# Patient Record
Sex: FEMALE | Race: WHITE | Employment: OTHER | URBAN - METROPOLITAN AREA
[De-identification: names, ages, dates, MRNs, and addresses within clinical notes are randomized per-mention and may not be internally consistent; named-entity substitution may affect disease eponyms.]

---

## 2019-10-11 ENCOUNTER — HOSPITAL ENCOUNTER (OUTPATIENT)
Age: 74
Setting detail: SPECIMEN
Discharge: HOME | End: 2019-10-11
Payer: COMMERCIAL

## 2019-10-11 LAB
DATE, STOOL #1: NORMAL
DATE, STOOL #2: NORMAL
DATE, STOOL #3: NORMAL
HEMOCCULT SP1 STL QL: NEGATIVE
HEMOCCULT SP2 STL QL: NEGATIVE
HEMOCCULT SP3 STL QL: NEGATIVE
TIME, STOOL #1: 1856
TIME, STOOL #2: NORMAL
TIME, STOOL #3: NORMAL

## 2019-10-14 ENCOUNTER — HOSPITAL ENCOUNTER (OUTPATIENT)
Age: 74
Setting detail: OBSERVATION
Discharge: HOME | End: 2019-10-17
Attending: EMERGENCY MEDICINE | Admitting: INTERNAL MEDICINE
Payer: MEDICARE

## 2019-10-14 ENCOUNTER — APPOINTMENT (OUTPATIENT)
Dept: GENERAL RADIOLOGY | Age: 74
End: 2019-10-14
Payer: MEDICARE

## 2019-10-14 DIAGNOSIS — J40 BRONCHITIS: Primary | ICD-10-CM

## 2019-10-14 DIAGNOSIS — I50.9 CONGESTIVE HEART FAILURE, UNSPECIFIED HF CHRONICITY, UNSPECIFIED HEART FAILURE TYPE (HCC): ICD-10-CM

## 2019-10-14 DIAGNOSIS — R06.89 DYSPNEA AND RESPIRATORY ABNORMALITIES: ICD-10-CM

## 2019-10-14 DIAGNOSIS — J22 LOWER RESPIRATORY INFECTION: ICD-10-CM

## 2019-10-14 DIAGNOSIS — R06.00 DYSPNEA AND RESPIRATORY ABNORMALITIES: ICD-10-CM

## 2019-10-14 LAB
ABSOLUTE EOS #: 0.48 K/UL (ref 0–0.44)
ABSOLUTE IMMATURE GRANULOCYTE: 0.12 K/UL (ref 0–0.3)
ABSOLUTE LYMPH #: 4.44 K/UL (ref 1.1–3.7)
ABSOLUTE MONO #: 0.84 K/UL (ref 0.1–1.2)
ANION GAP SERPL CALCULATED.3IONS-SCNC: 11 MMOL/L (ref 9–17)
BASOPHILS # BLD: 1 % (ref 0–2)
BASOPHILS ABSOLUTE: 0.12 K/UL (ref 0–0.2)
BNP INTERPRETATION: ABNORMAL
BUN BLDV-MCNC: 10 MG/DL (ref 8–23)
BUN/CREAT BLD: 14 (ref 9–20)
CALCIUM SERPL-MCNC: 9.7 MG/DL (ref 8.6–10.4)
CHLORIDE BLD-SCNC: 104 MMOL/L (ref 98–107)
CO2: 23 MMOL/L (ref 20–31)
CREAT SERPL-MCNC: 0.71 MG/DL (ref 0.5–0.9)
DIFFERENTIAL TYPE: ABNORMAL
DIRECT EXAM: NORMAL
DIRECT EXAM: NORMAL
EOSINOPHILS RELATIVE PERCENT: 4 % (ref 1–4)
GFR AFRICAN AMERICAN: >60 ML/MIN
GFR NON-AFRICAN AMERICAN: >60 ML/MIN
GFR SERPL CREATININE-BSD FRML MDRD: ABNORMAL ML/MIN/{1.73_M2}
GFR SERPL CREATININE-BSD FRML MDRD: ABNORMAL ML/MIN/{1.73_M2}
GLUCOSE BLD-MCNC: 100 MG/DL (ref 70–99)
HCT VFR BLD CALC: 35.2 % (ref 36.3–47.1)
HEMOGLOBIN: 11.2 G/DL (ref 11.9–15.1)
IMMATURE GRANULOCYTES: 1 %
LACTIC ACID, SEPSIS WHOLE BLOOD: NORMAL MMOL/L (ref 0.5–1.9)
LACTIC ACID, SEPSIS: 1.4 MMOL/L (ref 0.5–1.9)
LYMPHOCYTES # BLD: 37 % (ref 24–43)
Lab: NORMAL
Lab: NORMAL
MAGNESIUM: 1.7 MG/DL (ref 1.6–2.6)
MCH RBC QN AUTO: 29.8 PG (ref 25.2–33.5)
MCHC RBC AUTO-ENTMCNC: 31.8 G/DL (ref 28.4–34.8)
MCV RBC AUTO: 93.6 FL (ref 82.6–102.9)
MONOCYTES # BLD: 7 % (ref 3–12)
MYOGLOBIN: <21 NG/ML (ref 25–58)
NRBC AUTOMATED: 0 PER 100 WBC
PDW BLD-RTO: 15.4 % (ref 11.8–14.4)
PLATELET # BLD: 382 K/UL (ref 138–453)
PLATELET ESTIMATE: ABNORMAL
PMV BLD AUTO: 10.1 FL (ref 8.1–13.5)
POTASSIUM SERPL-SCNC: 3.8 MMOL/L (ref 3.7–5.3)
PRO-BNP: 819 PG/ML
RBC # BLD: 3.76 M/UL (ref 3.95–5.11)
RBC # BLD: ABNORMAL 10*6/UL
SEG NEUTROPHILS: 50 % (ref 36–65)
SEGMENTED NEUTROPHILS ABSOLUTE COUNT: 6 K/UL (ref 1.5–8.1)
SODIUM BLD-SCNC: 138 MMOL/L (ref 135–144)
SPECIMEN DESCRIPTION: NORMAL
SPECIMEN DESCRIPTION: NORMAL
TROPONIN INTERP: ABNORMAL
TROPONIN T: ABNORMAL NG/ML
TROPONIN, HIGH SENSITIVITY: 12 NG/L (ref 0–14)
WBC # BLD: 12 K/UL (ref 3.5–11.3)
WBC # BLD: ABNORMAL 10*3/UL

## 2019-10-14 PROCEDURE — 85025 COMPLETE CBC W/AUTO DIFF WBC: CPT

## 2019-10-14 PROCEDURE — 87804 INFLUENZA ASSAY W/OPTIC: CPT

## 2019-10-14 PROCEDURE — 71045 X-RAY EXAM CHEST 1 VIEW: CPT

## 2019-10-14 PROCEDURE — 94760 N-INVAS EAR/PLS OXIMETRY 1: CPT

## 2019-10-14 PROCEDURE — 83880 ASSAY OF NATRIURETIC PEPTIDE: CPT

## 2019-10-14 PROCEDURE — 83735 ASSAY OF MAGNESIUM: CPT

## 2019-10-14 PROCEDURE — 6360000002 HC RX W HCPCS: Performed by: NURSE PRACTITIONER

## 2019-10-14 PROCEDURE — 84484 ASSAY OF TROPONIN QUANT: CPT

## 2019-10-14 PROCEDURE — 83874 ASSAY OF MYOGLOBIN: CPT

## 2019-10-14 PROCEDURE — 81003 URINALYSIS AUTO W/O SCOPE: CPT

## 2019-10-14 PROCEDURE — 94640 AIRWAY INHALATION TREATMENT: CPT

## 2019-10-14 PROCEDURE — 99285 EMERGENCY DEPT VISIT HI MDM: CPT

## 2019-10-14 PROCEDURE — 80048 BASIC METABOLIC PNL TOTAL CA: CPT

## 2019-10-14 PROCEDURE — 83605 ASSAY OF LACTIC ACID: CPT

## 2019-10-14 PROCEDURE — 93005 ELECTROCARDIOGRAM TRACING: CPT | Performed by: NURSE PRACTITIONER

## 2019-10-14 PROCEDURE — 87040 BLOOD CULTURE FOR BACTERIA: CPT

## 2019-10-14 PROCEDURE — 87880 STREP A ASSAY W/OPTIC: CPT

## 2019-10-14 PROCEDURE — 96365 THER/PROPH/DIAG IV INF INIT: CPT

## 2019-10-14 PROCEDURE — 96366 THER/PROPH/DIAG IV INF ADDON: CPT

## 2019-10-14 PROCEDURE — 96375 TX/PRO/DX INJ NEW DRUG ADDON: CPT

## 2019-10-14 RX ORDER — METHYLPREDNISOLONE SODIUM SUCCINATE 125 MG/2ML
125 INJECTION, POWDER, LYOPHILIZED, FOR SOLUTION INTRAMUSCULAR; INTRAVENOUS ONCE
Status: COMPLETED | OUTPATIENT
Start: 2019-10-14 | End: 2019-10-14

## 2019-10-14 RX ORDER — MAGNESIUM SULFATE 1 G/100ML
1 INJECTION INTRAVENOUS ONCE
Status: COMPLETED | OUTPATIENT
Start: 2019-10-14 | End: 2019-10-14

## 2019-10-14 RX ORDER — ALBUTEROL SULFATE 2.5 MG/3ML
2.5 SOLUTION RESPIRATORY (INHALATION) ONCE
Status: COMPLETED | OUTPATIENT
Start: 2019-10-14 | End: 2019-10-14

## 2019-10-14 RX ADMIN — ALBUTEROL SULFATE 2.5 MG: 2.5 SOLUTION RESPIRATORY (INHALATION) at 20:22

## 2019-10-14 RX ADMIN — METHYLPREDNISOLONE SODIUM SUCCINATE 125 MG: 125 INJECTION, POWDER, FOR SOLUTION INTRAMUSCULAR; INTRAVENOUS at 20:54

## 2019-10-14 RX ADMIN — MAGNESIUM SULFATE IN DEXTROSE 1 G: 10 INJECTION, SOLUTION INTRAVENOUS at 20:57

## 2019-10-14 ASSESSMENT — ENCOUNTER SYMPTOMS
COUGH: 1
WHEEZING: 1
VOMITING: 0
SHORTNESS OF BREATH: 1
ABDOMINAL PAIN: 0
NAUSEA: 0

## 2019-10-14 ASSESSMENT — PAIN SCALES - GENERAL: PAINLEVEL_OUTOF10: 9

## 2019-10-15 PROBLEM — Z72.0 TOBACCO ABUSE: Status: ACTIVE | Noted: 2019-10-15

## 2019-10-15 PROBLEM — I50.33 ACUTE ON CHRONIC DIASTOLIC CHF (CONGESTIVE HEART FAILURE) (HCC): Status: ACTIVE | Noted: 2019-10-15

## 2019-10-15 PROBLEM — I50.9 HEART FAILURE (HCC): Status: ACTIVE | Noted: 2019-10-15

## 2019-10-15 PROBLEM — I50.33 ACUTE ON CHRONIC DIASTOLIC HEART FAILURE (HCC): Status: ACTIVE | Noted: 2019-10-15

## 2019-10-15 LAB
ANION GAP SERPL CALCULATED.3IONS-SCNC: 17 MMOL/L (ref 9–17)
BUN BLDV-MCNC: 11 MG/DL (ref 8–23)
BUN/CREAT BLD: 12 (ref 9–20)
CALCIUM SERPL-MCNC: 9.2 MG/DL (ref 8.6–10.4)
CHLORIDE BLD-SCNC: 100 MMOL/L (ref 98–107)
CHOLESTEROL/HDL RATIO: 3
CHOLESTEROL: 114 MG/DL
CO2: 18 MMOL/L (ref 20–31)
CREAT SERPL-MCNC: 0.91 MG/DL (ref 0.5–0.9)
EKG ATRIAL RATE: 71 BPM
EKG P AXIS: 30 DEGREES
EKG P-R INTERVAL: 186 MS
EKG Q-T INTERVAL: 456 MS
EKG QRS DURATION: 94 MS
EKG QTC CALCULATION (BAZETT): 495 MS
EKG R AXIS: -17 DEGREES
EKG T AXIS: -23 DEGREES
EKG VENTRICULAR RATE: 71 BPM
GFR AFRICAN AMERICAN: >60 ML/MIN
GFR NON-AFRICAN AMERICAN: >60 ML/MIN
GFR SERPL CREATININE-BSD FRML MDRD: ABNORMAL ML/MIN/{1.73_M2}
GFR SERPL CREATININE-BSD FRML MDRD: ABNORMAL ML/MIN/{1.73_M2}
GLUCOSE BLD-MCNC: 101 MG/DL (ref 65–105)
GLUCOSE BLD-MCNC: 168 MG/DL (ref 65–105)
GLUCOSE BLD-MCNC: 215 MG/DL (ref 65–105)
GLUCOSE BLD-MCNC: 473 MG/DL (ref 70–99)
HCT VFR BLD CALC: 36.6 % (ref 36.3–47.1)
HDLC SERPL-MCNC: 38 MG/DL
HEMOGLOBIN: 11.4 G/DL (ref 11.9–15.1)
LACTIC ACID, SEPSIS WHOLE BLOOD: NORMAL MMOL/L (ref 0.5–1.9)
LACTIC ACID, SEPSIS: 1.6 MMOL/L (ref 0.5–1.9)
LDL CHOLESTEROL: 63 MG/DL (ref 0–130)
LV EF: 55 %
LVEF MODALITY: NORMAL
MAGNESIUM: 1.7 MG/DL (ref 1.6–2.6)
MCH RBC QN AUTO: 29.8 PG (ref 25.2–33.5)
MCHC RBC AUTO-ENTMCNC: 31.1 G/DL (ref 28.4–34.8)
MCV RBC AUTO: 95.6 FL (ref 82.6–102.9)
MYOGLOBIN: 21 NG/ML (ref 25–58)
NRBC AUTOMATED: 0 PER 100 WBC
PDW BLD-RTO: 15.4 % (ref 11.8–14.4)
PLATELET # BLD: 365 K/UL (ref 138–453)
PMV BLD AUTO: 10 FL (ref 8.1–13.5)
POTASSIUM SERPL-SCNC: 3.8 MMOL/L (ref 3.7–5.3)
RBC # BLD: 3.83 M/UL (ref 3.95–5.11)
SODIUM BLD-SCNC: 135 MMOL/L (ref 135–144)
TRIGL SERPL-MCNC: 64 MG/DL
TROPONIN INTERP: ABNORMAL
TROPONIN T: ABNORMAL NG/ML
TROPONIN, HIGH SENSITIVITY: 10 NG/L (ref 0–14)
TSH SERPL DL<=0.05 MIU/L-ACNC: 0.47 MIU/L (ref 0.3–5)
VLDLC SERPL CALC-MCNC: ABNORMAL MG/DL (ref 1–30)
WBC # BLD: 9.6 K/UL (ref 3.5–11.3)

## 2019-10-15 PROCEDURE — 36415 COLL VENOUS BLD VENIPUNCTURE: CPT

## 2019-10-15 PROCEDURE — 80048 BASIC METABOLIC PNL TOTAL CA: CPT

## 2019-10-15 PROCEDURE — 6370000000 HC RX 637 (ALT 250 FOR IP): Performed by: NURSE PRACTITIONER

## 2019-10-15 PROCEDURE — G0378 HOSPITAL OBSERVATION PER HR: HCPCS

## 2019-10-15 PROCEDURE — 97162 PT EVAL MOD COMPLEX 30 MIN: CPT

## 2019-10-15 PROCEDURE — 94640 AIRWAY INHALATION TREATMENT: CPT

## 2019-10-15 PROCEDURE — 6360000002 HC RX W HCPCS: Performed by: NURSE PRACTITIONER

## 2019-10-15 PROCEDURE — 6360000002 HC RX W HCPCS: Performed by: EMERGENCY MEDICINE

## 2019-10-15 PROCEDURE — 93306 TTE W/DOPPLER COMPLETE: CPT

## 2019-10-15 PROCEDURE — 83735 ASSAY OF MAGNESIUM: CPT

## 2019-10-15 PROCEDURE — 80061 LIPID PANEL: CPT

## 2019-10-15 PROCEDURE — 82947 ASSAY GLUCOSE BLOOD QUANT: CPT

## 2019-10-15 PROCEDURE — 2700000000 HC OXYGEN THERAPY PER DAY

## 2019-10-15 PROCEDURE — 96375 TX/PRO/DX INJ NEW DRUG ADDON: CPT

## 2019-10-15 PROCEDURE — 94761 N-INVAS EAR/PLS OXIMETRY MLT: CPT

## 2019-10-15 PROCEDURE — 99222 1ST HOSP IP/OBS MODERATE 55: CPT | Performed by: NURSE PRACTITIONER

## 2019-10-15 PROCEDURE — 85027 COMPLETE CBC AUTOMATED: CPT

## 2019-10-15 PROCEDURE — 2580000003 HC RX 258: Performed by: NURSE PRACTITIONER

## 2019-10-15 PROCEDURE — 97530 THERAPEUTIC ACTIVITIES: CPT

## 2019-10-15 PROCEDURE — 84443 ASSAY THYROID STIM HORMONE: CPT

## 2019-10-15 PROCEDURE — 96376 TX/PRO/DX INJ SAME DRUG ADON: CPT

## 2019-10-15 PROCEDURE — 96372 THER/PROPH/DIAG INJ SC/IM: CPT

## 2019-10-15 PROCEDURE — 94760 N-INVAS EAR/PLS OXIMETRY 1: CPT

## 2019-10-15 RX ORDER — SERTRALINE HYDROCHLORIDE 100 MG/1
100 TABLET, FILM COATED ORAL DAILY
Status: DISCONTINUED | OUTPATIENT
Start: 2019-10-15 | End: 2019-10-17 | Stop reason: HOSPADM

## 2019-10-15 RX ORDER — LORAZEPAM 1 MG/1
1 TABLET ORAL EVERY 8 HOURS PRN
Status: DISCONTINUED | OUTPATIENT
Start: 2019-10-15 | End: 2019-10-17 | Stop reason: HOSPADM

## 2019-10-15 RX ORDER — CARVEDILOL 3.12 MG/1
12.5 TABLET ORAL 2 TIMES DAILY WITH MEALS
Status: DISCONTINUED | OUTPATIENT
Start: 2019-10-15 | End: 2019-10-17 | Stop reason: HOSPADM

## 2019-10-15 RX ORDER — ACETAMINOPHEN 325 MG/1
650 TABLET ORAL EVERY 4 HOURS PRN
Status: DISCONTINUED | OUTPATIENT
Start: 2019-10-15 | End: 2019-10-17 | Stop reason: HOSPADM

## 2019-10-15 RX ORDER — DOCUSATE SODIUM 100 MG/1
100 CAPSULE, LIQUID FILLED ORAL 2 TIMES DAILY
Status: DISCONTINUED | OUTPATIENT
Start: 2019-10-15 | End: 2019-10-17 | Stop reason: HOSPADM

## 2019-10-15 RX ORDER — METHYLPREDNISOLONE SODIUM SUCCINATE 40 MG/ML
40 INJECTION, POWDER, LYOPHILIZED, FOR SOLUTION INTRAMUSCULAR; INTRAVENOUS EVERY 6 HOURS
Status: DISCONTINUED | OUTPATIENT
Start: 2019-10-15 | End: 2019-10-17

## 2019-10-15 RX ORDER — IPRATROPIUM BROMIDE AND ALBUTEROL SULFATE 2.5; .5 MG/3ML; MG/3ML
1 SOLUTION RESPIRATORY (INHALATION)
Status: DISCONTINUED | OUTPATIENT
Start: 2019-10-15 | End: 2019-10-15

## 2019-10-15 RX ORDER — NICOTINE 21 MG/24HR
1 PATCH, TRANSDERMAL 24 HOURS TRANSDERMAL DAILY PRN
Status: DISCONTINUED | OUTPATIENT
Start: 2019-10-15 | End: 2019-10-17 | Stop reason: HOSPADM

## 2019-10-15 RX ORDER — ONDANSETRON 2 MG/ML
4 INJECTION INTRAMUSCULAR; INTRAVENOUS EVERY 6 HOURS PRN
Status: DISCONTINUED | OUTPATIENT
Start: 2019-10-15 | End: 2019-10-17 | Stop reason: HOSPADM

## 2019-10-15 RX ORDER — MORPHINE SULFATE 15 MG/1
15 TABLET, FILM COATED, EXTENDED RELEASE ORAL 2 TIMES DAILY
Status: DISCONTINUED | OUTPATIENT
Start: 2019-10-15 | End: 2019-10-17 | Stop reason: HOSPADM

## 2019-10-15 RX ORDER — AMLODIPINE BESYLATE 5 MG/1
10 TABLET ORAL DAILY
Status: DISCONTINUED | OUTPATIENT
Start: 2019-10-15 | End: 2019-10-17 | Stop reason: HOSPADM

## 2019-10-15 RX ORDER — FUROSEMIDE 10 MG/ML
40 INJECTION INTRAMUSCULAR; INTRAVENOUS 2 TIMES DAILY
Status: DISCONTINUED | OUTPATIENT
Start: 2019-10-15 | End: 2019-10-17

## 2019-10-15 RX ORDER — BENZONATATE 100 MG/1
100 CAPSULE ORAL 3 TIMES DAILY PRN
Status: DISCONTINUED | OUTPATIENT
Start: 2019-10-15 | End: 2019-10-17 | Stop reason: HOSPADM

## 2019-10-15 RX ORDER — TRAZODONE HYDROCHLORIDE 50 MG/1
100 TABLET ORAL NIGHTLY
Status: DISCONTINUED | OUTPATIENT
Start: 2019-10-15 | End: 2019-10-17 | Stop reason: HOSPADM

## 2019-10-15 RX ORDER — CALCIUM CARBONATE/VITAMIN D3 600 MG-10
1 TABLET ORAL DAILY
Status: DISCONTINUED | OUTPATIENT
Start: 2019-10-15 | End: 2019-10-17 | Stop reason: HOSPADM

## 2019-10-15 RX ORDER — LISINOPRIL 10 MG/1
20 TABLET ORAL DAILY
Status: DISCONTINUED | OUTPATIENT
Start: 2019-10-15 | End: 2019-10-17 | Stop reason: HOSPADM

## 2019-10-15 RX ORDER — ALBUTEROL SULFATE 2.5 MG/3ML
2.5 SOLUTION RESPIRATORY (INHALATION)
Status: DISCONTINUED | OUTPATIENT
Start: 2019-10-15 | End: 2019-10-17 | Stop reason: HOSPADM

## 2019-10-15 RX ORDER — HYDRALAZINE HYDROCHLORIDE 10 MG/1
10 TABLET, FILM COATED ORAL 2 TIMES DAILY
Status: DISCONTINUED | OUTPATIENT
Start: 2019-10-15 | End: 2019-10-17 | Stop reason: HOSPADM

## 2019-10-15 RX ORDER — ONDANSETRON 4 MG/1
4 TABLET, ORALLY DISINTEGRATING ORAL EVERY 6 HOURS PRN
Status: DISCONTINUED | OUTPATIENT
Start: 2019-10-15 | End: 2019-10-17 | Stop reason: HOSPADM

## 2019-10-15 RX ORDER — ALBUTEROL SULFATE 90 UG/1
2 AEROSOL, METERED RESPIRATORY (INHALATION) EVERY 6 HOURS PRN
Status: DISCONTINUED | OUTPATIENT
Start: 2019-10-15 | End: 2019-10-17 | Stop reason: HOSPADM

## 2019-10-15 RX ORDER — GABAPENTIN 100 MG/1
100 CAPSULE ORAL 3 TIMES DAILY
Status: DISCONTINUED | OUTPATIENT
Start: 2019-10-15 | End: 2019-10-16

## 2019-10-15 RX ORDER — SODIUM CHLORIDE 0.9 % (FLUSH) 0.9 %
10 SYRINGE (ML) INJECTION EVERY 12 HOURS SCHEDULED
Status: DISCONTINUED | OUTPATIENT
Start: 2019-10-15 | End: 2019-10-17 | Stop reason: HOSPADM

## 2019-10-15 RX ORDER — LANOLIN ALCOHOL/MO/W.PET/CERES
325 CREAM (GRAM) TOPICAL
Status: DISCONTINUED | OUTPATIENT
Start: 2019-10-15 | End: 2019-10-17 | Stop reason: HOSPADM

## 2019-10-15 RX ORDER — OXYBUTYNIN CHLORIDE 5 MG/1
5 TABLET ORAL 2 TIMES DAILY
Status: DISCONTINUED | OUTPATIENT
Start: 2019-10-15 | End: 2019-10-17 | Stop reason: HOSPADM

## 2019-10-15 RX ORDER — NICOTINE POLACRILEX 4 MG
15 LOZENGE BUCCAL PRN
Status: DISCONTINUED | OUTPATIENT
Start: 2019-10-15 | End: 2019-10-17 | Stop reason: HOSPADM

## 2019-10-15 RX ORDER — DEXTROSE MONOHYDRATE 50 MG/ML
100 INJECTION, SOLUTION INTRAVENOUS PRN
Status: DISCONTINUED | OUTPATIENT
Start: 2019-10-15 | End: 2019-10-17 | Stop reason: HOSPADM

## 2019-10-15 RX ORDER — DEXTROSE MONOHYDRATE 25 G/50ML
12.5 INJECTION, SOLUTION INTRAVENOUS PRN
Status: DISCONTINUED | OUTPATIENT
Start: 2019-10-15 | End: 2019-10-17 | Stop reason: HOSPADM

## 2019-10-15 RX ORDER — PREDNISONE 20 MG/1
20 TABLET ORAL DAILY
Status: DISCONTINUED | OUTPATIENT
Start: 2019-10-15 | End: 2019-10-15

## 2019-10-15 RX ORDER — ASPIRIN 81 MG/1
81 TABLET ORAL DAILY
Status: DISCONTINUED | OUTPATIENT
Start: 2019-10-15 | End: 2019-10-17 | Stop reason: HOSPADM

## 2019-10-15 RX ORDER — ALBUTEROL SULFATE 2.5 MG/3ML
2.5 SOLUTION RESPIRATORY (INHALATION) EVERY 4 HOURS PRN
Status: DISCONTINUED | OUTPATIENT
Start: 2019-10-15 | End: 2019-10-17 | Stop reason: HOSPADM

## 2019-10-15 RX ORDER — ATORVASTATIN CALCIUM 20 MG/1
20 TABLET, FILM COATED ORAL DAILY
Status: DISCONTINUED | OUTPATIENT
Start: 2019-10-15 | End: 2019-10-17 | Stop reason: HOSPADM

## 2019-10-15 RX ORDER — PANTOPRAZOLE SODIUM 40 MG/1
40 TABLET, DELAYED RELEASE ORAL
Status: DISCONTINUED | OUTPATIENT
Start: 2019-10-15 | End: 2019-10-17 | Stop reason: HOSPADM

## 2019-10-15 RX ORDER — SODIUM CHLORIDE 0.9 % (FLUSH) 0.9 %
10 SYRINGE (ML) INJECTION PRN
Status: DISCONTINUED | OUTPATIENT
Start: 2019-10-15 | End: 2019-10-17 | Stop reason: HOSPADM

## 2019-10-15 RX ORDER — ONDANSETRON 2 MG/ML
4 INJECTION INTRAMUSCULAR; INTRAVENOUS EVERY 6 HOURS PRN
Status: DISCONTINUED | OUTPATIENT
Start: 2019-10-15 | End: 2019-10-15 | Stop reason: SDUPTHER

## 2019-10-15 RX ADMIN — ALBUTEROL SULFATE 2.5 MG: 2.5 SOLUTION RESPIRATORY (INHALATION) at 11:12

## 2019-10-15 RX ADMIN — DOCUSATE SODIUM 100 MG: 100 CAPSULE, LIQUID FILLED ORAL at 21:53

## 2019-10-15 RX ADMIN — INSULIN LISPRO 1 UNITS: 100 INJECTION, SOLUTION INTRAVENOUS; SUBCUTANEOUS at 21:53

## 2019-10-15 RX ADMIN — METHYLPREDNISOLONE SODIUM SUCCINATE 40 MG: 40 INJECTION, POWDER, FOR SOLUTION INTRAMUSCULAR; INTRAVENOUS at 21:53

## 2019-10-15 RX ADMIN — MORPHINE SULFATE 15 MG: 15 TABLET, FILM COATED, EXTENDED RELEASE ORAL at 21:53

## 2019-10-15 RX ADMIN — CARVEDILOL 12.5 MG: 3.12 TABLET, FILM COATED ORAL at 08:35

## 2019-10-15 RX ADMIN — ALBUTEROL SULFATE 2.5 MG: 2.5 SOLUTION RESPIRATORY (INHALATION) at 06:32

## 2019-10-15 RX ADMIN — CARVEDILOL 12.5 MG: 3.12 TABLET, FILM COATED ORAL at 17:25

## 2019-10-15 RX ADMIN — INSULIN LISPRO 3 UNITS: 100 INJECTION, SOLUTION INTRAVENOUS; SUBCUTANEOUS at 12:48

## 2019-10-15 RX ADMIN — METFORMIN HYDROCHLORIDE 500 MG: 500 TABLET ORAL at 08:34

## 2019-10-15 RX ADMIN — PREDNISONE 20 MG: 20 TABLET ORAL at 08:34

## 2019-10-15 RX ADMIN — Medication 10 ML: at 08:37

## 2019-10-15 RX ADMIN — SERTRALINE HYDROCHLORIDE 100 MG: 100 TABLET ORAL at 08:35

## 2019-10-15 RX ADMIN — FUROSEMIDE 40 MG: 10 INJECTION, SOLUTION INTRAMUSCULAR; INTRAVENOUS at 21:53

## 2019-10-15 RX ADMIN — MORPHINE SULFATE 15 MG: 15 TABLET, FILM COATED, EXTENDED RELEASE ORAL at 08:34

## 2019-10-15 RX ADMIN — ALBUTEROL SULFATE 2.5 MG: 2.5 SOLUTION RESPIRATORY (INHALATION) at 15:13

## 2019-10-15 RX ADMIN — ASPIRIN 81 MG: 81 TABLET, COATED ORAL at 08:33

## 2019-10-15 RX ADMIN — OXYBUTYNIN CHLORIDE 5 MG: 5 TABLET ORAL at 08:33

## 2019-10-15 RX ADMIN — Medication 10 ML: at 21:53

## 2019-10-15 RX ADMIN — ATORVASTATIN CALCIUM 20 MG: 20 TABLET, FILM COATED ORAL at 08:35

## 2019-10-15 RX ADMIN — TRAZODONE HYDROCHLORIDE 100 MG: 50 TABLET ORAL at 21:53

## 2019-10-15 RX ADMIN — HYDRALAZINE HYDROCHLORIDE 10 MG: 10 TABLET, FILM COATED ORAL at 08:33

## 2019-10-15 RX ADMIN — FERROUS SULFATE TAB EC 325 MG (65 MG FE EQUIVALENT) 325 MG: 325 (65 FE) TABLET DELAYED RESPONSE at 08:33

## 2019-10-15 RX ADMIN — BENZONATATE 100 MG: 100 CAPSULE ORAL at 10:54

## 2019-10-15 RX ADMIN — GUAIFENESIN AND DEXTROMETHORPHAN HYDROBROMIDE 1 TABLET: 600; 30 TABLET, EXTENDED RELEASE ORAL at 14:57

## 2019-10-15 RX ADMIN — OXYBUTYNIN CHLORIDE 5 MG: 5 TABLET ORAL at 21:53

## 2019-10-15 RX ADMIN — FUROSEMIDE 40 MG: 10 INJECTION, SOLUTION INTRAMUSCULAR; INTRAVENOUS at 08:35

## 2019-10-15 RX ADMIN — FERROUS SULFATE TAB EC 325 MG (65 MG FE EQUIVALENT) 325 MG: 325 (65 FE) TABLET DELAYED RESPONSE at 12:49

## 2019-10-15 RX ADMIN — DOCUSATE SODIUM 100 MG: 100 CAPSULE, LIQUID FILLED ORAL at 08:32

## 2019-10-15 RX ADMIN — PREGABALIN 200 MG: 75 CAPSULE ORAL at 03:23

## 2019-10-15 RX ADMIN — LORAZEPAM 1 MG: 1 TABLET ORAL at 03:24

## 2019-10-15 RX ADMIN — ACETAMINOPHEN 650 MG: 325 TABLET ORAL at 18:11

## 2019-10-15 RX ADMIN — PREGABALIN 200 MG: 75 CAPSULE ORAL at 08:33

## 2019-10-15 RX ADMIN — PANTOPRAZOLE SODIUM 40 MG: 40 TABLET, DELAYED RELEASE ORAL at 10:51

## 2019-10-15 RX ADMIN — Medication 1 TABLET: at 08:35

## 2019-10-15 RX ADMIN — LISINOPRIL 20 MG: 10 TABLET ORAL at 08:34

## 2019-10-15 RX ADMIN — ALBUTEROL SULFATE 2.5 MG: 2.5 SOLUTION RESPIRATORY (INHALATION) at 03:22

## 2019-10-15 RX ADMIN — PREGABALIN 200 MG: 75 CAPSULE ORAL at 21:53

## 2019-10-15 RX ADMIN — METHYLPREDNISOLONE SODIUM SUCCINATE 40 MG: 40 INJECTION, POWDER, FOR SOLUTION INTRAMUSCULAR; INTRAVENOUS at 14:57

## 2019-10-15 RX ADMIN — HYDRALAZINE HYDROCHLORIDE 10 MG: 10 TABLET, FILM COATED ORAL at 21:53

## 2019-10-15 RX ADMIN — ENOXAPARIN SODIUM 40 MG: 40 INJECTION SUBCUTANEOUS at 08:35

## 2019-10-15 RX ADMIN — MOMETASONE FUROATE AND FORMOTEROL FUMARATE DIHYDRATE 2 PUFF: 200; 5 AEROSOL RESPIRATORY (INHALATION) at 18:21

## 2019-10-15 RX ADMIN — GUAIFENESIN AND DEXTROMETHORPHAN HYDROBROMIDE 1 TABLET: 600; 30 TABLET, EXTENDED RELEASE ORAL at 21:53

## 2019-10-15 RX ADMIN — FERROUS SULFATE TAB EC 325 MG (65 MG FE EQUIVALENT) 325 MG: 325 (65 FE) TABLET DELAYED RESPONSE at 17:25

## 2019-10-15 RX ADMIN — ALBUTEROL SULFATE 2.5 MG: 2.5 SOLUTION RESPIRATORY (INHALATION) at 18:19

## 2019-10-15 ASSESSMENT — PAIN SCALES - GENERAL
PAINLEVEL_OUTOF10: 6
PAINLEVEL_OUTOF10: 3
PAINLEVEL_OUTOF10: 9

## 2019-10-15 ASSESSMENT — PAIN DESCRIPTION - PAIN TYPE: TYPE: CHRONIC PAIN

## 2019-10-15 ASSESSMENT — PAIN DESCRIPTION - FREQUENCY: FREQUENCY: INTERMITTENT

## 2019-10-15 ASSESSMENT — PAIN DESCRIPTION - DESCRIPTORS: DESCRIPTORS: ACHING;DISCOMFORT

## 2019-10-15 ASSESSMENT — PAIN DESCRIPTION - ONSET: ONSET: ON-GOING

## 2019-10-15 ASSESSMENT — PAIN DESCRIPTION - LOCATION: LOCATION: GENERALIZED

## 2019-10-15 ASSESSMENT — PAIN - FUNCTIONAL ASSESSMENT: PAIN_FUNCTIONAL_ASSESSMENT: ACTIVITIES ARE NOT PREVENTED

## 2019-10-16 ENCOUNTER — APPOINTMENT (OUTPATIENT)
Dept: GENERAL RADIOLOGY | Age: 74
End: 2019-10-16
Payer: MEDICARE

## 2019-10-16 LAB
-: NORMAL
AMORPHOUS: NORMAL
BACTERIA: NORMAL
BILIRUBIN URINE: NEGATIVE
BNP INTERPRETATION: ABNORMAL
CASTS UA: NORMAL /LPF
COLOR: YELLOW
COMMENT UA: ABNORMAL
CRYSTALS, UA: NORMAL /HPF
EPITHELIAL CELLS UA: NORMAL /HPF (ref 0–5)
GLUCOSE BLD-MCNC: 138 MG/DL (ref 65–105)
GLUCOSE BLD-MCNC: 144 MG/DL (ref 65–105)
GLUCOSE BLD-MCNC: 156 MG/DL (ref 65–105)
GLUCOSE BLD-MCNC: 208 MG/DL (ref 65–105)
GLUCOSE URINE: NEGATIVE
KETONES, URINE: NEGATIVE
LEUKOCYTE ESTERASE, URINE: NEGATIVE
MUCUS: NORMAL
NITRITE, URINE: NEGATIVE
OTHER OBSERVATIONS UA: NORMAL
PH UA: 5.5 (ref 5–8)
PRO-BNP: 2031 PG/ML
PROTEIN UA: ABNORMAL
RBC UA: NORMAL /HPF (ref 0–2)
RENAL EPITHELIAL, UA: NORMAL /HPF
SPECIFIC GRAVITY UA: 1.01 (ref 1–1.03)
TRICHOMONAS: NORMAL
TURBIDITY: CLEAR
URINE HGB: NEGATIVE
UROBILINOGEN, URINE: NORMAL
WBC UA: NORMAL /HPF (ref 0–5)
YEAST: NORMAL

## 2019-10-16 PROCEDURE — 83880 ASSAY OF NATRIURETIC PEPTIDE: CPT

## 2019-10-16 PROCEDURE — 6360000002 HC RX W HCPCS: Performed by: NURSE PRACTITIONER

## 2019-10-16 PROCEDURE — 97535 SELF CARE MNGMENT TRAINING: CPT

## 2019-10-16 PROCEDURE — 2580000003 HC RX 258: Performed by: NURSE PRACTITIONER

## 2019-10-16 PROCEDURE — G0378 HOSPITAL OBSERVATION PER HR: HCPCS

## 2019-10-16 PROCEDURE — 97116 GAIT TRAINING THERAPY: CPT

## 2019-10-16 PROCEDURE — 81001 URINALYSIS AUTO W/SCOPE: CPT

## 2019-10-16 PROCEDURE — 6370000000 HC RX 637 (ALT 250 FOR IP): Performed by: NURSE PRACTITIONER

## 2019-10-16 PROCEDURE — 94640 AIRWAY INHALATION TREATMENT: CPT

## 2019-10-16 PROCEDURE — 2700000000 HC OXYGEN THERAPY PER DAY

## 2019-10-16 PROCEDURE — 97110 THERAPEUTIC EXERCISES: CPT

## 2019-10-16 PROCEDURE — 96376 TX/PRO/DX INJ SAME DRUG ADON: CPT

## 2019-10-16 PROCEDURE — 82947 ASSAY GLUCOSE BLOOD QUANT: CPT

## 2019-10-16 PROCEDURE — 99225 PR SBSQ OBSERVATION CARE/DAY 25 MINUTES: CPT | Performed by: NURSE PRACTITIONER

## 2019-10-16 PROCEDURE — 71046 X-RAY EXAM CHEST 2 VIEWS: CPT

## 2019-10-16 PROCEDURE — 96372 THER/PROPH/DIAG INJ SC/IM: CPT

## 2019-10-16 PROCEDURE — 97166 OT EVAL MOD COMPLEX 45 MIN: CPT

## 2019-10-16 PROCEDURE — 36415 COLL VENOUS BLD VENIPUNCTURE: CPT

## 2019-10-16 RX ADMIN — TRAZODONE HYDROCHLORIDE 100 MG: 50 TABLET ORAL at 20:48

## 2019-10-16 RX ADMIN — METHYLPREDNISOLONE SODIUM SUCCINATE 40 MG: 40 INJECTION, POWDER, FOR SOLUTION INTRAMUSCULAR; INTRAVENOUS at 09:20

## 2019-10-16 RX ADMIN — GUAIFENESIN AND DEXTROMETHORPHAN HYDROBROMIDE 1 TABLET: 600; 30 TABLET, EXTENDED RELEASE ORAL at 09:24

## 2019-10-16 RX ADMIN — MORPHINE SULFATE 15 MG: 15 TABLET, FILM COATED, EXTENDED RELEASE ORAL at 09:19

## 2019-10-16 RX ADMIN — CARVEDILOL 12.5 MG: 3.12 TABLET, FILM COATED ORAL at 09:24

## 2019-10-16 RX ADMIN — ALBUTEROL SULFATE 2.5 MG: 2.5 SOLUTION RESPIRATORY (INHALATION) at 11:27

## 2019-10-16 RX ADMIN — ACETAMINOPHEN 650 MG: 325 TABLET ORAL at 06:05

## 2019-10-16 RX ADMIN — HYDRALAZINE HYDROCHLORIDE 10 MG: 10 TABLET, FILM COATED ORAL at 20:47

## 2019-10-16 RX ADMIN — ENOXAPARIN SODIUM 40 MG: 40 INJECTION SUBCUTANEOUS at 09:20

## 2019-10-16 RX ADMIN — LISINOPRIL 20 MG: 10 TABLET ORAL at 09:19

## 2019-10-16 RX ADMIN — Medication 1 TABLET: at 09:19

## 2019-10-16 RX ADMIN — Medication 10 ML: at 20:49

## 2019-10-16 RX ADMIN — PREGABALIN 200 MG: 75 CAPSULE ORAL at 20:48

## 2019-10-16 RX ADMIN — METHYLPREDNISOLONE SODIUM SUCCINATE 40 MG: 40 INJECTION, POWDER, FOR SOLUTION INTRAMUSCULAR; INTRAVENOUS at 15:53

## 2019-10-16 RX ADMIN — GUAIFENESIN AND DEXTROMETHORPHAN HYDROBROMIDE 1 TABLET: 600; 30 TABLET, EXTENDED RELEASE ORAL at 20:48

## 2019-10-16 RX ADMIN — ALBUTEROL SULFATE 2.5 MG: 2.5 SOLUTION RESPIRATORY (INHALATION) at 08:04

## 2019-10-16 RX ADMIN — PANTOPRAZOLE SODIUM 40 MG: 40 TABLET, DELAYED RELEASE ORAL at 05:38

## 2019-10-16 RX ADMIN — ASPIRIN 81 MG: 81 TABLET, COATED ORAL at 09:20

## 2019-10-16 RX ADMIN — AMLODIPINE BESYLATE 10 MG: 5 TABLET ORAL at 09:20

## 2019-10-16 RX ADMIN — ACETAMINOPHEN 650 MG: 325 TABLET ORAL at 13:56

## 2019-10-16 RX ADMIN — MOMETASONE FUROATE AND FORMOTEROL FUMARATE DIHYDRATE 2 PUFF: 200; 5 AEROSOL RESPIRATORY (INHALATION) at 08:04

## 2019-10-16 RX ADMIN — FERROUS SULFATE TAB EC 325 MG (65 MG FE EQUIVALENT) 325 MG: 325 (65 FE) TABLET DELAYED RESPONSE at 13:54

## 2019-10-16 RX ADMIN — FUROSEMIDE 40 MG: 10 INJECTION, SOLUTION INTRAMUSCULAR; INTRAVENOUS at 09:20

## 2019-10-16 RX ADMIN — PREGABALIN 200 MG: 75 CAPSULE ORAL at 09:19

## 2019-10-16 RX ADMIN — HYDRALAZINE HYDROCHLORIDE 10 MG: 10 TABLET, FILM COATED ORAL at 09:19

## 2019-10-16 RX ADMIN — METFORMIN HYDROCHLORIDE 500 MG: 500 TABLET ORAL at 09:24

## 2019-10-16 RX ADMIN — ATORVASTATIN CALCIUM 20 MG: 20 TABLET, FILM COATED ORAL at 09:19

## 2019-10-16 RX ADMIN — ACETAMINOPHEN 650 MG: 325 TABLET ORAL at 21:48

## 2019-10-16 RX ADMIN — FERROUS SULFATE TAB EC 325 MG (65 MG FE EQUIVALENT) 325 MG: 325 (65 FE) TABLET DELAYED RESPONSE at 16:56

## 2019-10-16 RX ADMIN — DOCUSATE SODIUM 100 MG: 100 CAPSULE, LIQUID FILLED ORAL at 09:19

## 2019-10-16 RX ADMIN — OXYBUTYNIN CHLORIDE 5 MG: 5 TABLET ORAL at 09:20

## 2019-10-16 RX ADMIN — DOCUSATE SODIUM 100 MG: 100 CAPSULE, LIQUID FILLED ORAL at 20:48

## 2019-10-16 RX ADMIN — METHYLPREDNISOLONE SODIUM SUCCINATE 40 MG: 40 INJECTION, POWDER, FOR SOLUTION INTRAMUSCULAR; INTRAVENOUS at 20:47

## 2019-10-16 RX ADMIN — INSULIN LISPRO 1 UNITS: 100 INJECTION, SOLUTION INTRAVENOUS; SUBCUTANEOUS at 21:48

## 2019-10-16 RX ADMIN — MOMETASONE FUROATE AND FORMOTEROL FUMARATE DIHYDRATE 2 PUFF: 200; 5 AEROSOL RESPIRATORY (INHALATION) at 18:19

## 2019-10-16 RX ADMIN — FERROUS SULFATE TAB EC 325 MG (65 MG FE EQUIVALENT) 325 MG: 325 (65 FE) TABLET DELAYED RESPONSE at 09:24

## 2019-10-16 RX ADMIN — OXYBUTYNIN CHLORIDE 5 MG: 5 TABLET ORAL at 20:48

## 2019-10-16 RX ADMIN — MORPHINE SULFATE 15 MG: 15 TABLET, FILM COATED, EXTENDED RELEASE ORAL at 20:47

## 2019-10-16 RX ADMIN — INSULIN LISPRO 3 UNITS: 100 INJECTION, SOLUTION INTRAVENOUS; SUBCUTANEOUS at 17:15

## 2019-10-16 RX ADMIN — CARVEDILOL 12.5 MG: 3.12 TABLET, FILM COATED ORAL at 16:56

## 2019-10-16 RX ADMIN — METHYLPREDNISOLONE SODIUM SUCCINATE 40 MG: 40 INJECTION, POWDER, FOR SOLUTION INTRAMUSCULAR; INTRAVENOUS at 03:15

## 2019-10-16 RX ADMIN — Medication 10 ML: at 09:19

## 2019-10-16 RX ADMIN — Medication 10 ML: at 03:15

## 2019-10-16 RX ADMIN — ALBUTEROL SULFATE 2.5 MG: 2.5 SOLUTION RESPIRATORY (INHALATION) at 15:37

## 2019-10-16 RX ADMIN — FUROSEMIDE 40 MG: 10 INJECTION, SOLUTION INTRAMUSCULAR; INTRAVENOUS at 20:47

## 2019-10-16 RX ADMIN — SERTRALINE HYDROCHLORIDE 100 MG: 100 TABLET ORAL at 09:20

## 2019-10-16 RX ADMIN — ALBUTEROL SULFATE 2.5 MG: 2.5 SOLUTION RESPIRATORY (INHALATION) at 18:19

## 2019-10-16 ASSESSMENT — PAIN DESCRIPTION - LOCATION
LOCATION: GENERALIZED
LOCATION: HEAD;CHEST

## 2019-10-16 ASSESSMENT — PAIN DESCRIPTION - DESCRIPTORS: DESCRIPTORS: ACHING;DISCOMFORT;TENDER

## 2019-10-16 ASSESSMENT — PAIN SCALES - GENERAL
PAINLEVEL_OUTOF10: 6
PAINLEVEL_OUTOF10: 8
PAINLEVEL_OUTOF10: 3
PAINLEVEL_OUTOF10: 9
PAINLEVEL_OUTOF10: 9
PAINLEVEL_OUTOF10: 8

## 2019-10-16 ASSESSMENT — PAIN DESCRIPTION - FREQUENCY: FREQUENCY: CONTINUOUS

## 2019-10-16 ASSESSMENT — PAIN - FUNCTIONAL ASSESSMENT: PAIN_FUNCTIONAL_ASSESSMENT: PREVENTS OR INTERFERES SOME ACTIVE ACTIVITIES AND ADLS

## 2019-10-16 ASSESSMENT — PAIN DESCRIPTION - PAIN TYPE
TYPE: ACUTE PAIN
TYPE: ACUTE PAIN

## 2019-10-16 ASSESSMENT — PAIN DESCRIPTION - PROGRESSION: CLINICAL_PROGRESSION: GRADUALLY IMPROVING

## 2019-10-16 ASSESSMENT — PAIN DESCRIPTION - ONSET: ONSET: ON-GOING

## 2019-10-17 VITALS
SYSTOLIC BLOOD PRESSURE: 169 MMHG | TEMPERATURE: 97.7 F | HEIGHT: 66 IN | HEART RATE: 62 BPM | DIASTOLIC BLOOD PRESSURE: 85 MMHG | WEIGHT: 151.2 LBS | BODY MASS INDEX: 24.3 KG/M2 | OXYGEN SATURATION: 100 % | RESPIRATION RATE: 16 BRPM

## 2019-10-17 LAB
GLUCOSE BLD-MCNC: 152 MG/DL (ref 65–105)
GLUCOSE BLD-MCNC: 158 MG/DL (ref 65–105)

## 2019-10-17 PROCEDURE — G0378 HOSPITAL OBSERVATION PER HR: HCPCS

## 2019-10-17 PROCEDURE — 6370000000 HC RX 637 (ALT 250 FOR IP): Performed by: NURSE PRACTITIONER

## 2019-10-17 PROCEDURE — 6360000002 HC RX W HCPCS: Performed by: NURSE PRACTITIONER

## 2019-10-17 PROCEDURE — 2700000000 HC OXYGEN THERAPY PER DAY

## 2019-10-17 PROCEDURE — 97530 THERAPEUTIC ACTIVITIES: CPT | Performed by: NURSE PRACTITIONER

## 2019-10-17 PROCEDURE — 96372 THER/PROPH/DIAG INJ SC/IM: CPT

## 2019-10-17 PROCEDURE — 94640 AIRWAY INHALATION TREATMENT: CPT

## 2019-10-17 PROCEDURE — 97535 SELF CARE MNGMENT TRAINING: CPT | Performed by: NURSE PRACTITIONER

## 2019-10-17 PROCEDURE — 82947 ASSAY GLUCOSE BLOOD QUANT: CPT

## 2019-10-17 PROCEDURE — 96376 TX/PRO/DX INJ SAME DRUG ADON: CPT

## 2019-10-17 PROCEDURE — 2580000003 HC RX 258: Performed by: NURSE PRACTITIONER

## 2019-10-17 PROCEDURE — 99239 HOSP IP/OBS DSCHRG MGMT >30: CPT | Performed by: NURSE PRACTITIONER

## 2019-10-17 RX ORDER — PREDNISONE 20 MG/1
40 TABLET ORAL DAILY
Qty: 14 TABLET | Refills: 0 | Status: SHIPPED | OUTPATIENT
Start: 2019-10-17 | End: 2019-10-24

## 2019-10-17 RX ORDER — FUROSEMIDE 20 MG/1
20 TABLET ORAL DAILY
Qty: 30 TABLET | Refills: 3 | Status: ON HOLD | OUTPATIENT
Start: 2019-10-18 | End: 2020-08-25 | Stop reason: HOSPADM

## 2019-10-17 RX ORDER — FUROSEMIDE 20 MG/1
20 TABLET ORAL DAILY
Qty: 60 TABLET | Refills: 3 | Status: CANCELLED | OUTPATIENT
Start: 2019-10-18

## 2019-10-17 RX ORDER — NICOTINE 21 MG/24HR
1 PATCH, TRANSDERMAL 24 HOURS TRANSDERMAL DAILY PRN
Qty: 30 PATCH | Refills: 3 | Status: SHIPPED | OUTPATIENT
Start: 2019-10-17 | End: 2020-08-20

## 2019-10-17 RX ORDER — FUROSEMIDE 20 MG/1
20 TABLET ORAL DAILY
Status: DISCONTINUED | OUTPATIENT
Start: 2019-10-18 | End: 2019-10-17 | Stop reason: HOSPADM

## 2019-10-17 RX ORDER — PREDNISONE 20 MG/1
20 TABLET ORAL DAILY
Qty: 30 TABLET | Refills: 0
Start: 2019-10-25 | End: 2019-11-24

## 2019-10-17 RX ADMIN — HYDRALAZINE HYDROCHLORIDE 10 MG: 10 TABLET, FILM COATED ORAL at 08:36

## 2019-10-17 RX ADMIN — MORPHINE SULFATE 15 MG: 15 TABLET, FILM COATED, EXTENDED RELEASE ORAL at 08:37

## 2019-10-17 RX ADMIN — MOMETASONE FUROATE AND FORMOTEROL FUMARATE DIHYDRATE 2 PUFF: 200; 5 AEROSOL RESPIRATORY (INHALATION) at 07:25

## 2019-10-17 RX ADMIN — PREGABALIN 200 MG: 75 CAPSULE ORAL at 08:37

## 2019-10-17 RX ADMIN — ATORVASTATIN CALCIUM 20 MG: 20 TABLET, FILM COATED ORAL at 08:36

## 2019-10-17 RX ADMIN — PANTOPRAZOLE SODIUM 40 MG: 40 TABLET, DELAYED RELEASE ORAL at 06:05

## 2019-10-17 RX ADMIN — LISINOPRIL 20 MG: 10 TABLET ORAL at 08:36

## 2019-10-17 RX ADMIN — FUROSEMIDE 40 MG: 10 INJECTION, SOLUTION INTRAMUSCULAR; INTRAVENOUS at 08:35

## 2019-10-17 RX ADMIN — INSULIN LISPRO 3 UNITS: 100 INJECTION, SOLUTION INTRAVENOUS; SUBCUTANEOUS at 12:36

## 2019-10-17 RX ADMIN — AMLODIPINE BESYLATE 10 MG: 5 TABLET ORAL at 08:37

## 2019-10-17 RX ADMIN — METHYLPREDNISOLONE SODIUM SUCCINATE 40 MG: 40 INJECTION, POWDER, FOR SOLUTION INTRAMUSCULAR; INTRAVENOUS at 03:02

## 2019-10-17 RX ADMIN — Medication 1 TABLET: at 08:37

## 2019-10-17 RX ADMIN — METFORMIN HYDROCHLORIDE 500 MG: 500 TABLET ORAL at 08:37

## 2019-10-17 RX ADMIN — Medication 10 ML: at 08:44

## 2019-10-17 RX ADMIN — ALBUTEROL SULFATE 2.5 MG: 2.5 SOLUTION RESPIRATORY (INHALATION) at 07:25

## 2019-10-17 RX ADMIN — OXYBUTYNIN CHLORIDE 5 MG: 5 TABLET ORAL at 08:36

## 2019-10-17 RX ADMIN — ENOXAPARIN SODIUM 40 MG: 40 INJECTION SUBCUTANEOUS at 08:35

## 2019-10-17 RX ADMIN — METHYLPREDNISOLONE SODIUM SUCCINATE 40 MG: 40 INJECTION, POWDER, FOR SOLUTION INTRAMUSCULAR; INTRAVENOUS at 08:35

## 2019-10-17 RX ADMIN — ASPIRIN 81 MG: 81 TABLET, COATED ORAL at 08:38

## 2019-10-17 RX ADMIN — GUAIFENESIN AND DEXTROMETHORPHAN HYDROBROMIDE 1 TABLET: 600; 30 TABLET, EXTENDED RELEASE ORAL at 08:37

## 2019-10-17 RX ADMIN — INSULIN LISPRO 3 UNITS: 100 INJECTION, SOLUTION INTRAVENOUS; SUBCUTANEOUS at 08:35

## 2019-10-17 RX ADMIN — DOCUSATE SODIUM 100 MG: 100 CAPSULE, LIQUID FILLED ORAL at 08:36

## 2019-10-17 RX ADMIN — CARVEDILOL 12.5 MG: 3.12 TABLET, FILM COATED ORAL at 08:38

## 2019-10-17 RX ADMIN — FERROUS SULFATE TAB EC 325 MG (65 MG FE EQUIVALENT) 325 MG: 325 (65 FE) TABLET DELAYED RESPONSE at 08:37

## 2019-10-17 RX ADMIN — ACETAMINOPHEN 650 MG: 325 TABLET ORAL at 04:45

## 2019-10-17 RX ADMIN — FERROUS SULFATE TAB EC 325 MG (65 MG FE EQUIVALENT) 325 MG: 325 (65 FE) TABLET DELAYED RESPONSE at 12:36

## 2019-10-17 RX ADMIN — SERTRALINE HYDROCHLORIDE 100 MG: 100 TABLET ORAL at 08:37

## 2019-10-17 RX ADMIN — ALBUTEROL SULFATE 2.5 MG: 2.5 SOLUTION RESPIRATORY (INHALATION) at 11:13

## 2019-10-17 ASSESSMENT — PAIN SCALES - GENERAL
PAINLEVEL_OUTOF10: 8
PAINLEVEL_OUTOF10: 9
PAINLEVEL_OUTOF10: 6

## 2019-10-20 LAB
CULTURE: NORMAL
CULTURE: NORMAL
Lab: NORMAL
Lab: NORMAL
SPECIMEN DESCRIPTION: NORMAL
SPECIMEN DESCRIPTION: NORMAL

## 2019-12-03 ENCOUNTER — HOSPITAL ENCOUNTER (EMERGENCY)
Age: 74
Discharge: HOME | End: 2019-12-03
Attending: EMERGENCY MEDICINE
Payer: MEDICARE

## 2019-12-03 ENCOUNTER — APPOINTMENT (OUTPATIENT)
Dept: GENERAL RADIOLOGY | Age: 74
End: 2019-12-03
Payer: MEDICARE

## 2019-12-03 VITALS
SYSTOLIC BLOOD PRESSURE: 140 MMHG | RESPIRATION RATE: 18 BRPM | BODY MASS INDEX: 25.83 KG/M2 | HEIGHT: 65 IN | TEMPERATURE: 97.7 F | OXYGEN SATURATION: 96 % | WEIGHT: 155 LBS | HEART RATE: 68 BPM | DIASTOLIC BLOOD PRESSURE: 79 MMHG

## 2019-12-03 DIAGNOSIS — J40 BRONCHITIS: ICD-10-CM

## 2019-12-03 DIAGNOSIS — R06.02 SHORTNESS OF BREATH: Primary | ICD-10-CM

## 2019-12-03 LAB
ABSOLUTE EOS #: 0.41 K/UL (ref 0–0.44)
ABSOLUTE IMMATURE GRANULOCYTE: 0.04 K/UL (ref 0–0.3)
ABSOLUTE LYMPH #: 3.49 K/UL (ref 1.1–3.7)
ABSOLUTE MONO #: 0.76 K/UL (ref 0.1–1.2)
ANION GAP SERPL CALCULATED.3IONS-SCNC: 14 MMOL/L (ref 9–17)
BASOPHILS # BLD: 1 % (ref 0–2)
BASOPHILS ABSOLUTE: 0.09 K/UL (ref 0–0.2)
BNP INTERPRETATION: ABNORMAL
BUN BLDV-MCNC: 23 MG/DL (ref 8–23)
BUN/CREAT BLD: 24 (ref 9–20)
CALCIUM SERPL-MCNC: 9.8 MG/DL (ref 8.6–10.4)
CHLORIDE BLD-SCNC: 105 MMOL/L (ref 98–107)
CO2: 21 MMOL/L (ref 20–31)
CREAT SERPL-MCNC: 0.94 MG/DL (ref 0.5–0.9)
DIFFERENTIAL TYPE: ABNORMAL
EOSINOPHILS RELATIVE PERCENT: 4 % (ref 1–4)
GFR AFRICAN AMERICAN: >60 ML/MIN
GFR NON-AFRICAN AMERICAN: 58 ML/MIN
GFR SERPL CREATININE-BSD FRML MDRD: ABNORMAL ML/MIN/{1.73_M2}
GFR SERPL CREATININE-BSD FRML MDRD: ABNORMAL ML/MIN/{1.73_M2}
GLUCOSE BLD-MCNC: 96 MG/DL (ref 70–99)
HCT VFR BLD CALC: 37.5 % (ref 36.3–47.1)
HEMOGLOBIN: 11.9 G/DL (ref 11.9–15.1)
IMMATURE GRANULOCYTES: 0 %
LYMPHOCYTES # BLD: 32 % (ref 24–43)
MCH RBC QN AUTO: 29.5 PG (ref 25.2–33.5)
MCHC RBC AUTO-ENTMCNC: 31.7 G/DL (ref 28.4–34.8)
MCV RBC AUTO: 93.1 FL (ref 82.6–102.9)
MONOCYTES # BLD: 7 % (ref 3–12)
MYOGLOBIN: 26 NG/ML (ref 25–58)
NRBC AUTOMATED: 0 PER 100 WBC
PDW BLD-RTO: 14.8 % (ref 11.8–14.4)
PLATELET # BLD: 321 K/UL (ref 138–453)
PLATELET ESTIMATE: ABNORMAL
PMV BLD AUTO: 10 FL (ref 8.1–13.5)
POTASSIUM SERPL-SCNC: 4.3 MMOL/L (ref 3.7–5.3)
PRO-BNP: 333 PG/ML
RBC # BLD: 4.03 M/UL (ref 3.95–5.11)
RBC # BLD: ABNORMAL 10*6/UL
SEG NEUTROPHILS: 56 % (ref 36–65)
SEGMENTED NEUTROPHILS ABSOLUTE COUNT: 6.28 K/UL (ref 1.5–8.1)
SODIUM BLD-SCNC: 140 MMOL/L (ref 135–144)
TROPONIN INTERP: NORMAL
TROPONIN T: NORMAL NG/ML
TROPONIN, HIGH SENSITIVITY: 12 NG/L (ref 0–14)
WBC # BLD: 11.1 K/UL (ref 3.5–11.3)
WBC # BLD: ABNORMAL 10*3/UL

## 2019-12-03 PROCEDURE — 99285 EMERGENCY DEPT VISIT HI MDM: CPT

## 2019-12-03 PROCEDURE — 94760 N-INVAS EAR/PLS OXIMETRY 1: CPT

## 2019-12-03 PROCEDURE — 6360000002 HC RX W HCPCS: Performed by: EMERGENCY MEDICINE

## 2019-12-03 PROCEDURE — 6360000002 HC RX W HCPCS: Performed by: PHYSICIAN ASSISTANT

## 2019-12-03 PROCEDURE — 83874 ASSAY OF MYOGLOBIN: CPT

## 2019-12-03 PROCEDURE — 94640 AIRWAY INHALATION TREATMENT: CPT

## 2019-12-03 PROCEDURE — 80048 BASIC METABOLIC PNL TOTAL CA: CPT

## 2019-12-03 PROCEDURE — 84484 ASSAY OF TROPONIN QUANT: CPT

## 2019-12-03 PROCEDURE — 96374 THER/PROPH/DIAG INJ IV PUSH: CPT

## 2019-12-03 PROCEDURE — 71046 X-RAY EXAM CHEST 2 VIEWS: CPT

## 2019-12-03 PROCEDURE — 85025 COMPLETE CBC W/AUTO DIFF WBC: CPT

## 2019-12-03 PROCEDURE — 93005 ELECTROCARDIOGRAM TRACING: CPT | Performed by: PHYSICIAN ASSISTANT

## 2019-12-03 PROCEDURE — 83880 ASSAY OF NATRIURETIC PEPTIDE: CPT

## 2019-12-03 RX ORDER — ALBUTEROL SULFATE 2.5 MG/3ML
5 SOLUTION RESPIRATORY (INHALATION)
Status: DISCONTINUED | OUTPATIENT
Start: 2019-12-03 | End: 2019-12-03 | Stop reason: HOSPADM

## 2019-12-03 RX ORDER — BENZONATATE 100 MG/1
100 CAPSULE ORAL 3 TIMES DAILY PRN
Qty: 30 CAPSULE | Refills: 0 | Status: SHIPPED | OUTPATIENT
Start: 2019-12-03 | End: 2019-12-10

## 2019-12-03 RX ORDER — ALBUTEROL SULFATE 90 UG/1
2 AEROSOL, METERED RESPIRATORY (INHALATION)
Status: DISCONTINUED | OUTPATIENT
Start: 2019-12-03 | End: 2019-12-03 | Stop reason: HOSPADM

## 2019-12-03 RX ORDER — FUROSEMIDE 10 MG/ML
40 INJECTION INTRAMUSCULAR; INTRAVENOUS ONCE
Status: COMPLETED | OUTPATIENT
Start: 2019-12-03 | End: 2019-12-03

## 2019-12-03 RX ORDER — IPRATROPIUM BROMIDE AND ALBUTEROL SULFATE 2.5; .5 MG/3ML; MG/3ML
1 SOLUTION RESPIRATORY (INHALATION)
Status: DISCONTINUED | OUTPATIENT
Start: 2019-12-03 | End: 2019-12-03 | Stop reason: HOSPADM

## 2019-12-03 RX ADMIN — FUROSEMIDE 40 MG: 10 INJECTION, SOLUTION INTRAMUSCULAR; INTRAVENOUS at 13:26

## 2019-12-03 RX ADMIN — ALBUTEROL SULFATE 5 MG: 5 SOLUTION RESPIRATORY (INHALATION) at 12:00

## 2019-12-04 LAB
EKG ATRIAL RATE: 70 BPM
EKG P AXIS: 33 DEGREES
EKG P-R INTERVAL: 180 MS
EKG Q-T INTERVAL: 460 MS
EKG QRS DURATION: 92 MS
EKG QTC CALCULATION (BAZETT): 496 MS
EKG R AXIS: -22 DEGREES
EKG T AXIS: -4 DEGREES
EKG VENTRICULAR RATE: 70 BPM

## 2019-12-12 ENCOUNTER — HOSPITAL ENCOUNTER (OUTPATIENT)
Dept: PHYSICAL THERAPY | Age: 74
Setting detail: THERAPIES SERIES
Discharge: HOME | End: 2019-12-12
Payer: MEDICARE

## 2019-12-12 PROCEDURE — 97162 PT EVAL MOD COMPLEX 30 MIN: CPT

## 2019-12-12 PROCEDURE — 97112 NEUROMUSCULAR REEDUCATION: CPT

## 2019-12-17 ENCOUNTER — HOSPITAL ENCOUNTER (OUTPATIENT)
Dept: PHYSICAL THERAPY | Age: 74
Setting detail: THERAPIES SERIES
Discharge: HOME | End: 2019-12-17
Payer: MEDICARE

## 2019-12-17 PROCEDURE — 97110 THERAPEUTIC EXERCISES: CPT

## 2019-12-17 PROCEDURE — 97112 NEUROMUSCULAR REEDUCATION: CPT

## 2019-12-19 ENCOUNTER — HOSPITAL ENCOUNTER (OUTPATIENT)
Dept: PHYSICAL THERAPY | Age: 74
Setting detail: THERAPIES SERIES
Discharge: HOME | End: 2019-12-19
Payer: MEDICARE

## 2019-12-19 PROCEDURE — 97110 THERAPEUTIC EXERCISES: CPT

## 2019-12-19 PROCEDURE — 97116 GAIT TRAINING THERAPY: CPT

## 2019-12-23 ENCOUNTER — HOSPITAL ENCOUNTER (OUTPATIENT)
Dept: PHYSICAL THERAPY | Age: 74
Setting detail: THERAPIES SERIES
Discharge: HOME | End: 2019-12-23
Payer: MEDICARE

## 2019-12-23 PROCEDURE — 97116 GAIT TRAINING THERAPY: CPT

## 2019-12-23 PROCEDURE — 97110 THERAPEUTIC EXERCISES: CPT

## 2019-12-27 ENCOUNTER — HOSPITAL ENCOUNTER (OUTPATIENT)
Dept: PHYSICAL THERAPY | Age: 74
Setting detail: THERAPIES SERIES
Discharge: HOME | End: 2019-12-27
Payer: MEDICARE

## 2019-12-27 PROCEDURE — 97016 VASOPNEUMATIC DEVICE THERAPY: CPT

## 2019-12-27 PROCEDURE — 97110 THERAPEUTIC EXERCISES: CPT

## 2019-12-30 ENCOUNTER — HOSPITAL ENCOUNTER (OUTPATIENT)
Dept: PHYSICAL THERAPY | Age: 74
Setting detail: THERAPIES SERIES
Discharge: HOME | End: 2019-12-30
Payer: MEDICARE

## 2019-12-30 PROCEDURE — 97110 THERAPEUTIC EXERCISES: CPT

## 2019-12-30 PROCEDURE — 97016 VASOPNEUMATIC DEVICE THERAPY: CPT

## 2020-01-02 ENCOUNTER — HOSPITAL ENCOUNTER (OUTPATIENT)
Dept: PHYSICAL THERAPY | Age: 75
Setting detail: THERAPIES SERIES
Discharge: HOME | End: 2020-01-02
Payer: MEDICARE

## 2020-01-02 PROCEDURE — 97016 VASOPNEUMATIC DEVICE THERAPY: CPT

## 2020-01-02 PROCEDURE — 97110 THERAPEUTIC EXERCISES: CPT

## 2020-01-02 NOTE — FLOWSHEET NOTE
Aquatics     []  Neuromuscular     [x]  Other: vasocompression 15 1   Total Treatment time 30 2       Assessment: [] Progressing toward goals. [] No change. [x] Other: Added weight for L marches and LAQ and added hip ER to continue to work on hip and LE strengthening. Held standing exercises again at this date due to pt having continued R ankle edema and pain. Ended treatment session with vasocompression for R ankle. STG: (to be met in 8 treatments)  1. ? Pain: Pt will report decreased bilateral LE pain to 5/10 with standing and walking. 2. ? Strength: Pt will demonstrate improved core and erector spinae strength as evident by ability to sit upright. 3. ? Function: Pt will demonstrate ability to stand for 5 minutes without evident shaking. 4. Independent with Home Exercise Programs  5. Demonstrate Knowledge of fall prevention  LTG: (to be met in 16 treatments)  1. Pt will report decreased LE pain to 2-3/10 with standing for 5 minutes. 2. Pt will increase bilateral hip strength to 4-/5 globally in order to improve standing tolerance. 3. Pt will demonstrate decreased risk of falls and improved functional activity tolerance as evident by an improved score on the CAI by 7 points.      Patient goals: \"to get better\"    Pt. Education:  [x] Yes  [] No  [x] Reviewed Prior HEP/Ed  Method of Education: [x] Verbal  [x] Demo  [] Written  Comprehension of Education:  [] Verbalizes understanding. [x] Demonstrates understanding. [x] Needs review. [] Demonstrates/verbalizes HEP/Ed previously given. Plan: [x] Continue per plan of care.    [] Other:      Time In: 9:40 am           Time Out: 10:13 am    Electronically signed by:  Yanna King, PT

## 2020-01-06 ENCOUNTER — HOSPITAL ENCOUNTER (OUTPATIENT)
Dept: PHYSICAL THERAPY | Age: 75
Setting detail: THERAPIES SERIES
Discharge: HOME | End: 2020-01-06
Payer: MEDICARE

## 2020-01-06 ENCOUNTER — APPOINTMENT (OUTPATIENT)
Dept: PHYSICAL THERAPY | Age: 75
End: 2020-01-06
Payer: MEDICARE

## 2020-01-06 PROCEDURE — 97016 VASOPNEUMATIC DEVICE THERAPY: CPT

## 2020-01-06 PROCEDURE — 97110 THERAPEUTIC EXERCISES: CPT

## 2020-01-09 ENCOUNTER — HOSPITAL ENCOUNTER (OUTPATIENT)
Dept: PHYSICAL THERAPY | Age: 75
Setting detail: THERAPIES SERIES
Discharge: HOME | End: 2020-01-09
Payer: MEDICARE

## 2020-01-09 NOTE — FLOWSHEET NOTE
[] [facility]) - [facility]  Therapy  [address]    [phone]  F: [phone]   [] [address] [facility] [address]   [address]  P: [phone]  F: [phone]  [] [place]  [address] [facility]  P: [phone]  F: [phone]  [] [address]  [facility]   [address]   [place]: [phone]  F: [phone]   [x] [address]  [place]: [phone]   F: [phone]     Physical Therapy Cancel/No Show note    Date: 2020  Patient: Jp Veloz  : 1945  MRN: [id number]    Cancels/No Shows to date:     For today's appointment patient:    [x]  Cancelled    [] Rescheduled appointment    [] No-show     Reason given by patient:    []  Patient ill    []  Conflicting appointment    [] No transportation      [] Conflict with work    [] No reason given    [] Weather related    [x] Other:      Comments:  Per , \"unable to make it. \"      [x] Next appointment was confirmed    Electronically signed by: Rakesh Mcclendon PT

## 2020-01-13 ENCOUNTER — APPOINTMENT (OUTPATIENT)
Dept: PHYSICAL THERAPY | Age: 75
End: 2020-01-13
Payer: MEDICARE

## 2020-01-15 ENCOUNTER — HOSPITAL ENCOUNTER (EMERGENCY)
Age: 75
Discharge: HOME | End: 2020-01-15
Attending: EMERGENCY MEDICINE
Payer: MEDICARE

## 2020-01-15 ENCOUNTER — APPOINTMENT (OUTPATIENT)
Dept: GENERAL RADIOLOGY | Age: 75
End: 2020-01-15
Payer: MEDICARE

## 2020-01-15 VITALS
OXYGEN SATURATION: 96 % | TEMPERATURE: 97.9 F | DIASTOLIC BLOOD PRESSURE: 55 MMHG | HEART RATE: 85 BPM | RESPIRATION RATE: 18 BRPM | HEIGHT: 66 IN | WEIGHT: 158 LBS | SYSTOLIC BLOOD PRESSURE: 94 MMHG | BODY MASS INDEX: 25.39 KG/M2

## 2020-01-15 LAB
-: NORMAL
ABSOLUTE EOS #: 0.4 K/UL (ref 0–0.44)
ABSOLUTE IMMATURE GRANULOCYTE: 0.03 K/UL (ref 0–0.3)
ABSOLUTE LYMPH #: 2.73 K/UL (ref 1.1–3.7)
ABSOLUTE MONO #: 0.8 K/UL (ref 0.1–1.2)
ANION GAP SERPL CALCULATED.3IONS-SCNC: 13 MMOL/L (ref 9–17)
BASOPHILS # BLD: 1 % (ref 0–2)
BASOPHILS ABSOLUTE: 0.11 K/UL (ref 0–0.2)
BNP INTERPRETATION: ABNORMAL
BUN BLDV-MCNC: 14 MG/DL (ref 8–23)
BUN/CREAT BLD: 16 (ref 9–20)
CALCIUM SERPL-MCNC: 9.9 MG/DL (ref 8.6–10.4)
CHLORIDE BLD-SCNC: 100 MMOL/L (ref 98–107)
CO2: 21 MMOL/L (ref 20–31)
CREAT SERPL-MCNC: 0.88 MG/DL (ref 0.5–0.9)
DIFFERENTIAL TYPE: ABNORMAL
DIRECT EXAM: NORMAL
EOSINOPHILS RELATIVE PERCENT: 4 % (ref 1–4)
GFR AFRICAN AMERICAN: >60 ML/MIN
GFR NON-AFRICAN AMERICAN: >60 ML/MIN
GFR SERPL CREATININE-BSD FRML MDRD: ABNORMAL ML/MIN/{1.73_M2}
GFR SERPL CREATININE-BSD FRML MDRD: ABNORMAL ML/MIN/{1.73_M2}
GLUCOSE BLD-MCNC: 85 MG/DL (ref 70–99)
HCT VFR BLD CALC: 37.8 % (ref 36.3–47.1)
HEMOGLOBIN: 12.2 G/DL (ref 11.9–15.1)
IMMATURE GRANULOCYTES: 0 %
LACTIC ACID, SEPSIS WHOLE BLOOD: NORMAL MMOL/L (ref 0.5–1.9)
LACTIC ACID, SEPSIS: 1.7 MMOL/L (ref 0.5–1.9)
LYMPHOCYTES # BLD: 29 % (ref 24–43)
Lab: NORMAL
MAGNESIUM: 1.8 MG/DL (ref 1.6–2.6)
MCH RBC QN AUTO: 28.4 PG (ref 25.2–33.5)
MCHC RBC AUTO-ENTMCNC: 32.3 G/DL (ref 28.4–34.8)
MCV RBC AUTO: 88.1 FL (ref 82.6–102.9)
MONOCYTES # BLD: 9 % (ref 3–12)
MYOGLOBIN: 44 NG/ML (ref 25–58)
NRBC AUTOMATED: 0 PER 100 WBC
PDW BLD-RTO: 15.1 % (ref 11.8–14.4)
PLATELET # BLD: 415 K/UL (ref 138–453)
PLATELET ESTIMATE: ABNORMAL
PMV BLD AUTO: 9.7 FL (ref 8.1–13.5)
POTASSIUM SERPL-SCNC: 4.2 MMOL/L (ref 3.7–5.3)
PRO-BNP: 1019 PG/ML
RBC # BLD: 4.29 M/UL (ref 3.95–5.11)
RBC # BLD: ABNORMAL 10*6/UL
REASON FOR REJECTION: NORMAL
SEG NEUTROPHILS: 57 % (ref 36–65)
SEGMENTED NEUTROPHILS ABSOLUTE COUNT: 5.32 K/UL (ref 1.5–8.1)
SODIUM BLD-SCNC: 134 MMOL/L (ref 135–144)
SPECIMEN DESCRIPTION: NORMAL
TROPONIN INTERP: NORMAL
TROPONIN T: NORMAL NG/ML
TROPONIN, HIGH SENSITIVITY: 10 NG/L (ref 0–14)
WBC # BLD: 9.4 K/UL (ref 3.5–11.3)
WBC # BLD: ABNORMAL 10*3/UL
ZZ NTE CLEAN UP: ORDERED TEST: NORMAL
ZZ NTE WITH NAME CLEAN UP: SPECIMEN SOURCE: NORMAL

## 2020-01-15 PROCEDURE — 83605 ASSAY OF LACTIC ACID: CPT

## 2020-01-15 PROCEDURE — 84484 ASSAY OF TROPONIN QUANT: CPT

## 2020-01-15 PROCEDURE — 6360000002 HC RX W HCPCS: Performed by: NURSE PRACTITIONER

## 2020-01-15 PROCEDURE — 96374 THER/PROPH/DIAG INJ IV PUSH: CPT

## 2020-01-15 PROCEDURE — 99291 CRITICAL CARE FIRST HOUR: CPT

## 2020-01-15 PROCEDURE — 80048 BASIC METABOLIC PNL TOTAL CA: CPT

## 2020-01-15 PROCEDURE — 87040 BLOOD CULTURE FOR BACTERIA: CPT

## 2020-01-15 PROCEDURE — 6370000000 HC RX 637 (ALT 250 FOR IP): Performed by: NURSE PRACTITIONER

## 2020-01-15 PROCEDURE — 83735 ASSAY OF MAGNESIUM: CPT

## 2020-01-15 PROCEDURE — 87804 INFLUENZA ASSAY W/OPTIC: CPT

## 2020-01-15 PROCEDURE — 83874 ASSAY OF MYOGLOBIN: CPT

## 2020-01-15 PROCEDURE — 71045 X-RAY EXAM CHEST 1 VIEW: CPT

## 2020-01-15 PROCEDURE — 94640 AIRWAY INHALATION TREATMENT: CPT

## 2020-01-15 PROCEDURE — 83880 ASSAY OF NATRIURETIC PEPTIDE: CPT

## 2020-01-15 PROCEDURE — 93005 ELECTROCARDIOGRAM TRACING: CPT | Performed by: NURSE PRACTITIONER

## 2020-01-15 PROCEDURE — 85025 COMPLETE CBC W/AUTO DIFF WBC: CPT

## 2020-01-15 RX ORDER — ALBUTEROL SULFATE 2.5 MG/3ML
2.5 SOLUTION RESPIRATORY (INHALATION) ONCE
Status: COMPLETED | OUTPATIENT
Start: 2020-01-15 | End: 2020-01-15

## 2020-01-15 RX ORDER — TIZANIDINE 2 MG/1
2 TABLET ORAL 2 TIMES DAILY PRN
COMMUNITY
End: 2020-08-20 | Stop reason: DRUGHIGH

## 2020-01-15 RX ORDER — GUAIFENESIN/DEXTROMETHORPHAN 100-10MG/5
5 SYRUP ORAL 3 TIMES DAILY PRN
Qty: 120 ML | Refills: 0 | Status: SHIPPED | OUTPATIENT
Start: 2020-01-15 | End: 2020-01-25

## 2020-01-15 RX ORDER — PANTOPRAZOLE SODIUM 40 MG/1
40 TABLET, DELAYED RELEASE ORAL DAILY
COMMUNITY
End: 2020-08-20 | Stop reason: DRUGHIGH

## 2020-01-15 RX ORDER — GUAIFENESIN 100 MG/5ML
200 SOLUTION ORAL ONCE
Status: COMPLETED | OUTPATIENT
Start: 2020-01-15 | End: 2020-01-15

## 2020-01-15 RX ORDER — ALBUTEROL SULFATE 2.5 MG/3ML
2.5 SOLUTION RESPIRATORY (INHALATION) EVERY 6 HOURS PRN
Qty: 120 EACH | Refills: 0 | Status: ON HOLD | OUTPATIENT
Start: 2020-01-15 | End: 2020-08-25 | Stop reason: SDUPTHER

## 2020-01-15 RX ORDER — IBUPROFEN 800 MG/1
800 TABLET ORAL EVERY 8 HOURS PRN
Status: ON HOLD | COMMUNITY
End: 2020-08-25 | Stop reason: HOSPADM

## 2020-01-15 RX ORDER — ALBUTEROL SULFATE 2.5 MG/3ML
2.5 SOLUTION RESPIRATORY (INHALATION) EVERY 6 HOURS PRN
COMMUNITY
End: 2020-08-20 | Stop reason: SDUPTHER

## 2020-01-15 RX ORDER — METHYLPREDNISOLONE SODIUM SUCCINATE 125 MG/2ML
125 INJECTION, POWDER, LYOPHILIZED, FOR SOLUTION INTRAMUSCULAR; INTRAVENOUS ONCE
Status: COMPLETED | OUTPATIENT
Start: 2020-01-15 | End: 2020-01-15

## 2020-01-15 RX ORDER — ALBUTEROL SULFATE 90 UG/1
2 AEROSOL, METERED RESPIRATORY (INHALATION) EVERY 6 HOURS PRN
Qty: 1 INHALER | Refills: 0 | Status: ON HOLD | OUTPATIENT
Start: 2020-01-15 | End: 2020-08-25 | Stop reason: HOSPADM

## 2020-01-15 RX ORDER — BENZONATATE 100 MG/1
100 CAPSULE ORAL 3 TIMES DAILY PRN
Qty: 21 CAPSULE | Refills: 0 | Status: SHIPPED | OUTPATIENT
Start: 2020-01-15 | End: 2020-01-22

## 2020-01-15 RX ORDER — PREDNISONE 20 MG/1
40 TABLET ORAL DAILY
Qty: 10 TABLET | Refills: 0 | Status: SHIPPED | OUTPATIENT
Start: 2020-01-15 | End: 2020-01-20

## 2020-01-15 RX ADMIN — GUAIFENESIN 200 MG: 200 SOLUTION ORAL at 11:59

## 2020-01-15 RX ADMIN — ALBUTEROL SULFATE 2.5 MG: 2.5 SOLUTION RESPIRATORY (INHALATION) at 11:32

## 2020-01-15 RX ADMIN — METHYLPREDNISOLONE SODIUM SUCCINATE 125 MG: 125 INJECTION, POWDER, FOR SOLUTION INTRAMUSCULAR; INTRAVENOUS at 11:58

## 2020-01-15 ASSESSMENT — ENCOUNTER SYMPTOMS
WHEEZING: 1
RHINORRHEA: 1
COUGH: 1
VOMITING: 0
SHORTNESS OF BREATH: 1
ABDOMINAL PAIN: 0
NAUSEA: 0

## 2020-01-15 ASSESSMENT — VISUAL ACUITY: OU: 1

## 2020-01-15 ASSESSMENT — PAIN SCALES - GENERAL: PAINLEVEL_OUTOF10: 9

## 2020-01-15 NOTE — ED PROVIDER NOTES
mouth daily, Disp-30 tablet, R-3Normal      nicotine (NICODERM CQ) 21 MG/24HR Place 1 patch onto the skin daily as needed (as neeeded for nicotine withdrawl), Disp-30 patch, R-3Normal      atorvastatin (LIPITOR) 20 MG tablet Take 20 mg by mouth daily Historical Med      traZODone (DESYREL) 100 MG tablet Take 100 mg by mouth nightly      insulin lispro (HUMALOG) 100 UNIT/ML injection vial Inject 3 Units into the skin 3 times daily (before meals)      metFORMIN (GLUCOPHAGE) 500 MG tablet Take 500 mg by mouth daily (with breakfast)      ferrous sulfate 325 (65 FE) MG EC tablet Take 325 mg by mouth 3 times daily (with meals)      docusate sodium (COLACE) 100 MG capsule Take 100 mg by mouth 2 times daily      !! albuterol (PROVENTIL HFA;VENTOLIN HFA) 108 (90 BASE) MCG/ACT inhaler Inhale 2 puffs into the lungs every 6 hours as needed for Wheezing. pregabalin (LYRICA) 75 MG capsule Take 75 mg by mouth 2 times daily. Historical Med      aspirin 81 MG tablet Take 81 mg by mouth daily. calcium-vitamin D (OSCAL-500) 500-200 MG-UNIT per tablet Take 1 tablet by mouth daily. hydrALAZINE (APRESOLINE) 10 MG tablet Take 10 mg by mouth 2 times daily. fluticasone-salmeterol (ADVAIR) 250-50 MCG/DOSE AEPB Inhale 1 puff into the lungs every 12 hours. lisinopril (PRINIVIL;ZESTRIL) 20 MG tablet Take 20 mg by mouth daily Historical Med      diclofenac sodium 1 % GEL Apply 2 g topically 2 times daily. , Topical, 2 TIMES DAILY, Until Discontinued, Historical Med       !! - Potential duplicate medications found. Please discuss with provider. ALLERGIES     Spiriva handihaler [tiotropium bromide monohydrate]    FAMILY HISTORY     History reviewed. No pertinent family history.        SOCIAL HISTORY       Social History     Socioeconomic History    Marital status: Single     Spouse name: None    Number of children: None    Years of education: None    Highest education level: None   Occupational History    None Social Needs    Financial resource strain: None    Food insecurity:     Worry: None     Inability: None    Transportation needs:     Medical: None     Non-medical: None   Tobacco Use    Smoking status: Current Every Day Smoker     Packs/day: 0.50     Types: Cigarettes    Smokeless tobacco: Never Used   Substance and Sexual Activity    Alcohol use: Yes     Alcohol/week: 70.0 standard drinks     Types: 70 Cans of beer per week     Comment: 840 oz (3, 40oz beers/day)    Drug use: Yes     Types: Marijuana    Sexual activity: None   Lifestyle    Physical activity:     Days per week: None     Minutes per session: None    Stress: None   Relationships    Social connections:     Talks on phone: None     Gets together: None     Attends Baptism service: None     Active member of club or organization: None     Attends meetings of clubs or organizations: None     Relationship status: None    Intimate partner violence:     Fear of current or ex partner: None     Emotionally abused: None     Physically abused: None     Forced sexual activity: None   Other Topics Concern    None   Social History Narrative    None         REVIEW OF SYSTEMS    (2-9 systems for level 4, 10 or more for level 5)     Review of Systems   Constitutional: Positive for activity change and fatigue. HENT: Positive for congestion and rhinorrhea. Respiratory: Positive for cough, shortness of breath and wheezing. Cardiovascular: Positive for chest pain. Gastrointestinal: Negative for abdominal pain, nausea and vomiting. Musculoskeletal: Positive for myalgias. All other systems reviewed and are negative. Except as noted above the remainder of the review of systems was reviewed and negative.      PHYSICAL EXAM    (up to 7 for level 4, 8 or more for level 5)     ED Triage Vitals   BP Temp Temp Source Pulse Resp SpO2 Height Weight   01/15/20 1109 01/15/20 1108 01/15/20 1108 01/15/20 1108 01/15/20 1108 01/15/20 1108 01/15/20 1108 patients simultaneously. Vitals:    Vitals:    01/15/20 1108 01/15/20 1109   BP:  (!) 94/55   Pulse: 85    Resp: 18    Temp: 97.9 °F (36.6 °C)    TempSrc: Oral    SpO2: 96%    Weight: 158 lb (71.7 kg)    Height: 5' 6\" (1.676 m)          CONSULTS:  None    RES:  Procedures    FINAL IMPRESSION      1. COPD exacerbation ([address]          DISPOSITION/PLAN   DISPOSITION Decision To Discharge 01/15/2020 12:49:38 PM      PATIENT REFERRED TO:   Kristin Pereira MD  [place] [address] [facility],[address]  [phone]    Schedule an appointment as soon as possible for a visit       [facility] ED  [address] [facility]  [phone]          DISCHARGE MEDICATIONS:     Discharge Medication List as of 1/15/2020 12:55 PM      START taking these medications    Details   predniSONE (DELTASONE) 20 MG tablet Take 2 tablets by mouth daily for 5 days, Disp-10 tablet, R-0Print      !! albuterol sulfate HFA (VENTOLIN HFA) 108 (90 Base) MCG/ACT inhaler Inhale 2 puffs into the lungs every 6 hours as needed for Wheezing or Shortness of Breath, Disp-1 Inhaler, R-0Print      !! albuterol (PROVENTIL) (2.5 MG/3ML) 0.083% nebulizer solution Take 3 mLs by nebulization every 6 hours as needed for Wheezing or Shortness of Breath, Disp-120 each, R-0Print      benzonatate (TESSALON PERLES) 100 MG capsule Take 1 capsule by mouth 3 times daily as needed for Cough, Disp-21 capsule, R-0Print       !! - Potential duplicate medications found. Please discuss with provider.         Electronically signed by ABDON Sanchez 1/15/2020 at 6:35 PM           ABDON Sanchez CNP  01/15/20 [id number]

## 2020-01-15 NOTE — ED PROVIDER NOTES
EMERGENCY DEPARTMENT ENCOUNTER   ATTENDING ATTESTATION     Pt Name: Kaushik Coffey  MRN: [id number]  [place] 1945  Date of evaluation: 1/15/20   Kaushik Coffey is a 77 y.o. female with CC: Cough and Shortness of Breath    MDM:   The patient is a 67-year-old female who presented to the emergency department secondary cough congestion and concern for possible flu. Afebrile nontoxic-appearing no acute respiratory distress. Rapid influenza negative, chest x-ray no infiltrate dissection pneumothorax. Patient noted wheezing she has a history of COPD/asthma. Received breathing treatments. Discharged home with steroid burst after receiving steroids in the emergency department albuterol inhaler, outpatient follow-up and parameters for return to the emergency department. CRITICAL CARE:       EKG: All EKG's are interpreted by the Emergency Department Physician who either signs or Co-signs this chart in the absence of a cardiologist.      RADIOLOGY:All plain film, CT, MRI, and formal ultrasound images (except ED bedside ultrasound) are read by the radiologist, see reports below, unless otherwise noted in MDM or here. XR CHEST PORTABLE    (Results Pending)     LABS: All lab results were reviewed by myself, and all abnormals are listed below. Labs Reviewed   CBC WITH AUTO DIFFERENTIAL - Abnormal; Notable for the following components:       Result Value    RDW 15.1 (*)     All other components within normal limits   RAPID INFLUENZA A/B ANTIGENS   CULTURE BLOOD #1   CULTURE BLOOD #1   SPECIMEN REJECTION   TROP/MYOGLOBIN   LACTATE, SEPSIS   LACTATE, SEPSIS   BASIC METABOLIC PANEL   BRAIN NATRIURETIC PEPTIDE   TROP/MYOGLOBIN   MAGNESIUM   PREVIOUS SPECIMEN     CONSULTS:  None  FINAL IMPRESSION    No diagnosis found.         PASTMEDICAL HISTORY     Past Medical History:   Diagnosis Date    CHF (congestive heart failure) (HCC)     COPD (chronic obstructive pulmonary disease) (HCC)     Diabetes mellitus (HCC)     MRSA (methicillin resistant staph aureus) culture positive 8/28/2014    sputum    Tobacco abuse 10/15/2019     SURGICAL HISTORY       Past Surgical History:   Procedure Laterality Date    APPENDECTOMY      HYSTERECTOMY      JOINT REPLACEMENT Bilateral      CURRENT MEDICATIONS       Previous Medications    ALBUTEROL (PROVENTIL HFA;VENTOLIN HFA) 108 (90 BASE) MCG/ACT INHALER    Inhale 2 puffs into the lungs every 6 hours as needed for Wheezing. AMLODIPINE (NORVASC) 10 MG TABLET    Take 10 mg by mouth daily. ASPIRIN 81 MG TABLET    Take 81 mg by mouth daily. ATORVASTATIN (LIPITOR) 10 MG TABLET    Take 20 mg by mouth daily    CALCIUM-VITAMIN D (OSCAL-500) 500-200 MG-UNIT PER TABLET    Take 1 tablet by mouth daily. CARVEDILOL (COREG) 12.5 MG TABLET    Take 12.5 mg by mouth 2 times daily (with meals)    DIAZEPAM (VALIUM) 5 MG TABLET    Take 5 mg by mouth once as needed for Anxiety. DICLOFENAC SODIUM 1 % GEL    Apply 2 g topically 2 times daily. DOCUSATE SODIUM (COLACE) 100 MG CAPSULE    Take 100 mg by mouth 2 times daily    FERROUS SULFATE 325 (65 FE) MG EC TABLET    Take 325 mg by mouth 3 times daily (with meals)    FLUTICASONE PROPIONATE, NASAL, NA    by Nasal route. FLUTICASONE-SALMETEROL (ADVAIR) 250-50 MCG/DOSE AEPB    Inhale 1 puff into the lungs every 12 hours. FUROSEMIDE (LASIX) 20 MG TABLET    Take 1 tablet by mouth daily    HYDRALAZINE (APRESOLINE) 10 MG TABLET    Take 10 mg by mouth 2 times daily. HYDROCODONE-ACETAMINOPHEN (NORCO) 5-325 MG PER TABLET    Take 2 tablets by mouth every 6 hours as needed for Pain    INSULIN LISPRO (HUMALOG) 100 UNIT/ML INJECTION VIAL    Inject 3 Units into the skin 3 times daily (before meals)    LISINOPRIL (PRINIVIL;ZESTRIL) 5 MG TABLET    Take 20 mg by mouth daily     LORAZEPAM (ATIVAN) 1 MG TABLET    Take 1 mg by mouth every 8 hours as needed for Anxiety    MELOXICAM (MOBIC) 15 MG TABLET    Take 15 mg by mouth daily.     METFORMIN (GLUCOPHAGE) 500 MG TABLET    Take 500 mg by mouth daily (with breakfast)    MORPHINE SULFATE ER (MS CONTIN) 15 MG CR TABLET    Take 15 mg by mouth 2 times daily    NICOTINE (NICODERM CQ) 21 MG/24HR    Place 1 patch onto the skin daily as needed (as neeeded for nicotine withdrawl)    OMEPRAZOLE (PRILOSEC) 20 MG CAPSULE    Take 20 mg by mouth daily. OXYBUTYNIN (DITROPAN) 5 MG TABLET    Take 5 mg by mouth 2 times daily. PREGABALIN (LYRICA) 200 MG CAPSULE    Take 200 mg by mouth 2 times daily. SERTRALINE (ZOLOFT) 25 MG TABLET    Take 100 mg by mouth daily     TRAZODONE (DESYREL) 100 MG TABLET    Take 100 mg by mouth nightly    ZOLPIDEM (AMBIEN) 5 MG TABLET    Take 5 mg by mouth nightly as needed for Sleep. ALLERGIES     is allergic to spiriva handihaler [tiotropium bromide monohydrate]. FAMILY HISTORY     She indicated that her mother is . She indicated that her father is . SOCIAL HISTORY       Social History     Tobacco Use    Smoking status: Current Every Day Smoker     Packs/day: 0.50     Types: Cigarettes    Smokeless tobacco: Never Used   Substance Use Topics    Alcohol use: Yes     Alcohol/week: 70.0 standard drinks     Types: 70 Cans of beer per week     Comment: 840 oz (3, 40oz beers/day)    Drug use: Yes     Types: Marijuana       I personally evaluated and examined the patient in conjunction with the APC and agree with the assessment, treatment plan, and disposition of the patient as recorded by the APC.    Santa Lazo MD  Attending Emergency Physician          Santa Lazo MD  [phone]

## 2020-01-16 ENCOUNTER — APPOINTMENT (OUTPATIENT)
Dept: PHYSICAL THERAPY | Age: 75
End: 2020-01-16
Payer: MEDICARE

## 2020-01-16 LAB
EKG ATRIAL RATE: 64 BPM
EKG P AXIS: 29 DEGREES
EKG P-R INTERVAL: 178 MS
EKG Q-T INTERVAL: 514 MS
EKG QRS DURATION: 94 MS
EKG QTC CALCULATION (BAZETT): 530 MS
EKG R AXIS: -26 DEGREES
EKG T AXIS: -45 DEGREES
EKG VENTRICULAR RATE: 64 BPM

## 2020-01-16 PROCEDURE — 93010 ELECTROCARDIOGRAM REPORT: CPT | Performed by: INTERNAL MEDICINE

## 2020-01-23 ENCOUNTER — HOSPITAL ENCOUNTER (OUTPATIENT)
Dept: PHYSICAL THERAPY | Age: 75
Setting detail: THERAPIES SERIES
Discharge: HOME | End: 2020-01-23
Payer: MEDICARE

## 2020-01-23 PROCEDURE — 97110 THERAPEUTIC EXERCISES: CPT

## 2020-01-23 PROCEDURE — 97016 VASOPNEUMATIC DEVICE THERAPY: CPT

## 2020-01-23 NOTE — FLOWSHEET NOTE
Physical Therapy      [address] [place] [address] Outpatient Physical Therapy   [address] [facility] [address]   Phone: [phone]   Fax: [phone]    Physical Therapy Daily Treatment Note    Date:  2020  Patient Name:  Ryan Zhong    :  1945  MRN: [id number]  Physician: Dr. Anatoliy Gibbs MD                                   Insurance: [facility] Medicare  Medical Diagnosis: leg weakness                  Rehab Codes: M79.604, M79.605, M62.81, R26.2  Onset Date: one year                                    Next 's appt: none listed  Visit# / total visits:   Cancels/No Shows:     Subjective:   Pain:  [x] Yes  [] No Location: Generalized body ache Pain Rating: (0-10 scale) 8/10 overall  Pain altered Tx:  [x] No  [] Yes  Action:  Comments: Patient reports that she has been feeling better, R ankle pain and swelling has decreased and she has been trying to walk around her house.      Objective:  Modalities:   Precautions: high fall risk  Exercises: Patient preferred to complete seated and standing exs as opposed to Mat  Exercise Reps/ Time Weight/ Level Comments   Nu Step 5' L4          Seated      Hip ABD 20x Blue TB    Hip add 5\"x20 ball    LAQ 20x     march 20x     Heel/toe raises 20x  Noted decreased active DF on the LLE compared to the R   HS Curls 20x Blue    Hip ER 20x           Standing   In parallel bars   3 way Hip 15x  Just completed abduction    Heel raises x15     March  20x     HS Curl 15x     Partial Squat 10x     H/T wt shifting 20x     Lateral Side Stepping --  // Bars req Bishnu UE support   // [employer] --           Gait   Gait belt CGA x1 - all with cane   Gait training x1 lap in clinic  VC for slowing pace and energy conservation   Lateral side stepping 20'x2     Retro walking 20'x2           Other:     Specific Instructions for next treatment:    Completed by primary PT 2020    STRENGTH     Left Right   Hip Flex 3+ 4-   Ext 4- 4-   Abd 3+ 3+   Knee Flex 4-

## 2020-01-27 ENCOUNTER — HOSPITAL ENCOUNTER (OUTPATIENT)
Dept: PHYSICAL THERAPY | Age: 75
Setting detail: THERAPIES SERIES
Discharge: HOME | End: 2020-01-27
Payer: MEDICARE

## 2020-01-27 PROCEDURE — 97016 VASOPNEUMATIC DEVICE THERAPY: CPT

## 2020-01-27 PROCEDURE — 97110 THERAPEUTIC EXERCISES: CPT

## 2020-01-30 ENCOUNTER — HOSPITAL ENCOUNTER (OUTPATIENT)
Dept: PHYSICAL THERAPY | Age: 75
Setting detail: THERAPIES SERIES
Discharge: HOME | End: 2020-01-30
Payer: MEDICARE

## 2020-01-30 PROCEDURE — 97110 THERAPEUTIC EXERCISES: CPT

## 2020-01-30 PROCEDURE — 97016 VASOPNEUMATIC DEVICE THERAPY: CPT

## 2020-01-30 NOTE — FLOWSHEET NOTE
Physical Therapy      [address] [place] [address] Outpatient Physical Therapy   [address] [facility] [address]   Phone: [phone]   Fax: [phone]    Physical Therapy Daily Treatment Note    Date:  2020  Patient Name:  Jp Veloz    :  1945  MRN: [id number]  Physician: Dr. Clay Hameed MD                                   Insurance: [facility] [employer] [facility] Medicare  Medical Diagnosis: leg weakness                  Rehab Codes: M79.604, M79.605, M62.81, R26.2  Onset Date: one year                                    Next 's appt: none listed  Visit# / total visits:   Cancels/No Shows:     Subjective:   Pain:  [x] Yes  [] No Location: Generalized body ache Pain Rating: (0-10 scale) 8/10 overall  Pain altered Tx:  [x] No  [] Yes  Action:  Comments: Patient reports increased fatigue today, noting she has been coughing more again. Objective:  Modalities: Vasocompression R ankle min pressure with patient supine x 15 min.    Precautions: high fall risk  Exercises: Patient preferred to complete seated and standing exs as opposed to Mat  Exercise Reps/ Time Weight/ Level Comments   Nu Step 5' L4          Seated      Hip ABD 20x Blue TB    Hip add 5\"x20 ball    LAQ 20x 1.5#    marches 20x 1.5#    Heel/toe raises 20x 1.5# Noted decreased active DF on the LLE compared to the R   HS Curls 20x Blue    Hip ER 20x 1.5#          Standing   In parallel bars   3 way Hip 15x  Just completed abduction/extension    Heel raises x15     March  20x     HS Curl 15x     Partial Squat 10x     H/T wt shifting 20x     Lateral Side Stepping --  // Bars req Bishnu UE support   // [employer] --           Gait   Gait belt CGA x1 - all with cane   Gait training x1 lap in clinic  VC for slowing pace and energy conservation   Lateral side stepping 20'x2     Retro walking 20'x2           Other:     Specific Instructions for next treatment:    Completed by primary PT 2020    STRENGTH     Left Right   Hip Flex 3+ 4- Ext 4- 4-   Abd 3+ 3+   Knee Flex 4- 4-   Ext 3+ 4-   Ankle DF 4- 4-   PF 4- 4-         Treatment Charges: Mins Units   []  Modalities     [x]  Ther Exercise 25 2   []  Manual Therapy     []  Ther Activities     []  Aquatics     []  Neuromuscular     [x]  Other: vasocompression 15 1   Total Treatment time 40 3       Assessment: [x] Progressing toward goals. [] No change. [x] Other: Continued with most recent exercise progression due to patient having increased fatigue. Monitored patient's oxygen saturation and heart rate throughout treatment session due to patient being short of breath following 5 minutes of standing exercises. After second set of standing exercises, pt at SpO2 76% and  bpm, took resting of 2-3 minutes to bring oxygen saturation up to 89-90%. Pt expresses that she usually sits in the 80s. Pt instructed to use her home oxygen today in order to normalize her stats and patient planning to go to the ER if symptoms worsen. Pt able to ambulate short distances in the clinic at this date without an assistive device and without loss of balance. STG: (to be met in 8 treatments)  1. ? Pain: Pt will report decreased bilateral LE pain to 5/10 with standing and walking. - Not met 1/23/2020  2. ? Strength: Pt will demonstrate improved core and erector spinae strength as evident by ability to sit upright. - Not met 1/23/2020  3. ? Function: Pt will demonstrate ability to stand for 5 minutes without evident shaking. - MET 1/23/2020  4. Independent with Home Exercise Programs - met 1/23/2020  5. Demonstrate Knowledge of fall prevention - met 1/23/2020  LTG: (to be met in 16 treatments)  1. Pt will report decreased LE pain to 2-3/10 with standing for 5 minutes. 2. Pt will increase bilateral hip strength to 4-/5 globally in order to improve standing tolerance.   3. Pt will demonstrate decreased risk of falls and improved functional activity tolerance as evident by an improved score on the CAI by 7

## 2020-02-03 ENCOUNTER — HOSPITAL ENCOUNTER (OUTPATIENT)
Dept: PHYSICAL THERAPY | Age: 75
Setting detail: THERAPIES SERIES
Discharge: HOME | End: 2020-02-03
Payer: MEDICARE

## 2020-02-03 PROCEDURE — 97110 THERAPEUTIC EXERCISES: CPT

## 2020-02-03 PROCEDURE — 97016 VASOPNEUMATIC DEVICE THERAPY: CPT

## 2020-02-03 NOTE — FLOWSHEET NOTE
[address] Outpatient Physical Therapy   [address] [facility] [address]   Phone: [phone]   Fax: [phone]    Physical Therapy Daily Treatment Note    Date:  2/3/2020  Patient Name:  Ayaka Stovall    :  1945  MRN: [id number]  Physician: Dr. Leslie Mosher MD                                   Insurance: [facility] [employer] Medicare  Medical Diagnosis: leg weakness                  Rehab Codes: M79.604, M79.605, M62.81, R26.2  Onset Date: one year                                    Next 's appt: none listed  Visit# / total visits:   Cancels/No Shows:     Subjective:   Pain:  [x] Yes  [] No Location: Generalized body ache Pain Rating: (0-10 scale) 10/10 overall  Pain altered Tx:  [x] No  [] Yes  Action:  Comments: Patient reports today with increase pain and general fatigue. Reported that she \"has a cold\" that has caused increases. Objective:  Modalities: Vasocompression R ankle min pressure with patient supine x 15 min.    Precautions: high fall risk  Exercises: Patient preferred to complete seated and standing exs as opposed to Mat  Exercise Reps/ Time Weight/ Level Comments   Nu Step 5' L4          Seated      Hip ABD 20x Blue TB    Hip add 5\"x20 ball    LAQ 20x 1.5#    marches 20x 1.5#    Heel/toe raises 20x 1.5# Noted decreased active DF on the LLE compared to the R   HS Curls 20x Blue    Hip ER 20x 1.5#          Standing   In parallel bars   3 way Hip 15x 1.5# Just completed abduction/extension 2/3/2020   Heel raises x15     March  20x 1.5#    HS Curl 15x 1.5#    Partial Squat 10x     H/T wt shifting 20x     Lateral Side Stepping --  // Bars req Bishnu UE support   // [employer] --           Gait   Gait belt CGA x1 - all with cane   Gait training x1 lap in clinic  VC for slowing pace and energy conservation   Lateral side stepping 20'x2     Retro walking 20'x2           Other:     Specific Instructions for next treatment:    Completed by primary PT 1/23/2020    STRENGTH     Left Right   Hip Flex 3+ 4-   Ext 4- 4-   Abd 3+ 3+   Knee Flex 4- 4-   Ext 3+ 4-   Ankle DF 4- 4-   PF 4- 4-         Treatment Charges: Mins Units   []  Modalities     [x]  Ther Exercise 35 2   []  Manual Therapy     []  Ther Activities     []  Aquatics     []  Neuromuscular     [x]  Other: vasocompression 15 1   Total Treatment time 50 3       Assessment: [x] Progressing toward goals. [] No change. [x] Other: Continued with exercises per chart to increase B LE strength. Progressed standing exercises by adding weight where noted with fair tolerance. Patient noted increase in muscle fatigue with progressions. Measured SpO2/HR immediately after standing exercises SpO2 72%,  bpm. Measured after 2-3 min of rest: SpO2 91, HR 70. Ended session with vaso to R ankle secondary to cont pain. STG: (to be met in 8 treatments)  1. ? Pain: Pt will report decreased bilateral LE pain to 5/10 with standing and walking. - Not met 1/23/2020  2. ? Strength: Pt will demonstrate improved core and erector spinae strength as evident by ability to sit upright. - Not met 1/23/2020  3. ? Function: Pt will demonstrate ability to stand for 5 minutes without evident shaking. - MET 1/23/2020  4. Independent with Home Exercise Programs - met 1/23/2020  5. Demonstrate Knowledge of fall prevention - met 1/23/2020  LTG: (to be met in 16 treatments)  1. Pt will report decreased LE pain to 2-3/10 with standing for 5 minutes. 2. Pt will increase bilateral hip strength to 4-/5 globally in order to improve standing tolerance. 3. Pt will demonstrate decreased risk of falls and improved functional activity tolerance as evident by an improved score on the CAI by 7 points.      Patient goals: \"to get better\"    Pt. Education:  [x] Yes  [] No  [x] Reviewed Prior HEP/Ed  Method of Education: [] Verbal  [x] Demo  [] Written  Comprehension of Education:  [] Verbalizes understanding. [x] Demonstrates understanding.

## 2020-02-06 ENCOUNTER — HOSPITAL ENCOUNTER (OUTPATIENT)
Dept: PHYSICAL THERAPY | Age: 75
Setting detail: THERAPIES SERIES
Discharge: HOME | End: 2020-02-06
Payer: MEDICARE

## 2020-02-10 ENCOUNTER — HOSPITAL ENCOUNTER (OUTPATIENT)
Dept: PHYSICAL THERAPY | Age: 75
Setting detail: THERAPIES SERIES
Discharge: HOME | End: 2020-02-10
Payer: MEDICARE

## 2020-02-13 ENCOUNTER — HOSPITAL ENCOUNTER (OUTPATIENT)
Dept: PHYSICAL THERAPY | Age: 75
Setting detail: THERAPIES SERIES
Discharge: HOME | End: 2020-02-13
Payer: MEDICARE

## 2020-02-13 NOTE — FLOWSHEET NOTE
[] [address]  [address]    [phone]  F: [phone]   [] [address]  [facility] [address]   [address]  P: [phone]  F: [phone]  [] [address]  [address] [facility]  P: [phone]  F: [phone]  [] [address] [facility] [address]   [place]: [phone]  F: [phone]   [] [address]  [place]: [phone]   F: [phone]     Physical Therapy Cancel/No Show note    Date: 2020  Patient: Lexie Booker  : 1945  MRN: [id number]    Cancels/No Shows to date:     For today's appointment patient:    [x]  Cancelled    [] Rescheduled appointment    [] No-show     Reason given by patient:    [x]  Patient ill    []  Conflicting appointment    [] No transportation      [] Conflict with work    [] No reason given    [] Weather related    [] Other:      Comments:        [x] Next appointment was confirmed    Electronically signed by: Chato Smiley PT

## 2020-02-17 ENCOUNTER — HOSPITAL ENCOUNTER (OUTPATIENT)
Dept: PHYSICAL THERAPY | Age: 75
Setting detail: THERAPIES SERIES
Discharge: HOME | End: 2020-02-17
Payer: MEDICARE

## 2020-02-17 NOTE — FLOWSHEET NOTE
[] [facility]) [facility]  [address]    [phone]  F: [phone]   [] [address] [facility] [address]   [address]  P: [phone]  F: [phone]  [] [address]  [address] [facility]  P: [phone]  F: [phone]  [] [address]  [facility]   [address]   [place]: [phone]  F: [phone]   [x] [address]  [place]: [phone]   F: [phone]     Physical Therapy Cancel/No Show note    Date: 2020  Patient: Laxmi Pimentel  : 1945  MRN: [id number]    Cancels/No Shows to date:     For today's appointment patient:    [x]  Cancelled    [] Rescheduled appointment    [] No-show     Reason given by patient:    []  Patient ill    []  Conflicting appointment    [x] No transportation      [] Conflict with work    [] No reason given    [] Weather related    [] Other:      Comments:        [] Next appointment was confirmed    Electronically signed by: Sonja Askew PTA

## 2020-02-20 ENCOUNTER — HOSPITAL ENCOUNTER (OUTPATIENT)
Dept: PHYSICAL THERAPY | Age: 75
Setting detail: THERAPIES SERIES
Discharge: HOME | End: 2020-02-20
Payer: MEDICARE

## 2020-02-24 ENCOUNTER — HOSPITAL ENCOUNTER (OUTPATIENT)
Dept: PHYSICAL THERAPY | Age: 75
Setting detail: THERAPIES SERIES
Discharge: HOME | End: 2020-02-24
Payer: MEDICARE

## 2020-02-24 PROCEDURE — 97016 VASOPNEUMATIC DEVICE THERAPY: CPT

## 2020-02-24 PROCEDURE — 97110 THERAPEUTIC EXERCISES: CPT

## 2020-02-24 NOTE — FLOWSHEET NOTE
Instructions for next treatment:    Completed by primary PT 1/23/2020    STRENGTH     Left Right   Hip Flex 3+ 4-   Ext 4- 4-   Abd 3+ 3+   Knee Flex 4- 4-   Ext 3+ 4-   Ankle DF 4- 4-   PF 4- 4-         Treatment Charges: Mins Units   []  Modalities     [x]  Ther Exercise 30 2   []  Manual Therapy     []  Ther Activities     []  Aquatics     []  Neuromuscular     [x]  Other: vasocompression 15 1   Total Treatment time 45 3       Assessment: [x] Progressing toward goals. [] No change. [x] Other: Pt demonstrated significant improvement in tolerance to exercise program at this date, showing some fatigue by end of treatment session, but not short of breath like past sessions. Decreased time required to complete exercise program.    STG: (to be met in 8 treatments)  1. ? Pain: Pt will report decreased bilateral LE pain to 5/10 with standing and walking. - Not met 1/23/2020  2. ? Strength: Pt will demonstrate improved core and erector spinae strength as evident by ability to sit upright. - Not met 1/23/2020  3. ? Function: Pt will demonstrate ability to stand for 5 minutes without evident shaking. - MET 1/23/2020  4. Independent with Home Exercise Programs - met 1/23/2020  5. Demonstrate Knowledge of fall prevention - met 1/23/2020  LTG: (to be met in 16 treatments)  1. Pt will report decreased LE pain to 2-3/10 with standing for 5 minutes. 2. Pt will increase bilateral hip strength to 4-/5 globally in order to improve standing tolerance. 3. Pt will demonstrate decreased risk of falls and improved functional activity tolerance as evident by an improved score on the CAI by 7 points.      Patient goals: \"to get better\"    Pt. Education:  [x] Yes  [] No  [x] Reviewed Prior HEP/Ed  Method of Education: [] Verbal  [x] Demo  [] Written  Comprehension of Education:  [] Verbalizes understanding. [x] Demonstrates understanding. [x] Needs review. [] Demonstrates/verbalizes HEP/Ed previously given.      Plan: [x]

## 2020-03-02 ENCOUNTER — HOSPITAL ENCOUNTER (OUTPATIENT)
Dept: PHYSICAL THERAPY | Age: 75
Setting detail: THERAPIES SERIES
Discharge: HOME | End: 2020-03-02
Payer: MEDICARE

## 2020-03-02 PROCEDURE — 97110 THERAPEUTIC EXERCISES: CPT

## 2020-03-02 NOTE — FLOWSHEET NOTE
[address] Outpatient Physical Therapy   [address] [facility] [address]   Phone: [phone]   Fax: [phone]    Physical Therapy Daily Treatment Note    Date:  3/2/2020  Patient Name:  Joe Smith    :  1945  MRN: [id number]  Physician: Dr. Khanh Sevilla MD                                   Insurance: [facility] [employer] [facility] Medicare  Medical Diagnosis: leg weakness                  Rehab Codes: M79.604, M79.605, M62.81, R26.2  Onset Date: one year                                    Next 's appt: none listed  Visit# / total visits:   Cancels/No Shows:     Subjective:   Pain:  [x] Yes  [] No Location: Generalized body ache Pain Rating: (0-10 scale) 8/10 overall  Pain altered Tx:  [x] No  [] Yes  Action:  Comments: Patient has no new complaints states the weather is causing her COPD symptoms to become aggravated. Objective:  Modalities: Vasocompression R ankle min pressure with patient supine x 15 min.    Precautions: high fall risk  Exercises: Patient preferred to complete seated and standing exs as opposed to Mat  Exercise Reps/ Time Weight/ Level Comments   Nu Step 5' L4          Seated      Hip ABD 20x Blue TB    Hip add 5\"x20 ball    LAQ 20x 1.5#    marches 20x 1.5#    Heel/toe raises 20x 1.5# Noted decreased active DF on the LLE compared to the R   HS Curls 20x Blue    Hip ER 20x 1.5#          Standing   In parallel bars   3 way Hip 15x 1.5# Just completed abduction/extension 2/3/2020   Heel raises x15     March  20x 1.5#    HS Curl 15x 1.5#    Partial Squat 10x     H/T wt shifting 20x     Lateral Side Stepping --  // Bars req Bishnu UE support   // [employer] --           Gait   Gait belt CGA x1 - all with cane   Gait training 2 lap in clinic  VC for slowing pace and energy conservation   Lateral side stepping 20'x2     Retro walking 20'x2           Other:     Specific Instructions for next treatment:    Completed by primary PT 2020    STRENGTH

## 2020-03-05 ENCOUNTER — HOSPITAL ENCOUNTER (OUTPATIENT)
Dept: PHYSICAL THERAPY | Age: 75
Setting detail: THERAPIES SERIES
Discharge: HOME | End: 2020-03-05
Payer: MEDICARE

## 2020-03-05 PROCEDURE — 97110 THERAPEUTIC EXERCISES: CPT

## 2020-03-05 NOTE — FLOWSHEET NOTE
side stepping 20'x2     Retro walking 20'x2           Other:     Specific Instructions for next treatment:    Completed by primary PT 1/23/2020    STRENGTH     Left Right   Hip Flex 3+ 4-   Ext 4- 4-   Abd 3+ 3+   Knee Flex 4- 4-   Ext 3+ 4-   Ankle DF 4- 4-   PF 4- 4-         Treatment Charges: Mins Units   []  Modalities     [x]  Ther Exercise 30 2   []  Manual Therapy     []  Ther Activities     []  Aquatics     []  Neuromuscular     []  Other: vasocompression     Total Treatment time 30 2       Assessment: [x] Progressing toward goals. [] No change. [x] Other: Cont exercises per chart to increase BLE strength. Added step ups and sets of squats this date for additional LE strengthening. Patient with good tolerance to exercises overall with notable fatigue post.     STG: (to be met in 8 treatments)  1. ? Pain: Pt will report decreased bilateral LE pain to 5/10 with standing and walking. - Not met 1/23/2020  2. ? Strength: Pt will demonstrate improved core and erector spinae strength as evident by ability to sit upright. - Not met 1/23/2020  3. ? Function: Pt will demonstrate ability to stand for 5 minutes without evident shaking. - MET 1/23/2020  4. Independent with Home Exercise Programs - met 1/23/2020  5. Demonstrate Knowledge of fall prevention - met 1/23/2020  LTG: (to be met in 16 treatments)  1. Pt will report decreased LE pain to 2-3/10 with standing for 5 minutes. 2. Pt will increase bilateral hip strength to 4-/5 globally in order to improve standing tolerance. 3. Pt will demonstrate decreased risk of falls and improved functional activity tolerance as evident by an improved score on the CAI by 7 points.      Patient goals: \"to get better\"    Pt. Education:  [x] Yes  [] No  [x] Reviewed Prior HEP/Ed  Method of Education: [] Verbal  [x] Demo  [] Written  Comprehension of Education:  [] Verbalizes understanding. [x] Demonstrates understanding. [x] Needs review.   [] Demonstrates/verbalizes HEP/Ed previously given. Plan: [x] Continue per plan of care.    [] Other:      Time In: 11:00 am           Time Out: 11:30 am     Electronically signed by:  Sonja Askew PTA

## 2020-03-10 ENCOUNTER — HOSPITAL ENCOUNTER (OUTPATIENT)
Dept: PHYSICAL THERAPY | Age: 75
Setting detail: THERAPIES SERIES
Discharge: HOME | End: 2020-03-10
Payer: MEDICARE

## 2020-03-10 PROCEDURE — 97110 THERAPEUTIC EXERCISES: CPT

## 2020-03-10 PROCEDURE — 97112 NEUROMUSCULAR REEDUCATION: CPT

## 2020-03-10 NOTE — FLOWSHEET NOTE
performed as part of CAI Balance testing at this date     Specific Instructions for next treatment:      Treatment Charges: Mins Units   []  Modalities     [x]  Ther Exercise 25 2   []  Manual Therapy     []  Ther Activities     []  Aquatics     [x]  Neuromuscular 15 1   []  Other: vasocompression     Total Treatment time 40 2       Assessment: [x] Progressing toward goals. [] No change. [x] Other: Pt with significant improvements in balance activities at this date, limited assist required throughout. Pt demonstrates a good recall of charted exercises without increased pain noted. Continues to demonstrate fatigue with standing exercises secondary to shortness of breath. STG: (to be met in 8 treatments)  1. ? Pain: Pt will report decreased bilateral LE pain to 5/10 with standing and walking. - Not met 3/10/2020  2. ? Strength: Pt will demonstrate improved core and erector spinae strength as evident by ability to sit upright. - Progress made 3/10/2020  3. ? Function: Pt will demonstrate ability to stand for 5 minutes without evident shaking. - MET 1/23/2020  4. Independent with Home Exercise Programs - met 1/23/2020  5. Demonstrate Knowledge of fall prevention - met 1/23/2020  LTG: (to be met in 16 treatments)  1. Pt will report decreased LE pain to 2-3/10 with standing for 5 minutes. - Not met 3/10/2020  2. Pt will increase bilateral hip strength to 4-/5 globally in order to improve standing tolerance. - MET 3/10/2020  3. Pt will demonstrate decreased risk of falls and improved functional activity tolerance as evident by an improved score on the CAI by 7 points. - MET 3/10/2020, improved by 16 points     Patient goals: \"to get better\"    Pt. Education:  [x] Yes  [] No  [x] Reviewed Prior HEP/Ed  Method of Education: [] Verbal  [x] Demo  [] Written  Comprehension of Education:  [] Verbalizes understanding. [] Demonstrates understanding. [] Needs review.   [x] Demonstrates/verbalizes HEP/Ed previously

## 2020-08-20 ENCOUNTER — HOSPITAL ENCOUNTER (INPATIENT)
Age: 75
LOS: 5 days | Discharge: HOME | DRG: 871 | End: 2020-08-25
Attending: EMERGENCY MEDICINE | Admitting: FAMILY MEDICINE
Payer: MEDICARE

## 2020-08-20 ENCOUNTER — APPOINTMENT (OUTPATIENT)
Dept: GENERAL RADIOLOGY | Age: 75
DRG: 871 | End: 2020-08-20
Payer: MEDICARE

## 2020-08-20 PROBLEM — J96.10 CHRONIC RESPIRATORY FAILURE (HCC): Status: ACTIVE | Noted: 2020-08-20

## 2020-08-20 PROBLEM — R94.31 PROLONGED Q-T INTERVAL ON ECG: Status: ACTIVE | Noted: 2020-08-20

## 2020-08-20 PROBLEM — R77.8 ELEVATED TROPONIN: Status: ACTIVE | Noted: 2020-08-20

## 2020-08-20 PROBLEM — J18.9 COMMUNITY ACQUIRED PNEUMONIA OF RIGHT UPPER LOBE OF LUNG: Status: ACTIVE | Noted: 2020-08-20

## 2020-08-20 LAB
ABSOLUTE EOS #: 0 K/UL (ref 0–0.4)
ABSOLUTE IMMATURE GRANULOCYTE: 0 K/UL (ref 0–0.3)
ABSOLUTE LYMPH #: 1.48 K/UL (ref 1–4.8)
ABSOLUTE MONO #: 1.27 K/UL (ref 0.2–0.8)
ANION GAP SERPL CALCULATED.3IONS-SCNC: 13 MMOL/L (ref 9–17)
BASOPHILS # BLD: 1 %
BASOPHILS ABSOLUTE: 0.21 K/UL (ref 0–0.2)
BNP INTERPRETATION: ABNORMAL
BUN BLDV-MCNC: 21 MG/DL (ref 8–23)
BUN/CREAT BLD: 19 (ref 9–20)
CALCIUM SERPL-MCNC: 8.3 MG/DL (ref 8.6–10.4)
CHLORIDE BLD-SCNC: 99 MMOL/L (ref 98–107)
CO2: 22 MMOL/L (ref 20–31)
CREAT SERPL-MCNC: 1.13 MG/DL (ref 0.5–0.9)
DIFFERENTIAL TYPE: ABNORMAL
EKG ATRIAL RATE: 88 BPM
EKG P AXIS: 31 DEGREES
EKG P-R INTERVAL: 186 MS
EKG Q-T INTERVAL: 416 MS
EKG QRS DURATION: 98 MS
EKG QTC CALCULATION (BAZETT): 503 MS
EKG R AXIS: -30 DEGREES
EKG T AXIS: 27 DEGREES
EKG VENTRICULAR RATE: 88 BPM
EOSINOPHILS RELATIVE PERCENT: 0 % (ref 1–4)
GFR AFRICAN AMERICAN: 57 ML/MIN
GFR NON-AFRICAN AMERICAN: 47 ML/MIN
GFR SERPL CREATININE-BSD FRML MDRD: ABNORMAL ML/MIN/{1.73_M2}
GFR SERPL CREATININE-BSD FRML MDRD: ABNORMAL ML/MIN/{1.73_M2}
GLUCOSE BLD-MCNC: 113 MG/DL (ref 65–105)
GLUCOSE BLD-MCNC: 178 MG/DL (ref 65–105)
GLUCOSE BLD-MCNC: 202 MG/DL (ref 65–105)
GLUCOSE BLD-MCNC: 94 MG/DL (ref 70–99)
HCT VFR BLD CALC: 36.6 % (ref 36.3–47.1)
HEMOGLOBIN: 11.4 G/DL (ref 11.9–15.1)
IMMATURE GRANULOCYTES: 0 %
LACTIC ACID: 1.9 MMOL/L (ref 0.5–2.2)
LYMPHOCYTES # BLD: 7 % (ref 24–44)
MCH RBC QN AUTO: 25.3 PG (ref 25.2–33.5)
MCHC RBC AUTO-ENTMCNC: 31.1 G/DL (ref 28.4–34.8)
MCV RBC AUTO: 81.2 FL (ref 82.6–102.9)
MONOCYTES # BLD: 6 % (ref 1–7)
MORPHOLOGY: ABNORMAL
MORPHOLOGY: ABNORMAL
NRBC AUTOMATED: 0.1 PER 100 WBC
PDW BLD-RTO: 27.1 % (ref 11.8–14.4)
PLATELET # BLD: 348 K/UL (ref 138–453)
PLATELET ESTIMATE: ABNORMAL
PMV BLD AUTO: 9.8 FL (ref 8.1–13.5)
POTASSIUM SERPL-SCNC: 4.4 MMOL/L (ref 3.7–5.3)
PRO-BNP: ABNORMAL PG/ML
RBC # BLD: 4.51 M/UL (ref 3.95–5.11)
RBC # BLD: ABNORMAL 10*6/UL
SARS-COV-2, PCR: NORMAL
SARS-COV-2, RAPID: NOT DETECTED
SARS-COV-2: NORMAL
SEG NEUTROPHILS: 86 % (ref 36–66)
SEGMENTED NEUTROPHILS ABSOLUTE COUNT: 18.14 K/UL (ref 1.8–7.7)
SODIUM BLD-SCNC: 134 MMOL/L (ref 135–144)
SOURCE: NORMAL
TROPONIN INTERP: ABNORMAL
TROPONIN T: ABNORMAL NG/ML
TROPONIN, HIGH SENSITIVITY: 18 NG/L (ref 0–14)
WBC # BLD: 21.1 K/UL (ref 3.5–11.3)
WBC # BLD: ABNORMAL 10*3/UL

## 2020-08-20 PROCEDURE — 2060000000 HC ICU INTERMEDIATE R&B

## 2020-08-20 PROCEDURE — 6370000000 HC RX 637 (ALT 250 FOR IP): Performed by: NURSE PRACTITIONER

## 2020-08-20 PROCEDURE — 99223 1ST HOSP IP/OBS HIGH 75: CPT | Performed by: FAMILY MEDICINE

## 2020-08-20 PROCEDURE — 85025 COMPLETE CBC W/AUTO DIFF WBC: CPT

## 2020-08-20 PROCEDURE — 96365 THER/PROPH/DIAG IV INF INIT: CPT

## 2020-08-20 PROCEDURE — 6370000000 HC RX 637 (ALT 250 FOR IP): Performed by: FAMILY MEDICINE

## 2020-08-20 PROCEDURE — 94640 AIRWAY INHALATION TREATMENT: CPT

## 2020-08-20 PROCEDURE — 87040 BLOOD CULTURE FOR BACTERIA: CPT

## 2020-08-20 PROCEDURE — 80048 BASIC METABOLIC PNL TOTAL CA: CPT

## 2020-08-20 PROCEDURE — 99285 EMERGENCY DEPT VISIT HI MDM: CPT

## 2020-08-20 PROCEDURE — 94761 N-INVAS EAR/PLS OXIMETRY MLT: CPT

## 2020-08-20 PROCEDURE — 71045 X-RAY EXAM CHEST 1 VIEW: CPT

## 2020-08-20 PROCEDURE — 2700000000 HC OXYGEN THERAPY PER DAY

## 2020-08-20 PROCEDURE — 6360000002 HC RX W HCPCS: Performed by: EMERGENCY MEDICINE

## 2020-08-20 PROCEDURE — 83880 ASSAY OF NATRIURETIC PEPTIDE: CPT

## 2020-08-20 PROCEDURE — 6360000002 HC RX W HCPCS: Performed by: FAMILY MEDICINE

## 2020-08-20 PROCEDURE — 2580000003 HC RX 258: Performed by: EMERGENCY MEDICINE

## 2020-08-20 PROCEDURE — 96375 TX/PRO/DX INJ NEW DRUG ADDON: CPT

## 2020-08-20 PROCEDURE — APPSS45 APP SPLIT SHARED TIME 31-45 MINUTES: Performed by: NURSE PRACTITIONER

## 2020-08-20 PROCEDURE — 96368 THER/DIAG CONCURRENT INF: CPT

## 2020-08-20 PROCEDURE — 84484 ASSAY OF TROPONIN QUANT: CPT

## 2020-08-20 PROCEDURE — 2580000003 HC RX 258: Performed by: NURSE PRACTITIONER

## 2020-08-20 PROCEDURE — 83605 ASSAY OF LACTIC ACID: CPT

## 2020-08-20 PROCEDURE — 82947 ASSAY GLUCOSE BLOOD QUANT: CPT

## 2020-08-20 PROCEDURE — 93005 ELECTROCARDIOGRAM TRACING: CPT | Performed by: EMERGENCY MEDICINE

## 2020-08-20 PROCEDURE — 6360000002 HC RX W HCPCS: Performed by: NURSE PRACTITIONER

## 2020-08-20 PROCEDURE — U0002 COVID-19 LAB TEST NON-CDC: HCPCS

## 2020-08-20 RX ORDER — PREDNISONE 20 MG/1
20 TABLET ORAL DAILY
Status: DISCONTINUED | OUTPATIENT
Start: 2020-08-20 | End: 2020-08-22

## 2020-08-20 RX ORDER — NICOTINE 21 MG/24HR
1 PATCH, TRANSDERMAL 24 HOURS TRANSDERMAL DAILY PRN
Status: DISCONTINUED | OUTPATIENT
Start: 2020-08-20 | End: 2020-08-25 | Stop reason: HOSPADM

## 2020-08-20 RX ORDER — SODIUM CHLORIDE 9 MG/ML
INJECTION, SOLUTION INTRAVENOUS CONTINUOUS
Status: DISCONTINUED | OUTPATIENT
Start: 2020-08-20 | End: 2020-08-20

## 2020-08-20 RX ORDER — SODIUM CHLORIDE 0.9 % (FLUSH) 0.9 %
10 SYRINGE (ML) INJECTION PRN
Status: DISCONTINUED | OUTPATIENT
Start: 2020-08-20 | End: 2020-08-25 | Stop reason: HOSPADM

## 2020-08-20 RX ORDER — ASPIRIN 81 MG/1
81 TABLET ORAL DAILY
Status: DISCONTINUED | OUTPATIENT
Start: 2020-08-20 | End: 2020-08-25 | Stop reason: HOSPADM

## 2020-08-20 RX ORDER — NICOTINE POLACRILEX 4 MG
15 LOZENGE BUCCAL PRN
Status: DISCONTINUED | OUTPATIENT
Start: 2020-08-20 | End: 2020-08-25 | Stop reason: HOSPADM

## 2020-08-20 RX ORDER — SODIUM CHLORIDE 0.9 % (FLUSH) 0.9 %
10 SYRINGE (ML) INJECTION EVERY 12 HOURS SCHEDULED
Status: DISCONTINUED | OUTPATIENT
Start: 2020-08-20 | End: 2020-08-25 | Stop reason: HOSPADM

## 2020-08-20 RX ORDER — AMMONIUM LACTATE 12 G/100G
LOTION TOPICAL 2 TIMES DAILY
Status: ON HOLD | COMMUNITY
End: 2021-02-08

## 2020-08-20 RX ORDER — ACETAMINOPHEN 650 MG/1
650 SUPPOSITORY RECTAL EVERY 6 HOURS PRN
Status: DISCONTINUED | OUTPATIENT
Start: 2020-08-20 | End: 2020-08-25 | Stop reason: HOSPADM

## 2020-08-20 RX ORDER — METHYLPREDNISOLONE SODIUM SUCCINATE 125 MG/2ML
125 INJECTION, POWDER, LYOPHILIZED, FOR SOLUTION INTRAMUSCULAR; INTRAVENOUS ONCE
Status: COMPLETED | OUTPATIENT
Start: 2020-08-20 | End: 2020-08-20

## 2020-08-20 RX ORDER — LORATADINE 10 MG/1
10 TABLET ORAL DAILY
Status: ON HOLD | COMMUNITY
End: 2021-03-09 | Stop reason: HOSPADM

## 2020-08-20 RX ORDER — PANTOPRAZOLE SODIUM 20 MG/1
20 TABLET, DELAYED RELEASE ORAL DAILY
Status: DISCONTINUED | OUTPATIENT
Start: 2020-08-21 | End: 2020-08-25 | Stop reason: HOSPADM

## 2020-08-20 RX ORDER — IPRATROPIUM BROMIDE AND ALBUTEROL SULFATE 2.5; .5 MG/3ML; MG/3ML
1 SOLUTION RESPIRATORY (INHALATION)
Status: DISCONTINUED | OUTPATIENT
Start: 2020-08-20 | End: 2020-08-25 | Stop reason: HOSPADM

## 2020-08-20 RX ORDER — ACETAMINOPHEN 325 MG/1
650 TABLET ORAL EVERY 6 HOURS PRN
Status: DISCONTINUED | OUTPATIENT
Start: 2020-08-20 | End: 2020-08-25 | Stop reason: HOSPADM

## 2020-08-20 RX ORDER — ALBUTEROL SULFATE 2.5 MG/3ML
2.5 SOLUTION RESPIRATORY (INHALATION)
Status: DISCONTINUED | OUTPATIENT
Start: 2020-08-20 | End: 2020-08-25 | Stop reason: HOSPADM

## 2020-08-20 RX ORDER — PANTOPRAZOLE SODIUM 40 MG/1
40 TABLET, DELAYED RELEASE ORAL DAILY
Status: DISCONTINUED | OUTPATIENT
Start: 2020-08-20 | End: 2020-08-20

## 2020-08-20 RX ORDER — TIZANIDINE 2 MG/1
2 TABLET ORAL 2 TIMES DAILY PRN
Status: DISCONTINUED | OUTPATIENT
Start: 2020-08-20 | End: 2020-08-25 | Stop reason: HOSPADM

## 2020-08-20 RX ORDER — ATORVASTATIN CALCIUM 20 MG/1
20 TABLET, FILM COATED ORAL DAILY
Status: DISCONTINUED | OUTPATIENT
Start: 2020-08-20 | End: 2020-08-25 | Stop reason: HOSPADM

## 2020-08-20 RX ORDER — PROMETHAZINE HYDROCHLORIDE 12.5 MG/1
12.5 TABLET ORAL EVERY 6 HOURS PRN
Status: DISCONTINUED | OUTPATIENT
Start: 2020-08-20 | End: 2020-08-20

## 2020-08-20 RX ORDER — FUROSEMIDE 10 MG/ML
40 INJECTION INTRAMUSCULAR; INTRAVENOUS DAILY
Status: DISCONTINUED | OUTPATIENT
Start: 2020-08-20 | End: 2020-08-25 | Stop reason: HOSPADM

## 2020-08-20 RX ORDER — DEXTROSE MONOHYDRATE 50 MG/ML
100 INJECTION, SOLUTION INTRAVENOUS PRN
Status: DISCONTINUED | OUTPATIENT
Start: 2020-08-20 | End: 2020-08-25 | Stop reason: HOSPADM

## 2020-08-20 RX ORDER — DOCUSATE SODIUM 100 MG/1
100 CAPSULE, LIQUID FILLED ORAL 2 TIMES DAILY
Status: DISCONTINUED | OUTPATIENT
Start: 2020-08-20 | End: 2020-08-25 | Stop reason: HOSPADM

## 2020-08-20 RX ORDER — TRAZODONE HYDROCHLORIDE 100 MG/1
100 TABLET ORAL NIGHTLY
Status: DISCONTINUED | OUTPATIENT
Start: 2020-08-20 | End: 2020-08-25 | Stop reason: HOSPADM

## 2020-08-20 RX ORDER — FUROSEMIDE 20 MG/1
20 TABLET ORAL DAILY
Status: DISCONTINUED | OUTPATIENT
Start: 2020-08-20 | End: 2020-08-20

## 2020-08-20 RX ORDER — BUDESONIDE AND FORMOTEROL FUMARATE DIHYDRATE 160; 4.5 UG/1; UG/1
2 AEROSOL RESPIRATORY (INHALATION) 2 TIMES DAILY
Status: DISCONTINUED | OUTPATIENT
Start: 2020-08-20 | End: 2020-08-25 | Stop reason: HOSPADM

## 2020-08-20 RX ORDER — DEXTROSE MONOHYDRATE 25 G/50ML
12.5 INJECTION, SOLUTION INTRAVENOUS PRN
Status: DISCONTINUED | OUTPATIENT
Start: 2020-08-20 | End: 2020-08-25 | Stop reason: HOSPADM

## 2020-08-20 RX ORDER — PREGABALIN 75 MG/1
75 CAPSULE ORAL 2 TIMES DAILY
Status: DISCONTINUED | OUTPATIENT
Start: 2020-08-20 | End: 2020-08-20

## 2020-08-20 RX ORDER — LISINOPRIL 20 MG/1
20 TABLET ORAL DAILY
Status: DISCONTINUED | OUTPATIENT
Start: 2020-08-20 | End: 2020-08-25 | Stop reason: HOSPADM

## 2020-08-20 RX ORDER — TRAZODONE HYDROCHLORIDE 100 MG/1
100 TABLET ORAL NIGHTLY
Status: DISCONTINUED | OUTPATIENT
Start: 2020-08-20 | End: 2020-08-20

## 2020-08-20 RX ORDER — PANTOPRAZOLE SODIUM 20 MG/1
20 TABLET, DELAYED RELEASE ORAL DAILY
Status: ON HOLD | COMMUNITY
End: 2020-08-25 | Stop reason: SDUPTHER

## 2020-08-20 RX ORDER — HYDRALAZINE HYDROCHLORIDE 10 MG/1
10 TABLET, FILM COATED ORAL 2 TIMES DAILY
Status: DISCONTINUED | OUTPATIENT
Start: 2020-08-20 | End: 2020-08-20

## 2020-08-20 RX ORDER — TIZANIDINE 4 MG/1
4 TABLET ORAL 2 TIMES DAILY PRN
Status: ON HOLD | COMMUNITY
End: 2021-02-25 | Stop reason: HOSPADM

## 2020-08-20 RX ORDER — PREGABALIN 100 MG/1
100 CAPSULE ORAL 2 TIMES DAILY
Status: DISCONTINUED | OUTPATIENT
Start: 2020-08-20 | End: 2020-08-25 | Stop reason: HOSPADM

## 2020-08-20 RX ORDER — GUAIFENESIN/DEXTROMETHORPHAN 100-10MG/5
5 SYRUP ORAL EVERY 4 HOURS PRN
Status: DISCONTINUED | OUTPATIENT
Start: 2020-08-20 | End: 2020-08-22

## 2020-08-20 RX ORDER — ONDANSETRON 2 MG/ML
4 INJECTION INTRAMUSCULAR; INTRAVENOUS EVERY 6 HOURS PRN
Status: DISCONTINUED | OUTPATIENT
Start: 2020-08-20 | End: 2020-08-20

## 2020-08-20 RX ORDER — PREGABALIN 100 MG/1
100 CAPSULE ORAL 2 TIMES DAILY
Status: ON HOLD | COMMUNITY
End: 2021-02-22 | Stop reason: HOSPADM

## 2020-08-20 RX ADMIN — METHYLPREDNISOLONE SODIUM SUCCINATE 125 MG: 125 INJECTION, POWDER, FOR SOLUTION INTRAMUSCULAR; INTRAVENOUS at 12:47

## 2020-08-20 RX ADMIN — BUDESONIDE AND FORMOTEROL FUMARATE DIHYDRATE 2 PUFF: 160; 4.5 AEROSOL RESPIRATORY (INHALATION) at 19:13

## 2020-08-20 RX ADMIN — TRAZODONE HYDROCHLORIDE 100 MG: 100 TABLET ORAL at 20:54

## 2020-08-20 RX ADMIN — PREGABALIN 100 MG: 100 CAPSULE ORAL at 20:16

## 2020-08-20 RX ADMIN — PREDNISONE 20 MG: 20 TABLET ORAL at 18:11

## 2020-08-20 RX ADMIN — GUAIFENESIN AND DEXTROMETHORPHAN 5 ML: 100; 10 SYRUP ORAL at 20:16

## 2020-08-20 RX ADMIN — SODIUM CHLORIDE, PRESERVATIVE FREE 10 ML: 5 INJECTION INTRAVENOUS at 20:16

## 2020-08-20 RX ADMIN — DOCUSATE SODIUM 100 MG: 100 CAPSULE, LIQUID FILLED ORAL at 20:16

## 2020-08-20 RX ADMIN — INSULIN LISPRO 2 UNITS: 100 INJECTION, SOLUTION INTRAVENOUS; SUBCUTANEOUS at 20:16

## 2020-08-20 RX ADMIN — AZITHROMYCIN MONOHYDRATE 500 MG: 500 INJECTION, POWDER, LYOPHILIZED, FOR SOLUTION INTRAVENOUS at 14:43

## 2020-08-20 RX ADMIN — CEFTRIAXONE SODIUM 1 G: 1 INJECTION, POWDER, FOR SOLUTION INTRAMUSCULAR; INTRAVENOUS at 14:43

## 2020-08-20 RX ADMIN — ENOXAPARIN SODIUM 40 MG: 40 INJECTION SUBCUTANEOUS at 18:06

## 2020-08-20 RX ADMIN — IPRATROPIUM BROMIDE AND ALBUTEROL SULFATE 1 AMPULE: .5; 3 SOLUTION RESPIRATORY (INHALATION) at 19:13

## 2020-08-20 RX ADMIN — FUROSEMIDE 40 MG: 10 INJECTION, SOLUTION INTRAMUSCULAR; INTRAVENOUS at 18:06

## 2020-08-20 ASSESSMENT — PAIN SCALES - GENERAL: PAINLEVEL_OUTOF10: 0

## 2020-08-20 ASSESSMENT — ENCOUNTER SYMPTOMS
EYE REDNESS: 0
SINUS PRESSURE: 0
ABDOMINAL DISTENTION: 0
COLOR CHANGE: 0
VOMITING: 0
SHORTNESS OF BREATH: 1
BACK PAIN: 0
CONSTIPATION: 0
APNEA: 0
COUGH: 1
DIARRHEA: 0
SINUS PAIN: 0
PHOTOPHOBIA: 0
EYE DISCHARGE: 0
VOICE CHANGE: 0
NAUSEA: 0
WHEEZING: 1
FACIAL SWELLING: 0
ABDOMINAL PAIN: 0

## 2020-08-20 NOTE — ED NOTES
Patient presents to the er with c/o chest pain and sob over the past week and half. Patient denies fever, but does report chills and body aches. Patient has a hx of COPD and wears home O2. In triage patient was found to have low saturation, but did not wear her oxygen on way over to the hospital SpO2 did improve when patient was placed on oxygen.      Venessa De Jesus RN  08/20/20 5600

## 2020-08-20 NOTE — PROGRESS NOTES
.Patient admitted to room 1003. VS taken, telemetry placed and call light given/within reach. Patient A&O and given room orientation. Orders released/reviewed, initial assessment completed and navigator started. See chart for more detail.

## 2020-08-20 NOTE — ED PROVIDER NOTES
[address] [place] [address] ED  EMERGENCY DEPARTMENT ENCOUNTER      Pt Name: Dylan Ahumada  MRN: [id number]  [place] 1945  Date of evaluation: 8/20/2020  Provider: Don Saucedo MD    [address] [place] [address]       Chief Complaint   Patient presents with    Shortness of Breath    Cough    Generalized Body Aches         HISTORY OF PRESENT ILLNESS  (Location/Symptom, Timing/Onset, Context/Setting, Quality, Duration, Modifying Factors, Severity.)   Dylan Ahumada is a 77 y.o. female who presents to the emergency department for cough and shortness of breath. She is had it for a week. She has a history of COPD and is on home oxygen. Upon triage her O2 sat was 82% but she did not wear her oxygen in route to the hospital.  When she was placed on her usual home oxygen her O2 sat was near 100%. She has been coughing up some beige phlegm, no hemoptysis. She has had body aches but no known fever. Nursing Notes were reviewed. ALLERGIES     Tiotropium and Spiriva handihaler [tiotropium bromide monohydrate]    CURRENT MEDICATIONS       Previous Medications    ALBUTEROL (PROVENTIL HFA;VENTOLIN HFA) 108 (90 BASE) MCG/ACT INHALER    Inhale 2 puffs into the lungs every 6 hours as needed for Wheezing. ALBUTEROL (PROVENTIL) (2.5 MG/3ML) 0.083% NEBULIZER SOLUTION    Take 2.5 mg by nebulization every 6 hours as needed for Wheezing    ALBUTEROL (PROVENTIL) (2.5 MG/3ML) 0.083% NEBULIZER SOLUTION    Take 3 mLs by nebulization every 6 hours as needed for Wheezing or Shortness of Breath    ALBUTEROL SULFATE HFA (VENTOLIN HFA) 108 (90 BASE) MCG/ACT INHALER    Inhale 2 puffs into the lungs every 6 hours as needed for Wheezing or Shortness of Breath    ASPIRIN 81 MG TABLET    Take 81 mg by mouth daily. ATORVASTATIN (LIPITOR) 20 MG TABLET    Take 20 mg by mouth daily     CALCIUM-VITAMIN D (OSCAL-500) 500-200 MG-UNIT PER TABLET    Take 1 tablet by mouth daily. DICLOFENAC SODIUM 1 % GEL    Apply 2 g topically 2 times daily.     DOCUSATE SODIUM (COLACE) 100 MG CAPSULE    Take 100 mg by mouth 2 times daily    FERROUS SULFATE 325 (65 FE) MG EC TABLET    Take 325 mg by mouth 3 times daily (with meals)    FLUTICASONE-SALMETEROL (ADVAIR) 250-50 MCG/DOSE AEPB    Inhale 1 puff into the lungs every 12 hours. FUROSEMIDE (LASIX) 20 MG TABLET    Take 1 tablet by mouth daily    HYDRALAZINE (APRESOLINE) 10 MG TABLET    Take 10 mg by mouth 2 times daily. IBUPROFEN (ADVIL;MOTRIN) 800 MG TABLET    Take 800 mg by mouth every 8 hours as needed for Pain    INSULIN LISPRO (HUMALOG) 100 UNIT/ML INJECTION VIAL    Inject 3 Units into the skin 3 times daily (before meals)    LISINOPRIL (PRINIVIL;ZESTRIL) 20 MG TABLET    Take 20 mg by mouth daily     METFORMIN (GLUCOPHAGE) 500 MG TABLET    Take 500 mg by mouth daily (with breakfast)    NICOTINE (NICODERM CQ) 21 MG/24HR    Place 1 patch onto the skin daily as needed (as neeeded for nicotine withdrawl)    PANTOPRAZOLE (PROTONIX) 40 MG TABLET    Take 40 mg by mouth daily    PREGABALIN (LYRICA) 75 MG CAPSULE    Take 75 mg by mouth 2 times daily. TIZANIDINE (ZANAFLEX) 2 MG TABLET    Take 2 mg by mouth 2 times daily as needed    TRAZODONE (DESYREL) 100 MG TABLET    Take 100 mg by mouth nightly       PAST MEDICAL HISTORY         Diagnosis Date    CHF (congestive heart failure) (HCC)     [facility]     Diabetes mellitus [facility]     MRSA (methicillin resistant staph aureus) culture positive 2014    sputum    Tobacco abuse 10/15/2019       SURGICAL HISTORY           Procedure Laterality Date    APPENDECTOMY      HYSTERECTOMY      JOINT REPLACEMENT Bilateral          FAMILY HISTORY     History reviewed. No pertinent family history. Family Status   Relation Name Status    Mother      Father          SOCIAL HISTORY      reports that she has been smoking cigarettes. She has been smoking about 0.50 packs per day.  She has never used smokeless tobacco. She reports current alcohol use of about 70.0 standard drinks of alcohol per week. She reports current drug use. Drug: Marijuana. REVIEW OF SYSTEMS    (2-9 systems for level 4, 10 or more for level 5)     Review of Systems   Constitutional: Negative for chills, fatigue and fever. HENT: Negative for congestion, ear discharge and facial swelling. Eyes: Negative for discharge and redness. Respiratory: Positive for cough, shortness of breath and wheezing. Cardiovascular: Negative for chest pain. Gastrointestinal: Negative for abdominal pain, constipation, diarrhea and vomiting. Genitourinary: Negative for dysuria and hematuria. Musculoskeletal: Negative for arthralgias. Skin: Negative for color change and rash. Neurological: Negative for syncope, numbness and headaches. Hematological: Negative for adenopathy. Psychiatric/Behavioral: Negative for confusion. The patient is not nervous/anxious. Except as noted above the remainder of the review of systems was reviewed and negative. PHYSICAL EXAM    (up to 7 for level 4, 8 or more for level 5)     Vitals:    08/20/20 1215 08/20/20 1225 08/20/20 1303 08/20/20 1318   BP: (!) 151/91  (!) 161/95 (!) 165/72   Pulse: 92  91 88   Resp: 18  19 20   Temp: 98.4 °F (36.9 °C)      TempSrc: Oral      SpO2: (!) 82%      Weight:  144 lb (65.3 kg)     Height:  5' 6\" (1.676 m)         Physical Exam  Constitutional:       General: She is not in acute distress. Appearance: She is well-developed. She is not diaphoretic. HENT:      Head: Normocephalic and atraumatic. Eyes:      General:         Right eye: No discharge. Left eye: No discharge. Neck:      Musculoskeletal: Neck supple. Cardiovascular:      Rate and Rhythm: Normal rate. Pulmonary:      Effort: Pulmonary effort is normal. No respiratory distress. Breath sounds: No stridor. Comments: She coughs frequently. Musculoskeletal: Normal range of motion.    Skin:     Findings: No erythema or rash. Neurological:      Mental Status: She is alert and oriented to person, place, and time. Psychiatric:         Behavior: Behavior normal.     Limited physical exam carried out due to risk of viral transmission. DIAGNOSTIC RESULTS     EKG: All EKG's are interpreted by the Emergency Department Physician who either signs or Co-signs this chart in the absence of a cardiologist.    RADIOLOGY:   Non-plain film images such as CT, Ultrasound and MRI are read by the radiologist. Plain radiographic images are visualized and preliminarily interpreted by the emergency physician with the below findings:    Interpretation per the Radiologist below, if available at the time of this note:    Xr Chest Portable    Result Date: 8/20/2020  EXAMINATION: ONE XRAY VIEW OF THE CHEST 8/20/2020 12:57 pm COMPARISON: 01/15/2020, 12/03/2019 HISTORY: ORDERING SYSTEM PROVIDED HISTORY: sob TECHNOLOGIST PROVIDED HISTORY: sob Reason for Exam: port ap upright cough congestion Acuity: Unknown Type of Exam: Unknown FINDINGS: Cardiac silhouette enlargement again noted. Interstitial opacities appear more prominent in the right upper lung field. Basilar interstitial opacities otherwise appear unchanged. The soft tissue of the patient's neck obscures a portion of the lung apices, however there is no evidence for pneumothorax. No evidence for significant effusion. No acute osseous abnormality identified. Right upper lobe opacities appear more prominent in the interval.  Developing inflammatory process or infection should be considered in the appropriate clinical setting. Chronic basilar interstitial opacities.      LABS:  Labs Reviewed   CBC WITH AUTO DIFFERENTIAL - Abnormal; Notable for the following components:       Result Value    WBC 21.1 (*)     Hemoglobin 11.4 (*)     MCV 81.2 (*)     RDW 27.1 (*)     NRBC Automated 0.1 (*)     Seg Neutrophils 86 (*)     Lymphocytes 7 (*)     Eosinophils % 0 (*)     Segs Absolute 18.14 (*)     Absolute Mono # 1.27 (*)     Basophils Absolute 0.21 (*)     All other components within normal limits   BASIC METABOLIC PANEL - Abnormal; Notable for the following components:    CREATININE 1.13 (*)     Calcium 8.3 (*)     Sodium 134 (*)     GFR Non- 47 (*)     GFR  57 (*)     All other components within normal limits   CULTURE, BLOOD 1   CULTURE, BLOOD 1   LACTIC ACID   COVID-19       All other labs were within normal range or not returned as of this dictation. EMERGENCY DEPARTMENT COURSE and DIFFERENTIAL DIAGNOSIS/MDM:   Vitals:    Vitals:    08/20/20 1215 08/20/20 1225 08/20/20 1303 08/20/20 1318   BP: (!) 151/91  (!) 161/95 (!) 165/72   Pulse: 92  91 88   Resp: 18 19 20   Temp: 98.4 °F (36.9 °C)      TempSrc: Oral      SpO2: (!) 82%      Weight:  144 lb (65.3 kg)     Height:  5' 6\" (1.676 m)         Orders Placed This Encounter   Medications    methylPREDNISolone sodium (SOLU-MEDROL) injection 125 mg    cefTRIAXone (ROCEPHIN) 1 g IVPB in 50 mL D5W minibag    azithromycin (ZITHROMAX) 500 mg in D5W 250ml addavial       Medical Decision Making: The patient has pneumonia per radiologist reading of the x-ray. White count is 21,000. Blood cultures were obtained and then she was given IV antibiotic. Coronavirus testing is negative. Treatment diagnosis and disposition were discussed with the patient. CONSULTS:  None    PROCEDURES:  None    FINAL IMPRESSION      1. Pneumonia due to organism          DISPOSITION/PLAN   DISPOSITION Decision To Admit 08/20/2020 02:34:33 PM      PATIENT REFERRED TO:   No follow-up provider specified.     DISCHARGE MEDICATIONS:     New Prescriptions    No medications on file         (Please note that portions of this note were completed with a voice recognition program.  Efforts were made to edit the dictations but occasionally words are mis-transcribed.)    Thomas Christopher MD  Attending Emergency Physician           Thomas Christopher, MD  08/20/20 8978

## 2020-08-20 NOTE — H&P
[facility]    HISTORY AND PHYSICAL EXAMINATION            Date:   8/20/2020  Patient name:  Eleonora Monzon  Date of admission:  8/20/2020 12:17 PM  MRN:   [id number]  Account:  [de-identified]  YOB: 1945  PCP:    Yudy Reagan MD  Room:   [place] [address]  Code Status:    Prior    Chief Complaint:     Chief Complaint   Patient presents with    Shortness of Breath    Cough    Generalized Body Aches       History Obtained From:     patient    History of Present Illness:     Eleonora Monzon is a 77 y.o. Non-/non  female who presents with Shortness of Breath; Cough; and Generalized Body Aches   and is admitted to the hospital for the management of Community acquired pneumonia of right upper lobe of lung ([facility] [address] Patient reports that she has a longstanding history of chronic respiratory failure secondary to COPD. Patient was apparently told that she has congestive heart failure as well however it does not appear as though she is on a daily diuretics. Patient reports that over the past week she has been experiencing exertional dyspnea, increasing fatigue, and for the past 5 days a deep cough that is becoming more frequent and productive. Patient reports that her cough is now producing yellow/green phlegm and sputum. Patient denies any fever or chills. Patient reports that today her cough became so severe that she was severely dyspneic and could not catch her breath. Patient wears home O2 and she states that she increased her oxygen in an attempt to relieve her dyspnea without any success. Patient has a granddaughter that helps care for her in the home and she reports that her condition has greatly deteriorated over the past 5 to 7 days by her assessment. In the emergency department patient was found to have an elevated WBC count, findings on x-ray consistent with atelectasis as well as pneumonia in the right upper lobe.   Patient is to be admitted for further evaluation. At the time of my exam patient is in mild respiratory distress, she is requiring supplemental oxygen to maintain oxygen saturation greater than 90. Patient denies any chest pain at this time however she states she does have chest wall pain during coughing episodes rated at a 5/10. Dyspnea is persistent and has been eased by breathing treatment in the emergency department. COVID swab was negative. Past Medical History:     Past Medical History:   Diagnosis Date    CHF (congestive heart failure) ([facility] [address]     COPD [facility]chronic obstructive pulmonary disease) ([facility]     Diabetes mellitus ([facility])     MRSA (methicillin resistant staph aureus) culture positive 8/28/2014    sputum    Tobacco abuse 10/15/2019        Past Surgical History:     Past Surgical History:   Procedure Laterality Date    APPENDECTOMY      HYSTERECTOMY      JOINT REPLACEMENT Bilateral         Medications Prior to Admission:     Prior to Admission medications    Medication Sig Start Date End Date Taking?  Authorizing Provider   albuterol (PROVENTIL) (2.5 MG/3ML) 0.083% nebulizer solution Take 2.5 mg by nebulization every 6 hours as needed for Wheezing    Historical Provider, MD   ibuprofen (ADVIL;MOTRIN) 800 MG tablet Take 800 mg by mouth every 8 hours as needed for Pain    Historical Provider, MD   pantoprazole (PROTONIX) 40 MG tablet Take 40 mg by mouth daily    Historical Provider, MD   tiZANidine (ZANAFLEX) 2 MG tablet Take 2 mg by mouth 2 times daily as needed    Historical Provider, MD   albuterol sulfate HFA (VENTOLIN HFA) 108 (90 Base) MCG/ACT inhaler Inhale 2 puffs into the lungs every 6 hours as needed for Wheezing or Shortness of Breath 1/15/20   ABDON Iniguez CNP   albuterol (PROVENTIL) (2.5 MG/3ML) 0.083% nebulizer solution Take 3 mLs by nebulization every 6 hours as needed for Wheezing or Shortness of Breath 1/15/20   ABDON Iniguez CNP   furosemide (LASIX) 20 MG tablet Take 1 tablet by mouth daily 10/18/19 11/17/19  ABDON Martin NP   nicotine (NICODERM CQ) 21 MG/24HR Place 1 patch onto the skin daily as needed (as neeeded for nicotine withdrawl) 10/17/19   ABDON Martin NP   atorvastatin (LIPITOR) 20 MG tablet Take 20 mg by mouth daily     Historical Provider, MD   traZODone (DESYREL) 100 MG tablet Take 100 mg by mouth nightly    Historical Provider, MD   insulin lispro (HUMALOG) 100 UNIT/ML injection vial Inject 3 Units into the skin 3 times daily (before meals)    Historical Provider, MD   metFORMIN (GLUCOPHAGE) 500 MG tablet Take 500 mg by mouth daily (with breakfast)    Historical Provider, MD   ferrous sulfate 325 (65 FE) MG EC tablet Take 325 mg by mouth 3 times daily (with meals)    Historical Provider, MD   docusate sodium (COLACE) 100 MG capsule Take 100 mg by mouth 2 times daily    Historical Provider, MD   albuterol (PROVENTIL HFA;VENTOLIN HFA) 108 (90 BASE) MCG/ACT inhaler Inhale 2 puffs into the lungs every 6 hours as needed for Wheezing. Historical Provider, MD   pregabalin (LYRICA) 75 MG capsule Take 75 mg by mouth 2 times daily. Historical Provider, MD   aspirin 81 MG tablet Take 81 mg by mouth daily. Historical Provider, MD   calcium-vitamin D (OSCAL-500) 500-200 MG-UNIT per tablet Take 1 tablet by mouth daily. Historical Provider, MD   hydrALAZINE (APRESOLINE) 10 MG tablet Take 10 mg by mouth 2 times daily. Historical Provider, MD   fluticasone-salmeterol (ADVAIR) 250-50 MCG/DOSE AEPB Inhale 1 puff into the lungs every 12 hours. Historical Provider, MD   lisinopril (PRINIVIL;ZESTRIL) 20 MG tablet Take 20 mg by mouth daily     Historical Provider, MD   diclofenac sodium 1 % GEL Apply 2 g topically 2 times daily. Historical Provider, MD        Allergies:     Tiotropium and Spiriva handihaler [tiotropium bromide monohydrate]    Social History:     Tobacco:    reports that she has been smoking cigarettes.  She has been smoking about 0.50 packs per day. She has never used smokeless tobacco.  Alcohol:      reports current alcohol use of about 70.0 standard drinks of alcohol per week. Drug Use:  reports current drug use. Drug: Marijuana. Family History:     Family History   Problem Relation Age of Onset    Heart Disease Mother     Diabetes Father         Review of Systems:     Positive and Negative as described in HPI. Review of Systems   Constitutional: Positive for activity change and fatigue. Negative for fever. HENT: Negative for sinus pressure, sinus pain and voice change. Eyes: Negative for photophobia and visual disturbance. Respiratory: Positive for cough, shortness of breath and wheezing. Negative for apnea. Cardiovascular: Positive for leg swelling. Negative for chest pain and palpitations. Gastrointestinal: Negative for abdominal distention, abdominal pain, nausea and vomiting. Endocrine: Negative for polyphagia and polyuria. Genitourinary: Negative for difficulty urinating, frequency, urgency, vaginal bleeding and vaginal discharge. Musculoskeletal: Negative for arthralgias, back pain, neck pain and neck stiffness. Skin: Negative for color change and rash. Allergic/Immunologic: Negative for environmental allergies, food allergies and immunocompromised state. Neurological: Positive for dizziness and weakness. Negative for tremors and syncope. Hematological: Negative for adenopathy. Psychiatric/Behavioral: Negative for agitation, confusion and self-injury. The patient is not nervous/anxious and is not hyperactive. Physical Exam:   BP (!) 160/98   Pulse 83   Temp 98.4 °F (36.9 °C) (Oral)   Resp 20   Ht 5' 6\" (1.676 m)   Wt 144 lb (65.3 kg)   SpO2 91%   BMI 23.24 kg/m²   Temp (24hrs), Av.4 °F (36.9 °C), Min:98.4 °F (36.9 °C), Max:98.4 °F (36.9 °C)    No results for input(s): POCGLU in the last 72 hours.   No intake or output data in the 24 hours ending 20 7020    Physical Exam  Constitutional:       General: She is in acute distress. Appearance: She is ill-appearing. She is not toxic-appearing. HENT:      Head: Normocephalic and atraumatic. Right periorbital erythema present. Nose: Congestion present. Neck:      Musculoskeletal: Normal range of motion and neck supple. Cardiovascular:      Rate and Rhythm: Normal rate and regular rhythm. Pulses: Normal pulses. Heart sounds: No murmur. No friction rub. No gallop. Pulmonary:      Effort: Tachypnea, accessory muscle usage and respiratory distress present. No retractions. Breath sounds: Examination of the right-middle field reveals decreased breath sounds. Examination of the left-middle field reveals decreased breath sounds. Examination of the right-lower field reveals decreased breath sounds and rales. Examination of the left-lower field reveals decreased breath sounds and rales. Decreased breath sounds, wheezing and rales present. Abdominal:      General: Abdomen is flat. There is no distension. Palpations: Abdomen is soft. There is no mass. Tenderness: There is no abdominal tenderness. Musculoskeletal: Normal range of motion. General: No swelling or tenderness. Skin:     General: Skin is warm and dry. Capillary Refill: Capillary refill takes less than 2 seconds. Coloration: Skin is pale. Neurological:      General: No focal deficit present. Mental Status: She is alert and oriented to person, place, and time. Motor: No weakness.       Gait: Gait normal.   Psychiatric:         Mood and Affect: Mood normal.         Behavior: Behavior normal.         Investigations:      Laboratory Testing:  Recent Results (from the past 24 hour(s))   EKG 12 Lead    Collection Time: 08/20/20 12:30 PM   Result Value Ref Range    Ventricular Rate 88 BPM    Atrial Rate 88 BPM    P-R Interval 186 ms    QRS Duration 98 ms    Q-T Interval 416 ms    QTc Calculation (Bazett) 503 ms    P Axis 31 degrees    R Axis -30 degrees    T Axis 27 degrees   CBC Auto Differential    Collection Time: 08/20/20 12:40 PM   Result Value Ref Range    WBC 21.1 (H) 3.5 - 11.3 k/uL    RBC 4.51 3.95 - 5.11 m/uL    Hemoglobin 11.4 (L) 11.9 - 15.1 g/dL    Hematocrit 36.6 36.3 - 47.1 %    MCV 81.2 (L) 82.6 - 102.9 fL    MCH 25.3 25.2 - 33.5 pg    MCHC 31.1 28.4 - 34.8 g/dL    RDW 27.1 (H) 11.8 - 14.4 %    Platelets 948 897 - 872 k/uL    MPV 9.8 8.1 - 13.5 fL    NRBC Automated 0.1 (H) 0.0 per 100 WBC    Differential Type NOT REPORTED     WBC Morphology NOT REPORTED     RBC Morphology NOT REPORTED     Platelet Estimate NOT REPORTED     Seg Neutrophils 86 (H) 36 - 66 %    Lymphocytes 7 (L) 24 - 44 %    Monocytes 6 1 - 7 %    Eosinophils % 0 (L) 1 - 4 %    Basophils 1 %    Immature Granulocytes 0 0 %    Segs Absolute 18.14 (H) 1.8 - 7.7 k/uL    Absolute Lymph # 1.48 1.0 - 4.8 k/uL    Absolute Mono # 1.27 (H) 0.2 - 0.8 k/uL    Absolute Eos # 0.00 0.0 - 0.4 k/uL    Basophils Absolute 0.21 (H) 0.0 - 0.2 k/uL    Absolute Immature Granulocyte 0.00 0.00 - 0.30 k/uL    Morphology ANISOCYTOSIS PRESENT     Morphology DOHLE BODIES PRESENT    Basic Metabolic Panel    Collection Time: 08/20/20 12:40 PM   Result Value Ref Range    Glucose 94 70 - 99 mg/dL    BUN 21 8 - 23 mg/dL    CREATININE 1.13 (H) 0.50 - 0.90 mg/dL    Bun/Cre Ratio 19 9 - 20    Calcium 8.3 (L) 8.6 - 10.4 mg/dL    Sodium 134 (L) 135 - 144 mmol/L    Potassium 4.4 3.7 - 5.3 mmol/L    Chloride 99 98 - 107 mmol/L    CO2 22 20 - 31 mmol/L    Anion Gap 13 9 - 17 mmol/L    GFR Non-African American 47 (L) >60 mL/min    GFR  57 (L) >60 mL/min    GFR Comment          GFR Staging NOT REPORTED    Lactic Acid    Collection Time: 08/20/20  2:02 PM   Result Value Ref Range    Lactic Acid 1.9 0.5 - 2.2 mmol/L   COVID-19    Collection Time: 08/20/20  2:02 PM    Specimen: Other   Result Value Ref Range    SARS-CoV-2          SARS-CoV-2, Rapid Not Detected Not Detected Source . NASOPHARYNGEAL SWAB     SARS-CoV-2, PCR             Imaging/Diagnostics:  Xr Chest Portable    Result Date: 8/20/2020  Right upper lobe opacities appear more prominent in the interval.  Developing inflammatory process or infection should be considered in the appropriate clinical setting. Chronic basilar interstitial opacities. Assessment :      Hospital Problems           Last Modified POA    * (Principal) Community acquired pneumonia of right upper lobe of lung ([address] 8/20/2020 Yes    COPD (chronic obstructive pulmonary disease) ([address] 8/20/2020 Yes    Acute respiratory failure with hypoxia ([address] 8/20/2020 Yes    Type 2 diabetes mellitus with hyperglycemia, with long-term current use of insulin ([address] 8/20/2020 Yes    Acute on chronic diastolic heart failure ([address] 8/20/2020 Yes          Plan:     Patient status inpatient in the  Progressive Unit/Step down    1. Add BNP, serial twelve-lead's, repeat x-ray in the morning  2. Echo results appreciated no need to repeat at this time  3. Rocephin and Zithromax IV  4. Trend lactate, follow cultures, trend CBC, add procalcitonin  5. Glycemic control with sliding scale insulin  6. Scheduled and as needed breathing treatments  7. Continue home medications for COPD    Consultations:   IP CONSULT TO HOSPITALIST    Patient is admitted as inpatient status because of co-morbidities listed above, severity of signs and symptoms as outlined, requirement for current medical therapies and most importantly because of direct risk to patient if care not provided in a hospital setting. Expected length of stay > 48 hours.     ABDON Arriaga NP  8/20/2020  3:36 PM    Copy sent to Dr. Behzad Strauss MD

## 2020-08-21 ENCOUNTER — APPOINTMENT (OUTPATIENT)
Dept: GENERAL RADIOLOGY | Age: 75
DRG: 871 | End: 2020-08-21
Payer: MEDICARE

## 2020-08-21 PROBLEM — E44.1 MILD MALNUTRITION (HCC): Status: ACTIVE | Noted: 2020-08-21

## 2020-08-21 LAB
ANION GAP SERPL CALCULATED.3IONS-SCNC: 11 MMOL/L (ref 9–17)
BUN BLDV-MCNC: 17 MG/DL (ref 8–23)
BUN/CREAT BLD: 18 (ref 9–20)
CALCIUM SERPL-MCNC: 8.5 MG/DL (ref 8.6–10.4)
CHLORIDE BLD-SCNC: 101 MMOL/L (ref 98–107)
CO2: 25 MMOL/L (ref 20–31)
CREAT SERPL-MCNC: 0.96 MG/DL (ref 0.5–0.9)
GFR AFRICAN AMERICAN: >60 ML/MIN
GFR NON-AFRICAN AMERICAN: 57 ML/MIN
GFR SERPL CREATININE-BSD FRML MDRD: ABNORMAL ML/MIN/{1.73_M2}
GFR SERPL CREATININE-BSD FRML MDRD: ABNORMAL ML/MIN/{1.73_M2}
GLUCOSE BLD-MCNC: 126 MG/DL (ref 65–105)
GLUCOSE BLD-MCNC: 141 MG/DL (ref 65–105)
GLUCOSE BLD-MCNC: 145 MG/DL (ref 70–99)
GLUCOSE BLD-MCNC: 150 MG/DL (ref 65–105)
HCT VFR BLD CALC: 33.8 % (ref 36.3–47.1)
HEMOGLOBIN: 10.6 G/DL (ref 11.9–15.1)
MCH RBC QN AUTO: 25 PG (ref 25.2–33.5)
MCHC RBC AUTO-ENTMCNC: 31.4 G/DL (ref 28.4–34.8)
MCV RBC AUTO: 79.7 FL (ref 82.6–102.9)
NRBC AUTOMATED: 0.1 PER 100 WBC
PDW BLD-RTO: 26.3 % (ref 11.8–14.4)
PLATELET # BLD: 358 K/UL (ref 138–453)
PMV BLD AUTO: 9.7 FL (ref 8.1–13.5)
POTASSIUM SERPL-SCNC: 4.4 MMOL/L (ref 3.7–5.3)
RBC # BLD: 4.24 M/UL (ref 3.95–5.11)
SODIUM BLD-SCNC: 137 MMOL/L (ref 135–144)
WBC # BLD: 22.9 K/UL (ref 3.5–11.3)

## 2020-08-21 PROCEDURE — 85027 COMPLETE CBC AUTOMATED: CPT

## 2020-08-21 PROCEDURE — 80048 BASIC METABOLIC PNL TOTAL CA: CPT

## 2020-08-21 PROCEDURE — 6360000002 HC RX W HCPCS: Performed by: FAMILY MEDICINE

## 2020-08-21 PROCEDURE — 82947 ASSAY GLUCOSE BLOOD QUANT: CPT

## 2020-08-21 PROCEDURE — 36415 COLL VENOUS BLD VENIPUNCTURE: CPT

## 2020-08-21 PROCEDURE — 6370000000 HC RX 637 (ALT 250 FOR IP): Performed by: NURSE PRACTITIONER

## 2020-08-21 PROCEDURE — 97166 OT EVAL MOD COMPLEX 45 MIN: CPT

## 2020-08-21 PROCEDURE — 71046 X-RAY EXAM CHEST 2 VIEWS: CPT

## 2020-08-21 PROCEDURE — 2580000003 HC RX 258: Performed by: NURSE PRACTITIONER

## 2020-08-21 PROCEDURE — 97535 SELF CARE MNGMENT TRAINING: CPT

## 2020-08-21 PROCEDURE — 97116 GAIT TRAINING THERAPY: CPT

## 2020-08-21 PROCEDURE — 94640 AIRWAY INHALATION TREATMENT: CPT

## 2020-08-21 PROCEDURE — 99232 SBSQ HOSP IP/OBS MODERATE 35: CPT | Performed by: FAMILY MEDICINE

## 2020-08-21 PROCEDURE — 97530 THERAPEUTIC ACTIVITIES: CPT

## 2020-08-21 PROCEDURE — 97162 PT EVAL MOD COMPLEX 30 MIN: CPT

## 2020-08-21 PROCEDURE — 6360000002 HC RX W HCPCS: Performed by: NURSE PRACTITIONER

## 2020-08-21 PROCEDURE — 94761 N-INVAS EAR/PLS OXIMETRY MLT: CPT

## 2020-08-21 PROCEDURE — 6370000000 HC RX 637 (ALT 250 FOR IP): Performed by: FAMILY MEDICINE

## 2020-08-21 PROCEDURE — 2700000000 HC OXYGEN THERAPY PER DAY

## 2020-08-21 PROCEDURE — 2060000000 HC ICU INTERMEDIATE R&B

## 2020-08-21 RX ORDER — AMLODIPINE BESYLATE 5 MG/1
5 TABLET ORAL DAILY
Status: DISCONTINUED | OUTPATIENT
Start: 2020-08-21 | End: 2020-08-25 | Stop reason: HOSPADM

## 2020-08-21 RX ADMIN — DOCUSATE SODIUM 100 MG: 100 CAPSULE, LIQUID FILLED ORAL at 08:00

## 2020-08-21 RX ADMIN — AMLODIPINE BESYLATE 5 MG: 5 TABLET ORAL at 20:39

## 2020-08-21 RX ADMIN — IPRATROPIUM BROMIDE AND ALBUTEROL SULFATE 1 AMPULE: .5; 3 SOLUTION RESPIRATORY (INHALATION) at 15:14

## 2020-08-21 RX ADMIN — ASPIRIN 81 MG: 81 TABLET, COATED ORAL at 07:59

## 2020-08-21 RX ADMIN — PREDNISONE 20 MG: 20 TABLET ORAL at 08:00

## 2020-08-21 RX ADMIN — BUDESONIDE AND FORMOTEROL FUMARATE DIHYDRATE 2 PUFF: 160; 4.5 AEROSOL RESPIRATORY (INHALATION) at 07:47

## 2020-08-21 RX ADMIN — GUAIFENESIN AND DEXTROMETHORPHAN 5 ML: 100; 10 SYRUP ORAL at 17:26

## 2020-08-21 RX ADMIN — IPRATROPIUM BROMIDE AND ALBUTEROL SULFATE 1 AMPULE: .5; 3 SOLUTION RESPIRATORY (INHALATION) at 07:46

## 2020-08-21 RX ADMIN — FUROSEMIDE 40 MG: 10 INJECTION, SOLUTION INTRAMUSCULAR; INTRAVENOUS at 07:59

## 2020-08-21 RX ADMIN — TRAZODONE HYDROCHLORIDE 100 MG: 100 TABLET ORAL at 20:39

## 2020-08-21 RX ADMIN — DOCUSATE SODIUM 100 MG: 100 CAPSULE, LIQUID FILLED ORAL at 20:39

## 2020-08-21 RX ADMIN — LISINOPRIL 20 MG: 20 TABLET ORAL at 07:59

## 2020-08-21 RX ADMIN — BUDESONIDE AND FORMOTEROL FUMARATE DIHYDRATE 2 PUFF: 160; 4.5 AEROSOL RESPIRATORY (INHALATION) at 19:26

## 2020-08-21 RX ADMIN — INSULIN LISPRO 2 UNITS: 100 INJECTION, SOLUTION INTRAVENOUS; SUBCUTANEOUS at 12:49

## 2020-08-21 RX ADMIN — GUAIFENESIN AND DEXTROMETHORPHAN 5 ML: 100; 10 SYRUP ORAL at 09:10

## 2020-08-21 RX ADMIN — ENOXAPARIN SODIUM 40 MG: 40 INJECTION SUBCUTANEOUS at 07:59

## 2020-08-21 RX ADMIN — SODIUM CHLORIDE, PRESERVATIVE FREE 10 ML: 5 INJECTION INTRAVENOUS at 08:00

## 2020-08-21 RX ADMIN — ACETAMINOPHEN 650 MG: 325 TABLET ORAL at 14:50

## 2020-08-21 RX ADMIN — INSULIN LISPRO 2 UNITS: 100 INJECTION, SOLUTION INTRAVENOUS; SUBCUTANEOUS at 17:26

## 2020-08-21 RX ADMIN — GUAIFENESIN AND DEXTROMETHORPHAN 5 ML: 100; 10 SYRUP ORAL at 04:48

## 2020-08-21 RX ADMIN — CEFTRIAXONE SODIUM 1 G: 1 INJECTION, POWDER, FOR SOLUTION INTRAMUSCULAR; INTRAVENOUS at 14:28

## 2020-08-21 RX ADMIN — METFORMIN HYDROCHLORIDE 500 MG: 500 TABLET ORAL at 07:59

## 2020-08-21 RX ADMIN — PREGABALIN 100 MG: 100 CAPSULE ORAL at 20:39

## 2020-08-21 RX ADMIN — IPRATROPIUM BROMIDE AND ALBUTEROL SULFATE 1 AMPULE: .5; 3 SOLUTION RESPIRATORY (INHALATION) at 19:26

## 2020-08-21 RX ADMIN — AZITHROMYCIN MONOHYDRATE 500 MG: 500 INJECTION, POWDER, LYOPHILIZED, FOR SOLUTION INTRAVENOUS at 15:42

## 2020-08-21 RX ADMIN — PANTOPRAZOLE SODIUM 20 MG: 20 TABLET, DELAYED RELEASE ORAL at 06:26

## 2020-08-21 RX ADMIN — PREGABALIN 100 MG: 100 CAPSULE ORAL at 08:00

## 2020-08-21 RX ADMIN — ATORVASTATIN CALCIUM 20 MG: 20 TABLET, FILM COATED ORAL at 07:59

## 2020-08-21 RX ADMIN — SODIUM CHLORIDE, PRESERVATIVE FREE 10 ML: 5 INJECTION INTRAVENOUS at 20:39

## 2020-08-21 ASSESSMENT — ENCOUNTER SYMPTOMS
NAUSEA: 0
VOMITING: 0
DIARRHEA: 0
CHEST TIGHTNESS: 0
BLOOD IN STOOL: 0
CONSTIPATION: 0
ABDOMINAL PAIN: 0
WHEEZING: 0
SHORTNESS OF BREATH: 1
COUGH: 1

## 2020-08-21 ASSESSMENT — PAIN SCALES - GENERAL
PAINLEVEL_OUTOF10: 9
PAINLEVEL_OUTOF10: 5
PAINLEVEL_OUTOF10: 9
PAINLEVEL_OUTOF10: 10
PAINLEVEL_OUTOF10: 5

## 2020-08-21 ASSESSMENT — PAIN DESCRIPTION - LOCATION
LOCATION: HEAD
LOCATION: GENERALIZED
LOCATION: GENERALIZED

## 2020-08-21 ASSESSMENT — PAIN DESCRIPTION - PAIN TYPE
TYPE: CHRONIC PAIN
TYPE: ACUTE PAIN
TYPE: CHRONIC PAIN

## 2020-08-21 NOTE — CARE COORDINATION
SW met with pt to discuss discharge plan, SW explained the therapy team recommendation regarding SNF, pt is agreeable, pt requested referral to [address] [place] [address]. SW will fax referral     The Plan for Transition of Care is related to the following treatment goals: Discharge to SNF to gain strength while recovering with PT/OT strengthening, teaching, training, monitoring and medication management. The Patient was provided with a choice of provider and agrees   with the discharge plan. [x] Yes [] No    Freedom of choice list was provided with basic dialogue that supports the patient's individualized plan of care/goals, treatment preferences and shares the quality data associated with the providers.  [x] Yes [] No

## 2020-08-21 NOTE — PLAN OF CARE
Problem: Skin Integrity:  Goal: Will show no infection signs and symptoms  Description: Will show no infection signs and symptoms  Outcome: Ongoing     Problem: Falls - Risk of:  Goal: Will remain free from falls  Description: Will remain free from falls. Fall risk assessment completed. Patient instructed to use call light. Bed locked and in lowest position, side rails up 2/4, call light and bedside table within reach, clutter removed, and non-skid footwear on when pt out of bed. Hourly rounds will continue. Bed alarm in use. Outcome: Ongoing     Problem: Pain:  Goal: Control of chronic pain  Description: Control of chronic pain. Pain assessment completed. Patient demonstrates understanding of pain rating scale and interventions. At this time patient states he pain is controlled with lyrica. Will continue to monitor and reassess with each rounding and PRN.     Outcome: Ongoing

## 2020-08-21 NOTE — PROGRESS NOTES
: No  Patient's  Info: MIKE elizabeth  Additional Comments: h.o. falls - one this Monday and one a week ago. Cognition   Cognition  Overall Cognitive Status: Exceptions  Arousal/Alertness: Appropriate responses to stimuli  Following Commands: Follows all commands without difficulty  Attention Span: Appears intact  Memory: Appears intact  Safety Judgement: Decreased awareness of need for assistance;Decreased awareness of need for safety  Problem Solving: Assistance required to generate solutions;Assistance required to implement solutions;Assistance required to correct errors made  Insights: Decreased awareness of deficits  Initiation: Requires cues for some  Sequencing: Requires cues for some    Objective     Observation/Palpation  Posture: Fair  Observation: Pt demonstrated poor posture throughout session, due to being SOB    AROM RLE (degrees)  RLE AROM: WFL  AROM LLE (degrees)  LLE AROM : WFL  Strength RLE  Comment: grossly 3+/5 MMT  Strength LLE  Comment: grossly 3+/5 MMT  Tone RLE  RLE Tone: Normotonic  Tone LLE  LLE Tone: Normotonic  Sensation  Overall Sensation Status: WFL  Bed mobility  Supine to Sit: Stand by assistance  Comment: HOB elevated and handrails used, increased time to complete. Transfers  Sit to Stand: Minimal Assistance  Stand to sit: Minimal Assistance  Bed to Chair: Moderate assistance(or minAx2)  Comment: Verbal cueing for proper hand placement with STS transfers. Ambulation  Ambulation?: Yes  Ambulation 1  Surface: level tile  Device: Rolling Walker  Assistance: Moderate assistance(or minAx2)  Quality of Gait: Pt demonstrated shakiness while ambulating and standing. No LOB, although unsteady. Gait Deviations: Slow [place]; Increased BLANQUITA; Decreased step length;Decreased step height  Distance: 12 ft to the sink and 12ft back to the chair. Comments: Pt required verbal cueing for proper sequence and use of RW as pt will allow RW to get very far in front of her.  No LOB Balance  Posture: Fair(Pt tends to lean over on elbows when trying to catch breath in sitting position. Educated pt to sit up tall when completing deep breathing to maximize exercise.)  Sitting - Static: Good  Sitting - Dynamic: Good  Standing - Static: Fair  Standing - Dynamic: Fair;-(BUE support from RW)        Plan   Plan  Times per week: 1-2x day / 5-6 days per week  Specific instructions for Next Treatment: initiate exercises/HEP  Current Treatment Recommendations: Strengthening, Functional Mobility Training, Home Exercise Program, Transfer Training, Gait Training, Safety Education & Training, Balance Training, Endurance Training, Positioning  Safety Devices  Type of devices: All fall risk precautions in place, Call light within reach, Chair alarm in place, Patient at risk for falls, Gait belt, Left in chair, Nurse notified    OutComes Score  AM-PAC Score  AM-PAC Inpatient Mobility Raw Score : 16 (08/21/20 1216)  AM-PAC Inpatient T-Scale Score : 40.78 (08/21/20 1216)  Mobility Inpatient CMS 0-100% Score: 54.16 (08/21/20 1216)  Mobility Inpatient CMS G-Code Modifier : CK (08/21/20 1216)          Goals  Short term goals  Time Frame for Short term goals: 12 visits  Short term goal 1: Pt will perform bed mobility indep. Short term goal 2: Pt will perform all functional transfer indep. Short term goal 3: Pt will ambulate 100ft with RW and CGA  Short term goal 4: Pt will tolerate 25 mins of PT including exercises / balance / and gait training to improve activity tolerance and functional mobility. Patient Goals   Patient goals :  To get stronger       Therapy Time   Individual Concurrent Group Co-treatment   Time In 1054         Time Out 1134         Minutes 40           Tx time: 30mins        Brodie Nieves PT

## 2020-08-21 NOTE — PROGRESS NOTES
Comprehensive Nutrition Assessment    Type and Reason for Visit:  Positive Nutrition Screen(Unplanned weight loss, decreased appetite)    Nutrition Recommendations/Plan: Continue General diet and start Ensure Enlive 2x/day. Nutrition Assessment:  Patient admission is related to pneuminia. Patient reports a decreased appetite and weight loss over the past 7 months. Patient is mildly malnourished. Continue General diet and start Ensure Enlive 2x/day. Monitor p.o intakes and labs. Malnutrition Assessment:  Malnutrition Status:  Mild malnutrition    Context:  Chronic Illness     Findings of the 6 clinical characteristics of malnutrition:  Energy Intake:  Mild decrease in energy intake (Comment)  Weight Loss:  1 - Mild weight loss (specify amount and time period)     Body Fat Loss:  Unable to assess     Muscle Mass Loss:  Unable to assess    Fluid Accumulation:  No significant fluid accumulation Extremities   Strength:  Not Performed    Estimated Daily Nutrient Needs:  Energy (kcal):  2192-2345 kcal based on 20-23 kcal/kg; Weight Used for Energy Requirements:  Current     Protein (g):  79-85 gm based on 1.2-1.3 gm/kg; Weight Used for Protein Requirements:  Current          Nutrition Related Findings:  No edema. Wounds:  None       Current Nutrition Therapies:    DIET GENERAL;     Anthropometric Measures:  · Height: 5' 6\" (167.6 cm)  · Current Body Weight: 145 lb (65.8 kg)   · Admission Body Weight: 145 lb (65.8 kg)    · Usual Body Weight: 158 lb (71.7 kg)     · Ideal Body Weight: 130 lbs; % Ideal Body Weight 111.5 %   · BMI: 23.4  · BMI Categories: Normal Weight (BMI 22.0 to 24.9) age over 72       Nutrition Diagnosis:   · Mild malnutrition related to inadequate protein-energy intake as evidenced by weight loss, intake 51-75%, intake 26-50%      Nutrition Interventions:   Food and/or Nutrient Delivery:  Continue Current Diet, Start Oral Nutrition Supplement  Nutrition Education/Counseling:  Education not indicated   Coordination of Nutrition Care:  Continued Inpatient Monitoring    Goals:  PO intakes are greater than 50% at meals       Nutrition Monitoring and Evaluation:   Food/Nutrient Intake Outcomes:  Food and Nutrient Intake, Supplement Intake  Physical Signs/Symptoms Outcomes:  Biochemical Data, Skin, Weight, Fluid Status or Edema     Discharge Planning:    Continue Oral Nutrition Supplement, Continue current diet       Some areas of assessment maybe incomplete due to COVID-19 precautions.     Vic Moon  MFN, RDN, LDN  Lead Clinical Dietitian  RD Office Phone [phone]

## 2020-08-21 NOTE — CARE COORDINATION
Case Management Initial Discharge Plan  Roberto Senate,         Readmission Risk              Risk of Unplanned Readmission:        23             Met with:patient to discuss discharge plans. Information verified: address, contacts, phone number, , insurance Yes  PCP: Luanne Adame MD  Date of last visit: ? Insurance Provider: [id number]    Discharge Planning  Current Residence:  Apartment  Living Arrangements:  Alone   Home has 2nd floor apartment with elevator present   Support Systems:  Family Members  Current Services PTA:  [id number]  daily 4 hr shiftsAgency:[facility]   Patient able to perform ADL's:Assisted  DME in home:  Nebulizer, O2 concentrator with portability pt can't remember provider, shower chair, cane, scooter, rollator, walker, grab bars, glucometer, Life Alert, [employer]  DME used to aid ambulation prior to admission:   walker  DME used during admission:  walker    Potential Assistance Needed:  [facility] PT/OT    Pharmacy:    Potential Assistance Purchasing Medications:  No  Does patient want to participate in local refill/ meds to beds program?  No    Patient agreeable to home care: No  [place] of choice provided:  n/a      Type of Home Care Services:  Nursing Services  Patient expects to be discharged to:  home    Prior SNF/Rehab Placement and Facility: [facility]  Agreeable to SNF/Rehab: Yes  [place] of choice provided: no   Evaluation: yes, message to  to follow up for SNF    Expected Discharge date:  20  Follow Up Appointment: Best Day/ Time: Monday AM    Transportation provider:   Transportation arrangements needed for discharge: Yes    Discharge Plan:   Met with patient to review discharge Plan. Pt states she has a home health aide daily. Phone call to [facility] at [phone] and spoke with Kuldip and informed him of pt's admit and that she is considering SNF.     Discussed with patient therapy's recommendation for SNF and she is agreeable. Explained to her that  will contact her for her choices. Trish TRINH contacted and will follow up. PLAN  SW to f/u for SNF  COVID test negative.         Electronically signed by [employer] on 8/21/20 at 12:30 PM EDT

## 2020-08-21 NOTE — PROGRESS NOTES
[facility]    Progress Note    8/21/2020    9:24 AM    Name:   Lucas Homans  MRN:     [id number]     Acct:      [de-identified]   Room:   [facility] Day:  1  Admit Date:  8/20/2020 12:17 PM    PCP:   Pawan Mejias MD  Code Status:  Full Code    Subjective:     C/C:   Chief Complaint   Patient presents with    Shortness of Breath    Cough    Generalized Body Aches     Interval History Status: improved. Patient seen and examined at bedside, no acute events overnight. Feels much better and breathing is better but her cough is still bothering her  Good diuresis overnight   patient denies any chest pain,  chills, fevers, nausea or vomiting. Patient vitals, labs  were reviewed,from overnight shift and morning updates were noted and discussed with the nurse    Brief History:     Patient with known history of diabetes, hypertension, hyperlipidemia and diastolic heart failure and tobacco abuse [continue to smoke] presented to ER with progressive dyspnea and developed cough and colored sputum yesterday was found to have developing pneumonia and acute on chronic diastolic heart failure  Was screened for COVID 19 and came back negative  Initially hypoxic on presentation , wears oxygen chronically, not tachypneic, slightly elevated blood pressure    Review of Systems:     Review of Systems   Constitutional: Positive for fatigue. Negative for chills, diaphoresis and fever. HENT: Negative for congestion. Eyes: Negative for visual disturbance. Respiratory: Positive for cough and shortness of breath. Negative for chest tightness and wheezing. Cardiovascular: Negative for chest pain, palpitations and leg swelling. Gastrointestinal: Negative for abdominal pain, blood in stool, constipation, diarrhea, nausea and vomiting. Genitourinary: Negative for difficulty urinating.    Neurological: Negative for dizziness, weakness, light-headedness, numbness and headaches. All other systems reviewed and are negative. Medications: Allergies: Allergies   Allergen Reactions    Tiotropium Anaphylaxis and Swelling    Spiriva Handihaler [Tiotropium Bromide Monohydrate] Swelling       Current Meds:   Scheduled Meds:    aspirin  81 mg Oral Daily    atorvastatin  20 mg Oral Daily    docusate sodium  100 mg Oral BID    budesonide-formoterol  2 puff Inhalation BID    lisinopril  20 mg Oral Daily    metFORMIN  500 mg Oral Daily with breakfast    sodium chloride flush  10 mL Intravenous 2 times per day    enoxaparin  40 mg Subcutaneous Daily    ipratropium-albuterol  1 ampule Inhalation Q4H WA    azithromycin  500 mg Intravenous Q24H    And    cefTRIAXone (ROCEPHIN) IV  1 g Intravenous Q24H    insulin lispro  0-12 Units Subcutaneous TID WC    insulin lispro  0-6 Units Subcutaneous Nightly    pregabalin  100 mg Oral BID    pantoprazole  20 mg Oral Daily    furosemide  40 mg Intravenous Daily    predniSONE  20 mg Oral Daily    traZODone  100 mg Oral Nightly     Continuous Infusions:    dextrose       PRN Meds: tiZANidine, sodium chloride flush, acetaminophen **OR** acetaminophen, magnesium hydroxide, nicotine, albuterol, glucose, dextrose, glucagon (rDNA), dextrose, guaiFENesin-dextromethorphan    Data:     Past Medical History:   has a past medical history of CHF (congestive heart failure) ([facility]), COPD (chronic obstructive pulmonary disease) ([facility] [address] [facility] MRSA (methicillin resistant staph aureus) culture positive, and Tobacco abuse. Social History:   reports that she has been smoking cigarettes. She has been smoking about 0.50 packs per day. She has never used smokeless tobacco. She reports current alcohol use of about 70.0 standard drinks of alcohol per week. She reports current drug use. Drug: Marijuana.      Family History:   Family History   Problem Relation Age of Onset    Heart Disease Mother     Diabetes Father Vitals:  BP (!) 157/92   Pulse 100   Temp 98.2 °F (36.8 °C) (Oral)   Resp 16   Ht 5' 6\" (1.676 m)   Wt 145 lb 4.8 oz (65.9 kg)   SpO2 94%   BMI 23.45 kg/m²   Temp (24hrs), Av °F (36.7 °C), Min:97.3 °F (36.3 °C), Max:98.4 °F (36.9 °C)    Recent Labs     20  0730   POCGLU 113* 178* 202* 126*       I/O (24Hr): Intake/Output Summary (Last 24 hours) at 2020 09  Last data filed at 2020 8694  Gross per 24 hour   Intake 300 ml   Output 700 ml   Net -400 ml       Labs:  Hematology:  Recent Labs     20  12420  0616   WBC 21.1* 22.9*   RBC 4.51 4.24   HGB 11.4* 10.6*   HCT 36.6 33.8*   MCV 81.2* 79.7*   MCH 25.3 25.0*   MCHC 31.1 31.4   RDW 27.1* 26.3*    358   MPV 9.8 9.7     Chemistry:  Recent Labs     20  12420  0616   * 137   K 4.4 4.4   CL 99 101   CO2 22 25   GLUCOSE 94 145*   BUN 21 17   CREATININE 1.13* 0.96*   ANIONGAP 13 11   LABGLOM 47* 57*   GFRAA 57* >60   CALCIUM 8.3* 8.5*   PROBNP 16,462*  --    TROPHS 18*  --      Recent Labs     20  0730   POCGLU 113* 178* 202* 126*     ABG:  Lab Results   Component Value Date    POCPH 7.40 2014    [address]  2014     DISREGARD RESULTS. PATIENT NUMBER WAS INCORRECTLY ASSIGNED. POCPCO2 44 2014    PCO2  2014     DISREGARD RESULTS. PATIENT NUMBER WAS INCORRECTLY ASSIGNED. POCPO2 65 2014    PO2  2014     DISREGARD RESULTS. PATIENT NUMBER WAS INCORRECTLY ASSIGNED. POCHCO3 27.4 2014    HCO3  2014     DISREGARD RESULTS. PATIENT NUMBER WAS INCORRECTLY ASSIGNED. NBEA NOT REPORTED 2014    PBEA 3 2014    [id number] 29 2014    [id number] 92 2014    O2SAT  2014     DISREGARD RESULTS. PATIENT NUMBER WAS INCORRECTLY ASSIGNED.     FIO2 NOT REPORTED 2014     Lab Results   Component Value Date/Time    SPECIAL RIGHT WRIST, 9ML 2020 02:09 PM     Lab Results   Component Value Date/Time    CULTURE NO GROWTH 13 HOURS 08/20/2020 02:09 PM       Radiology:  Xr Chest Portable    Result Date: 8/20/2020  Right upper lobe opacities appear more prominent in the interval.  Developing inflammatory process or infection should be considered in the appropriate clinical setting. Chronic basilar interstitial opacities. Physical Examination:        Physical Exam  Vitals signs and nursing note reviewed. Constitutional:       General: She is not in acute distress. HENT:      Head: Normocephalic and atraumatic. Eyes:      Conjunctiva/sclera: Conjunctivae normal.      Pupils: Pupils are equal, round, and reactive to light. Cardiovascular:      Rate and Rhythm: Normal rate and regular rhythm. Heart sounds: No murmur. Pulmonary:      Effort: Pulmonary effort is normal. No accessory muscle usage or respiratory distress. Breath sounds: No stridor. Examination of the right-lower field reveals rales. Examination of the left-lower field reveals rales. Decreased breath sounds and rales present. No wheezing or rhonchi. Abdominal:      General: Bowel sounds are normal. There is no distension. Palpations: Abdomen is soft. Abdomen is not rigid. Tenderness: There is no abdominal tenderness. There is no guarding. Musculoskeletal:         General: No tenderness. Skin:     General: Skin is warm and dry. Findings: No erythema, lesion or rash. Neurological:      Mental Status: She is alert and oriented to person, place, and time. Cranial Nerves: No cranial nerve deficit. Motor: No seizure activity. Psychiatric:         Speech: Speech normal.         Behavior: Behavior normal. Behavior is cooperative.          Assessment:        Hospital Problems           Last Modified POA    * (Principal) Community acquired pneumonia of right upper lobe of lung ([address] 8/20/2020 Yes    Type 2 diabetes mellitus with hyperglycemia, with long-term current use of insulin ([facility] 8/20/2020 Yes    COPD (chronic obstructive pulmonary disease) ([facility] 8/20/2020 Yes    Acute on chronic diastolic heart failure ([facility] 8/20/2020 Yes    Tobacco abuse 8/20/2020 Yes    Elevated troponin 8/20/2020 Yes    Prolonged Q-T interval on ECG 8/20/2020 Yes    Pneumonia due to organism 8/20/2020 Yes    Hypoxia 8/20/2020 Yes    Chronic respiratory failure ([facility] 8/20/2020 Yes          Plan:        PNA : will get two-view chest x-ray to better evaluate infiltrate for now continue antibiotics.     COPD exacerbation: Continue breathing treatments and inhaled steroids    Acute decompensated diastolic heart failure: Lasix 40 mg BID IV , strict I's & O's, daily weight, cardiac diet and fluid restriction      HTN: Not optimally controlled, continue home medications and add small dose of Norvasc    HPL: continue home medications    DM2: Continue to hold metformin for now, insulin sliding scale, hypoglycemia protocol    Diabetic polyneuropathy: Continue Lyrica    DVT prophylaxis    Discussed with the patient and the nurse    Karishma Alatorre MD  8/21/2020  9:24 AM

## 2020-08-21 NOTE — PROGRESS NOTES
Occupational Therapy   Occupational Therapy Initial Assessment  Date: 2020   Patient Name: Alla Gonzalez  MRN: [id number]     : 1945    Date of Service: 2020    Discharge Recommendations:  [address] RN reports patient is medically stable for therapy treatment this date. Chart reviewed prior to treatment and patient is agreeable for therapy. All lines intact and patient positioned comfortably at end of treatment. All patient needs addressed prior to ending therapy session. Assessment   Performance deficits / Impairments: Decreased functional mobility ; Decreased ADL status; Decreased strength;Decreased safe awareness;Decreased balance;Decreased endurance;Decreased posture  Prognosis: Good  Decision Making: Medium Complexity  OT Education: OT Role;Plan of Care;Family Education;Equipment;Home Exercise Program;Precautions; ADL Adaptive Strategies; Energy Conservation;Transfer Training  REQUIRES OT FOLLOW UP: Yes  Activity Tolerance  Activity Tolerance: Patient Tolerated treatment well;Patient limited by fatigue  Safety Devices  Safety Devices in place: Yes  Type of devices: Call light within reach;Nurse notified; Patient at risk for falls;Gait belt;Left in chair;Chair alarm in place           Patient Diagnosis(es): The encounter diagnosis was Pneumonia due to organism. has a past medical history of CHF (congestive heart failure) ([facility] [address] COPD (chronic obstructive pulmonary disease) ([facility] [address] Diabetes mellitus ([facility] [address] MRSA (methicillin resistant staph aureus) culture positive, and Tobacco abuse.   has a past surgical history that includes joint replacement (Bilateral); Hysterectomy; and Appendectomy.            Restrictions  Restrictions/Precautions  Restrictions/Precautions: General Precautions, Fall Risk  Position Activity Restriction  Other position/activity restrictions: 3L O2    Subjective   General  Chart Reviewed: Yes  Patient assessed for rehabilitation services?: Yes  Response to previous treatment: Patient with no complaints from previous session  Family / Caregiver Present: No  Vital Signs  Temp: 98.1 °F (36.7 °C)  Temp Source: Oral  Pulse: 89  Heart Rate Source: Monitor  Resp: 16  BP: (!) 152/85  BP Location: Right Arm  BP Upper/Lower: Upper  MAP (mmHg): 101  Oxygen Therapy  SpO2: 100 %  O2 Device: Nasal cannula  Social/Functional History  Social/Functional History  Lives With: Alone  Type of Home: Apartment  Home Layout: One level(2nd floor apartment)  Home Access: Elevator  Bathroom Shower/Tub: Tub/Shower unit  Bathroom Toilet: Standard  Bathroom Equipment: Shower chair, Grab bars in shower  Home Equipment: Alert Button, Oxygen(3L O2, usually uses at night but has been using throughout day more)  Receives Help From: Home health(A 7days/week for 4 hours)  ADL Assistance: Needs assistance(assist with bathing, pt can dress and bathe self)  Homemaking Assistance: Needs assistance(requires assistance with laundry, cooking, cleaning)  Ambulation Assistance: Independent(furniture walks at home, does not use AD)  Transfer Assistance: Independent  Active : No  Patient's  Info: MIKE elizabeth  Additional Comments: h.o. falls - one this Monday and one a week ago. Objective   Vision: Impaired  Vision Exceptions: Wears glasses at all times  Hearing: Exceptions to [facility]  Hearing Exceptions: Bilateral hearing aid    Orientation  Overall Orientation Status: Within Functional Limits  Observation/Palpation  Posture: Fair  Observation: Pt demonstrated poor posture throughout session, due to being SOB  Balance  Sitting Balance: Stand by assistance  Standing Balance: Minimal assistance  Functional Mobility  Functional - Mobility Device: Rolling Walker  Activity: To/from bathroom  Assist Level: Minimal assistance(x2 for short distance bed to sink and back for grooming tasks. Pt fatigues very easily and needs cues throughout to stay close to walker.  Pt also cued on pursed lip Training, Self-Care / ADL, Positioning, Equipment Evaluation, Education, & procurement, Patient/Caregiver Education & Training            Goals  Short term goals  Time Frame for Short term goals: by discharge, pt will  Short term goal 1: demo SBA with ADL transfers with good safety and DME/AD as approp  Short term goal 2: demo SBA with funcitonal mob in room with good safety/pacing for ADL completion  Short term goal 3: demo SBA with toieting routine  Short term goal 4: demo SBA with UB ADLs and CGA with LB ADLs with good safety/pacing, AE/DME as needed  Short term goal 5: demo and verb good understanding of fall prevention techs, EC/WS techs, breathing techs, and possible equip needs for home  Patient Goals   Patient goals : to get home when able       Therapy Time   Individual Concurrent Group Co-treatment   Time In 1104         Time Out 1144         Minutes 40              Patient would benefit from SNF for continued occupational therapy to increase independence with  ADL of bathing, dressing, toileting and grooming. Writer recommending SNF placement for for activity tolerance and strength which will increase independence with ADL's coordinated with bed mobility and chair transfers. Continued skilled OT services to address decreased safety awareness with ADL and IADL tasks and for education and increased independence with DME and AE for fall prevention and ec/ws techniques prior to d/c home. Educated patient on pursed lip breathing to increase control of breathing. Initiated by breathing through the nose and blowing out with pursed lip to increase O2 into lungs.       Verbal edu provided to pt on safe ADL completion techniques and tips during bathing/showering, and toileting; EC/WS techniques of pacing, posturing, body mechanics, planning and organizing tasks, avoiding fatigue, and task simplification in regards to ADLs of eating, grooming, bathing/showering, dressing, and IADLs of cooking, meal cleanup, marketing and meal planning, laundry, bed making, and housework.      Shaan Ballard, OT

## 2020-08-21 NOTE — PLAN OF CARE
Nutrition Problem #1: Mild malnutrition  Intervention: Food and/or Nutrient Delivery: Continue Current Diet, Start Oral Nutrition Supplement  Nutritional Goals: PO intakes are greater than 50% at meals

## 2020-08-21 NOTE — CARE COORDINATION
GAYATHRI received return call from Carlos Navas with the [address] regarding the pt has been accepted and the insurance pre-cert will be started.

## 2020-08-21 NOTE — FLOWSHEET NOTE
Patient is awake and alert while sitting up in a chair. Patient seems to be having some shortness of breath but engages in conversation. Patient reports that she has family support, but denies any spiritual or emotional concerns. Writer provides conversation, listening presence, and emotional support. Patient appears to be coping adequately. Patient expresses appreciation for the visit. Spiritual Care will follow as needed.        08/21/20 1230   Encounter Summary   Services provided to: Patient   Referral/Consult From: [facility] Family members   Continue Visiting   (8/21/20)   Complexity of Encounter Low   Length of Encounter 15 minutes   Routine   Type Initial   Assessment Approachable   Intervention Active listening;Explored feelings, thoughts, concerns;Explored coping resources;Nurtured hope   Outcome Expressed gratitude

## 2020-08-22 ENCOUNTER — APPOINTMENT (OUTPATIENT)
Dept: CT IMAGING | Age: 75
DRG: 871 | End: 2020-08-22
Payer: MEDICARE

## 2020-08-22 LAB
ABSOLUTE EOS #: 0 K/UL (ref 0–0.44)
ABSOLUTE IMMATURE GRANULOCYTE: 0.19 K/UL (ref 0–0.3)
ABSOLUTE LYMPH #: 3.42 K/UL (ref 1.1–3.7)
ABSOLUTE MONO #: 1.33 K/UL (ref 0.1–1.2)
ANION GAP SERPL CALCULATED.3IONS-SCNC: 13 MMOL/L (ref 9–17)
BASOPHILS # BLD: 1 % (ref 0–2)
BASOPHILS ABSOLUTE: 0.19 K/UL (ref 0–0.2)
BUN BLDV-MCNC: 14 MG/DL (ref 8–23)
BUN/CREAT BLD: 17 (ref 9–20)
CALCIUM SERPL-MCNC: 8.7 MG/DL (ref 8.6–10.4)
CHLORIDE BLD-SCNC: 101 MMOL/L (ref 98–107)
CO2: 26 MMOL/L (ref 20–31)
CREAT SERPL-MCNC: 0.82 MG/DL (ref 0.5–0.9)
DIFFERENTIAL TYPE: ABNORMAL
EOSINOPHILS RELATIVE PERCENT: 0 % (ref 1–4)
GFR AFRICAN AMERICAN: >60 ML/MIN
GFR NON-AFRICAN AMERICAN: >60 ML/MIN
GFR SERPL CREATININE-BSD FRML MDRD: ABNORMAL ML/MIN/{1.73_M2}
GFR SERPL CREATININE-BSD FRML MDRD: ABNORMAL ML/MIN/{1.73_M2}
GLUCOSE BLD-MCNC: 109 MG/DL (ref 65–105)
GLUCOSE BLD-MCNC: 110 MG/DL (ref 65–105)
GLUCOSE BLD-MCNC: 110 MG/DL (ref 70–99)
GLUCOSE BLD-MCNC: 122 MG/DL (ref 65–105)
GLUCOSE BLD-MCNC: 174 MG/DL (ref 65–105)
GLUCOSE BLD-MCNC: 63 MG/DL (ref 65–105)
HCT VFR BLD CALC: 34.3 % (ref 36.3–47.1)
HEMOGLOBIN: 10.6 G/DL (ref 11.9–15.1)
IMMATURE GRANULOCYTES: 1 %
LACTIC ACID, SEPSIS WHOLE BLOOD: ABNORMAL MMOL/L (ref 0.5–1.9)
LACTIC ACID, SEPSIS: 2.6 MMOL/L (ref 0.5–1.9)
LYMPHOCYTES # BLD: 18 % (ref 24–43)
MCH RBC QN AUTO: 25.2 PG (ref 25.2–33.5)
MCHC RBC AUTO-ENTMCNC: 30.9 G/DL (ref 28.4–34.8)
MCV RBC AUTO: 81.5 FL (ref 82.6–102.9)
MONOCYTES # BLD: 7 % (ref 3–12)
MORPHOLOGY: ABNORMAL
MORPHOLOGY: ABNORMAL
NRBC AUTOMATED: 0.4 PER 100 WBC
PDW BLD-RTO: 26.5 % (ref 11.8–14.4)
PLATELET # BLD: 367 K/UL (ref 138–453)
PLATELET ESTIMATE: ABNORMAL
PMV BLD AUTO: 9.9 FL (ref 8.1–13.5)
POTASSIUM SERPL-SCNC: 3.9 MMOL/L (ref 3.7–5.3)
RBC # BLD: 4.21 M/UL (ref 3.95–5.11)
RBC # BLD: ABNORMAL 10*6/UL
SEG NEUTROPHILS: 73 % (ref 36–65)
SEGMENTED NEUTROPHILS ABSOLUTE COUNT: 13.87 K/UL (ref 1.5–8.1)
SODIUM BLD-SCNC: 140 MMOL/L (ref 135–144)
WBC # BLD: 19 K/UL (ref 3.5–11.3)
WBC # BLD: ABNORMAL 10*3/UL

## 2020-08-22 PROCEDURE — 80048 BASIC METABOLIC PNL TOTAL CA: CPT

## 2020-08-22 PROCEDURE — 6360000002 HC RX W HCPCS: Performed by: NURSE PRACTITIONER

## 2020-08-22 PROCEDURE — 6370000000 HC RX 637 (ALT 250 FOR IP): Performed by: NURSE PRACTITIONER

## 2020-08-22 PROCEDURE — 2060000000 HC ICU INTERMEDIATE R&B

## 2020-08-22 PROCEDURE — 99232 SBSQ HOSP IP/OBS MODERATE 35: CPT | Performed by: FAMILY MEDICINE

## 2020-08-22 PROCEDURE — 71250 CT THORAX DX C-: CPT

## 2020-08-22 PROCEDURE — 94640 AIRWAY INHALATION TREATMENT: CPT

## 2020-08-22 PROCEDURE — 83605 ASSAY OF LACTIC ACID: CPT

## 2020-08-22 PROCEDURE — 2580000003 HC RX 258: Performed by: NURSE PRACTITIONER

## 2020-08-22 PROCEDURE — 36415 COLL VENOUS BLD VENIPUNCTURE: CPT

## 2020-08-22 PROCEDURE — 85025 COMPLETE CBC W/AUTO DIFF WBC: CPT

## 2020-08-22 PROCEDURE — 6360000002 HC RX W HCPCS: Performed by: FAMILY MEDICINE

## 2020-08-22 PROCEDURE — 2700000000 HC OXYGEN THERAPY PER DAY

## 2020-08-22 PROCEDURE — 82947 ASSAY GLUCOSE BLOOD QUANT: CPT

## 2020-08-22 PROCEDURE — 94761 N-INVAS EAR/PLS OXIMETRY MLT: CPT

## 2020-08-22 PROCEDURE — 6370000000 HC RX 637 (ALT 250 FOR IP): Performed by: FAMILY MEDICINE

## 2020-08-22 RX ORDER — BENZONATATE 100 MG/1
100 CAPSULE ORAL 3 TIMES DAILY PRN
Status: DISCONTINUED | OUTPATIENT
Start: 2020-08-22 | End: 2020-08-25 | Stop reason: HOSPADM

## 2020-08-22 RX ORDER — 0.9 % SODIUM CHLORIDE 0.9 %
250 INTRAVENOUS SOLUTION INTRAVENOUS ONCE
Status: DISCONTINUED | OUTPATIENT
Start: 2020-08-22 | End: 2020-08-22

## 2020-08-22 RX ORDER — METHYLPREDNISOLONE SODIUM SUCCINATE 40 MG/ML
40 INJECTION, POWDER, LYOPHILIZED, FOR SOLUTION INTRAMUSCULAR; INTRAVENOUS EVERY 6 HOURS
Status: DISCONTINUED | OUTPATIENT
Start: 2020-08-22 | End: 2020-08-24

## 2020-08-22 RX ORDER — HYDROCODONE BITARTRATE AND ACETAMINOPHEN 5; 325 MG/1; MG/1
1 TABLET ORAL EVERY 6 HOURS PRN
Status: DISCONTINUED | OUTPATIENT
Start: 2020-08-22 | End: 2020-08-23

## 2020-08-22 RX ORDER — CODEINE PHOSPHATE AND GUAIFENESIN 10; 100 MG/5ML; MG/5ML
5 SOLUTION ORAL EVERY 4 HOURS PRN
Status: DISCONTINUED | OUTPATIENT
Start: 2020-08-22 | End: 2020-08-25 | Stop reason: HOSPADM

## 2020-08-22 RX ADMIN — ACETAMINOPHEN 650 MG: 325 TABLET ORAL at 15:17

## 2020-08-22 RX ADMIN — DOCUSATE SODIUM 100 MG: 100 CAPSULE, LIQUID FILLED ORAL at 20:09

## 2020-08-22 RX ADMIN — GUAIFENESIN AND CODEINE PHOSPHATE 5 ML: 10; 100 LIQUID ORAL at 17:57

## 2020-08-22 RX ADMIN — ENOXAPARIN SODIUM 40 MG: 40 INJECTION SUBCUTANEOUS at 08:49

## 2020-08-22 RX ADMIN — BENZONATATE 100 MG: 100 CAPSULE ORAL at 08:47

## 2020-08-22 RX ADMIN — PREGABALIN 100 MG: 100 CAPSULE ORAL at 08:49

## 2020-08-22 RX ADMIN — FUROSEMIDE 40 MG: 10 INJECTION, SOLUTION INTRAMUSCULAR; INTRAVENOUS at 08:49

## 2020-08-22 RX ADMIN — GUAIFENESIN AND CODEINE PHOSPHATE 5 ML: 10; 100 LIQUID ORAL at 12:59

## 2020-08-22 RX ADMIN — ASPIRIN 81 MG: 81 TABLET, COATED ORAL at 08:49

## 2020-08-22 RX ADMIN — IPRATROPIUM BROMIDE AND ALBUTEROL SULFATE 1 AMPULE: .5; 3 SOLUTION RESPIRATORY (INHALATION) at 07:13

## 2020-08-22 RX ADMIN — INSULIN LISPRO 2 UNITS: 100 INJECTION, SOLUTION INTRAVENOUS; SUBCUTANEOUS at 17:57

## 2020-08-22 RX ADMIN — LISINOPRIL 20 MG: 20 TABLET ORAL at 08:49

## 2020-08-22 RX ADMIN — BUDESONIDE AND FORMOTEROL FUMARATE DIHYDRATE 2 PUFF: 160; 4.5 AEROSOL RESPIRATORY (INHALATION) at 07:13

## 2020-08-22 RX ADMIN — IPRATROPIUM BROMIDE AND ALBUTEROL SULFATE 1 AMPULE: .5; 3 SOLUTION RESPIRATORY (INHALATION) at 15:26

## 2020-08-22 RX ADMIN — INSULIN LISPRO 1 UNITS: 100 INJECTION, SOLUTION INTRAVENOUS; SUBCUTANEOUS at 20:08

## 2020-08-22 RX ADMIN — ATORVASTATIN CALCIUM 20 MG: 20 TABLET, FILM COATED ORAL at 08:49

## 2020-08-22 RX ADMIN — IPRATROPIUM BROMIDE AND ALBUTEROL SULFATE 1 AMPULE: .5; 3 SOLUTION RESPIRATORY (INHALATION) at 19:19

## 2020-08-22 RX ADMIN — GUAIFENESIN AND CODEINE PHOSPHATE 5 ML: 10; 100 LIQUID ORAL at 08:47

## 2020-08-22 RX ADMIN — METHYLPREDNISOLONE SODIUM SUCCINATE 40 MG: 40 INJECTION, POWDER, FOR SOLUTION INTRAMUSCULAR; INTRAVENOUS at 20:08

## 2020-08-22 RX ADMIN — TRAZODONE HYDROCHLORIDE 100 MG: 100 TABLET ORAL at 20:09

## 2020-08-22 RX ADMIN — SODIUM CHLORIDE, PRESERVATIVE FREE 10 ML: 5 INJECTION INTRAVENOUS at 20:08

## 2020-08-22 RX ADMIN — SODIUM CHLORIDE, PRESERVATIVE FREE 10 ML: 5 INJECTION INTRAVENOUS at 08:50

## 2020-08-22 RX ADMIN — DOCUSATE SODIUM 100 MG: 100 CAPSULE, LIQUID FILLED ORAL at 08:49

## 2020-08-22 RX ADMIN — HYDROCODONE BITARTRATE AND ACETAMINOPHEN 1 TABLET: 5; 325 TABLET ORAL at 17:57

## 2020-08-22 RX ADMIN — IPRATROPIUM BROMIDE AND ALBUTEROL SULFATE 1 AMPULE: .5; 3 SOLUTION RESPIRATORY (INHALATION) at 11:08

## 2020-08-22 RX ADMIN — BUDESONIDE AND FORMOTEROL FUMARATE DIHYDRATE 2 PUFF: 160; 4.5 AEROSOL RESPIRATORY (INHALATION) at 19:20

## 2020-08-22 RX ADMIN — PREGABALIN 100 MG: 100 CAPSULE ORAL at 20:08

## 2020-08-22 RX ADMIN — METHYLPREDNISOLONE SODIUM SUCCINATE 40 MG: 40 INJECTION, POWDER, FOR SOLUTION INTRAMUSCULAR; INTRAVENOUS at 08:49

## 2020-08-22 RX ADMIN — METHYLPREDNISOLONE SODIUM SUCCINATE 40 MG: 40 INJECTION, POWDER, FOR SOLUTION INTRAMUSCULAR; INTRAVENOUS at 16:12

## 2020-08-22 RX ADMIN — GUAIFENESIN AND DEXTROMETHORPHAN 5 ML: 100; 10 SYRUP ORAL at 04:00

## 2020-08-22 RX ADMIN — AMLODIPINE BESYLATE 5 MG: 5 TABLET ORAL at 08:50

## 2020-08-22 RX ADMIN — PANTOPRAZOLE SODIUM 20 MG: 20 TABLET, DELAYED RELEASE ORAL at 05:07

## 2020-08-22 RX ADMIN — CEFTRIAXONE SODIUM 1 G: 1 INJECTION, POWDER, FOR SOLUTION INTRAMUSCULAR; INTRAVENOUS at 15:12

## 2020-08-22 RX ADMIN — BENZONATATE 100 MG: 100 CAPSULE ORAL at 20:09

## 2020-08-22 RX ADMIN — AZITHROMYCIN MONOHYDRATE 500 MG: 500 INJECTION, POWDER, LYOPHILIZED, FOR SOLUTION INTRAVENOUS at 16:12

## 2020-08-22 ASSESSMENT — ENCOUNTER SYMPTOMS
ABDOMINAL PAIN: 0
NAUSEA: 0
DIARRHEA: 0
VOMITING: 0
BLOOD IN STOOL: 0
COUGH: 1
WHEEZING: 0
CHEST TIGHTNESS: 0
CONSTIPATION: 0

## 2020-08-22 ASSESSMENT — PAIN SCALES - GENERAL
PAINLEVEL_OUTOF10: 0
PAINLEVEL_OUTOF10: 0
PAINLEVEL_OUTOF10: 10
PAINLEVEL_OUTOF10: 5

## 2020-08-22 ASSESSMENT — PAIN DESCRIPTION - LOCATION: LOCATION: GENERALIZED

## 2020-08-22 ASSESSMENT — PAIN DESCRIPTION - PAIN TYPE: TYPE: CHRONIC PAIN

## 2020-08-22 NOTE — PROGRESS NOTES
and are negative. Medications: Allergies: Allergies   Allergen Reactions    Tiotropium Anaphylaxis and Swelling    Spiriva Handihaler [Tiotropium Bromide Monohydrate] Swelling       Current Meds:   Scheduled Meds:    amLODIPine  5 mg Oral Daily    aspirin  81 mg Oral Daily    atorvastatin  20 mg Oral Daily    docusate sodium  100 mg Oral BID    budesonide-formoterol  2 puff Inhalation BID    lisinopril  20 mg Oral Daily    [Held by provider] metFORMIN  500 mg Oral Daily with breakfast    sodium chloride flush  10 mL Intravenous 2 times per day    enoxaparin  40 mg Subcutaneous Daily    ipratropium-albuterol  1 ampule Inhalation Q4H WA    azithromycin  500 mg Intravenous Q24H    And    cefTRIAXone (ROCEPHIN) IV  1 g Intravenous Q24H    insulin lispro  0-12 Units Subcutaneous TID WC    insulin lispro  0-6 Units Subcutaneous Nightly    pregabalin  100 mg Oral BID    pantoprazole  20 mg Oral Daily    furosemide  40 mg Intravenous Daily    predniSONE  20 mg Oral Daily    traZODone  100 mg Oral Nightly     Continuous Infusions:    dextrose       PRN Meds: tiZANidine, sodium chloride flush, acetaminophen **OR** acetaminophen, magnesium hydroxide, nicotine, albuterol, glucose, dextrose, glucagon (rDNA), dextrose, guaiFENesin-dextromethorphan    Data:     Past Medical History:   has a past medical history of CHF (congestive heart failure) ([facility]), COPD (chronic obstructive pulmonary disease) ([facility] [address] Diabetes mellitus ([facility] MRSA (methicillin resistant staph aureus) culture positive, and Tobacco abuse. Social History:   reports that she has been smoking cigarettes. She has been smoking about 0.50 packs per day. She has never used smokeless tobacco. She reports current alcohol use of about 70.0 standard drinks of alcohol per week. She reports current drug use. Drug: Marijuana.      Family History:   Family History   Problem Relation Age of Onset    Heart Disease Mother     Diabetes Father Vitals:  BP (!) 155/91   Pulse 96   Temp 97.9 °F (36.6 °C) (Oral)   Resp 18   Ht 5' 6\" (1.676 m)   Wt 147 lb 3.2 oz (66.8 kg)   SpO2 91%   BMI 23.76 kg/m²   Temp (24hrs), Av.2 °F (36.8 °C), Min:97.9 °F (36.6 °C), Max:98.6 °F (37 °C)    Recent Labs     20  0730 20  11420  16520   POCGLU 126* 141* 150* 110*       I/O (24Hr): Intake/Output Summary (Last 24 hours) at 2020 08  Last data filed at 2020 0414  Gross per 24 hour   Intake 840 ml   Output 1450 ml   Net -610 ml       Labs:  Hematology:  Recent Labs     20  1240 20  0616   WBC 21.1* 22.9*   RBC 4.51 4.24   HGB 11.4* 10.6*   HCT 36.6 33.8*   MCV 81.2* 79.7*   MCH 25.3 25.0*   MCHC 31.1 31.4   RDW 27.1* 26.3*    358   MPV 9.8 9.7     Chemistry:  Recent Labs     20  1240 20  0616   * 137   K 4.4 4.4   CL 99 101   CO2 22 25   GLUCOSE 94 145*   BUN 21 17   CREATININE 1.13* 0.96*   ANIONGAP 13 11   LABGLOM 47* 57*   GFRAA 57* >60   CALCIUM 8.3* 8.5*   PROBNP 16,462*  --    TROPHS 18*  --      Recent Labs     20  1927 20  0720  11420  16520   POCGLU 178* 202* 126* 141* 150* 110*     ABG:  Lab Results   Component Value Date    POCPH 7.40 2014    [address]  2014     DISREGARD RESULTS. PATIENT NUMBER WAS INCORRECTLY ASSIGNED. POCPCO2 44 2014    PCO2  2014     DISREGARD RESULTS. PATIENT NUMBER WAS INCORRECTLY ASSIGNED. POCPO2 65 2014    PO2  2014     DISREGARD RESULTS. PATIENT NUMBER WAS INCORRECTLY ASSIGNED. POCHCO3 27.4 2014    HCO3  2014     DISREGARD RESULTS. PATIENT NUMBER WAS INCORRECTLY ASSIGNED. NBEA NOT REPORTED 2014    PBEA 3 2014    [id number] 29 2014    [id number] 92 2014    O2SAT  2014     DISREGARD RESULTS. PATIENT NUMBER WAS INCORRECTLY ASSIGNED.     FIO2 NOT REPORTED 2014     Lab Results   Component Value Date/Time    SPECIAL RIGHT WRIST, 9ML 08/20/2020 02:09 PM     Lab Results   Component Value Date/Time    CULTURE NO GROWTH 2 DAYS 08/20/2020 02:09 PM       Radiology:  Xr Chest Portable    Result Date: 8/20/2020  Right upper lobe opacities appear more prominent in the interval.  Developing inflammatory process or infection should be considered in the appropriate clinical setting. Chronic basilar interstitial opacities. Physical Examination:        Physical Exam  Vitals signs and nursing note reviewed. Constitutional:       General: She is not in acute distress. HENT:      Head: Normocephalic and atraumatic. Eyes:      Conjunctiva/sclera: Conjunctivae normal.      Pupils: Pupils are equal, round, and reactive to light. Cardiovascular:      Rate and Rhythm: Normal rate and regular rhythm. Heart sounds: No murmur. Pulmonary:      Effort: Pulmonary effort is normal. No accessory muscle usage or respiratory distress. Breath sounds: No stridor. Examination of the right-lower field reveals rales. Examination of the left-lower field reveals rales. Decreased breath sounds and rales present. No wheezing or rhonchi. Abdominal:      General: Bowel sounds are normal. There is no distension. Palpations: Abdomen is soft. Abdomen is not rigid. Tenderness: There is no abdominal tenderness. There is no guarding. Musculoskeletal:         General: No tenderness. Skin:     General: Skin is warm and dry. Findings: No erythema, lesion or rash. Neurological:      Mental Status: She is alert and oriented to person, place, and time. Cranial Nerves: No cranial nerve deficit. Motor: No seizure activity. Psychiatric:         Speech: Speech normal.         Behavior: Behavior normal. Behavior is cooperative.          Assessment:        Hospital Problems           Last Modified POA    * (Principal) Community acquired pneumonia of right upper lobe of lung ([address] 8/20/2020 Yes    Type 2 diabetes mellitus with hyperglycemia, with long-term current use of insulin ([facility] 8/20/2020 Yes    COPD (chronic obstructive pulmonary disease) ([facility] [address] 8/20/2020 Yes    Acute on chronic diastolic heart failure ([facility] [facility] 8/20/2020 Yes    Tobacco abuse 8/20/2020 Yes    Elevated troponin 8/20/2020 Yes    Prolonged Q-T interval on ECG 8/20/2020 Yes    Pneumonia due to organism 8/20/2020 Yes    Hypoxia 8/20/2020 Yes    Chronic respiratory failure ([facility] [address] 8/20/2020 Yes    Mild malnutrition ([facility] [address] 8/21/2020 Yes          Plan:        RUL PNA :  Might be viral given CXR ,continue IV antibiotics for now , will get CT chest    COPD exacerbation: Continue breathing treatments and inhaled steroids  Added stronger medications for cough     Acute decompensated diastolic heart failure: Lasix 40 mg BID IV , strict I's & O's, daily weight, cardiac diet and fluid restriction      HTN: Not optimally controlled, continue home medications and added small dose of Norvasc with better response    HPL: continue home medications    DM2: Continue to hold metformin for now, insulin sliding scale, hypoglycemia protocol    Diabetic polyneuropathy: Continue Lyrica    DVT prophylaxis    Discussed with the patient and the nurse    Austin Brooks MD  8/22/2020  8:19 AM

## 2020-08-22 NOTE — PROGRESS NOTES
Physical Therapy  DATE: 2020    NAME: Cuca Crockett  MRN: [id number]   : 1945    Patient not seen this date for Physical Therapy due to:  [] Blood transfusion in progress  [] Cancel by RN  [] Hemodialysis  [x]  Refusal by Patient, states she isn't feeling good and does not want to do PT today  [] Spine Precautions   [] Strict Bedrest  [] Surgery  [] Testing      [] Other        [] PT being discontinued at this time. Patient independent. No further needs. [] PT being discontinued at this time as the patient has been transferred to hospice care. No further needs.     Anastasia Fuentes, PT

## 2020-08-22 NOTE — PLAN OF CARE
Problem: Falls - Risk of:  Goal: Will remain free from falls  Description: Will remain free from falls  Outcome: Ongoing   Fall risk assessment completed. Patient instructed to use call light. Bed locked and in lowest position, side rails up 2/4, call light and bedside table within reach, clutter removed, and non-skid footwear on when pt out of bed. Hourly rounds will continue. Problem: Pain:  Description: Pain management should include both nonpharmacologic and pharmacologic interventions.   Goal: Pain level will decrease  Description: Pain level will decrease  Outcome: Ongoing

## 2020-08-22 NOTE — DISCHARGE INSTR - COC
Continuity of Care Form    Patient Name: Raul Mathews   :  1945  MRN:  [id number]    Admit date:  2020  Discharge date:  2020    Code Status Order: Full Code   Advance Directives:   [facility] Documentation     Date/Time Healthcare Directive Type of Healthcare Directive Copy in [facility] [address] Agent's Name Healthcare Agent's Phone Number    20 1730  No, patient does not have an advance directive for healthcare treatment -- -- -- -- --          Admitting Physician:  Karen Lieberman MD  PCP: Quique Her MD    Discharging Nurse: [facility] [address] Unit/Room#: 1003/1003-02  Discharging Unit Phone Number: [phone]    Emergency Contact:   Extended Emergency Contact Information  Primary Emergency Contact: Letty Dill  Address: NOT AVAILABLE  Home Phone: [phone]  Relation: Aunt/Uncle  Secondary Emergency Contact: [address] [place] [address] Phone: [phone]  Relation: Grandchild    Past Surgical History:  Past Surgical History:   Procedure Laterality Date    APPENDECTOMY      HYSTERECTOMY      JOINT REPLACEMENT Bilateral        Immunization History: There is no immunization history on file for this patient.     Active Problems:  Patient Active Problem List   Diagnosis Code    COPD (chronic obstructive pulmonary disease) ([facility] J44.9    CHF (congestive heart failure) ([facility]) I50.9    Cocaine abuse ([facility] F14.10    Acidosis, metabolic, with respiratory acidosis E87.4    Acute respiratory failure with hypoxia ([facility]) J96.01    Polysubstance abuse ([facility] F19.10    Hyponatremia, Beer Potomania E87.1    Normocytic anemia Q65.5    Neutrophilic leukocytosis P92.0    Increased ammonia level R79.89    Hypophosphatemia E83.39    Type 2 diabetes mellitus with hyperglycemia, with long-term current use of insulin ([facility]) E11.65, Z79.4    Acute on chronic diastolic heart failure ([facility]) I50.33    Tobacco abuse Z72.0    Diabetes mellitus ([facility] E11.9  Acute on chronic diastolic CHF (congestive heart failure) ([facility]) I50.33    Community acquired pneumonia of right upper lobe of lung ([facility]) J18.1    Elevated troponin R79.89    Prolonged Q-T interval on ECG R94.31    Pneumonia due to organism J18.9    Hypoxia R09.02    Chronic respiratory failure ([facility]) J96.10    Mild malnutrition ([facility]) E44.1       Isolation/Infection:   Isolation          Contact        Patient Infection Status     Infection Onset Added Last Indicated Last Indicated By Review Planned Expiration Resolved Resolved By    MRSA  09/02/14 09/02/14 Olivia Cruz, ANDRES        sputum    Resolved    COVID-19 Rule Out 08/20/20 08/20/20 08/20/20 COVID-19 (Ordered)   08/20/20 Rule-Out Test Resulted          Nurse Assessment:  Last Vital Signs: BP (!) 155/91   Pulse 96   Temp 97.9 °F (36.6 °C) (Oral)   Resp 18   Ht 5' 6\" (1.676 m)   Wt 147 lb 3.2 oz (66.8 kg)   SpO2 91%   BMI 23.76 kg/m²     Last documented pain score (0-10 scale): Pain Level: 5  Last Weight:   Wt Readings from Last 1 Encounters:   08/22/20 147 lb 3.2 oz (66.8 kg)     Mental Status:  oriented and alert    IV Access:  - None    Nursing Mobility/ADLs:  Walking   Independent  Transfer  Independent  Bathing  Independent  Dressing  Independent  [address]  Independent  Med Delivery   whole    Wound Care Documentation and Therapy:        Elimination:  Continence:   · Bowel: Yes  · Bladder: Yes  Urinary Catheter: None   Colostomy/Ileostomy/Ileal Conduit: No       Date of Last BM: 08/25/2020    Intake/Output Summary (Last 24 hours) at 8/22/2020 0826  Last data filed at 8/22/2020 0414  Gross per 24 hour   Intake 840 ml   Output 1450 ml   Net -610 ml     I/O last 3 completed shifts: In: 840 [P.O.:840]  Out: 1450 [Urine:1450]    Safety Concerns:      At Risk for Falls    Impairments/Disabilities:      None    Nutrition Therapy:  Current Nutrition Therapy:   - Oral Diet:  General    Routes of Feeding: Oral  Liquids: No Restrictions  Daily Fluid Restriction: no  Last Modified Barium Swallow with Video (Video Swallowing Test): not done    Treatments at the Time of Hospital Discharge:   Respiratory Treatments: see mar  Oxygen Therapy:  is on oxygen at 3 L/min per nasal cannula. Ventilator:    - No ventilator support    Rehab Therapies: Physical Therapy and Occupational Therapy  Weight Bearing Status/Restrictions: No weight bearing restirctions  Other Medical Equipment (for information only, NOT a DME order):  wheelchair, walker and bath bench  Other Treatments:   1. Skilled RN assessment   2. Medication reconciliation   3. HHA as prior to admission       Patient's personal belongings (please select all that are sent with patient):  None    RN SIGNATURE:  Electronically signed by Rosalina Steward RN on 8/25/20 at 2:24 PM EDT    CASE MANAGEMENT/SOCIAL WORK SECTION    Inpatient Status Date: 8/20    Readmission Risk Assessment Score:  Readmission Risk              Risk of Unplanned Readmission:        23           Discharging to Facility/ [facility]   · Phone: [phone] or [phone]  · Fax: [phone]    Dialysis Facility (if applicable)   · Name:  · Address:  · Dialysis Schedule:  · Phone:  · Fax:    / signature: Electronically signed by MAXIMUS Keyes on 8/22/20 at 8:26 AM EDT  Electronically signed by Timbo Wheat RN on 8/25/20 at 12:42 PM EDT      PHYSICIAN SECTION    Prognosis: Fair    Condition at Discharge: Stable    Rehab Potential (if transferring to Rehab): Fair    Recommended Labs or Other Treatments After Discharge: continue Oxygen, PT , OT . Physician Certification: I certify the above information and transfer of [place] [employer]  is necessary for the continuing treatment of the diagnosis listed and that she requires Home Care for greater 30 days.      Update Admission H&P: No change in H&P    PHYSICIAN SIGNATURE:  Electronically signed by

## 2020-08-22 NOTE — PLAN OF CARE
Problem: Falls - Risk of:  Goal: Will remain free from falls  Description: Will remain free from falls. Fall risk assessment completed. Patient instructed to use call light. Bed locked and in lowest position, side rails up 2/4, call light and bedside table within reach, clutter removed, and non-skid footwear on when pt out of bed. Hourly rounds will continue. Outcome: Ongoing     Problem: Pain:  Goal: Control of acute pain  Description: Control of acute pain. Pain assessment completed. Patient demonstrates understanding of pain rating scale and interventions. At this time patient states her pain is a 10/10. PRN medication administered. Will continue to monitor and reassess with each rounding and PRN.     Outcome: Ongoing     Problem: Breathing Pattern - Ineffective:  Goal: Ability to achieve and maintain a regular respiratory rate will improve  Description: Ability to achieve and maintain a regular respiratory rate will improve  Outcome: Ongoing

## 2020-08-23 LAB
ABSOLUTE EOS #: 0 K/UL (ref 0–0.44)
ABSOLUTE IMMATURE GRANULOCYTE: 0.26 K/UL (ref 0–0.3)
ABSOLUTE LYMPH #: 1.7 K/UL (ref 1.1–3.7)
ABSOLUTE MONO #: 1.05 K/UL (ref 0.1–1.2)
ANION GAP SERPL CALCULATED.3IONS-SCNC: 13 MMOL/L (ref 9–17)
BASOPHILS # BLD: 0 % (ref 0–2)
BASOPHILS ABSOLUTE: 0 K/UL (ref 0–0.2)
BUN BLDV-MCNC: 14 MG/DL (ref 8–23)
BUN/CREAT BLD: 17 (ref 9–20)
C-REACTIVE PROTEIN: 46 MG/L (ref 0–5)
CALCIUM SERPL-MCNC: 9.1 MG/DL (ref 8.6–10.4)
CHLORIDE BLD-SCNC: 98 MMOL/L (ref 98–107)
CO2: 29 MMOL/L (ref 20–31)
CREAT SERPL-MCNC: 0.84 MG/DL (ref 0.5–0.9)
DIFFERENTIAL TYPE: ABNORMAL
EOSINOPHILS RELATIVE PERCENT: 0 % (ref 1–4)
FERRITIN: 130 UG/L (ref 13–150)
FIBRINOGEN: 478 MG/DL (ref 179–518)
GFR AFRICAN AMERICAN: >60 ML/MIN
GFR NON-AFRICAN AMERICAN: >60 ML/MIN
GFR SERPL CREATININE-BSD FRML MDRD: ABNORMAL ML/MIN/{1.73_M2}
GFR SERPL CREATININE-BSD FRML MDRD: ABNORMAL ML/MIN/{1.73_M2}
GLUCOSE BLD-MCNC: 105 MG/DL (ref 65–105)
GLUCOSE BLD-MCNC: 114 MG/DL (ref 70–99)
GLUCOSE BLD-MCNC: 131 MG/DL (ref 65–105)
GLUCOSE BLD-MCNC: 132 MG/DL (ref 65–105)
GLUCOSE BLD-MCNC: 134 MG/DL (ref 65–105)
GLUCOSE BLD-MCNC: 153 MG/DL (ref 65–105)
HCT VFR BLD CALC: 37 % (ref 36.3–47.1)
HEMOGLOBIN: 11.3 G/DL (ref 11.9–15.1)
IMMATURE GRANULOCYTES: 2 %
LACTATE DEHYDROGENASE: 358 U/L (ref 135–214)
LACTIC ACID, SEPSIS WHOLE BLOOD: ABNORMAL MMOL/L (ref 0.5–1.9)
LACTIC ACID, SEPSIS: 2.6 MMOL/L (ref 0.5–1.9)
LYMPHOCYTES # BLD: 13 % (ref 24–43)
MCH RBC QN AUTO: 24.7 PG (ref 25.2–33.5)
MCHC RBC AUTO-ENTMCNC: 30.5 G/DL (ref 28.4–34.8)
MCV RBC AUTO: 81 FL (ref 82.6–102.9)
MONOCYTES # BLD: 8 % (ref 3–12)
MORPHOLOGY: ABNORMAL
MORPHOLOGY: ABNORMAL
NRBC AUTOMATED: 0.5 PER 100 WBC
PDW BLD-RTO: 26.1 % (ref 11.8–14.4)
PLATELET # BLD: 380 K/UL (ref 138–453)
PLATELET ESTIMATE: ABNORMAL
PMV BLD AUTO: 9.8 FL (ref 8.1–13.5)
POTASSIUM SERPL-SCNC: 4.3 MMOL/L (ref 3.7–5.3)
RBC # BLD: 4.57 M/UL (ref 3.95–5.11)
RBC # BLD: ABNORMAL 10*6/UL
SARS-COV-2, PCR: NORMAL
SARS-COV-2, RAPID: NORMAL
SARS-COV-2: NOT DETECTED
SEG NEUTROPHILS: 77 % (ref 36–65)
SEGMENTED NEUTROPHILS ABSOLUTE COUNT: 10.09 K/UL (ref 1.5–8.1)
SODIUM BLD-SCNC: 140 MMOL/L (ref 135–144)
SOURCE: NORMAL
WBC # BLD: 13.1 K/UL (ref 3.5–11.3)
WBC # BLD: ABNORMAL 10*3/UL

## 2020-08-23 PROCEDURE — 99232 SBSQ HOSP IP/OBS MODERATE 35: CPT | Performed by: FAMILY MEDICINE

## 2020-08-23 PROCEDURE — 94640 AIRWAY INHALATION TREATMENT: CPT

## 2020-08-23 PROCEDURE — 80048 BASIC METABOLIC PNL TOTAL CA: CPT

## 2020-08-23 PROCEDURE — 82728 ASSAY OF FERRITIN: CPT

## 2020-08-23 PROCEDURE — 6360000002 HC RX W HCPCS: Performed by: NURSE PRACTITIONER

## 2020-08-23 PROCEDURE — 6370000000 HC RX 637 (ALT 250 FOR IP): Performed by: FAMILY MEDICINE

## 2020-08-23 PROCEDURE — 94761 N-INVAS EAR/PLS OXIMETRY MLT: CPT

## 2020-08-23 PROCEDURE — 2060000000 HC ICU INTERMEDIATE R&B

## 2020-08-23 PROCEDURE — 6370000000 HC RX 637 (ALT 250 FOR IP): Performed by: NURSE PRACTITIONER

## 2020-08-23 PROCEDURE — 86140 C-REACTIVE PROTEIN: CPT

## 2020-08-23 PROCEDURE — 85384 FIBRINOGEN ACTIVITY: CPT

## 2020-08-23 PROCEDURE — 6360000002 HC RX W HCPCS: Performed by: FAMILY MEDICINE

## 2020-08-23 PROCEDURE — U0003 INFECTIOUS AGENT DETECTION BY NUCLEIC ACID (DNA OR RNA); SEVERE ACUTE RESPIRATORY SYNDROME CORONAVIRUS 2 (SARS-COV-2) (CORONAVIRUS DISEASE [COVID-19]), AMPLIFIED PROBE TECHNIQUE, MAKING USE OF HIGH THROUGHPUT TECHNOLOGIES AS DESCRIBED BY CMS-2020-01-R: HCPCS

## 2020-08-23 PROCEDURE — 36415 COLL VENOUS BLD VENIPUNCTURE: CPT

## 2020-08-23 PROCEDURE — 2700000000 HC OXYGEN THERAPY PER DAY

## 2020-08-23 PROCEDURE — 2580000003 HC RX 258: Performed by: NURSE PRACTITIONER

## 2020-08-23 PROCEDURE — 83615 LACTATE (LD) (LDH) ENZYME: CPT

## 2020-08-23 PROCEDURE — 85025 COMPLETE CBC W/AUTO DIFF WBC: CPT

## 2020-08-23 RX ORDER — HYDROCODONE BITARTRATE AND ACETAMINOPHEN 5; 325 MG/1; MG/1
1 TABLET ORAL EVERY 4 HOURS PRN
Status: DISCONTINUED | OUTPATIENT
Start: 2020-08-23 | End: 2020-08-25 | Stop reason: HOSPADM

## 2020-08-23 RX ADMIN — HYDROCODONE BITARTRATE AND ACETAMINOPHEN 1 TABLET: 5; 325 TABLET ORAL at 20:55

## 2020-08-23 RX ADMIN — BUDESONIDE AND FORMOTEROL FUMARATE DIHYDRATE 2 PUFF: 160; 4.5 AEROSOL RESPIRATORY (INHALATION) at 07:10

## 2020-08-23 RX ADMIN — BENZONATATE 100 MG: 100 CAPSULE ORAL at 04:32

## 2020-08-23 RX ADMIN — GUAIFENESIN AND CODEINE PHOSPHATE 5 ML: 10; 100 LIQUID ORAL at 02:54

## 2020-08-23 RX ADMIN — BENZONATATE 100 MG: 100 CAPSULE ORAL at 15:17

## 2020-08-23 RX ADMIN — AZITHROMYCIN MONOHYDRATE 500 MG: 500 INJECTION, POWDER, LYOPHILIZED, FOR SOLUTION INTRAVENOUS at 16:12

## 2020-08-23 RX ADMIN — AMLODIPINE BESYLATE 5 MG: 5 TABLET ORAL at 08:53

## 2020-08-23 RX ADMIN — GUAIFENESIN AND CODEINE PHOSPHATE 5 ML: 10; 100 LIQUID ORAL at 13:12

## 2020-08-23 RX ADMIN — METHYLPREDNISOLONE SODIUM SUCCINATE 40 MG: 40 INJECTION, POWDER, FOR SOLUTION INTRAMUSCULAR; INTRAVENOUS at 08:53

## 2020-08-23 RX ADMIN — ATORVASTATIN CALCIUM 20 MG: 20 TABLET, FILM COATED ORAL at 08:53

## 2020-08-23 RX ADMIN — ASPIRIN 81 MG: 81 TABLET, COATED ORAL at 08:53

## 2020-08-23 RX ADMIN — PREGABALIN 100 MG: 100 CAPSULE ORAL at 20:35

## 2020-08-23 RX ADMIN — DOCUSATE SODIUM 100 MG: 100 CAPSULE, LIQUID FILLED ORAL at 08:53

## 2020-08-23 RX ADMIN — PANTOPRAZOLE SODIUM 20 MG: 20 TABLET, DELAYED RELEASE ORAL at 04:32

## 2020-08-23 RX ADMIN — PREGABALIN 100 MG: 100 CAPSULE ORAL at 08:53

## 2020-08-23 RX ADMIN — DOCUSATE SODIUM 100 MG: 100 CAPSULE, LIQUID FILLED ORAL at 20:35

## 2020-08-23 RX ADMIN — Medication 10 ML: at 15:17

## 2020-08-23 RX ADMIN — IPRATROPIUM BROMIDE AND ALBUTEROL SULFATE 1 AMPULE: .5; 3 SOLUTION RESPIRATORY (INHALATION) at 07:08

## 2020-08-23 RX ADMIN — METHYLPREDNISOLONE SODIUM SUCCINATE 40 MG: 40 INJECTION, POWDER, FOR SOLUTION INTRAMUSCULAR; INTRAVENOUS at 02:37

## 2020-08-23 RX ADMIN — SODIUM CHLORIDE, PRESERVATIVE FREE 10 ML: 5 INJECTION INTRAVENOUS at 20:35

## 2020-08-23 RX ADMIN — METHYLPREDNISOLONE SODIUM SUCCINATE 40 MG: 40 INJECTION, POWDER, FOR SOLUTION INTRAMUSCULAR; INTRAVENOUS at 20:35

## 2020-08-23 RX ADMIN — TRAZODONE HYDROCHLORIDE 100 MG: 100 TABLET ORAL at 20:35

## 2020-08-23 RX ADMIN — IPRATROPIUM BROMIDE AND ALBUTEROL SULFATE 1 AMPULE: .5; 3 SOLUTION RESPIRATORY (INHALATION) at 11:57

## 2020-08-23 RX ADMIN — GUAIFENESIN AND CODEINE PHOSPHATE 5 ML: 10; 100 LIQUID ORAL at 09:09

## 2020-08-23 RX ADMIN — HYDROCODONE BITARTRATE AND ACETAMINOPHEN 1 TABLET: 5; 325 TABLET ORAL at 05:18

## 2020-08-23 RX ADMIN — HYDROCODONE BITARTRATE AND ACETAMINOPHEN 1 TABLET: 5; 325 TABLET ORAL at 11:19

## 2020-08-23 RX ADMIN — METHYLPREDNISOLONE SODIUM SUCCINATE 40 MG: 40 INJECTION, POWDER, FOR SOLUTION INTRAMUSCULAR; INTRAVENOUS at 15:17

## 2020-08-23 RX ADMIN — HYDROCODONE BITARTRATE AND ACETAMINOPHEN 1 TABLET: 5; 325 TABLET ORAL at 15:25

## 2020-08-23 RX ADMIN — FUROSEMIDE 40 MG: 10 INJECTION, SOLUTION INTRAMUSCULAR; INTRAVENOUS at 08:53

## 2020-08-23 RX ADMIN — GUAIFENESIN AND CODEINE PHOSPHATE 5 ML: 10; 100 LIQUID ORAL at 17:39

## 2020-08-23 RX ADMIN — SODIUM CHLORIDE, PRESERVATIVE FREE 10 ML: 5 INJECTION INTRAVENOUS at 08:54

## 2020-08-23 RX ADMIN — ENOXAPARIN SODIUM 40 MG: 40 INJECTION SUBCUTANEOUS at 08:53

## 2020-08-23 RX ADMIN — LISINOPRIL 20 MG: 20 TABLET ORAL at 08:59

## 2020-08-23 RX ADMIN — CEFTRIAXONE SODIUM 1 G: 1 INJECTION, POWDER, FOR SOLUTION INTRAMUSCULAR; INTRAVENOUS at 15:17

## 2020-08-23 ASSESSMENT — ENCOUNTER SYMPTOMS
ABDOMINAL PAIN: 0
CHEST TIGHTNESS: 0
VOMITING: 0
DIARRHEA: 0
WHEEZING: 0
NAUSEA: 0
BLOOD IN STOOL: 0
COUGH: 1
CONSTIPATION: 0

## 2020-08-23 ASSESSMENT — PAIN SCALES - GENERAL
PAINLEVEL_OUTOF10: 9
PAINLEVEL_OUTOF10: 9
PAINLEVEL_OUTOF10: 10
PAINLEVEL_OUTOF10: 10
PAINLEVEL_OUTOF10: 7
PAINLEVEL_OUTOF10: 10
PAINLEVEL_OUTOF10: 0
PAINLEVEL_OUTOF10: 10
PAINLEVEL_OUTOF10: 9
PAINLEVEL_OUTOF10: 10

## 2020-08-23 ASSESSMENT — PAIN DESCRIPTION - ONSET
ONSET: ON-GOING

## 2020-08-23 ASSESSMENT — PAIN DESCRIPTION - PAIN TYPE
TYPE: ACUTE PAIN

## 2020-08-23 ASSESSMENT — PAIN DESCRIPTION - LOCATION
LOCATION: ABDOMEN;CHEST
LOCATION: ABDOMEN

## 2020-08-23 ASSESSMENT — PAIN DESCRIPTION - FREQUENCY
FREQUENCY: CONTINUOUS
FREQUENCY: CONTINUOUS
FREQUENCY: INTERMITTENT
FREQUENCY: INTERMITTENT

## 2020-08-23 ASSESSMENT — PAIN DESCRIPTION - DESCRIPTORS
DESCRIPTORS: DISCOMFORT
DESCRIPTORS: ACHING;DISCOMFORT
DESCRIPTORS: ACHING;DISCOMFORT

## 2020-08-23 ASSESSMENT — PAIN - FUNCTIONAL ASSESSMENT
PAIN_FUNCTIONAL_ASSESSMENT: PREVENTS OR INTERFERES SOME ACTIVE ACTIVITIES AND ADLS
PAIN_FUNCTIONAL_ASSESSMENT: PREVENTS OR INTERFERES SOME ACTIVE ACTIVITIES AND ADLS

## 2020-08-23 ASSESSMENT — PAIN DESCRIPTION - PROGRESSION
CLINICAL_PROGRESSION: NOT CHANGED
CLINICAL_PROGRESSION: NOT CHANGED

## 2020-08-23 NOTE — PLAN OF CARE
Problem: Falls - Risk of:  Goal: Will remain free from falls  Description: Will remain free from falls  8/23/2020 0028 by Alaina Jj RN  Outcome: Ongoing   Fall risk assessment completed. Patient instructed to use call light. Bed locked and in lowest position, side rails up 2/4, call light and bedside table within reach, clutter removed, and non-skid footwear on when pt out of bed. Hourly rounds will continue. Problem: Breathing Pattern - Ineffective:  Goal: Ability to achieve and maintain a regular respiratory rate will improve  Description: Ability to achieve and maintain a regular respiratory rate will improve  8/23/2020 0028 by Alaina Jj RN  Outcome: Ongoing   Pt on 3L of oxygen via NC. Spot checking pt, O2 sat WNL. Pt c/o of SOB with getting up for the bathroom, but subsides when resting in bed.

## 2020-08-23 NOTE — PROGRESS NOTES
[facility]    Progress Note    8/23/2020    7:50 AM    Name:   Cuca Crockett  MRN:     [id number]     Acct:      [de-identified]   Room:   [address] [place] [address] Day:  3  Admit Date:  8/20/2020 12:17 PM    PCP:   Jane Muir MD  Code Status:  Full Code    Subjective:     C/C:   Chief Complaint   Patient presents with    Shortness of Breath    Cough    Generalized Body Aches     Interval History Status: improved. Patient seen and examined at bedside, continues to be coughing hard  Patient vitals, labs  were reviewed,from overnight shift and morning updates were noted and discussed with the nurse    Brief History:     Patient with known history of diabetes, hypertension, hyperlipidemia and diastolic heart failure and tobacco abuse [continue to smoke] presented to ER with progressive dyspnea and developed cough and colored sputum yesterday was found to have developing pneumonia and acute on chronic diastolic heart failure  Was screened for COVID 19 and came back negative  Initially hypoxic on presentation , wears oxygen chronically, not tachypneic, slightly elevated blood pressure    Review of Systems:     Review of Systems   Constitutional: Positive for fatigue. Negative for chills, diaphoresis and fever. HENT: Negative for congestion. Eyes: Negative for visual disturbance. Respiratory: Positive for cough. Negative for chest tightness and wheezing. Cardiovascular: Negative for chest pain, palpitations and leg swelling. Gastrointestinal: Negative for abdominal pain, blood in stool, constipation, diarrhea, nausea and vomiting. Genitourinary: Negative for difficulty urinating. Neurological: Negative for dizziness, weakness, light-headedness, numbness and headaches. All other systems reviewed and are negative. Medications: Allergies:     Allergies   Allergen Reactions    Tiotropium Anaphylaxis and Swelling    Spiriva Handaler [Tiotropium Bromide Monohydrate] Swelling       Current Meds:   Scheduled Meds:    methylPREDNISolone  40 mg Intravenous Q6H    amLODIPine  5 mg Oral Daily    aspirin  81 mg Oral Daily    atorvastatin  20 mg Oral Daily    docusate sodium  100 mg Oral BID    budesonide-formoterol  2 puff Inhalation BID    [Held by provider] lisinopril  20 mg Oral Daily    [Held by provider] metFORMIN  500 mg Oral Daily with breakfast    sodium chloride flush  10 mL Intravenous 2 times per day    enoxaparin  40 mg Subcutaneous Daily    ipratropium-albuterol  1 ampule Inhalation Q4H WA    azithromycin  500 mg Intravenous Q24H    And    cefTRIAXone (ROCEPHIN) IV  1 g Intravenous Q24H    insulin lispro  0-12 Units Subcutaneous TID WC    insulin lispro  0-6 Units Subcutaneous Nightly    pregabalin  100 mg Oral BID    pantoprazole  20 mg Oral Daily    furosemide  40 mg Intravenous Daily    traZODone  100 mg Oral Nightly     Continuous Infusions:    dextrose       PRN Meds: benzonatate, guaiFENesin-codeine, HYDROcodone 5 mg - acetaminophen, tiZANidine, sodium chloride flush, acetaminophen **OR** acetaminophen, magnesium hydroxide, nicotine, albuterol, glucose, dextrose, glucagon (rDNA), dextrose    Data:     Past Medical History:   has a past medical history of CHF (congestive heart failure) ([facility]), COPD (chronic obstructive pulmonary disease) ([facility] [facility] Diabetes mellitus ([facility] MRSA (methicillin resistant staph aureus) culture positive, and Tobacco abuse. Social History:   reports that she has been smoking cigarettes. She has been smoking about 0.50 packs per day. She has never used smokeless tobacco. She reports current alcohol use of about 70.0 standard drinks of alcohol per week. She reports current drug use. Drug: Marijuana.      Family History:   Family History   Problem Relation Age of Onset    Heart Disease Mother     Diabetes Father        Vitals:  /81   Pulse 95   Temp 97.9 °F (36.6 °C) (Oral)   Resp 18 Ht 5' 6\" (1.676 m)   Wt 147 lb 3.2 oz (66.8 kg)   SpO2 92%   BMI 23.76 kg/m²   Temp (24hrs), Av.9 °F (36.6 °C), Min:97.5 °F (36.4 °C), Max:98.2 °F (36.8 °C)    Recent Labs     20  0710 20  0839 20  1114 20  1637   POCGLU 63* 109* 122* 174*       I/O (24Hr): Intake/Output Summary (Last 24 hours) at 2020 0750  Last data filed at 2020 0303  Gross per 24 hour   Intake 470 ml   Output 1800 ml   Net -1330 ml       Labs:  Hematology:  Recent Labs     20  1240 20  0616 20  0906   WBC 21.1* 22.9* 19.0*   RBC 4.51 4.24 4.21   HGB 11.4* 10.6* 10.6*   HCT 36.6 33.8* 34.3*   MCV 81.2* 79.7* 81.5*   MCH 25.3 25.0* 25.2   MCHC 31.1 31.4 30.9   RDW 27.1* 26.3* 26.5*    358 367   MPV 9.8 9.7 9.9     Chemistry:  Recent Labs     20  1240 20  0616 20  0906   * 137 140   K 4.4 4.4 3.9   CL 99 101 101   CO2 22 25 26   GLUCOSE 94 145* 110*   BUN 21 17 14   CREATININE 1.13* 0.96* 0.82   ANIONGAP 13 11 13   LABGLOM 47* 57* >60   GFRAA 57* >60 >60   CALCIUM 8.3* 8.5* 8.7   PROBNP 16,462*  --   --    TROPHS 18*  --   --      Recent Labs     20  1651 20  1944 20  0710 20  0839 20  1114 20  1637   POCGLU 150* 110* 63* 109* 122* 174*     ABG:  Lab Results   Component Value Date    POCPH 7.40 2014    [address]  2014     DISREGARD RESULTS. PATIENT NUMBER WAS INCORRECTLY ASSIGNED. POCPCO2 44 2014    PCO2  2014     DISREGARD RESULTS. PATIENT NUMBER WAS INCORRECTLY ASSIGNED. POCPO2 65 2014    PO2  2014     DISREGARD RESULTS. PATIENT NUMBER WAS INCORRECTLY ASSIGNED. POCHCO3 27.4 2014    HCO3  2014     DISREGARD RESULTS. PATIENT NUMBER WAS INCORRECTLY ASSIGNED. NBEA NOT REPORTED 2014    PBEA 3 2014    [id number] 29 2014    [id number] 92 2014    O2SAT  2014     DISREGARD RESULTS. PATIENT NUMBER WAS INCORRECTLY ASSIGNED.     FIO2 NOT REPORTED 09/04/2014     Lab Results   Component Value Date/Time    SPECIAL RIGHT WRIST, 9ML 08/20/2020 02:09 PM     Lab Results   Component Value Date/Time    CULTURE NO GROWTH 3 DAYS 08/20/2020 02:09 PM       Radiology:  Xr Chest Portable    Result Date: 8/20/2020  Right upper lobe opacities appear more prominent in the interval.  Developing inflammatory process or infection should be considered in the appropriate clinical setting. Chronic basilar interstitial opacities. Physical Examination:        Physical Exam  Vitals signs and nursing note reviewed. Constitutional:       General: She is not in acute distress. HENT:      Head: Normocephalic and atraumatic. Eyes:      Conjunctiva/sclera: Conjunctivae normal.      Pupils: Pupils are equal, round, and reactive to light. Cardiovascular:      Rate and Rhythm: Normal rate and regular rhythm. Heart sounds: No murmur. Pulmonary:      Effort: Pulmonary effort is normal. No accessory muscle usage or respiratory distress. Breath sounds: No stridor. Examination of the right-lower field reveals rales. Examination of the left-lower field reveals rales. Decreased breath sounds and rales present. No wheezing or rhonchi. Abdominal:      General: Bowel sounds are normal. There is no distension. Palpations: Abdomen is soft. Abdomen is not rigid. Tenderness: There is no abdominal tenderness. There is no guarding. Musculoskeletal:         General: No tenderness. Skin:     General: Skin is warm and dry. Findings: No erythema, lesion or rash. Neurological:      Mental Status: She is alert and oriented to person, place, and time. Cranial Nerves: No cranial nerve deficit. Motor: No seizure activity. Psychiatric:         Speech: Speech normal.         Behavior: Behavior normal. Behavior is cooperative.          Assessment:        Hospital Problems           Last Modified POA    * (Principal) Community acquired pneumonia of right upper lobe of lung ([address] 8/20/2020 Yes    Type 2 diabetes mellitus with hyperglycemia, with long-term current use of insulin ([address] 8/20/2020 Yes    COPD (chronic obstructive pulmonary disease) ([address] 8/20/2020 Yes    Acute on chronic diastolic heart failure ([address] 8/20/2020 Yes    Tobacco abuse 8/20/2020 Yes    Elevated troponin 8/20/2020 Yes    Prolonged Q-T interval on ECG 8/20/2020 Yes    Pneumonia due to organism 8/20/2020 Yes    Hypoxia 8/20/2020 Yes    Chronic respiratory failure ([address] 8/20/2020 Yes    Mild malnutrition ([address] 8/21/2020 Yes          Plan:        Atypical PNA on CT chest/ emphysema and fibrosis: Groundglass opacities, covered 19 rapid test was negative in ER, will get inflammatory markers,continue IV antibiotics for now.   Discussed with the pulmonologist (Dr. Sonia Kaye)     COPD exacerbation: Continue breathing treatments , steroids and medications for cough     Acute decompensated diastolic heart failure: Lasix 40 mg BID IV , strict I's & O's, daily weight, cardiac diet and fluid restriction    HTN: Not optimally controlled, continue home medications and added small dose of Norvasc with better response    HPL: continue home medications    DM2: Continue to hold metformin for now, insulin sliding scale, hypoglycemia protocol    Diabetic polyneuropathy: Continue Lyrica    DVT prophylaxis    Discussed with the patient and the nurse    Pattie Heimlich, MD  8/23/2020  7:50 AM

## 2020-08-23 NOTE — PLAN OF CARE
Problem: Falls - Risk of:  Goal: Will remain free from falls  Description: Will remain free from falls  8/23/2020 1233 by Jimena Melendez RN  Outcome: Ongoing   Fall risk assessment completed. Patient instructed to use call light. Bed locked and in lowest position, side rails up 2/4, call light and bedside table within reach, clutter removed, and non-skid footwear on when pt out of bed. Hourly rounds will continue. Problem: Skin Integrity:  Goal: Absence of new skin breakdown  Description: Absence of new skin breakdown  8/23/2020 1233 by Jimena Melendez RN  Outcome: Ongoing   Patient repositioned as needed. No skin breakdown noted. Mucus membranes moist and will continue to monitor. Problem: Pain:  Goal: Pain level will decrease  Description: Pain level will decrease  8/23/2020 1233 by Jimena Melendez RN  Outcome: Ongoing  Pain assessment completed. Patient demonstrates understanding of pain rating scale and interventions. At this time patient states their pain is at 10 and being treated with PRN pain medication. Problem: Breathing Pattern - Ineffective:  Goal: Ability to achieve and maintain a regular respiratory rate will improve  Description: Ability to achieve and maintain a regular respiratory rate will improve  8/23/2020 1233 by Jimena Melendez RN  Outcome: Ongoing  Oxygen administered as needed. Pulse oximetry levels WNL. No cyanosis noted. Lung sounds rhonchi- throughout and wheezing- throughout. Repositioned to encourage proper ventilation.

## 2020-08-23 NOTE — CONSULTS
Pulmonary Medicine and Critical Care Consult    Patient - Shirley Stover   MRN -  [id number]   [facility]   - 1945      Date of Admission -  2020 12:17 PM  Date of evaluation -  2020  Room - [facility]/1003-   Shayna Castellanos MD Primary Care Physician - Ximena García MD     Reason for Consult      Shortness of breath/chronic respiratory failure  Assessment   · Chronic hypoxic respiratory failure  · Acute Exacerbation of COPD/emphysema  · Probable interstitial lung disease with progressive fibrosis  · Atypical pneumonia  · Smoker  · Diabetes    Recommendations   · Oxygen by nasal cannula  · Incentive spirometer every hour awake  · DuoNeb by nebulizer  · Symbicort 160  · Solu-Medrol 40 every 6  · Rocephin Zithromax  · Sputum culture   · respiratory pathogen panel  · Airborne isolation  · COVID testing   · Smoking cessation advised  · Follow-up CT chest outpatient  · DVT prophylaxis    Problem List      Patient Active Problem List   Diagnosis    COPD (chronic obstructive pulmonary disease) ([address]    CHF (congestive heart failure) (HCC)    Cocaine abuse ([address]    Acidosis, metabolic, with respiratory acidosis    Acute respiratory failure with hypoxia (HCC)    Polysubstance abuse ([address]    Hyponatremia, Beer Potomania    Normocytic anemia    Neutrophilic leukocytosis    Increased ammonia level    Hypophosphatemia    Type 2 diabetes mellitus with hyperglycemia, with long-term current use of insulin (HCC)    Acute on chronic diastolic heart failure ([address]    Tobacco abuse    Diabetes mellitus ([address]    Acute on chronic diastolic CHF (congestive heart failure) ([address]    Community acquired pneumonia of right upper lobe of lung ([address]    Elevated troponin    Prolonged Q-T interval on ECG    Pneumonia due to organism    Hypoxia    Chronic respiratory failure ([address]    Mild malnutrition ([address]       HPI     Shirley Stover is 77 y.o.,  female, chronic respiratory failure on home oxygen 2 L 24 hours a secondary advanced COPD emphysema, diabetes, CHF and continue with nicotine dependence/smoking. Patient presented for shortness of breath that worsened over the last 2 weeks associated with dry cough. She denies fever or chills. No loss of smell or taste. She has no exposure to sick contacts however she goes out of the house and her grandkids come to see her all the time. She has been smoking 3 cigarettes a day and has not been compliant with her home oxygen. She had a CT scan chest done today.   PMHx   Past Medical History      Diagnosis Date    [facility]     COPD (chronic obstructive pulmonary disease) (HCC)     Diabetes mellitus (HCC)     MRSA (methicillin resistant staph aureus) culture positive 8/28/2014    sputum    Tobacco abuse 10/15/2019      Past Surgical History        Procedure Laterality Date    APPENDECTOMY      HYSTERECTOMY      JOINT REPLACEMENT Bilateral        Meds    Current Medications    methylPREDNISolone  40 mg Intravenous Q6H    amLODIPine  5 mg Oral Daily    aspirin  81 mg Oral Daily    atorvastatin  20 mg Oral Daily    docusate sodium  100 mg Oral BID    budesonide-formoterol  2 puff Inhalation BID    lisinopril  20 mg Oral Daily    [Held by provider] metFORMIN  500 mg Oral Daily with breakfast    sodium chloride flush  10 mL Intravenous 2 times per day    enoxaparin  40 mg Subcutaneous Daily    ipratropium-albuterol  1 ampule Inhalation Q4H WA    azithromycin  500 mg Intravenous Q24H    And    cefTRIAXone (ROCEPHIN) IV  1 g Intravenous Q24H    insulin lispro  0-12 Units Subcutaneous TID     insulin lispro  0-6 Units Subcutaneous Nightly    pregabalin  100 mg Oral BID    pantoprazole  20 mg Oral Daily    furosemide  40 mg Intravenous Daily    traZODone  100 mg Oral Nightly     HYDROcodone 5 mg - acetaminophen, benzonatate, guaiFENesin-codeine, tiZANidine, sodium chloride flush, acetaminophen **OR** acetaminophen, magnesium hydroxide, nicotine, albuterol, glucose, dextrose, glucagon (rDNA), dextrose  IV Drips/Infusions   dextrose       Home Medications  Medications Prior to Admission: pantoprazole (PROTONIX) 20 MG tablet, Take 20 mg by mouth daily  tiZANidine (ZANAFLEX) 4 MG tablet, Take 4 mg by mouth 2 times daily as needed  pregabalin (LYRICA) 100 MG capsule, Take 100 mg by mouth 2 times daily. ammonium lactate (LAC-HYDRIN) 12 % lotion, Apply topically 2 times daily To arms and legs  Multiple Vitamins-Minerals (CERTAVITE SENIOR PO), Take 1 tablet by mouth daily  sertraline (ZOLOFT) 50 MG tablet, Take 50 mg by mouth daily  loratadine (CLARITIN) 10 MG tablet, Take 10 mg by mouth daily  ibuprofen (ADVIL;MOTRIN) 800 MG tablet, Take 800 mg by mouth every 8 hours as needed for Pain  furosemide (LASIX) 20 MG tablet, Take 1 tablet by mouth daily  lisinopril (PRINIVIL;ZESTRIL) 20 MG tablet, Take 20 mg by mouth daily   albuterol sulfate HFA (VENTOLIN HFA) 108 (90 Base) MCG/ACT inhaler, Inhale 2 puffs into the lungs every 6 hours as needed for Wheezing or Shortness of Breath  albuterol (PROVENTIL) (2.5 MG/3ML) 0.083% nebulizer solution, Take 3 mLs by nebulization every 6 hours as needed for Wheezing or Shortness of Breath (Patient taking differently: Take 2.5 mg by nebulization every 8 hours as needed for Wheezing or Shortness of Breath )  atorvastatin (LIPITOR) 20 MG tablet, Take 20 mg by mouth daily   traZODone (DESYREL) 100 MG tablet, Take 100 mg by mouth nightly  metFORMIN (GLUCOPHAGE) 500 MG tablet, Take 500 mg by mouth daily (with breakfast)  aspirin 81 MG tablet, Take 81 mg by mouth daily. calcium-vitamin D (OSCAL-500) 500-200 MG-UNIT per tablet, Take 1 tablet by mouth daily. fluticasone-salmeterol (ADVAIR) 250-50 MCG/DOSE AEPB, Inhale 1 puff into the lungs every 12 hours.     Allergies    Tiotropium and Spiriva handihaler [tiotropium bromide monohydrate]  Social History     Social History     Socioeconomic History    Marital status: Single     Spouse name: Not on file    Number of children: Not on file    Years of education: Not on file    Highest education level: Not on file   Occupational History    Not on file   Social Needs    Financial resource strain: Not on file    Food insecurity     Worry: Not on file     Inability: Not on file    Transportation needs     Medical: Not on file     Non-medical: Not on file   Tobacco Use    Smoking status: Current Every Day Smoker     Packs/day: 0.50     Types: Cigarettes    Smokeless tobacco: Never Used   Substance and Sexual Activity    Alcohol use:  Yes     Alcohol/week: 70.0 standard drinks     Types: 70 Cans of beer per week     Comment: 840 oz (3, 40oz beers/day)    Drug use: Yes     Types: Marijuana    Sexual activity: Not on file   Lifestyle    Physical activity     Days per week: Not on file     Minutes per session: Not on file    Stress: Not on file   Relationships    Social connections     Talks on phone: Not on file     Gets together: Not on file     Attends Hinduism service: Not on file     Active member of club or organization: Not on file     Attends meetings of clubs or organizations: Not on file     Relationship status: Not on file    Intimate partner violence     Fear of current or ex partner: Not on file     Emotionally abused: Not on file     Physically abused: Not on file     Forced sexual activity: Not on file   Other Topics Concern    Not on file   Social History Narrative    Not on file     Family History          Problem Relation Age of Onset    Heart Disease Mother     Diabetes Father      ROS - 11 systems   General Denies any fever or chills  HEENT Denies any diplopia, tinnitus or vertigo  Resp as above  Cardiac Denies any chest pain, palpitations, claudication or edema  GI Denies any melena, hematochezia, hematemesis or pyrosis   Denies any frequency, urgency, hesitancy or incontinence  Heme Denies bruising or bleeding easily  Endocrine Denies any history of  thyroid disease  Neuro Denies any focal motor deficits  Psychiatric Denies anxiety, depression, suicidal ideation  Skin Denies rashes, itching, open sores    Vitals     height is 5' 6\" (1.676 m) and weight is 147 lb 3.2 oz (66.8 kg). Her oral temperature is 98.1 °F (36.7 °C). Her blood pressure is 156/92 (abnormal) and her pulse is 97. Her respiration is 20 and oxygen saturation is 96%. Body mass index is 23.76 kg/m². I/O        Intake/Output Summary (Last 24 hours) at 8/23/2020 1204  Last data filed at 8/23/2020 0303  Gross per 24 hour   Intake 320 ml   Output 1800 ml   Net -1480 ml     I/O last 3 completed shifts: In: 470 [P.O.:470]  Out: 1800 [Urine:1800]   Patient Vitals for the past 96 hrs (Last 3 readings):   Weight   08/22/20 0502 147 lb 3.2 oz (66.8 kg)   08/21/20 0612 145 lb 4.8 oz (65.9 kg)   08/20/20 1225 144 lb (65.3 kg)     Exam   General Appearance  Awake, alert, oriented, in no acute distress  HEENT - Head is normocephalic, atraumatic. Pupil reactive to light  Neck - Supple, symmetrical, trachea midline and Soft, trachea midline and straight  Lungs -decreased breath sound bilateral crackles  Cardiovascular - Heart sounds are normal.  Regular rhythm normal rate without murmur, gallop or rub. Abdomen - Soft, nontender, nondistended, no masses or organomegaly  Neurologic - CN II-XII are grossly intact.  There are no focal motor  deficits  Skin - No bruising or bleeding  Extremities - No cyanosis, clubbing or edema    Labs  - Old records and notes have been reviewed in [facility]   CBC     Lab Results   Component Value Date    WBC 13.1 08/23/2020    RBC 4.57 08/23/2020    HGB 11.3 08/23/2020    HCT 37.0 08/23/2020     08/23/2020    MCV 81.0 08/23/2020    MCH 24.7 08/23/2020    MCHC 30.5 08/23/2020    RDW 26.1 08/23/2020    LYMPHOPCT 13 08/23/2020    MONOPCT 8 08/23/2020    BASOPCT 0 08/23/2020    MONOSABS 1.05 08/23/2020    LYMPHSABS 1.70 08/23/2020    EOSABS 0.00 08/23/2020    BASOSABS 0.00 08/23/2020    DIFFTYPE NOT REPORTED 08/23/2020     BMP   Lab Results   Component Value Date     08/23/2020    K 4.3 08/23/2020    CL 98 08/23/2020    CO2 29 08/23/2020    BUN 14 08/23/2020    CREATININE 0.84 08/23/2020    GLUCOSE 114 08/23/2020    CALCIUM 9.1 08/23/2020    MG 1.8 01/15/2020     LFTS  Lab Results   Component Value Date    ALKPHOS 87 08/28/2014    ALT 20 08/28/2014    AST 34 08/28/2014    PROT 7.8 08/28/2014    BILITOT 0.20 08/28/2014    LABALBU 3.9 08/28/2014         Radiology    CXR      CT chest     Patchy ground-glass opacity involving right upper lobe near the apex and    minimal patchy ground-glass opacities left lower lobe may suggest underlying    atypical infection or interstitial edema.  Clinic correlation recommended         Diffuse interstitial thickening identified throughout both lungs with small    cystic lucencies may represent underlying longstanding chronic interstitial    process or emphysematous change no not well characterized on this exam at    this time                    CT chest 2016          (See actual reports for details)    \"Thank you for asking us to see this patient\"    Case discussed with nurse and patient/family. Questions and concerns addressed.     Electronically signed by     Tona Argueta MD on 8/23/2020 at 12:04 PM

## 2020-08-24 PROBLEM — A41.9 SEPSIS (HCC): Status: ACTIVE | Noted: 2020-08-24

## 2020-08-24 LAB
ANGIOTENSIN-CONVERTING ENZYME: 16 U/L (ref 8–52)
GLUCOSE BLD-MCNC: 121 MG/DL (ref 65–105)
GLUCOSE BLD-MCNC: 130 MG/DL (ref 65–105)
GLUCOSE BLD-MCNC: 162 MG/DL (ref 65–105)
GLUCOSE BLD-MCNC: 165 MG/DL (ref 65–105)

## 2020-08-24 PROCEDURE — 6370000000 HC RX 637 (ALT 250 FOR IP): Performed by: FAMILY MEDICINE

## 2020-08-24 PROCEDURE — 6370000000 HC RX 637 (ALT 250 FOR IP): Performed by: NURSE PRACTITIONER

## 2020-08-24 PROCEDURE — 2060000000 HC ICU INTERMEDIATE R&B

## 2020-08-24 PROCEDURE — 99232 SBSQ HOSP IP/OBS MODERATE 35: CPT | Performed by: FAMILY MEDICINE

## 2020-08-24 PROCEDURE — 82164 ANGIOTENSIN I ENZYME TEST: CPT

## 2020-08-24 PROCEDURE — 97116 GAIT TRAINING THERAPY: CPT

## 2020-08-24 PROCEDURE — 97530 THERAPEUTIC ACTIVITIES: CPT

## 2020-08-24 PROCEDURE — 86038 ANTINUCLEAR ANTIBODIES: CPT

## 2020-08-24 PROCEDURE — 6360000002 HC RX W HCPCS: Performed by: FAMILY MEDICINE

## 2020-08-24 PROCEDURE — 2580000003 HC RX 258: Performed by: NURSE PRACTITIONER

## 2020-08-24 PROCEDURE — 97110 THERAPEUTIC EXERCISES: CPT

## 2020-08-24 PROCEDURE — 83516 IMMUNOASSAY NONANTIBODY: CPT

## 2020-08-24 PROCEDURE — 6360000002 HC RX W HCPCS: Performed by: NURSE PRACTITIONER

## 2020-08-24 PROCEDURE — 94640 AIRWAY INHALATION TREATMENT: CPT

## 2020-08-24 PROCEDURE — 36415 COLL VENOUS BLD VENIPUNCTURE: CPT

## 2020-08-24 PROCEDURE — 97535 SELF CARE MNGMENT TRAINING: CPT

## 2020-08-24 PROCEDURE — 86225 DNA ANTIBODY NATIVE: CPT

## 2020-08-24 PROCEDURE — 82947 ASSAY GLUCOSE BLOOD QUANT: CPT

## 2020-08-24 RX ORDER — METHYLPREDNISOLONE SODIUM SUCCINATE 40 MG/ML
30 INJECTION, POWDER, LYOPHILIZED, FOR SOLUTION INTRAMUSCULAR; INTRAVENOUS EVERY 8 HOURS
Status: DISCONTINUED | OUTPATIENT
Start: 2020-08-24 | End: 2020-08-25

## 2020-08-24 RX ADMIN — PANTOPRAZOLE SODIUM 20 MG: 20 TABLET, DELAYED RELEASE ORAL at 05:29

## 2020-08-24 RX ADMIN — SODIUM CHLORIDE, PRESERVATIVE FREE 10 ML: 5 INJECTION INTRAVENOUS at 22:09

## 2020-08-24 RX ADMIN — INSULIN LISPRO 1 UNITS: 100 INJECTION, SOLUTION INTRAVENOUS; SUBCUTANEOUS at 22:09

## 2020-08-24 RX ADMIN — BUDESONIDE AND FORMOTEROL FUMARATE DIHYDRATE 2 PUFF: 160; 4.5 AEROSOL RESPIRATORY (INHALATION) at 19:56

## 2020-08-24 RX ADMIN — PREGABALIN 100 MG: 100 CAPSULE ORAL at 22:08

## 2020-08-24 RX ADMIN — CEFTRIAXONE SODIUM 1 G: 1 INJECTION, POWDER, FOR SOLUTION INTRAMUSCULAR; INTRAVENOUS at 13:27

## 2020-08-24 RX ADMIN — INSULIN LISPRO 2 UNITS: 100 INJECTION, SOLUTION INTRAVENOUS; SUBCUTANEOUS at 13:30

## 2020-08-24 RX ADMIN — IPRATROPIUM BROMIDE AND ALBUTEROL SULFATE 1 AMPULE: .5; 3 SOLUTION RESPIRATORY (INHALATION) at 19:55

## 2020-08-24 RX ADMIN — HYDROCODONE BITARTRATE AND ACETAMINOPHEN 1 TABLET: 5; 325 TABLET ORAL at 22:09

## 2020-08-24 RX ADMIN — GUAIFENESIN AND CODEINE PHOSPHATE 5 ML: 10; 100 LIQUID ORAL at 17:35

## 2020-08-24 RX ADMIN — HYDROCODONE BITARTRATE AND ACETAMINOPHEN 1 TABLET: 5; 325 TABLET ORAL at 02:30

## 2020-08-24 RX ADMIN — AZITHROMYCIN MONOHYDRATE 500 MG: 500 INJECTION, POWDER, LYOPHILIZED, FOR SOLUTION INTRAVENOUS at 17:11

## 2020-08-24 RX ADMIN — FUROSEMIDE 40 MG: 10 INJECTION, SOLUTION INTRAMUSCULAR; INTRAVENOUS at 08:08

## 2020-08-24 RX ADMIN — IPRATROPIUM BROMIDE AND ALBUTEROL SULFATE 1 AMPULE: .5; 3 SOLUTION RESPIRATORY (INHALATION) at 14:53

## 2020-08-24 RX ADMIN — HYDROCODONE BITARTRATE AND ACETAMINOPHEN 1 TABLET: 5; 325 TABLET ORAL at 13:27

## 2020-08-24 RX ADMIN — ASPIRIN 81 MG: 81 TABLET, COATED ORAL at 08:08

## 2020-08-24 RX ADMIN — BENZONATATE 100 MG: 100 CAPSULE ORAL at 02:30

## 2020-08-24 RX ADMIN — DOCUSATE SODIUM 100 MG: 100 CAPSULE, LIQUID FILLED ORAL at 22:09

## 2020-08-24 RX ADMIN — DOCUSATE SODIUM 100 MG: 100 CAPSULE, LIQUID FILLED ORAL at 08:08

## 2020-08-24 RX ADMIN — ATORVASTATIN CALCIUM 20 MG: 20 TABLET, FILM COATED ORAL at 08:09

## 2020-08-24 RX ADMIN — TRAZODONE HYDROCHLORIDE 100 MG: 100 TABLET ORAL at 22:09

## 2020-08-24 RX ADMIN — METHYLPREDNISOLONE SODIUM SUCCINATE 30 MG: 40 INJECTION, POWDER, FOR SOLUTION INTRAMUSCULAR; INTRAVENOUS at 17:11

## 2020-08-24 RX ADMIN — PREGABALIN 100 MG: 100 CAPSULE ORAL at 08:08

## 2020-08-24 RX ADMIN — METHYLPREDNISOLONE SODIUM SUCCINATE 40 MG: 40 INJECTION, POWDER, FOR SOLUTION INTRAMUSCULAR; INTRAVENOUS at 08:08

## 2020-08-24 RX ADMIN — IPRATROPIUM BROMIDE AND ALBUTEROL SULFATE 1 AMPULE: .5; 3 SOLUTION RESPIRATORY (INHALATION) at 11:42

## 2020-08-24 RX ADMIN — LISINOPRIL 20 MG: 20 TABLET ORAL at 08:08

## 2020-08-24 RX ADMIN — AMLODIPINE BESYLATE 5 MG: 5 TABLET ORAL at 08:08

## 2020-08-24 RX ADMIN — METHYLPREDNISOLONE SODIUM SUCCINATE 40 MG: 40 INJECTION, POWDER, FOR SOLUTION INTRAMUSCULAR; INTRAVENOUS at 02:30

## 2020-08-24 RX ADMIN — SODIUM CHLORIDE, PRESERVATIVE FREE 10 ML: 5 INJECTION INTRAVENOUS at 08:08

## 2020-08-24 RX ADMIN — ENOXAPARIN SODIUM 40 MG: 40 INJECTION SUBCUTANEOUS at 08:09

## 2020-08-24 RX ADMIN — HYDROCODONE BITARTRATE AND ACETAMINOPHEN 1 TABLET: 5; 325 TABLET ORAL at 17:35

## 2020-08-24 ASSESSMENT — PAIN DESCRIPTION - LOCATION
LOCATION: ABDOMEN

## 2020-08-24 ASSESSMENT — PAIN DESCRIPTION - DESCRIPTORS
DESCRIPTORS: ACHING;DISCOMFORT

## 2020-08-24 ASSESSMENT — PAIN SCALES - GENERAL
PAINLEVEL_OUTOF10: 2
PAINLEVEL_OUTOF10: 9

## 2020-08-24 ASSESSMENT — ENCOUNTER SYMPTOMS
DIARRHEA: 0
CONSTIPATION: 0
CHEST TIGHTNESS: 0
WHEEZING: 0
VOMITING: 0
ABDOMINAL PAIN: 0
COUGH: 1
NAUSEA: 0
BLOOD IN STOOL: 0

## 2020-08-24 ASSESSMENT — PAIN DESCRIPTION - PROGRESSION
CLINICAL_PROGRESSION: NOT CHANGED

## 2020-08-24 ASSESSMENT — PAIN - FUNCTIONAL ASSESSMENT
PAIN_FUNCTIONAL_ASSESSMENT: ACTIVITIES ARE NOT PREVENTED
PAIN_FUNCTIONAL_ASSESSMENT: PREVENTS OR INTERFERES SOME ACTIVE ACTIVITIES AND ADLS
PAIN_FUNCTIONAL_ASSESSMENT: PREVENTS OR INTERFERES SOME ACTIVE ACTIVITIES AND ADLS

## 2020-08-24 ASSESSMENT — PAIN DESCRIPTION - FREQUENCY
FREQUENCY: INTERMITTENT

## 2020-08-24 ASSESSMENT — PAIN DESCRIPTION - PAIN TYPE
TYPE: ACUTE PAIN

## 2020-08-24 ASSESSMENT — PAIN DESCRIPTION - ONSET
ONSET: ON-GOING
ONSET: GRADUAL
ONSET: GRADUAL

## 2020-08-24 NOTE — PROGRESS NOTES
Physical Therapy  Facility/Department: [facility] PROGRESSIVE CARE  Daily Treatment Note  NAME: Lucas Homans  : 1945  MRN: [id number]    Date of Service: 2020    Discharge Recommendations:  Subacute/Skilled Nursing Facility   PT Equipment Recommendations  Equipment Needed: No    Assessment   Body structures, Functions, Activity limitations: Decreased functional mobility ; Decreased balance;Decreased strength;Decreased endurance; Increased pain;Decreased safe awareness  Assessment: Patient motivated to reach goals and return healthy. Patient able to progress toward baseline but is limited by her SOB, endurance, fatigue and decreased safety awareness. Patient appropriate to continue IP Rehab and discharge to SNF at this time. Patient declined SNF stay currently but agreeable for Home w/ HHPT. Prognosis: Good  PT Education: Goals;Plan of Care;General Safety; Energy Conservation;Transfer Training;Functional Mobility Training;Gait Training  Patient Education: Educated pt on breathing techniques when SOB, taking rest breaks when necessary, use of RW, and use of call light before getting up. Activity Tolerance  Activity Tolerance: Educated pt to take breaks when needed, as she will push herself. Patient Diagnosis(es): The encounter diagnosis was Pneumonia due to organism. has a past medical history of CHF (congestive heart failure) ([facility] [address] COPD (chronic obstructive pulmonary disease) ([facility] [facility] Diabetes mellitus ([facility] [facility] MRSA (methicillin resistant staph aureus) culture positive, and Tobacco abuse.   has a past surgical history that includes joint replacement (Bilateral); Hysterectomy; and Appendectomy.     Restrictions  Restrictions/Precautions  Restrictions/Precautions: General Precautions, Fall Risk  Required Braces or Orthoses?: No  Position Activity Restriction  Other position/activity restrictions: 2L O2, up as luis daniel, contact iso, elevate heels, telemetry  Subjective   General  Chart Reviewed: Yes  Response To

## 2020-08-24 NOTE — PROGRESS NOTES
Occupational Therapy  Facility/Department: [facility] PROGRESSIVE CARE  Daily Treatment Note  NAME: Refugio Storey  : 1945  MRN: [id number]    Date of Service: 2020    RN Connie Garza reports patient is medically stable for therapy treatment this date. Chart reviewed prior to treatment and patient is agreeable for therapy. All lines intact and patient positioned comfortably at end of treatment. All patient needs addressed prior to ending therapy session. Discharge Recommendations:  Subacute/Skilled Nursing Facility(Pt declines SNF however states she is agreeable to in home OT)  OT Equipment Recommendations  Equipment Needed: Yes  Mobility Devices: ADL Assistive Devices  ADL Assistive Devices: Long-handled Shoe Horn;Long-handled Sponge    Assessment   Performance deficits / Impairments: Decreased functional mobility ; Decreased ADL status; Decreased strength;Decreased safe awareness;Decreased balance;Decreased endurance;Decreased posture  Assessment: Skilled OT is indicated to increase overall I and safety awareness with ADL and functional tasks to return home with assist as needed. Pt states she has a supportive grand son who can assist at DC. Prognosis: Good  OT Education: OT Role;Plan of Care;Energy Conservation;Transfer Training  Patient Education: Pursed lip breathing, safety in function, fall prevention/call light use and recommendations for continued therapy  REQUIRES OT FOLLOW UP: Yes  Activity Tolerance  Activity Tolerance: Patient Tolerated treatment well;Patient limited by fatigue;Patient limited by pain  Activity Tolerance: Fair minus  Safety Devices  Type of devices: Call light within reach;Gait belt;Patient at risk for falls; Left in chair;Nurse notified         Patient Diagnosis(es): The encounter diagnosis was Pneumonia due to organism.       has a past medical history of [facility]F (congestive heart failure) ([facility] COPD (chronic obstructive pulmonary disease) ([facility] Diabetes mellitus ([facility] MRSA (methicillin resistant staph aureus) culture positive, and Tobacco abuse.   has a past surgical history that includes joint replacement (Bilateral); Hysterectomy; and Appendectomy. Restrictions  Restrictions/Precautions  Restrictions/Precautions: General Precautions, Fall Risk  Position Activity Restriction  Other position/activity restrictions: 2L O2, up as luis daniel, contact iso, elevate heels, telemetry  Subjective   General  Chart Reviewed: Yes  Patient assessed for rehabilitation services?: Yes  Family / Caregiver Present: No  Pre Treatment Pain Screening  Intervention List: Nurse/Physician notified  Comments / Details: Pt states she has 8/10 pain in her abdomin. Pt states RN gave her medication prior to writer arrival.    Orientation  Orientation  Overall Orientation Status: Within Functional Limits  Objective    ADL  Grooming: Setup;Stand by assistance(to brush teeth seated EOB)  UE Bathing: Setup;Stand by assistance(for sponge bath seated EOB)  LE Bathing: Setup;Minimal assistance(CGA to wash BLE seated EOB, MIN A to stand with RW and pt able to wash florencio area)  UE Dressing: Setup;Minimal assistance(to doff/joc[facility] hosp gown)  LE Dressing: Setup; Moderate assistance(Pt able to thread BLE in pull up brief while seated EOB, required MIN A to stand with RW and pull brief up completely. Pt required MOD A to jocelyn B socks seated EOB.)  Toileting: (Pt with no needs at this time)  Additional Comments: Pt completed sponge bath EOB with increased time and cues for pacing self, and pursed lip breathing. Balance  Sitting Balance: Stand by assistance  Standing Balance: Minimal assistance  Standing Balance  Time: standing luis daniel < 1.5 min for self care and functional mobility tasks.   Functional Mobility  Functional - Mobility Device: Rolling Walker  Activity: (bed to door back to bedside chair)  Assist Level: Minimal assistance  Functional Mobility Comments: MOD verbal cues for pacing, slowing down movements, awareness/assist with lines, pursed lip breathing, RW safety, upright posture/scanning room all to increase safety and reduce falls. Toilet Transfers  Toilet Transfer: Unable to assess  Toilet Transfers Comments: Pt with no needs at this time. Bed mobility  Supine to Sit: Stand by assistance(with increased time to complete)  Sit to Supine: Unable to assess(Pt agreed to sit in bedside chair.)  Comment: MIN verbal cues for pacing self and pursed lip breathing all to increase safety. Pt with good use of bed rails, required increased time to complete. Transfers  Stand Step Transfers: Minimal assistance(with RW)  Sit to stand: Minimal assistance  Stand to sit: Minimal assistance  Transfer Comments: MOD verbal cues for RW safety, B hand placement, squaring self/AD up to surface prior to sitting, controlled stand to sit, pacing self, awarenss/assist with lines and pursed lip breathing all to increase safety. Cognition  Overall Cognitive Status: Exceptions  Arousal/Alertness: Appropriate responses to stimuli  Following Commands:  Follows all commands without difficulty  Attention Span: Appears intact  Memory: Appears intact  Safety Judgement: Decreased awareness of need for assistance;Decreased awareness of need for safety  Problem Solving: Assistance required to generate solutions;Assistance required to implement solutions;Assistance required to correct errors made  Insights: Decreased awareness of deficits  Initiation: Requires cues for some  Sequencing: Requires cues for some     Perception  Overall Perceptual Status: [facility]                                   Plan   Plan  Times per week: 4-5x/week, 1-2x/day  Current Treatment Recommendations: Strengthening, Balance Training, Functional Mobility Training, Endurance Training, Safety Education & Training, Self-Care / ADL, Positioning, Equipment Evaluation, Education, & procurement, Patient/Caregiver Education & Training                        AM-PAC Score: 17              Goals  Short term goals  Time Frame for Short term goals: by discharge, pt will  Short term goal 1: demo SBA with ADL transfers with good safety and DME/AD as approp  Short term goal 2: demo SBA with funcitonal mob in room with good safety/pacing for ADL completion  Short term goal 3: demo SBA with toieting routine  Short term goal 4: demo SBA with UB ADLs and CGA with LB ADLs with good safety/pacing, AE/DME as needed  Short term goal 5: demo and verb good understanding of fall prevention techs, EC/WS techs, breathing techs, and possible equip needs for home  Patient Goals   Patient goals : to get home when able       Therapy Time   Individual Concurrent Group Co-treatment   Time In 0740         Time Out 0810         Minutes [facility]

## 2020-08-24 NOTE — PROGRESS NOTES
Physician Progress Note      Kike Field  [facility] #:                  [id number]  :                       1945  ADMIT DATE:       2020 12:17 PM  [address] [place] DATE:  RESPONDING  PROVIDER #:        Rebeca Edge MD          QUERY TEXT:    Pt admitted with chills and dyspnea . Pt noted to have Pneumonia. If possible,   please document in the progress notes and discharge summary if you are   evaluating and /or treating any of the following: The medical record reflects the following:  Risk Factors: age 76 yo w PNA, Smoker, COPD  Clinical Indicators: hypotension, hypoxia @ 82% on presentation in ED, WBC 19,   13.1, tachycardia at  HR in 90's,  dyspnea is persistent - using increased oxygen from normal home use. She is   documented as being ill appearing. no fever  Treatment: Covid testing negative, Blood cultures pending, Rocephin and   Zithromax IV  Thank you , Sondra Tang RN, MSN, CDS  Options provided:  -- Sepsis, present on admission  -- No Sepsis, Pneumonia only  -- Other - I will add my own diagnosis  -- Disagree - Not applicable / Not valid  -- Disagree - Clinically unable to determine / Unknown  -- Refer to Clinical Documentation Reviewer    PROVIDER RESPONSE TEXT:    This patient has sepsis which was present on admission.     Query created by: Rajat Le on 2020 10:45 AM      Electronically signed by:  Rebeca Edge MD 2020 10:49 AM

## 2020-08-24 NOTE — PLAN OF CARE
Problem: Pain:  Goal: Pain level will decrease  Description: Pain level will decrease  Outcome: Ongoing  Note: Assess for pain using scale 0-10. Medicate PRN as ordered. Non pharmacologic interventions. Continuing to monitor.      Problem: Pain:  Goal: Control of acute pain  Description: Control of acute pain  Outcome: Ongoing     Problem: Breathing Pattern - Ineffective:  Goal: Ability to achieve and maintain a regular respiratory rate will improve  Description: Ability to achieve and maintain a regular respiratory rate will improve  Outcome: Ongoing

## 2020-08-24 NOTE — PROGRESS NOTES
[facility]    Progress Note    8/24/2020    10:51 AM    Name:   Charley Bonds  MRN:     [id number]     Acct:      [de-identified]   Room:   [address] [place] [address] Day:  4  Admit Date:  8/20/2020 12:17 PM    PCP:   Dimas Pardo MD  Code Status:  Full Code    Subjective:     C/C:   Chief Complaint   Patient presents with    Shortness of Breath    Cough    Generalized Body Aches     Interval History Status: improved. Patient seen and examined at bedside, she is significantly better , cough improving, still with some breathing difficulties  COVID again negative   Patient vitals, labs  were reviewed,from overnight shift and morning updates were noted and discussed with the nurse    Brief History:     Patient with known history of diabetes, hypertension, hyperlipidemia and diastolic heart failure and tobacco abuse [continue to smoke] presented to ER with progressive dyspnea and developed cough and colored sputum yesterday was found to have developing pneumonia and acute on chronic diastolic heart failure  Was screened for COVID 19 and came back negative  Initially hypoxic on presentation , wears oxygen chronically, not tachypneic, slightly elevated blood pressure    Review of Systems:     Review of Systems   Constitutional: Negative for chills, diaphoresis and fever. HENT: Negative for congestion. Eyes: Negative for visual disturbance. Respiratory: Positive for cough. Negative for chest tightness and wheezing. Cardiovascular: Negative for chest pain, palpitations and leg swelling. Gastrointestinal: Negative for abdominal pain, blood in stool, constipation, diarrhea, nausea and vomiting. Genitourinary: Negative for difficulty urinating. Neurological: Negative for dizziness, weakness, light-headedness, numbness and headaches. Medications: Allergies:     Allergies   Allergen Reactions    Tiotropium Anaphylaxis and Swelling    Spiriva Handihaler [Tiotropium Bromide Monohydrate] Swelling       Current Meds:   Scheduled Meds:    methylPREDNISolone  30 mg Intravenous Q8H    amLODIPine  5 mg Oral Daily    aspirin  81 mg Oral Daily    atorvastatin  20 mg Oral Daily    docusate sodium  100 mg Oral BID    budesonide-formoterol  2 puff Inhalation BID    lisinopril  20 mg Oral Daily    [Held by provider] metFORMIN  500 mg Oral Daily with breakfast    sodium chloride flush  10 mL Intravenous 2 times per day    enoxaparin  40 mg Subcutaneous Daily    ipratropium-albuterol  1 ampule Inhalation Q4H WA    azithromycin  500 mg Intravenous Q24H    And    cefTRIAXone (ROCEPHIN) IV  1 g Intravenous Q24H    insulin lispro  0-12 Units Subcutaneous TID WC    insulin lispro  0-6 Units Subcutaneous Nightly    pregabalin  100 mg Oral BID    pantoprazole  20 mg Oral Daily    furosemide  40 mg Intravenous Daily    traZODone  100 mg Oral Nightly     Continuous Infusions:    dextrose       PRN Meds: HYDROcodone 5 mg - acetaminophen, benzonatate, guaiFENesin-codeine, tiZANidine, sodium chloride flush, acetaminophen **OR** acetaminophen, magnesium hydroxide, nicotine, albuterol, glucose, dextrose, glucagon (rDNA), dextrose    Data:     Past Medical History:   has a past medical history of CHF (congestive heart failure) ([facility]), COPD (chronic obstructive pulmonary disease) ([facility] Diabetes mellitus ([facility] MRSA (methicillin resistant staph aureus) culture positive, and Tobacco abuse. Social History:   reports that she has been smoking cigarettes. She has been smoking about 0.50 packs per day. She has never used smokeless tobacco. She reports current alcohol use of about 70.0 standard drinks of alcohol per week. She reports current drug use. Drug: Marijuana.      Family History:   Family History   Problem Relation Age of Onset    Heart Disease Mother     Diabetes Father        Vitals:  /79   Pulse 98   Temp 97.5 °F (36.4 °C) (Oral)   Resp 20   Ht 5' 6\" (1.676 m)   Wt 141 lb 14.4 oz (64.4 kg)   SpO2 92%   BMI 22.90 kg/m²   Temp (24hrs), Av.9 °F (36.6 °C), Min:97.5 °F (36.4 °C), Max:98.3 °F (36.8 °C)    Recent Labs     20  1102 20  16520  0714   POCGLU 134* 131* 132* 121*       I/O (24Hr): Intake/Output Summary (Last 24 hours) at 2020 1051  Last data filed at 2020 2117  Gross per 24 hour   Intake 318 ml   Output --   Net 318 ml       Labs:  Hematology:  Recent Labs     20  0920  0827   WBC 19.0* 13.1*   RBC 4.21 4.57   HGB 10.6* 11.3*   HCT 34.3* 37.0   MCV 81.5* 81.0*   MCH 25.2 24.7*   MCHC 30.9 30.5   RDW 26.5* 26.1*    380   MPV 9.9 9.8   CRP  --  46.0*     Chemistry:  Recent Labs     20  0920  0827    140   K 3.9 4.3    98   CO2 26 29   GLUCOSE 110* 114*   BUN 14 14   CREATININE 0.82 0.84   ANIONGAP 13 13   LABGLOM >60 >60   GFRAA >60 >60   CALCIUM 8.7 9.1     Recent Labs     20  1916 20  0744 20  0827 20  1102 20  16520  0714   LDH  --   --  358*  --   --   --   --    POCGLU 153* 105  --  134* 131* 132* 121*     ABG:  Lab Results   Component Value Date    POCPH 7.40 2014    [facility]  2014     DISREGARD RESULTS. PATIENT NUMBER WAS INCORRECTLY ASSIGNED. POCPCO2 44 2014    PCO2  2014     DISREGARD RESULTS. PATIENT NUMBER WAS INCORRECTLY ASSIGNED. POCPO2 65 2014    PO2  2014     DISREGARD RESULTS. PATIENT NUMBER WAS INCORRECTLY ASSIGNED. POCHCO3 27.4 2014    HCO3  2014     DISREGARD RESULTS. PATIENT NUMBER WAS INCORRECTLY ASSIGNED. NBEA NOT REPORTED 2014    PBEA 3 2014    USC2VHR 29 2014    VXMU1PCP 92 2014    O2SAT  2014     DISREGARD RESULTS. PATIENT NUMBER WAS INCORRECTLY ASSIGNED.     FIO2 NOT REPORTED 2014     Lab Results   Component Value Date/Time    SPECIAL RIGHT WRIST, 9ML 2020 02:09 PM     Lab Results   Component Value Date/Time    CULTURE NO GROWTH 3 DAYS 08/20/2020 02:09 PM       Radiology:  Xr Chest Portable    Result Date: 8/20/2020  Right upper lobe opacities appear more prominent in the interval.  Developing inflammatory process or infection should be considered in the appropriate clinical setting. Chronic basilar interstitial opacities. Physical Examination:        Physical Exam  Vitals signs and nursing note reviewed. Constitutional:       General: She is not in acute distress. HENT:      Head: Normocephalic and atraumatic. Eyes:      Conjunctiva/sclera: Conjunctivae normal.      Pupils: Pupils are equal, round, and reactive to light. Cardiovascular:      Rate and Rhythm: Normal rate and regular rhythm. Heart sounds: No murmur. Pulmonary:      Effort: Pulmonary effort is normal. No accessory muscle usage or respiratory distress. Breath sounds: No stridor. Examination of the right-lower field reveals rales. Examination of the left-lower field reveals rales. Decreased breath sounds and rales present. No wheezing or rhonchi. Abdominal:      General: Bowel sounds are normal. There is no distension. Palpations: Abdomen is soft. Abdomen is not rigid. Tenderness: There is no abdominal tenderness. There is no guarding. Musculoskeletal:         General: No tenderness. Skin:     General: Skin is warm and dry. Findings: No erythema, lesion or rash. Neurological:      Mental Status: She is alert and oriented to person, place, and time. Cranial Nerves: No cranial nerve deficit. Motor: No seizure activity. Psychiatric:         Speech: Speech normal.         Behavior: Behavior normal. Behavior is cooperative.          Assessment:        Hospital Problems           Last Modified POA    * (Principal) Community acquired pneumonia of right upper lobe of lung ([address] 8/20/2020 Yes    Type 2 diabetes mellitus with hyperglycemia, with long-term current use of insulin ([facility] 8/20/2020 Yes    COPD (chronic obstructive pulmonary disease) ([facility] [address] 8/20/2020 Yes    Acute on chronic diastolic heart failure ([facility] [address] 8/20/2020 Yes    Tobacco abuse 8/20/2020 Yes    Elevated troponin 8/20/2020 Yes    Prolonged Q-T interval on ECG 8/20/2020 Yes    Pneumonia due to organism 8/20/2020 Yes    Hypoxia 8/20/2020 Yes    Chronic respiratory failure ([facility] 8/20/2020 Yes    Mild malnutrition ([facility] [facility] 8/21/2020 Yes    Sepsis ([facility] [facility] 8/24/2020 Yes          Plan:          Sepsis ( POA) : improving, continue IV Abx     Atypical PNA on CT chest/ emphysema / ILD with possibly progressing  fibrosis: Groundglass opacities, COVID 19  19 rapid test and regular swab both negative,  inflammatory markers not significantcontinue  IV antibiotics   Discussed with the pulmonologist, appreciate their recommendation   Patient is finally feeling better today after 2 days of IV steroids     Chronic hypoxemic respiratory failure: Continue supplemental oxygen    COPD exacerbation: Continue breathing treatments ,  IV steroids and medications for cough     Acute decompensated diastolic heart failure:  Switch  Lasix 40 IV to oral  , strict I's & O's, daily weight, cardiac diet and fluid restriction    HTN: better  controlled, continue current medications     HPL: continue home medications    DM2: Continue to hold metformin for now, insulin sliding scale, hypoglycemia protocol    Diabetic polyneuropathy: Continue Lyrica    DVT prophylaxis    Hopefully DC in the next 24 hours    Discussed with the patient and the nurse     Bella Brink MD  8/24/2020  10:51 AM

## 2020-08-24 NOTE — CARE COORDINATION
Discharge planning    Chart reviewed. SS notes bianka has accepted and precert was started on 5/38. covid testing is pending. Therapy will need updated notes today to assist with precert.      TAMARA in [facility]     PULM    Assessment   · Chronic hypoxic respiratory failure  · Acute Exacerbation of COPD/emphysema  · Probable interstitial lung disease with progressive fibrosis  · Atypical pneumonia  Recommendations   · Oxygen by nasal cannula  · Incentive spirometer every hour awake  · DuoNeb by nebulizer  · Solu-Medrol 40 every 6  · Rocephin Zithromax  · Sputum culture   · respiratory pathogen panel  · Airborne isolation  · COVID testing   · Follow-up CT chest outpatient

## 2020-08-24 NOTE — PROGRESS NOTES
Pulmonary Critical Care Progress Note  Franchesca Castillo MD     Patient seen for the follow up of  Community acquired pneumonia of right upper lobe of lung ([address]     Subjective:  She is sitting up in chair in no distress. No significant events noted overnight. Her shortness of breath is significantly improved. She has dry cough. She denies any chest pain. Examination:  Vitals: /79   Pulse 98   Temp 97.5 °F (36.4 °C) (Oral)   Resp 20   Ht 5' 6\" (1.676 m)   Wt 141 lb 14.4 oz (64.4 kg)   SpO2 92%   BMI 22.90 kg/m²   General appearance: alert and cooperative with exam, up in chair  Neck: No JVD  Lungs: Bibasilar crackles  Heart: regular rate and rhythm, S1, S2 normal, no gallop  Abdomen: Soft, non tender, + BS  Extremities: no cyanosis or clubbing. No significant edema    LABs:  CBC:   Recent Labs     08/22/20  0906 08/23/20  0827   WBC 19.0* 13.1*   HGB 10.6* 11.3*   HCT 34.3* 37.0    380     BMP:   Recent Labs     08/22/20  0906 08/23/20  0827    140   K 3.9 4.3   CO2 26 29   BUN 14 14   CREATININE 0.82 0.84   LABGLOM >60 >60   GLUCOSE 110* 114*       Radiology:  8/22/2020      Impression:  · Chronic hypoxic respiratory failure, noncompliant on home oxygen  · Acute Exacerbation of COPD/emphysema  · Probable interstitial lung disease with progressive fibrosis  · Atypical pneumonia, SARS-COV-2-x1  · Lymphadenopathy, paratracheal, most likely reactive  · Active smoking  · DM, CAD, CHF    Recommendations:  · Oxygen by nasal cannula at 2 L per nasal cannula  · Incentive spirometer every hour awake  · DuoNeb every 4 hours and as needed   · Symbicort 160  · IV Solu-Medrol 40 mg every 6 hours, decrease dose/frequency  · Continue IV Rocephin and Zithromax  · Check sputum culture   · CVD work-up  · X-ray chest and labs in a.m.   · Smoking cessation advised  · Follow-up CT chest as an outpatient outpatient  · DVT prophylaxis with Lovenox  · Will follow with you      Electronically signed by     Sandra Bower

## 2020-08-25 ENCOUNTER — APPOINTMENT (OUTPATIENT)
Dept: GENERAL RADIOLOGY | Age: 75
DRG: 871 | End: 2020-08-25
Payer: MEDICARE

## 2020-08-25 VITALS
HEIGHT: 66 IN | RESPIRATION RATE: 18 BRPM | WEIGHT: 143.1 LBS | OXYGEN SATURATION: 95 % | DIASTOLIC BLOOD PRESSURE: 84 MMHG | BODY MASS INDEX: 23 KG/M2 | SYSTOLIC BLOOD PRESSURE: 127 MMHG | HEART RATE: 97 BPM | TEMPERATURE: 97.5 F

## 2020-08-25 PROBLEM — A41.9 SEPSIS (HCC): Status: RESOLVED | Noted: 2020-08-24 | Resolved: 2020-08-25

## 2020-08-25 LAB
ABSOLUTE EOS #: 0 K/UL (ref 0–0.4)
ABSOLUTE IMMATURE GRANULOCYTE: 0.13 K/UL (ref 0–0.3)
ABSOLUTE LYMPH #: 1.33 K/UL (ref 1–4.8)
ABSOLUTE MONO #: 0.93 K/UL (ref 0.2–0.8)
ANCA MYELOPEROXIDASE: 41 AU/ML
ANCA PROTEINASE 3: 18 AU/ML
ANION GAP SERPL CALCULATED.3IONS-SCNC: 12 MMOL/L (ref 9–17)
ANTI DNA DOUBLE STRANDED: 39 IU/ML
ANTI-NUCLEAR ANTIBODY (ANA): NEGATIVE
BASOPHILS # BLD: 0 %
BASOPHILS ABSOLUTE: 0 K/UL (ref 0–0.2)
BUN BLDV-MCNC: 22 MG/DL (ref 8–23)
BUN/CREAT BLD: 23 (ref 9–20)
CALCIUM SERPL-MCNC: 8.7 MG/DL (ref 8.6–10.4)
CHLORIDE BLD-SCNC: 98 MMOL/L (ref 98–107)
CO2: 26 MMOL/L (ref 20–31)
CREAT SERPL-MCNC: 0.95 MG/DL (ref 0.5–0.9)
DIFFERENTIAL TYPE: ABNORMAL
EOSINOPHILS RELATIVE PERCENT: 0 % (ref 1–4)
GBM ANTIBODY IGG: 10 AU/ML
GFR AFRICAN AMERICAN: >60 ML/MIN
GFR NON-AFRICAN AMERICAN: 58 ML/MIN
GFR SERPL CREATININE-BSD FRML MDRD: ABNORMAL ML/MIN/{1.73_M2}
GFR SERPL CREATININE-BSD FRML MDRD: ABNORMAL ML/MIN/{1.73_M2}
GLUCOSE BLD-MCNC: 139 MG/DL (ref 65–105)
GLUCOSE BLD-MCNC: 169 MG/DL (ref 70–99)
GLUCOSE BLD-MCNC: 250 MG/DL (ref 65–105)
HCT VFR BLD CALC: 36.9 % (ref 36.3–47.1)
HEMOGLOBIN: 11.2 G/DL (ref 11.9–15.1)
IMMATURE GRANULOCYTES: 1 %
LYMPHOCYTES # BLD: 10 % (ref 24–44)
MCH RBC QN AUTO: 25.2 PG (ref 25.2–33.5)
MCHC RBC AUTO-ENTMCNC: 30.4 G/DL (ref 28.4–34.8)
MCV RBC AUTO: 82.9 FL (ref 82.6–102.9)
MONOCYTES # BLD: 7 % (ref 1–7)
MORPHOLOGY: ABNORMAL
MORPHOLOGY: ABNORMAL
NRBC AUTOMATED: 0.5 PER 100 WBC
PDW BLD-RTO: 26.1 % (ref 11.8–14.4)
PLATELET # BLD: 398 K/UL (ref 138–453)
PLATELET ESTIMATE: ABNORMAL
PMV BLD AUTO: 10.1 FL (ref 8.1–13.5)
POTASSIUM SERPL-SCNC: 4.3 MMOL/L (ref 3.7–5.3)
RBC # BLD: 4.45 M/UL (ref 3.95–5.11)
RBC # BLD: ABNORMAL 10*6/UL
SEG NEUTROPHILS: 82 % (ref 36–66)
SEGMENTED NEUTROPHILS ABSOLUTE COUNT: 10.91 K/UL (ref 1.8–7.7)
SODIUM BLD-SCNC: 136 MMOL/L (ref 135–144)
WBC # BLD: 13.3 K/UL (ref 3.5–11.3)
WBC # BLD: ABNORMAL 10*3/UL

## 2020-08-25 PROCEDURE — 85025 COMPLETE CBC W/AUTO DIFF WBC: CPT

## 2020-08-25 PROCEDURE — 6370000000 HC RX 637 (ALT 250 FOR IP): Performed by: NURSE PRACTITIONER

## 2020-08-25 PROCEDURE — 6370000000 HC RX 637 (ALT 250 FOR IP): Performed by: FAMILY MEDICINE

## 2020-08-25 PROCEDURE — 2700000000 HC OXYGEN THERAPY PER DAY

## 2020-08-25 PROCEDURE — 71045 X-RAY EXAM CHEST 1 VIEW: CPT

## 2020-08-25 PROCEDURE — 80048 BASIC METABOLIC PNL TOTAL CA: CPT

## 2020-08-25 PROCEDURE — 6360000002 HC RX W HCPCS: Performed by: NURSE PRACTITIONER

## 2020-08-25 PROCEDURE — 97110 THERAPEUTIC EXERCISES: CPT

## 2020-08-25 PROCEDURE — 82947 ASSAY GLUCOSE BLOOD QUANT: CPT

## 2020-08-25 PROCEDURE — 99239 HOSP IP/OBS DSCHRG MGMT >30: CPT | Performed by: FAMILY MEDICINE

## 2020-08-25 PROCEDURE — 94640 AIRWAY INHALATION TREATMENT: CPT

## 2020-08-25 PROCEDURE — 36415 COLL VENOUS BLD VENIPUNCTURE: CPT

## 2020-08-25 PROCEDURE — 6360000002 HC RX W HCPCS: Performed by: FAMILY MEDICINE

## 2020-08-25 PROCEDURE — 97535 SELF CARE MNGMENT TRAINING: CPT

## 2020-08-25 PROCEDURE — 2580000003 HC RX 258: Performed by: NURSE PRACTITIONER

## 2020-08-25 RX ORDER — NICOTINE 21 MG/24HR
1 PATCH, TRANSDERMAL 24 HOURS TRANSDERMAL DAILY PRN
Qty: 30 PATCH | Refills: 3 | Status: ON HOLD | OUTPATIENT
Start: 2020-08-25 | End: 2021-02-08

## 2020-08-25 RX ORDER — GUAIFENESIN/DEXTROMETHORPHAN 100-10MG/5
5 SYRUP ORAL 3 TIMES DAILY PRN
Qty: 120 ML | Refills: 0 | Status: SHIPPED | OUTPATIENT
Start: 2020-08-25 | End: 2020-09-04

## 2020-08-25 RX ORDER — PANTOPRAZOLE SODIUM 20 MG/1
20 TABLET, DELAYED RELEASE ORAL DAILY
Qty: 30 TABLET | Refills: 3 | Status: SHIPPED | OUTPATIENT
Start: 2020-08-25

## 2020-08-25 RX ORDER — FUROSEMIDE 40 MG/1
40 TABLET ORAL DAILY
Qty: 60 TABLET | Refills: 3 | Status: ON HOLD | OUTPATIENT
Start: 2020-08-25 | End: 2021-02-08 | Stop reason: DRUGHIGH

## 2020-08-25 RX ORDER — ALBUTEROL SULFATE 2.5 MG/3ML
2.5 SOLUTION RESPIRATORY (INHALATION) EVERY 6 HOURS PRN
Qty: 120 EACH | Refills: 0 | Status: SHIPPED | OUTPATIENT
Start: 2020-08-25

## 2020-08-25 RX ORDER — PREDNISONE 10 MG/1
TABLET ORAL
Qty: 32 TABLET | Refills: 0 | Status: ON HOLD | OUTPATIENT
Start: 2020-08-25 | End: 2021-02-08 | Stop reason: ALTCHOICE

## 2020-08-25 RX ORDER — AMLODIPINE BESYLATE 5 MG/1
5 TABLET ORAL DAILY
Qty: 30 TABLET | Refills: 3 | Status: ON HOLD | OUTPATIENT
Start: 2020-08-26 | End: 2021-02-08 | Stop reason: ALTCHOICE

## 2020-08-25 RX ORDER — PREDNISONE 20 MG/1
40 TABLET ORAL DAILY
Status: DISCONTINUED | OUTPATIENT
Start: 2020-08-25 | End: 2020-08-25 | Stop reason: HOSPADM

## 2020-08-25 RX ORDER — BENZONATATE 100 MG/1
100 CAPSULE ORAL 3 TIMES DAILY PRN
Qty: 30 CAPSULE | Refills: 0 | Status: SHIPPED | OUTPATIENT
Start: 2020-08-25 | End: 2020-09-04

## 2020-08-25 RX ORDER — AZITHROMYCIN 500 MG/1
500 TABLET, FILM COATED ORAL DAILY
Qty: 5 TABLET | Refills: 0 | Status: SHIPPED | OUTPATIENT
Start: 2020-08-25 | End: 2020-08-30

## 2020-08-25 RX ADMIN — ASPIRIN 81 MG: 81 TABLET, COATED ORAL at 08:29

## 2020-08-25 RX ADMIN — IPRATROPIUM BROMIDE AND ALBUTEROL SULFATE 1 AMPULE: .5; 3 SOLUTION RESPIRATORY (INHALATION) at 14:21

## 2020-08-25 RX ADMIN — IPRATROPIUM BROMIDE AND ALBUTEROL SULFATE 1 AMPULE: .5; 3 SOLUTION RESPIRATORY (INHALATION) at 10:00

## 2020-08-25 RX ADMIN — BUDESONIDE AND FORMOTEROL FUMARATE DIHYDRATE 2 PUFF: 160; 4.5 AEROSOL RESPIRATORY (INHALATION) at 06:11

## 2020-08-25 RX ADMIN — METHYLPREDNISOLONE SODIUM SUCCINATE 30 MG: 40 INJECTION, POWDER, FOR SOLUTION INTRAMUSCULAR; INTRAVENOUS at 03:23

## 2020-08-25 RX ADMIN — PANTOPRAZOLE SODIUM 20 MG: 20 TABLET, DELAYED RELEASE ORAL at 05:52

## 2020-08-25 RX ADMIN — ATORVASTATIN CALCIUM 20 MG: 20 TABLET, FILM COATED ORAL at 08:29

## 2020-08-25 RX ADMIN — ENOXAPARIN SODIUM 40 MG: 40 INJECTION SUBCUTANEOUS at 08:29

## 2020-08-25 RX ADMIN — AMLODIPINE BESYLATE 5 MG: 5 TABLET ORAL at 08:29

## 2020-08-25 RX ADMIN — LISINOPRIL 20 MG: 20 TABLET ORAL at 08:29

## 2020-08-25 RX ADMIN — PREDNISONE 40 MG: 20 TABLET ORAL at 12:42

## 2020-08-25 RX ADMIN — DOCUSATE SODIUM 100 MG: 100 CAPSULE, LIQUID FILLED ORAL at 08:29

## 2020-08-25 RX ADMIN — METHYLPREDNISOLONE SODIUM SUCCINATE 30 MG: 40 INJECTION, POWDER, FOR SOLUTION INTRAMUSCULAR; INTRAVENOUS at 08:30

## 2020-08-25 RX ADMIN — BENZONATATE 100 MG: 100 CAPSULE ORAL at 03:23

## 2020-08-25 RX ADMIN — INSULIN LISPRO 6 UNITS: 100 INJECTION, SOLUTION INTRAVENOUS; SUBCUTANEOUS at 12:41

## 2020-08-25 RX ADMIN — IPRATROPIUM BROMIDE AND ALBUTEROL SULFATE 1 AMPULE: .5; 3 SOLUTION RESPIRATORY (INHALATION) at 06:08

## 2020-08-25 RX ADMIN — FUROSEMIDE 40 MG: 10 INJECTION, SOLUTION INTRAMUSCULAR; INTRAVENOUS at 08:29

## 2020-08-25 RX ADMIN — PREGABALIN 100 MG: 100 CAPSULE ORAL at 08:29

## 2020-08-25 RX ADMIN — HYDROCODONE BITARTRATE AND ACETAMINOPHEN 1 TABLET: 5; 325 TABLET ORAL at 03:23

## 2020-08-25 RX ADMIN — SODIUM CHLORIDE, PRESERVATIVE FREE 10 ML: 5 INJECTION INTRAVENOUS at 08:30

## 2020-08-25 ASSESSMENT — PAIN SCALES - GENERAL
PAINLEVEL_OUTOF10: 9
PAINLEVEL_OUTOF10: 9
PAINLEVEL_OUTOF10: 6
PAINLEVEL_OUTOF10: 8

## 2020-08-25 ASSESSMENT — PAIN DESCRIPTION - LOCATION
LOCATION: GENERALIZED
LOCATION: ABDOMEN;GENERALIZED
LOCATION: ABDOMEN

## 2020-08-25 ASSESSMENT — ENCOUNTER SYMPTOMS
ABDOMINAL PAIN: 0
VOMITING: 0
CONSTIPATION: 0
CHEST TIGHTNESS: 0
DIARRHEA: 0
BLOOD IN STOOL: 0
COUGH: 0
NAUSEA: 0
RHINORRHEA: 0
WHEEZING: 0
SHORTNESS OF BREATH: 0

## 2020-08-25 ASSESSMENT — PAIN DESCRIPTION - PAIN TYPE
TYPE: CHRONIC PAIN
TYPE: CHRONIC PAIN

## 2020-08-25 NOTE — PROGRESS NOTES
Physical Therapy  Facility/Department: [facility] PROGRESSIVE CARE  Daily Treatment Note  NAME: Cuca Crockett  : 1945  MRN: [id number]    Date of Service: 2020    Discharge Recommendations:  Subacute/Skilled Nursing Facility        Assessment   Body structures, Functions, Activity limitations: Decreased functional mobility ; Decreased balance;Decreased strength;Decreased endurance; Increased pain;Decreased safe awareness  Assessment: pt progressing toward goals  Activity Tolerance  Activity Tolerance: Patient limited by fatigue;Patient limited by endurance     Patient Diagnosis(es): The primary encounter diagnosis was Pneumonia due to organism. A diagnosis of Chronic obstructive pulmonary disease with acute exacerbation ([facility] was also pertinent to this visit. has a past medical history of [facility] (congestive heart failure) [facility] COPD (chronic obstructive pulmonary disease) ([facility] Diabetes mellitus ([facility] MRSA (methicillin resistant staph aureus) culture positive, and Tobacco abuse.   has a past surgical history that includes joint replacement (Bilateral); Hysterectomy; and Appendectomy.     Restrictions  Restrictions/Precautions  Restrictions/Precautions: General Precautions, Fall Risk  Required Braces or Orthoses?: No  Position Activity Restriction  Other position/activity restrictions: 2L O2, up as luis daniel, contact iso, elevate heels, telemetry  Subjective   General  Chart Reviewed: Yes  Subjective  Subjective: pt reports she had just walked in her room with OT so she is agreeable to do chairside ex only     Pain Screening  Patient Currently in Pain: Yes  Pain Assessment  Pain Assessment: 0-10  Pain Level: 6  Pain Location: Abdomen  Vital Signs  Patient Currently in Pain: Yes       Orientation     Cognition      Objective         Ambulation  Ambulation?: No     Balance  Posture: Fair  Sitting - Static: Good  Sitting - Dynamic: Good  Standing - Static: Fair  Standing - Dynamic: Fair;-  Exercises  Comments: Seated tk LE AROM x 10 reps, UE medium resistance tband ex x 20 reps with several rest breaks required. VCs for pursed lip breathing with fair carryover. G-Code     OutComes Score                                                     AM-PAC Score  AM-PAC Inpatient Mobility Raw Score : 15 (08/25/20 1437)  AM-PAC Inpatient T-Scale Score : 39.45 (08/25/20 1437)  Mobility Inpatient CMS 0-100% Score: 57.7 (08/25/20 1437)  Mobility Inpatient CMS G-Code Modifier : CK (08/25/20 1437)          Goals  Short term goals  Time Frame for Short term goals: 12 visits  Short term goal 1: Pt will perform bed mobility indep. Short term goal 2: Pt will perform all functional transfer indep. Short term goal 3: Pt will ambulate 100ft with RW and CGA  Short term goal 4: Pt will tolerate 25 mins of PT including exercises / balance / and gait training to improve activity tolerance and functional mobility. Patient Goals   Patient goals : To get stronger    Plan    Plan  Times per week: 1-2x day / 5-6 days per week  Specific instructions for Next Treatment: initiate exercises/HEP  Current Treatment Recommendations: Strengthening, Functional Mobility Training, Home Exercise Program, Transfer Training, Gait Training, Safety Education & Training, Balance Training, Endurance Training, Positioning  Safety Devices  Type of devices:  All fall risk precautions in place, Call light within reach, Chair alarm in place, Patient at risk for falls, Gait belt, Left in chair, Nurse notified     Therapy Time   Individual Concurrent Group Co-treatment   Time In  1139         Time Out  1204         Minutes  25                 [address], [place]

## 2020-08-25 NOTE — PROGRESS NOTES
Pulmonary Critical Care Progress Note  Joanne Sanchez MD     Patient seen for the follow up of  Community acquired pneumonia of right upper lobe of lung ([address]     Subjective:  She is up in chair, in no distress. No significant events noted overnight. Her shortness of breath is better than yesterday. She has dry cough. She denies any chest pain. She is anxious to go home. Examination:  Vitals: BP (!) 142/95   Pulse 96   Temp 97.5 °F (36.4 °C) (Oral)   Resp 18   Ht 5' 6\" (1.676 m)   Wt 143 lb 1.6 oz (64.9 kg)   SpO2 98%   BMI 23.10 kg/m²   General appearance: alert and cooperative with exam, up in chair  Neck: No JVD  Lungs: Bibasilar crackles  Heart: regular rate and rhythm, S1, S2 normal, no gallop  Abdomen: Soft, non tender, + BS  Extremities: no cyanosis or clubbing.  No significant edema    LABs:  CBC:   Recent Labs     08/23/20  0827 08/25/20  0541   WBC 13.1* 13.3*   HGB 11.3* 11.2*   HCT 37.0 36.9    398     BMP:   Recent Labs     08/23/20  0827 08/25/20  0541    136   K 4.3 4.3   CO2 29 26   BUN 14 22   CREATININE 0.84 0.95*   LABGLOM >60 58*   GLUCOSE 114* 169*       Radiology:  CXR  8/25/2020      CT scan  8/22/2020      Impression:  · Chronic hypoxic respiratory failure, noncompliant on home oxygen  · Acute Exacerbation of COPD/emphysema  · Probable interstitial lung disease with progressive fibrosis  · Atypical pneumonia, SARS-COV-2-x1  · Lymphadenopathy, paratracheal, most likely reactive  · Active smoking  · DM, CAD, CHF    Recommendations:  · Oxygen by nasal cannula at 3 L per nasal cannula  · Incentive spirometer every hour awake  · DuoNeb every 4 hours and as needed   · Symbicort 160  · Discontinue IV Solu-Medrol 30 mg every 8 hours  · Start prednisone taper  · Continue IV Rocephin and Zithromax, switch to oral at discharge   · check sputum culture   · CVD work-up in progress  · Smoking cessation advised  · Follow-up CT chest as an outpatient  · Okay to discharge from pulmonary standpoint with outpatient follow-up with Ronald Crews CNP in 1 week.   · DVT prophylaxis with Lovenox  · Will follow with you      Electronically signed by     Abdelrahman Garcia MD on 8/25/2020 at 12:21 PM  Pulmonary Critical Care and Sleep Medicine,  [facility]  Cell: [phone]  Office: [phone]

## 2020-08-25 NOTE — PROGRESS NOTES
[address]      Daily Progress Note     Admit Date: 8/20/2020  Bed/Room No.  1005/1005-01  Admitting Physician : Chana Garcia MD  Code Status :[facility] Day:  LOS: 5 days   Chief Complaint:     Chief Complaint   Patient presents with    Shortness of Breath    Cough    Generalized Body Aches     Principal Problem:    Community acquired pneumonia of right upper lobe of lung ([address]  Active Problems:    COPD (chronic obstructive pulmonary disease) ([facility]    Type 2 diabetes mellitus with hyperglycemia, with long-term current use of insulin (HCC)    Acute on chronic diastolic heart failure (HCC)    Tobacco abuse    Elevated troponin    Prolonged Q-T interval on ECG    Pneumonia due to organism    Hypoxia    Chronic respiratory failure (HCC)    Mild malnutrition (HCC)    Sepsis ([facility]  Resolved Problems:    * No resolved hospital problems. *    Subjective : Interval History/Significant events :  08/25/20    Patient reports improvement in her breathing. She denies any wheezing, productive sputum, dyspnea at rest.  Patient stable at 2 L oxygen nasal cannula. She denies any chest pain, nausea, vomiting. Vitals - Stable afebrile  Labs -leukocytosis improved 13.3, normal kidney function, electrolytes. Nursing notes , Consults notes reviewed. Overnight events and updates discussed with Nursing staff . Background History:         My Vale is 77 y.o. female  Who was admitted to the hospital on 8/20/2020 for treatment of Community acquired pneumonia of right upper lobe of lung ([facility] Patient came to emergency room with dyspnea, cough with productive sputum. Patient was found to have pneumonia and acute on chronic diastolic heart failure. COVID-19 was suspected and was ruled out with negative SARS-CoV-2 PCR. Patient was hypoxic on presentation. He has history of chronic respiratory failure. Patient was treated with IV steroids, IV diuretics, IV antibiotics.   PMH:  Past Medical History:   Diagnosis Date    CHF (congestive heart failure) ([facility]     COPD (chronic obstructive pulmonary disease) ([facility]     Diabetes mellitus ([facility])     MRSA (methicillin resistant staph aureus) culture positive 8/28/2014    sputum    Tobacco abuse 10/15/2019      Allergies: Allergies   Allergen Reactions    Tiotropium Anaphylaxis and Swelling    Spiriva Handihaler [Tiotropium Bromide Monohydrate] Swelling      Medications :  methylPREDNISolone, 30 mg, Intravenous, Q8H  amLODIPine, 5 mg, Oral, Daily  aspirin, 81 mg, Oral, Daily  atorvastatin, 20 mg, Oral, Daily  docusate sodium, 100 mg, Oral, BID  budesonide-formoterol, 2 puff, Inhalation, BID  lisinopril, 20 mg, Oral, Daily  [Held by provider] metFORMIN, 500 mg, Oral, Daily with breakfast  sodium chloride flush, 10 mL, Intravenous, 2 times per day  enoxaparin, 40 mg, Subcutaneous, Daily  ipratropium-albuterol, 1 ampule, Inhalation, Q4H WA  azithromycin, 500 mg, Intravenous, Q24H    And  cefTRIAXone (ROCEPHIN) IV, 1 g, Intravenous, Q24H  insulin lispro, 0-12 Units, Subcutaneous, TID WC  insulin lispro, 0-6 Units, Subcutaneous, Nightly  pregabalin, 100 mg, Oral, BID  pantoprazole, 20 mg, Oral, Daily  furosemide, 40 mg, Intravenous, Daily  traZODone, 100 mg, Oral, Nightly        Review of Systems   Review of Systems   Constitutional: Negative for appetite change, fatigue, fever and unexpected weight change. HENT: Negative for congestion, rhinorrhea and sneezing. Eyes: Negative for visual disturbance. Respiratory: Negative for cough, chest tightness, shortness of breath and wheezing. Cardiovascular: Negative for chest pain and palpitations. Gastrointestinal: Negative for abdominal pain, blood in stool, constipation, diarrhea, nausea and vomiting. Genitourinary: Negative for dysuria, enuresis, frequency and hematuria. Musculoskeletal: Negative for arthralgias and myalgias. Skin: Negative for rash.    Neurological: Negative for dizziness, weakness, light-headedness and headaches. Hematological: Does not bruise/bleed easily. Psychiatric/Behavioral: Negative for dysphoric mood and sleep disturbance. Objective :      Current Vitals : Temp: 97.5 °F (36.4 °C),  Pulse: 96, Resp: 18, BP: (!) 142/95, SpO2: 98 %  Last 24 Hrs Vitals   Patient Vitals for the past 24 hrs:   BP Temp Temp src Pulse Resp SpO2 Weight   08/25/20 0713 (!) 142/95 97.5 °F (36.4 °C) Oral 96 18 98 % --   08/25/20 0558 -- -- -- -- -- -- 143 lb 1.6 oz (64.9 kg)   08/25/20 0404 125/80 97.7 °F (36.5 °C) Oral 95 20 93 % --   08/24/20 2357 115/81 97.9 °F (36.6 °C) Oral 70 18 100 % --   08/24/20 1944 102/79 97.5 °F (36.4 °C) Oral (!) 44 18 100 % --   08/24/20 1531 132/87 97.9 °F (36.6 °C) Oral 91 16 98 % --   08/24/20 1117 96/67 97.5 °F (36.4 °C) Oral 94 18 93 % --     Intake / output   No intake/output data recorded. Physical Exam:  Physical Exam  Vitals signs and nursing note reviewed. Constitutional:       General: She is not in acute distress. Appearance: She is not diaphoretic. Interventions: Nasal cannula in place. HENT:      Head: Normocephalic and atraumatic. Nose:      Right Sinus: No maxillary sinus tenderness or frontal sinus tenderness. Left Sinus: No maxillary sinus tenderness or frontal sinus tenderness. Mouth/Throat:      Pharynx: No oropharyngeal exudate. Eyes:      General: No scleral icterus. Conjunctiva/sclera: Conjunctivae normal.      Pupils: Pupils are equal, round, and reactive to light. Neck:      Musculoskeletal: Full passive range of motion without pain and neck supple. Thyroid: No thyromegaly. Vascular: No JVD. Cardiovascular:      Rate and Rhythm: Normal rate and regular rhythm. Pulses:           Dorsalis pedis pulses are 2+ on the right side and 2+ on the left side. Heart sounds: Normal heart sounds. No murmur.    Pulmonary:      Effort: Pulmonary effort is normal.      Breath sounds: Normal breath sounds. No wheezing or rales. Abdominal:      Palpations: Abdomen is soft. There is no mass. Tenderness: There is no abdominal tenderness. Lymphadenopathy:      Head:      Right side of head: No submandibular adenopathy. Left side of head: No submandibular adenopathy. Cervical: No cervical adenopathy. Skin:     General: Skin is warm. Neurological:      Mental Status: She is alert and oriented to person, place, and time. Motor: No tremor. Psychiatric:         Behavior: Behavior is cooperative. Laboratory findings:    Recent Labs     08/22/20  0906 08/23/20  0827 08/25/20  0541   WBC 19.0* 13.1* 13.3*   HGB 10.6* 11.3* 11.2*   HCT 34.3* 37.0 36.9    380 398     Recent Labs     08/22/20  0906 08/23/20  0827 08/25/20  0541    140 136   K 3.9 4.3 4.3    98 98   CO2 26 29 26   GLUCOSE 110* 114* 169*   BUN 14 14 22   CREATININE 0.82 0.84 0.95*   CALCIUM 8.7 9.1 8.7     Recent Labs     08/23/20  0827   *          Specific Gravity, UA   Date Value Ref Range Status   10/16/2019 1.010 1.005 - 1.030 Final     Protein, UA   Date Value Ref Range Status   10/16/2019 1+ (A) NEGATIVE Final     RBC, UA   Date Value Ref Range Status   10/16/2019 0 TO 2 0 - 2 /HPF Final     Bacteria, UA   Date Value Ref Range Status   10/16/2019 NOT REPORTED None Final     Nitrite, Urine   Date Value Ref Range Status   10/16/2019 NEGATIVE NEGATIVE Final     WBC, UA   Date Value Ref Range Status   10/16/2019 None 0 - 5 /HPF Final     Leukocyte Esterase, Urine   Date Value Ref Range Status   10/16/2019 NEGATIVE NEGATIVE Final       Imaging / Clinical Data :-   Xr Chest (2 Vw)    Result Date: 8/21/2020  No change in scattered pulmonary opacities of concern for developing inflammatory or infectious process. No effusion, or extrapleural air. Osseous structures are age-appropriate.      Ct Chest Wo Contrast    Result Date: 8/22/2020  Patchy ground-glass opacity involving right upper

## 2020-08-25 NOTE — CARE COORDINATION
SW informed by Cayetano Mckeon RN CM the pt has changed her mind and would like to go home at discharge instead of to the [address] [place] [address]. GAYATHRI called Lynn Kim at the [address] [place] [address] to inform and cancel the insurance pre-cert.

## 2020-08-25 NOTE — DISCHARGE SUMMARY
[address]      Discharge Summary     Patient ID: Rosemarie Yarbrough  :     MRN: [id number]     ACCOUNT:  [de-identified]   Patient Location : [address] [place] [address]  Patient's PCP: Fransisca Allison MD  Admit Date: 2020   Discharge Date: 2020     Length of Stay: 5  Code Status:  Full Code  Admitting Physician: Austin Brooks MD  Discharge Physician: Caitie Barry MD     Active Discharge Diagnosis :     Primary Problem  Community acquired pneumonia of right upper lobe of lung [facility])      [place] Problems    Diagnosis Date Noted    Mild malnutrition ([facility] [E44.1] 2020    Community acquired pneumonia of right upper lobe of lung ([facility] [J18.1] 2020    Elevated troponin [R79.89] 2020    Prolonged Q-T interval on ECG [R94.31] 2020    Chronic respiratory failure ([facility] [J96.10] 2020    Pneumonia due to organism [J18.9]     Hypoxia [R09.02]     Acute on chronic diastolic heart failure ([facility] [I50.33] 10/15/2019    Tobacco abuse [Z72.0] 10/15/2019    Type 2 diabetes mellitus with hyperglycemia, with long-term current use of insulin ([facility] [E11.65, Z79.4] 2014    [facility] [J44.9] 2014       Admission Condition:  fair     Discharged Condition: stable    Hospital Stay:     Hospital Course:  Rosemarie Yarbrough is a 77 y.o. female who was admitted for the management of   Community acquired pneumonia of right upper lobe of lung ([facility] , presented to ER with Shortness of Breath; Cough; and Generalized Body Aches  Patient came to emergency room with dyspnea, cough with productive sputum. Patient was found to have pneumonia and acute on chronic diastolic heart failure. COVID-19 was suspected and was ruled out with negative SARS-CoV-2 PCR. Patient was hypoxic on presentation. He has history of chronic respiratory failure.   Patient was treated with IV steroids, IV diuretics, IV antibiotics. Significant therapeutic interventions:   1. Sepsis secondary to atypical pneumonia -improved with antibiotics. 2. COPD exacerbation -prednisone taper  3. Acute on chronic diastolic heart failure -treated with IV diuretics stable on oral diuretics, fluid restriction, salt restriction. 4. Essential hypertension-well-controlled  5. Type 2 diabetes mellitus -controlled blood sugars. 6. Suspected COVID-19-ruled out with NAAT  and RT-PCR SARS-CoV-2.        Significant Diagnostic Studies:   Labs / Micro:/Radiology  Recent Labs     08/25/20  0541 08/23/20  0827 08/22/20  0906   WBC 13.3* 13.1* 19.0*   HGB 11.2* 11.3* 10.6*   HCT 36.9 37.0 34.3*   MCV 82.9 81.0* 81.5*    380 367     Labs Renal Latest Ref Rng & Units 8/25/2020 8/23/2020 8/22/2020 8/21/2020 8/20/2020   BUN 8 - 23 mg/dL 22 14 14 17 21   Cr 0.50 - 0.90 mg/dL 0.95(H) 0.84 0.82 0.96(H) 1.13(H)   K 3.7 - 5.3 mmol/L 4.3 4.3 3.9 4.4 4.4   Na 135 - 144 mmol/L 136 140 140 137 134(L)     Lab Results   Component Value Date    ALT 20 08/28/2014    AST 34 (H) 08/28/2014    ALKPHOS 87 08/28/2014    BILITOT 0.20 (L) 08/28/2014     Lab Results   Component Value Date    TSH 0.47 10/15/2019     Lab Results   Component Value Date    HEPAIGM NONREACTIVE 08/29/2014    HEPBIGM NONREACTIVE 08/29/2014    HEPCAB REACTIVE (A) 08/29/2014     Lab Results   Component Value Date    COLORU YELLOW 10/16/2019    NITRU NEGATIVE 10/16/2019    GLUCOSEU NEGATIVE 10/16/2019    GLUCOSEU NEGATIVE 10/24/2011    KETUA NEGATIVE 10/16/2019    UROBILINOGEN Normal 10/16/2019    BILIRUBINUR NEGATIVE 10/16/2019    BILIRUBINUR NEGATIVE 10/24/2011     Lab Results   Component Value Date    LABA1C 5.8 08/28/2014     Lab Results   Component Value Date     08/28/2014     No results found for: INR, PROTIME    Xr Chest (2 Vw)    Result Date: 8/21/2020  No change in scattered pulmonary opacities of concern for developing inflammatory or infectious process.   No effusion, or extrapleural air. Osseous structures are age-appropriate. Ct Chest Wo Contrast    Result Date: 8/22/2020  Patchy ground-glass opacity involving right upper lobe near the apex and minimal patchy ground-glass opacities left lower lobe may suggest underlying atypical infection or interstitial edema. Clinic correlation recommended Diffuse interstitial thickening identified throughout both lungs with small cystic lucencies may represent underlying longstanding chronic interstitial process or emphysematous change no not well characterized on this exam at this time     Xr Chest Portable    Result Date: 8/25/2020  Interstitial changes which are slightly worse in the left lower lobe, but improved in the right upper lobe. Findings may be secondary to a mixed response of atypical infection and/or edema. There may be an element of chronic interstitial lung disease, although, overall the interstitial changes are more pronounced from 8 months prior. Xr Chest Portable    Result Date: 8/20/2020  Right upper lobe opacities appear more prominent in the interval.  Developing inflammatory process or infection should be considered in the appropriate clinical setting. Chronic basilar interstitial opacities. Consultations:    Consults:     Final Specialist Recommendations/Findings:   IP CONSULT TO HOSPITALIST  IP CONSULT TO PULMONOLOGY      The patient was seen and examined on day of discharge and this discharge summary is in conjunction with any daily progress note from day of discharge.     Discharge plan:     Disposition: Home with home Care     Physician Follow Up:     Ashlyn Mc MD  [address] [address]  [phone]    Schedule an appointment as soon as possible for a visit in 1 week      [facility] - SAMANTHA  [address]  [facility]  [phone]    Schedule an appointment as soon as possible for a visit in 1 week         Requiring Further Evaluation/Follow Up POST HOSPITALIZATION/Incidental Findings: follow up with PCP     Diet: cardiac diet    Activity: As tolerated. Instructions to Patient: medications as advised. Discharge Medications:      Medication List      START taking these medications    amLODIPine 5 MG tablet  Commonly known as:  NORVASC  Take 1 tablet by mouth daily  Start taking on:  August 26, 2020     azithromycin 500 MG tablet  Commonly known as:  ZITHROMAX  Take 1 tablet by mouth daily for 5 days     benzonatate 100 MG capsule  Commonly known as:  TESSALON  Take 1 capsule by mouth 3 times daily as needed for Cough     guaiFENesin-dextromethorphan 100-10 MG/5ML syrup  Commonly known as:  ROBITUSSIN DM  Take 5 mLs by mouth 3 times daily as needed for Cough     predniSONE 10 MG tablet  Commonly known as:  DELTASONE  4 tabs for 3 days, then 3 tabs for 3 days , then 2 tabs for 3 days , then 1 tab for 3 days , then 1/2 tab for 3 days then stop        CHANGE how you take these medications    albuterol (2.5 MG/3ML) 0.083% nebulizer solution  Commonly known as:  PROVENTIL  Take 3 mLs by nebulization every 6 hours as needed for Wheezing or Shortness of Breath  What changed:    · when to take this  · Another medication with the same name was removed. Continue taking this medication, and follow the directions you see here.      furosemide 40 MG tablet  Commonly known as:  Lasix  Take 1 tablet by mouth daily  What changed:    · medication strength  · how much to take     nicotine 21 MG/24HR  Commonly known as:  NICODERM CQ  Place 1 patch onto the skin daily as needed (if patient smokes/requests nicotine replacent therapy)  What changed:    · reasons to take this  · how long to infuse this        CONTINUE taking these medications    ammonium lactate 12 % lotion  Commonly known as:  LAC-HYDRIN     aspirin 81 MG tablet     atorvastatin 20 MG tablet  Commonly known as:  LIPITOR     calcium-vitamin D 500-200 MG-UNIT per tablet  Commonly known as:  [address] SENIOR PO     fluticasone-salmeterol 250-50 MCG/DOSE Aepb  Commonly known as:  ADVAIR     lisinopril 20 MG tablet  Commonly known as:  PRINIVIL;ZESTRIL     loratadine 10 MG tablet  Commonly known as:  CLARITIN     metFORMIN 500 MG tablet  Commonly known as:  GLUCOPHAGE     pantoprazole 20 MG tablet  Commonly known as:  PROTONIX  Take 1 tablet by mouth daily     pregabalin 100 MG capsule  Commonly known as:  LYRICA     sertraline 50 MG tablet  Commonly known as:  ZOLOFT     tiZANidine 4 MG tablet  Commonly known as:  ZANAFLEX     traZODone 100 MG tablet  Commonly known as:  DESYREL        STOP taking these medications    diclofenac sodium 1 % Gel  Commonly known as:  VOLTAREN     docusate sodium 100 MG capsule  Commonly known as:  COLACE     hydrALAZINE 10 MG tablet  Commonly known as:  APRESOLINE     ibuprofen 800 MG tablet  Commonly known as:  ADVIL;MOTRIN           Where to Get Your Medications      These medications were sent to [address] [place] [address]  [address] [address]    Phone:  [phone]   · albuterol (2.5 MG/3ML) 0.083% nebulizer solution  · amLODIPine 5 MG tablet  · azithromycin 500 MG tablet  · benzonatate 100 MG capsule  · furosemide 40 MG tablet  · guaiFENesin-dextromethorphan 100-10 MG/5ML syrup  · nicotine 21 MG/24HR  · pantoprazole 20 MG tablet  · predniSONE 10 MG tablet         Time Spent on discharge is  35 mins in patient examination, evaluation, counseling as well as medication reconciliation, prescriptions for required medications, discharge plan and follow up. Electronically signed by   Jeffry Yo MD  8/25/2020        Thank you Dr. Jane Muir MD for the opportunity to be involved in this patient's care.

## 2020-08-25 NOTE — PROGRESS NOTES
.All patient belongings collected. IV and telemetry removed. Discharge paperwork given and explained to patient. All questions answered. Patient awaiting ride from family and being discharged home with home care. Patients family is bringing portable oxygen tank for patient to have on ride home.

## 2020-08-25 NOTE — PROGRESS NOTES
Comprehensive Nutrition Assessment    Type and Reason for Visit:  Reassess    Nutrition Recommendations/Plan: Continue General diet. Consider carb control diet if glucose labs continue to be elevated. Nutrition Assessment:  Patient is eating well at meals and has a good appetite. Elevated glucose labs are noted. Patient may benefit from carb controlled diet. Monitor p.o intakes and labs. Malnutrition Assessment:  Malnutrition Status:  Mild malnutrition    Context:  Chronic Illness     Findings of the 6 clinical characteristics of malnutrition:  Energy Intake:  Mild decrease in energy intake (Comment)  Weight Loss:  1 - Mild weight loss (specify amount and time period)     Body Fat Loss:  Unable to assess     Muscle Mass Loss:  Unable to assess    Fluid Accumulation:  No significant fluid accumulation Extremities   Strength:  Not Performed    Estimated Daily Nutrient Needs:  Energy (kcal):  3930-0692 kcal based on 20-23 kcal/kg; Weight Used for Energy Requirements:  Current     Protein (g):  79-85 gm based on 1.2-1.3 gm/kg; Weight Used for Protein Requirements:  Current            Nutrition Related Findings:  No edema. Wounds:  None       Current Nutrition Therapies:    DIET GENERAL;     Anthropometric Measures:  · Height: 5' 6\" (167.6 cm)  · Current Body Weight: 143 lb (64.9 kg)   · Admission Body Weight: 145 lb (65.8 kg)    · Usual Body Weight: 158 lb (71.7 kg)     · Ideal Body Weight: 130 lbs; % Ideal Body Weight 111.5 %   · BMI: 23.1  · BMI Categories: Normal Weight (BMI 22.0 to 24.9) age over 72       Nutrition Diagnosis:   · Mild malnutrition related to inadequate protein-energy intake as evidenced by weight loss, intake 51-75%, intake 26-50%      Nutrition Interventions:   Food and/or Nutrient Delivery:  Continue Current Diet  Nutrition Education/Counseling:  Education not indicated   Coordination of Nutrition Care:  Continued Inpatient Monitoring    Goals:  PO intakes are greater than 50% at meals       Nutrition Monitoring and Evaluation:   Food/Nutrient Intake Outcomes:  Food and Nutrient Intake, Supplement Intake  Physical Signs/Symptoms Outcomes:  Biochemical Data, Skin, Weight, Fluid Status or Edema     Discharge Planning:    Continue current diet     Some areas of assessment maybe incomplete due to COVID-19 precautions.     Chano NGUYENN, RDN, LDN  Lead Clinical Dietitian  RD Office Phone [phone]

## 2020-08-25 NOTE — PROGRESS NOTES
Occupational Therapy  Facility/Department: [facility] PROGRESSIVE CARE  Daily Treatment Note  NAME: Cuca Crockett  : 1945  MRN: [id number]    Date of Service: 2020    Discharge Recommendations:  Subacute/Skilled Nursing Facility  OT Equipment Recommendations  Equipment Needed: Yes  Mobility Devices: ADL Assistive Devices  ADL Assistive Devices: Long-handled Shoe Horn;Long-handled Sponge    Assessment   Performance deficits / Impairments: Decreased functional mobility ; Decreased ADL status; Decreased strength;Decreased safe awareness;Decreased balance;Decreased endurance;Decreased posture  Assessment: Skilled OT is indicated to increase overall I and safety awareness with ADL and functional tasks to return home with assist as needed. Pt states she has a supportive grand son who can assist at DC. Prognosis: Good  OT Education: OT Role;Plan of Care;Energy Conservation;Transfer Training;IADL Safety; ADL Adaptive Strategies  REQUIRES OT FOLLOW UP: Yes  Activity Tolerance  Activity Tolerance: Patient Tolerated treatment well  Safety Devices  Safety Devices in place: Yes  Type of devices: Call light within reach;Gait belt;Patient at risk for falls; Left in chair;Nurse notified         Patient Diagnosis(es): The encounter diagnosis was Pneumonia due to organism. has a past medical history of CHF (congestive heart failure) ([facility] [address] COPD (chronic obstructive pulmonary disease) ([facility] [address] Diabetes mellitus ([facility] [address] MRSA (methicillin resistant staph aureus) culture positive, and Tobacco abuse.   has a past surgical history that includes joint replacement (Bilateral); Hysterectomy; and Appendectomy.     Restrictions  Restrictions/Precautions  Restrictions/Precautions: General Precautions, Fall Risk  Required Braces or Orthoses?: No  Position Activity Restriction  Other position/activity restrictions: 2L O2, up as luis daniel, contact iso, elevate heels, telemetry  Subjective   General  Chart Reviewed: Yes  Patient assessed for rehabilitation services?: Yes  Response to previous treatment: Patient with no complaints from previous session  Family / Caregiver Present: No      Orientation  Orientation  Overall Orientation Status: Within Functional Limits  Objective    ADL  Grooming: Setup;Stand by assistance(standing at sink)  UE Dressing: Setup;Minimal assistance  LE Dressing: Setup;Minimal assistance  Toileting: Minimal assistance        Balance  Sitting Balance: Supervision  Standing Balance: Contact guard assistance  Standing Balance  Time: standing tolerance ~5 minutes  Activity: standing at sink for grooming  Functional Mobility  Functional - Mobility Device: Rolling Walker  Activity: To/from bathroom; Other  Assist Level: Contact guard assistance  Functional Mobility Comments: Patient completed functional mobility to/from bathroom then around room x1 with verbal cues for environment scanning, RW safety(keeping RW close to body), posture, and awareness of line mgt to ncrease overall safety  Toilet Transfers  Toilet - Technique: Ambulating  Equipment Used: Standard toilet  Toilet Transfer: Contact guard assistance  Toilet Transfers Comments: Good use of grab bars  Bed mobility  Supine to Sit: Stand by assistance  Scooting: Stand by assistance  Comment: Increased time and use of bed rails  Transfers  Sit to stand: Contact guard assistance  Stand to sit: Contact guard assistance  Transfer Comments: Verbal cues for hand placements, controlled sit<>stand, and pacing self. Cognition  Overall Cognitive Status: Exceptions  Arousal/Alertness: Appropriate responses to stimuli  Following Commands:  Follows all commands without difficulty  Attention Span: Appears intact  Memory: Appears intact  Safety Judgement: Decreased awareness of need for assistance;Decreased awareness of need for safety  Problem Solving: Assistance required to generate solutions;Assistance required to implement solutions;Assistance required to correct errors made  Insights: Decreased awareness of deficits  Initiation: Requires cues for some  Sequencing: Requires cues for some      Plan   Plan  Times per week: 4-5x/week, 1-2x/day  Current Treatment Recommendations: Strengthening, Balance Training, Functional Mobility Training, Endurance Training, Safety Education & Training, Self-Care / ADL, Positioning, Equipment Evaluation, Education, & procurement, Patient/Caregiver Education & Training    AM-PAC Score        AM-PAC Inpatient Daily Activity Raw Score: 18 (08/25/20 1238)  AM-PAC Inpatient ADL T-Scale Score : 38.66 (08/25/20 1238)  ADL Inpatient CMS 0-100% Score: 46.65 (08/25/20 1238)  ADL Inpatient CMS G-Code Modifier : CK (08/25/20 1238)    Goals  Short term goals  Time Frame for Short term goals: by discharge, pt will  Short term goal 1: demo SBA with ADL transfers with good safety and DME/AD as approp  Short term goal 2: demo SBA with funcitonal mob in room with good safety/pacing for ADL completion  Short term goal 3: demo SBA with toileting routine  Short term goal 4: demo SBA with UB ADLs and CGA with LB ADLs with good safety/pacing, AE/DME as needed  Short term goal 5: demo and verb good understanding of fall prevention techs, EC/WS techs, breathing techs, and possible equip needs for home  Patient Goals   Patient goals : to get home when able       Therapy Time   Individual Concurrent Group Co-treatment   Time In 1118         Time Out 1134         Minutes 16           Upon writer exit, call light within reach, pt retired to chair. All lines intact and patient positioned comfortably. Chair alarm in place. All patient needs addressed prior to ending therapy session. Chart reviewed prior to treatment and patient is agreeable for therapy. RN reports patient is medically stable for therapy treatment this date.     VIOLETTA Galloway/VERA

## 2020-08-25 NOTE — CARE COORDINATION
Discharge planning    Met with patient and she no longer wants to go to [facility]. She wants to go home with her current home care agency [facility]. Call to [facility] at [phone] and spoke with  and they do provide HHA and they can provide RN and therapy if needed. Patient states her grandson Winnie Majano will be in and out of her home all day long and can stay with her as needed    \Notified that patient will likely be discharged home today. Didi Schwab Discharging to Facility/ Agency     · Name is [facility]   · Phone: [phone] or [phone]  · Fax: [phone]    TAMARA in epic and will need completed and signed. Patient is calling grandson to find out about her home 18. Patient states she has home 18 with concentrator and portability. Call to [facility] to see if they have patient listed. [facility] stated her order is  2-3 liters 24/7 back in 2015.

## 2020-08-26 ENCOUNTER — CARE COORDINATION (OUTPATIENT)
Dept: CASE MANAGEMENT | Age: 75
End: 2020-08-26

## 2020-08-26 NOTE — CARE COORDINATION
Attempted to reach pt for follow up call. Left message requesting call back.     Jerman Verdugo RN  Care Transition Coordinator  [phone]

## 2020-08-26 NOTE — ADT AUTH CERT
controlled, continue home medications and added small dose of Norvasc with better response         HPL: continue home medications         DM2: Continue to hold metformin for now, insulin sliding scale, hypoglycemia protocol         Diabetic polyneuropathy: Continue Lyrica         DVT prophylaxis    ---------------------------------       8/22/2020 08:39    POC Glucose: 109 (H)       8/22/2020 09:06    Sodium: 140    Potassium: 3.9    Chloride: 101    CO2: 26    BUN: 14    Creatinine: 0.82    Bun/Cre Ratio: 17    Anion Gap: 13    GFR Non-: >60    GFR African American: >60    Glucose: 110 (H)    Calcium: 8.7    GFR Comment: Pend    GFR Staging: NOT REPORTED    WBC: 19.0 (H)    RBC: 4.21    Hemoglobin Quant: 10.6 (L)    Hematocrit: 34.3 (L)    MCV: 81.5 (L)    MCH: 25.2    MCHC: 30.9    MPV: 9.9    RDW: 26.5 (H)    Platelet Count: 823    Platelet Estimate: NOT REPORTED    Absolute Mono #: 1.33 (H)    Eosinophils %: 0 (L)    Basophils Absolute: 0.19    Differential Type: NOT REPORTED    Seg Neutrophils: 73 (H)    Segs Absolute: 13.87 (H)    Lymphocytes: 18 (L)    Absolute Lymph #: 3.42    Monocytes: 7    Absolute Eos #: 0.00    Basophils: 1    Immature Granulocytes: 1 (H)    WBC Morphology: NOT REPORTED    RBC Morphology: NOT REPORTED    Morphology: 1+ TARGET CELLS       8/22/2020 09:06    Morphology: ANISOCYTOSIS PRESENT       8/22/2020 11:14    POC Glucose: 122 (H)       8/22/2020 16:37    POC Glucose: 174 (H)       8/22/2020 19:16    POC Glucose: 153 (H)       8/22/2020 21:51    Lactic Acid, Sepsis: 2.6 (H)    Lactic Acid, Sepsis, Whole Blood: NOT REPORTED       8/22/2020 23:56    Lactic Acid, Sepsis: 2.6 (H)    Lactic Acid, Sepsis, Whole Blood: NOT REPORTED    ---------------    CT chest;    Patchy ground-glass opacity involving right upper lobe near the apex and    minimal patchy ground-glass opacities left lower lobe may suggest underlying    atypical infection or interstitial edema.  Clinic correlation recommended         Diffuse interstitial thickening identified throughout both lungs with small    cystic lucencies may represent underlying longstanding chronic interstitial    process or emphysematous change no not well characterized on this exam at    this time    -------------------    Scheduled Medications    · amLODIPine 5 mg Oral Daily    · aspirin 81 mg Oral Daily    · atorvastatin 20 mg Oral Daily    · docusate sodium 100 mg Oral BID    · budesonide-formoterol 2 puff Inhalation BID    · lisinopril 20 mg Oral Daily    · [Held by provider] metFORMIN 500 mg Oral Daily with breakfast    · sodium chloride flush 10 mL Intravenous 2 times per day    · enoxaparin 40 mg Subcutaneous Daily    · ipratropium-albuterol 1 ampule Inhalation Q4H WA    · azithromycin 500 mg Intravenous Q24H      And    · cefTRIAXone (ROCEPHIN) IV 1 g Intravenous Q24H    · insulin lispro 0-12 Units Subcutaneous TID WC    · insulin lispro 0-6 Units Subcutaneous Nightly    · pregabalin 100 mg Oral BID    · pantoprazole 20 mg Oral Daily    · furosemide 40 mg Intravenous Daily    · predniSONE 20 mg Oral Daily    · traZODone 100 mg Oral Nightly       PRN    acetaminophen (TYLENOL) tablet 650 mg      Dose: 650 mg    Freq: EVERY 6 HOURS PRN Route: PO x1       benzonatate (TESSALON) capsule 100 mg      Dose: 100 mg    Freq: 3 TIMES DAILY PRN Route: PO x2       guaiFENesin-codeine (GUAIFENESIN AC) 100-10 MG/5ML liquid 5 mL      Dose: 5 mL    Freq: EVERY 4 HOURS PRN Route: PO x3       guaiFENesin-dextromethorphan (ROBITUSSIN DM) 100-10 MG/5ML syrup 5 mL      Dose: 5 mL x 1       HYDROcodone-acetaminophen (NORCO) 5-325 MG per tablet 1 tablet      Dose: 1 tablet    Freq: EVERY 6 HOURS PRN Route: PO x1       nicotine (NICODERM CQ) 21 MG/24HR 1 patch      Dose: 1 patch x1

## 2020-08-28 ENCOUNTER — CARE COORDINATION (OUTPATIENT)
Dept: CASE MANAGEMENT | Age: 75
End: 2020-08-28

## 2020-08-28 NOTE — CARE COORDINATION
[facility] Transitions Follow Up Call    2020    Patient: Lucas Homans  Patient : 1945   MRN: [id number]  Reason for Admission: Pneumonia  Discharge Date: 20 RARS: Readmission Risk Score: 34         Spoke with: Patient    Patient states she is doing better at this time. Still coughing mostly dry cough. (did not received Tessalon Perles - called Pharmacy and they are not covered by Insurance) Will call PCP for another medication. Called Dr. Kusum Fowler office and spoke to Jadine Sever who transferred me to Nurse voice mail [facility]). Left message that patient does need something for cough and to call patient with information. Continues to use oxygen at 3/L continuous for SOB. Taking Antibiotics and prednisone as directed. Patient states she is NOT smoking and using her Nicotine patch. Patient has all other medications is taking medications as directed and has no questions concerning medications at this time. Medications reviewed. No 1111F done PCP not [facility]. Patient knows when to contact physician or report to ED with worsening or severe symptoms, changes, or concerns. Patient states has F/U appointment 2020  Will Follow up at later time. Linden Riley LPN     [facility] / [address] [place] [address] Transitions Subsequent and Final Call    Subsequent and Final Calls  Do you have any ongoing symptoms?:  Yes  Patient-reported symptoms:  Shortness of Breath, Cough  Interventions for patient-reported symptoms:  Notified PCP/Physician  Have your medications changed?:  No  Do you have any questions related to your medications?:  Yes  Patient Reports:  Wants to know why no Tessalon Perles? Do you currently have any active services?:  No  Do you have any needs or concerns that I can assist you with?:  No  Identified Barriers:  None  Care Transitions Interventions  Other Interventions: Follow Up  No future appointments.     Linden Riley LPN

## 2020-08-31 ENCOUNTER — CARE COORDINATION (OUTPATIENT)
Dept: CASE MANAGEMENT | Age: 75
End: 2020-08-31

## 2020-08-31 NOTE — CARE COORDINATION
[facility] Transitions Follow Up Call- COVID risk follow up call     2020    Patient: Alla Gonzalez  Patient : 1945   MRN: [id number]  Reason for Admission: pneumonia, COPD   Discharge Date: 20 RARS: Readmission Risk Score: 29         Spoke with: Allison Beckford     Call to pt who states she is doing \"okay\". States using the oxygen. Declines to talk further  Kylah Watkins will follow up with her later     Care Transitions Subsequent and Final Call    Subsequent and Final Calls  Do you have any ongoing symptoms?:  No  Have your medications changed?:  No  Do you have any questions related to your medications?:  No  Do you currently have any active services?:  Yes  Are you currently active with any services?:  Home Health  Do you have any needs or concerns that I can assist you with?:  No  Identified Barriers:  Lack of Education  Care Transitions Interventions  Other Interventions: Follow Up  No future appointments.     Claude Blair RN

## 2020-09-08 ENCOUNTER — CARE COORDINATION (OUTPATIENT)
Dept: CASE MANAGEMENT | Age: 75
End: 2020-09-08

## 2020-09-08 NOTE — CARE COORDINATION
[facility] Transitions Follow Up Call- 1st attempt final COVID risk follow up call      2020    Patient: Ada Grijalva  Patient : 1945   MRN: [id number]  Reason for Admission: pneumonia, COPD   Discharge Date: 20 RARS: Readmission Risk Score: 29         Attempted to reach patient for final transitional call. VM left to return call to [phone]     Follow Up  No future appointments.     Ernst Babinski, RN

## 2020-09-10 ENCOUNTER — CARE COORDINATION (OUTPATIENT)
Dept: CASE MANAGEMENT | Age: 75
End: 2020-09-10

## 2020-09-10 NOTE — CARE COORDINATION
[facility] Transitions Follow Up Call- final COVID risk follow up call     9/10/2020    Patient: Isac Xiong  Patient : 1945   MRN: [id number]  Reason for Admission: pneumonia, COPD    Discharge Date: 20 RARS: Readmission Risk Score: 29         Spoke with: Mike Constantino     Call to pt who states she is doing \"okay\". States wearing oxygen   States no new tests or meds from follow up appts  Denies fever/ chills or cough    You Patient resolved from the Care Transitions episode on 9/10/2020  Discussed COVID-19 related testing which was available at this time. Test results were negative. Patient informed of results, if available? Yes    Patient/family has been provided the following resources and education related to COVID-19:                         Signs, symptoms and red flags related to COVID-19            CDC exposure and quarantine guidelines            [employer] exposure contact - [phone]            Contact for their local Department of Health                 Patient currently reports that the following symptoms have improved:  no new/worsening symptoms     No further outreach scheduled with this CTN/ACM. Episode of Care resolved. Patient has this CTN/ACM contact information if future needs arise. Care Transitions Subsequent and Final Call    Subsequent and Final Calls  Do you have any ongoing symptoms?:  No  Have your medications changed?:  No  Do you have any questions related to your medications?:  No  Do you currently have any active services?:  Yes  Are you currently active with any services?:  Home Health  Do you have any needs or concerns that I can assist you with?:  No  Identified Barriers:  Lack of Education  Care Transitions Interventions  Other Interventions: Follow Up  No future appointments.     Vani Miller RN

## 2020-09-19 PROBLEM — R77.8 ELEVATED TROPONIN: Status: RESOLVED | Noted: 2020-08-20 | Resolved: 2020-09-19

## 2020-11-03 PROBLEM — J18.9 COMMUNITY ACQUIRED PNEUMONIA OF RIGHT UPPER LOBE OF LUNG: Status: RESOLVED | Noted: 2020-08-20 | Resolved: 2020-11-03

## 2021-02-06 ENCOUNTER — APPOINTMENT (OUTPATIENT)
Dept: CT IMAGING | Age: 76
DRG: 871 | End: 2021-02-06
Payer: MEDICARE

## 2021-02-06 ENCOUNTER — APPOINTMENT (OUTPATIENT)
Dept: GENERAL RADIOLOGY | Age: 76
DRG: 871 | End: 2021-02-06
Payer: MEDICARE

## 2021-02-06 ENCOUNTER — HOSPITAL ENCOUNTER (INPATIENT)
Age: 76
LOS: 9 days | Discharge: ANOTHER HOSPITAL | DRG: 871 | End: 2021-02-15
Attending: EMERGENCY MEDICINE | Admitting: INTERNAL MEDICINE
Payer: MEDICARE

## 2021-02-06 DIAGNOSIS — R77.8 ELEVATED TROPONIN: ICD-10-CM

## 2021-02-06 DIAGNOSIS — R41.82 ALTERED MENTAL STATUS, UNSPECIFIED ALTERED MENTAL STATUS TYPE: ICD-10-CM

## 2021-02-06 DIAGNOSIS — N17.9 AKI (ACUTE KIDNEY INJURY) (HCC): ICD-10-CM

## 2021-02-06 DIAGNOSIS — J18.9 PNEUMONIA DUE TO ORGANISM: Primary | ICD-10-CM

## 2021-02-06 PROBLEM — I10 ESSENTIAL HYPERTENSION: Status: ACTIVE | Noted: 2021-02-06

## 2021-02-06 PROBLEM — I95.9 HYPOTENSION: Status: ACTIVE | Noted: 2021-02-06

## 2021-02-06 PROBLEM — F17.200 CURRENT EVERY DAY SMOKER: Status: ACTIVE | Noted: 2021-02-06

## 2021-02-06 PROBLEM — I50.32 CHRONIC DIASTOLIC HEART FAILURE (HCC): Status: ACTIVE | Noted: 2019-10-15

## 2021-02-06 LAB
-: ABNORMAL
ABSOLUTE EOS #: 0.1 K/UL (ref 0–0.44)
ABSOLUTE IMMATURE GRANULOCYTE: 0 K/UL (ref 0–0.3)
ABSOLUTE LYMPH #: 2.28 K/UL (ref 1.1–3.7)
ABSOLUTE MONO #: 0.76 K/UL (ref 0.1–1.2)
ACETAMINOPHEN LEVEL: <10 UG/ML (ref 10–30)
ALBUMIN SERPL-MCNC: 2.3 G/DL (ref 3.5–5.2)
ALBUMIN/GLOBULIN RATIO: ABNORMAL (ref 1–2.5)
ALP BLD-CCNC: 102 U/L (ref 35–104)
ALT SERPL-CCNC: 18 U/L (ref 5–33)
AMMONIA: 36 UMOL/L (ref 11–51)
AMORPHOUS: ABNORMAL
ANION GAP SERPL CALCULATED.3IONS-SCNC: 11 MMOL/L (ref 9–17)
AST SERPL-CCNC: 41 U/L
BACTERIA: ABNORMAL
BASOPHILS # BLD: 1 % (ref 0–2)
BASOPHILS ABSOLUTE: 0.1 K/UL (ref 0–0.2)
BILIRUB SERPL-MCNC: 0.82 MG/DL (ref 0.3–1.2)
BILIRUBIN DIRECT: 0.53 MG/DL
BILIRUBIN URINE: ABNORMAL
BILIRUBIN, INDIRECT: 0.29 MG/DL (ref 0–1)
BNP INTERPRETATION: ABNORMAL
BUN BLDV-MCNC: 23 MG/DL (ref 8–23)
BUN/CREAT BLD: 15 (ref 9–20)
CALCIUM SERPL-MCNC: 8.2 MG/DL (ref 8.6–10.4)
CASTS UA: ABNORMAL /LPF
CHLORIDE BLD-SCNC: 105 MMOL/L (ref 98–107)
CO2: 17 MMOL/L (ref 20–31)
COLOR: YELLOW
COMMENT UA: ABNORMAL
CREAT SERPL-MCNC: 1.57 MG/DL (ref 0.5–0.9)
CRYSTALS, UA: ABNORMAL /HPF
DIFFERENTIAL TYPE: ABNORMAL
EOSINOPHILS RELATIVE PERCENT: 1 % (ref 1–4)
EPITHELIAL CELLS UA: ABNORMAL /HPF (ref 0–5)
ETHANOL PERCENT: <0.01 %
ETHANOL: <10 MG/DL
FIO2: ABNORMAL
GFR AFRICAN AMERICAN: 39 ML/MIN
GFR NON-AFRICAN AMERICAN: 32 ML/MIN
GFR SERPL CREATININE-BSD FRML MDRD: ABNORMAL ML/MIN/{1.73_M2}
GFR SERPL CREATININE-BSD FRML MDRD: ABNORMAL ML/MIN/{1.73_M2}
GLOBULIN: ABNORMAL G/DL (ref 1.5–3.8)
GLUCOSE BLD-MCNC: 137 MG/DL (ref 70–99)
GLUCOSE URINE: NEGATIVE
HCO3 VENOUS: 18.6 MMOL/L (ref 24–30)
HCT VFR BLD CALC: 37 % (ref 36.3–47.1)
HEMOGLOBIN: 11.4 G/DL (ref 11.9–15.1)
IMMATURE GRANULOCYTES: 0 %
INR BLD: 1.4
KETONES, URINE: NEGATIVE
LACTIC ACID: 1.5 MMOL/L (ref 0.5–2.2)
LACTIC ACID: 2.3 MMOL/L (ref 0.5–2.2)
LEUKOCYTE ESTERASE, URINE: NEGATIVE
LIPASE: 7 U/L (ref 13–60)
LYMPHOCYTES # BLD: 24 % (ref 24–43)
MAGNESIUM: 1.9 MG/DL (ref 1.6–2.6)
MCH RBC QN AUTO: 24.4 PG (ref 25.2–33.5)
MCHC RBC AUTO-ENTMCNC: 30.8 G/DL (ref 28.4–34.8)
MCV RBC AUTO: 79.1 FL (ref 82.6–102.9)
MONOCYTES # BLD: 8 % (ref 3–12)
MORPHOLOGY: ABNORMAL
MORPHOLOGY: ABNORMAL
MUCUS: ABNORMAL
NEGATIVE BASE EXCESS, VEN: 8 (ref 0–2)
NITRITE, URINE: NEGATIVE
NRBC AUTOMATED: 0.2 PER 100 WBC
O2 DEVICE/FLOW/%: ABNORMAL
O2 SAT, VEN: 73 %
OTHER OBSERVATIONS UA: ABNORMAL
PARTIAL THROMBOPLASTIN TIME: 35.4 SEC (ref 23.9–33.8)
PATIENT TEMP: ABNORMAL
PCO2, VEN: 37 MM HG (ref 39–55)
PDW BLD-RTO: 26.7 % (ref 11.8–14.4)
PH UA: 6 (ref 5–8)
PH VENOUS: 7.31 (ref 7.32–7.42)
PLATELET # BLD: 243 K/UL (ref 138–453)
PLATELET ESTIMATE: ABNORMAL
PMV BLD AUTO: 9.8 FL (ref 8.1–13.5)
PO2, VEN: 42 MM HG (ref 30–50)
POC PCO2 TEMP: ABNORMAL MM HG
POC PH TEMP: ABNORMAL
POC PO2 TEMP: ABNORMAL MM HG
POSITIVE BASE EXCESS, VEN: ABNORMAL (ref 0–2)
POTASSIUM SERPL-SCNC: 4 MMOL/L (ref 3.7–5.3)
PRO-BNP: ABNORMAL PG/ML
PROTEIN UA: ABNORMAL
PROTHROMBIN TIME: 16.6 SEC (ref 11.5–14.2)
RBC # BLD: 4.68 M/UL (ref 3.95–5.11)
RBC # BLD: ABNORMAL 10*6/UL
RBC UA: ABNORMAL /HPF (ref 0–2)
RENAL EPITHELIAL, UA: ABNORMAL /HPF
SALICYLATE LEVEL: <1 MG/DL (ref 3–10)
SARS-COV-2, RAPID: NOT DETECTED
SARS-COV-2: NORMAL
SARS-COV-2: NORMAL
SEG NEUTROPHILS: 66 % (ref 36–65)
SEGMENTED NEUTROPHILS ABSOLUTE COUNT: 6.26 K/UL (ref 1.5–8.1)
SODIUM BLD-SCNC: 133 MMOL/L (ref 135–144)
SOURCE: NORMAL
SPECIFIC GRAVITY UA: 1.02 (ref 1–1.03)
TOTAL CO2, VENOUS: 20 MMOL/L (ref 25–31)
TOTAL PROTEIN: 7.5 G/DL (ref 6.4–8.3)
TOXIC TRICYCLIC SC,BLOOD: NEGATIVE
TRICHOMONAS: ABNORMAL
TROPONIN INTERP: ABNORMAL
TROPONIN T: ABNORMAL NG/ML
TROPONIN, HIGH SENSITIVITY: 43 NG/L (ref 0–14)
TROPONIN, HIGH SENSITIVITY: 44 NG/L (ref 0–14)
TROPONIN, HIGH SENSITIVITY: 58 NG/L (ref 0–14)
TURBIDITY: ABNORMAL
URINE HGB: NEGATIVE
UROBILINOGEN, URINE: NORMAL
WBC # BLD: 9.5 K/UL (ref 3.5–11.3)
WBC # BLD: ABNORMAL 10*3/UL
WBC UA: ABNORMAL /HPF (ref 0–5)
YEAST: ABNORMAL

## 2021-02-06 PROCEDURE — 94640 AIRWAY INHALATION TREATMENT: CPT

## 2021-02-06 PROCEDURE — 2060000000 HC ICU INTERMEDIATE R&B

## 2021-02-06 PROCEDURE — 80307 DRUG TEST PRSMV CHEM ANLYZR: CPT

## 2021-02-06 PROCEDURE — 96365 THER/PROPH/DIAG IV INF INIT: CPT

## 2021-02-06 PROCEDURE — 82803 BLOOD GASES ANY COMBINATION: CPT

## 2021-02-06 PROCEDURE — 99284 EMERGENCY DEPT VISIT MOD MDM: CPT

## 2021-02-06 PROCEDURE — 82140 ASSAY OF AMMONIA: CPT

## 2021-02-06 PROCEDURE — 99222 1ST HOSP IP/OBS MODERATE 55: CPT | Performed by: NURSE PRACTITIONER

## 2021-02-06 PROCEDURE — 2700000000 HC OXYGEN THERAPY PER DAY

## 2021-02-06 PROCEDURE — 6360000002 HC RX W HCPCS: Performed by: EMERGENCY MEDICINE

## 2021-02-06 PROCEDURE — 2580000003 HC RX 258: Performed by: NURSE PRACTITIONER

## 2021-02-06 PROCEDURE — 2580000003 HC RX 258: Performed by: INTERNAL MEDICINE

## 2021-02-06 PROCEDURE — 84484 ASSAY OF TROPONIN QUANT: CPT

## 2021-02-06 PROCEDURE — 83735 ASSAY OF MAGNESIUM: CPT

## 2021-02-06 PROCEDURE — 93005 ELECTROCARDIOGRAM TRACING: CPT | Performed by: EMERGENCY MEDICINE

## 2021-02-06 PROCEDURE — 80143 DRUG ASSAY ACETAMINOPHEN: CPT

## 2021-02-06 PROCEDURE — 6370000000 HC RX 637 (ALT 250 FOR IP): Performed by: INTERNAL MEDICINE

## 2021-02-06 PROCEDURE — 80179 DRUG ASSAY SALICYLATE: CPT

## 2021-02-06 PROCEDURE — 70450 CT HEAD/BRAIN W/O DYE: CPT

## 2021-02-06 PROCEDURE — 83690 ASSAY OF LIPASE: CPT

## 2021-02-06 PROCEDURE — 85025 COMPLETE CBC W/AUTO DIFF WBC: CPT

## 2021-02-06 PROCEDURE — 81001 URINALYSIS AUTO W/SCOPE: CPT

## 2021-02-06 PROCEDURE — 83880 ASSAY OF NATRIURETIC PEPTIDE: CPT

## 2021-02-06 PROCEDURE — 80076 HEPATIC FUNCTION PANEL: CPT

## 2021-02-06 PROCEDURE — 71045 X-RAY EXAM CHEST 1 VIEW: CPT

## 2021-02-06 PROCEDURE — 83605 ASSAY OF LACTIC ACID: CPT

## 2021-02-06 PROCEDURE — 2580000003 HC RX 258: Performed by: EMERGENCY MEDICINE

## 2021-02-06 PROCEDURE — 36415 COLL VENOUS BLD VENIPUNCTURE: CPT

## 2021-02-06 PROCEDURE — 85730 THROMBOPLASTIN TIME PARTIAL: CPT

## 2021-02-06 PROCEDURE — 85610 PROTHROMBIN TIME: CPT

## 2021-02-06 PROCEDURE — 96368 THER/DIAG CONCURRENT INF: CPT

## 2021-02-06 PROCEDURE — 87040 BLOOD CULTURE FOR BACTERIA: CPT

## 2021-02-06 PROCEDURE — G0480 DRUG TEST DEF 1-7 CLASSES: HCPCS

## 2021-02-06 PROCEDURE — U0002 COVID-19 LAB TEST NON-CDC: HCPCS

## 2021-02-06 PROCEDURE — 82805 BLOOD GASES W/O2 SATURATION: CPT

## 2021-02-06 PROCEDURE — 6360000002 HC RX W HCPCS: Performed by: INTERNAL MEDICINE

## 2021-02-06 PROCEDURE — 51702 INSERT TEMP BLADDER CATH: CPT

## 2021-02-06 PROCEDURE — 80048 BASIC METABOLIC PNL TOTAL CA: CPT

## 2021-02-06 RX ORDER — TIZANIDINE 4 MG/1
4 TABLET ORAL EVERY 8 HOURS PRN
Status: DISCONTINUED | OUTPATIENT
Start: 2021-02-06 | End: 2021-02-15 | Stop reason: HOSPADM

## 2021-02-06 RX ORDER — POTASSIUM CHLORIDE 20 MEQ/1
40 TABLET, EXTENDED RELEASE ORAL PRN
Status: DISCONTINUED | OUTPATIENT
Start: 2021-02-06 | End: 2021-02-06

## 2021-02-06 RX ORDER — TRAZODONE HYDROCHLORIDE 100 MG/1
100 TABLET ORAL NIGHTLY
Status: DISCONTINUED | OUTPATIENT
Start: 2021-02-06 | End: 2021-02-13

## 2021-02-06 RX ORDER — SODIUM CHLORIDE 0.9 % (FLUSH) 0.9 %
10 SYRINGE (ML) INJECTION PRN
Status: DISCONTINUED | OUTPATIENT
Start: 2021-02-06 | End: 2021-02-06 | Stop reason: SDUPTHER

## 2021-02-06 RX ORDER — SODIUM CHLORIDE 9 MG/ML
INJECTION, SOLUTION INTRAVENOUS CONTINUOUS
Status: DISCONTINUED | OUTPATIENT
Start: 2021-02-06 | End: 2021-02-06

## 2021-02-06 RX ORDER — AZITHROMYCIN 250 MG/1
500 TABLET, FILM COATED ORAL ONCE
Status: COMPLETED | OUTPATIENT
Start: 2021-02-07 | End: 2021-02-07

## 2021-02-06 RX ORDER — SODIUM CHLORIDE 0.9 % (FLUSH) 0.9 %
10 SYRINGE (ML) INJECTION EVERY 12 HOURS SCHEDULED
Status: DISCONTINUED | OUTPATIENT
Start: 2021-02-06 | End: 2021-02-14 | Stop reason: SDUPTHER

## 2021-02-06 RX ORDER — ALBUTEROL SULFATE 2.5 MG/3ML
2.5 SOLUTION RESPIRATORY (INHALATION) EVERY 6 HOURS PRN
Status: DISCONTINUED | OUTPATIENT
Start: 2021-02-06 | End: 2021-02-15 | Stop reason: HOSPADM

## 2021-02-06 RX ORDER — ATORVASTATIN CALCIUM 20 MG/1
20 TABLET, FILM COATED ORAL DAILY
Status: DISCONTINUED | OUTPATIENT
Start: 2021-02-06 | End: 2021-02-15 | Stop reason: HOSPADM

## 2021-02-06 RX ORDER — NICOTINE 21 MG/24HR
1 PATCH, TRANSDERMAL 24 HOURS TRANSDERMAL DAILY PRN
Status: DISCONTINUED | OUTPATIENT
Start: 2021-02-06 | End: 2021-02-15 | Stop reason: HOSPADM

## 2021-02-06 RX ORDER — OYSTER SHELL CALCIUM WITH VITAMIN D 500; 200 MG/1; [IU]/1
1 TABLET, FILM COATED ORAL DAILY
Status: DISCONTINUED | OUTPATIENT
Start: 2021-02-06 | End: 2021-02-06 | Stop reason: CLARIF

## 2021-02-06 RX ORDER — POLYETHYLENE GLYCOL 3350 17 G/17G
17 POWDER, FOR SOLUTION ORAL DAILY PRN
Status: DISCONTINUED | OUTPATIENT
Start: 2021-02-06 | End: 2021-02-15 | Stop reason: HOSPADM

## 2021-02-06 RX ORDER — AZITHROMYCIN 250 MG/1
250 TABLET, FILM COATED ORAL DAILY
Status: DISPENSED | OUTPATIENT
Start: 2021-02-08 | End: 2021-02-12

## 2021-02-06 RX ORDER — MAGNESIUM SULFATE 1 G/100ML
1000 INJECTION INTRAVENOUS PRN
Status: DISCONTINUED | OUTPATIENT
Start: 2021-02-06 | End: 2021-02-06

## 2021-02-06 RX ORDER — ACETAMINOPHEN 650 MG/1
650 SUPPOSITORY RECTAL EVERY 6 HOURS PRN
Status: DISCONTINUED | OUTPATIENT
Start: 2021-02-06 | End: 2021-02-15 | Stop reason: HOSPADM

## 2021-02-06 RX ORDER — SODIUM CHLORIDE 0.9 % (FLUSH) 0.9 %
10 SYRINGE (ML) INJECTION PRN
Status: DISCONTINUED | OUTPATIENT
Start: 2021-02-06 | End: 2021-02-15 | Stop reason: HOSPADM

## 2021-02-06 RX ORDER — CALCIUM CARBONATE/VITAMIN D3 600 MG-10
1 TABLET ORAL DAILY
Status: DISCONTINUED | OUTPATIENT
Start: 2021-02-07 | End: 2021-02-15 | Stop reason: HOSPADM

## 2021-02-06 RX ORDER — POTASSIUM CHLORIDE 7.45 MG/ML
10 INJECTION INTRAVENOUS PRN
Status: DISCONTINUED | OUTPATIENT
Start: 2021-02-06 | End: 2021-02-06

## 2021-02-06 RX ORDER — ASPIRIN 81 MG/1
81 TABLET ORAL DAILY
Status: DISCONTINUED | OUTPATIENT
Start: 2021-02-06 | End: 2021-02-15 | Stop reason: HOSPADM

## 2021-02-06 RX ORDER — SODIUM CHLORIDE 9 MG/ML
INJECTION, SOLUTION INTRAVENOUS CONTINUOUS
Status: DISCONTINUED | OUTPATIENT
Start: 2021-02-06 | End: 2021-02-07

## 2021-02-06 RX ORDER — SODIUM CHLORIDE 0.9 % (FLUSH) 0.9 %
10 SYRINGE (ML) INJECTION EVERY 12 HOURS SCHEDULED
Status: DISCONTINUED | OUTPATIENT
Start: 2021-02-06 | End: 2021-02-06 | Stop reason: SDUPTHER

## 2021-02-06 RX ORDER — BUDESONIDE AND FORMOTEROL FUMARATE DIHYDRATE 160; 4.5 UG/1; UG/1
2 AEROSOL RESPIRATORY (INHALATION) 2 TIMES DAILY
Status: DISCONTINUED | OUTPATIENT
Start: 2021-02-06 | End: 2021-02-09

## 2021-02-06 RX ORDER — ONDANSETRON 2 MG/ML
4 INJECTION INTRAMUSCULAR; INTRAVENOUS EVERY 6 HOURS PRN
Status: DISCONTINUED | OUTPATIENT
Start: 2021-02-06 | End: 2021-02-15 | Stop reason: HOSPADM

## 2021-02-06 RX ORDER — 0.9 % SODIUM CHLORIDE 0.9 %
30 INTRAVENOUS SOLUTION INTRAVENOUS ONCE
Status: COMPLETED | OUTPATIENT
Start: 2021-02-06 | End: 2021-02-06

## 2021-02-06 RX ORDER — PANTOPRAZOLE SODIUM 20 MG/1
20 TABLET, DELAYED RELEASE ORAL DAILY
Status: DISCONTINUED | OUTPATIENT
Start: 2021-02-07 | End: 2021-02-15 | Stop reason: HOSPADM

## 2021-02-06 RX ORDER — PROMETHAZINE HYDROCHLORIDE 12.5 MG/1
12.5 TABLET ORAL EVERY 6 HOURS PRN
Status: DISCONTINUED | OUTPATIENT
Start: 2021-02-06 | End: 2021-02-15 | Stop reason: HOSPADM

## 2021-02-06 RX ORDER — ACETAMINOPHEN 325 MG/1
650 TABLET ORAL EVERY 6 HOURS PRN
Status: DISCONTINUED | OUTPATIENT
Start: 2021-02-06 | End: 2021-02-14 | Stop reason: DRUGHIGH

## 2021-02-06 RX ORDER — CETIRIZINE HYDROCHLORIDE 5 MG/1
5 TABLET ORAL DAILY
Status: DISCONTINUED | OUTPATIENT
Start: 2021-02-06 | End: 2021-02-15 | Stop reason: HOSPADM

## 2021-02-06 RX ADMIN — SERTRALINE HYDROCHLORIDE 50 MG: 50 TABLET ORAL at 22:21

## 2021-02-06 RX ADMIN — ENOXAPARIN SODIUM 30 MG: 30 INJECTION SUBCUTANEOUS at 22:21

## 2021-02-06 RX ADMIN — SODIUM CHLORIDE: 9 INJECTION, SOLUTION INTRAVENOUS at 22:21

## 2021-02-06 RX ADMIN — SODIUM CHLORIDE 1000 ML: 9 INJECTION, SOLUTION INTRAVENOUS at 15:30

## 2021-02-06 RX ADMIN — ATORVASTATIN CALCIUM 20 MG: 20 TABLET, FILM COATED ORAL at 22:22

## 2021-02-06 RX ADMIN — CETIRIZINE HYDROCHLORIDE 5 MG: 5 TABLET, FILM COATED ORAL at 22:21

## 2021-02-06 RX ADMIN — TRAZODONE HYDROCHLORIDE 100 MG: 100 TABLET ORAL at 22:21

## 2021-02-06 RX ADMIN — CEFTRIAXONE SODIUM 1000 MG: 1 INJECTION, POWDER, FOR SOLUTION INTRAMUSCULAR; INTRAVENOUS at 15:45

## 2021-02-06 RX ADMIN — ASPIRIN 81 MG: 81 TABLET, COATED ORAL at 22:21

## 2021-02-06 RX ADMIN — SODIUM CHLORIDE, PRESERVATIVE FREE 10 ML: 5 INJECTION INTRAVENOUS at 22:21

## 2021-02-06 RX ADMIN — BUDESONIDE AND FORMOTEROL FUMARATE DIHYDRATE 2 PUFF: 160; 4.5 AEROSOL RESPIRATORY (INHALATION) at 20:52

## 2021-02-06 RX ADMIN — AZITHROMYCIN MONOHYDRATE 500 MG: 500 INJECTION, POWDER, LYOPHILIZED, FOR SOLUTION INTRAVENOUS at 16:15

## 2021-02-06 ASSESSMENT — PAIN SCALES - GENERAL: PAINLEVEL_OUTOF10: 0

## 2021-02-06 NOTE — ED NOTES
Pt is laying in bed with eyes clothes and appears in no distress. Respirations even and unlabored. Will continue to monitor.       Ximena Gallardo RN  02/06/21 9565

## 2021-02-06 NOTE — ED NOTES
Pt arrived in ED w/family w/c/o weakness, unsteady gait and confusion for the past 3 wks. Monday she got a COVID shot and speech has been slurred; started dropping things and falling. Pt A/o x4. Equal strength in arms and legs; though both are weaker than \"normal.\" Pt feels R leg is weaker than the other but this was not observed during assessment. Per granddaughter her speech is not normal - currently garbled/slurred. Gave pt warm blankets. Granddaughter at bedside.      Marie Yepez RN  02/06/21 9831

## 2021-02-06 NOTE — Clinical Note
Patient Class: Inpatient [101]   REQUIRED: Diagnosis: Hypotension [064332]   Estimated Length of Stay: Estimated stay of more than 2 midnights   Admitting Provider: Carey Carr [[id number]]   Telemetry Bed Required?: Yes

## 2021-02-06 NOTE — ED PROVIDER NOTES
EMERGENCY DEPARTMENT ENCOUNTER    Pt Name: Heidi Oliveros  MRN: [id number]  [place] 1945  Date of evaluation: 2/6/21  CHIEF COMPLAINT       Chief Complaint   Patient presents with    Dizziness     onset 2 weeks    Fall     difficulty walking    Aphasia     onset 4 days ago     HISTORY OF PRESENT ILLNESS   This is a 81-year-old female that presents with complaints of generalized weakness, altered mental status and generally not feeling well. Family states that over the past few weeks she is been having some increasing changes in her mentation, over the past few days she has been having issues with dropping things, she is been generally weak, seemingly confused and having some difficulty with walking. The patient does not really provide much to the clinical history, she has a history of alcoholism, she is a daily drinker and did have a few beers today.               REVIEW OF SYSTEMS     Review of Systems   Unable to perform ROS: Mental status change     PASTMEDICAL HISTORY     Past Medical History:   Diagnosis Date    [facility]congestive heart failure) ([facility] [address]     COPD (chronic obstructive pulmonary disease) ([facility] [address]     Diabetes mellitus ([facility])     MRSA (methicillin resistant staph aureus) culture positive 8/28/2014    sputum    Tobacco abuse 10/15/2019     Past Problem List  Patient Active Problem List   Diagnosis Code    COPD (chronic obstructive pulmonary disease) ([facility] [address] J44.9    CHF (congestive heart failure) ([facility]) I50.9    Cocaine abuse ([facility] [address] F14.10    Acidosis, metabolic, with respiratory acidosis E87.4    Acute respiratory failure with hypoxia ([facility]) J96.01    Polysubstance abuse ([facility] [address] F19.10    Hyponatremia, Beer Potomania E87.1    Normocytic anemia R40.5    Neutrophilic leukocytosis N49.4    Increased ammonia level R79.89    Hypophosphatemia E83.39    Type 2 diabetes mellitus without complication, without long-term current use of insulin ([facility]) E11.9    Acute on chronic diastolic heart failure (HCC) I50.33    Tobacco abuse Z72.0    Diabetes mellitus ([facility] [address] E11.9    Chronic diastolic heart failure ([facility]) I50.32    Elevated troponin R77.8    Prolonged Q-T interval on ECG R94.31    Community acquired pneumonia of right middle lobe of lung J18.9    Hypoxia R09.02    Chronic respiratory failure ([facility]) J96.10    Mild malnutrition ([facility]) E44.1    Hypotension I95.9    DEONTE (acute kidney injury) ([facility] [address] N17.9    Current every day smoker F17.200    Essential hypertension I10    Altered mental status R41.82     SURGICAL HISTORY       Past Surgical History:   Procedure Laterality Date    APPENDECTOMY      HYSTERECTOMY      JOINT REPLACEMENT Bilateral      CURRENT MEDICATIONS       Current Discharge Medication List      CONTINUE these medications which have NOT CHANGED    Details   pantoprazole (PROTONIX) 20 MG tablet Take 1 tablet by mouth daily  Qty: 30 tablet, Refills: 3      amLODIPine (NORVASC) 5 MG tablet Take 1 tablet by mouth daily  Qty: 30 tablet, Refills: 3      albuterol (PROVENTIL) (2.5 MG/3ML) 0.083% nebulizer solution Take 3 mLs by nebulization every 6 hours as needed for Wheezing or Shortness of Breath  Qty: 120 each, Refills: 0      nicotine (NICODERM CQ) 21 MG/24HR Place 1 patch onto the skin daily as needed (if patient smokes/requests nicotine replacent therapy)  Qty: 30 patch, Refills: 3      predniSONE (DELTASONE) 10 MG tablet 4 tabs for 3 days, then 3 tabs for 3 days , then 2 tabs for 3 days , then 1 tab for 3 days , then 1/2 tab for 3 days then stop  Qty: 32 tablet, Refills: 0      furosemide (LASIX) 40 MG tablet Take 1 tablet by mouth daily  Qty: 60 tablet, Refills: 3      tiZANidine (ZANAFLEX) 4 MG tablet Take 4 mg by mouth 2 times daily as needed      pregabalin (LYRICA) 100 MG capsule Take 100 mg by mouth 2 times daily.       ammonium lactate (LAC-HYDRIN) 12 % lotion Apply topically 2 times daily To arms and legs      Multiple Vitamins-Minerals (CERTAVITE SENIOR PO) Take 1 tablet by mouth daily      sertraline (ZOLOFT) 50 MG tablet Take 50 mg by mouth daily      loratadine (CLARITIN) 10 MG tablet Take 10 mg by mouth daily      atorvastatin (LIPITOR) 20 MG tablet Take 20 mg by mouth daily       traZODone (DESYREL) 100 MG tablet Take 100 mg by mouth nightly      metFORMIN (GLUCOPHAGE) 500 MG tablet Take 500 mg by mouth daily (with breakfast)      aspirin 81 MG tablet Take 81 mg by mouth daily. calcium-vitamin D (OSCAL-500) 500-200 MG-UNIT per tablet Take 1 tablet by mouth daily. fluticasone-salmeterol (ADVAIR) 250-50 MCG/DOSE AEPB Inhale 1 puff into the lungs every 12 hours. lisinopril (PRINIVIL;ZESTRIL) 20 MG tablet Take 20 mg by mouth daily            ALLERGIES     is allergic to tiotropium and spiriva handihaler [tiotropium bromide monohydrate]. FAMILY HISTORY     She indicated that her mother is . She indicated that her father is . SOCIAL HISTORY       Social History     Tobacco Use    Smoking status: Current Every Day Smoker     Packs/day: 0.50     Types: Cigarettes    Smokeless tobacco: Never Used   Substance Use Topics    Alcohol use:  Yes     Alcohol/week: 70.0 standard drinks     Types: 70 Cans of beer per week     Comment: 840 oz (3, 40oz beers/day)    Drug use: Yes     Types: Marijuana     PHYSICAL EXAM     INITIAL VITALS: /76   Pulse 85   Temp 97.9 °F (36.6 °C) (Oral)   Resp 18   Ht 5' 2\" (1.575 m)   Wt 132 lb (59.9 kg)   SpO2 98%   BMI 24.14 kg/m²    Physical Exam    MEDICAL DECISION MAKIN)  Sepsis Identified at Time:      3:25 PM EST         2)  Sepsis Alert Protocol Guidelines:  · Blood Cultures drawn before antibiotics  · Broad Spectrum Antibiotics given stat within one hour  · Lactic Acid Q2 hours times 2 occurrences (if first lactic acid > 2.0)    3)  Fluid Bolus Guidelines:    If lactate > 4.0   OR   MAP less than 65  OR  systolic BP < 90 (two separate readings),            then 30ml/kg crystalloid fluid bolus infused, and may give over 4 hour duration. 4)  Repeat Sepsis Exam completed during fluid bolus at time:     Patient reevaluated, the patient had significant improvement in her blood pressure, her mentation is improved as well. Continue to monitor. 5)  Vasopressors: For persistent hypotension after completion of 30ml/kg fluid bolus (need to measure BP Q 15 min in the hour after completion of fluid bolus). Discuss risks and benefits of vasopressors and alternative therapies with patient and/or DPOA. ED Course as of Feb 07 1414   Sat Feb 06, 2021   1529 Patients xray concerning for pneumonia, will administer antibiotics, code sepsis called. [RS]      ED Course User Index  [RS] Federica Lipscomb MD       CRITICAL CARE:       PROCEDURES:    Procedures    DIAGNOSTIC RESULTS   EKG:All EKG's are interpreted by the Emergency Department Physician who either signs or Co-signs this chart in the absence of a cardiologist.    Patient's EKG shows sinus bradycardia with a rate of 54, QRS and QTC intervals unremarkable, the patient has left axis deviation, no ST elevations or depressions, no significant T wave changes. Nonspecific EKG. RADIOLOGY:All plain film, CT, MRI, and formal ultrasound images (except ED bedside ultrasound) are read by the radiologist, see reports below, unless otherwisenoted in MDM or here. CT HEAD WO CONTRAST   Final Result   Chronic findings in the brain without acute CT abnormality identified. XR CHEST PORTABLE   Final Result   Bilateral airspace and interstitial opacities either reflecting pulmonary   edema versus pneumonia. LABS: All lab results were reviewed by myself, and all abnormals are listed below.   Labs Reviewed   BASIC METABOLIC PANEL - Abnormal; Notable for the following components:       Result Value    Glucose 137 (*)     CREATININE 1.57 (*)     Calcium 8.2 (*)     Sodium 133 (*)     CO2 17 (*)     GFR Non- 32 (*) GFR  39 (*)     All other components within normal limits   CBC WITH AUTO DIFFERENTIAL - Abnormal; Notable for the following components:    Hemoglobin 11.4 (*)     MCV 79.1 (*)     MCH 24.4 (*)     RDW 26.7 (*)     NRBC Automated 0.2 (*)     Seg Neutrophils 66 (*)     All other components within normal limits   APTT - Abnormal; Notable for the following components:    PTT 35.4 (*)     All other components within normal limits   PROTIME-INR - Abnormal; Notable for the following components:    Protime 16.6 (*)     All other components within normal limits   TROPONIN - Abnormal; Notable for the following components:    Troponin, High Sensitivity 58 (*)     All other components within normal limits   TROPONIN - Abnormal; Notable for the following components:    Troponin, High Sensitivity 44 (*)     All other components within normal limits   LACTIC ACID - Abnormal; Notable for the following components:    Lactic Acid 2.3 (*)     All other components within normal limits   LIPASE - Abnormal; Notable for the following components:    Lipase 7 (*)     All other components within normal limits   HEPATIC FUNCTION PANEL - Abnormal; Notable for the following components:    Albumin 2.3 (*)     AST 41 (*)     Bilirubin, Direct 0.53 (*)     All other components within normal limits   URINE RT REFLEX TO CULTURE - Abnormal; Notable for the following components:    Turbidity UA CLOUDY (*)     Bilirubin Urine   (*)     Value: Presumptive positive. Unable to confirm due to unavailability of reagent.     Protein, UA 2+ (*)     All other components within normal limits   ACETAMINOPHEN LEVEL - Abnormal; Notable for the following components:    Acetaminophen Level <10 (*)     All other components within normal limits   SALICYLATE LEVEL - Abnormal; Notable for the following components:    Salicylate Lvl <1 (*)     All other components within normal limits   POC PANEL (G3)-PRINCESS - Abnormal; Notable for the following components:    pH, David 7.31 (*)     pCO2, David 37 (*)     Total CO2, Venous 20 (*)     HCO3, Venous 18.6 (*)     Negative Base Excess, David 8 (*)     All other components within normal limits   MICROSCOPIC URINALYSIS - Abnormal; Notable for the following components:    Bacteria, UA MANY (*)     All other components within normal limits   BRAIN NATRIURETIC PEPTIDE - Abnormal; Notable for the following components:    Pro-BNP 13,553 (*)     All other components within normal limits   TROPONIN - Abnormal; Notable for the following components:    Troponin, High Sensitivity 43 (*)     All other components within normal limits   TROPONIN - Abnormal; Notable for the following components:    Troponin, High Sensitivity 49 (*)     All other components within normal limits   TROPONIN - Abnormal; Notable for the following components:    Troponin, High Sensitivity 53 (*)     All other components within normal limits   BASIC METABOLIC PANEL W/ REFLEX TO MG FOR LOW K - Abnormal; Notable for the following components:    CREATININE 1.27 (*)     Calcium 7.7 (*)     Chloride 108 (*)     CO2 18 (*)     GFR Non- 41 (*)     GFR  50 (*)     All other components within normal limits   CBC WITH AUTO DIFFERENTIAL - Abnormal; Notable for the following components:    Hemoglobin 11.3 (*)     Hematocrit 36.2 (*)     MCV 77.5 (*)     MCH 24.2 (*)     RDW 26.1 (*)     All other components within normal limits   URIC ACID - Abnormal; Notable for the following components:    Uric Acid 11.1 (*)     All other components within normal limits   HEMOGLOBIN A1C - Abnormal; Notable for the following components:    Hemoglobin A1C 6.2 (*)     All other components within normal limits   CULTURE, BLOOD 1   CULTURE, BLOOD 1   MAGNESIUM   LACTIC ACID   COVID-19   ETHANOL   TOXIC TRICYCLIC SC,B   AMMONIA   SODIUM, URINE, RANDOM   CREATININE, RANDOM URINE   CHLORIDE, URINE, RANDOM   EOSINOPHILS, URINE   CK   POC GLUCOSE FINGERSTICK POC GLUCOSE FINGERSTICK   POCT GLUCOSE   POCT GLUCOSE   POCT GLUCOSE   POCT GLUCOSE       EMERGENCY DEPARTMENTCOURSE:         Vitals:    Vitals:    02/07/21 0357 02/07/21 0737 02/07/21 0830 02/07/21 1218   BP: 100/67 105/67  112/76   Pulse: 69 68  85   Resp: 18 16  18   Temp: 97.5 °F (36.4 °C) 97.9 °F (36.6 °C)  97.9 °F (36.6 °C)   TempSrc: Oral Axillary  Oral   SpO2: 95% 98% 91% 98%   Weight:       Height:           The patient was given the following medications while in the emergency department:  Orders Placed This Encounter   Medications    0.9 % sodium chloride bolus    DISCONTD: sodium chloride flush 0.9 % injection 10 mL    DISCONTD: sodium chloride flush 0.9 % injection 10 mL    cefTRIAXone (ROCEPHIN) 1000 mg IVPB in 50 mL D5W minibag     Order Specific Question:   Antimicrobial Indications     Answer: Other     Order Specific Question:   Other Abx Indication     Answer: Suspected Sepsis of Pulmonary Origin - Community Acquired    azithromycin (ZITHROMAX) 500 mg in D5W 250ml addavial     Order Specific Question:   Antimicrobial Indications     Answer: Other     Order Specific Question:   Other Abx Indication     Answer:    Suspected Sepsis of Pulmonary Origin - Community Acquired    albuterol (PROVENTIL) nebulizer solution 2.5 mg    aspirin EC tablet 81 mg    atorvastatin (LIPITOR) tablet 20 mg    DISCONTD: calcium-vitamin D (OSCAL-500) 500-200 MG-UNIT per tablet 1 tablet    budesonide-formoterol (SYMBICORT) 160-4.5 MCG/ACT inhaler 2 puff    cetirizine (ZYRTEC) tablet 5 mg    pantoprazole (PROTONIX) tablet 20 mg    sertraline (ZOLOFT) tablet 50 mg    tiZANidine (ZANAFLEX) tablet 4 mg    traZODone (DESYREL) tablet 100 mg    sodium chloride flush 0.9 % injection 10 mL    sodium chloride flush 0.9 % injection 10 mL    DISCONTD: potassium chloride (KLOR-CON M) extended release tablet 40 mEq    DISCONTD: potassium bicarb-citric acid (EFFER-K) effervescent tablet 40 mEq    DISCONTD: potassium chloride 10 mEq/100 mL IVPB (Peripheral Line)    DISCONTD: magnesium sulfate 1000 mg in dextrose 5% 100 mL IVPB    OR Linked Order Group     promethazine (PHENERGAN) tablet 12.5 mg     ondansetron (ZOFRAN) injection 4 mg    polyethylene glycol (GLYCOLAX) packet 17 g    nicotine (NICODERM CQ) 21 MG/24HR 1 patch    OR Linked Order Group     acetaminophen (TYLENOL) tablet 650 mg     acetaminophen (TYLENOL) suppository 650 mg    enoxaparin (LOVENOX) injection 30 mg    DISCONTD: 0.9 % sodium chloride infusion    cefTRIAXone (ROCEPHIN) 1000 mg IVPB in 50 mL D5W minibag     Order Specific Question:   Antimicrobial Indications     Answer:   Pneumonia (CAP)    FOLLOWED BY Linked Order Group     azithromycin (ZITHROMAX) tablet 500 mg      Order Specific Question:   Antimicrobial Indications      Answer:   Pneumonia (CAP)     azithromycin (ZITHROMAX) tablet 250 mg      Order Specific Question:   Antimicrobial Indications      Answer:   Pneumonia (CAP)    calcium carb-cholecalciferol 600-400 MG-UNIT per tab 1 tablet    0.9 % sodium chloride infusion     CONSULTS:  IP CONSULT TO HOSPITALIST    FINAL IMPRESSION      Community acquired pneumonia  Sepsis     DISPOSITION/PLAN   DISPOSITION Admitted 02/06/2021 05:38:07 PM  Signed out to Dr Alissa Bach at shift change      PATIENT REFERRED TO:  No follow-up provider specified.   DISCHARGE MEDICATIONS:  Current Discharge Medication List        Ceci Hassan MD  Attending Emergency Physician                   Ceci Hassan MD  02/07/21 5664

## 2021-02-06 NOTE — ED PROVIDER NOTES
EMERGENCY DEPARTMENT ENCOUNTER   ATTENDING ATTESTATION     Pt Name: Yanira Velazquez  MRN: [id number]  [place] 1945  Date of evaluation: 2/6/21   Yanira Velazquez is a 77 y.o. female with CC: Dizziness (onset 2 weeks), Fall (difficulty walking), and Aphasia (onset 4 days ago)    MDM:   Received in signout from Dr. Delano Sotelo. CT head pending. Altered mental status, pneumonia meeting sepsis received antibiotics plan for admission. Spoke with [facility] Dr. Kanu Griffin who will admit. ED Course as of Feb 06 2124   Sat Feb 06, 2021   1529 Patients xray concerning for pneumonia, will administer antibiotics, code sepsis called. [RS]      ED Course User Index  [RS] Patrick Yeh MD       CRITICAL CARE:       EKG: All EKG's are interpreted by the Emergency Department Physician who either signs or Co-signs this chart in the absence of a cardiologist.      RADIOLOGY:All plain film, CT, MRI, and formal ultrasound images (except ED bedside ultrasound) are read by the radiologist, see reports below, unless otherwise noted in MDM or here. CT HEAD WO CONTRAST   Final Result   Chronic findings in the brain without acute CT abnormality identified. XR CHEST PORTABLE   Final Result   Bilateral airspace and interstitial opacities either reflecting pulmonary   edema versus pneumonia. LABS: All lab results were reviewed by myself, and all abnormals are listed below.   Labs Reviewed   BASIC METABOLIC PANEL - Abnormal; Notable for the following components:       Result Value    Glucose 137 (*)     CREATININE 1.57 (*)     Calcium 8.2 (*)     Sodium 133 (*)     CO2 17 (*)     GFR Non- 32 (*)     GFR  39 (*)     All other components within normal limits   CBC WITH AUTO DIFFERENTIAL - Abnormal; Notable for the following components:    Hemoglobin 11.4 (*)     MCV 79.1 (*)     MCH 24.4 (*)     RDW 26.7 (*)     NRBC Automated 0.2 (*)     Seg Neutrophils 66 (*)     All other components within normal limits   BRAIN NATRIURETIC PEPTIDE - Abnormal; Notable for the following components:    Pro-BNP 13,553 (*)     All other components within normal limits   CULTURE, BLOOD 1   CULTURE, BLOOD 1   MAGNESIUM   LACTIC ACID   COVID-19   ETHANOL   TOXIC TRICYCLIC SC,B   AMMONIA   BLOOD GAS, VENOUS   BASIC METABOLIC PANEL W/ REFLEX TO MG FOR LOW K   CBC WITH AUTO DIFFERENTIAL   SODIUM, URINE, RANDOM   URIC ACID   CREATININE, RANDOM URINE   CK   TROPONIN   TROPONIN   CHLORIDE, URINE, RANDOM   EOSINOPHILS, URINE   TROPONIN     CONSULTS:  IP CONSULT TO HOSPITALIST  FINAL IMPRESSION      1. Pneumonia due to organism    2. Altered mental status, unspecified altered mental status type    3.  DEONTE (acute kidney injury) ([facility] [address]    4. Elevated troponin            PASTMEDICAL HISTORY     Past Medical History:   Diagnosis Date    [facility]     COPD [facility]chronic obstructive pulmonary disease) [facility]     Diabetes mellitus ([facility])     MRSA (methicillin resistant staph aureus) culture positive 8/28/2014    sputum    Tobacco abuse 10/15/2019     SURGICAL HISTORY       Past Surgical History:   Procedure Laterality Date    APPENDECTOMY      HYSTERECTOMY      JOINT REPLACEMENT Bilateral      CURRENT MEDICATIONS       Current Discharge Medication List      CONTINUE these medications which have NOT CHANGED    Details   pantoprazole (PROTONIX) 20 MG tablet Take 1 tablet by mouth daily  Qty: 30 tablet, Refills: 3      amLODIPine (NORVASC) 5 MG tablet Take 1 tablet by mouth daily  Qty: 30 tablet, Refills: 3      albuterol (PROVENTIL) (2.5 MG/3ML) 0.083% nebulizer solution Take 3 mLs by nebulization every 6 hours as needed for Wheezing or Shortness of Breath  Qty: 120 each, Refills: 0      nicotine (NICODERM CQ) 21 MG/24HR Place 1 patch onto the skin daily as needed (if patient smokes/requests nicotine replacent therapy)  Qty: 30 patch, Refills: 3      predniSONE (DELTASONE) 10 MG tablet 4 tabs for 3 days, then 3 tabs for 3 days , then 2 tabs for 3 days , then 1 tab for 3 days , then 1/2 tab for 3 days then stop  Qty: 32 tablet, Refills: 0      furosemide (LASIX) 40 MG tablet Take 1 tablet by mouth daily  Qty: 60 tablet, Refills: 3      tiZANidine (ZANAFLEX) 4 MG tablet Take 4 mg by mouth 2 times daily as needed      pregabalin (LYRICA) 100 MG capsule Take 100 mg by mouth 2 times daily. ammonium lactate (LAC-HYDRIN) 12 % lotion Apply topically 2 times daily To arms and legs      Multiple Vitamins-Minerals (CERTAVITE SENIOR PO) Take 1 tablet by mouth daily      sertraline (ZOLOFT) 50 MG tablet Take 50 mg by mouth daily      loratadine (CLARITIN) 10 MG tablet Take 10 mg by mouth daily      atorvastatin (LIPITOR) 20 MG tablet Take 20 mg by mouth daily       traZODone (DESYREL) 100 MG tablet Take 100 mg by mouth nightly      metFORMIN (GLUCOPHAGE) 500 MG tablet Take 500 mg by mouth daily (with breakfast)      aspirin 81 MG tablet Take 81 mg by mouth daily. calcium-vitamin D (OSCAL-500) 500-200 MG-UNIT per tablet Take 1 tablet by mouth daily. fluticasone-salmeterol (ADVAIR) 250-50 MCG/DOSE AEPB Inhale 1 puff into the lungs every 12 hours. lisinopril (PRINIVIL;ZESTRIL) 20 MG tablet Take 20 mg by mouth daily            ALLERGIES     is allergic to tiotropium and spiriva handihaler [tiotropium bromide monohydrate]. FAMILY HISTORY     She indicated that her mother is . She indicated that her father is . SOCIAL HISTORY       Social History     Tobacco Use    Smoking status: Current Every Day Smoker     Packs/day: 0.50     Types: Cigarettes    Smokeless tobacco: Never Used   Substance Use Topics    Alcohol use:  Yes     Alcohol/week: 70.0 standard drinks     Types: 70 Cans of beer per week     Comment: 840 oz (3, 40oz beers/day)    Drug use: Yes     Types: Marijuana       I personally evaluated and examined the patient in conjunction with the APC and agree with the assessment, treatment plan, and disposition of the patient as recorded by the APC.    Alejandra Sweeney MD  Attending Emergency Physician         Alejandra Sweeney MD  02/06/21 1601       Alejandra Sweeney MD  02/06/21 8118

## 2021-02-07 ENCOUNTER — APPOINTMENT (OUTPATIENT)
Dept: GENERAL RADIOLOGY | Age: 76
DRG: 871 | End: 2021-02-07
Payer: MEDICARE

## 2021-02-07 LAB
ABSOLUTE EOS #: 0.07 K/UL (ref 0–0.44)
ABSOLUTE IMMATURE GRANULOCYTE: 0 K/UL (ref 0–0.3)
ABSOLUTE LYMPH #: 1.82 K/UL (ref 1.1–3.7)
ABSOLUTE MONO #: 0.63 K/UL (ref 0.1–1.2)
ANION GAP SERPL CALCULATED.3IONS-SCNC: 9 MMOL/L (ref 9–17)
BASOPHILS # BLD: 1 % (ref 0–2)
BASOPHILS ABSOLUTE: 0.07 K/UL (ref 0–0.2)
BUN BLDV-MCNC: 21 MG/DL (ref 8–23)
BUN/CREAT BLD: 17 (ref 9–20)
CALCIUM SERPL-MCNC: 7.7 MG/DL (ref 8.6–10.4)
CHLORIDE BLD-SCNC: 108 MMOL/L (ref 98–107)
CHLORIDE, UR: <20 MMOL/L
CO2: 18 MMOL/L (ref 20–31)
CREAT SERPL-MCNC: 1.27 MG/DL (ref 0.5–0.9)
CREATININE URINE: 127.9 MG/DL (ref 28–217)
DIFFERENTIAL TYPE: ABNORMAL
EOSINOPHIL,URINE: NORMAL
EOSINOPHILS RELATIVE PERCENT: 1 % (ref 1–4)
ESTIMATED AVERAGE GLUCOSE: 131 MG/DL
GFR AFRICAN AMERICAN: 50 ML/MIN
GFR NON-AFRICAN AMERICAN: 41 ML/MIN
GFR SERPL CREATININE-BSD FRML MDRD: ABNORMAL ML/MIN/{1.73_M2}
GFR SERPL CREATININE-BSD FRML MDRD: ABNORMAL ML/MIN/{1.73_M2}
GLUCOSE BLD-MCNC: 100 MG/DL (ref 65–105)
GLUCOSE BLD-MCNC: 127 MG/DL (ref 65–105)
GLUCOSE BLD-MCNC: 79 MG/DL (ref 65–105)
GLUCOSE BLD-MCNC: 86 MG/DL (ref 70–99)
GLUCOSE BLD-MCNC: 89 MG/DL (ref 65–105)
HBA1C MFR BLD: 6.2 % (ref 4–6)
HCT VFR BLD CALC: 36.2 % (ref 36.3–47.1)
HEMOGLOBIN: 11.3 G/DL (ref 11.9–15.1)
IMMATURE GRANULOCYTES: 0 %
LYMPHOCYTES # BLD: 26 % (ref 24–43)
MCH RBC QN AUTO: 24.2 PG (ref 25.2–33.5)
MCHC RBC AUTO-ENTMCNC: 31.2 G/DL (ref 28.4–34.8)
MCV RBC AUTO: 77.5 FL (ref 82.6–102.9)
MONOCYTES # BLD: 9 % (ref 3–12)
MORPHOLOGY: ABNORMAL
MORPHOLOGY: ABNORMAL
NRBC AUTOMATED: 0 PER 100 WBC
PDW BLD-RTO: 26.1 % (ref 11.8–14.4)
PLATELET # BLD: 199 K/UL (ref 138–453)
PLATELET ESTIMATE: ABNORMAL
PMV BLD AUTO: 10.5 FL (ref 8.1–13.5)
POTASSIUM SERPL-SCNC: 3.8 MMOL/L (ref 3.7–5.3)
RBC # BLD: 4.67 M/UL (ref 3.95–5.11)
RBC # BLD: ABNORMAL 10*6/UL
SEG NEUTROPHILS: 63 % (ref 36–65)
SEGMENTED NEUTROPHILS ABSOLUTE COUNT: 4.41 K/UL (ref 1.5–8.1)
SODIUM BLD-SCNC: 135 MMOL/L (ref 135–144)
SODIUM,UR: <20 MMOL/L
TOTAL CK: 48 U/L (ref 26–192)
TROPONIN INTERP: ABNORMAL
TROPONIN INTERP: ABNORMAL
TROPONIN T: ABNORMAL NG/ML
TROPONIN T: ABNORMAL NG/ML
TROPONIN, HIGH SENSITIVITY: 49 NG/L (ref 0–14)
TROPONIN, HIGH SENSITIVITY: 53 NG/L (ref 0–14)
URIC ACID: 11.1 MG/DL (ref 2.4–5.7)
WBC # BLD: 7 K/UL (ref 3.5–11.3)
WBC # BLD: ABNORMAL 10*3/UL

## 2021-02-07 PROCEDURE — 84484 ASSAY OF TROPONIN QUANT: CPT

## 2021-02-07 PROCEDURE — 93005 ELECTROCARDIOGRAM TRACING: CPT | Performed by: INTERNAL MEDICINE

## 2021-02-07 PROCEDURE — 6370000000 HC RX 637 (ALT 250 FOR IP): Performed by: INTERNAL MEDICINE

## 2021-02-07 PROCEDURE — 6360000002 HC RX W HCPCS: Performed by: NURSE PRACTITIONER

## 2021-02-07 PROCEDURE — 94640 AIRWAY INHALATION TREATMENT: CPT

## 2021-02-07 PROCEDURE — 82436 ASSAY OF URINE CHLORIDE: CPT

## 2021-02-07 PROCEDURE — 80048 BASIC METABOLIC PNL TOTAL CA: CPT

## 2021-02-07 PROCEDURE — 84550 ASSAY OF BLOOD/URIC ACID: CPT

## 2021-02-07 PROCEDURE — 85025 COMPLETE CBC W/AUTO DIFF WBC: CPT

## 2021-02-07 PROCEDURE — 2700000000 HC OXYGEN THERAPY PER DAY

## 2021-02-07 PROCEDURE — 83036 HEMOGLOBIN GLYCOSYLATED A1C: CPT

## 2021-02-07 PROCEDURE — 82550 ASSAY OF CK (CPK): CPT

## 2021-02-07 PROCEDURE — 94761 N-INVAS EAR/PLS OXIMETRY MLT: CPT

## 2021-02-07 PROCEDURE — 2580000003 HC RX 258: Performed by: INTERNAL MEDICINE

## 2021-02-07 PROCEDURE — 87205 SMEAR GRAM STAIN: CPT

## 2021-02-07 PROCEDURE — 82947 ASSAY GLUCOSE BLOOD QUANT: CPT

## 2021-02-07 PROCEDURE — 36415 COLL VENOUS BLD VENIPUNCTURE: CPT

## 2021-02-07 PROCEDURE — 82570 ASSAY OF URINE CREATININE: CPT

## 2021-02-07 PROCEDURE — 2060000000 HC ICU INTERMEDIATE R&B

## 2021-02-07 PROCEDURE — 84300 ASSAY OF URINE SODIUM: CPT

## 2021-02-07 PROCEDURE — 71045 X-RAY EXAM CHEST 1 VIEW: CPT

## 2021-02-07 PROCEDURE — 6360000002 HC RX W HCPCS: Performed by: INTERNAL MEDICINE

## 2021-02-07 PROCEDURE — 99232 SBSQ HOSP IP/OBS MODERATE 35: CPT | Performed by: INTERNAL MEDICINE

## 2021-02-07 RX ORDER — FUROSEMIDE 10 MG/ML
40 INJECTION INTRAMUSCULAR; INTRAVENOUS ONCE
Status: COMPLETED | OUTPATIENT
Start: 2021-02-08 | End: 2021-02-07

## 2021-02-07 RX ORDER — NICOTINE POLACRILEX 4 MG
15 LOZENGE BUCCAL PRN
Status: DISCONTINUED | OUTPATIENT
Start: 2021-02-07 | End: 2021-02-15 | Stop reason: HOSPADM

## 2021-02-07 RX ORDER — DEXTROSE MONOHYDRATE 50 MG/ML
100 INJECTION, SOLUTION INTRAVENOUS PRN
Status: DISCONTINUED | OUTPATIENT
Start: 2021-02-07 | End: 2021-02-15 | Stop reason: HOSPADM

## 2021-02-07 RX ORDER — DEXTROSE MONOHYDRATE 25 G/50ML
12.5 INJECTION, SOLUTION INTRAVENOUS PRN
Status: DISCONTINUED | OUTPATIENT
Start: 2021-02-07 | End: 2021-02-15 | Stop reason: HOSPADM

## 2021-02-07 RX ADMIN — CETIRIZINE HYDROCHLORIDE 5 MG: 5 TABLET, FILM COATED ORAL at 09:41

## 2021-02-07 RX ADMIN — PANTOPRAZOLE SODIUM 20 MG: 20 TABLET, DELAYED RELEASE ORAL at 09:43

## 2021-02-07 RX ADMIN — ATORVASTATIN CALCIUM 20 MG: 20 TABLET, FILM COATED ORAL at 09:41

## 2021-02-07 RX ADMIN — Medication 1 TABLET: at 09:41

## 2021-02-07 RX ADMIN — BUDESONIDE AND FORMOTEROL FUMARATE DIHYDRATE 2 PUFF: 160; 4.5 AEROSOL RESPIRATORY (INHALATION) at 20:00

## 2021-02-07 RX ADMIN — BUDESONIDE AND FORMOTEROL FUMARATE DIHYDRATE 2 PUFF: 160; 4.5 AEROSOL RESPIRATORY (INHALATION) at 08:28

## 2021-02-07 RX ADMIN — ASPIRIN 81 MG: 81 TABLET, COATED ORAL at 09:41

## 2021-02-07 RX ADMIN — FUROSEMIDE 40 MG: 10 INJECTION, SOLUTION INTRAMUSCULAR; INTRAVENOUS at 23:55

## 2021-02-07 RX ADMIN — SERTRALINE HYDROCHLORIDE 50 MG: 50 TABLET ORAL at 09:44

## 2021-02-07 RX ADMIN — AZITHROMYCIN MONOHYDRATE 500 MG: 250 TABLET ORAL at 09:41

## 2021-02-07 RX ADMIN — CEFTRIAXONE SODIUM 1000 MG: 1 INJECTION, POWDER, FOR SOLUTION INTRAMUSCULAR; INTRAVENOUS at 16:53

## 2021-02-07 RX ADMIN — ENOXAPARIN SODIUM 30 MG: 30 INJECTION SUBCUTANEOUS at 09:41

## 2021-02-07 ASSESSMENT — PAIN SCALES - GENERAL: PAINLEVEL_OUTOF10: 0

## 2021-02-07 NOTE — H&P
@[facility]@    [facility]    HISTORY AND PHYSICAL EXAMINATION            Date:   2/6/2021  Patient name:  Adilson Clayton  Date of admission:  2/6/2021  2:27 PM  MRN:   [id number]  Account:  [de-identified]  YOB: 1945  PCP:    Ginnie Babinski, MD  Room:   [address] [place] [address]  Code Status:    Full Code    Chief Complaint:     Chief Complaint   Patient presents with    Dizziness     onset 2 weeks    Fall     difficulty walking    Aphasia     onset 4 days ago     \" Coughing a lot, and not doing too good\"    History Obtained From:     patient, electronic medical record    History of Present Illness:     Adilson Clayton is a 77 y.o. Non-/non  female who presents with Dizziness (onset 2 weeks), Fall (difficulty walking), and Aphasia (onset 4 days ago)   and is admitted to the hospital for the management of Community acquired pneumonia of right middle lobe of lung. This is a 78-year-old -American female with past medical history of hypertension, diabetes mellitus, chronic diastolic heart failure, COPD, chronic hypoxic respiratory failure noncompliant with home oxygen, nicotine dependence/current smoker. Ms. Epifanio Dixon was brought to the ED on 2/6/20/2021 by her family out of concern for generalized weakness, and several weeks of changes in mentation. On arrival to the ED she was afebrile, hypotensive with systolic blood pressures 05U to 80s, hypoxic 85% on room air. Lab work notable for WBC 9.5K, lactic acid 2.3, BUN/Cre 23/1.57, sodium 133, CO2 17, HST 58, proBNP 13,553, COVID-19 negative. Chest x-ray demonstrated bilateral airspace and interstitial opacities right greater than left, noncontrast CT of the head negative for any acute intracranial process, twelve-lead EKG sinus bradycardia rate of 54 without evidence of acute ischemic changes.   She was fluid resuscitated and responsive with rapid improvement in her blood pressure and 2-hour lactate of 1.5. Blood cultures drawn, started on Rocephin and Zithromax and transferred to the progressive care unit for admission. On my exam, Ms. Jerod Johns is very hard of hearing, however she is able to follow commands, speech is fluent and content appropriate, and is without any motor or sensory deficits. She lives alone and reports that she has been feeling poorly for the past 2 months, poor appetite, fatigue, not eating or drinking well. She has been smoking since a teenager and is down to 4 cigarettes a day, she is supposed to wear home oxygen however is not compliant, and she drinks a 16 ounce beer daily. She has family support, but to what extent is not clear. Review of systems is positive for cough, fatigue, poor appetite, at times early satiety, denies nausea, vomiting, diarrhea, chest pain. Past Medical History:     Past Medical History:   Diagnosis Date    [facility] [address]     [facility]     Diabetes mellitus ([facility])     MRSA (methicillin resistant staph aureus) culture positive 8/28/2014    sputum    Tobacco abuse 10/15/2019        Past Surgical History:     Past Surgical History:   Procedure Laterality Date    APPENDECTOMY      HYSTERECTOMY      JOINT REPLACEMENT Bilateral         Medications Prior to Admission:     Prior to Admission medications    Medication Sig Start Date End Date Taking?  Authorizing Provider   pantoprazole (PROTONIX) 20 MG tablet Take 1 tablet by mouth daily 8/25/20   Rebecca Chandra MD   amLODIPine (NORVASC) 5 MG tablet Take 1 tablet by mouth daily 8/26/20   Rebecca Chandra MD   albuterol (PROVENTIL) (2.5 MG/3ML) 0.083% nebulizer solution Take 3 mLs by nebulization every 6 hours as needed for Wheezing or Shortness of Breath 8/25/20   Rebecca Chandra MD   nicotine (NICODERM CQ) 21 MG/24HR Place 1 patch onto the skin daily as needed (if patient smokes/requests nicotine replacent therapy) 8/25/20 drinks of alcohol per week. Drug Use:  reports current drug use. Drug: Marijuana. Family History:     Family History   Problem Relation Age of Onset    Heart Disease Mother     Diabetes Father        Review of Systems:     Positive and Negative as described in HPI. CONSTITUTIONAL:  negative for fevers, chills, sweats, positive for fatigue, overall not feeling well   HEENT:  negative for vision, hearing changes, runny nose, throat pain  RESPIRATORY:  negative for shortness of breath, positive cough and congestion, denies wheezing  CARDIOVASCULAR:  negative for chest pain, palpitations  GASTROINTESTINAL:  negative for nausea, vomiting, diarrhea, constipation, change in bowel habits, abdominal pain   GENITOURINARY:  negative for difficulty of urination, burning with urination, frequency   INTEGUMENT:  negative for rash, skin lesions, easy bruising   HEMATOLOGIC/LYMPHATIC:  negative for swelling/edema   ALLERGIC/IMMUNOLOGIC:  negative for urticaria , itching  ENDOCRINE:  negative increase in drinking, increase in urination, hot or cold intolerance  MUSCULOSKELETAL:  negative joint pains, muscle aches, swelling of joints  NEUROLOGICAL:  negative for headaches, dizziness, lightheadedness, numbness, pain, tingling extremities. BEHAVIOR/PSYCH:  negative for depression, anxiety    Physical Exam:   /78   Pulse 69   Temp 97.2 °F (36.2 °C) (Oral)   Resp 16   Ht 5' 2\" (1.575 m)   Wt 132 lb (59.9 kg)   SpO2 96%   BMI 24.14 kg/m²   Temp (24hrs), Av.6 °F (35.9 °C), Min:96 °F (35.6 °C), Max:97.2 °F (36.2 °C)    No results for input(s): POCGLU in the last 72 hours.   No intake or output data in the 24 hours ending 21 6415    General Appearance:  alert, fatigued, unkept appearance  Mental status: oriented to person, place, and time  Head:  normocephalic, atraumatic  Eye: no icterus, redness, pupils equal and reactive, extraocular eye movements intact, conjunctiva clear  Ear: normal external ear, no discharge, [place]  Nose:  no drainage noted  Mouth: Oral mucosa and tongue, dry, poor dentition  Neck: Trachea midline, no JVD  Lungs: Symmetric and equal chest expansion, coarse breath sounds anterior, rales right posterior middle and lower lobes, no wheezing  Cardiovascular: S1-S2 regular rate and rhythm without murmur gallop or rub  Abdomen: Soft, nontender, nondistended, hypoactive bowel  Neurologic: There are no new focal motor or sensory deficits, normal muscle tone and bulk, no abnormal sensation, normal speech, c  Skin: No gross lesions, rashes, bruising or bleeding on exposed skin area, bilateral lower extremities diffusely dry skin  Extremities:  peripheral pulses palpable, no pedal edema or calf pain with palpation  Psych: normal affect     Investigations:      Laboratory Testing:  Recent Results (from the past 24 hour(s))   EKG 12 Lead    Collection Time: 02/06/21  2:46 PM   Result Value Ref Range    Ventricular Rate 54 BPM    Atrial Rate 54 BPM    P-R Interval 214 ms    QRS Duration 92 ms    Q-T Interval 544 ms    QTc Calculation (Bazett) 515 ms    P Axis 35 degrees    R Axis -50 degrees    T Axis -70 degrees   Basic Metabolic Panel    Collection Time: 02/06/21  3:05 PM   Result Value Ref Range    Glucose 137 (H) 70 - 99 mg/dL    BUN 23 8 - 23 mg/dL    CREATININE 1.57 (H) 0.50 - 0.90 mg/dL    Bun/Cre Ratio 15 9 - 20    Calcium 8.2 (L) 8.6 - 10.4 mg/dL    Sodium 133 (L) 135 - 144 mmol/L    Potassium 4.0 3.7 - 5.3 mmol/L    Chloride 105 98 - 107 mmol/L    CO2 17 (L) 20 - 31 mmol/L    Anion Gap 11 9 - 17 mmol/L    GFR Non-African American 32 (L) >60 mL/min    GFR  39 (L) >60 mL/min    GFR Comment          GFR Staging NOT REPORTED    CBC Auto Differential    Collection Time: 02/06/21  3:05 PM   Result Value Ref Range    WBC 9.5 3.5 - 11.3 k/uL    RBC 4.68 3.95 - 5.11 m/uL    Hemoglobin 11.4 (L) 11.9 - 15.1 g/dL    Hematocrit 37.0 36.3 - 47.1 %    MCV 79.1 (L) 82.6 - 102.9 fL    MCH 24.4 (L) 25.2 - 33.5 pg    MCHC 30.8 28.4 - 34.8 g/dL    RDW 26.7 (H) 11.8 - 14.4 %    Platelets 852 479 - 523 k/uL    MPV 9.8 8.1 - 13.5 fL    NRBC Automated 0.2 (H) 0.0 per 100 WBC    Differential Type NOT REPORTED     WBC Morphology NOT REPORTED     RBC Morphology NOT REPORTED     Platelet Estimate NOT REPORTED     Seg Neutrophils 66 (H) 36 - 65 %    Lymphocytes 24 24 - 43 %    Monocytes 8 3 - 12 %    Eosinophils % 1 1 - 4 %    Basophils 1 0 - 2 %    Immature Granulocytes 0 0 %    Segs Absolute 6.26 1.50 - 8.10 k/uL    Absolute Lymph # 2.28 1.10 - 3.70 k/uL    Absolute Mono # 0.76 0.10 - 1.20 k/uL    Absolute Eos # 0.10 0.00 - 0.44 k/uL    Basophils Absolute 0.10 0.00 - 0.20 k/uL    Absolute Immature Granulocyte 0.00 0.00 - 0.30 k/uL    Morphology ANISOCYTOSIS PRESENT     Morphology 1+ TARGET CELLS    Magnesium    Collection Time: 02/06/21  3:05 PM   Result Value Ref Range    Magnesium 1.9 1.6 - 2.6 mg/dL   APTT    Collection Time: 02/06/21  3:05 PM   Result Value Ref Range    PTT 35.4 (H) 23.9 - 33.8 sec   Protime-INR    Collection Time: 02/06/21  3:05 PM   Result Value Ref Range    Protime 16.6 (H) 11.5 - 14.2 sec    INR 1.4    Troponin    Collection Time: 02/06/21  3:05 PM   Result Value Ref Range    Troponin, High Sensitivity 58 (HH) 0 - 14 ng/L    Troponin T NOT REPORTED <0.03 ng/mL    Troponin Interp NOT REPORTED    Lactic Acid    Collection Time: 02/06/21  3:05 PM   Result Value Ref Range    Lactic Acid 2.3 (H) 0.5 - 2.2 mmol/L   Lipase    Collection Time: 02/06/21  3:05 PM   Result Value Ref Range    Lipase 7 (L) 13 - 60 U/L   Hepatic Function Panel    Collection Time: 02/06/21  3:05 PM   Result Value Ref Range    Albumin 2.3 (L) 3.5 - 5.2 g/dL    Alkaline Phosphatase 102 35 - 104 U/L    ALT 18 5 - 33 U/L    AST 41 (H) <32 U/L    Total Bilirubin 0.82 0.3 - 1.2 mg/dL    Bilirubin, Direct 0.53 (H) <0.31 mg/dL    Bilirubin, Indirect 0.29 0.00 - 1.00 mg/dL    Total Protein 7.5 6.4 - 8.3 g/dL    Globulin NOT REPORTED 1.5 - 3.8 g/dL    Albumin/Globulin Ratio NOT REPORTED 1.0 - 2.5   Acetaminophen Level    Collection Time: 02/06/21  3:05 PM   Result Value Ref Range    Acetaminophen Level <10 (L) 10 - 30 ug/mL   Ethanol    Collection Time: 02/06/21  3:05 PM   Result Value Ref Range    Ethanol <10 <10 mg/dL    Ethanol percent <4.644 <9.105 %   Salicylate    Collection Time: 02/06/21  3:05 PM   Result Value Ref Range    Salicylate Lvl <1 (L) 3 - 10 mg/dL   TOXIC TRICYCLIC SC,B    Collection Time: 02/06/21  3:05 PM   Result Value Ref Range    Toxic Tricyclic Sc,Blood NEGATIVE NEGATIVE   Ammonia    Collection Time: 02/06/21  3:05 PM   Result Value Ref Range    Ammonia 36 11 - 51 umol/L   Urinalysis Reflex to Culture    Collection Time: 02/06/21  3:18 PM    Specimen: Urine, clean catch   Result Value Ref Range    Color, UA YELLOW YELLOW    Turbidity UA CLOUDY (A) CLEAR    Glucose, Ur NEGATIVE NEGATIVE    Bilirubin Urine (A) NEGATIVE     Presumptive positive. Unable to confirm due to unavailability of reagent. Ketones, Urine NEGATIVE NEGATIVE    Specific Gravity, UA 1.025 1.005 - 1.030    Urine Hgb NEGATIVE NEGATIVE    pH, UA 6.0 5.0 - 8.0    Protein, UA 2+ (A) NEGATIVE    Urobilinogen, Urine Normal Normal    Nitrite, Urine NEGATIVE NEGATIVE    Leukocyte Esterase, Urine NEGATIVE NEGATIVE    Urinalysis Comments NOT REPORTED    COVID-19    Collection Time: 02/06/21  3:18 PM    Specimen: Other   Result Value Ref Range    SARS-CoV-2          SARS-CoV-2, Rapid Not Detected Not Detected    Source . NASOPHARYNGEAL SWAB     SARS-CoV-2         Microscopic Urinalysis    Collection Time: 02/06/21  3:18 PM   Result Value Ref Range    -          WBC, UA 2 TO 5 0 - 5 /HPF    RBC, UA 0 TO 2 0 - 2 /HPF    Casts UA NOT REPORTED /LPF    Crystals, UA NOT REPORTED None /HPF    Epithelial Cells UA 0 TO 2 0 - 5 /HPF    Renal Epithelial, UA NOT REPORTED 0 /HPF    Bacteria, UA MANY (A) None    Mucus, UA NOT REPORTED None    Trichomonas, UA NOT REPORTED None Amorphous, UA NOT REPORTED None    Other Observations UA NOT REPORTED NOT REQ. Yeast, UA NOT REPORTED None   POC PANEL (G3)-PRINCESS    Collection Time: 02/06/21  3:45 PM   Result Value Ref Range    pH, Princess 7.31 (L) 7.32 - 7.42    pCO2, Princess 37 (L) 39 - 55 mm Hg    pO2, Princess 42 30 - 50 mm Hg    Total CO2, Venous 20 (L) 25 - 31 mmol/L    HCO3, Venous 18.6 (L) 24 - 30 mmol/L    Negative Base Excess, Princess 8 (H) 0.0 - 2.0    Positive Base Excess, Princess NOT REPORTED 0.0 - 2.0    O2 Sat, Princess 73 %    Pt Temp NOT REPORTED     O2 Device/Flow/% NOT REPORTED     FIO2 NOT REPORTED     POC pH Temp NOT REPORTED     POC pCO2 Temp NOT REPORTED mm Hg    POC pO2 Temp NOT REPORTED mm Hg   Troponin    Collection Time: 02/06/21  5:05 PM   Result Value Ref Range    Troponin, High Sensitivity 44 (H) 0 - 14 ng/L    Troponin T NOT REPORTED <0.03 ng/mL    Troponin Interp NOT REPORTED    Lactic Acid    Collection Time: 02/06/21  5:05 PM   Result Value Ref Range    Lactic Acid 1.5 0.5 - 2.2 mmol/L   Brain Natriuretic Peptide    Collection Time: 02/06/21  5:05 PM   Result Value Ref Range    Pro-BNP 13,553 (H) <300 pg/mL    BNP Interpretation Pro-BNP Reference Range:    Troponin    Collection Time: 02/06/21  8:58 PM   Result Value Ref Range    Troponin, High Sensitivity 43 (H) 0 - 14 ng/L    Troponin T NOT REPORTED <0.03 ng/mL    Troponin Interp NOT REPORTED        Imaging/Diagnostics:  Ct Head Wo Contrast    Result Date: 2/6/2021  Chronic findings in the brain without acute CT abnormality identified. Xr Chest Portable    Result Date: 2/6/2021  Bilateral airspace and interstitial opacities either reflecting pulmonary edema versus pneumonia.        Assessment :      Hospital Problems           Last Modified POA    * (Principal) Community acquired pneumonia of right middle lobe of lung 2/6/2021 Yes    COPD (chronic obstructive pulmonary disease) ([facility] [address] 2/6/2021 Yes    Type 2 diabetes mellitus without complication, without long-term current use of insulin ([facility] [address] 2/6/2021 Yes    Chronic diastolic heart failure ([address] 2/6/2021 Yes    Elevated troponin 2/6/2021 Yes    Chronic respiratory failure ([address] 2/6/2021 Yes    Mild malnutrition ([facility] [address] 2/6/2021 Yes    Hypotension 2/6/2021 Yes    DEONTE (acute kidney injury) ([facility] [address] 2/6/2021 Yes    Current every day smoker 2/6/2021 Yes    Essential hypertension 2/6/2021 Yes    Altered mental status 2/6/2021 Yes          Plan:      Patient status inpatient in the Progressive Unit/Step down     1. Continue Rocephin and Zithromax, follow-up blood cultures    2. Supplemental oxygen via nasal cannula, continue home inhalers with albuterol nebulizer every 6 hours as needed. Has seen Dr. Larissa Rod in the past.  Consider consult if no improvement. 3.   DEONTE: Creatinine 1.57 (baseline < 1.0). Prerenal, hypotension 2/2 dehydration. Suspect mild hyponatremia, DEONTE and mild metabolic acidosis will improve with gentle hydration. Hold diuretics and antihypertensive therapy. 4.  Chronic diastolic heart failure, elevated proBNP. Most recent surface echocardiogram October 2019 demonstrated LVEF 55% with grade 1 diastolic dysfunction, mild MR, normal RV size and function. EKG without ischemic changes, elevated troponin of unclear significance. She appears clinically dry on exam.    5.  DM Type 2: Hold Metformin, blood glucose check before meals and at bedtime. Check hemoglobin A1c. Trend glucose hold off on corrective scale. 6.  Altered mental status: Improved since arrival.  No focal deficits    7. Consult social work to evaluate for possible assistance at home           Consultations:   IP CONSULT TO HOSPITALIST    Patient is admitted as inpatient status because of co-morbidities listed above, severity of signs and symptoms as outlined, requirement for current medical therapies and most importantly because of direct risk to patient if care not provided in a hospital setting. Expected length of stay > 48 hours.     [place] ABDON Munoz - KRYSTLE  2/6/2021  11:38 PM    Copy sent to Dr. Kiersten Ortiz MD

## 2021-02-07 NOTE — PROGRESS NOTES
[facility]  Office: [address], DO, Gely Bishop, DO, Darin Davis, DO, Alva Bruno, DO, Jose Zambrano MD, Harrison Chavez MD, Elise Levine MD, Jon Scales MD, Ana Fernando MD, Delia Alfred MD, Melanie Chaidez MD, Jaclyn Holguin MD, Alexandru Isaac MD, Rashi Berger DO, Lincoln Olson MD, Stephanie Mccauley MD, Marquis Nguyen DO, Delano White MD,  Lesli Hyde DO, Tiki Mix MD, Mariama Thibodeaux MD, Alber Woods, SAMANTHA, [address], [facility], Dashawn Johnston, CNP, Leighann Epstein, CNS, Ruddy Willis, CNP, Tor Jean Baptiste, CNP, Duncan Drummond, CNP, Brittny Rodriguez, CNP, Carter Gallo, CNP, Jamil Velez PA-C, Marisel Jackson, [facility], Angel Muhammad, CNP, Hafsa Krishnamurthy, CNP, Lucius Iraheta, CNP, Maida Oconnell, [facility], Joseph Juarez, Agustín Crawford    Progress Note    2/7/2021    3:01 PM    Name:   Zacarias Valadez  MRN:     [id number]     Acct:      [de-identified]   Room:   [address] [place] [address] Day:  1  Admit Date:  2/6/2021  2:27 PM    PCP:   Todd Nuñez MD  Code Status:  Full Code    Subjective:     C/C:   Chief Complaint   Patient presents with   Elizabeth Shruti Dizziness     onset 2 weeks    Fall     difficulty walking    Aphasia     onset 4 days ago     Interval History Status: improved. Patient seen and examined this afternoon. Seen up in bed, about to eat lunch. States that she feels significantly improved from yesterday. Blood pressure significantly improved. She notes that she drinks 3-6 beers daily. Continues to smoke cigarettes. Denies chest pain, shortness of breath, abdominal pain, nausea, vomiting. She does endorse a cough, which been present for quite some time. Brief History:     Zacarias Valadez is a 77 y.o.  Non-/non  female who presents with Dizziness (onset 2 weeks), Fall (difficulty walking), and Aphasia (onset 4 days ago)   and is admitted to the hospital for the management of Community acquired pneumonia of right middle lobe of lung.     This is a 52-year-old -American female with past medical history of hypertension, diabetes mellitus, chronic diastolic heart failure, COPD, chronic hypoxic respiratory failure noncompliant with home oxygen, nicotine dependence/current smoker.      Ms. Mary Jane Tam was brought to the ED on 2/6/20/2021 by her family out of concern for generalized weakness, and several weeks of changes in mentation. On arrival to the ED she was afebrile, hypotensive with systolic blood pressures 34J to 80s, hypoxic 85% on room air. Lab work notable for WBC 9.5K, lactic acid 2.3, BUN/Cre 23/1.57, sodium 133, CO2 17, HST 58, proBNP 13,553, COVID-19 negative.     Chest x-ray demonstrated bilateral airspace and interstitial opacities right greater than left, noncontrast CT of the head negative for any acute intracranial process, twelve-lead EKG sinus bradycardia rate of 54 without evidence of acute ischemic changes. She was fluid resuscitated and responsive with rapid improvement in her blood pressure and 2-hour lactate of 1.5. Blood cultures drawn, started on Rocephin and Zithromax and transferred to the progressive care unit for admission.      On my exam, Ms. Mary Jane Tam is very hard of hearing, however she is able to follow commands, speech is fluent and content appropriate, and is without any motor or sensory deficits. She lives alone and reports that she has been feeling poorly for the past 2 months, poor appetite, fatigue, not eating or drinking well. She has been smoking since a teenager and is down to 4 cigarettes a day, she is supposed to wear home oxygen however is not compliant, and she drinks a 16 ounce beer daily. She has family support, but to what extent is not clear. Review of systems is positive for cough, fatigue, poor appetite, at times early satiety, denies nausea, vomiting, diarrhea, chest pain.      Review of Systems:     Constitutional: Reports generalized weakness; negative for chills, fevers, sweats  Respiratory: reports cough; negative for dyspnea on exertion, shortness of breath, wheezing  Cardiovascular:  negative for chest pain, chest pressure/discomfort, lower extremity edema, palpitations  Gastrointestinal:  negative for abdominal pain, constipation, diarrhea, nausea, vomiting  Neurological:  negative for dizziness, headache    Medications: Allergies: Allergies   Allergen Reactions    Tiotropium Anaphylaxis and Swelling    Spiriva Handihaler [Tiotropium Bromide Monohydrate] Swelling       Current Meds:   Scheduled Meds:    aspirin  81 mg Oral Daily    atorvastatin  20 mg Oral Daily    budesonide-formoterol  2 puff Inhalation BID    cetirizine  5 mg Oral Daily    pantoprazole  20 mg Oral Daily    sertraline  50 mg Oral Daily    traZODone  100 mg Oral Nightly    sodium chloride flush  10 mL Intravenous 2 times per day    enoxaparin  30 mg Subcutaneous Daily    cefTRIAXone (ROCEPHIN) IV  1,000 mg Intravenous Q24H    [START ON 2/8/2021] azithromycin  250 mg Oral Daily    calcium carb-cholecalciferol  1 tablet Oral Daily     Continuous Infusions:    sodium chloride 75 mL/hr at 02/06/21 2221     PRN Meds: albuterol, tiZANidine, sodium chloride flush, promethazine **OR** ondansetron, polyethylene glycol, nicotine, acetaminophen **OR** acetaminophen    Data:     Past Medical History:   has a past medical history of CHF (congestive heart failure) ([facility] [address] COPD (chronic obstructive pulmonary disease) ([facility] Diabetes mellitus ([facility] MRSA (methicillin resistant staph aureus) culture positive, and Tobacco abuse. Social History:   reports that she has been smoking cigarettes. She has been smoking about 0.50 packs per day. She has never used smokeless tobacco. She reports current alcohol use of about 70.0 standard drinks of alcohol per week. She reports current drug use. Drug: Marijuana.      Family History:   Family History   Problem Relation Age of Onset    Heart Disease Mother     Diabetes Father        Vitals:  /76   Pulse 85   Temp 97.9 °F (36.6 °C) (Oral)   Resp 18   Ht 5' 2\" (1.575 m)   Wt 132 lb (59.9 kg)   SpO2 98%   BMI 24.14 kg/m²   Temp (24hrs), Av.6 °F (36.4 °C), Min:97.2 °F (36.2 °C), Max:97.9 °F (36.6 °C)    Recent Labs     21  0848 21  1154   POCGLU 79 100       I/O (24Hr): Intake/Output Summary (Last 24 hours) at 2021 1501  Last data filed at 2021 0502  Gross per 24 hour   Intake 1037 ml   Output 350 ml   Net 687 ml       Labs:  Hematology:  Recent Labs     21  1505 21  0308   WBC 9.5 7.0   RBC 4.68 4.67   HGB 11.4* 11.3*   HCT 37.0 36.2*   MCV 79.1* 77.5*   MCH 24.4* 24.2*   MCHC 30.8 31.2   RDW 26.7* 26.1*    199   MPV 9.8 10.5   INR 1.4  --      Chemistry:  Recent Labs     21  1505 21  1705 21  2058 21  0308 21  0841   *  --   --  135  --    K 4.0  --   --  3.8  --      --   --  108*  --    CO2 17*  --   --  18*  --    GLUCOSE 137*  --   --  86  --    BUN 23  --   --  21  --    CREATININE 1.57*  --   --  1.27*  --    MG 1.9  --   --   --   --    ANIONGAP 11  --   --  9  --    LABGLOM 32*  --   --  41*  --    GFRAA 39*  --   --  50*  --    CALCIUM 8.2*  --   --  7.7*  --    PROBNP  --  13,553*  --   --   --    TROPHS 58* 44* 43* 49* 53*   CKTOTAL  --   --   --  48  --      Recent Labs     21  1505 21  0308 21  0848 21  1154   PROT 7.5  --   --   --    LABALBU 2.3*  --   --   --    LABA1C  --  6.2*  --   --    AST 41*  --   --   --    ALT 18  --   --   --    ALKPHOS 102  --   --   --    BILITOT 0.82  --   --   --    BILIDIR 0.53*  --   --   --    AMMONIA 36  --   --   --    LIPASE 7*  --   --   --    URICACID  --  11.1*  --   --    POCGLU  --   --  79 100     ABG:  Lab Results   Component Value Date    POCPH 7.40 2014    PH  2014     DISREGARD RESULTS. PATIENT NUMBER WAS INCORRECTLY ASSIGNED.     POCPCO2 44 2014 PCO2  09/04/2014     DISREGARD RESULTS. PATIENT NUMBER WAS INCORRECTLY ASSIGNED. POCPO2 65 09/04/2014    PO2  09/04/2014     DISREGARD RESULTS. PATIENT NUMBER WAS INCORRECTLY ASSIGNED. POCHCO3 27.4 09/04/2014    HCO3  09/04/2014     DISREGARD RESULTS. PATIENT NUMBER WAS INCORRECTLY ASSIGNED. NBEA NOT REPORTED 09/04/2014    PBEA 3 09/04/2014    [id number] 29 09/04/2014    [id number] 92 09/04/2014    O2SAT  09/04/2014     DISREGARD RESULTS. PATIENT NUMBER WAS INCORRECTLY ASSIGNED. FIO2 NOT REPORTED 02/06/2021     Lab Results   Component Value Date/Time    SPECIAL L AC 7 ML 02/06/2021 03:09 PM     Lab Results   Component Value Date/Time    CULTURE NO GROWTH 12 HOURS 02/06/2021 03:09 PM       Radiology:  Ct Head Wo Contrast    Result Date: 2/6/2021  Chronic findings in the brain without acute CT abnormality identified. Xr Chest Portable    Result Date: 2/6/2021  Bilateral airspace and interstitial opacities either reflecting pulmonary edema versus pneumonia.        Physical Examination:        General appearance:  alert, cooperative and no distress, elderly -American female sitting up in bed  Mental Status:  oriented to person, place and time and normal affect  Lungs:  clear to auscultation bilaterally, no wheezes or rhonchi appreciated normal effort  Heart:  regular rate and rhythm, no murmur, ectopy noted  Abdomen:  soft, nontender, nondistended, normal bowel sounds, no masses, hepatomegaly, splenomegaly  Extremities:  no edema, redness, tenderness in the calves  Skin:  no gross lesions, rashes, induration    Assessment:        Hospital Problems           Last Modified POA    * (Principal) Community acquired pneumonia of right middle lobe of lung 2/6/2021 Yes    COPD (chronic obstructive pulmonary disease) ([address] 2/6/2021 Yes    Type 2 diabetes mellitus without complication, without long-term current use of insulin ([address] 2/6/2021 Yes    Chronic diastolic heart failure ([address] 2/6/2021 Yes Elevated troponin 2/6/2021 Yes    Chronic respiratory failure ([facility] [address] 2/6/2021 Yes    Mild malnutrition ([facility] [address] 2/6/2021 Yes    Hypotension 2/6/2021 Yes    DEONTE (acute kidney injury) ([facility] [address] 2/6/2021 Yes    Current every day smoker 2/6/2021 Yes    Essential hypertension 2/6/2021 Yes    Altered mental status 2/6/2021 Yes          Plan:        1. Continue coverage for community-acquired pneumoniaRocephin and azithromycin  2. Continue supplemental oxygen to maintain SPO2 88 to 96%. Wean as tolerated. 3. Renal function is improving. Is likely that the patient was taking too much antihypertensive medication, leading to hypotension, acute kidney injury, and the symptoms that predominantly brought her to the hospital.  4. Hypertensionwe will reinstitute her antihypertensives as blood pressure allows  5. Heart failure with preserved ejection fractionrecheck 2D echo tomorrow. 6. Elevated troponinlikely secondary to DEONTE and hypovolemia. Checking 2D echo tomorrow  7. Continue IV fluids  8. Type 2 diabetes mellitusinsulin sliding scale for glycemic control  9.  We will discuss with family today    Rowan Moore DO  2/7/2021  3:01 PM

## 2021-02-07 NOTE — PROGRESS NOTES
The potassium/magnesium sliding scale has been automatically discontinued per [facility] approved policy because the patient has decreased renal function (CrCl<30 ml/min). The patient's current K/Mag levels are currently:    Recent Labs     02/06/21  1505   K 4.0   MG 1.9       Estimated Creatinine Clearance: 24 mL/min (A) (based on SCr of 1.57 mg/dL (H)). For patients with decreased renal function (below 30ml/min) needing potassium/magnesium supplementation, please order individual bolus doses with appropriate monitoring. Please contact the inpatient pharmacy at [phone] with any concerns. Thank you.     PROSPER RODRIGUEZ  2/6/2021  8:49 PM

## 2021-02-07 NOTE — FLOWSHEET NOTE
Patient was sleeping. Silent prayer was shared.    leaves prayer card for possible follow up.     02/07/21 1343   Encounter Summary   Services provided to: Patient   Referral/Consult From: [facility]   (2-7-21 PT: was sleeping)   Complexity of Encounter Low   Length of Encounter 15 minutes   Routine   Type Initial   Assessment Sleeping   Intervention Prayer

## 2021-02-07 NOTE — PROGRESS NOTES
Patient admitted to room 1021. VS taken, telemetry placed and call light given/within reach. Patient A&O and given room orientation. Orders released/reviewed, initial assessment completed and navigator started. See chart for more detail.

## 2021-02-08 PROBLEM — E43 SEVERE MALNUTRITION (HCC): Status: ACTIVE | Noted: 2021-02-08

## 2021-02-08 PROBLEM — I50.21 ACUTE SYSTOLIC HEART FAILURE (HCC): Status: ACTIVE | Noted: 2021-02-08

## 2021-02-08 LAB
ABSOLUTE EOS #: 0.08 K/UL (ref 0–0.44)
ABSOLUTE IMMATURE GRANULOCYTE: 0.04 K/UL (ref 0–0.3)
ABSOLUTE LYMPH #: 2.08 K/UL (ref 1.1–3.7)
ABSOLUTE MONO #: 0.82 K/UL (ref 0.1–1.2)
ANION GAP SERPL CALCULATED.3IONS-SCNC: 11 MMOL/L (ref 9–17)
ANTI-XA UNFRAC HEPARIN: 0.6 IU/L (ref 0.3–0.7)
BASOPHILS # BLD: 1 % (ref 0–2)
BASOPHILS ABSOLUTE: 0.07 K/UL (ref 0–0.2)
BUN BLDV-MCNC: 19 MG/DL (ref 8–23)
BUN/CREAT BLD: 19 (ref 9–20)
CALCIUM SERPL-MCNC: 8.3 MG/DL (ref 8.6–10.4)
CHLORIDE BLD-SCNC: 107 MMOL/L (ref 98–107)
CO2: 17 MMOL/L (ref 20–31)
CREAT SERPL-MCNC: 1.01 MG/DL (ref 0.5–0.9)
DIFFERENTIAL TYPE: ABNORMAL
EKG ATRIAL RATE: 54 BPM
EKG ATRIAL RATE: 93 BPM
EKG P AXIS: 35 DEGREES
EKG P AXIS: 38 DEGREES
EKG P-R INTERVAL: 210 MS
EKG P-R INTERVAL: 214 MS
EKG Q-T INTERVAL: 372 MS
EKG Q-T INTERVAL: 544 MS
EKG QRS DURATION: 86 MS
EKG QRS DURATION: 92 MS
EKG QTC CALCULATION (BAZETT): 462 MS
EKG QTC CALCULATION (BAZETT): 515 MS
EKG R AXIS: -42 DEGREES
EKG R AXIS: -50 DEGREES
EKG T AXIS: -70 DEGREES
EKG T AXIS: 63 DEGREES
EKG VENTRICULAR RATE: 54 BPM
EKG VENTRICULAR RATE: 93 BPM
EOSINOPHILS RELATIVE PERCENT: 1 % (ref 1–4)
GFR AFRICAN AMERICAN: >60 ML/MIN
GFR NON-AFRICAN AMERICAN: 53 ML/MIN
GFR SERPL CREATININE-BSD FRML MDRD: ABNORMAL ML/MIN/{1.73_M2}
GFR SERPL CREATININE-BSD FRML MDRD: ABNORMAL ML/MIN/{1.73_M2}
GLUCOSE BLD-MCNC: 131 MG/DL (ref 65–105)
GLUCOSE BLD-MCNC: 174 MG/DL (ref 65–105)
GLUCOSE BLD-MCNC: 86 MG/DL (ref 65–105)
GLUCOSE BLD-MCNC: 96 MG/DL (ref 70–99)
GLUCOSE BLD-MCNC: 98 MG/DL (ref 65–105)
HCT VFR BLD CALC: 38.7 % (ref 36.3–47.1)
HEMOGLOBIN: 12 G/DL (ref 11.9–15.1)
IMMATURE GRANULOCYTES: 0 %
INR BLD: 1.3
LV EF: 20 %
LVEF MODALITY: NORMAL
LYMPHOCYTES # BLD: 23 % (ref 24–43)
MCH RBC QN AUTO: 24.8 PG (ref 25.2–33.5)
MCHC RBC AUTO-ENTMCNC: 31 G/DL (ref 28.4–34.8)
MCV RBC AUTO: 80 FL (ref 82.6–102.9)
MONOCYTES # BLD: 9 % (ref 3–12)
NRBC AUTOMATED: 0 PER 100 WBC
PARTIAL THROMBOPLASTIN TIME: 40 SEC (ref 23.9–33.8)
PDW BLD-RTO: 27.1 % (ref 11.8–14.4)
PLATELET # BLD: 280 K/UL (ref 138–453)
PLATELET ESTIMATE: ABNORMAL
PMV BLD AUTO: 10.4 FL (ref 8.1–13.5)
POTASSIUM SERPL-SCNC: 3.7 MMOL/L (ref 3.7–5.3)
PROCALCITONIN: 0.07 NG/ML
PROTHROMBIN TIME: 16.4 SEC (ref 11.5–14.2)
RBC # BLD: 4.84 M/UL (ref 3.95–5.11)
RBC # BLD: ABNORMAL 10*6/UL
SEG NEUTROPHILS: 66 % (ref 36–65)
SEGMENTED NEUTROPHILS ABSOLUTE COUNT: 5.92 K/UL (ref 1.5–8.1)
SODIUM BLD-SCNC: 135 MMOL/L (ref 135–144)
WBC # BLD: 9 K/UL (ref 3.5–11.3)
WBC # BLD: ABNORMAL 10*3/UL

## 2021-02-08 PROCEDURE — 99233 SBSQ HOSP IP/OBS HIGH 50: CPT | Performed by: INTERNAL MEDICINE

## 2021-02-08 PROCEDURE — 84145 PROCALCITONIN (PCT): CPT

## 2021-02-08 PROCEDURE — 82947 ASSAY GLUCOSE BLOOD QUANT: CPT

## 2021-02-08 PROCEDURE — 6360000002 HC RX W HCPCS: Performed by: NURSE PRACTITIONER

## 2021-02-08 PROCEDURE — 85520 HEPARIN ASSAY: CPT

## 2021-02-08 PROCEDURE — 85730 THROMBOPLASTIN TIME PARTIAL: CPT

## 2021-02-08 PROCEDURE — 85610 PROTHROMBIN TIME: CPT

## 2021-02-08 PROCEDURE — 2700000000 HC OXYGEN THERAPY PER DAY

## 2021-02-08 PROCEDURE — 2060000000 HC ICU INTERMEDIATE R&B

## 2021-02-08 PROCEDURE — 93010 ELECTROCARDIOGRAM REPORT: CPT | Performed by: INTERNAL MEDICINE

## 2021-02-08 PROCEDURE — 6370000000 HC RX 637 (ALT 250 FOR IP): Performed by: INTERNAL MEDICINE

## 2021-02-08 PROCEDURE — 6360000002 HC RX W HCPCS: Performed by: INTERNAL MEDICINE

## 2021-02-08 PROCEDURE — 80048 BASIC METABOLIC PNL TOTAL CA: CPT

## 2021-02-08 PROCEDURE — 94660 CPAP INITIATION&MGMT: CPT

## 2021-02-08 PROCEDURE — 85025 COMPLETE CBC W/AUTO DIFF WBC: CPT

## 2021-02-08 PROCEDURE — 2580000003 HC RX 258: Performed by: INTERNAL MEDICINE

## 2021-02-08 PROCEDURE — 36415 COLL VENOUS BLD VENIPUNCTURE: CPT

## 2021-02-08 PROCEDURE — 93306 TTE W/DOPPLER COMPLETE: CPT

## 2021-02-08 PROCEDURE — 94640 AIRWAY INHALATION TREATMENT: CPT

## 2021-02-08 PROCEDURE — 94761 N-INVAS EAR/PLS OXIMETRY MLT: CPT

## 2021-02-08 RX ORDER — ALBUTEROL SULFATE 90 UG/1
2 AEROSOL, METERED RESPIRATORY (INHALATION) EVERY 6 HOURS PRN
Status: DISCONTINUED | OUTPATIENT
Start: 2021-02-08 | End: 2021-02-15 | Stop reason: HOSPADM

## 2021-02-08 RX ORDER — CARVEDILOL 6.25 MG/1
6.25 TABLET ORAL 2 TIMES DAILY WITH MEALS
Status: DISCONTINUED | OUTPATIENT
Start: 2021-02-08 | End: 2021-02-09

## 2021-02-08 RX ORDER — BUDESONIDE AND FORMOTEROL FUMARATE DIHYDRATE 160; 4.5 UG/1; UG/1
2 AEROSOL RESPIRATORY (INHALATION) 2 TIMES DAILY
Status: ON HOLD | COMMUNITY
End: 2021-02-22 | Stop reason: HOSPADM

## 2021-02-08 RX ORDER — HEPARIN SODIUM 1000 [USP'U]/ML
30 INJECTION, SOLUTION INTRAVENOUS; SUBCUTANEOUS PRN
Status: DISCONTINUED | OUTPATIENT
Start: 2021-02-08 | End: 2021-02-09 | Stop reason: ALTCHOICE

## 2021-02-08 RX ORDER — LISINOPRIL 5 MG/1
5 TABLET ORAL DAILY
Status: DISCONTINUED | OUTPATIENT
Start: 2021-02-08 | End: 2021-02-09

## 2021-02-08 RX ORDER — DIPHENHYDRAMINE HYDROCHLORIDE 50 MG/ML
25 INJECTION INTRAMUSCULAR; INTRAVENOUS ONCE
Status: COMPLETED | OUTPATIENT
Start: 2021-02-08 | End: 2021-02-08

## 2021-02-08 RX ORDER — TRAZODONE HYDROCHLORIDE 150 MG/1
150 TABLET ORAL NIGHTLY
Status: ON HOLD | COMMUNITY
End: 2021-02-22 | Stop reason: HOSPADM

## 2021-02-08 RX ORDER — HEPARIN SODIUM 1000 [USP'U]/ML
60 INJECTION, SOLUTION INTRAVENOUS; SUBCUTANEOUS ONCE
Status: COMPLETED | OUTPATIENT
Start: 2021-02-08 | End: 2021-02-08

## 2021-02-08 RX ORDER — FUROSEMIDE 10 MG/ML
60 INJECTION INTRAMUSCULAR; INTRAVENOUS DAILY
Status: DISCONTINUED | OUTPATIENT
Start: 2021-02-09 | End: 2021-02-13

## 2021-02-08 RX ORDER — IBUPROFEN 800 MG/1
800 TABLET ORAL
Status: ON HOLD | COMMUNITY
End: 2021-02-25 | Stop reason: HOSPADM

## 2021-02-08 RX ORDER — FUROSEMIDE 10 MG/ML
40 INJECTION INTRAMUSCULAR; INTRAVENOUS 2 TIMES DAILY
Status: DISCONTINUED | OUTPATIENT
Start: 2021-02-08 | End: 2021-02-08

## 2021-02-08 RX ORDER — FUROSEMIDE 10 MG/ML
60 INJECTION INTRAMUSCULAR; INTRAVENOUS 2 TIMES DAILY
Status: DISCONTINUED | OUTPATIENT
Start: 2021-02-08 | End: 2021-02-08

## 2021-02-08 RX ORDER — FUROSEMIDE 20 MG/1
20 TABLET ORAL DAILY
Status: ON HOLD | COMMUNITY
End: 2021-02-22 | Stop reason: SDUPTHER

## 2021-02-08 RX ORDER — HEPARIN SODIUM 10000 [USP'U]/100ML
5-30 INJECTION, SOLUTION INTRAVENOUS CONTINUOUS
Status: DISCONTINUED | OUTPATIENT
Start: 2021-02-08 | End: 2021-02-09

## 2021-02-08 RX ORDER — HEPARIN SODIUM 1000 [USP'U]/ML
60 INJECTION, SOLUTION INTRAVENOUS; SUBCUTANEOUS PRN
Status: DISCONTINUED | OUTPATIENT
Start: 2021-02-08 | End: 2021-02-09 | Stop reason: ALTCHOICE

## 2021-02-08 RX ADMIN — INSULIN LISPRO 1 UNITS: 100 INJECTION, SOLUTION INTRAVENOUS; SUBCUTANEOUS at 21:33

## 2021-02-08 RX ADMIN — ASPIRIN 81 MG: 81 TABLET, COATED ORAL at 09:39

## 2021-02-08 RX ADMIN — ENOXAPARIN SODIUM 30 MG: 30 INJECTION SUBCUTANEOUS at 09:38

## 2021-02-08 RX ADMIN — TRAZODONE HYDROCHLORIDE 100 MG: 100 TABLET ORAL at 21:33

## 2021-02-08 RX ADMIN — DIPHENHYDRAMINE HYDROCHLORIDE 25 MG: 50 INJECTION, SOLUTION INTRAMUSCULAR; INTRAVENOUS at 04:45

## 2021-02-08 RX ADMIN — HEPARIN SODIUM 3960 UNITS: 1000 INJECTION INTRAVENOUS; SUBCUTANEOUS at 16:33

## 2021-02-08 RX ADMIN — Medication 1 TABLET: at 09:39

## 2021-02-08 RX ADMIN — CETIRIZINE HYDROCHLORIDE 5 MG: 5 TABLET, FILM COATED ORAL at 09:39

## 2021-02-08 RX ADMIN — AZITHROMYCIN MONOHYDRATE 250 MG: 250 TABLET ORAL at 09:39

## 2021-02-08 RX ADMIN — FUROSEMIDE 60 MG: 10 INJECTION, SOLUTION INTRAMUSCULAR; INTRAVENOUS at 17:33

## 2021-02-08 RX ADMIN — SERTRALINE HYDROCHLORIDE 50 MG: 50 TABLET ORAL at 09:39

## 2021-02-08 RX ADMIN — PANTOPRAZOLE SODIUM 20 MG: 20 TABLET, DELAYED RELEASE ORAL at 06:43

## 2021-02-08 RX ADMIN — FUROSEMIDE 40 MG: 10 INJECTION, SOLUTION INTRAMUSCULAR; INTRAVENOUS at 09:38

## 2021-02-08 RX ADMIN — BUDESONIDE AND FORMOTEROL FUMARATE DIHYDRATE 2 PUFF: 160; 4.5 AEROSOL RESPIRATORY (INHALATION) at 09:22

## 2021-02-08 RX ADMIN — CARVEDILOL 6.25 MG: 6.25 TABLET, FILM COATED ORAL at 22:00

## 2021-02-08 RX ADMIN — HEPARIN SODIUM AND DEXTROSE 12 UNITS/KG/HR: 10000; 5 INJECTION INTRAVENOUS at 16:33

## 2021-02-08 RX ADMIN — ATORVASTATIN CALCIUM 20 MG: 20 TABLET, FILM COATED ORAL at 09:39

## 2021-02-08 RX ADMIN — LISINOPRIL 5 MG: 5 TABLET ORAL at 22:00

## 2021-02-08 RX ADMIN — SODIUM CHLORIDE, PRESERVATIVE FREE 10 ML: 5 INJECTION INTRAVENOUS at 09:41

## 2021-02-08 RX ADMIN — CEFTRIAXONE SODIUM 1000 MG: 1 INJECTION, POWDER, FOR SOLUTION INTRAMUSCULAR; INTRAVENOUS at 17:32

## 2021-02-08 ASSESSMENT — PAIN SCALES - GENERAL: PAINLEVEL_OUTOF10: 0

## 2021-02-08 NOTE — CONSULTS
Section of Cardiology   Consult Note      Reason for Consult: Congestive heart failure  Requesting Physician: Ramonita Guerrero DO    CHIEF COMPLAINT: Shortness of breath    History Obtained From:  patient, electronic medical record, patient's nurse    HISTORY OF PRESENT ILLNESS:      The patient is a 77 y.o. female with significant past medical history of COPD who presents with shortness of breath. The patient was admitted and placed on BiPAP and Lasix. Today's echocardiogram reported to have severe LV dysfunction. Her diuresing is successful and according to the record the patient made close to 3 L of urine however her Lasix was increased and was started on IV heparin. The patient states she is able to sleep in a flat position but sometimes she wakes up feeling out of breath. Denies any edema at this point in time. Denies any kind of chest pain. She is a smoker and alcohol drinker. She is on home oxygen 3 liters nasal cannula at night. At the time I saw the patient she has involuntary movement similar to Parkinson disease patients. The patient tells me that she never saw a cardiologist or pulmonologist.  The patient denies palpitation. No dizziness or syncope. No recent falls. Denies any bleeding from any orifice. Previous diagnostic testing for coronary artery disease includes: none. Previous history of cardiac disease includes: none. Coronary artery disease risk factors include: advanced age (older than 54 for men, 72 for women), sedentary lifestyle and smoking/ tobacco exposure.     Past Medical History:    Past Medical History:   Diagnosis Date    [facility]F [facility]est[facility] heart failure) ([facility] [address]     COPD (chronic obstructive pulmonary disease) (HCC)     Diabetes mellitus (HCC)     MRSA (methicillin resistant staph aureus) culture positive 8/28/2014    sputum    Tobacco abuse 10/15/2019     Past Surgical History:    Past Surgical History:   Procedure Laterality Date    APPENDECTOMY      HYSTERECTOMY      JOINT REPLACEMENT Bilateral      Home Medications:  Prior to Admission medications    Medication Sig Start Date End Date Taking? Authorizing Provider   budesonide-formoterol (SYMBICORT) 160-4.5 MCG/ACT AERO Inhale 2 puffs into the lungs 2 times daily   Yes Historical Provider, MD   tiotropium (SPIRIVA RESPIMAT) 2.5 MCG/ACT AERS inhaler Inhale 2 puffs into the lungs daily   Yes Historical Provider, MD   furosemide (LASIX) 20 MG tablet Take 20 mg by mouth daily   Yes Historical Provider, MD   traZODone (DESYREL) 150 MG tablet Take 150 mg by mouth nightly   Yes Historical Provider, MD   ibuprofen (ADVIL;MOTRIN) 800 MG tablet Take 800 mg by mouth 3 times daily (with meals)   Yes Historical Provider, MD   pantoprazole (PROTONIX) 20 MG tablet Take 1 tablet by mouth daily 8/25/20   Marisol Pierson MD   albuterol (PROVENTIL) (2.5 MG/3ML) 0.083% nebulizer solution Take 3 mLs by nebulization every 6 hours as needed for Wheezing or Shortness of Breath 8/25/20   Marisol Pierson MD   tiZANidine (ZANAFLEX) 4 MG tablet Take 4 mg by mouth 2 times daily as needed    Historical Provider, MD   pregabalin (LYRICA) 100 MG capsule Take 100 mg by mouth 2 times daily. Historical Provider, MD   Multiple Vitamins-Minerals (CERTAVITE SENIOR PO) Take 1 tablet by mouth daily    Historical Provider, MD   sertraline (ZOLOFT) 50 MG tablet Take 50 mg by mouth daily    Historical Provider, MD   loratadine (CLARITIN) 10 MG tablet Take 10 mg by mouth daily    Historical Provider, MD   atorvastatin (LIPITOR) 20 MG tablet Take 20 mg by mouth daily     Historical Provider, MD   aspirin 81 MG tablet Take 81 mg by mouth daily. Historical Provider, MD   calcium-vitamin D (OSCAL-500) 500-200 MG-UNIT per tablet Take 1 tablet by mouth daily.     Historical Provider, MD   lisinopril (PRINIVIL;ZESTRIL) 20 MG tablet Take 20 mg by mouth daily     Historical Provider, MD     Current Medications:    Current Facility-Administered Medications Medication Dose Route Frequency Provider Last Rate Last Admin    furosemide (LASIX) injection 60 mg  60 mg Intravenous BID Swati Gardinerr, DO   60 mg at 02/08/21 1733    albuterol sulfate  (90 Base) MCG/ACT inhaler 2 puff  2 puff Inhalation Q6H PRN Swati Cox Hauger, DO        heparin (porcine) injection 3,960 Units  60 Units/kg Intravenous PRN Swati Blairuger, DO        heparin (porcine) injection 1,980 Units  30 Units/kg Intravenous PRN Swati Blairuger, DO        heparin 25,000 units in dextrose 5% 250 mL (premix) infusion  5-30 Units/kg/hr Intravenous Continuous Swati Patel, DO 7.9 mL/hr at 02/08/21 1633 12 Units/kg/hr at 02/08/21 1633    metoprolol tartrate (LOPRESSOR) tablet 12.5 mg  12.5 mg Oral BID Swati Gardinerr, DO        insulin lispro (HUMALOG) injection vial 0-6 Units  0-6 Units Subcutaneous TID WC Dutch Patel, DO        insulin lispro (HUMALOG) injection vial 0-3 Units  0-3 Units Subcutaneous Nightly Dutch Patel, DO        glucose (GLUTOSE) 40 % oral gel 15 g  15 g Oral PRN Swati Gardinerr, DO        dextrose 50 % IV solution  12.5 g Intravenous PRN Swati Gardinerr, DO        glucagon (rDNA) injection 1 mg  1 mg Intramuscular PRN Swati Blairuger, DO        dextrose 5 % solution  100 mL/hr Intravenous PRN Swtai Gardinerr, DO        albuterol (PROVENTIL) nebulizer solution 2.5 mg  2.5 mg Nebulization Q6H PRN Swati Blairuger, DO        aspirin EC tablet 81 mg  81 mg Oral Daily Swati Gardinerr, DO   81 mg at 02/08/21 1127    atorvastatin (LIPITOR) tablet 20 mg  20 mg Oral Daily Swati Blairuger, DO   20 mg at 02/08/21 3895    budesonide-formoterol (SYMBICORT) 160-4.5 MCG/ACT inhaler 2 puff  2 puff Inhalation BID Swati Gardinerr, DO   2 puff at 02/08/21 8609    cetirizine (ZYRTEC) tablet 5 mg  5 mg Oral Daily Swati Patel DO   5 mg at 02/08/21 0939    pantoprazole (PROTONIX) tablet 20 mg  20 mg Oral Daily Ryan  Hauger, DO   20 mg at 02/08/21 8171    sertraline (ZOLOFT) tablet 50 mg  50 mg Oral Daily Ryan  Hauger, DO   50 mg at 02/08/21 6956    tiZANidine (ZANAFLEX) tablet 4 mg  4 mg Oral Q8H PRN Ryan  Hauger, DO        traZODone (DESYREL) tablet 100 mg  100 mg Oral Nightly Ryan  Hauger, DO   100 mg at 02/06/21 2221    sodium chloride flush 0.9 % injection 10 mL  10 mL Intravenous 2 times per day Ryan  Hauger, DO   10 mL at 02/08/21 0941    sodium chloride flush 0.9 % injection 10 mL  10 mL Intravenous PRN Ryan  Hauger, DO        promethazine (PHENERGAN) tablet 12.5 mg  12.5 mg Oral Q6H PRN Ryan  Hauger, DO        Or    ondansetron [facility]) injection 4 mg  4 mg Intravenous Q6H PRN Ryan  Hauger, DO        polyethylene glycol (GLYCOLAX) packet 17 g  17 g Oral Daily PRN Ryan  Hauger, DO        nicotine (NICODERM CQ) 21 MG/24HR 1 patch  1 patch Transdermal Daily PRN Ryan  Hauger, DO        acetaminophen (TYLENOL) tablet 650 mg  650 mg Oral Q6H PRN Ryan  Hauger, DO        Or    acetaminophen (TYLENOL) suppository 650 mg  650 mg Rectal Q6H PRN Ryan  Hauger, DO        cefTRIAXone (ROCEPHIN) 1000 mg IVPB in 50 mL D5W minibag  1,000 mg Intravenous Q24H Ryan  Hauger,  mL/hr at 02/08/21 1732 1,000 mg at 02/08/21 1732    azithromycin (ZITHROMAX) tablet 250 mg  250 mg Oral Daily Ryan  Hauger, DO   250 mg at 02/08/21 5707    calcium carb-cholecalciferol 600-400 MG-UNIT per tab 1 tablet  1 tablet Oral Daily Ryan  Hauger, DO   1 tablet at 02/08/21 0706     Allergies:  Tiotropium and Spiriva handihaler [tiotropium bromide monohydrate]    Social History:    Social History     Socioeconomic History    Marital status: Single     Spouse name: Not on file    Number of children: Not on file    Years of education: Not on file    Highest education level: Not on file   Occupational History    Not on file   Social Needs    Financial resource strain: Not on file    Food insecurity     Worry: Not on file     Inability: Not on file    Transportation needs     Medical: Not on file     Non-medical: Not on file   Tobacco Use    Smoking status: Current Every Day Smoker     Packs/day: 0.50     Types: Cigarettes    Smokeless tobacco: Never Used   Substance and Sexual Activity    Alcohol use:  Yes     Alcohol/week: 70.0 standard drinks     Types: 70 Cans of beer per week     Comment: 840 oz (3, 40oz beers/day)    Drug use: Yes     Types: Marijuana    Sexual activity: Not on file   Lifestyle    Physical activity     Days per week: Not on file     Minutes per session: Not on file    Stress: Not on file   Relationships    Social connections     Talks on phone: Not on file     Gets together: Not on file     Attends [employer] service: Not on file     Active member of club or organization: Not on file     Attends meetings of clubs or organizations: Not on file     Relationship status: Not on file    Intimate partner violence     Fear of current or ex partner: Not on file     Emotionally abused: Not on file     Physically abused: Not on file     Forced sexual activity: Not on file   Other Topics Concern    Not on file   Social History Narrative    Not on file     Family History:   Family History   Problem Relation Age of Onset    Heart Disease Mother     Diabetes Father        · REVIEW OF SYSTEMS       PHYSICAL EXAM:    Vitals:    VITALS:  /81   Pulse 108   Temp 98.4 °F (36.9 °C) (Oral)   Resp 18   Ht 5' 2\" (1.575 m)   Wt 145 lb 9.6 oz (66 kg)   SpO2 92%   BMI 26.63 kg/m²   24HR INTAKE/OUTPUT:      Intake/Output Summary (Last 24 hours) at 2/8/2021 1803  Last data filed at 2/8/2021 1733  Gross per 24 hour   Intake 50 ml   Output 2930 ml   Net -2880 ml       CONSTITUTIONAL:  awake, alert, cooperative, no apparent distress, and appears stated age  EYES: Pupils equal, round and reactive to light, extra ocular muscles intact, sclera clear, conjunctiva normal  ENT:  normocepalic, without obvious abnormality  NECK:  supple, symmetrical, trachea midline, no carotid bruit ,   No  JVD  BACK:  symmetric  LUNGS: Non-labored, good air exchange, clear to auscultation bilaterally, no crackles or wheezing  CARDIOVASCULAR:  Normal apical impulse, regular rate and rhythm, normal S1 and S2, no S3 or S4, and no murmur noted, no rub.  radial and bilateralpresent 2+  ABDOMEN:  No scars, normal bowel sounds, soft, non-distended, non-tender, no masses palpated, no hepatosplenomegally, no bruit. MUSCULOSKELETAL:  there is no redness, warmth, or swelling of the joints   No leg edema. NEUROLOGIC:  Awake, alert, oriented to name, place and time. SKIN:  no bruising or bleeding, normal skin color, texture, turgor and no jaundice    DATA:   ECG: Sinus rhythm, PVCs in singles, left axis deviation  ECHO: Date: Today   Left ventricle is normal in size and wall thickness. Global left ventricular systolic function is severely reduced with an  estimated ejection fraction of 20 % . Severe global hypokinesis. Left atrium is mildly dilated. Right atrium is moderately dilated . Right ventricular dilatation with reduced systolic function. Thickened mitral valve leaflets. Mild to moderate mitral regurgitation. Moderate tricuspid regurgitation. Moderate pulmonary hypertension. Estimated right ventricular systolic  pressure is [id number]. Stress Test:  Not performed to date  Angiography:  Not performed to date  Chest x-ray reported to have increasing bilateral pulmonary opacity concerning for worsening edema or pneumonia.     Cardiology Labs:  Recent Labs     02/06/21 2058 02/07/21  0308 02/07/21  0841   CKTOTAL  --  48  --    TROPONINT NOT REPORTED NOT REPORTED NOT REPORTED     Warfarin PT/INR:  Lab Results   Component Value Date    PROTIME 16.4 02/08/2021    INR 1.3 02/08/2021     CBC:  Lab Results   Component Value Date    WBC 9.0 02/08/2021    RBC 4.84 02/08/2021    HGB 12.0 02/08/2021    HCT 38.7 02/08/2021    MCV 80.0 02/08/2021    MCH 24.8 02/08/2021    MCHC 31.0 02/08/2021    RDW 27.1 02/08/2021     02/08/2021    MPV 10.4 02/08/2021     CMP:  Lab Results   Component Value Date     02/08/2021    K 3.7 02/08/2021     02/08/2021    CO2 17 02/08/2021    BUN 19 02/08/2021    CREATININE 1.01 02/08/2021    GFRAA >60 02/08/2021    LABGLOM 53 02/08/2021    GLUCOSE 96 02/08/2021    CALCIUM 8.3 02/08/2021     Magnesium:    Lab Results   Component Value Date    MG 1.9 02/06/2021     PTT:    Lab Results   Component Value Date    APTT 40.0 02/08/2021     TSH:    Lab Results   Component Value Date    TSH 0.47 10/15/2019     BMP:  Lab Results   Component Value Date     02/08/2021    K 3.7 02/08/2021     02/08/2021    CO2 17 02/08/2021    BUN 19 02/08/2021    LABALBU 2.3 02/06/2021    CREATININE 1.01 02/08/2021    CALCIUM 8.3 02/08/2021    GFRAA >60 02/08/2021    LABGLOM 53 02/08/2021    GLUCOSE 96 02/08/2021     LIVER PROFILE:  Recent Labs     02/06/21  1505   AST 41*   ALT 18   LABALBU 2.3*   ALKPHOS 102   BILITOT 0.82   BILIDIR 0.53*   IBILI 0.29   PROT 7.5   GLOB NOT REPORTED   ALBUMIN NOT REPORTED     FLP:    Lab Results   Component Value Date    CHOL 114 10/15/2019    TRIG 64 10/15/2019    HDL 38 10/15/2019    LDLCHOLESTEROL 63 10/15/2019       IMPRESSION  Acute systolic CHF  Severe cardiomyopathy  Moderate tricuspid regurgitation  Moderate pulmonary hypertension  Elevated troponin most likely secondary to CHF  COPD  Diabetes mellitus    Patient Active Problem List   Diagnosis    COPD (chronic obstructive pulmonary disease) (HCC)    CHF (congestive heart failure) (HCC)    Cocaine abuse (HCC)    Acidosis, metabolic, with respiratory acidosis    Acute respiratory failure with hypoxemia (HCC)    Polysubstance abuse (HCC)    Hyponatremia, Beer Potomania    Normocytic anemia    Neutrophilic leukocytosis    Increased ammonia level    Hypophosphatemia    Type 2 diabetes mellitus without complication, without long-term current use of insulin (HCC)    Acute on chronic diastolic heart failure (HCC)    Tobacco abuse    Diabetes mellitus (HCC)    Chronic diastolic heart failure (HCC)    Elevated troponin    Prolonged Q-T interval on ECG    Community acquired pneumonia of right middle lobe of lung    Hypoxia    Chronic respiratory failure (HCC)    Mild malnutrition (HCC)    Hypotension    [facility]ONTE [facility]) ([facility] [address]    Current every day smoker    Essential hypertension    Altered mental status    Acute systolic heart failure (HCC)           RECOMMENDATIONS:     Continue current medications  Management plan was discussed with patient     Patient has acute systolic CHF however her hypoxemia is not only related CHF otherwise she should be much better now by good diuresing. Underlying COPD is a major factor. Recommend to start Coreg 6.25 mg twice daily. Add lisinopril 5 mg daily to her regimen. Continue diuresing however I recommend to decrease the Lasix to once a day. May add Aldactone to her regimen. Low-salt diet  Strict input and output schedule  CHF teaching  The patient will require further cardiac work-up in near future after stabilization of her acute CHF  Recommend pulmonary evaluation  If her ejection fraction remains below 35% despite medical treatment then she will be considered for ICD placement for primary prevention of sudden cardiac death. Case discussed with the patient and her nurse. Thank you for consultation.       Electronically signed by Mansi Calvillo MD on 2/8/2021 at 6:03 PM     CC: Mary Melara MD

## 2021-02-08 NOTE — PLAN OF CARE
Problem: Falls - Risk of:  Goal: Will remain free from falls  Description: Will remain free from falls  2/8/2021 0231 by Sathish Harris, RN  Outcome: Ongoing  Note: Patient will remain free from falls this shift, call light is within reach, bed in locked and lowest position. Side rails up x2. Encouraged to call for assistance with transferring. Will continue to monitor. Problem: OXYGENATION/RESPIRATORY FUNCTION  Goal: Patient will achieve/maintain normal respiratory rate/effort  Description: Respiratory rate and effort will be within normal limits for the patient  Outcome: Ongoing  Note: Continuous SpO2 in place. Venturi mask applied by RT. SpO2 90% or >. IV ATB. Will monitor.

## 2021-02-08 NOTE — PLAN OF CARE
Problem: OXYGENATION/RESPIRATORY FUNCTION  Goal: Patient will maintain patent airway  Outcome: Ongoing    Pt has been on hi flow and now on Bipap . Sp02 stable in mid to low 90s. Problem: Nutrition  Goal: Optimal nutrition therapy    Current patient tires easily and has no appetite.    Outcome: Ongoing

## 2021-02-08 NOTE — PROGRESS NOTES
Occupational [address]  Occupational Therapy Not Seen Note    Patient not available for Occupational Therapy due to:    [] Testing:    [] Hemodialysis    [] Cancelled by RN:    []Refusal by Patient:    [] Surgery:     [] Intubation:     [] Pain Medication:    [] Sedation:     [] Spine Precautions :    [] Medical Instability:    [x] Other:   Cx per RN, not approp this dated d/t high flow and getting respiratory status more stable    Cari Fox, OTR/L

## 2021-02-08 NOTE — PROGRESS NOTES
Body Weight 131.8 %   · BMI: 26.5  · BMI Categories: Overweight (BMI 25.0-29. 9)       Nutrition Diagnosis:   · Severe malnutrition, In context of chronic, non-illness related related to inadequate protein-energy intake as evidenced by weight loss greater than or equal to 20% in 1 year, severe loss of subcutaneous fat, severe muscle loss, intake 26-50%, intake 0-25%      Nutrition Interventions:   Food and/or Nutrient Delivery:  Continue Current Diet, Start Oral Nutrition Supplement  Nutrition Education/Counseling:  Education not indicated   Coordination of Nutrition Care:  Continue to monitor while inpatient    Goals:  PO intakes are greater than 75% at meals       Nutrition Monitoring and Evaluation:   Food/Nutrient Intake Outcomes:  Food and Nutrient Intake, Supplement Intake  Physical Signs/Symptoms Outcomes:  Biochemical Data, Skin, Weight, GI Status, Fluid Status or Edema     Discharge Planning:    Continue current diet, Continue Oral Nutrition Supplement         Mera NGUYENN, RDN, LDN  Lead Clinical Dietitian  RD Office Phone [phone]

## 2021-02-08 NOTE — PROGRESS NOTES
Dr. Yuliana Nayak informed of consult, recent orders reviewed, labs reviewed. Order for Lisinopril and coreg received.

## 2021-02-08 NOTE — PROGRESS NOTES
Physical Therapy  DATE: 2021    NAME: Lilian Alatorre  MRN: [id number]   : 1945    Patient not seen this date for Physical Therapy due to:  [] Blood transfusion in progress  [x] Cancel by RN, medical instability  [] Hemodialysis  []  Refusal by Patient   [] Spine Precautions   [] Strict Bedrest  [] Surgery  [] Testing      [] Other        [] PT being discontinued at this time. Patient independent. No further needs. [] PT being discontinued at this time as the patient has been transferred to hospice care. No further needs.     [address], PT

## 2021-02-08 NOTE — PROGRESS NOTES
Patient placed on venti mask 6L 50%. Patient has stuffed nose and states she can not breathe through her nose. Patient coughing up thick yellow sputum. SpO2 91% on venti mask. RN notified. Will continue to monitor.

## 2021-02-08 NOTE — PROGRESS NOTES
Transitions of Care Pharmacy Service   Medication Review    The patient's list of current home medications has been reviewed. Patient states she gets all of her medications delivered from [facility]. An Rn puts them in a weekly organizer for her, she is not aware of the names of them. She states she does have a nebulizer machine at home but has run out of medication for it. She would like a new prescription for Nicotine patches, she is currently smoking about 3 cigarettes a day. Source(s) of information: [address] [place] [address] ([phone])    Based on information provided by the above source(s), I have updated the patient's home med list as described below. Please review the ACTION REQUESTED section of this note below for any discrepancies on current hospital orders. I changed or updated the following medications on the patient's home medication list:  Removed Nicoderm 21mg patches - pt out  Deltasone 10mg sliding scale - 1 time order  Lac-Hydrin 12 % lotion - tx complete  Advair 250/50 - see symbicort  Norvasc 5mg PO daily - ran out 12/  Metformin 500mg PO daily - list cleanup     Added Ibuprofen 800mg PO TID with meals  Symbicort 160/4.5 - 2 puffs BID  Spiriva Respimat 2.5mcg - 2 puffs daily     Adjusted   Lasix from 40mg to 20mg PO daily  Trazodone from 100mg to 150mg PO nightly   Other Notes Pt needs a new script for nebulizer medication if desired at home. She would also like a new script for Nicotine patches, probabilly 7mg would be enough. PROVIDER ACTION REQUESTED  Medications that need to be addressed by a physician/nurse practitioner:    Medication Action Requested   Trazodone     Please adjust from 100mg to 150mg nightly as appropriate         Please feel free to call me with any questions about this encounter. Thank you.     Erick Guard, [facility]   Transitions of Care Pharmacy Service  Phone:  [phone]  Fax: [phone]      Electronically signed by Cande Arguello Delano Scales, [facility] [facility] on 2/8/2021 at 10:56 AM           Medications Prior to Admission: budesonide-formoterol (SYMBICORT) 160-4.5 MCG/ACT AERO, Inhale 2 puffs into the lungs 2 times daily  tiotropium (SPIRIVA RESPIMAT) 2.5 MCG/ACT AERS inhaler, Inhale 2 puffs into the lungs daily  furosemide (LASIX) 20 MG tablet, Take 20 mg by mouth daily  traZODone (DESYREL) 150 MG tablet, Take 150 mg by mouth nightly  ibuprofen (ADVIL;MOTRIN) 800 MG tablet, Take 800 mg by mouth 3 times daily (with meals)  pantoprazole (PROTONIX) 20 MG tablet, Take 1 tablet by mouth daily  albuterol (PROVENTIL) (2.5 MG/3ML) 0.083% nebulizer solution, Take 3 mLs by nebulization every 6 hours as needed for Wheezing or Shortness of Breath  tiZANidine (ZANAFLEX) 4 MG tablet, Take 4 mg by mouth 2 times daily as needed  pregabalin (LYRICA) 100 MG capsule, Take 100 mg by mouth 2 times daily. Multiple Vitamins-Minerals (CERTAVITE SENIOR PO), Take 1 tablet by mouth daily  sertraline (ZOLOFT) 50 MG tablet, Take 50 mg by mouth daily  loratadine (CLARITIN) 10 MG tablet, Take 10 mg by mouth daily  atorvastatin (LIPITOR) 20 MG tablet, Take 20 mg by mouth daily   aspirin 81 MG tablet, Take 81 mg by mouth daily. calcium-vitamin D (OSCAL-500) 500-200 MG-UNIT per tablet, Take 1 tablet by mouth daily.   lisinopril (PRINIVIL;ZESTRIL) 20 MG tablet, Take 20 mg by mouth daily

## 2021-02-08 NOTE — CARE COORDINATION
Case Management Initial Discharge Plan  Denise Hunter,             Met with:patient to discuss discharge plans. Information verified: address, contacts, phone number, , insurance Yes    Emergency Contact/Next of Kin name & number: grandfrances becerra    PCP: Naif Aj MD  Date of last visit: was supposed to see tomorrow    Insurance Provider: Yordan    Discharge Planning    Living Arrangements:  Children   Support Systems:  [id number] [address] has 1 stories  0 stairs to climb to get into front door, has elevatorstairs to climb to reach second floor  Location of bedroom/bathroom in home main    Patient able to perform ADL's:Independent    Current Services (outpatient & in home) care [facility] [phone]  DME equipment: chair lift, cane, walker,Home O2 at night, rollator, nebulizer  DME provider: -    Receiving oral anticoagulation therapy? No    If indicated:   Physician managing anticoagulation treatment:   Where does patient obtain lab work for ATC treatment? Potential Assistance Needed:  N/A    Patient agreeable to home care: Yes  Freedom of choice provided:  yes    Prior SN[facility]hab [facility] and [facility]  Agreeable to SNF/Rehab: Yes  [place] of choice provided: yes     Evaluation: no    Expected Discharge date:  21    Patient expects to be discharged to:  home  Follow Up Appointment: Best Day/ Time: Tuesday AM    Transportation provider: Jeimy Bell  Transportation arrangements needed for discharge: Yes    Readmission Risk              Risk of Unplanned Readmission:        31             Does patient have a readmission risk score greater than 14?: Yes  If yes, follow-up appointment must be made within 7 days of discharge. Goals of Care:       Discharge Plan: Dg; hypotension  Patient lives alone in apt with elevator  Has [facility] kati Marquez that she is current with  Has been to [facility] and is agreeable if needed.    PT/OT to eval  [facility] [address]  Patient has home O2, lift chair, cane, walker, rollator,nebulizer  Uses [employer] for transportation. Continue to follow.             Electronically signed by Alesia Gosselin, RN on 2/7/21 at 7:02 PM EST

## 2021-02-08 NOTE — PROGRESS NOTES
[facility]  Office: [address], DO, Wanderayo Ambriz, DO, Naren Caputos, DO, Shannonramiro Chan, DO, Navi Shelton MD, Toni Lbelanc MD, Sofiya James MD, Sadaf Fields MD, Allison Camacho MD, [place] Conner MD, Charito Simons MD, Hay Rojas MD, Alexandru Porter MD, Jh Conn DO, Todd Salinas MD, Juan Bolivar MD, Josh Sampson DO, Rip Fuentes MD,  Inocencio Haynes DO, Henok Tamayo MD, Diaz Saez MD, Ruddy Riley [facility], [address], [facility], Mara Saldivar, CNP, Rikki Garces, CNS, Cruz Ingram, CNP, Mando Lee, CNP, Debbie Sal, CNP, Romulo Guajardo, CNP, Antionette Mo, CNP, Zarina Mckeon PA-C, Giuseppe Edwards, [facility], Franca Estevez, CNP, Iftikhar Luis, CNP, Edy Jarrett, CNP, Deepa Chong, [facility], Cyrus Lucero, [facility]    Progress Note    2/8/2021    2:16 PM    Name:   Karlie Alonso  MRN:     [id number]     Acct:      [de-identified]   Room:   [address] [place] [address] Day:  2  Admit Date:  2/6/2021  2:27 PM    PCP:   Joey Orozco MD  Code Status:  Full Code    Subjective:     C/C:   Chief Complaint   Patient presents with   Etheleen Daunt Dizziness     onset 2 weeks    Fall     difficulty walking    Aphasia     onset 4 days ago     Interval History Status: improved. Patient seen and examined this morning. Overnight, patient with significant hypoxia. She was placed on a simple mask and continued to desaturate. She has been placed on HFNC. She continues to appear quite winded this morning. Denies feeling swollen. She notes that she drinks 3-6 beers daily. Continues to smoke cigarettes. Dry coughing persists. Brief History:     Karlie Alonso is a 77 y.o.  Non-/non  female who presents with Dizziness (onset 2 weeks), Fall (difficulty walking), and Aphasia (onset 4 days ago)   and is admitted to the hospital for the management of Community acquired pneumonia of right middle lobe of lung.     This is a 67-year-old -American female with past medical history of hypertension, diabetes mellitus, chronic diastolic heart failure, COPD, chronic hypoxic respiratory failure noncompliant with home oxygen, nicotine dependence/current smoker.      Ms. Daniel Koch was brought to the ED on 2/6/20/2021 by her family out of concern for generalized weakness, and several weeks of changes in mentation. On arrival to the ED she was afebrile, hypotensive with systolic blood pressures 64H to 80s, hypoxic 85% on room air. Lab work notable for WBC 9.5K, lactic acid 2.3, BUN/Cre 23/1.57, sodium 133, CO2 17, HST 58, proBNP 13,553, COVID-19 negative.     Chest x-ray demonstrated bilateral airspace and interstitial opacities right greater than left, noncontrast CT of the head negative for any acute intracranial process, twelve-lead EKG sinus bradycardia rate of 54 without evidence of acute ischemic changes. She was fluid resuscitated and responsive with rapid improvement in her blood pressure and 2-hour lactate of 1.5. Blood cultures drawn, started on Rocephin and Zithromax and transferred to the progressive care unit for admission.      On my exam, Ms. Daniel Koch is very hard of hearing, however she is able to follow commands, speech is fluent and content appropriate, and is without any motor or sensory deficits. She lives alone and reports that she has been feeling poorly for the past 2 months, poor appetite, fatigue, not eating or drinking well. She has been smoking since a teenager and is down to 4 cigarettes a day, she is supposed to wear home oxygen however is not compliant, and she drinks a 16 ounce beer daily. She has family support, but to what extent is not clear. Review of systems is positive for cough, fatigue, poor appetite, at times early satiety, denies nausea, vomiting, diarrhea, chest pain. - found to have new onset HFrEF, cardio consulted 2/8.     Review of Systems:     Constitutional: Reports generalized weakness; negative for chills, fevers, sweats  Respiratory: reports dry cough, significant shortness of breath today; negative for wheezing  Cardiovascular:  negative for chest pain, chest pressure/discomfort, lower extremity edema, palpitations  Gastrointestinal:  negative for abdominal pain, constipation, diarrhea, nausea, vomiting  Neurological:  negative for dizziness, headache    Medications: Allergies: Allergies   Allergen Reactions    Tiotropium Anaphylaxis and Swelling    Spiriva Handihaler [Tiotropium Bromide Monohydrate] Swelling       Current Meds:   Scheduled Meds:    furosemide  40 mg Intravenous BID    insulin lispro  0-6 Units Subcutaneous TID WC    insulin lispro  0-3 Units Subcutaneous Nightly    aspirin  81 mg Oral Daily    atorvastatin  20 mg Oral Daily    budesonide-formoterol  2 puff Inhalation BID    cetirizine  5 mg Oral Daily    pantoprazole  20 mg Oral Daily    sertraline  50 mg Oral Daily    traZODone  100 mg Oral Nightly    sodium chloride flush  10 mL Intravenous 2 times per day    enoxaparin  30 mg Subcutaneous Daily    cefTRIAXone (ROCEPHIN) IV  1,000 mg Intravenous Q24H    azithromycin  250 mg Oral Daily    calcium carb-cholecalciferol  1 tablet Oral Daily     Continuous Infusions:    dextrose       PRN Meds: glucose, dextrose, glucagon (rDNA), dextrose, albuterol, tiZANidine, sodium chloride flush, promethazine **OR** ondansetron, polyethylene glycol, nicotine, acetaminophen **OR** acetaminophen    Data:     Past Medical History:   has a past medical history of CHF (congestive heart failure) ([facility]), COPD (chronic obstructive pulmonary disease) ([facility] Diabetes mellitus ([facility] MRSA (methicillin resistant staph aureus) culture positive, and Tobacco abuse. Social History:   reports that she has been smoking cigarettes. She has been smoking about 0.50 packs per day.  She has never used smokeless tobacco. She reports current alcohol use of about 70.0 standard drinks of alcohol per week. She reports current drug use. Drug: Marijuana. Family History:   Family History   Problem Relation Age of Onset    Heart Disease Mother     Diabetes Father        Vitals:  BP (!) 121/91   Pulse 102   Temp 97.3 °F (36.3 °C) (Oral)   Resp 20   Ht 5' 2\" (1.575 m)   Wt 145 lb 9.6 oz (66 kg)   SpO2 95%   BMI 26.63 kg/m²   Temp (24hrs), Av.4 °F (36.3 °C), Min:97.2 °F (36.2 °C), Max:98.1 °F (36.7 °C)    Recent Labs     21  1703 21  1953 21  0736 21  1117   POCGLU 127* 89 86 131*       I/O (24Hr):     Intake/Output Summary (Last 24 hours) at 2021 1416  Last data filed at 2021 0646  Gross per 24 hour   Intake 855 ml   Output 2380 ml   Net -1525 ml       Labs:  Hematology:  Recent Labs     21  1505 21  0308 21  0521   WBC 9.5 7.0 9.0   RBC 4.68 4.67 4.84   HGB 11.4* 11.3* 12.0   HCT 37.0 36.2* 38.7   MCV 79.1* 77.5* 80.0*   MCH 24.4* 24.2* 24.8*   MCHC 30.8 31.2 31.0   RDW 26.7* 26.1* 27.1*    199 280   MPV 9.8 10.5 10.4   INR 1.4  --   --      Chemistry:  Recent Labs     21  1505 21  1705 218 21  0308 21  0841 21  0521   *  --   --  135  --  135   K 4.0  --   --  3.8  --  3.7     --   --  108*  --  107   CO2 17*  --   --  18*  --  17*   GLUCOSE 137*  --   --  86  --  96   BUN 23  --   --  21  --  19   CREATININE 1.57*  --   --  1.27*  --  1.01*   MG 1.9  --   --   --   --   --    ANIONGAP 11  --   --  9  --  11   LABGLOM 32*  --   --  41*  --  53*   GFRAA 39*  --   --  50*  --  >60   CALCIUM 8.2*  --   --  7.7*  --  8.3*   PROBNP  --  13,553*  --   --   --   --    TROPHS 58* 44* 43* 49* 53*  --    CKTOTAL  --   --   --  48  --   --      Recent Labs     21  1505 21  0308 21  0848 21  1154 21  1703 21  1953 21  0736 21  1117   PROT 7.5  --   --   --   --   --   --   --    LABALBU 2.3*  --   --   --   --   --   --   --    LABA1C  --  6.2* --   --   --   --   --   --    AST 41*  --   --   --   --   --   --   --    ALT 18  --   --   --   --   --   --   --    ALKPHOS 102  --   --   --   --   --   --   --    BILITOT 0.82  --   --   --   --   --   --   --    BILIDIR 0.53*  --   --   --   --   --   --   --    AMMONIA 36  --   --   --   --   --   --   --    LIPASE 7*  --   --   --   --   --   --   --    URICACID  --  11.1*  --   --   --   --   --   --    POCGLU  --   --  79 100 127* 89 86 131*     ABG:  Lab Results   Component Value Date    POCPH 7.40 09/04/2014    PH  09/04/2014     DISREGARD RESULTS. PATIENT NUMBER WAS INCORRECTLY ASSIGNED. POCPCO2 44 09/04/2014    PCO2  09/04/2014     DISREGARD RESULTS. PATIENT NUMBER WAS INCORRECTLY ASSIGNED. POCPO2 65 09/04/2014    PO2  09/04/2014     DISREGARD RESULTS. PATIENT NUMBER WAS INCORRECTLY ASSIGNED. POCHCO3 27.4 09/04/2014    HCO3  09/04/2014     DISREGARD RESULTS. PATIENT NUMBER WAS INCORRECTLY ASSIGNED. NBEA NOT REPORTED 09/04/2014    PBEA 3 09/04/2014    [id number] 29 09/04/2014    [id number] 92 09/04/2014    O2SAT  09/04/2014     DISREGARD RESULTS. PATIENT NUMBER WAS INCORRECTLY ASSIGNED. FIO2 NOT REPORTED 02/06/2021     Lab Results   Component Value Date/Time    SPECIAL L AC 7 ML 02/06/2021 03:09 PM     Lab Results   Component Value Date/Time    CULTURE NO GROWTH 2 DAYS 02/06/2021 03:09 PM       Radiology:  Ct Head Wo Contrast    Result Date: 2/6/2021  Chronic findings in the brain without acute CT abnormality identified. Xr Chest Portable    Result Date: 2/6/2021  Bilateral airspace and interstitial opacities either reflecting pulmonary edema versus pneumonia.        Physical Examination:        General appearance:  alert, cooperative and appears uncomfortable, elderly -American female lying supine in bed  Mental Status:  oriented to person, place and time and normal affect  HENT: wears glasses, high-flow nasal cannula present  Lungs: diminished lung sounds throughout all lung fields, no wheezes or rhonchi appreciated increased effort today  Heart:  regular rate and rhythm, no murmur, ectopy noted  Abdomen:  soft, nontender, nondistended, normal bowel sounds, no masses, hepatomegaly, splenomegaly  Extremities:  no edema, redness, tenderness in the calves  Skin:  no gross lesions, rashes, induration    Assessment:        Hospital Problems           Last Modified POA    * (Principal) Acute systolic heart failure ([address] 2/8/2021 Yes    Acute respiratory failure with hypoxemia ([address] 2/8/2021 Yes    Elevated troponin 2/8/2021 Yes    Hypotension 2/8/2021 Yes    DEONTE (acute kidney injury) ([address] 2/8/2021 Yes    Altered mental status 2/8/2021 Yes    COPD (chronic obstructive pulmonary disease) ([address] 2/8/2021 Yes    Type 2 diabetes mellitus without complication, without long-term current use of insulin ([address] 2/8/2021 Yes    Chronic diastolic heart failure ([address] 2/8/2021 Yes    Community acquired pneumonia of right middle lobe of lung 2/8/2021 Yes    Chronic respiratory failure ([address] 2/8/2021 Yes    Mild malnutrition ([address] 2/8/2021 Yes    Current every day smoker 2/8/2021 Yes    Essential hypertension 2/8/2021 Yes          Plan:        1. New onset HFrEF - cardiology consultation today  2. Start heparin and low-dose lopressor  3. IV lasix to be increased today - 60 mg IVP BID  4. Continue coverage for community-acquired pneumoniaRocephin and azithromycin, although it now is more likely that the patient has edema rather than infection. Checking procal. May be able to discontinue antibiotics over the next 24 hours. 5. HFNC and Bipap therapy today. Bipap with naps and sleep  6. Hypertensionhypotensive on arrival. Likely secondary to new onset HF + lasix/lisinopril at home. Starting low dose beta blocker, increasing IV lasix today for diuresis. Hopeful to get low-dose lisinopril back on board before discharge  7. Elevated troponinlikely secondary to DEONTE and hypovolemia.  However, with new HFrEF, potentially may be due to ACS at this point. Starting heparin infusion. 8. Stop IVF  9. Type 2 diabetes mellitusinsulin sliding scale for glycemic control  10. Discussed with patient's granddaughter, Lauren Levine, on 2/7.     33 Jennifer Perez DO  2/8/2021  2:16 PM

## 2021-02-08 NOTE — FLOWSHEET NOTE
Writer attempted to visit pt but upon arrival pt was sleeping. Chaplains will attempt again at a later time. Chaplains will remain available to offer spiritual and emotional support as needed.        02/08/21 0636   Encounter Summary   Services provided to: Patient   Referral/Consult From: David   Continue Visiting   (2/8/21 sleeping)   Complexity of Encounter Low   Length of Encounter 15 minutes   Routine   Type Follow up   Assessment Sleeping   Intervention Prayer     Electronically signed by Kt Jin, on 2/8/2021 at 4:57 PM.  [id number] [facility]  [phone]

## 2021-02-09 ENCOUNTER — APPOINTMENT (OUTPATIENT)
Dept: CT IMAGING | Age: 76
DRG: 871 | End: 2021-02-09
Payer: MEDICARE

## 2021-02-09 LAB
ABSOLUTE EOS #: 0 K/UL (ref 0–0.44)
ABSOLUTE IMMATURE GRANULOCYTE: 0 K/UL (ref 0–0.3)
ABSOLUTE LYMPH #: 1.94 K/UL (ref 1.1–3.7)
ABSOLUTE MONO #: 0.87 K/UL (ref 0.1–1.2)
ANION GAP SERPL CALCULATED.3IONS-SCNC: 9 MMOL/L (ref 9–17)
ANTI-XA UNFRAC HEPARIN: 0.64 IU/L (ref 0.3–0.7)
BASOPHILS # BLD: 1 % (ref 0–2)
BASOPHILS ABSOLUTE: 0.1 K/UL (ref 0–0.2)
BNP INTERPRETATION: ABNORMAL
BUN BLDV-MCNC: 20 MG/DL (ref 8–23)
BUN/CREAT BLD: 18 (ref 9–20)
CALCIUM SERPL-MCNC: 8.2 MG/DL (ref 8.6–10.4)
CHLORIDE BLD-SCNC: 108 MMOL/L (ref 98–107)
CO2: 19 MMOL/L (ref 20–31)
CREAT SERPL-MCNC: 1.1 MG/DL (ref 0.5–0.9)
DIFFERENTIAL TYPE: ABNORMAL
EOSINOPHILS RELATIVE PERCENT: 0 % (ref 1–4)
FIO2: 50
GFR AFRICAN AMERICAN: 59 ML/MIN
GFR NON-AFRICAN AMERICAN: 48 ML/MIN
GFR SERPL CREATININE-BSD FRML MDRD: ABNORMAL ML/MIN/{1.73_M2}
GFR SERPL CREATININE-BSD FRML MDRD: ABNORMAL ML/MIN/{1.73_M2}
GLUCOSE BLD-MCNC: 127 MG/DL (ref 65–105)
GLUCOSE BLD-MCNC: 133 MG/DL (ref 65–105)
GLUCOSE BLD-MCNC: 141 MG/DL (ref 65–105)
GLUCOSE BLD-MCNC: 50 MG/DL (ref 70–99)
GLUCOSE BLD-MCNC: 59 MG/DL (ref 65–105)
GLUCOSE BLD-MCNC: 80 MG/DL (ref 65–105)
GLUCOSE BLD-MCNC: 85 MG/DL (ref 65–105)
GLUCOSE BLD-MCNC: 86 MG/DL (ref 65–105)
HCT VFR BLD CALC: 38.3 % (ref 36.3–47.1)
HEMOGLOBIN: 11.9 G/DL (ref 11.9–15.1)
IMMATURE GRANULOCYTES: 0 %
LYMPHOCYTES # BLD: 20 % (ref 24–43)
MCH RBC QN AUTO: 24.3 PG (ref 25.2–33.5)
MCHC RBC AUTO-ENTMCNC: 31.1 G/DL (ref 28.4–34.8)
MCV RBC AUTO: 78.2 FL (ref 82.6–102.9)
MONOCYTES # BLD: 9 % (ref 3–12)
MORPHOLOGY: ABNORMAL
MORPHOLOGY: ABNORMAL
NEGATIVE BASE EXCESS, ART: 5 (ref 0–2)
NRBC AUTOMATED: 0.3 PER 100 WBC
O2 DEVICE/FLOW/%: ABNORMAL
PATIENT TEMP: 37
PDW BLD-RTO: 26.3 % (ref 11.8–14.4)
PLATELET # BLD: 230 K/UL (ref 138–453)
PLATELET ESTIMATE: ABNORMAL
PMV BLD AUTO: 10.1 FL (ref 8.1–13.5)
POC HCO3: 20.1 MMOL/L (ref 22–27)
POC O2 SATURATION: 90 %
POC PCO2 TEMP: ABNORMAL MM HG
POC PCO2: 36 MM HG (ref 32–45)
POC PH TEMP: ABNORMAL
POC PH: 7.35 (ref 7.35–7.45)
POC PO2 TEMP: ABNORMAL MM HG
POC PO2: 62 MM HG (ref 75–95)
POSITIVE BASE EXCESS, ART: ABNORMAL (ref 0–2)
POTASSIUM SERPL-SCNC: 4 MMOL/L (ref 3.7–5.3)
PRO-BNP: ABNORMAL PG/ML
RBC # BLD: 4.9 M/UL (ref 3.95–5.11)
RBC # BLD: ABNORMAL 10*6/UL
SEG NEUTROPHILS: 70 % (ref 36–65)
SEGMENTED NEUTROPHILS ABSOLUTE COUNT: 6.79 K/UL (ref 1.5–8.1)
SODIUM BLD-SCNC: 136 MMOL/L (ref 135–144)
TCO2 (CALC), ART: 21 MMOL/L (ref 23–28)
WBC # BLD: 9.7 K/UL (ref 3.5–11.3)
WBC # BLD: ABNORMAL 10*3/UL

## 2021-02-09 PROCEDURE — 97163 PT EVAL HIGH COMPLEX 45 MIN: CPT

## 2021-02-09 PROCEDURE — 97530 THERAPEUTIC ACTIVITIES: CPT

## 2021-02-09 PROCEDURE — 6360000002 HC RX W HCPCS: Performed by: INTERNAL MEDICINE

## 2021-02-09 PROCEDURE — 82803 BLOOD GASES ANY COMBINATION: CPT

## 2021-02-09 PROCEDURE — 85025 COMPLETE CBC W/AUTO DIFF WBC: CPT

## 2021-02-09 PROCEDURE — 2700000000 HC OXYGEN THERAPY PER DAY

## 2021-02-09 PROCEDURE — 6370000000 HC RX 637 (ALT 250 FOR IP): Performed by: NURSE PRACTITIONER

## 2021-02-09 PROCEDURE — 99232 SBSQ HOSP IP/OBS MODERATE 35: CPT | Performed by: FAMILY MEDICINE

## 2021-02-09 PROCEDURE — 6370000000 HC RX 637 (ALT 250 FOR IP): Performed by: INTERNAL MEDICINE

## 2021-02-09 PROCEDURE — 83880 ASSAY OF NATRIURETIC PEPTIDE: CPT

## 2021-02-09 PROCEDURE — 36415 COLL VENOUS BLD VENIPUNCTURE: CPT

## 2021-02-09 PROCEDURE — 71250 CT THORAX DX C-: CPT

## 2021-02-09 PROCEDURE — 2580000003 HC RX 258: Performed by: INTERNAL MEDICINE

## 2021-02-09 PROCEDURE — 6360000002 HC RX W HCPCS: Performed by: FAMILY MEDICINE

## 2021-02-09 PROCEDURE — 97535 SELF CARE MNGMENT TRAINING: CPT

## 2021-02-09 PROCEDURE — 94660 CPAP INITIATION&MGMT: CPT

## 2021-02-09 PROCEDURE — 85520 HEPARIN ASSAY: CPT

## 2021-02-09 PROCEDURE — 2060000000 HC ICU INTERMEDIATE R&B

## 2021-02-09 PROCEDURE — 6360000002 HC RX W HCPCS: Performed by: NURSE PRACTITIONER

## 2021-02-09 PROCEDURE — 94640 AIRWAY INHALATION TREATMENT: CPT

## 2021-02-09 PROCEDURE — 80048 BASIC METABOLIC PNL TOTAL CA: CPT

## 2021-02-09 PROCEDURE — 82947 ASSAY GLUCOSE BLOOD QUANT: CPT

## 2021-02-09 PROCEDURE — 36600 WITHDRAWAL OF ARTERIAL BLOOD: CPT

## 2021-02-09 PROCEDURE — 97167 OT EVAL HIGH COMPLEX 60 MIN: CPT

## 2021-02-09 PROCEDURE — 94761 N-INVAS EAR/PLS OXIMETRY MLT: CPT

## 2021-02-09 RX ORDER — SODIUM CHLORIDE 0.9 % (FLUSH) 0.9 %
10 SYRINGE (ML) INJECTION EVERY 12 HOURS SCHEDULED
Status: DISCONTINUED | OUTPATIENT
Start: 2021-02-09 | End: 2021-02-09 | Stop reason: SDUPTHER

## 2021-02-09 RX ORDER — LORAZEPAM 1 MG/1
1 TABLET ORAL
Status: DISCONTINUED | OUTPATIENT
Start: 2021-02-09 | End: 2021-02-15

## 2021-02-09 RX ORDER — LORAZEPAM 1 MG/1
2 TABLET ORAL
Status: DISCONTINUED | OUTPATIENT
Start: 2021-02-09 | End: 2021-02-15

## 2021-02-09 RX ORDER — ARFORMOTEROL TARTRATE 15 UG/2ML
15 SOLUTION RESPIRATORY (INHALATION) 2 TIMES DAILY
Status: DISCONTINUED | OUTPATIENT
Start: 2021-02-09 | End: 2021-02-15 | Stop reason: HOSPADM

## 2021-02-09 RX ORDER — LORAZEPAM 2 MG/ML
2 INJECTION INTRAMUSCULAR
Status: DISCONTINUED | OUTPATIENT
Start: 2021-02-09 | End: 2021-02-15

## 2021-02-09 RX ORDER — CARVEDILOL 3.12 MG/1
3.12 TABLET ORAL 2 TIMES DAILY WITH MEALS
Status: DISCONTINUED | OUTPATIENT
Start: 2021-02-09 | End: 2021-02-15 | Stop reason: HOSPADM

## 2021-02-09 RX ORDER — LORAZEPAM 2 MG/ML
1 INJECTION INTRAMUSCULAR
Status: DISCONTINUED | OUTPATIENT
Start: 2021-02-09 | End: 2021-02-15

## 2021-02-09 RX ORDER — LISINOPRIL 2.5 MG/1
2.5 TABLET ORAL DAILY
Status: DISCONTINUED | OUTPATIENT
Start: 2021-02-09 | End: 2021-02-15 | Stop reason: HOSPADM

## 2021-02-09 RX ORDER — METHYLPREDNISOLONE SODIUM SUCCINATE 125 MG/2ML
60 INJECTION, POWDER, LYOPHILIZED, FOR SOLUTION INTRAMUSCULAR; INTRAVENOUS EVERY 6 HOURS
Status: DISCONTINUED | OUTPATIENT
Start: 2021-02-09 | End: 2021-02-10

## 2021-02-09 RX ORDER — BUDESONIDE 0.5 MG/2ML
0.5 INHALANT ORAL 2 TIMES DAILY
Status: DISPENSED | OUTPATIENT
Start: 2021-02-09 | End: 2021-02-14

## 2021-02-09 RX ORDER — LORAZEPAM 1 MG/1
3 TABLET ORAL
Status: DISCONTINUED | OUTPATIENT
Start: 2021-02-09 | End: 2021-02-15

## 2021-02-09 RX ORDER — LORAZEPAM 1 MG/1
4 TABLET ORAL
Status: DISCONTINUED | OUTPATIENT
Start: 2021-02-09 | End: 2021-02-15

## 2021-02-09 RX ORDER — LORAZEPAM 2 MG/ML
3 INJECTION INTRAMUSCULAR
Status: DISCONTINUED | OUTPATIENT
Start: 2021-02-09 | End: 2021-02-15

## 2021-02-09 RX ORDER — HEPARIN SODIUM 5000 [USP'U]/ML
5000 INJECTION, SOLUTION INTRAVENOUS; SUBCUTANEOUS EVERY 8 HOURS SCHEDULED
Status: DISCONTINUED | OUTPATIENT
Start: 2021-02-09 | End: 2021-02-13

## 2021-02-09 RX ORDER — LORAZEPAM 2 MG/ML
4 INJECTION INTRAMUSCULAR
Status: DISCONTINUED | OUTPATIENT
Start: 2021-02-09 | End: 2021-02-15

## 2021-02-09 RX ADMIN — INSULIN LISPRO 1 UNITS: 100 INJECTION, SOLUTION INTRAVENOUS; SUBCUTANEOUS at 18:22

## 2021-02-09 RX ADMIN — METHYLPREDNISOLONE SODIUM SUCCINATE 60 MG: 125 INJECTION, POWDER, FOR SOLUTION INTRAMUSCULAR; INTRAVENOUS at 13:20

## 2021-02-09 RX ADMIN — BUDESONIDE 500 MCG: 0.5 SUSPENSION RESPIRATORY (INHALATION) at 20:26

## 2021-02-09 RX ADMIN — ARFORMOTEROL TARTRATE 15 MCG: 15 SOLUTION RESPIRATORY (INHALATION) at 20:26

## 2021-02-09 RX ADMIN — HEPARIN SODIUM 5000 UNITS: 5000 INJECTION INTRAVENOUS; SUBCUTANEOUS at 22:28

## 2021-02-09 RX ADMIN — TRAZODONE HYDROCHLORIDE 100 MG: 100 TABLET ORAL at 20:23

## 2021-02-09 RX ADMIN — LORAZEPAM 1 MG: 1 TABLET ORAL at 16:38

## 2021-02-09 RX ADMIN — SODIUM CHLORIDE, PRESERVATIVE FREE 10 ML: 5 INJECTION INTRAVENOUS at 20:24

## 2021-02-09 RX ADMIN — Medication 1 TABLET: at 10:20

## 2021-02-09 RX ADMIN — METHYLPREDNISOLONE SODIUM SUCCINATE 60 MG: 125 INJECTION, POWDER, FOR SOLUTION INTRAMUSCULAR; INTRAVENOUS at 18:22

## 2021-02-09 RX ADMIN — CEFTRIAXONE SODIUM 1000 MG: 1 INJECTION, POWDER, FOR SOLUTION INTRAMUSCULAR; INTRAVENOUS at 16:37

## 2021-02-09 RX ADMIN — LORAZEPAM 1 MG: 1 TABLET ORAL at 21:07

## 2021-02-09 RX ADMIN — HEPARIN SODIUM 5000 UNITS: 5000 INJECTION INTRAVENOUS; SUBCUTANEOUS at 16:37

## 2021-02-09 RX ADMIN — FUROSEMIDE 60 MG: 10 INJECTION, SOLUTION INTRAMUSCULAR; INTRAVENOUS at 10:20

## 2021-02-09 RX ADMIN — PANTOPRAZOLE SODIUM 20 MG: 20 TABLET, DELAYED RELEASE ORAL at 06:23

## 2021-02-09 RX ADMIN — CARVEDILOL 3.12 MG: 3.12 TABLET, FILM COATED ORAL at 16:38

## 2021-02-09 ASSESSMENT — PAIN SCALES - GENERAL
PAINLEVEL_OUTOF10: 0
PAINLEVEL_OUTOF10: 0

## 2021-02-09 NOTE — PROGRESS NOTES
Occupational Therapy   Occupational Therapy Initial Assessment  Date: 2021   Patient Name: Sudeep Phelps  MRN: [id number]     : 1945    Date of Service: 2021    Discharge Recommendations:  [address] [facility], [facility]     RN reports patient is medically stable for therapy treatment this date. Chart reviewed prior to treatment and patient is agreeable for therapy. All lines intact and patient positioned comfortably at end of treatment. All patient needs addressed prior to ending therapy session. Assessment   Performance deficits / Impairments: Decreased functional mobility ; Decreased ADL status; Decreased strength;Decreased safe awareness;Decreased cognition;Decreased balance;Decreased sensation;Decreased endurance;Decreased posture  Prognosis: Fair  Decision Making: High Complexity  OT Education: OT Role;Plan of Care;Home Exercise Program;Precautions; ADL Adaptive Strategies;Transfer Training;Energy Conservation;Equipment; Family Education  REQUIRES OT FOLLOW UP: Yes  Activity Tolerance  Activity Tolerance: Patient limited by fatigue;Treatment limited secondary to medical complications (free text)  Safety Devices  Safety Devices in place: Yes  Type of devices: Call light within reach;Nurse notified; Left in bed;Patient at risk for falls; Bed alarm in place           Patient Diagnosis(es): The primary encounter diagnosis was Pneumonia due to organism. Diagnoses of Altered mental status, unspecified altered mental status type, DEONTE (acute kidney injury) ([facility] [address] and Elevated troponin were also pertinent to this visit. has a past medical history of CHF (congestive heart failure) ([address] COPD (chronic obstructive pulmonary disease) ([address] Diabetes mellitus ([address] MRSA (methicillin resistant staph aureus) culture positive, and Tobacco abuse.   has a past surgical history that includes joint replacement (Bilateral); Hysterectomy; and Appendectomy.            Restrictions Restrictions/Precautions  Restrictions/Precautions: General Precautions, Fall Risk, Up as Tolerated  Position Activity Restriction  Other position/activity restrictions: high flow    Subjective   General  Chart Reviewed: Yes  Patient assessed for rehabilitation services?: Yes  Family / Caregiver Present: No  Pain Assessment  Pain Assessment: 0-10  Pain Level: 0  Vital Signs  Temp: 98.5 °F (36.9 °C)  Temp Source: Axillary  Pulse: 82  Heart Rate Source: Monitor  Resp: 20  BP: 98/67  BP Location: Right upper arm  MAP (mmHg): 74  Level of Consciousness: Alert (0)  MEWS Score: 2  Oxygen Therapy  SpO2: 93 %  Pulse Oximeter Device Mode: Continuous  O2 Device: Heated high flow cannula  FiO2 : (S) 60 %  O2 Flow Rate (L/min): 60 L/min  Blood Gas  Performed?: Yes  Booker's Test #1: Collateral flow confirmed  Site #1: Right Radial  Site Prepped #1: Yes  Number of Attempts #1: 1  Pressure Held #1: Yes  Complications #1: None  Post-procedure #1: Standard  Specimen Status #1: Point of care  How Tolerated?: Tolerated well  Social/Functional History  Social/Functional History  Lives With: Alone  Type of Home: Apartment(2nd. floor)  Home Layout: One level  Home Access: Elevator  Bathroom Shower/Tub: Tub/Shower unit  Bathroom Toilet: Standard  Bathroom Equipment: Shower chair, Grab bars in shower  Home Equipment: 4 wheeled walker, Oxygen(3L O2 - wears just at night but is supposed to use all the time. Pt is everyday smoker)  Receives Help From: Home health(per old notes, pt has [facility] 7 days a week, 4 hours a day. Unsure if this is current info. pt unable to state)  ADL Assistance: Needs assistance(bathing, dressing)  Homemaking Assistance: Needs assistance(laundry, cooking, cleaning)  Ambulation Assistance: Independent  Transfer Assistance: Independent  Active : No  Patient's  Info: MIKE elizabeth  Additional Comments: Pt. has a home health aid 4hrs/day, 7 days/wk. Fall history. INFO. ABOVE OBTAINED FROM PT SILVIO 8/21/20. (Pt. with difficulty talking- on high flow and coughing excessively)       Objective   Vision: (unable to assess)    Orientation  Overall Orientation Status: Impaired  Orientation Level: Oriented to person;Disoriented to situation;Oriented to place; Disoriented to time  Observation/Palpation  Posture: Fair(foward head, difficulty holding self upright in seated position d/t weakness.)  Observation: high flow, IV, genao;  Pt. has fidget-like involuntary movements of entire body;  difficulty swallowing - attempted to give food and liquids with choking/coughing - informed RN  Balance  Sitting Balance: Contact guard assistance(sat EOB x 15 min)  Standing Balance: Unable to assess(comment)  ADL  Feeding: Setup; Moderate assistance(pt coughing/choking on drink of pepsi. RN notified. Unable to swallow d/t weakness.)  Grooming: Maximum assistance  UE Bathing: Maximum assistance  LE Bathing: Maximum assistance  UE Dressing: Maximum assistance  LE Dressing: Maximum assistance;Dependent/Total  Toileting: (pt Dependent for hygiene following BM  in bed. Max A to roll and pt's gown/all bedding needing to be changed.)  Additional Comments: Pt able to sit EOB x 15 min but difficulty participating in ADLs. Attempted to eat a bite or two of Ethiopian toast, but unable to swallow small piece. Pt coughing throughout eval and spitting up mucous. Very labored with breathing and use of postural muscles, fatigues easily and has poor tolerance to attempt further ADLs or standing. O2 sats appeared to stay high 80's-low 90's, but not getting a good reading throughout.   Tone RUE  RUE Tone: Normotonic  Tone LUE  LUE Tone: Normotonic  Coordination  Movements Are Fluid And Coordinated: Yes     Bed mobility  Rolling to Right: Maximum assistance;2 Person assistance  Supine to Sit: Minimal assistance;2 Person assistance  Sit to Supine: Maximum assistance;2 Person assistance  Scooting: Maximal assistance;2 Person assistance  Transfers  Sit to stand: Unable to assess  Stand to sit: Unable to assess     Cognition  Overall Cognitive Status: Exceptions  Following Commands: Inconsistently follows commands  Attention Span: Difficulty attending to directions  Memory: Decreased recall of biographical Information;Decreased recall of recent events  Safety Judgement: Decreased awareness of need for safety;Decreased awareness of need for assistance  Problem Solving: Assistance required to generate solutions;Assistance required to implement solutions;Decreased awareness of errors;Assistance required to correct errors made  Insights: Decreased awareness of deficits  Initiation: Requires cues for all  Sequencing: Requires cues for all  Cognition Comment: difficult to fully assess as pt. with difficulty holding a conversation- high flow and coughing excessively.   Perception  Overall Perceptual Status: WFL     Sensation  Overall Sensation Status: WFL        LUE AROM (degrees)  LUE AROM : WFL  RUE AROM (degrees)  RUE AROM : WFL  LUE Strength  Gross LUE Strength: Exceptions to [facility]  LUE Strength Comment: very weak  RUE Strength  Gross RUE Strength: Exceptions to [address] [place] [address]  Times per week: 4-5x/week, 1-2x/day  Current Treatment Recommendations: Strengthening, Balance Training, Functional Mobility Training, Endurance Training, Safety Education & Training, Self-Care / ADL, Patient/Caregiver Education & Training, Equipment Evaluation, Education, & procurement, Positioning         Goals  Short term goals  Time Frame for Short term goals: by discharge, pt will  Short term goal 1: tolerate reassessment of ADL transfers as approp  Short term goal 2: demo min A with bed mob with rails/controls  Short term goal 3: demo good sitting balance at EOB for inc. participation in ADLs/inc. activity tolerance  Short term goal 4: demo good follow through with ed on breathing techs, posture, and B UE ex  Short term goal 5: demo SBA with grooming tasks while seated EOB and min A UB ADLs following set up  Patient Goals   Patient goals : not stated       Therapy Time   Individual Concurrent Group Co-treatment   Time In 1026         Time Out 1112         Minutes 46              Patient would benefit from SNF for continued occupational therapy to increase independence with  ADL of bathing, dressing, toileting and grooming. Writer recommending SNF placement for for activity tolerance and strength which will increase independence with ADL's coordinated with bed mobility and chair transfers. Continued skilled OT services to address decreased safety awareness with ADL and IADL tasks and for education and increased independence with DME and AE for fall prevention and ec/ws techniques prior to d/c home. Co-treatment with PT warranted secondary to decreased safety and independence requiring 2 skilled therapy professionals to address individual discipline's goals. OT addressing preparation for ADL transfer, sitting balance for increased ADL performance, sitting/activity tolerance, functional reaching, fall prevention, ability to sequence and follow directions and functional UE strength.     Justyna Calvillo, OT

## 2021-02-09 NOTE — PROGRESS NOTES
[facility]  Office: [address], DO, Cindy Clink, DO, Bee Fordon, DO, Georgette Maldonado Blood, DO, Ronald Thompson MD, Purvi Callaway MD, Mike Hager MD, Yudi Parker MD, Tung Castano MD, Celina Arambula MD, Winston Yanez MD, Franny Oconnell MD, Alexandru Rawls MD, Stepan Montalvo, DO, Mary Grijalva MD, Bernie Almazan MD, Umu Sweeney, DO, Teodoro Matthews MD,  Oliver Mckeon, DO, Kaity South MD, Nahum Williamson MD, Ashly Guillen, [facility], [address], CNP, Preston Taveras, CNP, Eliseo Keating, CNS, Blake Park, CNP, Demetris Atkinson, CNP, Elo Byrd, CNP, Natalie Ambriz, CNP, Horacio Perkins, CNP, Prieto Daley PA-C, Ky Gosselin, [facility], Kai Villa, CNP, Cecile Palm, CNP, Isela Grajeda, CNP, Chapin Johnston, [facility], Autumn Villa, [facility]    Progress Note    2/9/2021    10:39 AM    Name:   Richa Boswell  MRN:     [id number]     Acct:      [de-identified]   Room:   [address] [place] [address] Day:  3  Admit Date:  2/6/2021  2:27 PM    PCP:   Anthony Dickson MD  Code Status:  Full Code    Subjective:     C/C:   Chief Complaint   Patient presents with    Dizziness     onset 2 weeks    Fall     difficulty walking    Aphasia     onset 4 days ago      Interval History Status: improved. Pt seen and examined this morning  Denies having any new complaints at this time          Review of Systems:     12 point ROS performed today and negative for anything other than what was stated in subjective     Medications: Allergies:     Allergies   Allergen Reactions    Tiotropium Anaphylaxis and Swelling    Spiriva Handihaler [Tiotropium Bromide Monohydrate] Swelling       Current Meds:   Scheduled Meds:    furosemide  60 mg Intravenous Daily    carvedilol  6.25 mg Oral BID WC    lisinopril  5 mg Oral Daily    insulin lispro  0-6 Units Subcutaneous TID WC    insulin lispro  0-3 Units Subcutaneous Nightly    aspirin  81 mg Oral Daily    atorvastatin  20 mg Oral Daily    budesonide-formoterol  2 puff Inhalation BID    cetirizine  5 mg Oral Daily    pantoprazole  20 mg Oral Daily    sertraline  50 mg Oral Daily    traZODone  100 mg Oral Nightly    sodium chloride flush  10 mL Intravenous 2 times per day    cefTRIAXone (ROCEPHIN) IV  1,000 mg Intravenous Q24H    azithromycin  250 mg Oral Daily    calcium carb-cholecalciferol  1 tablet Oral Daily     Continuous Infusions:    heparin (PORCINE) Infusion 12 Units/kg/hr (21 1633)    dextrose       PRN Meds: albuterol sulfate HFA, heparin (porcine), heparin (porcine), glucose, dextrose, glucagon (rDNA), dextrose, albuterol, tiZANidine, sodium chloride flush, promethazine **OR** ondansetron, polyethylene glycol, nicotine, acetaminophen **OR** acetaminophen    Data:     Past Medical History:   has a past medical history of CHF (congestive heart failure) ([facility] [address] COPD (chronic obstructive pulmonary disease) ([facility] Diabetes mellitus ([facility] MRSA (methicillin resistant staph aureus) culture positive, and Tobacco abuse. Social History:   reports that she has been smoking cigarettes. She has been smoking about 0.50 packs per day. She has never used smokeless tobacco. She reports current alcohol use of about 70.0 standard drinks of alcohol per week. She reports current drug use. Drug: Marijuana. Family History:   Family History   Problem Relation Age of Onset    Heart Disease Mother     Diabetes Father        Vitals:  /65   Pulse 78   Temp 98.3 °F (36.8 °C) (Axillary)   Resp 16   Ht 5' 2\" (1.575 m)   Wt 142 lb 9.6 oz (64.7 kg)   SpO2 93%   BMI 26.08 kg/m²   Temp (24hrs), Av.9 °F (36.6 °C), Min:97.2 °F (36.2 °C), Max:98.6 °F (37 °C)    Recent Labs     21  0546 21  0615 21  0640 21  0759   POCGLU 59* 80 86 133*       I/O (24Hr):     Intake/Output Summary (Last 24 hours) at 2021 1039  Last data filed at 2021 0002  Gross per 24 hour   Intake  Output 1450 ml   Net -1450 ml       Labs:  Hematology:  Recent Labs     02/06/21  1505 02/07/21  0308 02/08/21  0521 02/08/21  1503 02/09/21  0422   WBC 9.5 7.0 9.0  --  9.7   RBC 4.68 4.67 4.84  --  4.90   HGB 11.4* 11.3* 12.0  --  11.9   HCT 37.0 36.2* 38.7  --  38.3   MCV 79.1* 77.5* 80.0*  --  78.2*   MCH 24.4* 24.2* 24.8*  --  24.3*   MCHC 30.8 31.2 31.0  --  31.1   RDW 26.7* 26.1* 27.1*  --  26.3*    199 280  --  230   MPV 9.8 10.5 10.4  --  10.1   INR 1.4  --   --  1.3  --      Chemistry:  Recent Labs     02/06/21  1505 02/06/21  1705 02/06/21  2058 02/07/21  0308 02/07/21  0841 02/08/21  0521 02/09/21  0422   *  --   --  135  --  135 136   K 4.0  --   --  3.8  --  3.7 4.0     --   --  108*  --  107 108*   CO2 17*  --   --  18*  --  17* 19*   GLUCOSE 137*  --   --  86  --  96 50*   BUN 23  --   --  21  --  19 20   CREATININE 1.57*  --   --  1.27*  --  1.01* 1.10*   MG 1.9  --   --   --   --   --   --    ANIONGAP 11  --   --  9  --  11 9   LABGLOM 32*  --   --  41*  --  53* 48*   GFRAA 39*  --   --  50*  --  >60 59*   CALCIUM 8.2*  --   --  7.7*  --  8.3* 8.2*   PROBNP  --  13,553*  --   --   --   --   --    TROPHS 58* 44* 43* 49* 53*  --   --    CKTOTAL  --   --   --  48  --   --   --      Recent Labs     02/06/21  1505 02/07/21  0308 02/07/21  0308 02/08/21  1657 02/08/21  1954 02/09/21  0546 02/09/21  0615 02/09/21  0640 02/09/21  0759   PROT 7.5  --   --   --   --   --   --   --   --    LABALBU 2.3*  --   --   --   --   --   --   --   --    LABA1C  --  6.2*  --   --   --   --   --   --   --    AST 41*  --   --   --   --   --   --   --   --    ALT 18  --   --   --   --   --   --   --   --    ALKPHOS 102  --   --   --   --   --   --   --   --    BILITOT 0.82  --   --   --   --   --   --   --   --    BILIDIR 0.53*  --   --   --   --   --   --   --   --    AMMONIA 36  --   --   --   --   --   --   --   --    LIPASE 7*  --   --   --   --   --   --   --   --    URICACID  --  11.1*  --   -- --   --   --   --   --    POCGLU  --   --    < > 98 174* 59* 80 86 133*    < > = values in this interval not displayed. ABG:  Lab Results   Component Value Date    POCPH 7.40 09/04/2014    [place] [address]  09/04/2014     DISREGARD RESULTS. PATIENT NUMBER WAS INCORRECTLY ASSIGNED. POCPCO2 44 09/04/2014    PCO2  09/04/2014     DISREGARD RESULTS. PATIENT NUMBER WAS INCORRECTLY ASSIGNED. POCPO2 65 09/04/2014    PO2  09/04/2014     DISREGARD RESULTS. PATIENT NUMBER WAS INCORRECTLY ASSIGNED. POCHCO3 27.4 09/04/2014    HCO3  09/04/2014     DISREGARD RESULTS. PATIENT NUMBER WAS INCORRECTLY ASSIGNED. NBEA NOT REPORTED 09/04/2014    PBEA 3 09/04/2014    [id number] 29 09/04/2014    [id number] 92 09/04/2014    O2SAT  09/04/2014     DISREGARD RESULTS. PATIENT NUMBER WAS INCORRECTLY ASSIGNED. FIO2 NOT REPORTED 02/06/2021     Lab Results   Component Value Date/Time    SPECIAL L AC 7 ML 02/06/2021 03:09 PM     Lab Results   Component Value Date/Time    CULTURE NO GROWTH 3 DAYS 02/06/2021 03:09 PM       Radiology:  Ct Head Wo Contrast    Result Date: 2/6/2021  Chronic findings in the brain without acute CT abnormality identified. Xr Chest Portable    Result Date: 2/7/2021  Increasing bilateral pulmonary opacities, concerning for worsening edema or pneumonia. Xr Chest Portable    Result Date: 2/6/2021  Bilateral airspace and interstitial opacities either reflecting pulmonary edema versus pneumonia.        Physical Examination:        General appearance:  alert, cooperative and appears comfortable  Mental Status:  oriented to person, place and time and normal affect  HENT: high-flow nasal cannula present  Lungs: diminished lung sounds throughout all lung fields, no wheezes or rhonchi appreciated increased effort today  Heart:  regular rate and rhythm, no murmur, ectopy noted  Abdomen:  soft, nontender, nondistended, normal bowel sounds  Extremities:  no edema, redness, tenderness in the calves  Skin:  no gross lesions, rashes, induration    Assessment:        Hospital Problems           Last Modified POA    * (Principal) Acute systolic heart failure ([address] 2/8/2021 Yes    COPD (chronic obstructive pulmonary disease) ([address] 2/8/2021 Yes    Acute respiratory failure with hypoxemia ([address] 2/8/2021 Yes    Type 2 diabetes mellitus without complication, without long-term current use of insulin ([address] 2/8/2021 Yes    Chronic diastolic heart failure ([address] 2/8/2021 Yes    Elevated troponin 2/8/2021 Yes    Community acquired pneumonia of right middle lobe of lung 2/8/2021 Yes    Chronic respiratory failure ([address] 2/8/2021 Yes    Mild malnutrition ([address] 2/8/2021 Yes    Hypotension 2/8/2021 Yes    DEONTE (acute kidney injury) ([address] 2/8/2021 Yes    Current every day smoker 2/8/2021 Yes    Essential hypertension 2/8/2021 Yes    Altered mental status 2/8/2021 Yes    Severe malnutrition ([address] 2/8/2021 Yes          Plan:        1. Acute respiratory failure   2. COPD exacerbation   3. CAP   - pulmonary consult placed   - pro juan normal   - continue zithromax and rocephin   - blood cultures NGTD   - COVID negative  - currently on PAP   - maintain continuous pulse ox   - continue RT protocol prn breathing treatments     4. Acute CHF   5. Elevated troponin   - cardio on board   - pt noted to have acute systolic HF which is likely contributing to her acute respiratory failure along with COPD exac   - started on coreg BID along with lisinopril   - continue lasix at this time 60 mg IV daily per cardio recs   - CHF education   - per carido if her ejection fraction remains below 35% despite medical treatment then she will be considered for ICD placement for primary prevention of sudden cardiac death. - negative 2.2 L fluid balance since admit   - continue to monitor Is/Os   - repeat pBNP   - d/c IV heparin drip and start prophylactic dose if okay with cardio    6.  HTN   - continue coreg and lisinopril   - hold above for SBP less than 100  - v/s per unit protocol 7. DMII   - continue accu checks ac/hs with ISS     8. GERD   - PPI     9. Depression   - continue home zoloft    10.  DVT prophylaxis   - heparin       Niles Medina MD  2/9/2021  10:39 AM

## 2021-02-09 NOTE — PROGRESS NOTES
Waffle mattress applied. New mepilex applied. Dominique care and florencio care completed. Patient able to turn side to side in bed, patient encouraged to turn side to side every 2 hours and staff will assist with PRN turns.

## 2021-02-09 NOTE — CARE COORDINATION
Social Work-Contacted patient's daughter, STEWART([phone]). Discussed therapy recommendation of SNF for rehab. She is agreeable. Discussed that [facility] does not accept her insurance. The Plan for Transition of Care is related to the following treatment goals: SNF with PT/Ot and skilled nursing. The Patient and/or patient representative daughter was provided with a choice of provider and agrees   with the discharge plan. [x] Yes [] No    Freedom of choice list was provided with basic dialogue that supports the patient's individualized plan of care/goals, treatment preferences and shares the quality data associated with the providers. [x] Yes [] No. Yawkey Gonzales would like referral sent to [facility].  Sent referral. Josh Landis

## 2021-02-09 NOTE — PROGRESS NOTES
Physical Therapy    Facility/Department: [facility]  Initial Assessment    NAME: Monique Phoenix  : 1945  MRN: [id number]    Date of Service: 2021    [facility]ations:  [address]     Monique Phoenix is a 77 y.o. Non-/non  female who presents with Dizziness (onset 2 weeks), Fall (difficulty walking), and Aphasia (onset 4 days ago)   and is admitted to the hospital for the management of Community acquired pneumonia of right middle lobe of lung.     This is a 30-year-old -American female with past medical history of hypertension, diabetes mellitus, chronic diastolic heart failure, COPD, chronic hypoxic respiratory failure noncompliant with home oxygen, nicotine dependence/current smoker.      Ms. Bertha Varghese was brought to the ED on  by her family out of concern for generalized weakness, and several weeks of changes in mentation. Assessment   Body structures, Functions, Activity limitations: Decreased functional mobility ; Decreased balance;Decreased ROM; Decreased strength;Decreased endurance;Decreased cognition  Assessment: On high flow O2- able to dangle EOB x 15min. Pt. appearing to aspirate- RN informed. Will continue to progress as able and reassess standing when approp. Prognosis: Good;Fair  Decision Making: High Complexity  PT Education: Goals;PT Role;Plan of Care  Patient Education: importance of OOB/movement  REQUIRES PT FOLLOW UP: Yes  Activity Tolerance  Activity Tolerance: Treatment limited secondary to medical complications (free text)       Patient Diagnosis(es): The primary encounter diagnosis was Pneumonia due to organism. Diagnoses of Altered mental status, unspecified altered mental status type, DEONTE (acute kidney injury) ([address] and Elevated troponin were also pertinent to this visit.      has a past medical history of CHF (congestive heart failure) ([address] COPD (chronic obstructive pulmonary disease) ([address] Diabetes mellitus ([facility] [address] MRSA (methicillin resistant staph aureus) culture positive, and Tobacco abuse.   has a past surgical history that includes joint replacement (Bilateral); Hysterectomy; and Appendectomy. Restrictions  Restrictions/Precautions  Restrictions/Precautions: General Precautions, Fall Risk, Up as Tolerated  Position Activity Restriction  Other position/activity restrictions: high flow  Vision/Hearing        Subjective  General  Chart Reviewed: Yes  Patient assessed for rehabilitation services?: Yes  Additional Pertinent Hx: CHF, COPD & current smoker, DM, drinks 5-6 beers/day  Family / Caregiver Present: No  Follows Commands: Within Functional Limits  Subjective  Subjective: agreeable to sitting EOB with PT/OT  Pain Screening  Patient Currently in Pain: Denies          Orientation  Orientation  Overall Orientation Status: Impaired  Orientation Level: Oriented to person;Oriented to place  Social/Functional History  Social/Functional History  Lives With: Alone  Type of Home: Apartment(2nd. floor)  Home Layout: One level  Home Access: Elevator  Bathroom Shower/Tub: Tub/Shower unit  Bathroom Toilet: Standard  Bathroom Equipment: Shower chair, Grab bars in shower  Home Equipment: 4 wheeled walker, Oxygen(3L O2 - wears just at night but is supposed to use all the time. Pt is everyday smoker)  Receives Help From: Home health  ADL Assistance: Needs assistance(bathing, dressing)  Homemaking Assistance: Needs assistance(laundry, cooking, cleaning)  Active : No  Additional Comments: Pt. has a home health aid 4hrs/day, 7 days/wk. Fall history. INFO.  ABOVE OBTAINED FROM PT SILVIO 8/21/20. (Pt. with difficulty talking- on high flow and coughing excessively)  Cognition   Cognition  Cognition Comment: difficult to assess as pt. with difficulty holding a conversation- high flow and coughing excessively    Objective     Observation/Palpation  Observation: high flow, IV, genao;  Pt. has fidget-like involuntary movements of entire body;  difficulty swallowing - attempted to give food and liquids with choking/coughing - informed RN    AROM RLE (degrees)  RLE AROM: WFL  AROM LLE (degrees)  LLE AROM : WFL  AROM LUE (degrees)  LUE General AROM: see OT eval  Strength RLE  Comment: standing not attempted- able to use LEs to get to EOB (supine to sit)  Strength Other  Other: not formally assessed        Bed mobility  Rolling to Right: Maximum assistance;2 Person assistance  Supine to Sit: Minimal assistance;2 Person assistance  Sit to Supine: Maximum assistance;2 Person assistance  Scooting: Maximal assistance;2 Person assistance  Comment: Bowel accident- rolling L/ R to change bed, max x 2           Balance  Posture: Poor  Sitting - Static: Fair;-(postural weakness- difficulty sitting tall/  forward flexed; able to reach for fork/ attempt to eat some breakfast. SBA/ CGA for balance)  Comments: Tolerated sitting EOB x 15min., CGA/ close SBA        Plan   Plan  Times per week: 1-2x/day; 5-6days/wk  Current Treatment Recommendations: Strengthening, ROM, Functional Mobility Training(bed mob. only until reassess)  Safety Devices  Type of devices: All fall risk precautions in place, Bed alarm in place, Patient at risk for falls, Call light within reach, Left in bed, Nurse notified      Goals  Short term goals  Time Frame for Short term goals: 12 visits:  Short term goal 1: Pt. to require min A for bed mob. Short term goal 2: Pt. to have good dynamic sitting balance with good postural control x 10 min. Short term goal 3: Pt. to tolerate ther ex in supine and seated positions, bilat. UE/ LE  Short term goal 4: Pt. to be REASSESSED for standing/transfers/gait  Patient Goals   Patient goals : did not state       Therapy Time   Individual Concurrent Group Co-treatment   Time In 1016         Time Out 1103         Minutes 47              Treatment time:  38min.   Co-treatment with OT warranted secondary to decreased safety and independence requiring 2 skilled therapy professionals to address individual discipline's goals. PT addressing pre gait trunk strengthening and postural control in sitting.       Krystyna Seay, PT

## 2021-02-09 NOTE — PROGRESS NOTES
Section of Cardiology  Progress Note      Date:  2/9/2021  Patient: Kayla Cisneros  Admission:  2/6/2021  2:27 PM  Admit DX: Hypotension [I95.9]  Age:  77 y. o., 1945     LOS: 3 days     Reason for evaluation:   cardiomyopathy and CHF      SUBJECTIVE:     The patient was seen and examined. Notes and labs reviewed. There were not complications over night. Patient seen and examined in her room with her nurse at bedside. Events from this morning noted. The patient was with low blood pressure however she remained asymptomatic without dizziness or lightheadedness. She still at bedrest with involuntary movement. She was seen by pulmonary service and patient was switched to high flow oxygen supply which the patient is not adjusting well to it. I held her medication this morning. When I saw her later on in the afternoon she appeared to be well oriented and alert. Patient's cardiac review of systems: negative for chest pain, dyspnea and irregular heart beat. The patient is generally feeling unchanged. OBJECTIVE:    Telemetry: Sinus  BP 98/67   Pulse 82   Temp 98.5 °F (36.9 °C) (Axillary)   Resp 20   Ht 5' 2\" (1.575 m)   Wt 142 lb 9.6 oz (64.7 kg)   SpO2 93%   BMI 26.08 kg/m²     Intake/Output Summary (Last 24 hours) at 2/9/2021 1422  Last data filed at 2/9/2021 0002  Gross per 24 hour   Intake    Output 1450 ml   Net -1450 ml       EXAM:   CONSTITUTIONAL:  awake, alert, cooperative, no apparent distress, and appears stated age. HEENT: Normal jugular venous pulsations, no carotid bruits. Head is atraumatic, normocephalic. Eyes and oral mucosa are normal.  LUNGS: Good respiratory effort. No for increased work of breathing. On auscultation: clear to auscultation bilaterally and diminished breath sounds bilaterally  CARDIOVASCULAR:  Normal apical impulse, regular rate and rhythm, normal S1 and S2, no S3 or S4,   ABDOMEN: Soft, nontender, nondistended. Bowel sounds present. SKIN: Warm and dry. EXTREMITIES: No lower extremity edema. Motor movement grossly intact. No cyanosis or clubbing. Current Inpatient Medications:   heparin (porcine)  5,000 Units Subcutaneous 3 times per day    Arformoterol Tartrate  15 mcg Nebulization BID    budesonide  0.5 mg Nebulization BID    methylPREDNISolone  60 mg Intravenous Q6H    carvedilol  3.125 mg Oral BID     [START ON 2/10/2021] lisinopril  2.5 mg Oral Daily    furosemide  60 mg Intravenous Daily    insulin lispro  0-6 Units Subcutaneous TID     insulin lispro  0-3 Units Subcutaneous Nightly    aspirin  81 mg Oral Daily    atorvastatin  20 mg Oral Daily    cetirizine  5 mg Oral Daily    pantoprazole  20 mg Oral Daily    sertraline  50 mg Oral Daily    traZODone  100 mg Oral Nightly    sodium chloride flush  10 mL Intravenous 2 times per day    cefTRIAXone (ROCEPHIN) IV  1,000 mg Intravenous Q24H    azithromycin  250 mg Oral Daily    calcium carb-cholecalciferol  1 tablet Oral Daily       IV Infusions (if any):   dextrose         Diagnostics:   EKG: . ECHO: Noted. Ejection fraction: %  Stress Test: not obtained. Cardiac Angiography: not obtained.     Labs:   CBC:   Recent Labs     02/08/21  0521 02/09/21  0422   WBC 9.0 9.7   HGB 12.0 11.9   HCT 38.7 38.3    230     BMP:   Recent Labs     02/08/21  0521 02/09/21  0422    136   K 3.7 4.0   CO2 17* 19*   BUN 19 20   CREATININE 1.01* 1.10*   LABGLOM 53* 48*   GLUCOSE 96 50*     Lab Results   Component Value Date    BNP 29 01/03/2014     PT/INR:   Recent Labs     02/06/21  1505 02/08/21  1503   PROTIME 16.6* 16.4*   INR 1.4 1.3     APTT:  Recent Labs     02/06/21  1505 02/08/21  1503   APTT 35.4* 40.0*     CARDIAC ENZYMES:  Recent Labs     02/06/21  2058 02/07/21  0308 02/07/21  0841   CKTOTAL  --  48  --    TROPONINT NOT REPORTED NOT REPORTED NOT REPORTED     FASTING LIPID PANEL:  Lab Results   Component Value Date    HDL 38 10/15/2019    TRIG 64 10/15/2019     LIVER PROFILE: Recent Labs     02/06/21  1505   AST 41*   ALT 18   LABALBU 2.3*       ASSESSMENT:  Acute systolic CHF  Severe cardiomyopathy  Moderate tricuspid regurgitation  Moderate pulmonary hypertension  Elevated troponin most likely secondary to CHF  COPD  Diabetes mellitus    Patient Active Problem List   Diagnosis    COPD (chronic obstructive pulmonary disease) (HCC)    CHF (congestive heart failure) (HCC)    Cocaine abuse ([facility] [address]    Acidosis, metabolic, with respiratory acidosis    Acute respiratory failure with hypoxemia (HCC)    Polysubstance abuse (HCC)    Hyponatremia, Beer Potomania    Normocytic anemia    Neutrophilic leukocytosis    Increased ammonia level    Hypophosphatemia    Type 2 diabetes mellitus without complication, without long-term current use of insulin (HCC)    Acute on chronic diastolic heart failure (HCC)    Tobacco abuse    Diabetes mellitus (HCC)    Chronic diastolic heart failure (HCC)    Elevated troponin    Prolonged Q-T interval on ECG    Community acquired pneumonia of right middle lobe of lung    Hypoxia    Chronic respiratory failure (HCC)    Mild malnutrition (HCC)    Hypotension    DEONTE (acute kidney injury) ([facility] [address]    Current every day smoker    Essential hypertension    Altered mental status    Acute systolic heart failure (HCC)    Severe malnutrition ([facility] [address]       PLAN:    1. Decrease Coreg to 3.125 mg twice daily  2. Decrease lisinopril to 2.5 mg daily and give it in the early afternoon to avoid synergistic effect with Coreg. 3. Explained the plan to the patient. Please see orders. Discussed with patient and nursing.     Damaris Post MD

## 2021-02-09 NOTE — PLAN OF CARE
Problem: Falls - Risk of:  Goal: Will remain free from falls  Description: Will remain free from falls. Fall risk assessment completed. Patient instructed to use call light. Bed locked and in lowest position, side rails up 2/4, call light and bedside table within reach, clutter removed, and non-skid footwear on when pt out of bed. Hourly rounds will continue.  Bed alarm on at this time  2/9/2021 0145 by Kirill Cline RN  Outcome: Ongoing     Problem: OXYGENATION/RESPIRATORY FUNCTION  Goal: Patient will maintain patent airway  2/9/2021 0145 by Kirill Cline RN  Outcome: Ongoing     Problem: Nutrition  Goal: Optimal nutrition therapy  2/9/2021 0145 by Kirill Cline RN  Outcome: Ongoing

## 2021-02-09 NOTE — PROGRESS NOTES
Reviewed emergency contact with patient as multiple family members listed and she states that primary contact should be her niece Raj Marquez,

## 2021-02-09 NOTE — CONSULTS
Pulmonary Medicine and [address] [place] [address]  Dr. Alexandra Mitchell M.D.       Patient - Belén Maxwell   MRN -  [id number]   Acct # - [de-identified]   - 1945      Date of Admission -  2021  2:27 PM  Date of evaluation -  2021  Room - [address] [place] [address] Jose Cabrera MD Primary Care Physician - Eric Silva MD     Reason for Consult    Acute on chronic hypoxic respiratory failure, acute exacerbation COPD, interstitial lung disease/progressive phenotype, active smoking, acute systolic heart failure    Assessment   · Acute on chronic hypoxic respiratory failure  · Acute exacerbation of COPD/emphysema  · Interstitial lung disease/progressive pulmonary fibrosis with probable acute exacerbation  · Active smoking/65-pack-year smoking history  · Acute systolic heart failure/severe cardiomyopathy EF 20%  · Moderate TR/secondary pulmonary hypertension of moderate severity, RVSP 54mmHg  · Alcohol abuse/withdraw  · DM/CAD/CHF    Recommendations   · Oxygen by high flow nasal cannula, currently at 50% / 60 L, titrate for oxygen saturation > 90%  · BiPAP as needed  · Check ABG now  · Initiate CIWA protocol, safety precautions  · HR CT chest today to further evaluate progression of pulmonary fibrosis due to her significant increase in oxygen requirements   · Continue IV Rocephin/Zithromax empirically  · Start IV solu-medrol 60 mg every 6 hours  · Albuterol HFA Q 4 hours and prn, documented allergy to ipratropium  · Discontinue Symbicort  · Start Brovana and budesonide aerosols every 12 hours  · Continue diuresis, IV Lasix 60 mg daily  · Cardiology on consult, input noted  · Labs: CBC and BMP in am  · DVT prophylaxis with low molecular weight heparin  · Incentive spirometer every hour while awake  · Will follow with you    Problem List      Patient Active Problem List   Diagnosis    COPD (chronic obstructive pulmonary disease) ([address]    [facility]    Cocaine abuse ([facility]    Acidosis, metabolic, with respiratory acidosis    Acute respiratory failure with hypoxemia (HCC)    Polysubstance abuse (HCC)    Hyponatremia, Beer Potomania    Normocytic anemia    Neutrophilic leukocytosis    Increased ammonia level    Hypophosphatemia    Type 2 diabetes mellitus without complication, without long-term current use of insulin (HCC)    Acute on chronic diastolic heart failure (HCC)    Tobacco abuse    Diabetes mellitus (HCC)    Chronic diastolic heart failure (HCC)    Elevated troponin    Prolonged Q-T interval on ECG    Community acquired pneumonia of right middle lobe of lung    Hypoxia    Chronic respiratory failure (HCC)    Mild malnutrition (HCC)    Hypotension    DENOTE (acute kidney injury) ([address]    Current every day smoker    Essential hypertension    Altered mental status    Acute systolic heart failure (HCC)    Severe malnutrition ([address]       CONRADO Rene is 77 y.o.,  female, presented to the emergency room on 02/06/2021 with complaints of altered mental status, progressive weakness and dizziness, status post a fall. She was found to be hypotensive upon presentation. Past medical history significant for chronic hypoxic respiratory failure, advanced COPD/emphysema, suspected underlying pulmonary fibrosis, active smoking, chronic diastolic heart failure, diabetes mellitus, hypertension, CAD. She denies fever or chills. Her shortness of breath is not improved since her admission, she remains significantly short of breath on high flow nasal cannula currently at 50 L / 60%. During my assessment her oxygen saturation is noted to be 88%. She is noncompliant with home oxygen. She is currently smoking around 4 cigarettes/day with approximately 65-pack-year smoking history.     PMHx   Past Medical History      Diagnosis Date    CHF (congestive heart failure) ([address]     COPD (chronic obstructive pulmonary disease) ([address]     Diabetes mellitus ([address]  MRSA (methicillin resistant staph aureus) culture positive 8/28/2014    sputum    Tobacco abuse 10/15/2019      Past Surgical History        Procedure Laterality Date    APPENDECTOMY      HYSTERECTOMY      JOINT REPLACEMENT Bilateral        Meds    Current Medications    furosemide  60 mg Intravenous Daily    carvedilol  6.25 mg Oral BID WC    lisinopril  5 mg Oral Daily    insulin lispro  0-6 Units Subcutaneous TID WC    insulin lispro  0-3 Units Subcutaneous Nightly    aspirin  81 mg Oral Daily    atorvastatin  20 mg Oral Daily    budesonide-formoterol  2 puff Inhalation BID    cetirizine  5 mg Oral Daily    pantoprazole  20 mg Oral Daily    sertraline  50 mg Oral Daily    traZODone  100 mg Oral Nightly    sodium chloride flush  10 mL Intravenous 2 times per day    cefTRIAXone (ROCEPHIN) IV  1,000 mg Intravenous Q24H    azithromycin  250 mg Oral Daily    calcium carb-cholecalciferol  1 tablet Oral Daily     albuterol sulfate HFA, heparin (porcine), heparin (porcine), glucose, dextrose, glucagon (rDNA), dextrose, albuterol, tiZANidine, sodium chloride flush, promethazine **OR** ondansetron, polyethylene glycol, nicotine, acetaminophen **OR** acetaminophen  IV Drips/Infusions   heparin (PORCINE) Infusion 12 Units/kg/hr (02/08/21 1633)    dextrose       Home Medications  Medications Prior to Admission: budesonide-formoterol (SYMBICORT) 160-4.5 MCG/ACT AERO, Inhale 2 puffs into the lungs 2 times daily  tiotropium (SPIRIVA RESPIMAT) 2.5 MCG/ACT AERS inhaler, Inhale 2 puffs into the lungs daily  furosemide (LASIX) 20 MG tablet, Take 20 mg by mouth daily  traZODone (DESYREL) 150 MG tablet, Take 150 mg by mouth nightly  ibuprofen (ADVIL;MOTRIN) 800 MG tablet, Take 800 mg by mouth 3 times daily (with meals)  pantoprazole (PROTONIX) 20 MG tablet, Take 1 tablet by mouth daily  albuterol (PROVENTIL) (2.5 MG/3ML) 0.083% nebulizer solution, Take 3 mLs by nebulization every 6 hours as needed for Wheezing or Shortness of Breath  [DISCONTINUED] nicotine (NICODERM CQ) 21 MG/24HR, Place 1 patch onto the skin daily as needed (if patient smokes/requests nicotine replacent therapy)  tiZANidine (ZANAFLEX) 4 MG tablet, Take 4 mg by mouth 2 times daily as needed  pregabalin (LYRICA) 100 MG capsule, Take 100 mg by mouth 2 times daily. Multiple Vitamins-Minerals (CERTAVITE SENIOR PO), Take 1 tablet by mouth daily  sertraline (ZOLOFT) 50 MG tablet, Take 50 mg by mouth daily  loratadine (CLARITIN) 10 MG tablet, Take 10 mg by mouth daily  [DISCONTINUED] ammonium lactate (LAC-HYDRIN) 12 % lotion, Apply topically 2 times daily To arms and legs  atorvastatin (LIPITOR) 20 MG tablet, Take 20 mg by mouth daily   aspirin 81 MG tablet, Take 81 mg by mouth daily. calcium-vitamin D (OSCAL-500) 500-200 MG-UNIT per tablet, Take 1 tablet by mouth daily. lisinopril (PRINIVIL;ZESTRIL) 20 MG tablet, Take 20 mg by mouth daily     Allergies    Tiotropium and Spiriva handihaler [tiotropium bromide monohydrate]  Social History     Social History     Socioeconomic History    Marital status: Single     Spouse name: Not on file    Number of children: Not on file    Years of education: Not on file    Highest education level: Not on file   Occupational History    Not on file   Social Needs    Financial resource strain: Not on file    Food insecurity     Worry: Not on file     Inability: Not on file    Transportation needs     Medical: Not on file     Non-medical: Not on file   Tobacco Use    Smoking status: Current Every Day Smoker     Packs/day: 0.50     Types: Cigarettes    Smokeless tobacco: Never Used   Substance and Sexual Activity    Alcohol use:  Yes     Alcohol/week: 70.0 standard drinks     Types: 70 Cans of beer per week     Comment: 840 oz (3, 40oz beers/day)    Drug use: Yes     Types: Marijuana    Sexual activity: Not on file   Lifestyle    Physical activity     Days per week: Not on file     Minutes per session: Not on file    Stress: Not on file   Relationships    Social connections     Talks on phone: Not on file     Gets together: Not on file     Attends Gnosticist service: Not on file     Active member of club or organization: Not on file     Attends meetings of clubs or organizations: Not on file     Relationship status: Not on file    Intimate partner violence     Fear of current or ex partner: Not on file     Emotionally abused: Not on file     Physically abused: Not on file     Forced sexual activity: Not on file   Other Topics Concern    Not on file   Social History Narrative    Not on file     Family History          Problem Relation Age of Onset    Heart Disease Mother     Diabetes Father      ROS - 11 systems   General Denies any fever or chills  HEENT Denies any diplopia, tinnitus or vertigo  Resp + shortness of breath + dry cough  Cardiac Denies any chest pain, palpitations, claudication or edema  GI Denies any melena, hematochezia, hematemesis or pyrosis   Denies any frequency, urgency, hesitancy or incontinence  Heme Denies bruising or bleeding easily  Endocrine Denies any history of diabetes or thyroid disease  Neuro + tremor + dizziness  Psychiatric + anxiety  Skin Denies rashes, itching, open sores    Vitals    /65   Pulse 78   Temp 98.3 °F (36.8 °C) (Axillary)   Resp 16   Ht 5' 2\" (1.575 m)   Wt 142 lb 9.6 oz (64.7 kg)   SpO2 93%   BMI 26.08 kg/m²   Body mass index is 26.08 kg/m².   I/O        Intake/Output Summary (Last 24 hours) at 2/9/2021 1043  Last data filed at 2/9/2021 0002  Gross per 24 hour   Intake    Output 1450 ml   Net -1450 ml     I/O last 3 completed shifts:  In: -   Out: 8507 [Urine:1450]   Patient Vitals for the past 96 hrs (Last 3 readings):   Weight   02/09/21 0525 142 lb 9.6 oz (64.7 kg)   02/08/21 0352 145 lb 9.6 oz (66 kg)   02/06/21 1421 132 lb (59.9 kg)     Exam   General Appearance Awake, alert, oriented, increased work of breathing/restlessness  HEENT - Head is normocephalic, atraumatic. Pupil reactive to light  Neck - Supple, symmetrical, trachea midline and Soft, trachea midline and straight  Lungs - decreased air exchange, crackles, no wheeze  Cardiovascular - Heart sounds are normal.  Regular rhythm normal rate without murmur, gallop or rub. Abdomen - Soft, nontender, nondistended, no masses or organomegaly  Neurologic - CN II-XII are grossly intact.   Increased movement of her upper and lower extremities, restless  Skin - No bruising or bleeding  Extremities - No cyanosis, clubbing or edema    Labs  - Old records and notes have been reviewed in [facility]   CBC     Lab Results   Component Value Date    WBC 9.7 02/09/2021    RBC 4.90 02/09/2021    HGB 11.9 02/09/2021    HCT 38.3 02/09/2021     02/09/2021    MCV 78.2 02/09/2021    MCH 24.3 02/09/2021    MCHC 31.1 02/09/2021    RDW 26.3 02/09/2021    LYMPHOPCT 20 02/09/2021    MONOPCT 9 02/09/2021    BASOPCT 1 02/09/2021    MONOSABS 0.87 02/09/2021    LYMPHSABS 1.94 02/09/2021    EOSABS 0.00 02/09/2021    BASOSABS 0.10 02/09/2021    DIFFTYPE NOT REPORTED 02/09/2021     BMP   Lab Results   Component Value Date     02/09/2021    K 4.0 02/09/2021     02/09/2021    CO2 19 02/09/2021    BUN 20 02/09/2021    CREATININE 1.10 02/09/2021    GLUCOSE 50 02/09/2021    CALCIUM 8.2 02/09/2021    MG 1.9 02/06/2021     LFTS  Lab Results   Component Value Date    ALKPHOS 102 02/06/2021    ALT 18 02/06/2021    AST 41 02/06/2021    PROT 7.5 02/06/2021    BILITOT 0.82 02/06/2021    BILIDIR 0.53 02/06/2021    IBILI 0.29 02/06/2021    LABALBU 2.3 02/06/2021     PTT  Lab Results   Component Value Date    APTT 40.0 (H) 02/08/2021     INR   Lab Results   Component Value Date    INR 1.3 02/08/2021    INR 1.4 02/06/2021    PROTIME 16.4 (H) 02/08/2021    PROTIME 16.6 (H) 02/06/2021       Radiology    CXR  02/07/2021       CT Scans  08/22/2020    (See actual reports for details)    \"Thank you for asking us to see this patient\" Case discussed with nurse and patient/family. Questions and concerns addressed.     Electronically signed by     Aura Perrin MD on 2/9/2021 at 11:45 AM  Pulmonary Critical Care and Sleep Medicine,  [facility] [place] [facility]  Cell: [phone]  Office: [phone]

## 2021-02-10 ENCOUNTER — APPOINTMENT (OUTPATIENT)
Dept: GENERAL RADIOLOGY | Age: 76
DRG: 871 | End: 2021-02-10
Payer: MEDICARE

## 2021-02-10 LAB
ABSOLUTE EOS #: 0 K/UL (ref 0–0.44)
ABSOLUTE IMMATURE GRANULOCYTE: 0.09 K/UL (ref 0–0.3)
ABSOLUTE LYMPH #: 1.75 K/UL (ref 1.1–3.7)
ABSOLUTE MONO #: 0.28 K/UL (ref 0.1–1.2)
AMMONIA: 14 UMOL/L (ref 11–51)
AMPHETAMINE SCREEN URINE: NEGATIVE
ANION GAP SERPL CALCULATED.3IONS-SCNC: 11 MMOL/L (ref 9–17)
ANION GAP SERPL CALCULATED.3IONS-SCNC: 11 MMOL/L (ref 9–17)
ANTI-XA UNFRAC HEPARIN: <0.1 IU/L (ref 0.3–0.7)
BARBITURATE SCREEN URINE: NEGATIVE
BASOPHILS # BLD: 0 % (ref 0–2)
BASOPHILS ABSOLUTE: 0 K/UL (ref 0–0.2)
BENZODIAZEPINE SCREEN, URINE: NEGATIVE
BUN BLDV-MCNC: 25 MG/DL (ref 8–23)
BUN BLDV-MCNC: 30 MG/DL (ref 8–23)
BUN/CREAT BLD: 18 (ref 9–20)
BUN/CREAT BLD: 27 (ref 9–20)
BUPRENORPHINE URINE: NORMAL
CALCIUM SERPL-MCNC: 8.3 MG/DL (ref 8.6–10.4)
CALCIUM SERPL-MCNC: 8.6 MG/DL (ref 8.6–10.4)
CANNABINOID SCREEN URINE: NEGATIVE
CHLORIDE BLD-SCNC: 104 MMOL/L (ref 98–107)
CHLORIDE BLD-SCNC: 107 MMOL/L (ref 98–107)
CO2: 20 MMOL/L (ref 20–31)
CO2: 21 MMOL/L (ref 20–31)
COCAINE METABOLITE, URINE: NEGATIVE
CORTISOL COLLECTION INFO: ABNORMAL
CORTISOL: 24.4 UG/DL (ref 2.7–18.4)
CREAT SERPL-MCNC: 1.1 MG/DL (ref 0.5–0.9)
CREAT SERPL-MCNC: 1.37 MG/DL (ref 0.5–0.9)
DIFFERENTIAL TYPE: ABNORMAL
EOSINOPHILS RELATIVE PERCENT: 0 % (ref 1–4)
FIO2: 60
FIO2: ABNORMAL
GFR AFRICAN AMERICAN: 46 ML/MIN
GFR AFRICAN AMERICAN: 59 ML/MIN
GFR NON-AFRICAN AMERICAN: 38 ML/MIN
GFR NON-AFRICAN AMERICAN: 48 ML/MIN
GFR SERPL CREATININE-BSD FRML MDRD: ABNORMAL ML/MIN/{1.73_M2}
GLUCOSE BLD-MCNC: 105 MG/DL (ref 70–99)
GLUCOSE BLD-MCNC: 116 MG/DL (ref 65–105)
GLUCOSE BLD-MCNC: 119 MG/DL (ref 70–99)
GLUCOSE BLD-MCNC: 126 MG/DL (ref 65–105)
GLUCOSE BLD-MCNC: 84 MG/DL (ref 65–105)
HCT VFR BLD CALC: 35.7 % (ref 36.3–47.1)
HCT VFR BLD CALC: 38.8 % (ref 36.3–47.1)
HEMOGLOBIN: 11.4 G/DL (ref 11.9–15.1)
HEMOGLOBIN: 12.2 G/DL (ref 11.9–15.1)
IMMATURE GRANULOCYTES: 1 %
LACTIC ACID: 1.1 MMOL/L (ref 0.5–2.2)
LYMPHOCYTES # BLD: 19 % (ref 24–43)
MCH RBC QN AUTO: 24.4 PG (ref 25.2–33.5)
MCH RBC QN AUTO: 24.5 PG (ref 25.2–33.5)
MCHC RBC AUTO-ENTMCNC: 31.4 G/DL (ref 28.4–34.8)
MCHC RBC AUTO-ENTMCNC: 31.9 G/DL (ref 28.4–34.8)
MCV RBC AUTO: 76.4 FL (ref 82.6–102.9)
MCV RBC AUTO: 78.1 FL (ref 82.6–102.9)
MDMA URINE: NORMAL
METHADONE SCREEN, URINE: NEGATIVE
METHAMPHETAMINE, URINE: NORMAL
MONOCYTES # BLD: 3 % (ref 3–12)
MORPHOLOGY: ABNORMAL
MORPHOLOGY: ABNORMAL
NEGATIVE BASE EXCESS, ART: 4 (ref 0–2)
NEGATIVE BASE EXCESS, ART: 5 (ref 0–2)
NRBC AUTOMATED: 0.3 PER 100 WBC
NRBC AUTOMATED: 0.3 PER 100 WBC
O2 DEVICE/FLOW/%: ABNORMAL
O2 DEVICE/FLOW/%: ABNORMAL
OPIATES, URINE: NEGATIVE
OXYCODONE SCREEN URINE: NEGATIVE
PATIENT TEMP: 37
PATIENT TEMP: ABNORMAL
PDW BLD-RTO: 26.3 % (ref 11.8–14.4)
PDW BLD-RTO: 26.3 % (ref 11.8–14.4)
PHENCYCLIDINE, URINE: NEGATIVE
PLATELET # BLD: 231 K/UL (ref 138–453)
PLATELET # BLD: 233 K/UL (ref 138–453)
PLATELET ESTIMATE: ABNORMAL
PMV BLD AUTO: 10.3 FL (ref 8.1–13.5)
PMV BLD AUTO: 10.6 FL (ref 8.1–13.5)
POC HCO3: 21.8 MMOL/L (ref 22–27)
POC HCO3: 22.1 MMOL/L (ref 22–27)
POC O2 SATURATION: 94 %
POC O2 SATURATION: 99 %
POC PCO2 TEMP: ABNORMAL MM HG
POC PCO2 TEMP: ABNORMAL MM HG
POC PCO2: 45 MM HG (ref 32–45)
POC PCO2: 47 MM HG (ref 32–45)
POC PH TEMP: ABNORMAL
POC PH TEMP: ABNORMAL
POC PH: 7.28 (ref 7.35–7.45)
POC PH: 7.3 (ref 7.35–7.45)
POC PO2 TEMP: ABNORMAL MM HG
POC PO2 TEMP: ABNORMAL MM HG
POC PO2: 141 MM HG (ref 75–95)
POC PO2: 78 MM HG (ref 75–95)
POSITIVE BASE EXCESS, ART: ABNORMAL (ref 0–2)
POSITIVE BASE EXCESS, ART: ABNORMAL (ref 0–2)
POTASSIUM SERPL-SCNC: 4.5 MMOL/L (ref 3.7–5.3)
POTASSIUM SERPL-SCNC: 4.5 MMOL/L (ref 3.7–5.3)
PROPOXYPHENE, URINE: NORMAL
RBC # BLD: 4.67 M/UL (ref 3.95–5.11)
RBC # BLD: 4.97 M/UL (ref 3.95–5.11)
RBC # BLD: ABNORMAL 10*6/UL
SEG NEUTROPHILS: 77 % (ref 36–65)
SEGMENTED NEUTROPHILS ABSOLUTE COUNT: 7.08 K/UL (ref 1.5–8.1)
SODIUM BLD-SCNC: 136 MMOL/L (ref 135–144)
SODIUM BLD-SCNC: 138 MMOL/L (ref 135–144)
TCO2 (CALC), ART: 23 MMOL/L (ref 23–28)
TCO2 (CALC), ART: 23 MMOL/L (ref 23–28)
TEST INFORMATION: NORMAL
TRICYCLIC ANTIDEPRESSANTS, UR: NORMAL
WBC # BLD: 10.7 K/UL (ref 3.5–11.3)
WBC # BLD: 9.2 K/UL (ref 3.5–11.3)
WBC # BLD: ABNORMAL 10*3/UL

## 2021-02-10 PROCEDURE — 36620 INSERTION CATHETER ARTERY: CPT

## 2021-02-10 PROCEDURE — 82803 BLOOD GASES ANY COMBINATION: CPT

## 2021-02-10 PROCEDURE — 85520 HEPARIN ASSAY: CPT

## 2021-02-10 PROCEDURE — 2580000003 HC RX 258: Performed by: INTERNAL MEDICINE

## 2021-02-10 PROCEDURE — 2700000000 HC OXYGEN THERAPY PER DAY

## 2021-02-10 PROCEDURE — 6360000002 HC RX W HCPCS: Performed by: NURSE PRACTITIONER

## 2021-02-10 PROCEDURE — 36600 WITHDRAWAL OF ARTERIAL BLOOD: CPT

## 2021-02-10 PROCEDURE — 2580000003 HC RX 258: Performed by: NURSE PRACTITIONER

## 2021-02-10 PROCEDURE — 80048 BASIC METABOLIC PNL TOTAL CA: CPT

## 2021-02-10 PROCEDURE — 6360000002 HC RX W HCPCS: Performed by: FAMILY MEDICINE

## 2021-02-10 PROCEDURE — 94660 CPAP INITIATION&MGMT: CPT

## 2021-02-10 PROCEDURE — 97110 THERAPEUTIC EXERCISES: CPT

## 2021-02-10 PROCEDURE — 2000000000 HC ICU R&B

## 2021-02-10 PROCEDURE — 6360000002 HC RX W HCPCS: Performed by: INTERNAL MEDICINE

## 2021-02-10 PROCEDURE — 97530 THERAPEUTIC ACTIVITIES: CPT

## 2021-02-10 PROCEDURE — 97535 SELF CARE MNGMENT TRAINING: CPT

## 2021-02-10 PROCEDURE — 71045 X-RAY EXAM CHEST 1 VIEW: CPT

## 2021-02-10 PROCEDURE — 6370000000 HC RX 637 (ALT 250 FOR IP): Performed by: INTERNAL MEDICINE

## 2021-02-10 PROCEDURE — 94761 N-INVAS EAR/PLS OXIMETRY MLT: CPT

## 2021-02-10 PROCEDURE — 36415 COLL VENOUS BLD VENIPUNCTURE: CPT

## 2021-02-10 PROCEDURE — 2500000003 HC RX 250 WO HCPCS: Performed by: NURSE PRACTITIONER

## 2021-02-10 PROCEDURE — 82947 ASSAY GLUCOSE BLOOD QUANT: CPT

## 2021-02-10 PROCEDURE — 85027 COMPLETE CBC AUTOMATED: CPT

## 2021-02-10 PROCEDURE — 02HV33Z INSERTION OF INFUSION DEVICE INTO SUPERIOR VENA CAVA, PERCUTANEOUS APPROACH: ICD-10-PCS | Performed by: INTERNAL MEDICINE

## 2021-02-10 PROCEDURE — 83605 ASSAY OF LACTIC ACID: CPT

## 2021-02-10 PROCEDURE — 36556 INSERT NON-TUNNEL CV CATH: CPT

## 2021-02-10 PROCEDURE — 80307 DRUG TEST PRSMV CHEM ANLYZR: CPT

## 2021-02-10 PROCEDURE — 37799 UNLISTED PX VASCULAR SURGERY: CPT

## 2021-02-10 PROCEDURE — 99232 SBSQ HOSP IP/OBS MODERATE 35: CPT | Performed by: FAMILY MEDICINE

## 2021-02-10 PROCEDURE — 82533 TOTAL CORTISOL: CPT

## 2021-02-10 PROCEDURE — 94640 AIRWAY INHALATION TREATMENT: CPT

## 2021-02-10 PROCEDURE — 85025 COMPLETE CBC W/AUTO DIFF WBC: CPT

## 2021-02-10 PROCEDURE — 2500000003 HC RX 250 WO HCPCS: Performed by: INTERNAL MEDICINE

## 2021-02-10 PROCEDURE — 82140 ASSAY OF AMMONIA: CPT

## 2021-02-10 RX ORDER — DEXMEDETOMIDINE HYDROCHLORIDE 4 UG/ML
.2-1.4 INJECTION, SOLUTION INTRAVENOUS CONTINUOUS
Status: DISCONTINUED | OUTPATIENT
Start: 2021-02-10 | End: 2021-02-11 | Stop reason: SDUPTHER

## 2021-02-10 RX ORDER — 0.9 % SODIUM CHLORIDE 0.9 %
500 INTRAVENOUS SOLUTION INTRAVENOUS ONCE
Status: COMPLETED | OUTPATIENT
Start: 2021-02-10 | End: 2021-02-10

## 2021-02-10 RX ORDER — HALOPERIDOL 5 MG/ML
2 INJECTION INTRAMUSCULAR ONCE
Status: DISCONTINUED | OUTPATIENT
Start: 2021-02-10 | End: 2021-02-15 | Stop reason: HOSPADM

## 2021-02-10 RX ADMIN — BUDESONIDE 500 MCG: 0.5 SUSPENSION RESPIRATORY (INHALATION) at 19:59

## 2021-02-10 RX ADMIN — BUDESONIDE 500 MCG: 0.5 SUSPENSION RESPIRATORY (INHALATION) at 07:36

## 2021-02-10 RX ADMIN — HEPARIN SODIUM 5000 UNITS: 5000 INJECTION INTRAVENOUS; SUBCUTANEOUS at 20:04

## 2021-02-10 RX ADMIN — HEPARIN SODIUM 5000 UNITS: 5000 INJECTION INTRAVENOUS; SUBCUTANEOUS at 06:08

## 2021-02-10 RX ADMIN — ARFORMOTEROL TARTRATE 15 MCG: 15 SOLUTION RESPIRATORY (INHALATION) at 07:35

## 2021-02-10 RX ADMIN — SODIUM CHLORIDE, PRESERVATIVE FREE 10 ML: 5 INJECTION INTRAVENOUS at 20:02

## 2021-02-10 RX ADMIN — FUROSEMIDE 40 MG: 10 INJECTION, SOLUTION INTRAMUSCULAR; INTRAVENOUS at 10:07

## 2021-02-10 RX ADMIN — METHYLPREDNISOLONE SODIUM SUCCINATE 60 MG: 125 INJECTION, POWDER, FOR SOLUTION INTRAMUSCULAR; INTRAVENOUS at 01:59

## 2021-02-10 RX ADMIN — ATORVASTATIN CALCIUM 20 MG: 20 TABLET, FILM COATED ORAL at 10:07

## 2021-02-10 RX ADMIN — SODIUM CHLORIDE, PRESERVATIVE FREE 10 ML: 5 INJECTION INTRAVENOUS at 10:07

## 2021-02-10 RX ADMIN — METHYLPREDNISOLONE SODIUM SUCCINATE 60 MG: 125 INJECTION, POWDER, FOR SOLUTION INTRAMUSCULAR; INTRAVENOUS at 06:08

## 2021-02-10 RX ADMIN — AZITHROMYCIN MONOHYDRATE 250 MG: 250 TABLET ORAL at 10:07

## 2021-02-10 RX ADMIN — ARFORMOTEROL TARTRATE 15 MCG: 15 SOLUTION RESPIRATORY (INHALATION) at 19:59

## 2021-02-10 RX ADMIN — HEPARIN SODIUM 5000 UNITS: 5000 INJECTION INTRAVENOUS; SUBCUTANEOUS at 13:24

## 2021-02-10 RX ADMIN — CEFEPIME HYDROCHLORIDE 1000 MG: 1 INJECTION, POWDER, FOR SOLUTION INTRAMUSCULAR; INTRAVENOUS at 17:27

## 2021-02-10 RX ADMIN — DEXTROSE MONOHYDRATE 4 MCG/MIN: 50 INJECTION, SOLUTION INTRAVENOUS at 16:30

## 2021-02-10 RX ADMIN — PANTOPRAZOLE SODIUM 20 MG: 20 TABLET, DELAYED RELEASE ORAL at 06:08

## 2021-02-10 RX ADMIN — SODIUM CHLORIDE 0.2 MCG/KG/HR: 9 INJECTION, SOLUTION INTRAVENOUS at 17:35

## 2021-02-10 RX ADMIN — SODIUM CHLORIDE 500 ML: 9 INJECTION, SOLUTION INTRAVENOUS at 17:05

## 2021-02-10 RX ADMIN — METHYLPREDNISOLONE SODIUM SUCCINATE 60 MG: 125 INJECTION, POWDER, FOR SOLUTION INTRAMUSCULAR; INTRAVENOUS at 13:24

## 2021-02-10 RX ADMIN — HYDROCORTISONE SODIUM SUCCINATE 100 MG: 100 INJECTION, POWDER, FOR SOLUTION INTRAMUSCULAR; INTRAVENOUS at 18:10

## 2021-02-10 RX ADMIN — CARVEDILOL 3.12 MG: 3.12 TABLET, FILM COATED ORAL at 10:07

## 2021-02-10 ASSESSMENT — PAIN SCALES - GENERAL
PAINLEVEL_OUTOF10: 0

## 2021-02-10 NOTE — PROGRESS NOTES
Section of Cardiology  Progress Note      Date:  2/10/2021  Patient: Akash Rene  Admission:  2/6/2021  2:27 PM  Admit DX: Hypotension [I95.9]  Age:  77 y. o., 1945     LOS: 4 days     Reason for evaluation:   cardiomyopathy and CHF      SUBJECTIVE:     The patient was seen and examined. Notes and labs reviewed. There were complications over night. Earlier today the patient blood pressure decreased. The patient became more lethargic. Currently she does not follow command and very restless and confused. She is on high flow oxygen. She was seen by pulmonary service and the patient will be transferred to intensive care unit. Later on I saw the patient again in the intensive care unit. They told me she was very hypothermic and she is on warming blanket. She is quiet and sleepy but difficult to arouse. Her blood pressure decreased to 70 then to 50. Her heart rate remained normal.    OBJECTIVE:    Telemetry: Sinus  /73   Pulse 75   Temp 97.9 °F (36.6 °C) (Axillary)   Resp 21   Ht 5' 2\" (1.575 m)   Wt 142 lb 8 oz (64.6 kg)   SpO2 95%   BMI 26.06 kg/m²     Intake/Output Summary (Last 24 hours) at 2/10/2021 1258  Last data filed at 2/10/2021 8522  Gross per 24 hour   Intake 200 ml   Output 900 ml   Net -700 ml       EXAM:   CONSTITUTIONAL: Patient was restless and not cooperative but currently she is quiet. HEENT: Normal jugular venous pulsations, no carotid bruits. Head is atraumatic, normocephalic. Eyes and oral mucosa are normal.  LUNGS: Good respiratory effort. No for increased work of breathing. On auscultation: diminished breath sounds bilaterally  CARDIOVASCULAR:  Normal apical impulse, regular rate and rhythm, normal S1 and S2, no S3 or S4,   ABDOMEN: Soft, nontender, nondistended. Bowel sounds present. SKIN: Warm and dry. EXTREMITIES: No lower extremity edema. Motor movement grossly intact. No cyanosis or clubbing.     Current Inpatient Medications:   heparin (porcine)  5,000 Units Subcutaneous 3 times per day    Arformoterol Tartrate  15 mcg Nebulization BID    budesonide  0.5 mg Nebulization BID    methylPREDNISolone  60 mg Intravenous Q6H    carvedilol  3.125 mg Oral BID WC    lisinopril  2.5 mg Oral Daily    furosemide  60 mg Intravenous Daily    insulin lispro  0-6 Units Subcutaneous TID WC    insulin lispro  0-3 Units Subcutaneous Nightly    aspirin  81 mg Oral Daily    atorvastatin  20 mg Oral Daily    cetirizine  5 mg Oral Daily    pantoprazole  20 mg Oral Daily    sertraline  50 mg Oral Daily    traZODone  100 mg Oral Nightly    sodium chloride flush  10 mL Intravenous 2 times per day    cefTRIAXone (ROCEPHIN) IV  1,000 mg Intravenous Q24H    azithromycin  250 mg Oral Daily    calcium carb-cholecalciferol  1 tablet Oral Daily       IV Infusions (if any):   dextrose         Diagnostics:   EKG: . ECHO: reviewed. Ejection fraction: %  Stress Test: not obtained. Cardiac Angiography: not obtained. Labs:   CBC:   Recent Labs     02/09/21  0422 02/10/21  0554   WBC 9.7 9.2   HGB 11.9 12.2   HCT 38.3 38.8    233     BMP:   Recent Labs     02/09/21  0422 02/10/21  0554    136   K 4.0 4.5   CO2 19* 21   BUN 20 25*   CREATININE 1.10* 1.37*   LABGLOM 48* 38*   GLUCOSE 50* 105*     Lab Results   Component Value Date    BNP 29 01/03/2014     PT/INR:   Recent Labs     02/08/21  1503   PROTIME 16.4*   INR 1.3     APTT:  Recent Labs     02/08/21  1503   APTT 40.0*     CARDIAC ENZYMES:No results for input(s): CKTOTAL, CKMB, CKMBINDEX, TROPONINT in the last 72 hours. FASTING LIPID PANEL:  Lab Results   Component Value Date    HDL 38 10/15/2019    TRIG 64 10/15/2019     LIVER PROFILE:No results for input(s): AST, ALT, LABALBU in the last 72 hours.     ASSESSMENT:  Acute systolic CHF  Severe cardiomyopathy  Moderate tricuspid regurgitation  Moderate pulmonary hypertension  Elevated troponin most likely secondary to CHF  COPD  Diabetes mellitus  Patient Active Problem List   Diagnosis    [facility] disease) [facility]    CHF (congestive heart failure) (HCC)    Cocaine abuse (HCC)    Acidosis, metabolic, with respiratory acidosis    Acute respiratory failure with hypoxemia (HCC)    Polysubstance abuse (HCC)    Hyponatremia, Beer Potomania    Normocytic anemia    Neutrophilic leukocytosis    Increased ammonia level    Hypophosphatemia    Type 2 diabetes mellitus without complication, without long-term current use of insulin (HCC)    Acute on chronic diastolic heart failure (HCC)    Tobacco abuse    Diabetes mellitus (HCC)    Chronic diastolic heart failure (HCC)    Elevated troponin    Prolonged Q-T interval on ECG    Community acquired pneumonia of right middle lobe of lung    Hypoxia    Chronic respiratory failure (HCC)    Mild malnutrition (HCC)    Hypotension    DEONTE (acute kidney injury) ([facility]    Current every day smoker    Essential hypertension    Altered mental status    Acute systolic heart failure (HCC)    Severe malnutrition ([facility]       PLAN:    1. Start Levophed and subsequently start dobutamine as needed and tolerated to maintain systolic blood pressure above 90.  2. Her lactic acid and ammonia levels are normal.  3. No clear cardiac etiology for her deterioration at this point in time. 4. Prognosis is guarded. 5. Currently the patient is full code. Please see orders. Discussed with other pulmonary service and nursing.     Kathia Hooker MD

## 2021-02-10 NOTE — PROGRESS NOTES
Occupational Therapy  Facility/Department: [facility] PROGRESSIVE CARE  Daily Treatment Note  NAME: Merline Deck  : 1945  MRN: [id number]    Date of Service: 2/10/2021    Discharge Recommendations:  Subacute/Skilled Nursing Facility, [facility], [facility]       Assessment   Performance deficits / Impairments: Decreased functional mobility ; Decreased ADL status; Decreased strength;Decreased safe awareness;Decreased cognition;Decreased balance;Decreased sensation;Decreased endurance;Decreased posture  Prognosis: Fair  REQUIRES OT FOLLOW UP: Yes  Activity Tolerance  Activity Tolerance: Patient limited by fatigue;Treatment limited secondary to medical complications (free text)  Activity Tolerance: poor  Safety Devices  Safety Devices in place: Yes  Type of devices: All fall risk precautions in place; Left in bed;Bed alarm in place;Call light within reach;Nurse notified; Patient at risk for falls         Patient Diagnosis(es): The primary encounter diagnosis was Pneumonia due to organism. Diagnoses of Altered mental status, unspecified altered mental status type, DEONTE (acute kidney injury) ([facility] and Elevated troponin were also pertinent to this visit. has a past medical history of CHF (congestive heart failure) ([facility] COPD (chronic obstructive pulmonary disease) ([facility] Diabetes mellitus ([facility] MRSA (methicillin resistant staph aureus) culture positive, and Tobacco abuse.   has a past surgical history that includes joint replacement (Bilateral); Hysterectomy; and Appendectomy.     Restrictions  Restrictions/Precautions  Restrictions/Precautions: General Precautions, Fall Risk, Up as Tolerated  Required Braces or Orthoses?: No  Position Activity Restriction  Other position/activity restrictions: carb control diet, up with assist, high flow  Subjective   General  Chart Reviewed: Yes  Patient assessed for rehabilitation services?: Yes  Response to previous treatment: Patient with no complaints from previous session  Family / Caregiver Present: No      Orientation  Orientation  Overall Orientation Status: Impaired  Orientation Level: Oriented to person;Disoriented to place; Disoriented to time;Disoriented to situation  Objective    ADL  LE Dressing: Dependent/Total  Toileting: Dependent/Total  Additional Comments: LB care completed with DEP x2 staff in bed. Balance  Sitting Balance: Unable to assess  Standing Balance: Unable to assess  Functional Mobility  Functional Mobility Comments: UNABLE  Bed mobility  Rolling to Left: Maximum assistance;2 Person assistance;Dependent/Total  Rolling to Right: Maximum assistance;2 Person assistance;Dependent/Total  Scooting: Dependent/Total;2 Person assistance  Comment: Provided max verbal/tactile and hand over hand cues to participate in bed mob with NO follow thru. Transfers  Sit to stand: Unable to assess  Stand to sit: Unable to assess                       Cognition  Overall Cognitive Status: Exceptions  Arousal/Alertness: Inconsistent responses to stimuli;Unresponsive to stimuli  Following Commands: Does not follow commands  Attention Span: Difficulty attending to directions; Difficulty dividing attention; Unable to maintain attention  Memory: Decreased recall of biographical Information;Decreased recall of recent events;Decreased short term memory;Decreased recall of precautions  Safety Judgement: Decreased awareness of need for safety;Decreased awareness of need for assistance  Problem Solving: Assistance required to generate solutions;Assistance required to implement solutions;Decreased awareness of errors;Assistance required to correct errors made  Insights: Not aware of deficits  Initiation: Requires cues for all  Sequencing: Requires cues for all  Cognition Comment: Pt very sleepy and not follwoing directions or speaking much.      Perception  Overall Perceptual Status: Impaired  Initiation: Hand over hand to initiate tasks                                   Plan   Plan  Times per week:

## 2021-02-10 NOTE — PROCEDURES
Procedure note- Central Venous Catheter Placement  Caridad Gardner MD        INDICATION: Shock/hypotension    SITE: Right Femoral Vein    STERILE PREP: Full maximum sterile field/barrier technique was followed (with cap and mask, gloves and sterile gown, as well as broad field sterile drapes). Local disinfection was performed. LOCAL ANESTHETIC: Aqueous lidocaine 1%     ESTIMATED BLOOD LOSS: Minimal    ULTRASOUND GUIDANCE USED: Yes    PROCEDURE: With the help of ultrasound proper blood vessel was located. Using modified seldinger technique, the appropriate vessel was located with the introducer needle. The flexible J-tip wire was passed without difficulty or resistance, followed by minimal skin incisions over the introducer needle. The introducer needle was withdrawn and discarded, maintaining manual control of the guidewire at all times. After dilation of the tract, a preflushed 3 lumen CVC catheter was advanced over the guidewire without difficulty or resistance to its full extent. The guidewire was withdrawn and discarded and good return of nonpulsatile, dark venous blood assured through all lumes, with easy flushing of the lumens. The line was sutured in place then the site was reprepped with a  chlorprep solution followed by a Biopatch device.      COMPLICATIONS: None    Electronically signed by     Caridad Gardner MD, [facility]  Pulmonary Critical Care and Sleep Medicine,  [facility]  Cell: [phone]  Office: [phone]    2/10/2021 at 4:58 PM

## 2021-02-10 NOTE — PROGRESS NOTES
Physical Therapy  Facility/Department: [facility] PROGRESSIVE CARE  Daily Treatment Note  NAME: Fabian Canela  : 1945  MRN: [id number]    Date of Service: 2/10/2021    Discharge Recommendations:  [address]       RN reports patient is medically stable for therapy treatment this date. Chart reviewed prior to treatment and patient is agreeable for therapy. All lines intact and patient positioned comfortably at end of treatment. All patient needs addressed prior to ending therapy session. Re-assess needed for transfers/gait. Per RN, pt may tolerate PT better in afternoon. Attempt with next session. Assessment   Body structures, Functions, Activity limitations: Decreased functional mobility ; Decreased balance;Decreased ROM; Decreased strength;Decreased endurance;Decreased cognition  Assessment: Pt unable to tolerate much of PT this date due to being very tired and lethargic. Increased assist with all bed mobility for linen/brief change and repositioning. Will need re-assess for transfers/gait. Will continue to progress as able. Specific instructions for Next Treatment: re-assess for transfers/gait  Prognosis: Good;Fair  Decision Making: High Complexity  Clinical Presentation: unstable  PT Education: PT Role;General Safety;Pressure Relief  Patient Education: importance of OOB/movement  REQUIRES PT FOLLOW UP: Yes  Activity Tolerance  Activity Tolerance: Patient limited by fatigue;Patient limited by endurance  Activity Tolerance: Pt very lethargic this date, unable to keep eyes open during session. Pt would awake with sternal rub, but then close eyes immediately after. Pt became more awake towards end of session. Per RN, pt may tolerate therapy later in day. Patient Diagnosis(es): The primary encounter diagnosis was Pneumonia due to organism.  Diagnoses of Altered mental status, unspecified altered mental status type, DEONTE (acute kidney injury) ([facility] [address] and Elevated troponin were also pertinent to this visit. has a past medical history of CHF (congestive heart failure) ([facility] [place] [address] COPD (chronic obstructive pulmonary disease) ([facility] [place] [address] Diabetes mellitus ([facility] [place] [address] MRSA (methicillin resistant staph aureus) culture positive, and Tobacco abuse.   has a past surgical history that includes joint replacement (Bilateral); Hysterectomy; and Appendectomy. Restrictions  Restrictions/Precautions  Restrictions/Precautions: General Precautions, Fall Risk, Up as Tolerated, Contact Precautions  Required Braces or Orthoses?: No  Position Activity Restriction  Other position/activity restrictions: carb control diet, up with assist, high flow  Subjective   General  Chart Reviewed: Yes  Additional Pertinent Hx: CHF, COPD & current smoker, DM, drinks 5-6 beers/day  Family / Caregiver Present: No  Subjective  Subjective: Pt very lethargic this date, agreeable for repositioning and brief change. Cognition   Cognition  Overall Cognitive Status: Exceptions  Arousal/Alertness: Inconsistent responses to stimuli;Unresponsive to stimuli  Following Commands: Does not follow commands  Attention Span: Difficulty attending to directions; Difficulty dividing attention; Unable to maintain attention  Memory: Decreased recall of biographical Information;Decreased recall of recent events;Decreased short term memory;Decreased recall of precautions  Safety Judgement: Decreased awareness of need for safety;Decreased awareness of need for assistance  Problem Solving: Assistance required to generate solutions;Assistance required to implement solutions;Decreased awareness of errors;Assistance required to correct errors made  Insights: Not aware of deficits  Initiation: Requires cues for all  Sequencing: Requires cues for all  Cognition Comment: Pt very sleepy and not follwoing directions or speaking much.   Objective   Bed mobility  Rolling to Left: Maximum assistance;2 Person assistance;Dependent/Total  Rolling to Right: Maximum assistance;2 Person assistance;Dependent/Total  Scooting: Maximal assistance;2 Person assistance;Dependent/Total  Comment: Pt rolled multiple times for brief/linen change and repositioning. Pt would awaken with sternal rub but had difficulty keeping eyes open this date. MAX cues required, verbal and tactile, in order to complete all bed mobility. Transfers  Comment: Unable to assess due to pt being too lethargic. Ambulation  Ambulation?: No    Stimulating environment provided through opening blinds, turning on lights, turning on TV, turning up volume to appropriate level, raising HOB, and opening door. Exercises  Comments: PROM/AAROM for all UE and LEs. OutComes Score  AM-PAC Score  AM-PAC Inpatient Mobility Raw Score : 7 (02/10/21 0912)  AM-PAC Inpatient T-Scale Score : 26.42 (02/10/21 0912)  Mobility Inpatient CMS 0-100% Score: 92.36 (02/10/21 0912)  Mobility Inpatient CMS G-Code Modifier : CM (02/10/21 0912)          Goals  Short term goals  Time Frame for Short term goals: 12 visits:  Short term goal 1: Pt. to require min A for bed mob. Short term goal 2: Pt. to have good dynamic sitting balance with good postural control x 10 min. Short term goal 3: Pt. to tolerate ther ex in supine and seated positions, bilat. UE/ LE  Short term goal 4: Pt. to be REASSESSED for standing/transfers/gait  Patient Goals   Patient goals : did not state    Plan    Plan  Times per week: 1-2x/day; 5-6days/wk  Specific instructions for Next Treatment: re-assess for transfers/gait  Current Treatment Recommendations: Strengthening, ROM, Functional Mobility Training, Cognitive Reorientation, Patient/Caregiver Education & Training, Balance Training, Positioning, Safety Education & Training, Endurance Training, Neuromuscular Re-education(bed mob. only until reassess)  Safety Devices  Type of devices:  All fall risk precautions in place, Bed alarm in place, Patient at risk for falls, Call light within reach, Left in bed, Nurse notified(Pulse ox not reading accurately, RN present and notified.)     Therapy Time   Individual Concurrent Group Co-treatment   Time In [phone]         Time Out          Minutes 22             Co-treatment with OT warranted secondary to decreased safety and independence requiring 2 skilled therapy professionals to address individual discipline's goals. PT addressing bed mobility and activity tolerance.              Sheila Medicus, PT

## 2021-02-10 NOTE — PROGRESS NOTES
Patient transferred to . Report given to Christopher Goodwin RN. Patients sabine Faye in patients old room on PCU and update has been given. Writer also requested to call patients glenn Garcia to update him as well. Patients belongings collected and transferred with patient. Patient transferred on Bi-PAP.

## 2021-02-10 NOTE — PROGRESS NOTES
Patient having difficulty with staying awake long enough to take oral medications. Writer attempted to administer pills in apple sauce one at a time but patient was not following commands with swallowing. Writer was only able to administer 2 pills d/t safety of patient with not swallowing properly. See MAR for more details.

## 2021-02-10 NOTE — PLAN OF CARE
Problem: Falls - Risk of:  Goal: Will remain free from falls  Description: Will remain free from falls  2/10/2021 1855 by Doc Loja RN  Outcome: Ongoing  2/10/2021 1454 by Rachel Alegre RN  Outcome: Ongoing  2/10/2021 0733 by Mitch Ramirez RN  Outcome: Ongoing  Goal: Absence of physical injury  Description: Absence of physical injury  Outcome: Ongoing     Problem: OXYGENATION/RESPIRATORY FUNCTION  Goal: Patient will maintain patent airway  2/10/2021 1855 by Doc Loja RN  Outcome: Ongoing  2/10/2021 1454 by Rachel Alegre RN  Outcome: Ongoing  2/10/2021 0733 by Mitch Ramirez RN  Outcome: Ongoing  Goal: Patient will achieve/maintain normal respiratory rate/effort  Description: Respiratory rate and effort will be within normal limits for the patient  Outcome: Ongoing     Problem: Nutrition  Goal: Optimal nutrition therapy  2/10/2021 1855 by Doc Loja RN  Outcome: Ongoing  2/10/2021 0733 by Mitch Ramirez RN  Outcome: Ongoing     Problem: Skin Integrity:  Goal: Will show no infection signs and symptoms  Description: Will show no infection signs and symptoms  Outcome: Ongoing  Goal: Absence of new skin breakdown  Description: Absence of new skin breakdown  Outcome: Ongoing     Problem: Confusion - Acute:  Goal: Absence of continued neurological deterioration signs and symptoms  Description: Absence of continued neurological deterioration signs and symptoms  Outcome: Ongoing  Goal: Mental status will be restored to baseline  Description: Mental status will be restored to baseline  Outcome: Ongoing     Problem: Discharge Planning:  Goal: Ability to perform activities of daily living will improve  Description: Ability to perform activities of daily living will improve  Outcome: Ongoing  Goal: Participates in care planning  Description: Participates in care planning  Outcome: Ongoing     Problem: Confusion - Acute:  Goal: Absence of continued neurological deterioration signs and symptoms  Description: Absence of continued neurological deterioration signs and symptoms  Outcome: Ongoing  Goal: Mental status will be restored to baseline  Description: Mental status will be restored to baseline  Outcome: Ongoing     Problem: Injury - Risk of, Physical Injury:  Goal: Will remain free from falls  Description: Will remain free from falls  2/10/2021 1855 by Helen Clinton RN  Outcome: Ongoing  2/10/2021 1454 by Governor Maggie RN  Outcome: Ongoing  2/10/2021 0733 by Rachel Gallegos RN  Outcome: Ongoing  Goal: Absence of physical injury  Description: Absence of physical injury  Outcome: Ongoing     Problem: Mood - Altered:  Goal: Mood stable  Description: Mood stable  Outcome: Ongoing  Goal: Absence of abusive behavior  Description: Absence of abusive behavior  Outcome: Ongoing  Goal: Verbalizations of feeling emotionally comfortable while being cared for will increase  Description: Verbalizations of feeling emotionally comfortable while being cared for will increase  Outcome: Ongoing     Problem: Psychomotor Activity - Altered:  Goal: Absence of psychomotor disturbance signs and symptoms  Description: Absence of psychomotor disturbance signs and symptoms  Outcome: Ongoing     Problem: Sensory Perception - Impaired:  Goal: Demonstrations of improved sensory functioning will increase  Description: Demonstrations of improved sensory functioning will increase  Outcome: Ongoing  Goal: Decrease in sensory misperception frequency  Description: Decrease in sensory misperception frequency  Outcome: Ongoing  Goal: Able to refrain from responding to false sensory perceptions  Description: Able to refrain from responding to false sensory perceptions  Outcome: Ongoing  Goal: Demonstrates accurate environmental perceptions  Description: Demonstrates accurate environmental perceptions  Outcome: Ongoing  Goal: Able to distinguish between reality-based and nonreality-based thinking  Description: Able to distinguish between reality-based and

## 2021-02-10 NOTE — PLAN OF CARE
Problem: Falls - Risk of:  Goal: Will remain free from falls  Description: Will remain free from falls  2/10/2021 1454 by Enma Rivera RN  Outcome: Ongoing     Problem: OXYGENATION/RESPIRATORY FUNCTION  Goal: Patient will maintain patent airway  2/10/2021 1454 by Enma Rivera RN  Outcome: Ongoing

## 2021-02-10 NOTE — PROGRESS NOTES
Patient is seen and evaluated by me after transfer to ICU. She is on BiPAP, she is alert, very restless and has increased work of breathing. RN is at bedside. Plan is to continue BiPAP, may use Precedex as needed for sedation. Continue empiric antibiotics and IV Solu-Medrol and bronchodilators. Will check urine drug screen. Repeat x-ray chest and labs in the a.m. Discussed with RN. Above was reviewed and discussed with Mary Ann Tadeo CNP. And I agree with assessment and plan of care.   Electronically signed by     Jeimy Chung MD on 2/10/2021 at 3:30 PM  Pulmonary Critical Care and Sleep Medicine,  [facility]  Cell: [phone]  Office: [phone]

## 2021-02-10 NOTE — PROCEDURES
Arterial Catheter Insertion Procedure Note    Procedure: Insertion of Arterial Catheter    Indications: Shock/hypotension    Procedure Details   Under sterile conditions the skin above the right groin was prepped with betadine and covered with a sterile drape. Local anesthesia was applied to the skin and subcutaneous tissues. A 20-gauge needle was inserted into the femoral artery. A guide wire was then passed easily through the catheter which was then advanced with no resistance. Good pulsatile blood returned. The catheter was sutured into place. Good waveform noted on the monitor. Findings: There were no changes to vital signs. Patient did tolerate procedure well. Total time of procedure 20 minutes.     Electronically signed by     Doc Heredia MD on 2/10/2021 at 4:59 PM  Pulmonary Critical Care and Sleep Medicine,  [facility]  Cell: [phone]  Office: [phone]

## 2021-02-10 NOTE — PLAN OF CARE
Problem: Falls - Risk of:  Goal: Will remain free from falls  Description: Will remain free from falls. Fall risk assessment completed. Patient instructed to use call light. Bed locked and in lowest position, side rails up 2/4, call light and bedside table within reach, clutter removed, and non-skid footwear on when pt out of bed. Hourly rounds will continue.  Bed alarm on at this time  Outcome: Ongoing     Problem: OXYGENATION/RESPIRATORY FUNCTION  Goal: Patient will maintain patent airway  Outcome: Ongoing     Problem: Nutrition  Goal: Optimal nutrition therapy  Outcome: Ongoing

## 2021-02-10 NOTE — PROGRESS NOTES
[facility]  Office: [address], DO, Lucien Shin, DO, Keisha Isabel, DO, Hussein Washingtonple Blood, DO, Arcadio Moragn MD, Danyel Fuentes MD, Hugo Ruiz MD, America White MD, Aldo Espinoza MD, José Hedrick MD, Kristy West MD, Frankie White MD, Alexandru Shafer MD, Martha Tanner DO, Moses Augustin MD, Lyla Castleman, MD, Pablo Pendleton DO, Barbara Ventura MD,  Estella Regan DO, Carlito Oglesby MD, Andra Colin MD, Roxanna Pickard, [facility], [address], CNP, Balbir Serrano, CNP, Nilda Caba, CNS, Sudheer Hinojosa, [facility], Herny Leon, [facility], Kendall Limon, CNP, Bob Reddy, CNP, Nhan Simons, CNP, Ligia Nogueira PA-C, Aura Ruiz, [facility], Guy Shepherd, CNP, Zulema Fraser, CNP, Jorje Cade, CNP, Neptali Bolivar, CNP, Jacquie Potter, [facility]    Progress Note    2/10/2021    8:15 AM    Name:   Akash Rene  MRN:     [id number]     Acct:      [de-identified]   Room:   [address] [place] [address] Day:  4  Admit Date:  2/6/2021  2:27 PM    PCP:   Virginia Wong MD  Code Status:  Full Code    Subjective:     C/C:   Chief Complaint   Patient presents with    Dizziness     onset 2 weeks    Fall     difficulty walking    Aphasia     onset 4 days ago      Interval History Status: not changed. Pt was seen and examined this morning  No acute events overnight   Remains on high flow          Review of Systems:     12 point ROS performed and negative for anything other than what was stated in subjective     Medications: Allergies:     Allergies   Allergen Reactions    Tiotropium Anaphylaxis and Swelling    Spiriva Handihaler [Tiotropium Bromide Monohydrate] Swelling       Current Meds:   Scheduled Meds:    heparin (porcine)  5,000 Units Subcutaneous 3 times per day    Arformoterol Tartrate  15 mcg Nebulization BID    budesonide  0.5 mg Nebulization BID    methylPREDNISolone  60 mg Intravenous Q6H    carvedilol  3.125 mg Oral BID WC  lisinopril  2.5 mg Oral Daily    furosemide  60 mg Intravenous Daily    insulin lispro  0-6 Units Subcutaneous TID WC    insulin lispro  0-3 Units Subcutaneous Nightly    aspirin  81 mg Oral Daily    atorvastatin  20 mg Oral Daily    cetirizine  5 mg Oral Daily    pantoprazole  20 mg Oral Daily    sertraline  50 mg Oral Daily    traZODone  100 mg Oral Nightly    sodium chloride flush  10 mL Intravenous 2 times per day    cefTRIAXone (ROCEPHIN) IV  1,000 mg Intravenous Q24H    azithromycin  250 mg Oral Daily    calcium carb-cholecalciferol  1 tablet Oral Daily     Continuous Infusions:    dextrose       PRN Meds: LORazepam **OR** LORazepam **OR** LORazepam **OR** LORazepam **OR** LORazepam **OR** LORazepam **OR** LORazepam **OR** LORazepam, bisacodyl, albuterol sulfate HFA, glucose, dextrose, glucagon (rDNA), dextrose, albuterol, tiZANidine, sodium chloride flush, promethazine **OR** ondansetron, polyethylene glycol, nicotine, acetaminophen **OR** acetaminophen    Data:     Past Medical History:   has a past medical history of CHF (congestive heart failure) ([facility] [address] COPD (chronic obstructive pulmonary disease) ([facility] Diabetes mellitus ([facility] MRSA (methicillin resistant staph aureus) culture positive, and Tobacco abuse. Social History:   reports that she has been smoking cigarettes. She has been smoking about 0.50 packs per day. She has never used smokeless tobacco. She reports current alcohol use of about 70.0 standard drinks of alcohol per week. She reports current drug use. Drug: Marijuana.      Family History:   Family History   Problem Relation Age of Onset    Heart Disease Mother     Diabetes Father        Vitals:  /74   Pulse 62   Temp 97.9 °F (36.6 °C) (Axillary)   Resp 14   Ht 5' 2\" (1.575 m)   Wt 142 lb 8 oz (64.6 kg)   SpO2 100%   BMI 26.06 kg/m²   Temp (24hrs), Av.6 °F (36.4 °C), Min:97.2 °F (36.2 °C), Max:98.5 °F (36.9 °C)    Recent Labs     21  0759 21 1143 02/09/21  1623 02/09/21  1934   POCGLU 133* 85 141* 127*       I/O (24Hr): Intake/Output Summary (Last 24 hours) at 2/10/2021 0815  Last data filed at 2/10/2021 0473  Gross per 24 hour   Intake 200 ml   Output 900 ml   Net -700 ml       Labs:  Hematology:  Recent Labs     02/08/21  0521 02/08/21  1503 02/09/21  0422 02/10/21  0554   WBC 9.0  --  9.7 9.2   RBC 4.84  --  4.90 4.97   HGB 12.0  --  11.9 12.2   HCT 38.7  --  38.3 38.8   MCV 80.0*  --  78.2* 78.1*   MCH 24.8*  --  24.3* 24.5*   MCHC 31.0  --  31.1 31.4   RDW 27.1*  --  26.3* 26.3*     --  230 233   MPV 10.4  --  10.1 10.6   INR  --  1.3  --   --      Chemistry:  Recent Labs     02/07/21  0841 02/08/21  0521 02/09/21  0422 02/10/21  0554   NA  --  135 136 136   K  --  3.7 4.0 4.5   CL  --  107 108* 104   CO2  --  17* 19* 21   GLUCOSE  --  96 50* 105*   BUN  --  19 20 25*   CREATININE  --  1.01* 1.10* 1.37*   ANIONGAP  --  11 9 11   LABGLOM  --  53* 48* 38*   GFRAA  --  >60 59* 46*   CALCIUM  --  8.3* 8.2* 8.6   PROBNP  --   --  62,364*  --    TROPHS 53*  --   --   --      Recent Labs     02/09/21  0615 02/09/21  0640 02/09/21  0759 02/09/21  1143 02/09/21  1623 02/09/21  1934   POCGLU 80 86 133* 85 141* 127*     ABG:  Lab Results   Component Value Date    POCPH 7.35 02/09/2021    Reggie 30  09/04/2014     DISREGARD RESULTS. PATIENT NUMBER WAS INCORRECTLY ASSIGNED. POCPCO2 36 02/09/2021    PCO2  09/04/2014     DISREGARD RESULTS. PATIENT NUMBER WAS INCORRECTLY ASSIGNED. POCPO2 62 02/09/2021    PO2  09/04/2014     DISREGARD RESULTS. PATIENT NUMBER WAS INCORRECTLY ASSIGNED. POCHCO3 20.1 02/09/2021    HCO3  09/04/2014     DISREGARD RESULTS. PATIENT NUMBER WAS INCORRECTLY ASSIGNED. NBEA 5 02/09/2021    PBEA NOT REPORTED 02/09/2021    [id number] 21 02/09/2021    [id number] 90 02/09/2021    O2SAT  09/04/2014     DISREGARD RESULTS. PATIENT NUMBER WAS INCORRECTLY ASSIGNED.     FIO2 50.0 02/09/2021     Lab Results   Component Value Date/Time SPECIAL L AC 7 ML 02/06/2021 03:09 PM     Lab Results   Component Value Date/Time    CULTURE NO GROWTH 3 DAYS 02/06/2021 03:09 PM       Radiology:  Ct Head Wo Contrast    Result Date: 2/6/2021  Chronic findings in the brain without acute CT abnormality identified. Xr Chest Portable    Result Date: 2/7/2021  Increasing bilateral pulmonary opacities, concerning for worsening edema or pneumonia. Xr Chest Portable    Result Date: 2/6/2021  Bilateral airspace and interstitial opacities either reflecting pulmonary edema versus pneumonia. Ct Chest High Resolution    Result Date: 2/9/2021  1. Note: Evaluation of lungs is significantly limited by patient breathing motion related artifact. 2. Severe bilateral lung extensive parenchymal changes, as discussed above, consistent with interstitial lung disease. Question overlying pulmonary interstitial edema. Findings most suggestive of usual interstitial pneumonia (UIP). Findings appear worsened in comparison with prior August 2020 CT chest examination. 3. Mild layering bilateral posterior pleural effusions, as discussed above. 4. Moderate cardiomegaly with marked dilatation of the right atrium. Recommend clinical correlation. Cardiomegaly in association with pleural effusions and likely pulmonary interstitial edema compatible with patient reported history of CHF. 5. Superior middle mediastinal lymphadenopathy, as described above. Differential diagnostic considerations include association with infectious disease, inflammatory disease versus neoplastic process. 6. Marked atherosclerotic calcification involving the aortic arch and origin of the great vessels. 7. Moderate scattered subcutaneous edema.        Physical Examination:        General appearance:  alert, cooperative and appears comfortable  Mental Status:  oriented to person, place and time and normal affect  HENT: high-flow nasal cannula present  Lungs: diminished lung sounds throughout all lung fields, no wheezes or rhonchi appreciated increased effort today  Heart:  regular rate and rhythm, no murmur, ectopy noted  Abdomen:  soft, nontender, nondistended, normal bowel sounds  Extremities:  no edema, redness, tenderness in the calves  Skin:  no gross lesions, rashes, induration    Assessment:        Hospital Problems           Last Modified POA    * (Principal) Acute systolic heart failure ([address] 2/8/2021 Yes    COPD (chronic obstructive pulmonary disease) ([address] 2/8/2021 Yes    Acute respiratory failure with hypoxemia ([address] 2/8/2021 Yes    Type 2 diabetes mellitus without complication, without long-term current use of insulin ([address] 2/8/2021 Yes    Chronic diastolic heart failure ([address] 2/8/2021 Yes    Elevated troponin 2/8/2021 Yes    Community acquired pneumonia of right middle lobe of lung 2/8/2021 Yes    Chronic respiratory failure ([address] 2/8/2021 Yes    Mild malnutrition ([address] 2/8/2021 Yes    Hypotension 2/8/2021 Yes    DEONTE (acute kidney injury) ([address] 2/8/2021 Yes    Current every day smoker 2/8/2021 Yes    Essential hypertension 2/8/2021 Yes    Altered mental status 2/8/2021 Yes    Severe malnutrition ([address] 2/8/2021 Yes          Plan:        1. Acute respiratory failure   2. COPD exacerbation   3. CAP   - pulmonary consult placed   - pro juan normal   - continue zithromax and rocephin   - blood cultures NGTD   - COVID negative  - currently on PAP   - maintain continuous pulse ox   - continue RT protocol prn breathing treatments   - started on IV solumedrol q6hr  - d/c symbicort, started on brovana and budesonide aerosols q12hr  - abg done yesterday showed pH 7.35 with pO2 of 62  - CT chest done on 2/9, see above for results        4. Acute CHF   5.  Elevated troponin   - cardio on board   - pt noted to have acute systolic HF which is likely contributing to her acute respiratory failure along with COPD exac   - coreg dose decreased to 3.125 BID  - lisinopril decreased to 2.5 daily  - continue lasix at this time 60 mg IV daily per cardio recs   - CHF education   - per gab if her ejection fraction remains below 35% despite medical treatment then she will be considered for ICD placement for primary prevention of sudden cardiac death. - negative 3 L fluid balance since admit   - continue to monitor Is/Os   - repeat pBNP on 2/9 was 52530  - d/c IV heparin drip and start prophylactic dose if okay with cardio    6. DEONTE   - BUN/Cr trended up this morning to 25/1.37  - will adjust lasix dosage to 40 mg   - monitor output   - renally dose all meds  - avoid nephrotoxic agents    7. HTN   - continue coreg and lisinopril   - hold above for SBP less than 100  - v/s per unit protocol      8. DMII   - continue accu checks ac/hs with ISS      9. GERD   - PPI      10. Depression   - continue home zoloft     11.  DVT prophylaxis   - heparin     Dennie Skinner, MD  2/10/2021  8:15 AM

## 2021-02-10 NOTE — PLAN OF CARE
Per Tomas Miller RN patient not appropriate at this time for Video Swallow study. This study is on hold until Tomas Miller RN calls radiology to inform us of improvement.

## 2021-02-10 NOTE — PROGRESS NOTES
Pulmonary Critical Care Progress Note  ABDON Meyer-SAMANTHA/Anu Brody MD     Patient seen for the follow up of acute on chronic hypoxic respiratory failure, acute exacerbation of COPD/emphysema, interstitial lung diseaseprogressive pulmonary fibrosis with probable acute exacerbation, active smoking, pulmonary hypertension,  Acute systolic heart failure ([address]     Subjective:  She is in bed, RT at bedside placing on BiPAP. She has been extremely lethargic and mostly nonresponsive the majority of this morning. She however will open eyes and answer some questions but goes back to sleep fairly quickly. She is restless. Examination:  Vitals: /73   Pulse 75   Temp 97.9 °F (36.6 °C) (Axillary)   Resp 21   Ht 5' 2\" (1.575 m)   Wt 142 lb 8 oz (64.6 kg)   SpO2 95%   BMI 26.06 kg/m²   General appearance: Lethargic, in bed  Neck: No JVD  Lungs: Decreased air exchange, positive crackles  Heart: regular rate and rhythm, S1, S2 normal, no gallop  Abdomen: Soft, non tender, + BS  Extremities: no cyanosis or clubbing. No significant edema    LABs:  CBC:   Recent Labs     02/09/21  0422 02/10/21  0554   WBC 9.7 9.2   HGB 11.9 12.2   HCT 38.3 38.8    233     BMP:   Recent Labs     02/09/21  0422 02/10/21  0554    136   K 4.0 4.5   CO2 19* 21   BUN 20 25*   CREATININE 1.10* 1.37*   LABGLOM 48* 38*   GLUCOSE 50* 105*     PT/INR:   Recent Labs     02/08/21  1503   PROTIME 16.4*   INR 1.3     APTT:  Recent Labs     02/08/21  1503   APTT 40.0*      Ref.  Range 2/10/2021 12:39   POC HCO3 Latest Ref Range: 22 - 27 mmol/L 22.1   POC O2 SAT Latest Units: % 94   POC pCO2 Latest Ref Range: 32 - 45 mm Hg 45   POC pCO2 Temp Latest Units: mm Hg NOT REPORTED   POC pH Latest Ref Range: 7.35 - 7.45  7.30 (L)   POC PO2 Latest Ref Range: 75 - 95 mm Hg 78       Radiology:      Impression:  · Acute on chronic hypoxic respiratory failure  · Acute exacerbation of COPD/emphysema  · Interstitial lung disease/progressive pulmonary fibrosis with probable acute exacerbation  · Active smoking/65-pack-year smoking history  · Acute systolic heart failure/severe cardiomyopathy EF 20%  · Moderate TR/secondary pulmonary hypertension of moderate severity, RVSP 54mmHg  · Alcohol abuse/withdraw  · DM/CAD/CHF    Recommendations:  · BiPAP now   · Transfer to ICU  · May use Precedex for sedation  · Consider repeating CT of the head  · Oxygen via high flow for meals/breaks from BiPAP as tolerated  · CIWA protocol, safety precautions   · Continue IV Rocephin/Zithromax empirically  · IV solu-medrol 60 mg every 6 hours  · Albuterol HFA Q 4 hours and prn, documented allergy to ipratropium  · Brovana and budesonide aerosols every 12 hours  · Continue diuresis, IV Lasix 60 mg daily  · Cardiology on consult, input noted  · Labs: CBC and BMP in am  · X-ray chest in a.m.  · DVT prophylaxis with low molecular weight heparin  · Incentive spirometer every hour while awake  · Discussed with RN  · Will follow with shaun Banuelos APRN-CNP   Pulmonary Critical Care and Sleep Medicine,  Patient seen under the supervision of Merline Burton, MD, FCCP  CC: 35 minutes

## 2021-02-11 ENCOUNTER — APPOINTMENT (OUTPATIENT)
Dept: GENERAL RADIOLOGY | Age: 76
DRG: 871 | End: 2021-02-11
Payer: MEDICARE

## 2021-02-11 LAB
-: NORMAL
ABSOLUTE EOS #: 0 K/UL (ref 0–0.4)
ABSOLUTE IMMATURE GRANULOCYTE: 0.08 K/UL (ref 0–0.3)
ABSOLUTE LYMPH #: 0.91 K/UL (ref 1–4.8)
ABSOLUTE MONO #: 0.68 K/UL (ref 0.2–0.8)
ANION GAP SERPL CALCULATED.3IONS-SCNC: 12 MMOL/L (ref 9–17)
BASOPHILS # BLD: 0 %
BASOPHILS ABSOLUTE: 0 K/UL (ref 0–0.2)
BUN BLDV-MCNC: 32 MG/DL (ref 8–23)
BUN/CREAT BLD: 28 (ref 9–20)
CALCIUM SERPL-MCNC: 7.3 MG/DL (ref 8.6–10.4)
CHLORIDE BLD-SCNC: 111 MMOL/L (ref 98–107)
CO2: 18 MMOL/L (ref 20–31)
CREAT SERPL-MCNC: 1.14 MG/DL (ref 0.5–0.9)
DIFFERENTIAL TYPE: ABNORMAL
EOSINOPHILS RELATIVE PERCENT: 0 % (ref 1–4)
FIO2: 30
GFR AFRICAN AMERICAN: 56 ML/MIN
GFR NON-AFRICAN AMERICAN: 46 ML/MIN
GFR SERPL CREATININE-BSD FRML MDRD: ABNORMAL ML/MIN/{1.73_M2}
GFR SERPL CREATININE-BSD FRML MDRD: ABNORMAL ML/MIN/{1.73_M2}
GLUCOSE BLD-MCNC: 111 MG/DL (ref 65–105)
GLUCOSE BLD-MCNC: 115 MG/DL (ref 65–105)
GLUCOSE BLD-MCNC: 128 MG/DL (ref 65–105)
GLUCOSE BLD-MCNC: 128 MG/DL (ref 70–99)
GLUCOSE BLD-MCNC: 134 MG/DL (ref 65–105)
GLUCOSE BLD-MCNC: 137 MG/DL (ref 65–105)
HCT VFR BLD CALC: 27.1 % (ref 36.3–47.1)
HEMOGLOBIN: 8.5 G/DL (ref 11.9–15.1)
IMMATURE GRANULOCYTES: 1 %
LYMPHOCYTES # BLD: 12 % (ref 24–44)
MCH RBC QN AUTO: 24.4 PG (ref 25.2–33.5)
MCHC RBC AUTO-ENTMCNC: 31.4 G/DL (ref 28.4–34.8)
MCV RBC AUTO: 77.7 FL (ref 82.6–102.9)
MONOCYTES # BLD: 9 % (ref 1–7)
MORPHOLOGY: ABNORMAL
NEGATIVE BASE EXCESS, ART: 4 (ref 0–2)
NRBC AUTOMATED: 0.3 PER 100 WBC
O2 DEVICE/FLOW/%: ABNORMAL
PATIENT TEMP: 37
PDW BLD-RTO: 26 % (ref 11.8–14.4)
PLATELET # BLD: 199 K/UL (ref 138–453)
PLATELET ESTIMATE: ABNORMAL
PMV BLD AUTO: 10.2 FL (ref 8.1–13.5)
POC HCO3: 22.1 MMOL/L (ref 22–27)
POC O2 SATURATION: 97 %
POC PCO2 TEMP: ABNORMAL MM HG
POC PCO2: 44 MM HG (ref 32–45)
POC PH TEMP: ABNORMAL
POC PH: 7.31 (ref 7.35–7.45)
POC PO2 TEMP: ABNORMAL MM HG
POC PO2: 97 MM HG (ref 75–95)
POSITIVE BASE EXCESS, ART: ABNORMAL (ref 0–2)
POTASSIUM SERPL-SCNC: 4.4 MMOL/L (ref 3.7–5.3)
RBC # BLD: 3.49 M/UL (ref 3.95–5.11)
RBC # BLD: ABNORMAL 10*6/UL
REASON FOR REJECTION: NORMAL
SEG NEUTROPHILS: 78 % (ref 36–66)
SEGMENTED NEUTROPHILS ABSOLUTE COUNT: 5.93 K/UL (ref 1.8–7.7)
SODIUM BLD-SCNC: 141 MMOL/L (ref 135–144)
TCO2 (CALC), ART: 23 MMOL/L (ref 23–28)
WBC # BLD: 7.6 K/UL (ref 3.5–11.3)
WBC # BLD: ABNORMAL 10*3/UL
ZZ NTE CLEAN UP: ORDERED TEST: NORMAL
ZZ NTE WITH NAME CLEAN UP: SPECIMEN SOURCE: NORMAL

## 2021-02-11 PROCEDURE — 82803 BLOOD GASES ANY COMBINATION: CPT

## 2021-02-11 PROCEDURE — 2580000003 HC RX 258: Performed by: INTERNAL MEDICINE

## 2021-02-11 PROCEDURE — 6360000002 HC RX W HCPCS: Performed by: INTERNAL MEDICINE

## 2021-02-11 PROCEDURE — 2580000003 HC RX 258: Performed by: NURSE PRACTITIONER

## 2021-02-11 PROCEDURE — 94660 CPAP INITIATION&MGMT: CPT

## 2021-02-11 PROCEDURE — 6360000002 HC RX W HCPCS: Performed by: FAMILY MEDICINE

## 2021-02-11 PROCEDURE — 94761 N-INVAS EAR/PLS OXIMETRY MLT: CPT

## 2021-02-11 PROCEDURE — 85025 COMPLETE CBC W/AUTO DIFF WBC: CPT

## 2021-02-11 PROCEDURE — 71045 X-RAY EXAM CHEST 1 VIEW: CPT

## 2021-02-11 PROCEDURE — 2700000000 HC OXYGEN THERAPY PER DAY

## 2021-02-11 PROCEDURE — 37799 UNLISTED PX VASCULAR SURGERY: CPT

## 2021-02-11 PROCEDURE — 6360000002 HC RX W HCPCS: Performed by: NURSE PRACTITIONER

## 2021-02-11 PROCEDURE — 80048 BASIC METABOLIC PNL TOTAL CA: CPT

## 2021-02-11 PROCEDURE — 2500000003 HC RX 250 WO HCPCS: Performed by: NURSE PRACTITIONER

## 2021-02-11 PROCEDURE — 94640 AIRWAY INHALATION TREATMENT: CPT

## 2021-02-11 PROCEDURE — 99232 SBSQ HOSP IP/OBS MODERATE 35: CPT | Performed by: FAMILY MEDICINE

## 2021-02-11 PROCEDURE — 2000000000 HC ICU R&B

## 2021-02-11 PROCEDURE — 82947 ASSAY GLUCOSE BLOOD QUANT: CPT

## 2021-02-11 RX ADMIN — CEFEPIME HYDROCHLORIDE 1000 MG: 1 INJECTION, POWDER, FOR SOLUTION INTRAMUSCULAR; INTRAVENOUS at 05:03

## 2021-02-11 RX ADMIN — HYDROCORTISONE SODIUM SUCCINATE 100 MG: 100 INJECTION, POWDER, FOR SOLUTION INTRAMUSCULAR; INTRAVENOUS at 17:29

## 2021-02-11 RX ADMIN — HYDROCORTISONE SODIUM SUCCINATE 100 MG: 100 INJECTION, POWDER, FOR SOLUTION INTRAMUSCULAR; INTRAVENOUS at 10:22

## 2021-02-11 RX ADMIN — BUDESONIDE 500 MCG: 0.5 SUSPENSION RESPIRATORY (INHALATION) at 19:32

## 2021-02-11 RX ADMIN — HYDROCORTISONE SODIUM SUCCINATE 100 MG: 100 INJECTION, POWDER, FOR SOLUTION INTRAMUSCULAR; INTRAVENOUS at 00:23

## 2021-02-11 RX ADMIN — SODIUM CHLORIDE 0.4 MCG/KG/HR: 9 INJECTION, SOLUTION INTRAVENOUS at 05:46

## 2021-02-11 RX ADMIN — HEPARIN SODIUM 5000 UNITS: 5000 INJECTION INTRAVENOUS; SUBCUTANEOUS at 16:30

## 2021-02-11 RX ADMIN — ARFORMOTEROL TARTRATE 15 MCG: 15 SOLUTION RESPIRATORY (INHALATION) at 08:45

## 2021-02-11 RX ADMIN — FUROSEMIDE 60 MG: 10 INJECTION, SOLUTION INTRAMUSCULAR; INTRAVENOUS at 10:22

## 2021-02-11 RX ADMIN — CEFEPIME HYDROCHLORIDE 1000 MG: 1 INJECTION, POWDER, FOR SOLUTION INTRAMUSCULAR; INTRAVENOUS at 17:29

## 2021-02-11 RX ADMIN — SODIUM CHLORIDE, PRESERVATIVE FREE 10 ML: 5 INJECTION INTRAVENOUS at 10:49

## 2021-02-11 RX ADMIN — HEPARIN SODIUM 5000 UNITS: 5000 INJECTION INTRAVENOUS; SUBCUTANEOUS at 21:22

## 2021-02-11 RX ADMIN — HEPARIN SODIUM 5000 UNITS: 5000 INJECTION INTRAVENOUS; SUBCUTANEOUS at 05:35

## 2021-02-11 RX ADMIN — ARFORMOTEROL TARTRATE 15 MCG: 15 SOLUTION RESPIRATORY (INHALATION) at 19:32

## 2021-02-11 RX ADMIN — BUDESONIDE 500 MCG: 0.5 SUSPENSION RESPIRATORY (INHALATION) at 08:45

## 2021-02-11 RX ADMIN — SODIUM CHLORIDE, PRESERVATIVE FREE 10 ML: 5 INJECTION INTRAVENOUS at 21:19

## 2021-02-11 ASSESSMENT — PAIN SCALES - GENERAL: PAINLEVEL_OUTOF10: 0

## 2021-02-11 NOTE — PROGRESS NOTES
Patient received in bed resting quietly, opens eyes to verbal and tactile stimuli. Pt on precedex gtt at 0.4 mcg/kg/hr. Nsg assessment completed, see flow.

## 2021-02-11 NOTE — PROGRESS NOTES
[address] [facility]  Speech Language Pathology    Date: 2/11/2021  Patient Name: Jennell Paget  YOB: 1945   AGE: 77 y.o. MRN: [id number]        Patient Not Available for Speech Therapy     Due to:  [] Testing  [] Hemodialysis  [] Cancelled by RN  [] Surgery   [] Intubation/Sedation/Pain Medication  [] Medical instability  [x] Other: Not appropriate for MBSS at this time    Next scheduled treatment: as medically appropriate    Completed by:  ALBERT Edwards

## 2021-02-11 NOTE — PROGRESS NOTES
Pulmonary Critical Care Progress Note  Alexandra Mitchell MD     Patient seen for the follow up of acute on chronic hypoxic respiratory failure, acute exacerbation of COPD/emphysema, interstitial lung diseaseprogressive pulmonary fibrosis with probable acute exacerbation, active smoking, pulmonary hypertension,  Acute systolic heart failure ([address]     Subjective:  Patient remained on BiPAP last night. She has been weaned off of Levophed. She has been on Precedex drip which is in the process of being weaned. She wakes up with questioning. Examination:  Vitals: BP 96/77   Pulse 73   Temp 97.4 °F (36.3 °C) (Axillary)   Resp 15   Ht 5' 2\" (1.575 m)   Wt 141 lb (64 kg)   SpO2 100%   BMI 25.79 kg/m²   General appearance: Less lethargic, on BiPAP  Neck: No JVD  Lungs: Decreased air exchange, positive crackles  Heart: regular rate and rhythm, S1, S2 normal, no gallop  Abdomen: Soft, non tender, + BS  Extremities: no cyanosis or clubbing. Positive edema    LABs:  CBC:   Recent Labs     02/10/21  1739 02/11/21  0522   WBC 10.7 7.6   HGB 11.4* 8.5*   HCT 35.7* 27.1*    199     BMP:   Recent Labs     02/10/21  1739 02/11/21  0554    141   K 4.5 4.4   CO2 20 18*   BUN 30* 32*   CREATININE 1.10* 1.14*   LABGLOM 48* 46*   GLUCOSE 119* 128*     PT/INR:   Recent Labs     02/08/21  1503   PROTIME 16.4*   INR 1.3     APTT:  Recent Labs     02/08/21  1503   APTT 40.0*      Ref.  Range 2/10/2021 12:39   POC HCO3 Latest Ref Range: 22 - 27 mmol/L 22.1   POC O2 SAT Latest Units: % 94   POC pCO2 Latest Ref Range: 32 - 45 mm Hg 45   POC pCO2 Temp Latest Units: mm Hg NOT REPORTED   POC pH Latest Ref Range: 7.35 - 7.45  7.30 (L)   POC PO2 Latest Ref Range: 75 - 95 mm Hg 78   Serum cortisol: 24    Radiology:  X-ray chest: 2/11/2021            Impression:  · Acute on chronic hypoxic respiratory failure  · Acute exacerbation of COPD/emphysema  · Interstitial lung disease/progressive pulmonary fibrosis with probable acute exacerbation  · Active smoking/65-pack-year smoking history  · Acute systolic heart failure/severe cardiomyopathy EF 20%  · Moderate TR/secondary pulmonary hypertension of moderate severity, RVSP 54mmHg  · Alcohol abuse/withdraw  · DM/CAD/CHF    Recommendations:  · BiPAP now   · Wean off Precedex for sedation  · Consider repeating CT of the head  · Oxygen via high flow for meals/breaks from BiPAP as tolerated  · CIWA protocol, safety precautions   · Continue IV Rocephin/Zithromax empirically  · Decrease Solu-Cortef to 50 mg every 8 hours for COPD exacerbation   · Albuterol HFA Q 4 hours and prn, documented allergy to ipratropium  · Brovana and budesonide aerosols every 12 hours  · Continue diuresis, IV Lasix 60 mg daily  · Cardiology on consult, input noted  · Labs: CBC and BMP in am  · X-ray chest in a.m.  · DVT prophylaxis with low molecular weight heparin  · Incentive spirometer every hour while awake  · Discussed with RN  · Will follow with you    Electronically signed by     Aubrie Nelson MD on 2/11/2021 at 11:10 AM  Pulmonary Critical Care and Sleep Medicine,  [facility]  Cell: [phone]  Office: [phone]

## 2021-02-11 NOTE — PROGRESS NOTES
[facility]  Office: [address], DO, Yesenia Marilin, DO, Alma Dumont, DO, America Camila Bruno, DO, Macarena Llanes MD, David Ledesma MD, Haylie Pelletier MD, Keri Downing MD, Justo Garber MD, Kajal Palafox MD, Alcon Olmstead MD, Farideh Mix MD, Alexandru Lee MD, Karen Eduardo DO, Sharad Sweeney MD, Adamaris Acosta MD, Elizabeth Olson DO, Henry Mazariegos MD,  Audi Duenas DO, Bindu Mcleod MD, Vern Pimentel MD, Leeann Mckeon [facility], [address], [facility], Ignacia Castro, [facility], Sydnie Sheehan, CNS, Karthikeyan Keating, CNP, Wing Santana, CNP, Chan Pressley, CNP, Gustabo Bonilla, CNP, Broderick Zhao, CNP, Candice Berumen PA-C, Octavio Miranda, PATRICIO, Lily Pereira, CNP, Kirit Flores, CNP, Cisco Hartman, CNP, Madison Dubose CNP, Tabatha Agustin, [facility]    Progress Note    2/11/2021    11:26 AM    Name:   Candy Paulino  MRN:     [id number]     Acct:      [de-identified]   Room:   [address] [place] [address] Day:  5  Admit Date:  2/6/2021  2:27 PM    PCP:   Loretta Goodwin MD  Code Status:  Full Code    Subjective:     C/C:   Chief Complaint   Patient presents with    Dizziness     onset 2 weeks    Fall     difficulty walking    Aphasia     onset 4 days ago     Interval History Status: worsened. Pt was seen and examined this morning  Remains in poor condition and unable to provide reliable ROS        Medications: Allergies:     Allergies   Allergen Reactions    Tiotropium Anaphylaxis and Swelling    Spiriva Handihaler [Tiotropium Bromide Monohydrate] Swelling       Current Meds:   Scheduled Meds:    haloperidol lactate  2 mg Intravenous Once    cefepime  1,000 mg Intravenous Q12H    hydrocortisone sodium succinate PF  100 mg Intravenous Q8H    heparin (porcine)  5,000 Units Subcutaneous 3 times per day    Arformoterol Tartrate  15 mcg Nebulization BID    budesonide  0.5 mg Nebulization BID    carvedilol  3.125 mg Oral BID WC    lisinopril  2.5 mg Oral Daily    furosemide  60 mg Intravenous Daily    insulin lispro  0-6 Units Subcutaneous TID WC    insulin lispro  0-3 Units Subcutaneous Nightly    aspirin  81 mg Oral Daily    atorvastatin  20 mg Oral Daily    cetirizine  5 mg Oral Daily    pantoprazole  20 mg Oral Daily    sertraline  50 mg Oral Daily    traZODone  100 mg Oral Nightly    sodium chloride flush  10 mL Intravenous 2 times per day    azithromycin  250 mg Oral Daily    calcium carb-cholecalciferol  1 tablet Oral Daily     Continuous Infusions:    dexmedetomidine (PRECEDEX) IV infusion 0.4 mcg/kg/hr (21 0546)    norepinephrine 2 mcg/min (21 0020)    dextrose       PRN Meds: LORazepam **OR** LORazepam **OR** LORazepam **OR** LORazepam **OR** LORazepam **OR** LORazepam **OR** LORazepam **OR** LORazepam, bisacodyl, albuterol sulfate HFA, glucose, dextrose, glucagon (rDNA), dextrose, albuterol, tiZANidine, sodium chloride flush, promethazine **OR** ondansetron, polyethylene glycol, nicotine, acetaminophen **OR** acetaminophen    Data:     Past Medical History:   has a past medical history of CHF (congestive heart failure) ([facility] COPD (chronic obstructive pulmonary disease) ([facility] Diabetes mellitus ([facility] MRSA (methicillin resistant staph aureus) culture positive, and Tobacco abuse. Social History:   reports that she has been smoking cigarettes. She has been smoking about 0.50 packs per day. She has never used smokeless tobacco. She reports current alcohol use of about 70.0 standard drinks of alcohol per week. She reports current drug use. Drug: Marijuana.      Family History:   Family History   Problem Relation Age of Onset    Heart Disease Mother     Diabetes Father        Vitals:  BP 81/64   Pulse 72   Temp 97.4 °F (36.3 °C) (Axillary)   Resp 18   Ht 5' 2\" (1.575 m)   Wt 141 lb (64 kg)   SpO2 97%   BMI 25.79 kg/m²   Temp (24hrs), Av.4 °F (36.3 °C), Min:96.4 °F (35.8 °C), Max:98.3 °F (36.8 °C)    Recent Labs     02/10/21  0717 02/10/21  1103 02/10/21  1717 02/11/21  0723   POCGLU 84 116* 126* 137*       I/O (24Hr): Intake/Output Summary (Last 24 hours) at 2/11/2021 1126  Last data filed at 2/11/2021 0537  Gross per 24 hour   Intake 694.44 ml   Output 1055 ml   Net -360.56 ml       Labs:  Hematology:  Recent Labs     02/08/21  1503 02/08/21  1503 02/10/21  0554 02/10/21  1739 02/11/21  0522   WBC  --    < > 9.2 10.7 7.6   RBC  --    < > 4.97 4.67 3.49*   HGB  --    < > 12.2 11.4* 8.5*   HCT  --    < > 38.8 35.7* 27.1*   MCV  --    < > 78.1* 76.4* 77.7*   MCH  --    < > 24.5* 24.4* 24.4*   MCHC  --    < > 31.4 31.9 31.4   RDW  --    < > 26.3* 26.3* 26.0*   PLT  --    < > 233 231 199   MPV  --    < > 10.6 10.3 10.2   INR 1.3  --   --   --   --     < > = values in this interval not displayed. Chemistry:  Recent Labs     02/09/21  0422 02/10/21  0554 02/10/21  1739 02/11/21  0554    136 138 141   K 4.0 4.5 4.5 4.4   * 104 107 111*   CO2 19* 21 20 18*   GLUCOSE 50* 105* 119* 128*   BUN 20 25* 30* 32*   CREATININE 1.10* 1.37* 1.10* 1.14*   ANIONGAP 9 11 11 12   LABGLOM 48* 38* 48* 46*   GFRAA 59* 46* 59* 56*   CALCIUM 8.2* 8.6 8.3* 7.3*   PROBNP 62,364*  --   --   --      Recent Labs     02/09/21  1623 02/09/21  1934 02/10/21  0717 02/10/21  1103 02/10/21  1214 02/10/21  1717 02/11/21  0723   AMMONIA  --   --   --   --  14  --   --    POCGLU 141* 127* 84 116*  --  126* 137*     ABG:  Lab Results   Component Value Date    POCPH 7.28 02/10/2021    [place] [address]  09/04/2014     DISREGARD RESULTS. PATIENT NUMBER WAS INCORRECTLY ASSIGNED. [id number] 47 02/10/2021    PCO2  09/04/2014     DISREGARD RESULTS. PATIENT NUMBER WAS INCORRECTLY ASSIGNED. POCPO2 141 02/10/2021    PO2  09/04/2014     DISREGARD RESULTS. PATIENT NUMBER WAS INCORRECTLY ASSIGNED. [id number] 21.8 02/10/2021    HCO3  09/04/2014     DISREGARD RESULTS. PATIENT NUMBER WAS INCORRECTLY ASSIGNED.     NBEA 5 02/10/2021    PBEA NOT REPORTED 02/10/2021    [id number] 23 02/10/2021    [id number] 99 02/10/2021    O2SAT  09/04/2014     DISREGARD RESULTS. PATIENT NUMBER WAS INCORRECTLY ASSIGNED. FIO2 60.0 02/10/2021     Lab Results   Component Value Date/Time    SPECIAL L AC 7 ML 02/06/2021 03:09 PM     Lab Results   Component Value Date/Time    CULTURE NO GROWTH 5 DAYS 02/06/2021 03:09 PM       Radiology:  Ct Head Wo Contrast    Result Date: 2/6/2021  Chronic findings in the brain without acute CT abnormality identified. Xr Chest Portable    Result Date: 2/11/2021  Generalized interstitial prominence and pulmonary vascular congestion. Cardiomegaly. No definite pleural effusion or pneumothorax. Xr Chest Portable    Result Date: 2/10/2021  Increased right upper lobe opacity may represent developing pneumonia. Chronic interstitial lung changes with superimposed pulmonary edema. Xr Chest Portable    Result Date: 2/7/2021  Increasing bilateral pulmonary opacities, concerning for worsening edema or pneumonia. Xr Chest Portable    Result Date: 2/6/2021  Bilateral airspace and interstitial opacities either reflecting pulmonary edema versus pneumonia. Ct Chest High Resolution    Result Date: 2/9/2021  1. Note: Evaluation of lungs is significantly limited by patient breathing motion related artifact. 2. Severe bilateral lung extensive parenchymal changes, as discussed above, consistent with interstitial lung disease. Question overlying pulmonary interstitial edema. Findings most suggestive of usual interstitial pneumonia (UIP). Findings appear worsened in comparison with prior August 2020 CT chest examination. 3. Mild layering bilateral posterior pleural effusions, as discussed above. 4. Moderate cardiomegaly with marked dilatation of the right atrium. Recommend clinical correlation. Cardiomegaly in association with pleural effusions and likely pulmonary interstitial edema compatible with patient reported history of CHF.  5. Superior middle mediastinal lymphadenopathy, as described above. Differential diagnostic considerations include association with infectious disease, inflammatory disease versus neoplastic process. 6. Marked atherosclerotic calcification involving the aortic arch and origin of the great vessels. 7. Moderate scattered subcutaneous edema. Physical Examination:        General appearance:  moderate distress  Mental Status:  lethargic   HENT: high-flow nasal cannula present  Lungs: diminished lung sounds throughout all lung fields, no wheezes or rhonchi appreciated increased effort today  Heart:  regular rate and rhythm, no murmur, ectopy noted  Abdomen:  soft, nontender, nondistended, normal bowel sounds  Extremities:  no edema, redness, tenderness in the calves  Skin:  no gross lesions, rashes, induration    Assessment:        Hospital Problems           Last Modified POA    * (Principal) Acute systolic heart failure ([address] 2/8/2021 Yes    COPD (chronic obstructive pulmonary disease) ([address] 2/8/2021 Yes    Acute respiratory failure with hypoxemia ([address] 2/8/2021 Yes    Type 2 diabetes mellitus without complication, without long-term current use of insulin ([address] 2/8/2021 Yes    Chronic diastolic heart failure ([address] 2/8/2021 Yes    Elevated troponin 2/8/2021 Yes    Community acquired pneumonia of right middle lobe of lung 2/8/2021 Yes    Chronic respiratory failure ([address] 2/8/2021 Yes    Mild malnutrition ([address] 2/8/2021 Yes    Hypotension 2/8/2021 Yes    DEONTE (acute kidney injury) ([address] 2/8/2021 Yes    Current every day smoker 2/8/2021 Yes    Essential hypertension 2/8/2021 Yes    Altered mental status 2/8/2021 Yes    Severe malnutrition ([address] 2/8/2021 Yes          Plan:        1. Acute respiratory failure   2. COPD exacerbation   3.  CAP   - pulmonary consult placed   - pt required transfer to ICU yesterday afternoon due to increased respiratory distress and lethargy   - ABG, UA, chest xray, ammonia, lactic acid were all ordered and checked  - Pt also became hypotensive through the late afternoon, requiring levophed to be started and right femoral arterial and central ine placement. - started on iv solu cortef   - pro juan normal   - continue zithromax  - rocephin changed to cefepime  - blood cultures NGTD   - COVID negative  - maintain continuous pulse ox   - continue RT protocol prn breathing treatments   - started on IV solumedrol q6hr   - d/c symbicort, started on brovana and budesonide aerosols q12hr  - abg done yesterday showed pH 7.35 with pO2 of 62  - CT chest done on 2/9, see above for results   - chest xray done this morning showed pulmonary vascular congestion         4. Acute CHF   5. Elevated troponin   - cardio on board   - pt noted to have acute systolic HF which is likely contributing to her acute respiratory failure along with COPD exac   - coreg dose decreased to 3.125 BID  - lisinopril decreased to 2.5 daily  - continue lasix at this time 60 mg IV daily per cardio recs   - CHF education   - per carido if her ejection fraction remains below 35% despite medical treatment then she will be considered for ICD placement for primary prevention of sudden cardiac death. - negative 3 L fluid balance since admit   - continue to monitor Is/Os   - repeat pBNP on 2/9 was 16983  - d/c IV heparin drip and start prophylactic dose if okay with cardio     6. DEONTE   - BUN/Cr trended up this morning to 25/1.37  - will adjust lasix dosage to 40 mg   - monitor output   - renally dose all meds  - avoid nephrotoxic agents     7. HTN   - continue coreg and lisinopril   - hold above for SBP less than 100  - v/s per unit protocol      8. DMII   - continue accu checks ac/hs with ISS      9. GERD   - PPI      10. Depression   - continue home zoloft     11.  DVT prophylaxis   - heparin     Prognosis guardkeren James MD  2/11/2021  11:26 AM

## 2021-02-11 NOTE — PLAN OF CARE
Problem: Falls - Risk of:  Goal: Will remain free from falls  Description: Will remain free from falls  2/10/2021 1903 by Scooter Winter RN  Outcome: Ongoing  2/10/2021 1855 by Destini Covarrubias RN  Outcome: Ongoing  2/10/2021 1454 by Gavino Prajapati RN  Outcome: Ongoing  2/10/2021 0733 by Monica Benson RN  Outcome: Ongoing     Problem: Skin Integrity:  Goal: Will show no infection signs and symptoms  Description: Will show no infection signs and symptoms  2/10/2021 1903 by Scooter Winter RN  Outcome: Ongoing  2/10/2021 1855 by Destini Covarrubias RN  Outcome: Ongoing     Problem: Restraint Use - Nonviolent/Non-Self-Destructive Behavior:  Goal: Absence of restraint indications  Description: Absence of restraint indications  2/10/2021 1903 by Scooter Winter RN  Outcome: Ongoing  2/10/2021 1855 by Destini Covarrubias RN  Outcome: Ongoing   Uses call light appropriately, fall precautions in place will continue to monitor. Continue to monitor skin integrity and IV sites. Pt will remain safe while hospitalized.

## 2021-02-11 NOTE — PLAN OF CARE
Problem: Falls - Risk of:  Goal: Will remain free from falls  Description: Will remain free from falls  2/11/2021 1817 by Katharina Osorio RN  Outcome: Ongoing     Problem: OXYGENATION/RESPIRATORY FUNCTION  Goal: Patient will maintain patent airway  2/11/2021 1817 by Katharina Osorio RN  Outcome: Ongoing     Problem: Nutrition  Goal: Optimal nutrition therapy  2/11/2021 1817 by Katharina Osorio RN  Outcome: Ongoing     Problem: Skin Integrity:  Goal: Absence of new skin breakdown  Description: Absence of new skin breakdown  2/11/2021 1817 by Katharina Osorio RN  Outcome: Ongoing     Problem: Injury - Risk of, Physical Injury:  Goal: Will remain free from falls  Description: Will remain free from falls  2/11/2021 1817 by Katharina Osorio RN  Outcome: Ongoing     Problem: Psychomotor Activity - Altered:  Goal: Absence of psychomotor disturbance signs and symptoms  Description: Absence of psychomotor disturbance signs and symptoms  2/11/2021 1817 by Katharina Osorio RN  Outcome: Ongoing     Problem: Restraint Use - Nonviolent/Non-Self-Destructive Behavior:  Goal: Absence of restraint-related injury  Description: Absence of restraint-related injury  2/11/2021 1817 by Katharina Osorio RN  Outcome: Ongoing

## 2021-02-11 NOTE — PROGRESS NOTES
Occupational Therapy    DATE: 2021    NAME: Jared Bonilla  MRN: [id number]   : 1945      [facility]  Occupational Therapy Not Seen Note    Patient not available for Occupational Therapy due to:    [] Testing:    [] Hemodialysis    [] Cancelled by RN:    []Refusal by Patient:    [] Surgery:     [] Intubation:     [] Pain Medication:    [x] Sedation: Pt on precedex and bi pap, very lethargic.  Not appropriate at this time, will cont to follow.    [] Spine Precautions :    [] Medical Instability:    [] Other:    VIOLETTA Trimble/VERA

## 2021-02-11 NOTE — PLAN OF CARE
Nutrition Problem #1: Severe malnutrition, In context of chronic, non-illness related  Intervention: Food and/or Nutrient Delivery: Continue Current Diet, Start Oral Nutrition Supplement  Nutritional Goals: PO intakes are greater than 75% at meals

## 2021-02-11 NOTE — PROGRESS NOTES
Section of Cardiology  Progress Note      Date:  2/11/2021  Patient: Sudeep Phelps  Admission:  2/6/2021  2:27 PM  Admit DX: Hypotension [I95.9]  Age:  77 y. o., 1945     LOS: 5 days     Reason for evaluation:   cardiomyopathy and CHF      SUBJECTIVE:     The patient was seen and examined. Notes and labs reviewed. There were not complications over night. Patient seen and examined in her room with her nurse at bedside. Discussed earlier with the pulmonary service. Patient's temperature now back to normal.  She is off vasopressors. The patient is slightly more awake. She still dependent on BiPAP machine however her FiO2 is only 30%. The patient still has involuntary movement. She opens her eyes to command but does not have any meaningful answers. OBJECTIVE:    Telemetry: Sinus, frequent PACs and frequent PVCs  /78   Pulse 85   Temp 97.5 °F (36.4 °C) (Temporal)   Resp 23   Ht 5' 2\" (1.575 m)   Wt 141 lb (64 kg)   SpO2 96%   BMI 25.79 kg/m²     Intake/Output Summary (Last 24 hours) at 2/11/2021 1546  Last data filed at 2/11/2021 1412  Gross per 24 hour   Intake 694.44 ml   Output 1805 ml   Net -1110.56 ml       EXAM:   CONSTITUTIONAL:  awake, alert, cooperative, no apparent distress, and appears stated age. HEENT: Normal jugular venous pulsations, no carotid bruits. Head is atraumatic, normocephalic. Eyes and oral mucosa are normal.  LUNGS: Good respiratory effort. Yes for increased work of breathing. On auscultation: diminished breath sounds bilaterally  CARDIOVASCULAR:  Normal apical impulse, irregular rate and rhythm, normal S1 and S2, no S3 or S4,   ABDOMEN: Soft, nontender, nondistended. Bowel sounds present. SKIN: Warm and dry. EXTREMITIES: No lower extremity edema. Motor movement grossly intact. No cyanosis or clubbing.     Current Inpatient Medications:   haloperidol lactate  2 mg Intravenous Once    cefepime  1,000 mg Intravenous Q12H    hydrocortisone sodium succinate most likely secondary to CHF  COPD  Diabetes mellitus  Patient Active Problem List   Diagnosis    [facility]    CHF (congestive heart failure) (HCC)    Cocaine abuse (HCC)    Acidosis, metabolic, with respiratory acidosis    Acute respiratory failure with hypoxemia (HCC)    Polysubstance abuse (HCC)    Hyponatremia, Beer Potomania    Normocytic anemia    Neutrophilic leukocytosis    Increased ammonia level    Hypophosphatemia    Type 2 diabetes mellitus without complication, without long-term current use of insulin (HCC)    Acute on chronic diastolic heart failure (HCC)    Tobacco abuse    Diabetes mellitus (HCC)    Chronic diastolic heart failure (HCC)    Elevated troponin    Prolonged Q-T interval on ECG    Community acquired pneumonia of right middle lobe of lung    Hypoxia    Chronic respiratory failure (HCC)    Mild malnutrition (HCC)    Hypotension    DEONTE (acute kidney injury) ([facility]    Current every day smoker    Essential hypertension    Altered mental status    Acute systolic heart failure (HCC)    Severe malnutrition ([facility]       PLAN:    1. Patient's blood pressure is normal and we have arterial line for continuous monitoring. 2. Patient is unable to take her cardiac medications or any other pills due to her mental status changes in addition being on BiPAP. However she received Lasix with fairly good urine output. 3. Prognosis is guarded. Please see orders. Discussed with other pulmonary service and nursing.     Madhavi Ortiz MD

## 2021-02-11 NOTE — PROGRESS NOTES
Nutrition Assessment     Type and Reason for Visit: Reassess    Nutrition Recommendations/Plan:   1. Continue GENERAL; Carb Control: 5 carb choices (75 gms)/meal; No Added Salt (3-4 GM)  2. Continue Glucerna 2x/day  3. Monitor p.o intakes and labs. Consider need for NG tube feeding is patient remains lethargic    Nutrition Assessment:  Patient is lethargic due to Pecedex and Bipap for restlessness. Patient was not alert for breakfast or lunch today. Patient was not able to have modified barium swallow study due to lethargic state. Continue current diet as tolerated and Glucerna oral nutrition supplements 3x/day. Encourage sips of Glucerna when appropriate. Monitor p.o intakes and labs. Malnutrition Assessment:  Malnutrition Status: Severe malnutrition    Estimated Daily Nutrient Needs:  Energy (kcal): 1806-4669 kcal based on 23-25 kcal/kg; Weight Used for Energy Requirements:  Current     Protein (g): 60-65 gm based on 1.2-1.3 gm/kg; Weight Used for Protein Requirements:  Ideal          Nutrition Related Findings: No edema. Poor oral intakes. BiPap. On Precedex for restlessness and confusion.  Patient is lethergic      Current Nutrition Therapies:    DIET GENERAL; Carb Control: 5 carb choices (75 gms)/meal; No Added Salt (3-4 GM)  Dietary Nutrition Supplements: Diabetic Oral Supplement    Anthropometric Measures:  · Height: 5' 2\" (157.5 cm)  · Current Body Wt: 145 lb (65.8 kg)   · BMI: 26.5    Nutrition Diagnosis:   · Severe malnutrition, In context of chronic, non-illness related related to inadequate protein-energy intake as evidenced by weight loss greater than or equal to 20% in 1 year, severe loss of subcutaneous fat, severe muscle loss, intake 26-50%, intake 0-25%      Nutrition Interventions:   Food and/or Nutrient Delivery:  Continue Current Diet, Start Oral Nutrition Supplement  Nutrition Education/Counseling:  Education not indicated   Coordination of Nutrition Care:  Continue to monitor while inpatient    Goals:  PO intakes are greater than 75% at meals       Nutrition Monitoring and Evaluation:   Food/Nutrient Intake Outcomes:  Food and Nutrient Intake, Supplement Intake  Physical Signs/Symptoms Outcomes:  Biochemical Data, Skin, Weight, Fluid Status or Edema     Discharge Planning:    Continue Oral Nutrition Supplement, Continue current diet         Mayte NGUYENN, RDN, LDN  Lead Clinical Dietitian  RD Office Phone [phone]

## 2021-02-11 NOTE — PROGRESS NOTES
Physical Therapy  DATE: 2021    NAME: Juan Nichols  MRN: [id number]   : 1945    Patient not seen this date for Physical Therapy due to:  [] Blood transfusion in progress  [] Cancel by RN  [] Hemodialysis  []  Refusal by Patient   [] Spine Precautions   [] Strict Bedrest  [] Surgery  [] Testing      [x] Other - Pt on precedex and bi pap, very lethargic. Not appropriate at this time, will cont to follow. [] PT being discontinued at this time. Patient independent. No further needs. [] PT being discontinued at this time as the patient has been transferred to hospice care. No further needs.     Regis Bustamante, PT

## 2021-02-11 NOTE — PLAN OF CARE
Problem: Falls - Risk of:  Goal: Will remain free from falls  Description: Will remain free from falls  Outcome: Ongoing  Note: Patient is a fall risk during this admission. Fall risk assessment was performed. Patient is absent of falls. Bed is in the lowest position. Wheels on the bed are locked. Call light and bed side table are within reach. Clutter is removed. Patient was educated to call out when needing assistance or wanting to get out of bed. Patient offered toileting assistance during rounding. Hourly rounds have been performed. Problem: OXYGENATION/RESPIRATORY FUNCTION  Goal: Patient will maintain patent airway  Outcome: Ongoing  Note: Patient is currently on bipap with SpO2 in the high 90's. Pt is very hard to arouse this morning as she is currently receiving sedation. Will continue to monitor.

## 2021-02-12 ENCOUNTER — APPOINTMENT (OUTPATIENT)
Dept: GENERAL RADIOLOGY | Age: 76
DRG: 871 | End: 2021-02-12
Payer: MEDICARE

## 2021-02-12 ENCOUNTER — APPOINTMENT (OUTPATIENT)
Dept: MRI IMAGING | Age: 76
DRG: 871 | End: 2021-02-12
Payer: MEDICARE

## 2021-02-12 LAB
ABSOLUTE EOS #: 0 K/UL (ref 0–0.44)
ABSOLUTE IMMATURE GRANULOCYTE: 0.08 K/UL (ref 0–0.3)
ABSOLUTE LYMPH #: 1.01 K/UL (ref 1.1–3.7)
ABSOLUTE MONO #: 0.59 K/UL (ref 0.1–1.2)
ANION GAP SERPL CALCULATED.3IONS-SCNC: 13 MMOL/L (ref 9–17)
BASOPHILS # BLD: 0 % (ref 0–2)
BASOPHILS ABSOLUTE: 0 K/UL (ref 0–0.2)
BUN BLDV-MCNC: 39 MG/DL (ref 8–23)
BUN/CREAT BLD: 28 (ref 9–20)
CALCIUM SERPL-MCNC: 8.4 MG/DL (ref 8.6–10.4)
CHLORIDE BLD-SCNC: 110 MMOL/L (ref 98–107)
CO2: 20 MMOL/L (ref 20–31)
CREAT SERPL-MCNC: 1.38 MG/DL (ref 0.5–0.9)
CULTURE: NORMAL
CULTURE: NORMAL
DIFFERENTIAL TYPE: ABNORMAL
EOSINOPHILS RELATIVE PERCENT: 0 % (ref 1–4)
FIO2: 30
FOLATE: >20 NG/ML
GFR AFRICAN AMERICAN: 45 ML/MIN
GFR NON-AFRICAN AMERICAN: 37 ML/MIN
GFR SERPL CREATININE-BSD FRML MDRD: ABNORMAL ML/MIN/{1.73_M2}
GFR SERPL CREATININE-BSD FRML MDRD: ABNORMAL ML/MIN/{1.73_M2}
GLUCOSE BLD-MCNC: 136 MG/DL (ref 65–105)
GLUCOSE BLD-MCNC: 152 MG/DL (ref 70–99)
GLUCOSE BLD-MCNC: 160 MG/DL (ref 65–105)
GLUCOSE BLD-MCNC: 162 MG/DL (ref 65–105)
GLUCOSE BLD-MCNC: 165 MG/DL (ref 65–105)
GLUCOSE BLD-MCNC: 185 MG/DL (ref 65–105)
HCT VFR BLD CALC: 36.5 % (ref 36.3–47.1)
HEMOGLOBIN: 11.7 G/DL (ref 11.9–15.1)
IMMATURE GRANULOCYTES: 1 %
LYMPHOCYTES # BLD: 12 % (ref 24–43)
Lab: NORMAL
Lab: NORMAL
MCH RBC QN AUTO: 24.2 PG (ref 25.2–33.5)
MCHC RBC AUTO-ENTMCNC: 32.1 G/DL (ref 28.4–34.8)
MCV RBC AUTO: 75.6 FL (ref 82.6–102.9)
MONOCYTES # BLD: 7 % (ref 3–12)
MORPHOLOGY: ABNORMAL
NEGATIVE BASE EXCESS, ART: 4 (ref 0–2)
NRBC AUTOMATED: 0.5 PER 100 WBC
O2 DEVICE/FLOW/%: ABNORMAL
PATIENT TEMP: 37
PDW BLD-RTO: 26.1 % (ref 11.8–14.4)
PLATELET # BLD: 257 K/UL (ref 138–453)
PLATELET ESTIMATE: ABNORMAL
PMV BLD AUTO: 10.2 FL (ref 8.1–13.5)
POC HCO3: 20.2 MMOL/L (ref 22–27)
POC O2 SATURATION: 98 %
POC PCO2 TEMP: ABNORMAL MM HG
POC PCO2: 31 MM HG (ref 32–45)
POC PH TEMP: ABNORMAL
POC PH: 7.43 (ref 7.35–7.45)
POC PO2 TEMP: ABNORMAL MM HG
POC PO2: 95 MM HG (ref 75–95)
POSITIVE BASE EXCESS, ART: ABNORMAL (ref 0–2)
POTASSIUM SERPL-SCNC: 4.1 MMOL/L (ref 3.7–5.3)
RBC # BLD: 4.83 M/UL (ref 3.95–5.11)
RBC # BLD: ABNORMAL 10*6/UL
SEG NEUTROPHILS: 80 % (ref 36–65)
SEGMENTED NEUTROPHILS ABSOLUTE COUNT: 6.72 K/UL (ref 1.5–8.1)
SODIUM BLD-SCNC: 143 MMOL/L (ref 135–144)
SPECIMEN DESCRIPTION: NORMAL
SPECIMEN DESCRIPTION: NORMAL
T. PALLIDUM, IGG: NONREACTIVE
TCO2 (CALC), ART: 21 MMOL/L (ref 23–28)
VITAMIN B-12: 1736 PG/ML (ref 232–1245)
VITAMIN D 25-HYDROXY: 28.3 NG/ML (ref 30–100)
WBC # BLD: 8.4 K/UL (ref 3.5–11.3)
WBC # BLD: ABNORMAL 10*3/UL

## 2021-02-12 PROCEDURE — 2500000003 HC RX 250 WO HCPCS: Performed by: NURSE PRACTITIONER

## 2021-02-12 PROCEDURE — 6360000002 HC RX W HCPCS: Performed by: NURSE PRACTITIONER

## 2021-02-12 PROCEDURE — 85025 COMPLETE CBC W/AUTO DIFF WBC: CPT

## 2021-02-12 PROCEDURE — 99223 1ST HOSP IP/OBS HIGH 75: CPT | Performed by: PSYCHIATRY & NEUROLOGY

## 2021-02-12 PROCEDURE — 6370000000 HC RX 637 (ALT 250 FOR IP): Performed by: INTERNAL MEDICINE

## 2021-02-12 PROCEDURE — 82607 VITAMIN B-12: CPT

## 2021-02-12 PROCEDURE — 6360000002 HC RX W HCPCS: Performed by: INTERNAL MEDICINE

## 2021-02-12 PROCEDURE — 86780 TREPONEMA PALLIDUM: CPT

## 2021-02-12 PROCEDURE — 80048 BASIC METABOLIC PNL TOTAL CA: CPT

## 2021-02-12 PROCEDURE — 82803 BLOOD GASES ANY COMBINATION: CPT

## 2021-02-12 PROCEDURE — 82746 ASSAY OF FOLIC ACID SERUM: CPT

## 2021-02-12 PROCEDURE — 82947 ASSAY GLUCOSE BLOOD QUANT: CPT

## 2021-02-12 PROCEDURE — 94660 CPAP INITIATION&MGMT: CPT

## 2021-02-12 PROCEDURE — 82306 VITAMIN D 25 HYDROXY: CPT

## 2021-02-12 PROCEDURE — 37799 UNLISTED PX VASCULAR SURGERY: CPT

## 2021-02-12 PROCEDURE — 6360000002 HC RX W HCPCS: Performed by: FAMILY MEDICINE

## 2021-02-12 PROCEDURE — 2580000003 HC RX 258: Performed by: INTERNAL MEDICINE

## 2021-02-12 PROCEDURE — 99233 SBSQ HOSP IP/OBS HIGH 50: CPT | Performed by: FAMILY MEDICINE

## 2021-02-12 PROCEDURE — 2580000003 HC RX 258: Performed by: NURSE PRACTITIONER

## 2021-02-12 PROCEDURE — 94640 AIRWAY INHALATION TREATMENT: CPT

## 2021-02-12 PROCEDURE — 94761 N-INVAS EAR/PLS OXIMETRY MLT: CPT

## 2021-02-12 PROCEDURE — 2700000000 HC OXYGEN THERAPY PER DAY

## 2021-02-12 PROCEDURE — 71045 X-RAY EXAM CHEST 1 VIEW: CPT

## 2021-02-12 PROCEDURE — 2000000000 HC ICU R&B

## 2021-02-12 RX ORDER — HYDRALAZINE HYDROCHLORIDE 20 MG/ML
10 INJECTION INTRAMUSCULAR; INTRAVENOUS EVERY 6 HOURS PRN
Status: DISCONTINUED | OUTPATIENT
Start: 2021-02-12 | End: 2021-02-15 | Stop reason: HOSPADM

## 2021-02-12 RX ADMIN — HYDROCORTISONE SODIUM SUCCINATE 50 MG: 100 INJECTION, POWDER, FOR SOLUTION INTRAMUSCULAR; INTRAVENOUS at 17:42

## 2021-02-12 RX ADMIN — INSULIN LISPRO 1 UNITS: 100 INJECTION, SOLUTION INTRAVENOUS; SUBCUTANEOUS at 21:31

## 2021-02-12 RX ADMIN — HYDRALAZINE HYDROCHLORIDE 10 MG: 20 INJECTION INTRAMUSCULAR; INTRAVENOUS at 10:26

## 2021-02-12 RX ADMIN — SODIUM CHLORIDE 0.7 MCG/KG/HR: 9 INJECTION, SOLUTION INTRAVENOUS at 12:56

## 2021-02-12 RX ADMIN — SODIUM CHLORIDE 0.5 MCG/KG/HR: 9 INJECTION, SOLUTION INTRAVENOUS at 03:34

## 2021-02-12 RX ADMIN — BUDESONIDE 500 MCG: 0.5 SUSPENSION RESPIRATORY (INHALATION) at 09:50

## 2021-02-12 RX ADMIN — HEPARIN SODIUM 5000 UNITS: 5000 INJECTION INTRAVENOUS; SUBCUTANEOUS at 05:35

## 2021-02-12 RX ADMIN — ARFORMOTEROL TARTRATE 15 MCG: 15 SOLUTION RESPIRATORY (INHALATION) at 09:49

## 2021-02-12 RX ADMIN — SODIUM CHLORIDE 1.3 MCG/KG/HR: 9 INJECTION, SOLUTION INTRAVENOUS at 22:43

## 2021-02-12 RX ADMIN — BUDESONIDE 500 MCG: 0.5 SUSPENSION RESPIRATORY (INHALATION) at 20:06

## 2021-02-12 RX ADMIN — CEFEPIME HYDROCHLORIDE 1000 MG: 1 INJECTION, POWDER, FOR SOLUTION INTRAMUSCULAR; INTRAVENOUS at 17:43

## 2021-02-12 RX ADMIN — HYDROCORTISONE SODIUM SUCCINATE 100 MG: 100 INJECTION, POWDER, FOR SOLUTION INTRAMUSCULAR; INTRAVENOUS at 01:56

## 2021-02-12 RX ADMIN — FUROSEMIDE 60 MG: 10 INJECTION, SOLUTION INTRAMUSCULAR; INTRAVENOUS at 07:56

## 2021-02-12 RX ADMIN — SODIUM CHLORIDE 1 MCG/KG/HR: 9 INJECTION, SOLUTION INTRAVENOUS at 17:42

## 2021-02-12 RX ADMIN — HYDRALAZINE HYDROCHLORIDE 10 MG: 20 INJECTION INTRAMUSCULAR; INTRAVENOUS at 18:08

## 2021-02-12 RX ADMIN — LORAZEPAM 1 MG: 2 INJECTION INTRAMUSCULAR; INTRAVENOUS at 11:04

## 2021-02-12 RX ADMIN — ARFORMOTEROL TARTRATE 15 MCG: 15 SOLUTION RESPIRATORY (INHALATION) at 20:06

## 2021-02-12 RX ADMIN — HEPARIN SODIUM 5000 UNITS: 5000 INJECTION INTRAVENOUS; SUBCUTANEOUS at 13:50

## 2021-02-12 RX ADMIN — CEFEPIME HYDROCHLORIDE 1000 MG: 1 INJECTION, POWDER, FOR SOLUTION INTRAMUSCULAR; INTRAVENOUS at 05:35

## 2021-02-12 RX ADMIN — HEPARIN SODIUM 5000 UNITS: 5000 INJECTION INTRAVENOUS; SUBCUTANEOUS at 21:31

## 2021-02-12 RX ADMIN — SODIUM CHLORIDE, PRESERVATIVE FREE 10 ML: 5 INJECTION INTRAVENOUS at 13:00

## 2021-02-12 RX ADMIN — SODIUM CHLORIDE, PRESERVATIVE FREE 10 ML: 5 INJECTION INTRAVENOUS at 21:00

## 2021-02-12 NOTE — CONSULTS
NEUROLOGY INPATIENT CONSULT NOTE    2/12/2021         Akash Rene is a  77 y.o. female admitted on 2/6/2021 with  Hypotension [I95.9]      History is obtained mostly from the patient and the medical record and from the caregivers. Chart is reviewed and patient is examined. Briefly, this is a  77 y.o. female with hx of  was admitted on 2/6/2021 with c/o generalized weakness, altered mentation and \"not feeling well\". Family stated that over the past few weeks she has been having increasing changes in her mentation. She was also seemingly confused and difficulty walking. She also has history of alcoholism \"daily drinker and did have few beers today\". On arrival to ED; she was found to be hypotensive and hypoxic with chest x-ray demonstrating bilateral airspace and interstitial opacities, right greater than left. CT head negative for acute intracranial abnormalities. EKG with a sinus bradycardia. She had fluid resuscitation with rapid improvement in blood pressure. She was initiated on broad-spectrum antibiotic therapy with Rocephin and Zithromax. Caregivers stated that she was able to respond to commands until 2 days ago. She has been on BiPAP on and off. She has been requiring Precedex. No known history of CVA or seizure disorder. No current facility-administered medications on file prior to encounter.       Current Outpatient Medications on File Prior to Encounter   Medication Sig Dispense Refill    budesonide-formoterol (SYMBICORT) 160-4.5 MCG/ACT AERO Inhale 2 puffs into the lungs 2 times daily      tiotropium (SPIRIVA RESPIMAT) 2.5 MCG/ACT AERS inhaler Inhale 2 puffs into the lungs daily      furosemide (LASIX) 20 MG tablet Take 20 mg by mouth daily      traZODone (DESYREL) 150 MG tablet Take 150 mg by mouth nightly      ibuprofen (ADVIL;MOTRIN) 800 MG tablet Take 800 mg by mouth 3 times daily (with meals)      pantoprazole (PROTONIX) 20 MG tablet Take 1 tablet by mouth daily 30 tablet 3    albuterol (PROVENTIL) (2.5 MG/3ML) 0.083% nebulizer solution Take 3 mLs by nebulization every 6 hours as needed for Wheezing or Shortness of Breath 120 each 0    tiZANidine (ZANAFLEX) 4 MG tablet Take 4 mg by mouth 2 times daily as needed      pregabalin (LYRICA) 100 MG capsule Take 100 mg by mouth 2 times daily.  Multiple Vitamins-Minerals (CERTAVITE SENIOR PO) Take 1 tablet by mouth daily      sertraline (ZOLOFT) 50 MG tablet Take 50 mg by mouth daily      loratadine (CLARITIN) 10 MG tablet Take 10 mg by mouth daily      atorvastatin (LIPITOR) 20 MG tablet Take 20 mg by mouth daily       aspirin 81 MG tablet Take 81 mg by mouth daily.  calcium-vitamin D (OSCAL-500) 500-200 MG-UNIT per tablet Take 1 tablet by mouth daily.  lisinopril (PRINIVIL;ZESTRIL) 20 MG tablet Take 20 mg by mouth daily        Allergies: Nora Winter is allergic to tiotropium and spiriva handihaler [tiotropium bromide monohydrate]. Past Medical History:   Diagnosis Date    [facility]congestive heart failure) ([facility])     COPD (chronic obstructive pulmonary disease) [facility] [address] [facility]     Diabetes mellitus ([facility])     MRSA (methicillin resistant staph aureus) culture positive 8/28/2014    sputum    Tobacco abuse 10/15/2019       Past Surgical History:   Procedure Laterality Date    APPENDECTOMY      HYSTERECTOMY      JOINT REPLACEMENT Bilateral      Social History: Nora Winter  reports that she has been smoking cigarettes. She has been smoking about 0.50 packs per day. She has never used smokeless tobacco. She reports current alcohol use of about 70.0 standard drinks of alcohol per week. She reports current drug use. Drug: Marijuana.     Family History   Problem Relation Age of Onset    Heart Disease Mother     Diabetes Father        Current Medications:     hydrocortisone sodium succinate PF  50 mg Intravenous Q8H    haloperidol lactate  2 mg Intravenous Once    cefepime  1,000 mg Intravenous Q12H    heparin (porcine)  5,000 Units Subcutaneous 3 times per day    Arformoterol Tartrate  15 mcg Nebulization BID    budesonide  0.5 mg Nebulization BID    carvedilol  3.125 mg Oral BID WC    lisinopril  2.5 mg Oral Daily    furosemide  60 mg Intravenous Daily    insulin lispro  0-6 Units Subcutaneous TID WC    insulin lispro  0-3 Units Subcutaneous Nightly    aspirin  81 mg Oral Daily    atorvastatin  20 mg Oral Daily    cetirizine  5 mg Oral Daily    pantoprazole  20 mg Oral Daily    sertraline  50 mg Oral Daily    traZODone  100 mg Oral Nightly    sodium chloride flush  10 mL Intravenous 2 times per day    calcium carb-cholecalciferol  1 tablet Oral Daily     PRN Meds include: hydrALAZINE, LORazepam **OR** LORazepam **OR** LORazepam **OR** LORazepam **OR** LORazepam **OR** LORazepam **OR** LORazepam **OR** LORazepam, bisacodyl, albuterol sulfate HFA, glucose, dextrose, glucagon (rDNA), dextrose, albuterol, tiZANidine, sodium chloride flush, promethazine **OR** ondansetron, polyethylene glycol, nicotine, acetaminophen **OR** acetaminophen    ROS: Could not be obtained due to patient's condition. Objective:   BP (!) 148/98   Pulse 77   Temp 97.7 °F (36.5 °C) (Temporal)   Resp 24   Ht 5' 2\" (1.575 m)   Wt 137 lb (62.1 kg)   SpO2 95%   BMI 25.06 kg/m²     Blood pressure range: Systolic (53CFB), BCB:285 , Min:111 , MPL:734   ; Diastolic (71JCL), HXI:62, Min:78, Max:103      Continuous infusions:    dexmedetomidine (PRECEDEX) IV infusion 0.7 mcg/kg/hr (02/12/21 1188)    norepinephrine Stopped (02/11/21 1112)    dextrose         ON EXAMINATION:  GENERAL  Appears comfortable and in no acute cardiorespiratory distress   HEENT  NC/ AT   NECK  Supple and no bruits heard   Cardiovascular  S1, S2 heard; radial pulse intact   MENTAL STATUS:  Lethargic but arousable to name call; does not open eyes and does not squeeze hands; she groans and moans with nonintelligible speech; no hallucinations or delusions. toxic metabolic encephalopathy with septic shock on admit   will get MRI brain (without), carotid Dopplers, UA, Vit B12, Vit D, Treponemal Ab and EEG in further evaluation. Community-acquired pneumonia; on broad-spectrum antibiotic therapy with azithromycin and cefepime  Acute on chronic respiratory failure; on BiPAP  Acute exacerbation of COPD/emphysema: On Solu-Cortef, albuterol, Brovana and budesonide aerosols  Acute kidney injury  Ammonia level is normal at 14. No signs of meningism on present examination. MRI brain could not be done with contrast with low GFR and BUN/creatinine at 39/1.38  Care plan is discussed with patient's nurse at bedside. Will follow with you. Thank you for consultation. Please note that portions of this note were completed with a voice recognition program.  Although every effort was made to ensure the accuracy of this automated transcription, some errors in transcription may have occurred; occasionally words are mis-transcribed. Thank you for understanding.           Raymundo Heaton MD 2/12/2021 2:00 PM

## 2021-02-12 NOTE — PROGRESS NOTES
Occupational Therapy    DATE: 2021    NAME: Cristina Mata  MRN: [id number]   : 1945      [facility]  Occupational Therapy Not Seen Note    Patient not available for Occupational Therapy due to:    [] Testing:    [] Hemodialysis    [x] Cancelled by RN: Pt not following commands, in restraints, restless, bi pap    []Refusal by Patient:    [] Surgery:     [] Intubation:     [] Pain Medication:    [] Sedation:     [] Spine Precautions :    [] Medical Instability:    [] Other:      [employer], SHIPLEY/L

## 2021-02-12 NOTE — PROGRESS NOTES
[facility]  Office: [address], DO, Flower Guardado, DO, Checo Murrell, DO, Leandra Becker Damion, DO, Riddhi Atkinson MD, Petty Olea MD, Lachelle Hdez MD, Julianna Aranda MD, Edith Whatley MD, James Ponce MD, Analia Peña MD, Monisha Colin MD, Alexandru Thornton MD, Kathy Shaffer DO, Maggie Bronw MD, Reina Blake MD, Felix Estrada, DO, Marilu Lai MD,  Angel Freedman DO, Evelin Donovan MD, Lo Dick MD, aDmaso Robertson, [facility], [address], [facility], Andre Corcoran, CNP, Zoila Fuentes, CNS, Malick Benavides, CNP, Winnie Maldonado, CNP, Ward Toledo, CNP, Gaston Rae, CNP, Yary Augustin, CNP, Olga Burton PA-C, Denisse Wilks, [facility], Doreen Shepherd, CNP, Rahul Funez, CNP, Shannan Pineda, CNP, Haley Renteria, CNP, Nataliya Plaza, [facility]    Progress Note    2/12/2021    8:28 AM    Name:   Cristina Mata  MRN:     [id number]     Acct:      [de-identified]   Room:   2033/2033-01   Day:  6  Admit Date:  2/6/2021  2:27 PM    PCP:   Santana Wong MD  Code Status:  Full Code    Subjective:     C/C:   Chief Complaint   Patient presents with    Dizziness     onset 2 weeks    Fall     difficulty walking    Aphasia     onset 4 days ago      Interval History Status: not changed. Pt was seen and examined this morning  Not doing well. Very restless and agitated  Currently on high flow       Review of Systems:     12 point ROS performed today and negative for anything other than what was stated in subjective     Medications: Allergies:     Allergies   Allergen Reactions    Tiotropium Anaphylaxis and Swelling    Spiriva Handihaler [Tiotropium Bromide Monohydrate] Swelling       Current Meds:   Scheduled Meds:    haloperidol lactate  2 mg Intravenous Once    cefepime  1,000 mg Intravenous Q12H    hydrocortisone sodium succinate PF  100 mg Intravenous Q8H    heparin (porcine)  5,000 Units Subcutaneous 3 times per day    Arformoterol Tartrate  15 mcg Nebulization BID    budesonide  0.5 mg Nebulization BID    carvedilol  3.125 mg Oral BID WC    lisinopril  2.5 mg Oral Daily    furosemide  60 mg Intravenous Daily    insulin lispro  0-6 Units Subcutaneous TID WC    insulin lispro  0-3 Units Subcutaneous Nightly    aspirin  81 mg Oral Daily    atorvastatin  20 mg Oral Daily    cetirizine  5 mg Oral Daily    pantoprazole  20 mg Oral Daily    sertraline  50 mg Oral Daily    traZODone  100 mg Oral Nightly    sodium chloride flush  10 mL Intravenous 2 times per day    azithromycin  250 mg Oral Daily    calcium carb-cholecalciferol  1 tablet Oral Daily     Continuous Infusions:    dexmedetomidine (PRECEDEX) IV infusion 0.7 mcg/kg/hr (02/12/21 4556)    norepinephrine Stopped (02/11/21 1112)    dextrose       PRN Meds: LORazepam **OR** LORazepam **OR** LORazepam **OR** LORazepam **OR** LORazepam **OR** LORazepam **OR** LORazepam **OR** LORazepam, bisacodyl, albuterol sulfate HFA, glucose, dextrose, glucagon (rDNA), dextrose, albuterol, tiZANidine, sodium chloride flush, promethazine **OR** ondansetron, polyethylene glycol, nicotine, acetaminophen **OR** acetaminophen    Data:     Past Medical History:   has a past medical history of CHF (congestive heart failure) ([facility] [address] COPD (chronic obstructive pulmonary disease) ([facility] Diabetes mellitus ([facility] MRSA (methicillin resistant staph aureus) culture positive, and Tobacco abuse. Social History:   reports that she has been smoking cigarettes. She has been smoking about 0.50 packs per day. She has never used smokeless tobacco. She reports current alcohol use of about 70.0 standard drinks of alcohol per week. She reports current drug use. Drug: Marijuana.      Family History:   Family History   Problem Relation Age of Onset    Heart Disease Mother     Diabetes Father        Vitals:  BP (!) 134/93   Pulse 76   Temp 97 °F (36.1 °C) (Temporal)   Resp 20   Ht 5' 2\" (1.575 m)   Wt 137 lb (62.1 kg)   SpO2 91%   BMI 25.06 kg/m²   Temp (24hrs), Av.6 °F (36.4 °C), Min:97 °F (36.1 °C), Max:97.9 °F (36.6 °C)    Recent Labs     21  1126 21  1621 21  0725   POCGLU 111* 134* 115* 136*       I/O (24Hr): Intake/Output Summary (Last 24 hours) at 2021 0828  Last data filed at 2021 0600  Gross per 24 hour   Intake 169.8 ml   Output 1800 ml   Net -1630.2 ml       Labs:  Hematology:  Recent Labs     02/10/21  1739 02/11/21  0522 21  0553   WBC 10.7 7.6 8.4   RBC 4.67 3.49* 4.83   HGB 11.4* 8.5* 11.7*   HCT 35.7* 27.1* 36.5   MCV 76.4* 77.7* 75.6*   MCH 24.4* 24.4* 24.2*   MCHC 31.9 31.4 32.1   RDW 26.3* 26.0* 26.1*    199 257   MPV 10.3 10.2 10.2     Chemistry:  Recent Labs     02/10/21  1739 02/11/21  0554 21  0553    141 143   K 4.5 4.4 4.1    111* 110*   CO2 20 18* 20   GLUCOSE 119* 128* 152*   BUN 30* 32* 39*   CREATININE 1.10* 1.14* 1.38*   ANIONGAP 11 12 13   LABGLOM 48* 46* 37*   GFRAA 59* 56* 45*   CALCIUM 8.3* 7.3* 8.4*     Recent Labs     02/10/21  1214 02/10/21  1214 21  0723 21  1109 21  1126 21  1621 21  0725   AMMONIA 14  --   --   --   --   --   --   --    POCGLU  --    < > 137* 128* 111* 134* 115* 136*    < > = values in this interval not displayed. ABG:  Lab Results   Component Value Date    POCPH 7.31 2021    [place] [address]  2014     DISREGARD RESULTS. PATIENT NUMBER WAS INCORRECTLY ASSIGNED. [id number] 44 2021    PCO2  2014     DISREGARD RESULTS. PATIENT NUMBER WAS INCORRECTLY ASSIGNED. POCPO2 97 2021    PO2  2014     DISREGARD RESULTS. PATIENT NUMBER WAS INCORRECTLY ASSIGNED. [id number] 22.1 2021    HCO3  2014     DISREGARD RESULTS. PATIENT NUMBER WAS INCORRECTLY ASSIGNED.     NBEA 4 2021    PBEA NOT REPORTED 2021    [id number] 23 2021    [id number] 97 2021    O2SAT  2014     DISREGARD RESULTS. PATIENT NUMBER WAS INCORRECTLY ASSIGNED. FIO2 30.0 02/11/2021     Lab Results   Component Value Date/Time    SPECIAL L AC 7 ML 02/06/2021 03:09 PM     Lab Results   Component Value Date/Time    CULTURE NO GROWTH 6 DAYS 02/06/2021 03:09 PM       Radiology:  Ct Head Wo Contrast    Result Date: 2/6/2021  Chronic findings in the brain without acute CT abnormality identified. Xr Chest Portable    Result Date: 2/12/2021  Cardiomegaly and chronic pulmonary change with scattered pulmonary opacities. Xr Chest Portable    Result Date: 2/11/2021  Generalized interstitial prominence and pulmonary vascular congestion. Cardiomegaly. No definite pleural effusion or pneumothorax. Xr Chest Portable    Result Date: 2/10/2021  Increased right upper lobe opacity may represent developing pneumonia. Chronic interstitial lung changes with superimposed pulmonary edema. Xr Chest Portable    Result Date: 2/7/2021  Increasing bilateral pulmonary opacities, concerning for worsening edema or pneumonia. Xr Chest Portable    Result Date: 2/6/2021  Bilateral airspace and interstitial opacities either reflecting pulmonary edema versus pneumonia. Ct Chest High Resolution    Result Date: 2/9/2021  1. Note: Evaluation of lungs is significantly limited by patient breathing motion related artifact. 2. Severe bilateral lung extensive parenchymal changes, as discussed above, consistent with interstitial lung disease. Question overlying pulmonary interstitial edema. Findings most suggestive of usual interstitial pneumonia (UIP). Findings appear worsened in comparison with prior August 2020 CT chest examination. 3. Mild layering bilateral posterior pleural effusions, as discussed above. 4. Moderate cardiomegaly with marked dilatation of the right atrium. Recommend clinical correlation.   Cardiomegaly in association with pleural effusions and likely pulmonary interstitial edema compatible with patient reported history of CHF. 5. Superior middle mediastinal lymphadenopathy, as described above. Differential diagnostic considerations include association with infectious disease, inflammatory disease versus neoplastic process. 6. Marked atherosclerotic calcification involving the aortic arch and origin of the great vessels. 7. Moderate scattered subcutaneous edema. Physical Examination:        General appearance:  moderate distress  Mental Status:  lethargic   HENT: high-flow nasal cannula present  Lungs: diminished lung sounds throughout all lung fields, no wheezes or rhonchi appreciated increased effort today  Heart:  regular rate and rhythm, no murmur, ectopy noted  Abdomen:  soft, nontender, nondistended, normal bowel sounds  Extremities:  no edema, redness, tenderness in the calves  Skin:  no gross lesions, rashes, induration    Assessment:        Hospital Problems           Last Modified POA    * (Principal) Acute systolic heart failure ([address] 2/8/2021 Yes    COPD (chronic obstructive pulmonary disease) ([address] 2/8/2021 Yes    Acute respiratory failure with hypoxemia ([address] 2/8/2021 Yes    Type 2 diabetes mellitus without complication, without long-term current use of insulin ([address] 2/8/2021 Yes    Chronic diastolic heart failure ([address] 2/8/2021 Yes    Elevated troponin 2/8/2021 Yes    Community acquired pneumonia of right middle lobe of lung 2/8/2021 Yes    Chronic respiratory failure ([address] 2/8/2021 Yes    Mild malnutrition ([address] 2/8/2021 Yes    Hypotension 2/8/2021 Yes    DEONTE (acute kidney injury) ([address] 2/8/2021 Yes    Current every day smoker 2/8/2021 Yes    Essential hypertension 2/8/2021 Yes    Altered mental status 2/8/2021 Yes    Severe malnutrition ([address] 2/8/2021 Yes          Plan:        1. Acute respiratory failure   2. COPD exacerbation   3.  CAP   - pulmonary consult placed   - pt stepped down from ICU to CVICU yesterday   - ABG, UA, chest xray, ammonia, lactic acid were all ordered and checked  - Pt also became hypotensive through the late afternoon, requiring levophed to be started and right femoral arterial and central ine placement. Levophed no longer running   - started on iv solu cortef   - pro juan normal   - continue zithromax  - rocephin changed to cefepime  - blood cultures NGTD   - COVID negative  - maintain continuous pulse ox   - continue RT protocol prn breathing treatments   - started on IV solumedrol q6hr   - d/c symbicort, started on brovana and budesonide aerosols q12hr  - abg done yesterday showed pH 7.35 with pO2 of 62  - CT chest done on 2/9, see above for results   - chest xray done this morning showed pulmonary vascular congestion   - pt overall poor prognosis   - repeat CBG done   - IV lasix increased to 60 mg daily   - will have lengthy discussion with family regarding code status and expectaiotn s  - neurology consulted   - eeg and mri ordered         4. Acute CHF   5. Elevated troponin   - cardio on board   - pt noted to have acute systolic HF which is likely contributing to her acute respiratory failure along with COPD exac   - coreg dose decreased to 3.125 BID  - lisinopril decreased to 2.5 daily  - continue lasix at this time 60 mg IV daily per cardio recs   - CHF education   - per gab if her ejection fraction remains below 35% despite medical treatment then she will be considered for ICD placement for primary prevention of sudden cardiac death. - negative 3 L fluid balance since admit   - continue to monitor Is/Os   - repeat pBNP on 2/9 was 22053  - d/c IV heparin drip and start prophylactic dose if okay with cardio     6. DEONTE   - BUN/Cr trended up this morning to 39/1.38  - will adjust lasix dosage to 60 mg   - monitor output   - renally dose all meds  - avoid nephrotoxic agents     7. HTN   - continue coreg and lisinopril   - hold above for SBP less than 100  - v/s per unit protocol      8. DMII   - continue accu checks ac/hs with ISS      9. GERD   - PPI      10.  Depression   - continue home zoloft     11.  DVT prophylaxis   - heparin      Prognosis guarded    Niles Medina MD  2/12/2021  8:28 AM

## 2021-02-12 NOTE — PROGRESS NOTES
Physical Therapy  DATE: 2021    NAME: Nora Winter  MRN: [id number]   : 1945    Patient not seen this date for Physical Therapy due to:  [] Blood transfusion in progress  [x] Cancel by RN  [] Hemodialysis  []  Refusal by Patient   [] Spine Precautions   [] Strict Bedrest  [] Surgery  [] Testing      [x] Other patient on bipap, not following commands, restless and in restraints        [] PT being discontinued at this time. Patient independent. No further needs. [] PT being discontinued at this time as the patient has been transferred to hospice care. No further needs.     Amalia Nunez, PT

## 2021-02-12 NOTE — PROGRESS NOTES
Section of Cardiology  Progress Note      Date:  2/12/2021  Patient: Reymundo Field  Admission:  2/6/2021  2:27 PM  Admit DX: Hypotension [I95.9]  Age:  77 y. o., 1945     LOS: 6 days     Reason for evaluation:   cardiomyopathy and CHF      SUBJECTIVE:     The patient was seen and examined. Notes and labs reviewed. There were complications over night. Patient had deterioration in the clinical status and requiring much higher oxygen supplement. She is unresponsive but has restless body movement and occasionally she goes apneic. Blood pressure is elevated. No reports of arrhythmia. No bleeding from any orifice      OBJECTIVE:    Telemetry: Sinus  BP (!) 140/99   Pulse 78   Temp 96.7 °F (35.9 °C) (Temporal)   Resp 29   Ht 5' 2\" (1.575 m)   Wt 137 lb (62.1 kg)   SpO2 96%   BMI 25.06 kg/m²     Intake/Output Summary (Last 24 hours) at 2/12/2021 1735  Last data filed at 2/12/2021 0600  Gross per 24 hour   Intake 119.8 ml   Output 1050 ml   Net -930.2 ml       EXAM:   CONSTITUTIONAL: Delete that patient is restless, does not appear to be in respiratory distress at this point in time, she has apneic episodes. LUNGS: Good respiratory effort. No for increased work of breathing. On auscultation: Coarse breath sounds  CARDIOVASCULAR:  Normal apical impulse, regular rate and rhythm, normal S1 and S2, no S3 or S4,   ABDOMEN: Soft, nontender, nondistended. Bowel sounds present. SKIN: Warm and dry. EXTREMITIES: No lower extremity edema. Motor movement grossly intact. No cyanosis or clubbing.     Current Inpatient Medications:   hydrocortisone sodium succinate PF  50 mg Intravenous Q8H    haloperidol lactate  2 mg Intravenous Once    cefepime  1,000 mg Intravenous Q12H    heparin (porcine)  5,000 Units Subcutaneous 3 times per day    Arformoterol Tartrate  15 mcg Nebulization BID    budesonide  0.5 mg Nebulization BID    carvedilol  3.125 mg Oral BID WC    lisinopril  2.5 mg Oral Daily    furosemide respiratory failure with hypoxemia (HCC)    Polysubstance abuse (HCC)    Hyponatremia, Beer Potomania    Normocytic anemia    Neutrophilic leukocytosis    Increased ammonia level    Hypophosphatemia    Type 2 diabetes mellitus without complication, without long-term current use of insulin (HCC)    Acute on chronic diastolic heart failure (HCC)    Tobacco abuse    Diabetes mellitus (HCC)    Chronic diastolic heart failure (HCC)    Elevated troponin    Prolonged Q-T interval on ECG    Community acquired pneumonia of right middle lobe of lung    Hypoxia    Chronic respiratory failure (HCC)    Mild malnutrition (HCC)    Hypotension    [facility]ONTE [facility]) [facility]    Current every day smoker    Essential hypertension    Altered mental status    Acute systolic heart failure (HCC)    Severe malnutrition (HCC)       PLAN:    1. Definitely the clinical status is worsening over time. 2. Patient on high flow oxygen comparing to yesterday she was on 30% FiO2. Blood pressure remained maintained and actually it is high and no longer needing any vasopressors. 3. She appears to have Cheyne-Bernal breathing. 4. Neuro work-up in progress  5. Poor prognosis  6. The admitting service is trying to reach the family to discuss CODE STATUS. Please see orders. Discussed with  and nursing.     Flash Casey MD

## 2021-02-12 NOTE — PROGRESS NOTES
Pulmonary Critical Care Progress Note  Hyacinth Jerome MD     Patient seen for the follow up of acute on chronic hypoxic respiratory failure, acute exacerbation of COPD/emphysema, interstitial lung diseaseprogressive pulmonary fibrosis with probable acute exacerbation, active smoking, pulmonary hypertension,  Acute systolic heart failure (HCC)     Subjective:  She wore BiPAP last night, currently is on high flow. She was extremely restless and agitated despite being on Precedex at 1.2 mics so she was given 1 mg of IV Ativan and is now extremely lethargic, unable to arouse. Blood pressure has been stable off of Levophed. Examination:  Vitals: BP (!) 148/98   Pulse 77   Temp 97 °F (36.1 °C) (Temporal)   Resp 24   Ht 5' 2\" (1.575 m)   Wt 137 lb (62.1 kg)   SpO2 95%   BMI 25.06 kg/m²   General appearance: Lethargic  Neck: No JVD  Lungs: Decreased air exchange, positive crackles  Heart: regular rate and rhythm, S1, S2 normal, no gallop  Abdomen: Soft, non tender, + BS  Extremities: no cyanosis or clubbing. Positive edema    LABs:  CBC:   Recent Labs     02/11/21  0522 02/12/21  0553   WBC 7.6 8.4   HGB 8.5* 11.7*   HCT 27.1* 36.5    257     BMP:   Recent Labs     02/11/21  0554 02/12/21  0553    143   K 4.4 4.1   CO2 18* 20   BUN 32* 39*   CREATININE 1.14* 1.38*   LABGLOM 46* 37*   GLUCOSE 128* 152*        Ref.  Range 2/10/2021 12:39   POC HCO3 Latest Ref Range: 22 - 27 mmol/L 22.1   POC O2 SAT Latest Units: % 94   POC pCO2 Latest Ref Range: 32 - 45 mm Hg 45   POC pCO2 Temp Latest Units: mm Hg NOT REPORTED   POC pH Latest Ref Range: 7.35 - 7.45  7.30 (L)   POC PO2 Latest Ref Range: 75 - 95 mm Hg 78   Serum cortisol: 24    Radiology:  X-ray chest: 2/12/2021            Impression:  · Acute on chronic hypoxic respiratory failure  · Acute exacerbation of COPD/emphysema  · Interstitial lung disease/progressive pulmonary fibrosis with probable acute exacerbation  · Active smoking/65-pack-year smoking history  · Acute systolic heart failure/severe cardiomyopathy EF 20%  · Moderate TR/secondary pulmonary hypertension of moderate severity, RVSP 54mmHg  · Alcohol abuse/withdraw  · DM/CAD/CHF    Recommendations:  · Oxygen via high flow for meals/breaks from BiPAP as tolerated  · Wean off Precedex for sedation as able  · Consider repeating CT of the head  · CIWA protocol, safety precautions   · Discontinue Ativan at this time  · Decrease dose of trazodone to 50 mg nightly  · Continue IV cefepime  · Solu-Cortef to 50 mg every 8 hours for COPD exacerbation   · Albuterol HFA Q 4 hours and prn, documented allergy to ipratropium  · Brovana and budesonide aerosols every 12 hours  · Continue diuresis, change IV Lasix to 40 mg from 60 mg daily  · Cardiology on consult, input noted  · Consult neurology for worsening mental status  · Labs: CBC and BMP in am  · X-ray chest in a.m.  · DVT prophylaxis with subcu heparin  · Incentive spirometer every hour while awake and able to  · Discussed with RN  · Will follow with you    Electronically signed by     Thom Desai MD on 2/12/2021 at 12:50 PM  Pulmonary Critical Care and Sleep Medicine,  [facility]  Cell: [phone]  Office: [phone]

## 2021-02-13 ENCOUNTER — APPOINTMENT (OUTPATIENT)
Dept: MRI IMAGING | Age: 76
DRG: 871 | End: 2021-02-13
Payer: MEDICARE

## 2021-02-13 ENCOUNTER — APPOINTMENT (OUTPATIENT)
Dept: CT IMAGING | Age: 76
DRG: 871 | End: 2021-02-13
Payer: MEDICARE

## 2021-02-13 LAB
ABSOLUTE EOS #: 0 K/UL (ref 0–0.4)
ABSOLUTE IMMATURE GRANULOCYTE: 0.09 K/UL (ref 0–0.3)
ABSOLUTE LYMPH #: 1.22 K/UL (ref 1–4.8)
ABSOLUTE MONO #: 0.85 K/UL (ref 0.2–0.8)
ANION GAP SERPL CALCULATED.3IONS-SCNC: 12 MMOL/L (ref 9–17)
BASOPHILS # BLD: 0 %
BASOPHILS ABSOLUTE: 0 K/UL (ref 0–0.2)
BUN BLDV-MCNC: 43 MG/DL (ref 8–23)
BUN/CREAT BLD: 37 (ref 9–20)
CALCIUM SERPL-MCNC: 8.4 MG/DL (ref 8.6–10.4)
CHLORIDE BLD-SCNC: 112 MMOL/L (ref 98–107)
CO2: 21 MMOL/L (ref 20–31)
CREAT SERPL-MCNC: 1.15 MG/DL (ref 0.5–0.9)
DIFFERENTIAL TYPE: ABNORMAL
EOSINOPHILS RELATIVE PERCENT: 0 % (ref 1–4)
FIO2: 55
GFR AFRICAN AMERICAN: 56 ML/MIN
GFR NON-AFRICAN AMERICAN: 46 ML/MIN
GFR SERPL CREATININE-BSD FRML MDRD: ABNORMAL ML/MIN/{1.73_M2}
GFR SERPL CREATININE-BSD FRML MDRD: ABNORMAL ML/MIN/{1.73_M2}
GLUCOSE BLD-MCNC: 126 MG/DL (ref 65–105)
GLUCOSE BLD-MCNC: 148 MG/DL (ref 65–105)
GLUCOSE BLD-MCNC: 151 MG/DL (ref 65–105)
GLUCOSE BLD-MCNC: 171 MG/DL (ref 65–105)
GLUCOSE BLD-MCNC: 172 MG/DL (ref 70–99)
HCT VFR BLD CALC: 36.5 % (ref 36.3–47.1)
HEMOGLOBIN: 11.8 G/DL (ref 11.9–15.1)
IMMATURE GRANULOCYTES: 1 %
LYMPHOCYTES # BLD: 13 % (ref 24–44)
MAGNESIUM: 1.9 MG/DL (ref 1.6–2.6)
MCH RBC QN AUTO: 24.3 PG (ref 25.2–33.5)
MCHC RBC AUTO-ENTMCNC: 32.3 G/DL (ref 28.4–34.8)
MCV RBC AUTO: 75.1 FL (ref 82.6–102.9)
MONOCYTES # BLD: 9 % (ref 1–7)
MORPHOLOGY: ABNORMAL
NEGATIVE BASE EXCESS, ART: 2 (ref 0–2)
NRBC AUTOMATED: 0.4 PER 100 WBC
O2 DEVICE/FLOW/%: ABNORMAL
PATIENT TEMP: 37
PDW BLD-RTO: 25.7 % (ref 11.8–14.4)
PLATELET # BLD: 239 K/UL (ref 138–453)
PLATELET ESTIMATE: ABNORMAL
PMV BLD AUTO: 9.9 FL (ref 8.1–13.5)
POC HCO3: 22.6 MMOL/L (ref 22–27)
POC O2 SATURATION: 94 %
POC PCO2 TEMP: ABNORMAL MM HG
POC PCO2: 34 MM HG (ref 32–45)
POC PH TEMP: ABNORMAL
POC PH: 7.44 (ref 7.35–7.45)
POC PO2 TEMP: ABNORMAL MM HG
POC PO2: 67 MM HG (ref 75–95)
POSITIVE BASE EXCESS, ART: ABNORMAL (ref 0–2)
POTASSIUM SERPL-SCNC: 3.5 MMOL/L (ref 3.7–5.3)
RBC # BLD: 4.86 M/UL (ref 3.95–5.11)
RBC # BLD: ABNORMAL 10*6/UL
SEG NEUTROPHILS: 77 % (ref 36–66)
SEGMENTED NEUTROPHILS ABSOLUTE COUNT: 7.24 K/UL (ref 1.8–7.7)
SODIUM BLD-SCNC: 145 MMOL/L (ref 135–144)
TCO2 (CALC), ART: 24 MMOL/L (ref 23–28)
WBC # BLD: 9.4 K/UL (ref 3.5–11.3)
WBC # BLD: ABNORMAL 10*3/UL

## 2021-02-13 PROCEDURE — 70450 CT HEAD/BRAIN W/O DYE: CPT

## 2021-02-13 PROCEDURE — 6360000002 HC RX W HCPCS: Performed by: INTERNAL MEDICINE

## 2021-02-13 PROCEDURE — 2700000000 HC OXYGEN THERAPY PER DAY

## 2021-02-13 PROCEDURE — 82947 ASSAY GLUCOSE BLOOD QUANT: CPT

## 2021-02-13 PROCEDURE — 94761 N-INVAS EAR/PLS OXIMETRY MLT: CPT

## 2021-02-13 PROCEDURE — 6370000000 HC RX 637 (ALT 250 FOR IP): Performed by: INTERNAL MEDICINE

## 2021-02-13 PROCEDURE — 80048 BASIC METABOLIC PNL TOTAL CA: CPT

## 2021-02-13 PROCEDURE — 2000000000 HC ICU R&B

## 2021-02-13 PROCEDURE — 95822 EEG COMA OR SLEEP ONLY: CPT | Performed by: PSYCHIATRY & NEUROLOGY

## 2021-02-13 PROCEDURE — 70551 MRI BRAIN STEM W/O DYE: CPT

## 2021-02-13 PROCEDURE — 2580000003 HC RX 258: Performed by: INTERNAL MEDICINE

## 2021-02-13 PROCEDURE — 94660 CPAP INITIATION&MGMT: CPT

## 2021-02-13 PROCEDURE — 82803 BLOOD GASES ANY COMBINATION: CPT

## 2021-02-13 PROCEDURE — 95816 EEG AWAKE AND DROWSY: CPT

## 2021-02-13 PROCEDURE — 6360000002 HC RX W HCPCS: Performed by: PSYCHIATRY & NEUROLOGY

## 2021-02-13 PROCEDURE — 83735 ASSAY OF MAGNESIUM: CPT

## 2021-02-13 PROCEDURE — 94640 AIRWAY INHALATION TREATMENT: CPT

## 2021-02-13 PROCEDURE — 85025 COMPLETE CBC W/AUTO DIFF WBC: CPT

## 2021-02-13 PROCEDURE — 6360000002 HC RX W HCPCS: Performed by: NURSE PRACTITIONER

## 2021-02-13 PROCEDURE — 2580000003 HC RX 258: Performed by: NURSE PRACTITIONER

## 2021-02-13 PROCEDURE — 2500000003 HC RX 250 WO HCPCS: Performed by: NURSE PRACTITIONER

## 2021-02-13 PROCEDURE — 6360000002 HC RX W HCPCS: Performed by: FAMILY MEDICINE

## 2021-02-13 PROCEDURE — 93005 ELECTROCARDIOGRAM TRACING: CPT | Performed by: PSYCHIATRY & NEUROLOGY

## 2021-02-13 PROCEDURE — 2500000003 HC RX 250 WO HCPCS: Performed by: PSYCHIATRY & NEUROLOGY

## 2021-02-13 PROCEDURE — 99232 SBSQ HOSP IP/OBS MODERATE 35: CPT | Performed by: FAMILY MEDICINE

## 2021-02-13 PROCEDURE — 2580000003 HC RX 258: Performed by: PSYCHIATRY & NEUROLOGY

## 2021-02-13 PROCEDURE — 99233 SBSQ HOSP IP/OBS HIGH 50: CPT | Performed by: PSYCHIATRY & NEUROLOGY

## 2021-02-13 RX ORDER — POTASSIUM CHLORIDE 7.45 MG/ML
10 INJECTION INTRAVENOUS PRN
Status: DISCONTINUED | OUTPATIENT
Start: 2021-02-13 | End: 2021-02-13 | Stop reason: SDUPTHER

## 2021-02-13 RX ORDER — FUROSEMIDE 10 MG/ML
40 INJECTION INTRAMUSCULAR; INTRAVENOUS DAILY
Status: DISCONTINUED | OUTPATIENT
Start: 2021-02-13 | End: 2021-02-14

## 2021-02-13 RX ORDER — POTASSIUM CHLORIDE 29.8 MG/ML
20 INJECTION INTRAVENOUS PRN
Status: DISCONTINUED | OUTPATIENT
Start: 2021-02-13 | End: 2021-02-15 | Stop reason: HOSPADM

## 2021-02-13 RX ORDER — LEVETIRACETAM 500 MG/1
500 TABLET ORAL 2 TIMES DAILY
Status: DISCONTINUED | OUTPATIENT
Start: 2021-02-13 | End: 2021-02-13 | Stop reason: SDUPTHER

## 2021-02-13 RX ORDER — POTASSIUM CHLORIDE 20 MEQ/1
40 TABLET, EXTENDED RELEASE ORAL PRN
Status: DISCONTINUED | OUTPATIENT
Start: 2021-02-13 | End: 2021-02-13 | Stop reason: SDUPTHER

## 2021-02-13 RX ADMIN — ACYCLOVIR SODIUM 600 MG: 50 INJECTION, SOLUTION INTRAVENOUS at 20:31

## 2021-02-13 RX ADMIN — SODIUM CHLORIDE 1.1 MCG/KG/HR: 9 INJECTION, SOLUTION INTRAVENOUS at 15:43

## 2021-02-13 RX ADMIN — HYDROCORTISONE SODIUM SUCCINATE 50 MG: 100 INJECTION, POWDER, FOR SOLUTION INTRAMUSCULAR; INTRAVENOUS at 10:27

## 2021-02-13 RX ADMIN — SODIUM CHLORIDE, PRESERVATIVE FREE 10 ML: 5 INJECTION INTRAVENOUS at 07:44

## 2021-02-13 RX ADMIN — ARFORMOTEROL TARTRATE 15 MCG: 15 SOLUTION RESPIRATORY (INHALATION) at 10:48

## 2021-02-13 RX ADMIN — POTASSIUM CHLORIDE 20 MEQ: 29.8 INJECTION, SOLUTION INTRAVENOUS at 23:21

## 2021-02-13 RX ADMIN — SODIUM CHLORIDE, PRESERVATIVE FREE 10 ML: 5 INJECTION INTRAVENOUS at 21:19

## 2021-02-13 RX ADMIN — HEPARIN SODIUM 5000 UNITS: 5000 INJECTION INTRAVENOUS; SUBCUTANEOUS at 05:35

## 2021-02-13 RX ADMIN — CEFEPIME HYDROCHLORIDE 1000 MG: 1 INJECTION, POWDER, FOR SOLUTION INTRAMUSCULAR; INTRAVENOUS at 17:25

## 2021-02-13 RX ADMIN — DEXTROSE MONOHYDRATE 1000 MG: 50 INJECTION, SOLUTION INTRAVENOUS at 14:13

## 2021-02-13 RX ADMIN — SODIUM CHLORIDE 1.1 MCG/KG/HR: 9 INJECTION, SOLUTION INTRAVENOUS at 07:40

## 2021-02-13 RX ADMIN — ARFORMOTEROL TARTRATE 15 MCG: 15 SOLUTION RESPIRATORY (INHALATION) at 20:36

## 2021-02-13 RX ADMIN — FUROSEMIDE 40 MG: 10 INJECTION, SOLUTION INTRAMUSCULAR; INTRAVENOUS at 07:41

## 2021-02-13 RX ADMIN — SODIUM CHLORIDE 1.1 MCG/KG/HR: 9 INJECTION, SOLUTION INTRAVENOUS at 04:13

## 2021-02-13 RX ADMIN — INSULIN LISPRO 1 UNITS: 100 INJECTION, SOLUTION INTRAVENOUS; SUBCUTANEOUS at 08:24

## 2021-02-13 RX ADMIN — CEFEPIME HYDROCHLORIDE 1000 MG: 1 INJECTION, POWDER, FOR SOLUTION INTRAMUSCULAR; INTRAVENOUS at 05:35

## 2021-02-13 RX ADMIN — LEVETIRACETAM 1000 MG: 100 INJECTION, SOLUTION INTRAVENOUS at 15:42

## 2021-02-13 RX ADMIN — BUDESONIDE 500 MCG: 0.5 SUSPENSION RESPIRATORY (INHALATION) at 10:48

## 2021-02-13 RX ADMIN — POTASSIUM CHLORIDE 20 MEQ: 29.8 INJECTION, SOLUTION INTRAVENOUS at 21:46

## 2021-02-13 RX ADMIN — BUDESONIDE 500 MCG: 0.5 SUSPENSION RESPIRATORY (INHALATION) at 20:36

## 2021-02-13 RX ADMIN — HYDROCORTISONE SODIUM SUCCINATE 50 MG: 100 INJECTION, POWDER, FOR SOLUTION INTRAMUSCULAR; INTRAVENOUS at 01:31

## 2021-02-13 NOTE — PROGRESS NOTES
NEUROLOGY INPATIENT PROGRESS NOTE    2/13/2021         Shani Mina is a  77 y.o. female admitted on 2/6/2021 with  Hypotension [I95.9]  Chart reviewed and discussed with caregivers. She has restless movts and intermittently apneic and with uncontrolled htn. She has been requiring much higher oxygen. Briefly, this is a  77 y.o. female with hx of  was admitted on 2/6/2021 with c/o generalized weakness, altered mentation and \"not feeling well\". Family stated that over the past few weeks she has been having increasing changes in her mentation. She was also seemingly confused and difficulty walking. She also has history of alcoholism \"daily drinker and did have few beers today\". On arrival to ED; she was found to be hypotensive and hypoxic with chest x-ray demonstrating bilateral airspace and interstitial opacities, right greater than left. CT head negative for acute intracranial abnormalities. EKG with a sinus bradycardia. She had fluid resuscitation with rapid improvement in blood pressure. She was initiated on broad-spectrum antibiotic therapy with Rocephin and Zithromax. Caregivers stated that she was able to respond to commands until 2 days ago. She has been on BiPAP on and off. She has been requiring Precedex. No known history of CVA or seizure disorder. No current facility-administered medications on file prior to encounter.       Current Outpatient Medications on File Prior to Encounter   Medication Sig Dispense Refill    budesonide-formoterol (SYMBICORT) 160-4.5 MCG/ACT AERO Inhale 2 puffs into the lungs 2 times daily      tiotropium (SPIRIVA RESPIMAT) 2.5 MCG/ACT AERS inhaler Inhale 2 puffs into the lungs daily      furosemide (LASIX) 20 MG tablet Take 20 mg by mouth daily      traZODone (DESYREL) 150 MG tablet Take 150 mg by mouth nightly      ibuprofen (ADVIL;MOTRIN) 800 MG tablet Take 800 mg by mouth 3 times daily (with meals)      pantoprazole (PROTONIX) 20 MG tablet Take 1 tablet by mouth daily 30 tablet 3    albuterol (PROVENTIL) (2.5 MG/3ML) 0.083% nebulizer solution Take 3 mLs by nebulization every 6 hours as needed for Wheezing or Shortness of Breath 120 each 0    tiZANidine (ZANAFLEX) 4 MG tablet Take 4 mg by mouth 2 times daily as needed      pregabalin (LYRICA) 100 MG capsule Take 100 mg by mouth 2 times daily.  Multiple Vitamins-Minerals (CERTAVITE SENIOR PO) Take 1 tablet by mouth daily      sertraline (ZOLOFT) 50 MG tablet Take 50 mg by mouth daily      loratadine (CLARITIN) 10 MG tablet Take 10 mg by mouth daily      atorvastatin (LIPITOR) 20 MG tablet Take 20 mg by mouth daily       aspirin 81 MG tablet Take 81 mg by mouth daily.  calcium-vitamin D (OSCAL-500) 500-200 MG-UNIT per tablet Take 1 tablet by mouth daily.  lisinopril (PRINIVIL;ZESTRIL) 20 MG tablet Take 20 mg by mouth daily        Allergies: Merline Deck is allergic to tiotropium and spiriva handihaler [tiotropium bromide monohydrate]. Past Medical History:   Diagnosis Date    [facility]     [facility]     Diabetes mellitus ([facility]     MRSA (methicillin resistant staph aureus) culture positive 8/28/2014    sputum    Tobacco abuse 10/15/2019       Past Surgical History:   Procedure Laterality Date    APPENDECTOMY      HYSTERECTOMY      JOINT REPLACEMENT Bilateral      Social History: Merline Deck  reports that she has been smoking cigarettes. She has been smoking about 0.50 packs per day. She has never used smokeless tobacco. She reports current alcohol use of about 70.0 standard drinks of alcohol per week. She reports current drug use. Drug: Marijuana.     Family History   Problem Relation Age of Onset    Heart Disease Mother     Diabetes Father        Current Medications:     furosemide  40 mg Intravenous Daily    hydrocortisone sodium succinate PF  50 mg Intravenous Q8H    haloperidol lactate  2 mg Intravenous Once    cefepime  1,000 mg Intravenous Q12H    heparin (porcine)  5,000 Units Subcutaneous 3 times per day    Arformoterol Tartrate  15 mcg Nebulization BID    budesonide  0.5 mg Nebulization BID    carvedilol  3.125 mg Oral BID WC    lisinopril  2.5 mg Oral Daily    insulin lispro  0-6 Units Subcutaneous TID WC    insulin lispro  0-3 Units Subcutaneous Nightly    aspirin  81 mg Oral Daily    atorvastatin  20 mg Oral Daily    cetirizine  5 mg Oral Daily    pantoprazole  20 mg Oral Daily    sertraline  50 mg Oral Daily    traZODone  100 mg Oral Nightly    sodium chloride flush  10 mL Intravenous 2 times per day    calcium carb-cholecalciferol  1 tablet Oral Daily     PRN Meds include: hydrALAZINE, LORazepam **OR** LORazepam **OR** LORazepam **OR** LORazepam **OR** LORazepam **OR** LORazepam **OR** LORazepam **OR** LORazepam, bisacodyl, albuterol sulfate HFA, glucose, dextrose, glucagon (rDNA), dextrose, albuterol, tiZANidine, sodium chloride flush, promethazine **OR** ondansetron, polyethylene glycol, nicotine, acetaminophen **OR** acetaminophen    ROS: Could not be obtained due to patient's condition.         Objective:   BP (!) 136/96   Pulse 59   Temp 97.3 °F (36.3 °C) (Temporal)   Resp 25   Ht 5' 2\" (1.575 m)   Wt 133 lb (60.3 kg)   SpO2 95%   BMI 24.33 kg/m²     Blood pressure range: Systolic (43HNT), QNV:336 , Min:119 , VAK:886   ; Diastolic (94ZGZ), MYB:13, Min:79, Max:115      Continuous infusions:    dexmedetomidine (PRECEDEX) IV infusion 1.1 mcg/kg/hr (02/13/21 0740)    norepinephrine Stopped (02/11/21 1112)    dextrose         ON EXAMINATION:  GENERAL  Appears comfortable and in no acute cardiorespiratory distress   HEENT  NC/ AT   NECK  Supple and no bruits heard   Cardiovascular  S1, S2 heard; radial pulse intact   MENTAL STATUS:  Lethargic but arousable to name call; does not open eyes and does not squeeze hands; she groans and moans with nonintelligible speech; no hallucinations or delusions. CRANIAL NERVES: Pupils are midpositioned and and reactive b/l.,  No nystagmus noted could not visualize fundi as she is extremely uncooperative. Full eye movements; grimaces equally well to painful stimuli; face appears symmetric; hearing could not be assessed. IX,X,XI -unable to assess. XII   -     Midline tongue, no atrophy    MOTOR FUNCTION:  Flaccid bilateral upper extremities and with normal tone in bilateral lower extremities; spontaneous and purposeful movements noted in upper and lower extremities; no tremors noted; she withdraws to painful stimuli with flexion withdrawals in bilateral lower extremities. SENSORY FUNCTION:  Could not be assessed in upper and lower extremities    CEREBELLAR FUNCTION:  No ataxic movements noted in extremities. REFLEX FUNCTION:  Symmetric 1+ DTRs in upper extremities and hypoactive DTRs in lower extremities and mute plantars bilaterally. STATION and GAIT  Not tested         Data:    Lab Results:     Lab Results   Component Value Date    CHOL 114 10/15/2019    LDLCHOLESTEROL 63 10/15/2019    HDL 38 (L) 10/15/2019    TRIG 64 10/15/2019    ALT 18 02/06/2021    AST 41 (H) 02/06/2021    TSH 0.47 10/15/2019    INR 1.3 02/08/2021    LABA1C 6.2 (H) 02/07/2021    YAIVGATG26 1736 (H) 02/12/2021     Hematology:  Recent Labs     02/11/21  0522 02/12/21  0553 02/13/21  0317   WBC 7.6 8.4 9.4   HGB 8.5* 11.7* 11.8*   HCT 27.1* 36.5 36.5    257 239     Chemistry:  Recent Labs     02/11/21  0554 02/12/21  0553 02/13/21  0317    143 145*   K 4.4 4.1 3.5*   * 110* 112*   CO2 18* 20 21   GLUCOSE 128* 152* 172*   BUN 32* 39* 43*   CREATININE 1.14* 1.38* 1.15*   MG  --   --  1.9   CALCIUM 7.3* 8.4* 8.4*     Recent Labs     02/10/21  1214   AMMONIA 14       No results found for: PHENYTOIN, PHENYTOIN, VALPROATE, CBMZ        Diagnostic data reviewed:  CT head 2/6/2021: No acute intracranial abnormality.     B12 level: 1736    Folate level: >20    Vitamin D 25-hydroxy level: 28.3    Treponema pallidum antibody: Nonreactive    MRI brain: Pending    Carotid Dopplers: Pending    EEG 2/13/21: Abnormal EEG with significant interhemispheric pattern with periodic discharges emanating predominantly from left hemisphere greater than right hemisphere. This abnormal EEG consisted of periodic lateralizing epileptiform discharges consisting of diphasic/triphasic spike/sharp and slow-wave discharges and these are recurring at 11.5 Hz and these complexes are  by slow waves or no detectable activity. This lateralized left hemispheric PLED pattern is a highly epileptogenic interictal pattern. But cerebrovascular disease and herpes encephalitis need to be ruled out as well. At times, these complexes are generalized with diffusely suppressed background indicating diffuse cortical dysfunction as well as postictal state. Impression and Plan: Ms. Merline Deck is a 77 y.o. female with   Acute encephalopathy; toxic metabolic encephalopathy with septic shock on admit  Abnormal EEG with periodic epileptiform discharges; will load her with IV Keppra and Depacon.,  Had episodes of bradycardia therefore will get 12-lead EKG now and will consider starting her on IV Vimpat accordingly. She needs MRI brain and spinal tap. Acute on chronic respiratory failure; on BiPAP  Acute exacerbation of COPD/emphysema: On Solu-Cortef, albuterol, Brovana and budesonide aerosols  Acute kidney injury    MRI brain (without) was requested but could not be done for technical reasons as patient does not lie still and patient's present respiratory status does not allow was to administer sedation. Patient's pulmonologist is going to discuss with family about deteriorating respiratory status. UA negative; Vit B12, Treponemal Ab unremarkable. Ammonia level is normal at 14.     Vitamin D level is slightly low; to start on 1000 unit cholecalciferol daily

## 2021-02-13 NOTE — PROGRESS NOTES
[facility]  Office: [address] [facility], DO, Jesus Fischer, DO, Patrick Villatoro, DO, Lorenzo Bruno, DO, Mojgan Sahu MD, Marina Tim MD, Andree Dumont MD, Mckenzie Clinton MD, Tiffanie Hylton MD, Garfield Wilkinson MD, Sharma Babinski, MD, John Dhillon MD, Alexandru Brower MD, Jared Garvin DO, August Duron MD, Maricarmen Hanna MD, Andrea Rice, DO, Kasey Beatty MD,  Mike Vinson DO, Luis E Rutledge MD, Nile Dubon MD, Josesito Prajapati [facility], [address], [facility], Honorio Kruger, CNP, Celia Flores, CNS, Roxana Gonzales, CNP, Erwin Pappas, CNP, Keisha Bullard, CNP, Zulma Donnelly, CNP, Daniel Issa, CNP, Rishabh Stallworth PA-C, Avelina Buchanan, [facility], Joy Agosto, CNP, Saeed Whitley, CNP, Arnie Metz, CNP, Deejay Cheung, [facility], [facility], [facility]    Progress Note    2/13/2021    1:18 PM    Name:   Fabian Canela  MRN:     [id number]     Acct:      [de-identified]   Room:   2033/2033-01  IP Day:  7  Admit Date:  2/6/2021  2:27 PM    PCP:   Rudy Pollard MD  Code Status:  Full Code    Subjective:     C/C:   Chief Complaint   Patient presents with    Dizziness     onset 2 weeks    Fall     difficulty walking    Aphasia     onset 4 days ago      Interval History Status: not changed. Pt seen and examined this morning  Unable to provide ROS due to mental status          Medications: Allergies:     Allergies   Allergen Reactions    Tiotropium Anaphylaxis and Swelling    Spiriva Handihaler [Tiotropium Bromide Monohydrate] Swelling       Current Meds:   Scheduled Meds:    furosemide  40 mg Intravenous Daily    hydrocortisone sodium succinate PF  50 mg Intravenous Q8H    haloperidol lactate  2 mg Intravenous Once    cefepime  1,000 mg Intravenous Q12H    heparin (porcine)  5,000 Units Subcutaneous 3 times per day    Arformoterol Tartrate  15 mcg Nebulization BID    budesonide  0.5 mg Nebulization BID    carvedilol  3.125 mg Oral BID WC    lisinopril  2.5 mg Oral Daily    insulin lispro  0-6 Units Subcutaneous TID WC    insulin lispro  0-3 Units Subcutaneous Nightly    aspirin  81 mg Oral Daily    atorvastatin  20 mg Oral Daily    cetirizine  5 mg Oral Daily    pantoprazole  20 mg Oral Daily    sertraline  50 mg Oral Daily    traZODone  100 mg Oral Nightly    sodium chloride flush  10 mL Intravenous 2 times per day    calcium carb-cholecalciferol  1 tablet Oral Daily     Continuous Infusions:    dexmedetomidine (PRECEDEX) IV infusion 1.1 mcg/kg/hr (21 0740)    norepinephrine Stopped (21 1112)    dextrose       PRN Meds: hydrALAZINE, LORazepam **OR** LORazepam **OR** LORazepam **OR** LORazepam **OR** LORazepam **OR** LORazepam **OR** LORazepam **OR** LORazepam, bisacodyl, albuterol sulfate HFA, glucose, dextrose, glucagon (rDNA), dextrose, albuterol, tiZANidine, sodium chloride flush, promethazine **OR** ondansetron, polyethylene glycol, nicotine, acetaminophen **OR** acetaminophen    Data:     Past Medical History:   has a past medical history of [facility]congestive [facility] failure) ([facility] COPD (chronic obstructive pulmonary disease) ([facility] Diabetes mellitus ([facility] MRSA (methicillin resistant staph aureus) culture positive, and Tobacco abuse. Social History:   reports that she has been smoking cigarettes. She has been smoking about 0.50 packs per day. She has never used smokeless tobacco. She reports current alcohol use of about 70.0 standard drinks of alcohol per week. She reports current drug use. Drug: Marijuana.      Family History:   Family History   Problem Relation Age of Onset    Heart Disease Mother     Diabetes Father        Vitals:  BP (!) 120/93   Pulse 59   Temp 97.3 °F (36.3 °C) (Temporal)   Resp 26   Ht 5' 2\" (1.575 m)   Wt 133 lb (60.3 kg)   SpO2 98%   BMI 24.33 kg/m²   Temp (24hrs), Av.4 °F (36.3 °C), Min:96.7 °F (35.9 °C), Max:97.9 °F (36.6 °C)    Recent Labs     21  2000 Result Date: 2/6/2021  Chronic findings in the brain without acute CT abnormality identified. Xr Chest Portable    Result Date: 2/12/2021  Cardiomegaly and chronic pulmonary change with scattered pulmonary opacities. Xr Chest Portable    Result Date: 2/11/2021  Generalized interstitial prominence and pulmonary vascular congestion. Cardiomegaly. No definite pleural effusion or pneumothorax. Xr Chest Portable    Result Date: 2/10/2021  Increased right upper lobe opacity may represent developing pneumonia. Chronic interstitial lung changes with superimposed pulmonary edema. Xr Chest Portable    Result Date: 2/7/2021  Increasing bilateral pulmonary opacities, concerning for worsening edema or pneumonia. Xr Chest Portable    Result Date: 2/6/2021  Bilateral airspace and interstitial opacities either reflecting pulmonary edema versus pneumonia. Ct Chest High Resolution    Result Date: 2/9/2021  1. Note: Evaluation of lungs is significantly limited by patient breathing motion related artifact. 2. Severe bilateral lung extensive parenchymal changes, as discussed above, consistent with interstitial lung disease. Question overlying pulmonary interstitial edema. Findings most suggestive of usual interstitial pneumonia (UIP). Findings appear worsened in comparison with prior August 2020 CT chest examination. 3. Mild layering bilateral posterior pleural effusions, as discussed above. 4. Moderate cardiomegaly with marked dilatation of the right atrium. Recommend clinical correlation. Cardiomegaly in association with pleural effusions and likely pulmonary interstitial edema compatible with patient reported history of CHF. 5. Superior middle mediastinal lymphadenopathy, as described above. Differential diagnostic considerations include association with infectious disease, inflammatory disease versus neoplastic process.  6. Marked atherosclerotic calcification involving the aortic arch and origin of the great vessels. 7. Moderate scattered subcutaneous edema. Physical Examination:        General appearance:  moderate distress  Mental Status:  lethargic   HENT: high-flow nasal cannula present  Lungs: diminished lung sounds throughout all lung fields, no wheezes or rhonchi appreciated increased effort today  Heart:  regular rate and rhythm, no murmur, ectopy noted  Abdomen:  soft, nontender, nondistended, normal bowel sounds  Extremities:  no edema, redness, tenderness in the calves  Skin:  no gross lesions, rashes, induration    Assessment:        Hospital Problems           Last Modified POA    * (Principal) Acute systolic heart failure ([address] 2/8/2021 Yes    COPD (chronic obstructive pulmonary disease) ([address] 2/8/2021 Yes    Acute respiratory failure with hypoxemia ([address] 2/8/2021 Yes    Type 2 diabetes mellitus without complication, without long-term current use of insulin ([address] 2/8/2021 Yes    Chronic diastolic heart failure ([address] 2/8/2021 Yes    Elevated troponin 2/8/2021 Yes    Community acquired pneumonia of right middle lobe of lung 2/8/2021 Yes    Chronic respiratory failure ([address] 2/8/2021 Yes    Mild malnutrition ([address] 2/8/2021 Yes    Hypotension 2/8/2021 Yes    DEONTE (acute kidney injury) ([address] 2/8/2021 Yes    Current every day smoker 2/8/2021 Yes    Essential hypertension 2/8/2021 Yes    Altered mental status 2/8/2021 Yes    Severe malnutrition ([address] 2/8/2021 Yes          Plan:        1. Acute respiratory failure   2. COPD exacerbation   3. CAP   4. AMS   - Pulmonary discussed with neurology on consult;  Seizures vs herpetic encephalitis or left sided stroke ; needs MRI / LP ; to start Acyclovir   - will consult ID   - pulmonary consult placed   - pt stepped down from ICU to CVICU yesterday   - ABG, UA, chest xray, ammonia, lactic acid were all ordered and checked  - Pt also became hypotensive through the late afternoon, requiring levophed to be started and right femoral arterial and central ine placement. Levophed no longer running   - started on iv solu cortef   - pro juan normal   - continue zithromax  - rocephin changed to cefepime  - blood cultures NGTD   - COVID negative  - maintain continuous pulse ox   - continue RT protocol prn breathing treatments   - started on IV solumedrol q6hr   - d/c symbicort, started on brovana and budesonide aerosols q12hr  - abg done yesterday showed pH 7.35 with pO2 of 62  - CT chest done on 2/9, see above for results   - chest xray done this morning showed pulmonary vascular congestion   - pt overall poor prognosis   - repeat CBG done   - IV lasix increased to 60 mg daily   - will have lengthy discussion with family regarding code status and expectaiotn s  - neurology consulted   - eeg done  - mri ordered  - cT head w/o ordered  - pt placed on keppra and valproate         4. Acute CHF   5. Elevated troponin   - cardio on board   - pt noted to have acute systolic HF which is likely contributing to her acute respiratory failure along with COPD exac   - coreg dose decreased to 3.125 BID  - lisinopril decreased to 2.5 daily  - continue lasix at this time 60 mg IV daily per cardio recs   - CHF education   - per carmartina if her ejection fraction remains below 35% despite medical treatment then she will be considered for ICD placement for primary prevention of sudden cardiac death. - negative 3 L fluid balance since admit   - continue to monitor Is/Os   - repeat pBNP on 2/9 was 44054  - d/c IV heparin drip and start prophylactic dose if okay with cardio     6. DEONTE   - BUN/Cr trended up this morning to 39/1.38  - will adjust lasix dosage to 60 mg   - monitor output   - renally dose all meds  - avoid nephrotoxic agents     7. HTN   - continue coreg and lisinopril   - hold above for SBP less than 100  - v/s per unit protocol      8. DMII   - continue accu checks ac/hs with ISS      9. GERD   - PPI      10. Depression   - continue home zoloft     11.  DVT prophylaxis   - heparin

## 2021-02-13 NOTE — PROGRESS NOTES
Section of Cardiology  Progress Note      Date:  2/13/2021  Patient: Natividad Johnson  Admission:  2/6/2021  2:27 PM  Admit DX: Hypotension [I95.9]  Age:  77 y. o., 1945     LOS: 7 days     Reason for evaluation:   cardiomyopathy and CHF      SUBJECTIVE:     The patient was seen and examined. Notes and labs reviewed. There were not complications over night. Patient seen and examined in her room with her nurse at bedside. The patient just had CAT scan and MRI of the brain and results are pending. The patient remained restless and hypoxic. She is on BiPAP per pulmonary service. According to her nurse the patient had some arrhythmia and I reviewed the records and the patient had APCs and PVCs however for the most part she remained slightly bradycardic. Urine output is fair. She had an EEG. OBJECTIVE:    Telemetry: Sinus, occasional APCs and PVCs  /84   Pulse 54   Temp 97.7 °F (36.5 °C) (Temporal)   Resp 19   Ht 5' 2\" (1.575 m)   Wt 133 lb (60.3 kg)   SpO2 100%   BMI 24.33 kg/m²     Intake/Output Summary (Last 24 hours) at 2/13/2021 1815  Last data filed at 2/13/2021 1730  Gross per 24 hour   Intake 634.2 ml   Output 1725 ml   Net -1090.8 ml       EXAM:   CONSTITUTIONAL: Restless, moderately distressed, and appears stated age. HEENT: Normal jugular venous pulsations, no carotid bruits. Head is atraumatic, normocephalic. Eyes and oral mucosa are normal.  LUNGS: Good respiratory effort. No for increased work of breathing. On auscultation: Clear anteriorly  CARDIOVASCULAR:  Normal apical impulse, regular rate and rhythm, normal S1 and S2, no S3 or S4,   ABDOMEN: Soft, nontender, nondistended. Bowel sounds present. SKIN: Warm and dry. EXTREMITIES: No lower extremity edema. Motor movement grossly intact. No cyanosis or clubbing.     Current Inpatient Medications:   furosemide  40 mg Intravenous Daily    levETIRAcetam  500 mg Oral BID    valproate sodium (DEPACON) IVPB  500 mg Intravenous Diagnosis    COPD (chronic obstructive pulmonary disease) (HCC)    CHF (congestive heart failure) (HCC)    Cocaine abuse (HCC)    Acidosis, metabolic, with respiratory acidosis    Acute respiratory failure with hypoxemia (HCC)    Polysubstance abuse (HCC)    Hyponatremia, Beer Potomania    Normocytic anemia    Neutrophilic leukocytosis    Increased ammonia level    Hypophosphatemia    Type 2 diabetes mellitus without complication, without long-term current use of insulin (HCC)    Acute on chronic diastolic heart failure (HCC)    Tobacco abuse    Diabetes mellitus (HCC)    Chronic diastolic heart failure (HCC)    Elevated troponin    Prolonged Q-T interval on ECG    Community acquired pneumonia of right middle lobe of lung    Hypoxia    Chronic respiratory failure (HCC)    Mild malnutrition (HCC)    Hypotension    DEONTE (acute kidney injury) ([facility] [address]    Current every day smoker    Essential hypertension    Altered mental status    Acute systolic heart failure (HCC)    Severe malnutrition (HCC)    Abnormal EEG    Acute encephalopathy       PLAN:    1. As per her nurse, the intensivist discussed the CODE STATUS and the possible intubation with her niece however no final decision was made in the knees did not want the patient to be intubated however there was no official change in the [address] [place] [address]. 2. When I tried to talk to the knees she left stating to the nurse, that she has a ride and cannot wait. Please see orders. Discussed with  and nursing.     Ashley Hernandez MD

## 2021-02-13 NOTE — PROGRESS NOTES
Physical Therapy  DATE: 2021    NAME: Cristina Mata  MRN: [id number]   : 1945    Patient not seen this date for Physical Therapy due to:  [] Blood transfusion in progress  [x] Cancel by RN (Patient not medically stable at this time)  [] Hemodialysis  []  Refusal by Patient   [] Spine Precautions   [] Strict Bedrest  [] Surgery  [] Testing      [] Other        [] PT being discontinued at this time. Patient independent. No further needs. [] PT being discontinued at this time as the patient has been transferred to hospice care. No further needs.     Dorina Barry, PTA

## 2021-02-13 NOTE — PROCEDURES
[id number] [facility] [address]                [address] [place] [address]                          ELECTROENCEPHALOGRAM REPORT    PATIENT NAME: Matias RUSH                      :        1945  MED REC NO:   [id number]                             ROOM:       3  ACCOUNT NO:   [de-identified]                           ADMIT DATE: 2021  PROVIDER:     Ian Greco    DATE OF EE2021    HISTORY:  This is a 71-year-old female with history of COPD, diabetes,  community-acquired pneumonia with acute-on-chronic respiratory failure. She has significant mental status change and she is being evaluated for  possible seizures. CURRENT MEDICATIONS:  Include furosemide, hydrocortisone, cefepime,  lisinopril, aspirin, atorvastatin, sertraline, and trazodone. DESCRIPTION OF PROCEDURE:  Electrodes were applied using paste in  positions dictated by the International 10-20 system of placement. Reviewing montages included both referential and bipolar derivations. In addition to EEG data, EKG and eye movements were recorded. This is a  routine recording. This test was performed on 2021. DESCRIPTION OF ACTIVITIES:  At the onset of the study, the patient is  unresponsive with background demonstrating diffusely slow background  with a significant interhemispheric pattern with left hemisphere slowing  with superimposed periodic lateralizing epileptiform discharges  consisting of diphasic/triphasic spike and sharp wave discharges  followed by slow-wave activity and these complexes are recurring at  1-1.5 Hz. These complexes are  by slow waves or no detectable  activity. Intermittent lateralizing epileptiform discharges are also  noted in right hemisphere occurring at 1-2 Hz frequencies at times.    These discharges are followed by diffusely suppressed background lasting  for greater than 2-3 seconds at times indicating diffuse cortical dysfunction. EKG montage is contaminated with artifacts. ELECTRODIAGNOSTIC INTERPRETATION:  This is a highly abnormal EEG with  periodic lateralizing epileptiform discharges emanating predominantly  from the left hemisphere greater than right hemisphere. This  lateralized epileptiform discharge pattern is highly epileptogenic  interictal pattern. Cerebrovascular disease and herpes encephalitis  need to be ruled out as well. Clinical correlation is recommended.         Reji Carroll    D: 02/13/2021 13:41:38       T: 02/13/2021 13:52:30     [email or web address]  Job#: [id number]     Doc#: [id number]    CC:

## 2021-02-13 NOTE — PROGRESS NOTES
NEUROLOGY INPATIENT PROGRESS NOTE- ADDENDUM    2/13/2021        Reviewed EEG done today; called and spoke to patient's RN and pulmonologist, Dr. George Keith. Abnormal EEG with significant interhemispheric pattern with periodic discharges emanating predominantly from left hemisphere greater than right hemisphere. This abnormal EEG consisted of periodic lateralizing epileptiform discharges consisting of diphasic/triphasic spike/sharp and slow-wave discharges and these are recurring at 11.5 Hz and these complexes are  by slow waves or no detectable activity. This lateralized left hemispheric PLED pattern is a highly epileptogenic interictal pattern. But cerebrovascular disease and herpes encephalitis need to be ruled out as well. At times, these complexes are generalized with diffusely suppressed background indicating diffuse cortical dysfunction as well as postictal state.         Gerda Feliciano MD 2/13/2021 1:33 PM

## 2021-02-13 NOTE — PROGRESS NOTES
Impression:  · Acute on chronic hypoxic respiratory failure  · Acute exacerbation of COPD/emphysema  · Interstitial lung disease/progressive pulmonary fibrosis with probable acute exacerbation  · Active smoking/65-pack-year smoking history  · Acute systolic heart failure/severe cardiomyopathy EF 20%  · Moderate TR/secondary pulmonary hypertension of moderate severity, RVSP 54mmHg  · Alcohol abuse/withdraw  · DM/CAD/CHF    Recommendations:  · Oxygen via high flow for sat > 90%  · Place back on BiPAP as tolerated/ off Ativan   ·  Precedex for agitation  · Duoneb  · Brovana and Pulmicort nebs BID  · NPO   · Stop trazodone to 50 mg nightly  · Continue IV cefepime  · discontinue Solu-Cortef   · IV Lasix to 40 mg daily  · Cardiology on consult  · Thiamine folate and MVI   ·  discussed with neurology on consult;  Seizures vs herpetic encephalitis or left sided stroke ; needs MRI / LP ; to start Acyclovir consider ID consult   · I called and discussed with Trina her Niece and POA ; advised about above she wants to discuss with family and address intubation/ goals of care   · Family meeting / address code status  · Discussed with RN and RT  · DVT prophylaxis ; stop subcu heparin for LP        Eduardo Mansfield MD on 2/13/2021 at 1:16 PM  Pulmonary Critical Care and Sleep Medicine,  [facility] AT [place]: [phone]  cc35 min

## 2021-02-13 NOTE — PROGRESS NOTES
RN updated pts niece Donaldo Thayer and brother, Anna Ashraf, via phone. Questions and concerns addressed. Per Trina, Dr Deanna Denny was supposed to call her to discuss prognosis and family wants to set up family meeting. Message sent to Dr Dk Brody via [email or web address] with family request to have him call her regarding above. Dr Deanna Denny responded via perfectserve. He spoke with family regarding prognosis. Family wants pt to remain full code until they are able to have family meeting Saturday at 3pm with Dr Deanna Denny to discuss prognosis, poc, and code status further.

## 2021-02-14 ENCOUNTER — APPOINTMENT (OUTPATIENT)
Dept: INTERVENTIONAL RADIOLOGY/VASCULAR | Age: 76
DRG: 871 | End: 2021-02-14
Payer: MEDICARE

## 2021-02-14 LAB
ABSOLUTE EOS #: 0 K/UL (ref 0–0.44)
ABSOLUTE IMMATURE GRANULOCYTE: 0.1 K/UL (ref 0–0.3)
ABSOLUTE LYMPH #: 1.82 K/UL (ref 1.1–3.7)
ABSOLUTE MONO #: 0.71 K/UL (ref 0.1–1.2)
ALBUMIN CSF: 107 MG/L (ref 70–350)
ALBUMIN INDEX: 42.8
ALBUMIN SERPL-MCNC: 2.5 G/DL (ref 3.5–5.2)
ALBUMIN SERPL-MCNC: 2.5 G/DL (ref 3.5–5.2)
ALBUMIN/GLOBULIN RATIO: ABNORMAL (ref 1–2.5)
ALP BLD-CCNC: 50 U/L (ref 35–104)
ALT SERPL-CCNC: 15 U/L (ref 5–33)
ANION GAP SERPL CALCULATED.3IONS-SCNC: 12 MMOL/L (ref 9–17)
APPEARANCE CSF: CLEAR
APPEARANCE CSF: CLEAR
AST SERPL-CCNC: 34 U/L
BASOPHILS # BLD: 0 % (ref 0–2)
BASOPHILS ABSOLUTE: 0 K/UL (ref 0–0.2)
BILIRUB SERPL-MCNC: 0.63 MG/DL (ref 0.3–1.2)
BILIRUBIN DIRECT: 0.38 MG/DL
BILIRUBIN, INDIRECT: 0.25 MG/DL (ref 0–1)
BNP INTERPRETATION: ABNORMAL
BUN BLDV-MCNC: 38 MG/DL (ref 8–23)
BUN/CREAT BLD: 35 (ref 9–20)
CALCIUM SERPL-MCNC: 8 MG/DL (ref 8.6–10.4)
CHLORIDE BLD-SCNC: 115 MMOL/L (ref 98–107)
CO2: 23 MMOL/L (ref 20–31)
CREAT SERPL-MCNC: 1.09 MG/DL (ref 0.5–0.9)
DIFFERENTIAL TYPE: ABNORMAL
EOSINOPHILS RELATIVE PERCENT: 0 % (ref 1–4)
GFR AFRICAN AMERICAN: 59 ML/MIN
GFR NON-AFRICAN AMERICAN: 49 ML/MIN
GFR SERPL CREATININE-BSD FRML MDRD: ABNORMAL ML/MIN/{1.73_M2}
GFR SERPL CREATININE-BSD FRML MDRD: ABNORMAL ML/MIN/{1.73_M2}
GLOBULIN: ABNORMAL G/DL (ref 1.5–3.8)
GLUCOSE BLD-MCNC: 119 MG/DL (ref 65–105)
GLUCOSE BLD-MCNC: 123 MG/DL (ref 65–105)
GLUCOSE BLD-MCNC: 140 MG/DL (ref 70–99)
GLUCOSE BLD-MCNC: 143 MG/DL (ref 65–105)
GLUCOSE BLD-MCNC: 93 MG/DL (ref 65–105)
HCT VFR BLD CALC: 35.3 % (ref 36.3–47.1)
HEMOGLOBIN: 11.1 G/DL (ref 11.9–15.1)
IGG CSF: 5.3 MG/DL (ref 0.5–7)
IGG INDEX CSF: 0.51
IGG SYNTHESIS RATE CSF: < 3.3 MG/24 H
IGG: 2426 MG/DL (ref 700–1600)
IMMATURE GRANULOCYTES: 1 %
KEPPRA: 36 UG/ML
LYMPHOCYTES # BLD: 18 % (ref 24–43)
MAGNESIUM: 1.8 MG/DL (ref 1.6–2.6)
MCH RBC QN AUTO: 24.1 PG (ref 25.2–33.5)
MCHC RBC AUTO-ENTMCNC: 31.4 G/DL (ref 28.4–34.8)
MCV RBC AUTO: 76.6 FL (ref 82.6–102.9)
MONOCYTES # BLD: 7 % (ref 3–12)
MORPHOLOGY: ABNORMAL
NRBC AUTOMATED: 0 PER 100 WBC
OLIGOCLONAL BANDS: ABNORMAL
PDW BLD-RTO: 25.7 % (ref 11.8–14.4)
PLATELET # BLD: 201 K/UL (ref 138–453)
PLATELET ESTIMATE: ABNORMAL
PMV BLD AUTO: 10.1 FL (ref 8.1–13.5)
POTASSIUM SERPL-SCNC: 3.2 MMOL/L (ref 3.7–5.3)
POTASSIUM SERPL-SCNC: 3.5 MMOL/L (ref 3.7–5.3)
PRO-BNP: ABNORMAL PG/ML
PROTEIN CSF: 25 MG/DL (ref 15–45)
RBC # BLD: 4.61 M/UL (ref 3.95–5.11)
RBC # BLD: ABNORMAL 10*6/UL
RBC CSF: 103 /MM3
RBC CSF: 125 /MM3
SEG NEUTROPHILS: 74 % (ref 36–65)
SEGMENTED NEUTROPHILS ABSOLUTE COUNT: 7.47 K/UL (ref 1.5–8.1)
SODIUM BLD-SCNC: 150 MMOL/L (ref 135–144)
SUPERNAT COLOR CSF: COLORLESS
SUPERNAT COLOR CSF: COLORLESS
TOTAL PROTEIN: 6.6 G/DL (ref 6.4–8.3)
TUBE NUMBER CSF: 1
TUBE NUMBER CSF: 4
VALPROIC ACID LEVEL: 57 UG/ML (ref 50–125)
VALPROIC ACID, FREE: 31.8 UG/ML (ref 7–23)
VALPROIC DATE LAST DOSE: NORMAL
VALPROIC DOSE AMOUNT: NORMAL
VALPROIC TIME LAST DOSE: NORMAL
VOLUME CSF: ABNORMAL
VOLUME CSF: ABNORMAL
WBC # BLD: 10.1 K/UL (ref 3.5–11.3)
WBC # BLD: ABNORMAL 10*3/UL
WBC CSF: 0 /MM3
WBC CSF: 0 /MM3
XANTHOCHROMIA: ABNORMAL
XANTHOCHROMIA: ABNORMAL

## 2021-02-14 PROCEDURE — 2580000003 HC RX 258: Performed by: NURSE PRACTITIONER

## 2021-02-14 PROCEDURE — 2500000003 HC RX 250 WO HCPCS: Performed by: NURSE PRACTITIONER

## 2021-02-14 PROCEDURE — 009U3ZX DRAINAGE OF SPINAL CANAL, PERCUTANEOUS APPROACH, DIAGNOSTIC: ICD-10-PCS | Performed by: RADIOLOGY

## 2021-02-14 PROCEDURE — 87205 SMEAR GRAM STAIN: CPT

## 2021-02-14 PROCEDURE — 84132 ASSAY OF SERUM POTASSIUM: CPT

## 2021-02-14 PROCEDURE — 2500000003 HC RX 250 WO HCPCS: Performed by: PSYCHIATRY & NEUROLOGY

## 2021-02-14 PROCEDURE — 80076 HEPATIC FUNCTION PANEL: CPT

## 2021-02-14 PROCEDURE — 89050 BODY FLUID CELL COUNT: CPT

## 2021-02-14 PROCEDURE — 6360000002 HC RX W HCPCS: Performed by: INTERNAL MEDICINE

## 2021-02-14 PROCEDURE — 2580000003 HC RX 258: Performed by: PSYCHIATRY & NEUROLOGY

## 2021-02-14 PROCEDURE — 84157 ASSAY OF PROTEIN OTHER: CPT

## 2021-02-14 PROCEDURE — 82040 ASSAY OF SERUM ALBUMIN: CPT

## 2021-02-14 PROCEDURE — 97530 THERAPEUTIC ACTIVITIES: CPT

## 2021-02-14 PROCEDURE — 6360000002 HC RX W HCPCS: Performed by: NURSE PRACTITIONER

## 2021-02-14 PROCEDURE — 86788 WEST NILE VIRUS AB IGM: CPT

## 2021-02-14 PROCEDURE — 86789 WEST NILE VIRUS ANTIBODY: CPT

## 2021-02-14 PROCEDURE — 87070 CULTURE OTHR SPECIMN AEROBIC: CPT

## 2021-02-14 PROCEDURE — 95822 EEG COMA OR SLEEP ONLY: CPT | Performed by: PSYCHIATRY & NEUROLOGY

## 2021-02-14 PROCEDURE — 83880 ASSAY OF NATRIURETIC PEPTIDE: CPT

## 2021-02-14 PROCEDURE — 62328 DX LMBR SPI PNXR W/FLUOR/CT: CPT | Performed by: RADIOLOGY

## 2021-02-14 PROCEDURE — 80048 BASIC METABOLIC PNL TOTAL CA: CPT

## 2021-02-14 PROCEDURE — 80165 DIPROPYLACETIC ACID FREE: CPT

## 2021-02-14 PROCEDURE — 99223 1ST HOSP IP/OBS HIGH 75: CPT | Performed by: INTERNAL MEDICINE

## 2021-02-14 PROCEDURE — 2700000000 HC OXYGEN THERAPY PER DAY

## 2021-02-14 PROCEDURE — 94660 CPAP INITIATION&MGMT: CPT

## 2021-02-14 PROCEDURE — 86592 SYPHILIS TEST NON-TREP QUAL: CPT

## 2021-02-14 PROCEDURE — 99233 SBSQ HOSP IP/OBS HIGH 50: CPT | Performed by: PSYCHIATRY & NEUROLOGY

## 2021-02-14 PROCEDURE — 2580000003 HC RX 258: Performed by: INTERNAL MEDICINE

## 2021-02-14 PROCEDURE — 80164 ASSAY DIPROPYLACETIC ACD TOT: CPT

## 2021-02-14 PROCEDURE — 86618 LYME DISEASE ANTIBODY: CPT

## 2021-02-14 PROCEDURE — 82042 OTHER SOURCE ALBUMIN QUAN EA: CPT

## 2021-02-14 PROCEDURE — 85025 COMPLETE CBC W/AUTO DIFF WBC: CPT

## 2021-02-14 PROCEDURE — 80177 DRUG SCRN QUAN LEVETIRACETAM: CPT

## 2021-02-14 PROCEDURE — 87529 HSV DNA AMP PROBE: CPT

## 2021-02-14 PROCEDURE — 83735 ASSAY OF MAGNESIUM: CPT

## 2021-02-14 PROCEDURE — 95816 EEG AWAKE AND DROWSY: CPT

## 2021-02-14 PROCEDURE — 83916 OLIGOCLONAL BANDS: CPT

## 2021-02-14 PROCEDURE — 2000000000 HC ICU R&B

## 2021-02-14 PROCEDURE — 82947 ASSAY GLUCOSE BLOOD QUANT: CPT

## 2021-02-14 PROCEDURE — 99233 SBSQ HOSP IP/OBS HIGH 50: CPT | Performed by: FAMILY MEDICINE

## 2021-02-14 PROCEDURE — 94640 AIRWAY INHALATION TREATMENT: CPT

## 2021-02-14 PROCEDURE — 82784 ASSAY IGA/IGD/IGG/IGM EACH: CPT

## 2021-02-14 PROCEDURE — 6360000002 HC RX W HCPCS: Performed by: PSYCHIATRY & NEUROLOGY

## 2021-02-14 PROCEDURE — 94761 N-INVAS EAR/PLS OXIMETRY MLT: CPT

## 2021-02-14 RX ORDER — ACETAMINOPHEN 325 MG/1
650 TABLET ORAL EVERY 4 HOURS PRN
Status: DISCONTINUED | OUTPATIENT
Start: 2021-02-14 | End: 2021-02-15 | Stop reason: HOSPADM

## 2021-02-14 RX ORDER — BUDESONIDE 0.5 MG/2ML
0.5 INHALANT ORAL 2 TIMES DAILY
Status: DISCONTINUED | OUTPATIENT
Start: 2021-02-14 | End: 2021-02-15 | Stop reason: HOSPADM

## 2021-02-14 RX ORDER — SODIUM CHLORIDE 0.9 % (FLUSH) 0.9 %
10 SYRINGE (ML) INJECTION EVERY 12 HOURS SCHEDULED
Status: DISCONTINUED | OUTPATIENT
Start: 2021-02-14 | End: 2021-02-15 | Stop reason: HOSPADM

## 2021-02-14 RX ORDER — DEXTROSE MONOHYDRATE 50 MG/ML
INJECTION, SOLUTION INTRAVENOUS CONTINUOUS
Status: DISCONTINUED | OUTPATIENT
Start: 2021-02-14 | End: 2021-02-15

## 2021-02-14 RX ORDER — QUETIAPINE FUMARATE 25 MG/1
25 TABLET, FILM COATED ORAL 2 TIMES DAILY
Status: DISCONTINUED | OUTPATIENT
Start: 2021-02-14 | End: 2021-02-15 | Stop reason: HOSPADM

## 2021-02-14 RX ADMIN — FUROSEMIDE 40 MG: 10 INJECTION, SOLUTION INTRAMUSCULAR; INTRAVENOUS at 09:42

## 2021-02-14 RX ADMIN — LEVETIRACETAM 500 MG: 100 INJECTION, SOLUTION INTRAVENOUS at 16:52

## 2021-02-14 RX ADMIN — ACYCLOVIR SODIUM 600 MG: 50 INJECTION, SOLUTION INTRAVENOUS at 09:43

## 2021-02-14 RX ADMIN — LORAZEPAM 0.5 MG: 2 INJECTION INTRAMUSCULAR; INTRAVENOUS at 00:49

## 2021-02-14 RX ADMIN — SODIUM CHLORIDE 0.6 MCG/KG/HR: 9 INJECTION, SOLUTION INTRAVENOUS at 20:39

## 2021-02-14 RX ADMIN — LEVETIRACETAM 500 MG: 100 INJECTION, SOLUTION INTRAVENOUS at 03:35

## 2021-02-14 RX ADMIN — VALPROATE SODIUM 500 MG: 100 INJECTION, SOLUTION INTRAVENOUS at 13:21

## 2021-02-14 RX ADMIN — ACYCLOVIR SODIUM 600 MG: 50 INJECTION, SOLUTION INTRAVENOUS at 20:57

## 2021-02-14 RX ADMIN — DEXTROSE MONOHYDRATE: 50 INJECTION, SOLUTION INTRAVENOUS at 17:55

## 2021-02-14 RX ADMIN — SODIUM CHLORIDE, PRESERVATIVE FREE 10 ML: 5 INJECTION INTRAVENOUS at 09:42

## 2021-02-14 RX ADMIN — POTASSIUM CHLORIDE 20 MEQ: 29.8 INJECTION, SOLUTION INTRAVENOUS at 09:51

## 2021-02-14 RX ADMIN — Medication 0.5 MG/HR: at 22:48

## 2021-02-14 RX ADMIN — VALPROATE SODIUM 500 MG: 100 INJECTION, SOLUTION INTRAVENOUS at 01:47

## 2021-02-14 RX ADMIN — ARFORMOTEROL TARTRATE 15 MCG: 15 SOLUTION RESPIRATORY (INHALATION) at 10:07

## 2021-02-14 RX ADMIN — CEFEPIME HYDROCHLORIDE 1000 MG: 1 INJECTION, POWDER, FOR SOLUTION INTRAMUSCULAR; INTRAVENOUS at 05:47

## 2021-02-14 RX ADMIN — ARFORMOTEROL TARTRATE 15 MCG: 15 SOLUTION RESPIRATORY (INHALATION) at 20:15

## 2021-02-14 RX ADMIN — DEXTROSE MONOHYDRATE: 50 INJECTION, SOLUTION INTRAVENOUS at 22:59

## 2021-02-14 RX ADMIN — POTASSIUM CHLORIDE 20 MEQ: 29.8 INJECTION, SOLUTION INTRAVENOUS at 11:34

## 2021-02-14 RX ADMIN — BUDESONIDE 500 MCG: 0.5 SUSPENSION RESPIRATORY (INHALATION) at 20:15

## 2021-02-14 RX ADMIN — LORAZEPAM 0.5 MG: 2 INJECTION INTRAMUSCULAR; INTRAVENOUS at 11:43

## 2021-02-14 NOTE — FLOWSHEET NOTE
Dr. Lizette Mcdonough called and informed RN of need for patient to undergo a spinal tap today. Dr. Lizette Mcdonough would prefer this spinal tap be performed with IR; however stated if needed spinal tap can be performed at the bedside. RN informed Dr. Lizette Mcdonough that precedex gtt was resumed today and patient remains restless and aggitated at this time. RN to contact IR to arrange for physician to physician conversation regarding the need for spinal tap.

## 2021-02-14 NOTE — FLOWSHEET NOTE
RN notified Dr. Renee Romero via perfect serve of critical value for valproic acid of 31.8. Awaiting response at this time.

## 2021-02-14 NOTE — FLOWSHEET NOTE
RN contacted patient's niece Manohar Shah, as well as her grandson Simin Grant via telephone and obtained verbal consent for patient to undergo lumbar puncture spinal tap. Second RN Rose Marie Horn present to witness consent. Consent formed signed via two RNs and placed in the front of patient chart. RN notified Trina that Dr. Cecilia Spann will be contacting her via telephone to discuss plan of care. Will continue to monitor closely.

## 2021-02-14 NOTE — FLOWSHEET NOTE
RN contacted patient's niece Bunny Panda via telephone and updated her on the patient's current status via RN. Patient's niece verbalized she is satisfied with update and has requested to discuss the results of the first and second EEG after the test has been completed and resulted with Dr. Bladimir Gurrola. Mary Grace.

## 2021-02-14 NOTE — PLAN OF CARE
Problem: Restraint Use - Nonviolent/Non-Self-Destructive Behavior:  Goal: Absence of restraint indications  Description: Absence of restraint indications  2/14/2021 0151 by George Powell RN  Outcome: Ongoing  2/13/2021 1718 by Trenton Camacho RN  Outcome: Ongoing     Problem: Restraint Use - Nonviolent/Non-Self-Destructive Behavior:  Goal: Absence of restraint-related injury  Description: Absence of restraint-related injury  2/14/2021 0151 by George Powell RN  Outcome: Ongoing  2/13/2021 1718 by Trenton Camacho RN  Outcome: Ongoing

## 2021-02-14 NOTE — FLOWSHEET NOTE
Pt tolerated procedure without distress.  8 ml of clear csf fluid collected for lab specimens Dressing applied to procedure site pt returned to room

## 2021-02-14 NOTE — PROGRESS NOTES
Pulmonary Critical Care Progress Note       Patient seen for the follow up of acute on chronic hypoxic respiratory failure, acute exacerbation of COPD/emphysema, interstitial lung diseaseprogressive pulmonary fibrosis with probable acute exacerbation, active smoking, pulmonary hypertension,  Acute systolic heart failure (HCC)     Subjective:  She has been on 60% 50 l/min . She is getting agitated off Precedex. She has been off BiPAP. She has   Been NPO. Blood pressure stable. .  Family decided against intubation. .  She does not volunteer history and is moaning moving around in bed. Examination:  Vitals: /69   Pulse 55   Temp 97.2 °F (36.2 °C) (Temporal)   Resp 13   Ht 5' 2\" (1.575 m)   Wt 133 lb (60.3 kg)   SpO2 90%   BMI 24.33 kg/m²   General appearance:   Agitated opens eyes does not answer questions moves head does not follow commands   Neck: No JVD  Lungs: Decreased breath sounds no crackles   Heart: regular rate and rhythm, S1, S2 normal, no gallop  Abdomen: Soft, non tender, + BS  Extremities: no cyanosis or clubbing. Positive edema    LABs:  CBC:   Recent Labs     02/13/21 0317 02/14/21  0551   WBC 9.4 10.1   HGB 11.8* 11.1*   HCT 36.5 35.3*    201     BMP:   Recent Labs     02/13/21 0317 02/14/21  0551 02/14/21  1329   * 150*  --    K 3.5* 3.2* 3.5*   CO2 21 23  --    BUN 43* 38*  --    CREATININE 1.15* 1.09*  --    LABGLOM 46* 49*  --    GLUCOSE 172* 140*  --      Results for Rodrigo Tolbert (MRN [id number]) as of 2/13/2021 13:12   Ref.  Range 2/12/2021 10:49   POC pH Latest Ref Range: 7.35 - 7.45  7.43   POC pH Temp Unknown NOT REPORTED   POC pCO2 Latest Ref Range: 32 - 45 mm Hg 31 (L)   POC pCO2 Temp Latest Units: mm Hg NOT REPORTED   POC PO2 Latest Ref Range: 75 - 95 mm Hg 95   POC pO2 Temp Latest Units: mm Hg NOT REPORTED   POC HCO3 Latest Ref Range: 22 - 27 mmol/L 20.2 (L)   POC O2 SAT Latest Units: % 98         Radiology:    X-ray chest: 2/12/2021  Cardiomegaly and

## 2021-02-14 NOTE — BRIEF OP NOTE
Brief Postoperative Note Lumbar Puncture    Jared Bonilla  YOB: 1945  [id number]    Pre-operative Diagnosis: AMS    Post-operative Diagnosis: Same    Procedure: Fluoro guided Lumbar Puncture    Anesthesia: 1% Lidocaine    Surgeons/Assistants: ALFREDO DIAZ    Complications: None    Specimens: Obtained and sent to lab    Fluoro guided lumbar puncture. 20 gauge spinal needle. L2 -L3. 8 ml clear CSF obtained. Dressing applied. Vital signs were reviewed and were stable after the procedure. Discharged to floor.     Electronically signed by Catalina Mendoza on 2/14/2021 at 4:33 PM

## 2021-02-14 NOTE — PROGRESS NOTES
NEUROLOGY INPATIENT PROGRESS NOTE    2/14/2021         Kayla Cisneros is a  77 y.o. female admitted on 2/6/2021 with  Hypotension [I95.9]  Chart reviewed and discussed with caregivers. Patient has had CT head and MRI brain yesterday. Dose of studies were unremarkable. She continues to be unresponsive. She does squeeze left hand on occasion but not consistent. She was initiated on Depakote and Keppra after administration of loading doses. She has also received sedation. EEG repeated today. Briefly, this is a  77 y.o. female with hx of  was admitted on 2/6/2021 with c/o generalized weakness, altered mentation and \"not feeling well\". Family stated that over the past few weeks she has been having increasing changes in her mentation. She was also seemingly confused and difficulty walking. She also has history of alcoholism \"daily drinker and did have few beers today\". On arrival to ED; she was found to be hypotensive and hypoxic with chest x-ray demonstrating bilateral airspace and interstitial opacities, right greater than left. CT head negative for acute intracranial abnormalities. EKG with a sinus bradycardia. She had fluid resuscitation with rapid improvement in blood pressure. She was initiated on broad-spectrum antibiotic therapy with Rocephin and Zithromax. Caregivers stated that she was able to respond to commands until 2 days ago. She has been on BiPAP on and off. She has been requiring Precedex. No known history of CVA or seizure disorder. No current facility-administered medications on file prior to encounter.       Current Outpatient Medications on File Prior to Encounter   Medication Sig Dispense Refill    budesonide-formoterol (SYMBICORT) 160-4.5 MCG/ACT AERO Inhale 2 puffs into the lungs 2 times daily      tiotropium (SPIRIVA RESPIMAT) 2.5 MCG/ACT AERS inhaler Inhale 2 puffs into the lungs daily      furosemide (LASIX) 20 MG tablet Take 20 mg by mouth daily      traZODone (DESYREL) 150 MG tablet Take 150 mg by mouth nightly      ibuprofen (ADVIL;MOTRIN) 800 MG tablet Take 800 mg by mouth 3 times daily (with meals)      pantoprazole (PROTONIX) 20 MG tablet Take 1 tablet by mouth daily 30 tablet 3    albuterol (PROVENTIL) (2.5 MG/3ML) 0.083% nebulizer solution Take 3 mLs by nebulization every 6 hours as needed for Wheezing or Shortness of Breath 120 each 0    tiZANidine (ZANAFLEX) 4 MG tablet Take 4 mg by mouth 2 times daily as needed      pregabalin (LYRICA) 100 MG capsule Take 100 mg by mouth 2 times daily.  Multiple Vitamins-Minerals (CERTAVITE SENIOR PO) Take 1 tablet by mouth daily      sertraline (ZOLOFT) 50 MG tablet Take 50 mg by mouth daily      loratadine (CLARITIN) 10 MG tablet Take 10 mg by mouth daily      atorvastatin (LIPITOR) 20 MG tablet Take 20 mg by mouth daily       aspirin 81 MG tablet Take 81 mg by mouth daily.  calcium-vitamin D (OSCAL-500) 500-200 MG-UNIT per tablet Take 1 tablet by mouth daily.  lisinopril (PRINIVIL;ZESTRIL) 20 MG tablet Take 20 mg by mouth daily        Allergies: Sudeep Phelps is allergic to tiotropium and spiriva handihaler [tiotropium bromide monohydrate]. Past Medical History:   Diagnosis Date    [facility]cong[facility] failure) ([facility])     [facility]     Diabetes mellitus ([facility])     MRSA (methicillin resistant staph aureus) culture positive 8/28/2014    sputum    Tobacco abuse 10/15/2019       Past Surgical History:   Procedure Laterality Date    APPENDECTOMY      HYSTERECTOMY      JOINT REPLACEMENT Bilateral      Social History: Sudeep Phelps  reports that she has been smoking cigarettes. She has been smoking about 0.50 packs per day. She has never used smokeless tobacco. She reports current alcohol use of about 70.0 standard drinks of alcohol per week. She reports current drug use. Drug: Marijuana.     Family History   Problem Relation Age of Onset    Heart Disease Mother     Diabetes Father        Current Medications:     furosemide  40 mg Intravenous Daily    valproate sodium (DEPACON) IVPB  500 mg Intravenous Q12H    acyclovir  10 mg/kg Intravenous Q12H    levetiracetam  500 mg Intravenous Q12H    haloperidol lactate  2 mg Intravenous Once    cefepime  1,000 mg Intravenous Q12H    Arformoterol Tartrate  15 mcg Nebulization BID    carvedilol  3.125 mg Oral BID WC    lisinopril  2.5 mg Oral Daily    insulin lispro  0-6 Units Subcutaneous TID WC    insulin lispro  0-3 Units Subcutaneous Nightly    aspirin  81 mg Oral Daily    atorvastatin  20 mg Oral Daily    cetirizine  5 mg Oral Daily    pantoprazole  20 mg Oral Daily    sertraline  50 mg Oral Daily    sodium chloride flush  10 mL Intravenous 2 times per day    calcium carb-cholecalciferol  1 tablet Oral Daily     PRN Meds include: potassium chloride, hydrALAZINE, LORazepam **OR** LORazepam **OR** LORazepam **OR** LORazepam **OR** LORazepam **OR** LORazepam **OR** LORazepam **OR** LORazepam, bisacodyl, albuterol sulfate HFA, glucose, dextrose, glucagon (rDNA), dextrose, albuterol, tiZANidine, sodium chloride flush, promethazine **OR** ondansetron, polyethylene glycol, nicotine, acetaminophen **OR** acetaminophen    ROS: Could not be obtained due to patient's condition.         Objective:   /78   Pulse 50   Temp 97.5 °F (36.4 °C) (Temporal)   Resp 16   Ht 5' 2\" (1.575 m)   Wt 133 lb (60.3 kg)   SpO2 97%   BMI 24.33 kg/m²     Blood pressure range: Systolic (17LNL), SFF:867 , Min:110 , TG   ; Diastolic (82VDP), DJS:30, Min:71, Max:93      Continuous infusions:    dexmedetomidine (PRECEDEX) IV infusion Stopped (21 0629)    norepinephrine Stopped (21 111)    dextrose         ON EXAMINATION:  GENERAL  Appears comfortable and in no acute cardiorespiratory distress   HEENT  NC/ AT   NECK  Supple and no bruits heard   Cardiovascular  S1, S2 heard; radial pulse intact MENTAL STATUS:  Lethargic but arousable to name call; does not open eyes; squeezes left hand on occasion but not consistently; she groans and moans with nonintelligible speech; no hallucinations or delusions. CRANIAL NERVES: Pupils are midpositioned and and reactive b/l.,  No nystagmus noted could not visualize fundi as she is extremely uncooperative. Full eye movements; grimaces equally well to painful stimuli; face appears symmetric; hearing could not be assessed. IX,X,XI -unable to assess. XII   -     Midline tongue, no atrophy    MOTOR FUNCTION:  Flaccid bilateral upper extremities and with normal tone in bilateral lower extremities; spontaneous and purposeful movements noted in upper and lower extremities; no tremors noted; she withdraws to painful stimuli with flexion withdrawals in bilateral lower extremities. SENSORY FUNCTION:  Could not be assessed in upper and lower extremities    CEREBELLAR FUNCTION:  No ataxic movements noted in extremities. REFLEX FUNCTION:  Symmetric 1+ DTRs in upper extremities and hypoactive DTRs in lower extremities and mute plantars bilaterally.    STATION and GAIT  deferred         Data:    Lab Results:     Lab Results   Component Value Date    CHOL 114 10/15/2019    LDLCHOLESTEROL 63 10/15/2019    HDL 38 (L) 10/15/2019    TRIG 64 10/15/2019    ALT 18 02/06/2021    AST 41 (H) 02/06/2021    TSH 0.47 10/15/2019    INR 1.3 02/08/2021    LABA1C 6.2 (H) 02/07/2021    WXBPXECF88 1736 (H) 02/12/2021     Hematology:  Recent Labs     02/12/21  0553 02/13/21 0317 02/14/21  0551   WBC 8.4 9.4 10.1   HGB 11.7* 11.8* 11.1*   HCT 36.5 36.5 35.3*    239 201     Chemistry:  Recent Labs     02/12/21  0553 02/13/21  0317 02/14/21  0551    145* 150*   K 4.1 3.5* 3.2*   * 112* 115*   CO2 20 21 23   GLUCOSE 152* 172* 140*   BUN 39* 43* 38*   CREATININE 1.38* 1.15* 1.09*   MG  --  1.9 1.8   CALCIUM 8.4* 8.4* 8.0*     No results for input(s): PROT, LABALBU, LABA1C, E6PITWH, F8JSVGN, FT4, TSH, AST, ALT, LDH, AMMONIA, CHOL, HDL, LDLCHOLESTEROL, CHOLHDLRATIO, TRIG, VLDL, CKTOTAL, CKMB, CKMBINDEX, RF, DAPHNE in the last 72 hours. Lab Results   Component Value Date    VALPROATE 57 02/14/2021           Diagnostic data reviewed:  CT head 2/6/2021: No acute intracranial abnormality. B12 level: 1736    Folate level: >20    Vitamin D 25-hydroxy level: 28.3    Treponema pallidum antibody: Nonreactive    Follow-up CT head 2/13/2021: Stable CT on comparison to 2/6/2021    MRI brain 2/13/21: No acute intracranial abnormality. Carotid Dopplers: Pending    EEG 2/13/21: Abnormal EEG with significant interhemispheric pattern with periodic discharges emanating predominantly from left hemisphere greater than right hemisphere. This abnormal EEG consisted of periodic lateralizing epileptiform discharges consisting of diphasic/triphasic spike/sharp and slow-wave discharges and these are recurring at 11.5 Hz and these complexes are  by slow waves or no detectable activity. This lateralized left hemispheric PLED pattern is a highly epileptogenic interictal pattern. But cerebrovascular disease and herpes encephalitis need to be ruled out as well. At times, these complexes are generalized with diffusely suppressed background indicating diffuse cortical dysfunction as well as postictal state. Repeat EEG 2/14/2021: Significantly contaminated with artifacts. But it did demonstrate predominant left hemispheric PLEDs pattern; occasional bihemispheric epileptiform discharges. Impression and Plan: Ms. Chaya Gilmore is a 77 y.o. female with   Acute encephalopathy; toxic metabolic encephalopathy with septic shock on admit  Abnormal EEG with periodic epileptiform discharges; was loaded with IV Keppra and Depacon and on sedation and continued on Keppra and Depakote. Because of bradycardia; IV Vimpat not started. Patient is initiated on IV acyclovir while waiting for spinal tap.

## 2021-02-14 NOTE — PROGRESS NOTES
Arformoterol Tartrate  15 mcg Nebulization BID    carvedilol  3.125 mg Oral BID WC    lisinopril  2.5 mg Oral Daily    insulin lispro  0-6 Units Subcutaneous TID WC    insulin lispro  0-3 Units Subcutaneous Nightly    aspirin  81 mg Oral Daily    atorvastatin  20 mg Oral Daily    cetirizine  5 mg Oral Daily    pantoprazole  20 mg Oral Daily    sertraline  50 mg Oral Daily    sodium chloride flush  10 mL Intravenous 2 times per day    calcium carb-cholecalciferol  1 tablet Oral Daily     Continuous Infusions:    dexmedetomidine (PRECEDEX) IV infusion Stopped (21)    norepinephrine Stopped (21 111)    dextrose       PRN Meds: potassium chloride, hydrALAZINE, LORazepam **OR** LORazepam **OR** LORazepam **OR** LORazepam **OR** LORazepam **OR** LORazepam **OR** LORazepam **OR** LORazepam, bisacodyl, albuterol sulfate HFA, glucose, dextrose, glucagon (rDNA), dextrose, albuterol, tiZANidine, sodium chloride flush, promethazine **OR** ondansetron, polyethylene glycol, nicotine, acetaminophen **OR** acetaminophen    Data:     Past Medical History:   has a past medical history of [facility]est[facility] failure) ([facility] COPD (chronic obstructive pulmonary disease) ([facility] Diabetes mellitus ([facility] MRSA (methicillin resistant staph aureus) culture positive, and Tobacco abuse. Social History:   reports that she has been smoking cigarettes. She has been smoking about 0.50 packs per day. She has never used smokeless tobacco. She reports current alcohol use of about 70.0 standard drinks of alcohol per week. She reports current drug use. Drug: Marijuana.      Family History:   Family History   Problem Relation Age of Onset    Heart Disease Mother     Diabetes Father        Vitals:  /76   Pulse (!) 46   Temp 97.5 °F (36.4 °C) (Temporal)   Resp 15   Ht 5' 2\" (1.575 m)   Wt 133 lb (60.3 kg)   SpO2 98%   BMI 24.33 kg/m²   Temp (24hrs), Av.3 °F (36.3 °C), Min:96.7 °F (35.9 °C), Max:97.7 °F 03:09 PM       Radiology:  Ct Head Wo Contrast    Result Date: 2/13/2021  [ Stable CT study when compared to 02/06/2021. No acute intracranial abnormalities are noted. Xr Chest Portable    Result Date: 2/12/2021  Cardiomegaly and chronic pulmonary change with scattered pulmonary opacities. Xr Chest Portable    Result Date: 2/11/2021  Generalized interstitial prominence and pulmonary vascular congestion. Cardiomegaly. No definite pleural effusion or pneumothorax. Xr Chest Portable    Result Date: 2/10/2021  Increased right upper lobe opacity may represent developing pneumonia. Chronic interstitial lung changes with superimposed pulmonary edema. Xr Chest Portable    Result Date: 2/7/2021  Increasing bilateral pulmonary opacities, concerning for worsening edema or pneumonia. Ct Chest High Resolution    Result Date: 2/9/2021  1. Note: Evaluation of lungs is significantly limited by patient breathing motion related artifact. 2. Severe bilateral lung extensive parenchymal changes, as discussed above, consistent with interstitial lung disease. Question overlying pulmonary interstitial edema. Findings most suggestive of usual interstitial pneumonia (UIP). Findings appear worsened in comparison with prior August 2020 CT chest examination. 3. Mild layering bilateral posterior pleural effusions, as discussed above. 4. Moderate cardiomegaly with marked dilatation of the right atrium. Recommend clinical correlation. Cardiomegaly in association with pleural effusions and likely pulmonary interstitial edema compatible with patient reported history of CHF. 5. Superior middle mediastinal lymphadenopathy, as described above. Differential diagnostic considerations include association with infectious disease, inflammatory disease versus neoplastic process. 6. Marked atherosclerotic calcification involving the aortic arch and origin of the great vessels. 7. Moderate scattered subcutaneous edema.      Mri Brain Wo Contrast    Result Date: 2/13/2021  No acute intracranial abnormality within limits of motion artifact. Physical Examination:        General appearance:  moderate distress  Mental Status:  lethargic   HENT: high-flow nasal cannula present  Lungs: diminished lung sounds throughout all lung fields, no wheezes or rhonchi appreciated increased effort today  Heart:  regular rate and rhythm, no murmur, ectopy noted  Abdomen:  soft, nontender, nondistended, normal bowel sounds  Extremities:  no edema, redness, tenderness in the calves  Skin:  no gross lesions, rashes, induration    Assessment:        Hospital Problems           Last Modified POA    * (Principal) Acute systolic heart failure ([address] 2/8/2021 Yes    COPD (chronic obstructive pulmonary disease) ([address] 2/8/2021 Yes    Acute respiratory failure with hypoxemia ([address] 2/8/2021 Yes    Type 2 diabetes mellitus without complication, without long-term current use of insulin ([address] 2/8/2021 Yes    Chronic diastolic heart failure ([address] 2/8/2021 Yes    Elevated troponin 2/8/2021 Yes    Community acquired pneumonia of right middle lobe of lung 2/8/2021 Yes    Chronic respiratory failure ([address] 2/8/2021 Yes    Mild malnutrition ([address] 2/8/2021 Yes    Hypotension 2/8/2021 Yes    DEONTE (acute kidney injury) ([address] 2/8/2021 Yes    Current every day smoker 2/8/2021 Yes    Essential hypertension 2/8/2021 Yes    Altered mental status 2/8/2021 Yes    Severe malnutrition ([address] 2/8/2021 Yes    Abnormal EEG 2/13/2021 Yes    Acute encephalopathy 2/13/2021 Yes          Plan:        1. Acute respiratory failure   2. COPD exacerbation   3. CAP   4. AMS   - Pulmonary discussed with neurology on consult;  Seizures vs herpetic encephalitis or left sided stroke ; needs MRI / LP ; to start Acyclovir   - will consult ID   - pulmonary consult placed   - pt stepped down from ICU to CVICU yesterday   - ABG, UA, chest xray, ammonia, lactic acid were all ordered and checked  - Pt also became hypotensive through the late afternoon, requiring levophed to be started and right femoral arterial and central ine placement. Levophed no longer running   - started on iv solu cortef   - pro juan normal   - continue zithromax  - rocephin changed to cefepime  - blood cultures NGTD   - COVID negative  - maintain continuous pulse ox   - continue RT protocol prn breathing treatments   - started on IV solumedrol q6hr   - d/c symbicort, started on brovana and budesonide aerosols q12hr  - abg done yesterday showed pH 7.35 with pO2 of 62  - CT chest done on 2/9, see above for results   - chest xray done this morning showed pulmonary vascular congestion   - pt overall poor prognosis   - repeat CBG done   - IV lasix increased to 60 mg daily   - will have lengthy discussion with family regarding code status and expectaiotn s  - neurology consulted   - eeg done  - mri ordered  - cT head w/o ordered  - pt placed on keppra and valproate         4. Acute CHF   5. Elevated troponin   - cardio on board   - pt noted to have acute systolic HF which is likely contributing to her acute respiratory failure along with COPD exac   - coreg dose decreased to 3.125 BID  - lisinopril decreased to 2.5 daily  - continue lasix at this time 60 mg IV daily per cardio recs   - CHF education   - per gab if her ejection fraction remains below 35% despite medical treatment then she will be considered for ICD placement for primary prevention of sudden cardiac death. - negative 3 L fluid balance since admit   - continue to monitor Is/Os   - repeat pBNP on 2/9 was 91283  - d/c IV heparin drip and start prophylactic dose if okay with cardio     6. DEONTE   - BUN/Cr trended up this morning to 39/1.38  - will adjust lasix dosage to 60 mg   - monitor output   - renally dose all meds  - avoid nephrotoxic agents     7. HTN   - continue coreg and lisinopril   - hold above for SBP less than 100  - v/s per unit protocol      8.  DMII   - continue accu checks ac/hs with ISS      9. GERD   - PPI      10. Depression   - continue home zoloft     11. DVT prophylaxis   - heparin      Prognosis guarded - I met with the patients family  Yesterday afternoon and discussed code status along with plan of care. They would like the patient to remain a full code at this time but do not want her intubated in order to get MRI/LP done at this time.         Jaclyn Brooks MD  2/14/2021  8:21 AM

## 2021-02-14 NOTE — CONSULTS
[facility]  Initial Consult Note  Date: 2/14/2021    Hospital day :8     Impression:   1. Acute on chronic hypoxic respiratory failure  2. Interstitial lung disease/progressive pulmonary fibrosis   3. Acute exacerbation of COPD  4. Acute systolic heart failure in a patient with severe cardiomyopathy  5. Secondary pulmonary hypertension  6. Hypotension  7. EtOH abuse  8. Altered mental status likely multifactorial but EEG with left hemispheric PLEDs pattern concern for Epileptogenic focus, herpes encephalitis, CVA    Recommendations   · The patient was started on acyclovir due to the concern for HSV encephalitis but the MRI does not seem to suggest any significant inflammatory temperature changes  · Ultimately the patient will require a lumbar puncture to rule this out  · The patient did receive Rocephin and azithromycin and subsequently switched to cefepime but again I doubt that there is a primary acute infectious process here  · The history is consistent with a slow/progressive clinical decline which is not what 1 would see with sepsis and the procalcitonin levels are low  · I suspect that hypotension is multifactorial especially in a patient with severe cardiomyopathy  · I will stop the cefepime at this point in time  · The patient remains on high flow O2/BiPAP and ultimately has interstitial lung disease/fibrosis with secondary pulmonary hypertension that has clearly progressed on imaging  · The overall prognosis is poor  · It would seem that hospice/comfort care would be appropriate    Chief complaint/reason for consultation:   Septic shock    History of Present Illness:   Zacarias Valadez is a 77y.o.-year-old female who was initially admitted on 2/6/2021. Rafaela Harris is seen in consultation on hospital day #8 and she was admitted with generalized weakness/not feeling well, altered mentation that had been progressing over the past few weeks.   Patient was having difficulty walking and has a history aspirin  81 mg Oral Daily    atorvastatin  20 mg Oral Daily    cetirizine  5 mg Oral Daily    pantoprazole  20 mg Oral Daily    sertraline  50 mg Oral Daily    sodium chloride flush  10 mL Intravenous 2 times per day    calcium carb-cholecalciferol  1 tablet Oral Daily     Social History:     Social History     Socioeconomic History    Marital status: Single     Spouse name: Not on file    Number of children: Not on file    Years of education: Not on file    Highest education level: Not on file   Occupational History    Not on file   Social Needs    Financial resource strain: Not on file    Food insecurity     Worry: Not on file     Inability: Not on file   [employer] needs     Medical: Not on file     Non-medical: Not on file   Tobacco Use    Smoking status: Current Every Day Smoker     Packs/day: 0.50     Types: Cigarettes    Smokeless tobacco: Never Used   Substance and Sexual Activity    Alcohol use:  Yes     Alcohol/week: 70.0 standard drinks     Types: 70 Cans of beer per week     Comment: 840 oz (3, 40oz beers/day)    Drug use: Yes     Types: Marijuana    Sexual activity: Not on file   Lifestyle    Physical activity     Days per week: Not on file     Minutes per session: Not on file    Stress: Not on file   Relationships    Social connections     Talks on phone: Not on file     Gets together: Not on file     Attends [employer] service: Not on file     Active member of club or organization: Not on file     Attends meetings of clubs or organizations: Not on file     Relationship status: Not on file    Intimate partner violence     Fear of current or ex partner: Not on file     Emotionally abused: Not on file     Physically abused: Not on file     Forced sexual activity: Not on file   Other Topics Concern    Not on file   Social History Narrative    Not on file     Family History:     Family History   Problem Relation Age of Onset    Heart Disease Mother     Diabetes Father Allergies:   Tiotropium and Spiriva handihaler [tiotropium bromide monohydrate]     Review of Systems:   Unable to obtain the patient is encephalopathic    Physical Examination :   /78   Pulse 50   Temp 97.5 °F (36.4 °C) (Temporal)   Resp 16   Ht 5' 2\" (1.575 m)   Wt 133 lb (60.3 kg)   SpO2 97%   BMI 24.33 kg/m²     Temperature Range: Temp: 97.5 °F (36.4 °C) Temp  Av.3 °F (36.3 °C)  Min: 96.7 °F (35.9 °C)  Max: 97.7 °F (36.5 °C)  General Appearance: The patient is encephalopathic  Head: Normocephalic, without obvious abnormality, atraumatic  Eyes: pupils equal, round, and reactive to light  ENT: Dry oropharyngeal mucosa  Neck: Supple, without lymphadenopathy. Pulmonary/Chest: Few scattered rales appreciated  Cardiovascular: Regular rate and rhythm without murmurs, rubs, or gallops. Abdomen: Positive bowel sounds and Soft, nontender, nondistended. Extremities: No cyanosis, clubbing, edema, or effusions. Neurologic: The patient is encephalopathic and is moving around in bed but does not follow commands for me  Skin: Warm and dry with no rash. Medical Decision Making:   I have independently reviewed/ordered the following labs:  CBC with Differential:   Recent Labs     21  0551   WBC 9.4 10.1   HGB 11.8* 11.1*   HCT 36.5 35.3*    201   LYMPHOPCT 13* 18*   MONOPCT 9* 7     BMP:   Recent Labs     21  0551   * 150*   K 3.5* 3.2*   * 115*   CO2 21 23   BUN 43* 38*   CREATININE 1.15* 1.09*   MG 1.9 1.8     Hepatic Function Panel: No results for input(s): PROT, LABALBU, BILIDIR, IBILI, BILITOT, ALKPHOS, ALT, AST in the last 72 hours.     Lab Results   Component Value Date    PROCAL 0.07 2021       Lab Results   Component Value Date    CRP 46.0 2020     Lab Results   Component Value Date    FERRITIN 130 2020     Lab Results   Component Value Date    FIBRINOGEN 478 2020     Lab Results   Component Value Date    DDIMER 2.58 04/11/2016     Lab Results   Component Value Date     08/23/2020       No results found for: Dipak Talamantes    Lab Results   Component Value Date    COVID19 Not Detected 02/06/2021    COVID19 Not Detected 08/23/2020    COVID19 Not Detected 08/20/2020     No results found for requested labs within last 30 days. Imaging Studies:   CT OF THE HEAD WITHOUT CONTRAST  2/13/2021 1:44 pm  FINDINGS:   Stable CT study when compared to 02/06/2021. No acute intracranial abnormalities are noted. MRI OF THE BRAIN WITHOUT CONTRAST  2/13/2021 3:37 pm  FINDINGS:   No acute intracranial abnormality within limits of motion artifact. ONE XRAY VIEW OF THE CHEST 2/12/2021 4:55 am   FINDINGS:   Cardiomegaly and chronic pulmonary change with scattered pulmonary opacities. CT IMAGES OF THE CHEST WITHOUT CONTRAST, HIGH RESOLUTION 2/9/2021   FINDINGS:   1. Note: Evaluation of lungs is significantly limited by patient breathing motion related artifact. 2. Severe bilateral lung extensive parenchymal changes, as discussed above, consistent with interstitial lung disease. Question overlying pulmonary interstitial edema. Findings most suggestive of usual interstitial pneumonia (UIP). Findings appear worsened in comparison with prior August 2020 CT chest examination. 3. Mild layering bilateral posterior pleural effusions, as discussed above. 4. Moderate cardiomegaly with marked dilatation of the right atrium. Recommend clinical correlation. Cardiomegaly in association with pleural effusions and likely pulmonary interstitial edema compatible with patient reported history of CHF. 5. Superior middle mediastinal lymphadenopathy, as described above. Differential diagnostic considerations include association with infectious disease, inflammatory disease versus neoplastic process. 6. Marked atherosclerotic calcification involving the aortic arch and origin of the great vessels.    7. Moderate scattered subcutaneous edema. Echo Complete 2d W Doppler W Color  2/8/2021  CONCLUSIONS   Summary   Left ventricle is normal in size and wall thickness. Global left ventricular systolic function is severely reduced with an estimated ejection fraction of 20 % . Severe global hypokinesis. Left atrium is mildly dilated. Right atrium is moderately dilated . Right ventricular dilatation with reduced systolic function. Thickened mitral valve leaflets. Mild to moderate mitral regurgitation. Moderate tricuspid regurgitation. Moderate pulmonary hypertension. Estimated right ventricular systolic pressure is [id number]. Electronically signed by Bishop Awais Enriquez(Interpreting physician) on  02/08/2021 12:46 PM    Cultures:     Culture, Blood 1 [[id number]] Collected: 02/06/21 1509   Order Status: Completed Specimen: Blood Updated: 02/12/21 0022    Specimen Description . BLOOD    Special Requests L AC 7 ML    Culture NO GROWTH 6 DAYS   Culture, Blood 1 [[id number]] Collected: 02/06/21 1505   Order Status: Completed Specimen: Blood Updated: 02/12/21 0022    Specimen Description . BLOOD    Special Requests L AC 7 ML    Culture NO GROWTH 6 DAYS           Thank you for allowing us to participate in the care of this patient. Please call with questions. Electronically signed by Elizabeth Paredes MD on 2/14/2021 at 11:11 AM      [facility]  [id number] [employer] messaging  OFFICE: [phone]      This note is created with the assistance of a speech recognition program.  While intending to generate a document that actually reflects the content of the visit, the document can still have some errors including those of syntax and sound a like substitutions which may escape proof reading. In such instances, actual meaning can be extrapolated by contextual diversion.

## 2021-02-14 NOTE — FLOWSHEET NOTE
02/14/21 1002   Provider Notification   Reason for Communication Review case   Provider Name Dr. Yevgeniy Yancey   Provider Notification Physician   Method of Communication Secure Message   Response Waiting for response   Notification Time 200   RN notified Dr. Yevgeniy Yancey of new consult for septic shock. Awaiting response.

## 2021-02-14 NOTE — FLOWSHEET NOTE
Dr. Watson Anaya arrived to the patient's bedside for evaluation and was updated on the patient's current status via RN, as well as notified of critical value for free valproic acid of 31.8 New orders received for valproic acid level and free valproic acid labs to be redrawn tonight at midnight. Per Dr. Watson Anaya, hold morning dose of Depacon until labs are resulted and called to neurology for valproic acid level and free valproic acid. RN to contact Dr. Charlene Ramos at [phone] with results from cerebral spinal fluid. Will continue to monitor closely.

## 2021-02-14 NOTE — FLOWSHEET NOTE
RN notified Dr. Latosha Vogel via perfect serve that precedex gtt was resumed around [phone] today and need for restraint order to be re-ordered for 24 hour coverage. Awaiting response at this time. Will continue to monitor closely.

## 2021-02-14 NOTE — PROCEDURES
[address] [facility] [address]                [address] [place] [address]                          ELECTROENCEPHALOGRAM REPORT    PATIENT NAME: Jasmina RUSH                      :        1945  MED REC NO:   [id number]                             ROOM:       2033  ACCOUNT NO:   [de-identified]                           ADMIT DATE: 2021  PROVIDER:     Rakel Eldridge    DATE OF EE2021    HISTORY:  This is a 71-year-old female with a history of COPD, diabetes,  pneumonia with acute-on-chronic respiratory failure. She had  significant mental status change and she had abnormal EEG with periodic  lateralizing epileptiform discharges. She has been started on  antiepileptic drug therapy including Keppra and Depacon along with  Versed. DESCRIPTION OF PROCEDURE:  Electrodes were applied using paste in  positions dictated by the International 10-20 system of placement. Reviewing montages included both referential and bipolar derivations. In addition to EEG data, EKG and eye movements were recorded. This is a  routine recording. This test is performed on 2021 to follow up on  earlier EEG abnormalities. Technologist stated that the patient is very  agitated throughout the test and moving the head and not following  commands contributing to lot of artifacts during the study. DESCRIPTION OF ACTIVITIES:  At the onset of the study, the patient is  lethargic with the background demonstrating diffusely slow background  with superimposed muscle artifacts, electrode artifacts, and movement  artifacts. Technologist stated the patient has been very agitated  throughout the test and moving the head causing artifacts. This study  is significant for intermittent left periodic lateralizing epileptiform  discharges and occasional generalized periodic discharges.   These  consisted of diphasic and rarely triphasic spike and sharp wave  discharges followed by

## 2021-02-14 NOTE — FLOWSHEET NOTE
RN notified Dr. Katherin Dailey via perfect serve message to please call [phone] and the answering service will direct your call to speak with Dr. Autumn Colon who is on-call for Adelina LOYD today to arrange for spinal tap. Awaiting plan at this time. Will continue to monitor closely.

## 2021-02-14 NOTE — PROGRESS NOTES
Section of Cardiology  Progress Note      Date:  2/14/2021  Patient: Jennell Paget  Admission:  2/6/2021  2:27 PM  Admit DX: Hypotension [I95.9]  Age:  77 y. o., 1945     LOS: 8 days     Reason for evaluation:   cardiomyopathy and CHF      SUBJECTIVE:     The patient was seen and examined. Notes and labs reviewed. There were not complications over night. Patient seen and examined with her nurse at bedside. Events noted from the day. Patient had LP by neurology service. The results are pending. Otherwise overall status did not change. Remained restless and does not follow any command. On high flow or BiPAP oxygen supply. Her heart rate remained stable surprisingly. OBJECTIVE:    Telemetry: Sinus  BP (!) 121/57   Pulse 52   Temp 96.6 °F (35.9 °C) (Temporal)   Resp (!) 156   Ht 5' 2\" (1.575 m)   Wt 133 lb (60.3 kg)   SpO2 100%   BMI 24.33 kg/m²     Intake/Output Summary (Last 24 hours) at 2/14/2021 1743  Last data filed at 2/14/2021 1700  Gross per 24 hour   Intake 373.6 ml   Output 3000 ml   Net -2626.4 ml       EXAM:   CONSTITUTIONAL: Restless, does not follow commands and appears stated age. LUNGS: Good respiratory effort. No for increased work of breathing. On auscultation: Diminished breath sounds  CARDIOVASCULAR:  Normal apical impulse, regular rate and rhythm, normal S1 and S2, no S3 or S4,   ABDOMEN: Soft, nontender, nondistended. Bowel sounds present. SKIN: Warm and dry. EXTREMITIES: No lower extremity edema. Motor movement grossly intact. No cyanosis or clubbing.     Current Inpatient Medications:   sodium chloride flush  10 mL Intravenous 2 times per day    budesonide  0.5 mg Nebulization BID    QUEtiapine  25 mg Oral BID    valproate sodium (DEPACON) IVPB  500 mg Intravenous Q12H    acyclovir  10 mg/kg Intravenous Q12H    levetiracetam  500 mg Intravenous Q12H    haloperidol lactate  2 mg Intravenous Once    Arformoterol Tartrate  15 mcg Nebulization BID    carvedilol  3.125 mg Oral BID WC    lisinopril  2.5 mg Oral Daily    insulin lispro  0-6 Units Subcutaneous TID WC    insulin lispro  0-3 Units Subcutaneous Nightly    aspirin  81 mg Oral Daily    atorvastatin  20 mg Oral Daily    cetirizine  5 mg Oral Daily    pantoprazole  20 mg Oral Daily    sertraline  50 mg Oral Daily    calcium carb-cholecalciferol  1 tablet Oral Daily       IV Infusions (if any):   dextrose      dexmedetomidine (PRECEDEX) IV infusion 0.6 mcg/kg/hr (02/14/21 1645)    norepinephrine Stopped (02/11/21 1112)    dextrose         Diagnostics:   EKG: . ECHO: With severe cardiomyopathy. Ejection fraction: 20%  Stress Test: not obtained. Cardiac Angiography: not obtained. Labs:   CBC:   Recent Labs     02/13/21 0317 02/14/21  0551   WBC 9.4 10.1   HGB 11.8* 11.1*   HCT 36.5 35.3*    201     BMP:   Recent Labs     02/13/21 0317 02/14/21  0551 02/14/21  1329   * 150*  --    K 3.5* 3.2* 3.5*   CO2 21 23  --    BUN 43* 38*  --    CREATININE 1.15* 1.09*  --    LABGLOM 46* 49*  --    GLUCOSE 172* 140*  --      Lab Results   Component Value Date    BNP 29 01/03/2014     PT/INR: No results for input(s): PROTIME, INR in the last 72 hours. APTT:No results for input(s): APTT in the last 72 hours. CARDIAC ENZYMES:No results for input(s): CKTOTAL, CKMB, CKMBINDEX, TROPONINT in the last 72 hours.   FASTING LIPID PANEL:  Lab Results   Component Value Date    HDL 38 10/15/2019    TRIG 64 10/15/2019     LIVER PROFILE:  Recent Labs     02/14/21  0551   AST 34*   ALT 15   LABALBU 2.5*       ASSESSMENT:  Acute systolic CHF  Severe cardiomyopathy  Moderate tricuspid regurgitation  Moderate pulmonary hypertension  Elevated troponin most likely secondary to CHF  COPD  Diabetes mellitus  Acute kidney injury  Metabolic encephalopathy    Patient Active Problem List   Diagnosis    COPD (chronic obstructive pulmonary disease) ([address]    CHF (congestive heart failure) (HCC)    Cocaine abuse ([facility]    Acidosis, metabolic, with respiratory acidosis    Acute respiratory failure with hypoxemia (HCC)    Polysubstance abuse (HCC)    Hyponatremia, Beer Potomania    Normocytic anemia    Neutrophilic leukocytosis    Increased ammonia level    Hypophosphatemia    Type 2 diabetes mellitus without complication, without long-term current use of insulin (HCC)    Acute on chronic diastolic heart failure (HCC)    Tobacco abuse    Diabetes mellitus (HCC)    Chronic diastolic heart failure (HCC)    Elevated troponin    Prolonged Q-T interval on ECG    Community acquired pneumonia of right middle lobe of lung    Hypoxia    Chronic respiratory failure (HCC)    Mild malnutrition (HCC)    Hypotension    [facility]    Current every day smoker    Essential hypertension    Altered mental status    Acute systolic heart failure (HCC)    Severe malnutrition (HCC)    Abnormal EEG    Acute encephalopathy       PLAN:    1. Patient's family stated to the pulmonologist that they do want her to be intubated however there was no official change in the [address] [place] [address]. 2. Effectively the patient is not getting her oral medications. 3. Poor prognosis and so far is not clear why the patient has encephalopathy  4. Potassium was replaced. Please see orders. Discussed with  and nursing.     Jose Manuel Cardenas MD

## 2021-02-14 NOTE — FLOWSHEET NOTE
RN contacted Dr. Jodee Davis via telephone and informed him of of the following results from the cerebrl spinal fluid: Total protein of 25 and white cell count of zero on a #1 and #4. Remainder of the labs have been sent to [address] lab.

## 2021-02-14 NOTE — PROGRESS NOTES
Physical Therapy  Facility/Department: [facility] CVICU  Daily Treatment Note  NAME: Yanira Velazquez  : 1945  MRN: [id number]    Date of Service: 2021    Discharge Recommendations:  Subacute/Skilled Nursing Facility        Assessment   Body structures, Functions, Activity limitations: Decreased functional mobility ; Decreased balance;Decreased ROM; Decreased strength;Decreased endurance;Decreased cognition  Assessment: Patient with very little ability to participate in PT treatment today due to decrease endurance, decreased tolerance to activities and increased fatigue. Specific instructions for Next Treatment: re-assess for transfers/gait  Prognosis: Good;Fair  Decision Making: High Complexity  Clinical Presentation: unstable  PT Education: PT Role;General Safety;Pressure Relief  REQUIRES PT FOLLOW UP: Yes  Activity Tolerance  Activity Tolerance: Patient limited by fatigue;Patient limited by endurance  Activity Tolerance: Patient very lethargic with little to no follow thru of motions during PROM. Patient would occasionally open eyes and look at therapist during treatment but unable to maintain aroused status. Patient with little to no response to verbal commands. Patient Diagnosis(es): The primary encounter diagnosis was Pneumonia due to organism. Diagnoses of Altered mental status, unspecified altered mental status type, DEONTE (acute kidney injury) ([facility] and Elevated troponin were also pertinent to this visit. has a past medical history of CHF (congestive heart failure) ([facility] COPD (chronic obstructive pulmonary disease) ([facility] Diabetes mellitus ([facility] MRSA (methicillin resistant staph aureus) culture positive, and Tobacco abuse.   has a past surgical history that includes joint replacement (Bilateral); Hysterectomy; and Appendectomy.     Restrictions  Restrictions/Precautions  Restrictions/Precautions: General Precautions, Fall Risk, Up as Tolerated, Contact Precautions  Required Braces or Orthoses?: No Safety Education & Training, Endurance Training, Neuromuscular Re-education  Safety Devices  Type of devices:  All fall risk precautions in place, Bed alarm in place, Patient at risk for falls, Call light within reach, Left in bed, Nurse notified  Restraints  Initially in place: Yes     Therapy Time   Individual Concurrent Group Co-treatment   Time In [address] [place], [place]

## 2021-02-14 NOTE — PROGRESS NOTES
Pt suddenly became very agitated, squirming in bed, yelling incomprehensible words, shaking head back and forth. RN restarted precedex gtt initially at 0.4mcg/kg/hr titirating to 0.6mcg/kg/hr. Attempts to reorient pt unsuccessful, Ativan 0.5mg IV given for agitation. Will continue to monitor.

## 2021-02-15 ENCOUNTER — HOSPITAL ENCOUNTER (INPATIENT)
Age: 76
LOS: 12 days | Discharge: NURSING, REHAB OR LONG-TERM CARE | DRG: 070 | End: 2021-02-27
Attending: INTERNAL MEDICINE | Admitting: FAMILY MEDICINE
Payer: MEDICARE

## 2021-02-15 ENCOUNTER — APPOINTMENT (OUTPATIENT)
Dept: GENERAL RADIOLOGY | Age: 76
DRG: 070 | End: 2021-02-15
Attending: INTERNAL MEDICINE
Payer: MEDICARE

## 2021-02-15 ENCOUNTER — APPOINTMENT (OUTPATIENT)
Dept: GENERAL RADIOLOGY | Age: 76
DRG: 871 | End: 2021-02-15
Payer: MEDICARE

## 2021-02-15 VITALS
RESPIRATION RATE: 20 BRPM | HEART RATE: 64 BPM | WEIGHT: 135 LBS | BODY MASS INDEX: 24.84 KG/M2 | TEMPERATURE: 97.6 F | SYSTOLIC BLOOD PRESSURE: 103 MMHG | HEIGHT: 62 IN | DIASTOLIC BLOOD PRESSURE: 70 MMHG | OXYGEN SATURATION: 95 %

## 2021-02-15 PROBLEM — G40.309 GENERALIZED NONCONVULSIVE SEIZURES (HCC): Status: ACTIVE | Noted: 2021-02-15

## 2021-02-15 LAB
ABSOLUTE EOS #: 0.12 K/UL (ref 0–0.44)
ABSOLUTE IMMATURE GRANULOCYTE: 0.12 K/UL (ref 0–0.3)
ABSOLUTE LYMPH #: 2.18 K/UL (ref 1.1–3.7)
ABSOLUTE MONO #: 0.48 K/UL (ref 0.1–1.2)
ALLEN TEST: ABNORMAL
AMMONIA: 57 UMOL/L (ref 11–51)
AMMONIA: 64 UMOL/L (ref 11–51)
ANION GAP SERPL CALCULATED.3IONS-SCNC: 10 MMOL/L (ref 9–17)
ANION GAP SERPL CALCULATED.3IONS-SCNC: 8 MMOL/L (ref 9–17)
BASOPHILS # BLD: 0 % (ref 0–2)
BASOPHILS ABSOLUTE: 0 K/UL (ref 0–0.2)
BNP INTERPRETATION: ABNORMAL
BUN BLDV-MCNC: 17 MG/DL (ref 8–23)
BUN BLDV-MCNC: 27 MG/DL (ref 8–23)
BUN/CREAT BLD: 36 (ref 9–20)
BUN/CREAT BLD: ABNORMAL (ref 9–20)
CALCIUM SERPL-MCNC: 8.1 MG/DL (ref 8.6–10.4)
CALCIUM SERPL-MCNC: 8.4 MG/DL (ref 8.6–10.4)
CHLORIDE BLD-SCNC: 106 MMOL/L (ref 98–107)
CHLORIDE BLD-SCNC: 110 MMOL/L (ref 98–107)
CO2: 22 MMOL/L (ref 20–31)
CO2: 25 MMOL/L (ref 20–31)
CREAT SERPL-MCNC: 0.66 MG/DL (ref 0.5–0.9)
CREAT SERPL-MCNC: 0.75 MG/DL (ref 0.5–0.9)
DIFFERENTIAL TYPE: ABNORMAL
EKG ATRIAL RATE: 58 BPM
EKG P AXIS: 44 DEGREES
EKG P-R INTERVAL: 168 MS
EKG Q-T INTERVAL: 522 MS
EKG QRS DURATION: 104 MS
EKG QTC CALCULATION (BAZETT): 512 MS
EKG R AXIS: -33 DEGREES
EKG T AXIS: -174 DEGREES
EKG VENTRICULAR RATE: 58 BPM
EOSINOPHILS RELATIVE PERCENT: 1 % (ref 1–4)
FIO2: ABNORMAL
GFR AFRICAN AMERICAN: >60 ML/MIN
GFR AFRICAN AMERICAN: >60 ML/MIN
GFR NON-AFRICAN AMERICAN: >60 ML/MIN
GFR NON-AFRICAN AMERICAN: >60 ML/MIN
GFR SERPL CREATININE-BSD FRML MDRD: ABNORMAL ML/MIN/{1.73_M2}
GLUCOSE BLD-MCNC: 144 MG/DL (ref 70–99)
GLUCOSE BLD-MCNC: 151 MG/DL (ref 65–105)
GLUCOSE BLD-MCNC: 65 MG/DL (ref 65–105)
GLUCOSE BLD-MCNC: 76 MG/DL (ref 70–99)
GLUCOSE BLD-MCNC: 78 MG/DL (ref 74–100)
GLUCOSE BLD-MCNC: 87 MG/DL (ref 65–105)
GLUCOSE BLD-MCNC: 91 MG/DL (ref 65–105)
HCT VFR BLD CALC: 36.9 % (ref 36.3–47.1)
HEMOGLOBIN: 11.6 G/DL (ref 11.9–15.1)
IMMATURE GRANULOCYTES: 1 %
LACTIC ACID, WHOLE BLOOD: 1.8 MMOL/L (ref 0.7–2.1)
LYMPHOCYTES # BLD: 18 % (ref 24–43)
MAGNESIUM: 1.5 MG/DL (ref 1.6–2.6)
MAGNESIUM: 2.2 MG/DL (ref 1.6–2.6)
MCH RBC QN AUTO: 24.5 PG (ref 25.2–33.5)
MCHC RBC AUTO-ENTMCNC: 31.4 G/DL (ref 28.4–34.8)
MCV RBC AUTO: 78 FL (ref 82.6–102.9)
MODE: ABNORMAL
MONOCYTES # BLD: 4 % (ref 3–12)
MORPHOLOGY: ABNORMAL
NEGATIVE BASE EXCESS, ART: ABNORMAL (ref 0–2)
NRBC AUTOMATED: 0 PER 100 WBC
O2 DEVICE/FLOW/%: ABNORMAL
PATIENT TEMP: ABNORMAL
PDW BLD-RTO: 26.2 % (ref 11.8–14.4)
PLATELET # BLD: 203 K/UL (ref 138–453)
PLATELET ESTIMATE: ABNORMAL
PMV BLD AUTO: 10 FL (ref 8.1–13.5)
POC HCO3: 25.5 MMOL/L (ref 21–28)
POC O2 SATURATION: 94 % (ref 94–98)
POC PCO2 TEMP: ABNORMAL MM HG
POC PCO2: 38.7 MM HG (ref 35–48)
POC PH TEMP: ABNORMAL
POC PH: 7.43 (ref 7.35–7.45)
POC PO2 TEMP: ABNORMAL MM HG
POC PO2: 69.7 MM HG (ref 83–108)
POSITIVE BASE EXCESS, ART: 1 (ref 0–3)
POTASSIUM SERPL-SCNC: 2.7 MMOL/L (ref 3.7–5.3)
POTASSIUM SERPL-SCNC: 3.1 MMOL/L (ref 3.7–5.3)
POTASSIUM SERPL-SCNC: 3.4 MMOL/L (ref 3.7–5.3)
POTASSIUM SERPL-SCNC: 3.5 MMOL/L (ref 3.7–5.3)
PRO-BNP: ABNORMAL PG/ML
PROCALCITONIN: 0.06 NG/ML
RBC # BLD: 4.73 M/UL (ref 3.95–5.11)
RBC # BLD: ABNORMAL 10*6/UL
SAMPLE SITE: ABNORMAL
SEG NEUTROPHILS: 76 % (ref 36–65)
SEGMENTED NEUTROPHILS ABSOLUTE COUNT: 9.2 K/UL (ref 1.5–8.1)
SODIUM BLD-SCNC: 136 MMOL/L (ref 135–144)
SODIUM BLD-SCNC: 145 MMOL/L (ref 135–144)
TCO2 (CALC), ART: 27 MMOL/L (ref 22–29)
VALPROIC ACID LEVEL: 57 UG/ML (ref 50–125)
VALPROIC ACID, FREE: 14.5 UG/ML (ref 7–23)
VALPROIC DATE LAST DOSE: NORMAL
VALPROIC DOSE AMOUNT: NORMAL
VALPROIC TIME LAST DOSE: NORMAL
WBC # BLD: 12.1 K/UL (ref 3.5–11.3)
WBC # BLD: ABNORMAL 10*3/UL

## 2021-02-15 PROCEDURE — 87483 CNS DNA AMP PROBE TYPE 12-25: CPT

## 2021-02-15 PROCEDURE — 93005 ELECTROCARDIOGRAM TRACING: CPT | Performed by: STUDENT IN AN ORGANIZED HEALTH CARE EDUCATION/TRAINING PROGRAM

## 2021-02-15 PROCEDURE — 83605 ASSAY OF LACTIC ACID: CPT

## 2021-02-15 PROCEDURE — 80165 DIPROPYLACETIC ACID FREE: CPT

## 2021-02-15 PROCEDURE — 83880 ASSAY OF NATRIURETIC PEPTIDE: CPT

## 2021-02-15 PROCEDURE — 94660 CPAP INITIATION&MGMT: CPT

## 2021-02-15 PROCEDURE — 2500000003 HC RX 250 WO HCPCS: Performed by: NURSE PRACTITIONER

## 2021-02-15 PROCEDURE — 82947 ASSAY GLUCOSE BLOOD QUANT: CPT

## 2021-02-15 PROCEDURE — 2500000003 HC RX 250 WO HCPCS: Performed by: FAMILY MEDICINE

## 2021-02-15 PROCEDURE — 6360000002 HC RX W HCPCS: Performed by: PSYCHIATRY & NEUROLOGY

## 2021-02-15 PROCEDURE — 2580000003 HC RX 258: Performed by: PSYCHIATRY & NEUROLOGY

## 2021-02-15 PROCEDURE — 80048 BASIC METABOLIC PNL TOTAL CA: CPT

## 2021-02-15 PROCEDURE — 94761 N-INVAS EAR/PLS OXIMETRY MLT: CPT

## 2021-02-15 PROCEDURE — 94640 AIRWAY INHALATION TREATMENT: CPT

## 2021-02-15 PROCEDURE — 85025 COMPLETE CBC W/AUTO DIFF WBC: CPT

## 2021-02-15 PROCEDURE — 2580000003 HC RX 258: Performed by: INTERNAL MEDICINE

## 2021-02-15 PROCEDURE — 80164 ASSAY DIPROPYLACETIC ACD TOT: CPT

## 2021-02-15 PROCEDURE — 2000000000 HC ICU R&B

## 2021-02-15 PROCEDURE — 2500000003 HC RX 250 WO HCPCS: Performed by: STUDENT IN AN ORGANIZED HEALTH CARE EDUCATION/TRAINING PROGRAM

## 2021-02-15 PROCEDURE — 84145 PROCALCITONIN (PCT): CPT

## 2021-02-15 PROCEDURE — 82140 ASSAY OF AMMONIA: CPT

## 2021-02-15 PROCEDURE — 84484 ASSAY OF TROPONIN QUANT: CPT

## 2021-02-15 PROCEDURE — 37799 UNLISTED PX VASCULAR SURGERY: CPT

## 2021-02-15 PROCEDURE — 99233 SBSQ HOSP IP/OBS HIGH 50: CPT | Performed by: PSYCHIATRY & NEUROLOGY

## 2021-02-15 PROCEDURE — 71045 X-RAY EXAM CHEST 1 VIEW: CPT

## 2021-02-15 PROCEDURE — 6360000002 HC RX W HCPCS: Performed by: INTERNAL MEDICINE

## 2021-02-15 PROCEDURE — 2500000003 HC RX 250 WO HCPCS: Performed by: PSYCHIATRY & NEUROLOGY

## 2021-02-15 PROCEDURE — 2580000003 HC RX 258: Performed by: FAMILY MEDICINE

## 2021-02-15 PROCEDURE — 6360000002 HC RX W HCPCS: Performed by: STUDENT IN AN ORGANIZED HEALTH CARE EDUCATION/TRAINING PROGRAM

## 2021-02-15 PROCEDURE — 84132 ASSAY OF SERUM POTASSIUM: CPT

## 2021-02-15 PROCEDURE — 83735 ASSAY OF MAGNESIUM: CPT

## 2021-02-15 PROCEDURE — 82803 BLOOD GASES ANY COMBINATION: CPT

## 2021-02-15 PROCEDURE — 95822 EEG COMA OR SLEEP ONLY: CPT | Performed by: PSYCHIATRY & NEUROLOGY

## 2021-02-15 PROCEDURE — 51702 INSERT TEMP BLADDER CATH: CPT

## 2021-02-15 PROCEDURE — 95816 EEG AWAKE AND DROWSY: CPT

## 2021-02-15 PROCEDURE — 99239 HOSP IP/OBS DSCHRG MGMT >30: CPT | Performed by: FAMILY MEDICINE

## 2021-02-15 PROCEDURE — 6360000002 HC RX W HCPCS: Performed by: NURSE PRACTITIONER

## 2021-02-15 PROCEDURE — 2700000000 HC OXYGEN THERAPY PER DAY

## 2021-02-15 PROCEDURE — 2580000003 HC RX 258: Performed by: NURSE PRACTITIONER

## 2021-02-15 PROCEDURE — 99232 SBSQ HOSP IP/OBS MODERATE 35: CPT | Performed by: INTERNAL MEDICINE

## 2021-02-15 RX ORDER — ARFORMOTEROL TARTRATE 15 UG/2ML
15 SOLUTION RESPIRATORY (INHALATION) 2 TIMES DAILY
Status: DISCONTINUED | OUTPATIENT
Start: 2021-02-15 | End: 2021-02-27 | Stop reason: HOSPADM

## 2021-02-15 RX ORDER — POTASSIUM CHLORIDE 29.8 MG/ML
20 INJECTION INTRAVENOUS PRN
Status: CANCELLED | OUTPATIENT
Start: 2021-02-15

## 2021-02-15 RX ORDER — ONDANSETRON 2 MG/ML
4 INJECTION INTRAMUSCULAR; INTRAVENOUS EVERY 6 HOURS PRN
Status: DISCONTINUED | OUTPATIENT
Start: 2021-02-15 | End: 2021-02-27 | Stop reason: HOSPADM

## 2021-02-15 RX ORDER — LISINOPRIL 2.5 MG/1
2.5 TABLET ORAL DAILY
Status: DISCONTINUED | OUTPATIENT
Start: 2021-02-16 | End: 2021-02-27 | Stop reason: HOSPADM

## 2021-02-15 RX ORDER — NOREPINEPHRINE BIT/0.9 % NACL 16MG/250ML
2-100 INFUSION BOTTLE (ML) INTRAVENOUS CONTINUOUS
Status: DISCONTINUED | OUTPATIENT
Start: 2021-02-15 | End: 2021-02-16

## 2021-02-15 RX ORDER — NICOTINE POLACRILEX 4 MG
15 LOZENGE BUCCAL PRN
Status: DISCONTINUED | OUTPATIENT
Start: 2021-02-15 | End: 2021-02-27 | Stop reason: HOSPADM

## 2021-02-15 RX ORDER — MAGNESIUM SULFATE 1 G/100ML
1000 INJECTION INTRAVENOUS PRN
Status: DISCONTINUED | OUTPATIENT
Start: 2021-02-15 | End: 2021-02-15 | Stop reason: HOSPADM

## 2021-02-15 RX ORDER — HEPARIN SODIUM 5000 [USP'U]/ML
5000 INJECTION, SOLUTION INTRAVENOUS; SUBCUTANEOUS 2 TIMES DAILY
Status: DISCONTINUED | OUTPATIENT
Start: 2021-02-15 | End: 2021-02-15 | Stop reason: HOSPADM

## 2021-02-15 RX ORDER — ATORVASTATIN CALCIUM 20 MG/1
20 TABLET, FILM COATED ORAL DAILY
Status: CANCELLED | OUTPATIENT
Start: 2021-02-16

## 2021-02-15 RX ORDER — DIVALPROEX SODIUM 500 MG/1
500 TABLET, EXTENDED RELEASE ORAL 2 TIMES DAILY
Status: DISCONTINUED | OUTPATIENT
Start: 2021-02-15 | End: 2021-02-17

## 2021-02-15 RX ORDER — MAGNESIUM SULFATE 1 G/100ML
1000 INJECTION INTRAVENOUS PRN
Status: CANCELLED | OUTPATIENT
Start: 2021-02-15

## 2021-02-15 RX ORDER — ALBUTEROL SULFATE 2.5 MG/3ML
2.5 SOLUTION RESPIRATORY (INHALATION) EVERY 6 HOURS PRN
Status: DISCONTINUED | OUTPATIENT
Start: 2021-02-15 | End: 2021-02-27 | Stop reason: HOSPADM

## 2021-02-15 RX ORDER — CETIRIZINE HYDROCHLORIDE 10 MG/1
5 TABLET ORAL DAILY
Status: CANCELLED | OUTPATIENT
Start: 2021-02-16

## 2021-02-15 RX ORDER — ARFORMOTEROL TARTRATE 15 UG/2ML
15 SOLUTION RESPIRATORY (INHALATION) 2 TIMES DAILY
Status: DISCONTINUED | OUTPATIENT
Start: 2021-02-15 | End: 2021-02-15 | Stop reason: SDUPTHER

## 2021-02-15 RX ORDER — ALBUTEROL SULFATE 90 UG/1
2 AEROSOL, METERED RESPIRATORY (INHALATION) EVERY 6 HOURS PRN
Status: DISCONTINUED | OUTPATIENT
Start: 2021-02-15 | End: 2021-02-15

## 2021-02-15 RX ORDER — PROMETHAZINE HYDROCHLORIDE 12.5 MG/1
12.5 TABLET ORAL EVERY 6 HOURS PRN
Status: DISCONTINUED | OUTPATIENT
Start: 2021-02-15 | End: 2021-02-27 | Stop reason: HOSPADM

## 2021-02-15 RX ORDER — QUETIAPINE FUMARATE 25 MG/1
25 TABLET, FILM COATED ORAL 2 TIMES DAILY
Status: CANCELLED | OUTPATIENT
Start: 2021-02-15

## 2021-02-15 RX ORDER — NICOTINE 21 MG/24HR
1 PATCH, TRANSDERMAL 24 HOURS TRANSDERMAL DAILY PRN
Status: CANCELLED | OUTPATIENT
Start: 2021-02-15

## 2021-02-15 RX ORDER — DEXTROSE MONOHYDRATE 25 G/50ML
12.5 INJECTION, SOLUTION INTRAVENOUS PRN
Status: CANCELLED | OUTPATIENT
Start: 2021-02-15

## 2021-02-15 RX ORDER — POLYETHYLENE GLYCOL 3350 17 G/17G
17 POWDER, FOR SOLUTION ORAL DAILY PRN
Status: DISCONTINUED | OUTPATIENT
Start: 2021-02-15 | End: 2021-02-27 | Stop reason: HOSPADM

## 2021-02-15 RX ORDER — LISINOPRIL 2.5 MG/1
2.5 TABLET ORAL DAILY
Status: CANCELLED | OUTPATIENT
Start: 2021-02-15

## 2021-02-15 RX ORDER — LORAZEPAM 2 MG/ML
0.5 INJECTION INTRAMUSCULAR EVERY 4 HOURS
Status: DISCONTINUED | OUTPATIENT
Start: 2021-02-15 | End: 2021-02-15

## 2021-02-15 RX ORDER — TIZANIDINE 4 MG/1
4 TABLET ORAL EVERY 8 HOURS PRN
Status: CANCELLED | OUTPATIENT
Start: 2021-02-15

## 2021-02-15 RX ORDER — ATORVASTATIN CALCIUM 20 MG/1
20 TABLET, FILM COATED ORAL DAILY
Status: DISCONTINUED | OUTPATIENT
Start: 2021-02-16 | End: 2021-02-27 | Stop reason: HOSPADM

## 2021-02-15 RX ORDER — LORAZEPAM 2 MG/ML
0.5 INJECTION INTRAMUSCULAR EVERY 4 HOURS PRN
Status: CANCELLED | OUTPATIENT
Start: 2021-02-15

## 2021-02-15 RX ORDER — DEXTROSE MONOHYDRATE 50 MG/ML
INJECTION, SOLUTION INTRAVENOUS CONTINUOUS
Status: CANCELLED | OUTPATIENT
Start: 2021-02-15

## 2021-02-15 RX ORDER — LORAZEPAM 2 MG/ML
0.5 INJECTION INTRAMUSCULAR EVERY 4 HOURS PRN
Status: DISCONTINUED | OUTPATIENT
Start: 2021-02-15 | End: 2021-02-15 | Stop reason: HOSPADM

## 2021-02-15 RX ORDER — PANTOPRAZOLE SODIUM 20 MG/1
20 TABLET, DELAYED RELEASE ORAL DAILY
Status: DISCONTINUED | OUTPATIENT
Start: 2021-02-16 | End: 2021-02-27 | Stop reason: HOSPADM

## 2021-02-15 RX ORDER — DOPAMINE HYDROCHLORIDE 160 MG/100ML
2-20 INJECTION, SOLUTION INTRAVENOUS CONTINUOUS
Status: DISCONTINUED | OUTPATIENT
Start: 2021-02-15 | End: 2021-02-15 | Stop reason: HOSPADM

## 2021-02-15 RX ORDER — PANTOPRAZOLE SODIUM 20 MG/1
20 TABLET, DELAYED RELEASE ORAL DAILY
Status: CANCELLED | OUTPATIENT
Start: 2021-02-16

## 2021-02-15 RX ORDER — BUDESONIDE 0.5 MG/2ML
0.5 INHALANT ORAL 2 TIMES DAILY
Status: DISCONTINUED | OUTPATIENT
Start: 2021-02-15 | End: 2021-02-27 | Stop reason: HOSPADM

## 2021-02-15 RX ORDER — ASPIRIN 81 MG/1
81 TABLET ORAL DAILY
Status: DISCONTINUED | OUTPATIENT
Start: 2021-02-16 | End: 2021-02-27 | Stop reason: HOSPADM

## 2021-02-15 RX ORDER — CARVEDILOL 3.12 MG/1
3.12 TABLET ORAL 2 TIMES DAILY WITH MEALS
Status: CANCELLED | OUTPATIENT
Start: 2021-02-15

## 2021-02-15 RX ORDER — SODIUM CHLORIDE 0.9 % (FLUSH) 0.9 %
10 SYRINGE (ML) INJECTION PRN
Status: CANCELLED | OUTPATIENT
Start: 2021-02-15

## 2021-02-15 RX ORDER — POTASSIUM CHLORIDE 29.8 MG/ML
20 INJECTION INTRAVENOUS PRN
Status: DISCONTINUED | OUTPATIENT
Start: 2021-02-15 | End: 2021-02-25

## 2021-02-15 RX ORDER — CARVEDILOL 3.12 MG/1
3.12 TABLET ORAL 2 TIMES DAILY
Status: DISCONTINUED | OUTPATIENT
Start: 2021-02-15 | End: 2021-02-27 | Stop reason: HOSPADM

## 2021-02-15 RX ORDER — LACTULOSE 10 G/15ML
20 SOLUTION ORAL 2 TIMES DAILY
Status: DISCONTINUED | OUTPATIENT
Start: 2021-02-15 | End: 2021-02-15 | Stop reason: HOSPADM

## 2021-02-15 RX ORDER — FUROSEMIDE 10 MG/ML
60 INJECTION INTRAMUSCULAR; INTRAVENOUS DAILY
Status: DISCONTINUED | OUTPATIENT
Start: 2021-02-16 | End: 2021-02-16

## 2021-02-15 RX ORDER — HYDRALAZINE HYDROCHLORIDE 20 MG/ML
10 INJECTION INTRAMUSCULAR; INTRAVENOUS EVERY 6 HOURS PRN
Status: CANCELLED | OUTPATIENT
Start: 2021-02-15

## 2021-02-15 RX ORDER — CHLOROTHIAZIDE SODIUM 500 MG/1
500 INJECTION INTRAVENOUS ONCE
Status: COMPLETED | OUTPATIENT
Start: 2021-02-15 | End: 2021-02-15

## 2021-02-15 RX ORDER — SODIUM CHLORIDE 0.9 % (FLUSH) 0.9 %
10 SYRINGE (ML) INJECTION EVERY 12 HOURS SCHEDULED
Status: CANCELLED | OUTPATIENT
Start: 2021-02-15

## 2021-02-15 RX ORDER — POLYETHYLENE GLYCOL 3350 17 G/17G
17 POWDER, FOR SOLUTION ORAL DAILY PRN
Status: CANCELLED | OUTPATIENT
Start: 2021-02-15

## 2021-02-15 RX ORDER — SODIUM CHLORIDE 0.9 % (FLUSH) 0.9 %
10 SYRINGE (ML) INJECTION EVERY 12 HOURS SCHEDULED
Status: DISCONTINUED | OUTPATIENT
Start: 2021-02-15 | End: 2021-02-27 | Stop reason: HOSPADM

## 2021-02-15 RX ORDER — DOPAMINE HYDROCHLORIDE 160 MG/100ML
2-20 INJECTION, SOLUTION INTRAVENOUS CONTINUOUS
Status: CANCELLED | OUTPATIENT
Start: 2021-02-15

## 2021-02-15 RX ORDER — PROMETHAZINE HYDROCHLORIDE 12.5 MG/1
12.5 TABLET ORAL EVERY 6 HOURS PRN
Status: CANCELLED | OUTPATIENT
Start: 2021-02-15

## 2021-02-15 RX ORDER — ALBUTEROL SULFATE 90 UG/1
2 AEROSOL, METERED RESPIRATORY (INHALATION) EVERY 6 HOURS PRN
Status: CANCELLED | OUTPATIENT
Start: 2021-02-15

## 2021-02-15 RX ORDER — NICOTINE POLACRILEX 4 MG
15 LOZENGE BUCCAL PRN
Status: CANCELLED | OUTPATIENT
Start: 2021-02-15

## 2021-02-15 RX ORDER — ARFORMOTEROL TARTRATE 15 UG/2ML
15 SOLUTION RESPIRATORY (INHALATION) 2 TIMES DAILY
Status: CANCELLED | OUTPATIENT
Start: 2021-02-15

## 2021-02-15 RX ORDER — DEXTROSE MONOHYDRATE 50 MG/ML
100 INJECTION, SOLUTION INTRAVENOUS PRN
Status: DISCONTINUED | OUTPATIENT
Start: 2021-02-15 | End: 2021-02-27 | Stop reason: HOSPADM

## 2021-02-15 RX ORDER — ACETAMINOPHEN 325 MG/1
650 TABLET ORAL EVERY 4 HOURS PRN
Status: CANCELLED | OUTPATIENT
Start: 2021-02-15

## 2021-02-15 RX ORDER — ACETAMINOPHEN 650 MG/1
650 SUPPOSITORY RECTAL EVERY 6 HOURS PRN
Status: CANCELLED | OUTPATIENT
Start: 2021-02-15

## 2021-02-15 RX ORDER — SODIUM CHLORIDE 0.9 % (FLUSH) 0.9 %
10 SYRINGE (ML) INJECTION PRN
Status: DISCONTINUED | OUTPATIENT
Start: 2021-02-15 | End: 2021-02-27 | Stop reason: HOSPADM

## 2021-02-15 RX ORDER — BUDESONIDE 0.5 MG/2ML
0.5 INHALANT ORAL 2 TIMES DAILY
Status: CANCELLED | OUTPATIENT
Start: 2021-02-15 | End: 2021-02-19

## 2021-02-15 RX ORDER — ACETAMINOPHEN 650 MG/1
650 SUPPOSITORY RECTAL EVERY 6 HOURS PRN
Status: DISCONTINUED | OUTPATIENT
Start: 2021-02-15 | End: 2021-02-27 | Stop reason: HOSPADM

## 2021-02-15 RX ORDER — ASPIRIN 81 MG/1
81 TABLET ORAL DAILY
Status: CANCELLED | OUTPATIENT
Start: 2021-02-16

## 2021-02-15 RX ORDER — HEPARIN SODIUM 5000 [USP'U]/ML
5000 INJECTION, SOLUTION INTRAVENOUS; SUBCUTANEOUS EVERY 8 HOURS SCHEDULED
Status: DISCONTINUED | OUTPATIENT
Start: 2021-02-15 | End: 2021-02-16

## 2021-02-15 RX ORDER — CALCIUM CARBONATE/VITAMIN D3 600 MG-10
1 TABLET ORAL DAILY
Status: CANCELLED | OUTPATIENT
Start: 2021-02-16

## 2021-02-15 RX ORDER — LEVETIRACETAM 5 MG/ML
500 INJECTION INTRAVASCULAR EVERY 12 HOURS
Status: DISCONTINUED | OUTPATIENT
Start: 2021-02-16 | End: 2021-02-17

## 2021-02-15 RX ORDER — ONDANSETRON 2 MG/ML
4 INJECTION INTRAMUSCULAR; INTRAVENOUS EVERY 6 HOURS PRN
Status: CANCELLED | OUTPATIENT
Start: 2021-02-15

## 2021-02-15 RX ORDER — DEXTROSE MONOHYDRATE 50 MG/ML
100 INJECTION, SOLUTION INTRAVENOUS PRN
Status: CANCELLED | OUTPATIENT
Start: 2021-02-15

## 2021-02-15 RX ORDER — DEXTROSE MONOHYDRATE 25 G/50ML
12.5 INJECTION, SOLUTION INTRAVENOUS PRN
Status: DISCONTINUED | OUTPATIENT
Start: 2021-02-15 | End: 2021-02-27 | Stop reason: HOSPADM

## 2021-02-15 RX ORDER — ALBUTEROL SULFATE 2.5 MG/3ML
2.5 SOLUTION RESPIRATORY (INHALATION) EVERY 6 HOURS PRN
Status: CANCELLED | OUTPATIENT
Start: 2021-02-15

## 2021-02-15 RX ORDER — ACETAMINOPHEN 325 MG/1
650 TABLET ORAL EVERY 4 HOURS PRN
Status: DISCONTINUED | OUTPATIENT
Start: 2021-02-15 | End: 2021-02-27 | Stop reason: HOSPADM

## 2021-02-15 RX ADMIN — FAMOTIDINE 20 MG: 10 INJECTION INTRAVENOUS at 23:07

## 2021-02-15 RX ADMIN — POTASSIUM CHLORIDE 20 MEQ: 29.8 INJECTION, SOLUTION INTRAVENOUS at 23:42

## 2021-02-15 RX ADMIN — DEXTROSE MONOHYDRATE: 50 INJECTION, SOLUTION INTRAVENOUS at 14:41

## 2021-02-15 RX ADMIN — VALPROATE SODIUM 500 MG: 100 INJECTION, SOLUTION INTRAVENOUS at 15:34

## 2021-02-15 RX ADMIN — MAGNESIUM SULFATE 1000 MG: 1 INJECTION INTRAVENOUS at 07:21

## 2021-02-15 RX ADMIN — LEVETIRACETAM 500 MG: 100 INJECTION, SOLUTION INTRAVENOUS at 03:47

## 2021-02-15 RX ADMIN — ACYCLOVIR SODIUM 600 MG: 50 INJECTION, SOLUTION INTRAVENOUS at 08:46

## 2021-02-15 RX ADMIN — POTASSIUM CHLORIDE 20 MEQ: 29.8 INJECTION, SOLUTION INTRAVENOUS at 14:38

## 2021-02-15 RX ADMIN — SODIUM CHLORIDE 0.2 MCG/KG/HR: 9 INJECTION, SOLUTION INTRAVENOUS at 23:07

## 2021-02-15 RX ADMIN — CHLOROTHIAZIDE SODIUM 500 MG: 500 INJECTION, POWDER, LYOPHILIZED, FOR SOLUTION INTRAVENOUS at 16:07

## 2021-02-15 RX ADMIN — BUDESONIDE 500 MCG: 0.5 SUSPENSION RESPIRATORY (INHALATION) at 08:34

## 2021-02-15 RX ADMIN — HEPARIN SODIUM 5000 UNITS: 5000 INJECTION INTRAVENOUS; SUBCUTANEOUS at 23:07

## 2021-02-15 RX ADMIN — SODIUM CHLORIDE 0.2 MCG/KG/HR: 9 INJECTION, SOLUTION INTRAVENOUS at 13:30

## 2021-02-15 RX ADMIN — LEVETIRACETAM 500 MG: 100 INJECTION, SOLUTION INTRAVENOUS at 17:38

## 2021-02-15 RX ADMIN — DOPAMINE HYDROCHLORIDE 2 MCG/KG/MIN: 160 INJECTION, SOLUTION INTRAVENOUS at 01:15

## 2021-02-15 RX ADMIN — DEXTROSE MONOHYDRATE 12.5 G: 25 INJECTION, SOLUTION INTRAVENOUS at 09:01

## 2021-02-15 RX ADMIN — POTASSIUM CHLORIDE 20 MEQ: 29.8 INJECTION, SOLUTION INTRAVENOUS at 13:25

## 2021-02-15 RX ADMIN — DEXTROSE MONOHYDRATE: 50 INJECTION, SOLUTION INTRAVENOUS at 09:17

## 2021-02-15 RX ADMIN — POTASSIUM CHLORIDE 20 MEQ: 29.8 INJECTION, SOLUTION INTRAVENOUS at 06:36

## 2021-02-15 RX ADMIN — POTASSIUM CHLORIDE 20 MEQ: 29.8 INJECTION, SOLUTION INTRAVENOUS at 04:55

## 2021-02-15 RX ADMIN — LORAZEPAM 0.5 MG: 2 INJECTION, SOLUTION INTRAMUSCULAR; INTRAVENOUS at 03:11

## 2021-02-15 RX ADMIN — POTASSIUM CHLORIDE 20 MEQ: 29.8 INJECTION, SOLUTION INTRAVENOUS at 05:33

## 2021-02-15 RX ADMIN — ARFORMOTEROL TARTRATE 15 MCG: 15 SOLUTION RESPIRATORY (INHALATION) at 08:34

## 2021-02-15 RX ADMIN — MAGNESIUM SULFATE 1000 MG: 1 INJECTION INTRAVENOUS at 08:49

## 2021-02-15 NOTE — PLAN OF CARE
Nutrition Problem #1: Severe malnutrition, In context of chronic, non-illness related  Intervention: Food and/or Nutrient Delivery: Continue NPO  Nutritional Goals: Initate diet or oral nutrition support

## 2021-02-15 NOTE — DISCHARGE SUMMARY
[facility]  Office: Doris Singletary, DO, Lucien Shin DO, Keisha Isabel DO, Hussein Bruno, DO, Arcadio Morgan MD, Danyel Fuentes MD, Hugo Ruiz MD, America White MD, Aldo Espinoza MD, José Hedrick MD, Evelyn Calloway MD, Frankie White MD, Alexandru Shafer MD, Martha Tanner DO, Moses Augustin MD, Lyla Castleman, MD, Pablo Pendleton DO, Barbara Ventura MD,  Estella Regan DO, Carlito Oglesby MD, Andra Colin MD, Roxanna Pickard, [facility], [facility], CNP, Balbir Serrano, CNP, Nilda Caba, CNS, Sudheer Hinojosa, [facility], Henry Leon, [facility], Kendall Limon, CNP, Liya Abernathy, [facility], Nhan Simons, CNP, Ligia Nogueira PA-C, Aura Ruiz, [facility], Guy Shepherd, CNP, Zulema Fraser, CNP, Jorje Cade, CNP, Neptali Bolivar, CNP, Jacquie Potter, [facility]    Discharge Summary     Patient ID: Akash Rene  :  1945   MRN: [id number]     ACCOUNT:  [de-identified]   Patient's PCP: Virginia Wong MD  Admit Date: 2021   Discharge Date: 2/15/2021      Length of Stay: 9  Code Status:  Full Code  Admitting Physician: Natacha Pastor DO  Discharge Physician: Carlito Oglesby MD     Active Discharge Diagnoses:     Hospital Problem Lists:  Principal Problem:    Acute systolic heart failure ([facility] [address]  Active Problems:    COPD (chronic obstructive pulmonary disease) ([facility] [address]    Acute respiratory failure with hypoxemia ([facility] [address]    Type 2 diabetes mellitus without complication, without long-term current use of insulin (HCC)    Chronic diastolic heart failure (HCC)    Elevated troponin    Community acquired pneumonia of right middle lobe of lung    Chronic respiratory failure (HCC)    Mild malnutrition ([address]    Hypotension    DEONTE (acute kidney injury) ([facility] [address]    Current every day smoker    Essential hypertension    Altered mental status    Severe malnutrition ([facility] [address]    Abnormal EEG    Acute encephalopathy  Resolved Problems:    * No resolved hospital problems. *      Admission Condition:  serious     Discharged Condition: serious    Hospital Stay:     Hospital Course:  Jared Bonilla is a 77 y.o. female who was admitted for the management of    Acute systolic heart failure ([facility] [address] , presented to ER with   Dizziness (onset 2 weeks), Fall (difficulty walking), and Aphasia (onset 4 days ago)      1. Acute respiratory failure   2. COPD exacerbation   3. CAP   4. AMS   - Pulmonary discussed with neurology on consult; Seizures vs herpetic encephalitis or left sided stroke ; needs MRI / LP ; to start Acyclovir     - per  Neurology:   Acute encephalopathy; toxic metabolic encephalopathy with septic shock on admit  Abnormal EEG with periodic epileptiform discharges; was loaded with IV Keppra and Depacon and on sedation and continued on Keppra and Depakote. Because of bradycardia; IV Vimpat not started. Patient is initiated on IV acyclovir while waiting for spinal tap. CT head, MRI brain didn't show any evidence to explain PLED pattern. Therefore, she needs spinal tap. Patient had spinal tap through IR this evening; reviewed CSF findings;  CSF: Protein 25, , WBC 0. Findings do not indicate meningoencephalitis. Patient needs to be transferred to medical ICU at [facility] and then neuro critical care team to be contacted for continuous video EEG monitoring. Neurology team for LTME is aware of possible transfer tonight.       - per ID recommendations:   Recommendations   · The patient was started on acyclovir due to the concern for HSV encephalitis but the MRI does not seem to suggest any significant inflammatory temperature changes  · Ultimately the patient will require a lumbar puncture to rule this out  · The patient did receive Rocephin and azithromycin and subsequently switched to cefepime but again I doubt that there is a primary acute infectious process here  · The history is consistent with a slow/progressive clinical decline which is not what 1 would see with sepsis and the procalcitonin levels are low  · I suspect that hypotension is multifactorial especially in a patient with severe cardiomyopathy  · I will stop the cefepime at this point in time  · The patient remains on high flow O2/BiPAP and ultimately has interstitial lung disease/fibrosis with secondary pulmonary hypertension that has clearly progressed on imaging  · The overall prognosis is poor  · It would seem that hospice/comfort care would be appropriate    - pulmonary consult placed   - pt stepped down from ICU to CVICU yesterday   - ABG, UA, chest xray, ammonia, lactic acid were all ordered and checked  - Pt also became hypotensive through the late afternoon, requiring levophed to be started and right femoral arterial and central ine placement. Levophed no longer running   - started on iv solu cortef   - pro juan normal   - continue zithromax  - rocephin changed to cefepime  - blood cultures NGTD   - COVID negative  - maintain continuous pulse ox   - continue RT protocol prn breathing treatments   - started on IV solumedrol q6hr   - d/c symbicort, started on brovana and budesonide aerosols q12hr  - abg done yesterday showed pH 7.35 with pO2 of 62  - CT chest done on 2/9, see above for results   - chest xray done this morning showed pulmonary vascular congestion   - pt overall poor prognosis   - repeat CBG done   - IV lasix increased to 60 mg daily   - will have lengthy discussion with family regarding code status and expectaiotn s  - neurology consulted   - eeg done  - mri ordered  - cT head w/o ordered  - pt placed on keppra and valproate         4. Acute CHF   5.  Elevated troponin   - cardio on board   - pt noted to have acute systolic HF which is likely contributing to her acute respiratory failure along with COPD exac   - coreg dose decreased to 3.125 BID  - lisinopril decreased to 2.5 daily  - continue lasix at this time 60 mg IV daily per cardio recs   - CHF education   - per gab if her ejection fraction remains below 35% despite medical treatment then she will be considered for ICD placement for primary prevention of sudden cardiac death. - negative 3 L fluid balance since admit   - continue to monitor Is/Os   - repeat pBNP on 2/9 was [id number]  - d/c IV heparin drip and start prophylactic dose if okay with cardio     6. DEONTE   - BUN/Cr trended up this morning to 39/1.38  - will adjust lasix dosage to 60 mg   - monitor output   - renally dose all meds  - avoid nephrotoxic agents     7. HTN   - continue coreg and lisinopril   - hold above for SBP less than 100  - v/s per unit protocol      8. DMII   - continue accu checks ac/hs with ISS      9. GERD   - PPI      10. Depression   - continue home zoloft     11. DVT prophylaxis   - heparin      Prognosis guarded - I met with the patients family  Yesterday afternoon and discussed code status along with plan of care. They would like the patient to remain a full code at this time but do not want her intubated in order to get MRI/LP done at this time.      Significant therapeutic interventions: see above     Significant Diagnostic Studies:   Labs / Micro:  CBC:   Lab Results   Component Value Date    WBC 12.1 02/15/2021    RBC 4.73 02/15/2021    HGB 11.6 02/15/2021    HCT 36.9 02/15/2021    MCV 78.0 02/15/2021    MCH 24.5 02/15/2021    MCHC 31.4 02/15/2021    RDW 26.2 02/15/2021     02/15/2021     BMP:    Lab Results   Component Value Date    GLUCOSE 144 02/15/2021     02/15/2021    K 3.1 02/15/2021     02/15/2021    CO2 25 02/15/2021    ANIONGAP 10 02/15/2021    BUN 27 02/15/2021    CREATININE 0.75 02/15/2021    BUNCRER 36 02/15/2021    CALCIUM 8.4 02/15/2021    LABGLOM >60 02/15/2021    GFRAA >60 02/15/2021    GFR      02/15/2021    GFR NOT REPORTED 02/15/2021     HFP:    Lab Results   Component Value Date    PROT 6.6 02/14/2021     CMP:    Lab Results   Component Value Date    GLUCOSE 144 02/15/2021     02/15/2021    K 3.1 02/15/2021     02/15/2021    CO2 25 02/15/2021    BUN 27 02/15/2021    CREATININE 0.75 02/15/2021    ANIONGAP 10 02/15/2021    ALKPHOS 50 02/14/2021    ALT 15 02/14/2021    AST 34 02/14/2021    BILITOT 0.63 02/14/2021    LABALBU 2.5 02/14/2021    ALBUMIN NOT REPORTED 02/14/2021    LABGLOM >60 02/15/2021    GFRAA >60 02/15/2021    GFR      02/15/2021    GFR NOT REPORTED 02/15/2021    PROT 6.6 02/14/2021    CALCIUM 8.4 02/15/2021     PT/INR:    Lab Results   Component Value Date    PROTIME 16.4 02/08/2021    INR 1.3 02/08/2021     PTT:   Lab Results   Component Value Date    APTT 40.0 02/08/2021     FLP:    Lab Results   Component Value Date    CHOL 114 10/15/2019    TRIG 64 10/15/2019    HDL 38 10/15/2019     U/A:    Lab Results   Component Value Date    COLORU YELLOW 02/06/2021    TURBIDITY CLOUDY 02/06/2021    SPECGRAV 1.025 02/06/2021    HGBUR NEGATIVE 02/06/2021    PHUR 6.0 02/06/2021    PROTEINU 2+ 02/06/2021    GLUCOSEU NEGATIVE 02/06/2021    GLUCOSEU NEGATIVE 10/24/2011    KETUA NEGATIVE 02/06/2021    BILIRUBINUR  02/06/2021     Presumptive positive. Unable to confirm due to unavailability of reagent. BILIRUBINUR NEGATIVE 10/24/2011    UROBILINOGEN Normal 02/06/2021    NITRU NEGATIVE 02/06/2021    LEUKOCYTESUR NEGATIVE 02/06/2021     TSH:    Lab Results   Component Value Date    TSH 0.47 10/15/2019         Radiology:  Xr Chest (single View Frontal)    Result Date: 2/15/2021  1. Interval progression of multifocal pneumonia within the bilateral mid and lower lung zones. 2. Stable mild-to-moderate interstitial pulmonary edema. 3. Stable cardiomegaly. Ct Head Wo Contrast    Result Date: 2/13/2021  [ Stable CT study when compared to 02/06/2021. No acute intracranial abnormalities are noted. Xr Chest Portable    Result Date: 2/12/2021  Cardiomegaly and chronic pulmonary change with scattered pulmonary opacities.      Xr Chest Portable    Result Date: 2/11/2021  Generalized interstitial prominence and pulmonary vascular congestion. Cardiomegaly. No definite pleural effusion or pneumothorax. Xr Chest Portable    Result Date: 2/10/2021  Increased right upper lobe opacity may represent developing pneumonia. Chronic interstitial lung changes with superimposed pulmonary edema. Ct Chest High Resolution    Result Date: 2/9/2021  1. Note: Evaluation of lungs is significantly limited by patient breathing motion related artifact. 2. Severe bilateral lung extensive parenchymal changes, as discussed above, consistent with interstitial lung disease. Question overlying pulmonary interstitial edema. Findings most suggestive of usual interstitial pneumonia (UIP). Findings appear worsened in comparison with prior August 2020 CT chest examination. 3. Mild layering bilateral posterior pleural effusions, as discussed above. 4. Moderate cardiomegaly with marked dilatation of the right atrium. Recommend clinical correlation. Cardiomegaly in association with pleural effusions and likely pulmonary interstitial edema compatible with patient reported history of CHF. 5. Superior middle mediastinal lymphadenopathy, as described above. Differential diagnostic considerations include association with infectious disease, inflammatory disease versus neoplastic process. 6. Marked atherosclerotic calcification involving the aortic arch and origin of the great vessels. 7. Moderate scattered subcutaneous edema. Ir Lumbar Puncture For Diagnosis    Result Date: 2/15/2021  Successful fluoroscopic-guided diagnostic lumbar puncture, as. Mri Brain Wo Contrast    Result Date: 2/13/2021  No acute intracranial abnormality within limits of motion artifact.        Consultations:    Consults:     Final Specialist Recommendations/Findings:   IP CONSULT TO CARDIOLOGY  IP CONSULT TO PULMONOLOGY  IP CONSULT TO SOCIAL WORK  IP CONSULT TO NEUROLOGY  IP CONSULT TO INFECTIOUS DISEASES  IP CONSULT TO SOCIAL WORK      The patient was seen and examined on day of discharge and this discharge summary is in conjunction with any daily progress note from day of discharge. General appearance:  moderate distress  Mental Status:  lethargic   HENT: high-flow nasal cannula present  Lungs: diminished lung sounds throughout all lung fields, no wheezes or rhonchi appreciated increased effort today  Heart:  regular rate and rhythm, no murmur, ectopy noted  Abdomen:  soft, nontender, nondistended, normal bowel sounds  Extremities:  no edema, redness, tenderness in the calves  Skin:  no gross lesions, rashes, induration    Discharge plan:     Disposition: [facility]    Physician Follow Up:   DR Jomar GIBSON IS ACCEPTING [address]  No follow-up provider specified.      Requiring Further Evaluation/Follow Up POST HOSPITALIZATION/Incidental Findings:      Diet: NPO    Activity: BEDREST       Discharge Medications:      Medication List      STOP taking these medications    amLODIPine 5 MG tablet  Commonly known as: NORVASC     fluticasone-salmeterol 250-50 MCG/DOSE Aepb  Commonly known as: ADVAIR     metFORMIN 500 MG tablet  Commonly known as: GLUCOPHAGE     predniSONE 10 MG tablet  Commonly known as: Willie Cushing your doctor about these medications    albuterol (2.5 MG/3ML) 0.083% nebulizer solution  Commonly known as: PROVENTIL  Take 3 mLs by nebulization every 6 hours as needed for Wheezing or Shortness of Breath     aspirin 81 MG tablet     atorvastatin 20 MG tablet  Commonly known as: LIPITOR     calcium-vitamin D 500-200 MG-UNIT per tablet  Commonly known as: [id number]     CERTAVITE SENIOR PO     furosemide 20 MG tablet  Commonly known as: LASIX  Ask about: Which instructions should I use?     ibuprofen 800 MG tablet  Commonly known as: ADVIL;MOTRIN     lisinopril 20 MG tablet  Commonly known as: PRINIVIL;ZESTRIL     loratadine 10 MG tablet  Commonly known as: CLARITIN     pantoprazole 20 MG tablet  Commonly known as: PROTONIX  Take 1 tablet by mouth daily

## 2021-02-15 NOTE — PLAN OF CARE
Problem: Falls - Risk of:  Goal: Will remain free from falls  Description: Will remain free from falls  Outcome: Met This Shift  Goal: Absence of physical injury  Description: Absence of physical injury  Outcome: Met This Shift     Problem: Injury - Risk of, Physical Injury:  Goal: Will remain free from falls  Description: Will remain free from falls  Outcome: Met This Shift  Goal: Absence of physical injury  Description: Absence of physical injury  Outcome: Met This Shift     Problem: OXYGENATION/RESPIRATORY FUNCTION  Goal: Patient will maintain patent airway  Outcome: Ongoing  Goal: Patient will achieve/maintain normal respiratory rate/effort  Description: Respiratory rate and effort will be within normal limits for the patient  Outcome: Ongoing     Problem: Skin Integrity:  Goal: Will show no infection signs and symptoms  Description: Will show no infection signs and symptoms  Outcome: Ongoing  Goal: Absence of new skin breakdown  Description: Absence of new skin breakdown  Outcome: Ongoing

## 2021-02-15 NOTE — PROGRESS NOTES
Pulmonary Critical Care Progress Note       Patient seen for the follow up of acute on chronic hypoxic respiratory failure, acute exacerbation of COPD/emphysema, interstitial lung diseaseprogressive pulmonary fibrosis with probable acute exacerbation, active smoking, pulmonary hypertension,  Acute systolic heart failure (HCC)     Subjective:  She underwent MRI of the brain and LP by interventional radiology while on sedation. I was not contacted to clear her for testing or procedure. NG was not inserted as I requested and Seroquel was not started. She has been on 40% 50 l/min . She was taken off Precedex and placed on Versed at 1 mg an hour. She has been off BiPAP. She has   Been NPO. Blood pressure stable. .  Family decided against intubation. .  She is more alert but does not volunteer history and is moaning moving around in bed. Examination:  Vitals: /86   Pulse 72   Temp 96.9 °F (36.1 °C) (Temporal)   Resp 19   Ht 5' 2\" (1.575 m)   Wt 135 lb (61.2 kg)   SpO2 96%   BMI 24.69 kg/m²   General appearance:   Agitated  does not answer questions moves head does not follow commands mumbles words she  hand but not to command  Neck: No JVD  Lungs: Decreased breath sounds no crackles   Heart: regular rate and rhythm, S1, S2 normal, no gallop  Abdomen: Soft, non tender, + BS  Extremities: no cyanosis or clubbing. Positive edema    LABs:  CBC:   Recent Labs     02/14/21  0551 02/15/21  0324   WBC 10.1 12.1*   HGB 11.1* 11.6*   HCT 35.3* 36.9    203     BMP:   Recent Labs     02/14/21  0551 02/14/21  0551 02/15/21  0324 02/15/21  1136   *  --  145*  --    K 3.2*   < > 2.7* 3.1*   CO2 23  --  25  --    BUN 38*  --  27*  --    CREATININE 1.09*  --  0.75  --    LABGLOM 49*  --  >60  --    GLUCOSE 140*  --  144*  --     < > = values in this interval not displayed. Results for Joey Lundberg (MRN [id number]) as of 2/13/2021 13:12   Ref.  Range 2/12/2021 10:49   POC pH Latest Ref Range: 7.35 - 7.45  7.43   POC pH Temp Unknown NOT REPORTED   POC pCO2 Latest Ref Range: 32 - 45 mm Hg 31 (L)   POC pCO2 Temp Latest Units: mm Hg NOT REPORTED   POC PO2 Latest Ref Range: 75 - 95 mm Hg 95   POC pO2 Temp Latest Units: mm Hg NOT REPORTED   POC HCO3 Latest Ref Range: 22 - 27 mmol/L 20.2 (L)   POC O2 SAT Latest Units: % 98   Results for Allen Ulrich (MRN [id number]) as of 2/15/2021 15:02   Ref. Range 2/9/2021 04:22 2/14/2021 05:51   Pro-BNP Latest Ref Range: <300 pg/mL 62,364 (H) 110,225 (H)     Results for Allen Ulrich (MRN [id number]) as of 2/15/2021 15:02   Ref. Range 2/15/2021 03:24   Procalcitonin Latest Ref Range: <0.09 ng/mL 0.06   Results for Allen Ulrich (MRN [id number]) as of 2/15/2021 15:02   Ref. Range 2/15/2021 03:24   Ammonia Latest Ref Range: 11 - 51 umol/L 64 (H)       Radiology:  EEG 2/15   This EEG shows the presence of moderate diffuse slowing. No lateralized or epileptiform disturbance is seen. Chest x-ray 2/15    1. Interval progression of multifocal pneumonia within the bilateral mid and   lower lung zones. 2. Stable mild-to-moderate interstitial pulmonary edema. 3. Stable cardiomegaly             X-ray chest: 2/12/2021        CT chest 2/9    MRI brain 2/13    No acute intracranial abnormality within limits of motion artifact  Echocardiogram 2/8  Left ventricle is normal in size and wall thickness. Global left ventricular systolic function is severely reduced with an  estimated ejection fraction of 20 % . Severe global hypokinesis. Left atrium is mildly dilated. Right atrium is moderately dilated . Right ventricular dilatation with reduced systolic function. Thickened mitral valve leaflets. Mild to moderate mitral regurgitation. Moderate tricuspid regurgitation. Moderate pulmonary hypertension.  Estimated right ventricular systolic  pressure is 09NRGH.     Impression:  · Acute on chronic hypoxic respiratory failure  · Acute exacerbation of COPD/emphysema  · Pulmonary edema/severe cardiomyopathy  · Interstitial lung disease/progressive pulmonary fibrosis with probable acute exacerbation  ·  seizure activity localizing to left hemisphere / alcohol withdrawal; MRI no evidence of infectious changes  · Active smoking/65-pack-year smoking history  · Moderate TR/secondary pulmonary hypertension of moderate severity, RVSP 54mmHg  · Alcohol abuse/withdrawal  · DM/CAD/CHF    Recommendations:  · Oxygen via high flow for sat > 90%  · BiPAP as tolerated  · Discontinue Versed; concern for unintended oversedation since its longer acting than Precedex and may lead to respiratory failure/respiratory arrest ; patient is not for intubation  · Resume Precedex for agitation /avoid over sedation with Ativan  · Duoneb  · Brovana and Pulmicort nebs BID  · NPO   ·  insert NG /consult dietitian for tube feeds  ·  start Seroquel 25 b.i.d. /monitor QTC  · Continue IV cefepime /acyclovir added  · Discontinue D5W and monitor sodium/replace potassium  · diuril 500 IV x1  · Cardiology on consult  · Thiamine folate and MVI   · Start lactulose monitor ammonia level  ·  Keppra and Depakote per Neurology  · No intubation per family request  · Discussed with RN    · Noted primary has initiated to transfer to [place] [id number] for further neurology evaluation  · Poor prognosis  · DVT prophylaxis ;  resume heparin 5000 every 12        Tristen Blanco MD on 2/15/2021 at 2:57 PM  Pulmonary Critical Care and Sleep Medicine,  [facility] AT [place]: [phone]  cc35 min

## 2021-02-15 NOTE — FLOWSHEET NOTE
02/15/21 1435   Provider Notification   Reason for Communication Review case;Evaluate;New orders  (Rvw labs/tests(attn EEG,Spinal tap),meds/IVF(attn Precedex))   Provider Name Dr. oLrena Pedro   Provider Notification Physician   Method of Communication Secure Message   Response See orders  (Meningitis panel for CSF,cont. Keppra,Depacon&Precedex )   Notification Time 26   Dr. Lorena Pedro also informed of patient's family (azalea, Trina & grandson, Scott Mcclellan) request for a call to review his test results and plan of care. Dr. Lorena Pedro reviewed test and lab results including EEGs and Lumbar puncture, medications and IVF(attention Precedex infusion), and vitals. Dr. Lorena Pedro confirmed to continue Keppra, Depacon and Precedex.

## 2021-02-15 NOTE — PROGRESS NOTES
NEUROLOGY INPATIENT PROGRESS NOTE- ADDENDUM    2/14/2021         Patient had spinal tap through IR this evening; reviewed CSF findings;  CSF: Protein 25, , WBC 0. Findings do not indicate meningoencephalitis. Patient needs to be transferred to medical ICU at [facility] and then neuro critical care team to be contacted for continuous video EEG monitoring. Neurology team for LTME is aware of possible transfer tonight.   Spoke to patient's nurse, RN, Ms. Riky Fraser MD 2/14/2021 8:26 PM

## 2021-02-15 NOTE — PROGRESS NOTES
Physical Therapy  DATE: 2/15/2021    NAME: Adilson Clayton  MRN: [id number]   : 1945    Patient not seen this date for Physical Therapy due to:  [] Blood transfusion in progress  [] Cancel by RN  [] Hemodialysis  []  Refusal by Patient   [] Spine Precautions   [] Strict Bedrest  [] Surgery  [] Testing      [x] Other - pt not medically appropriate. [] PT being discontinued at this time. Patient independent. No further needs. [] PT being discontinued at this time as the patient has been transferred to hospice care. No further needs.     Nguyen Donaldson, PT

## 2021-02-15 NOTE — PROGRESS NOTES
Section of Cardiology  Progress Note      Date:  2/15/2021  Patient: Domonique Puri  Admission:  2/6/2021  2:27 PM  Admit DX: Hypotension [I95.9]  Age:  77 y. o., 1945     LOS: 9 days     Reason for evaluation:   cardiomyopathy and CHF      SUBJECTIVE:     The patient was seen and examined. Notes and labs reviewed. Patient remains on Precedex drip and does not answer questions appropriately. She remains on high flow nasal cannula oxygen. She required dopamine through the night that is now off and blood pressure is stable. She has not been able to take oral medication and has not received her aspirim, atorvastatin, carvedilol or lisinopril in the past 6 days. OBJECTIVE:      EXAM:   Vitals:    VITALS:  /86   Pulse 72   Temp 96.9 °F (36.1 °C) (Temporal)   Resp 20   Ht 5' 2\" (1.575 m)   Wt 135 lb (61.2 kg)   SpO2 99%   BMI 24.69 kg/m²   24HR INTAKE/OUTPUT:      Intake/Output Summary (Last 24 hours) at 2/15/2021 1342  Last data filed at 2/15/2021 0949  Gross per 24 hour   Intake 500 ml   Output 2200 ml   Net -1700 ml       CONSTITUTIONAL: Alert, no signs of acute distress  HEENT: Normal jugular venous pulsations  LUNGS: Clear anteriorly, nonlabored  CARDIOVASCULAR: Regular rate and rhythm, with occasional irregularity, 1/6 systolic murmur at the apex  SKIN: Warm and dry. EXTREMITIES: No lower extremity edema.      Current Inpatient Medications:   sodium chloride flush  10 mL Intravenous 2 times per day    budesonide  0.5 mg Nebulization BID    QUEtiapine  25 mg Oral BID    valproate sodium (DEPACON) IVPB  500 mg Intravenous Q12H    acyclovir  10 mg/kg Intravenous Q12H    levetiracetam  500 mg Intravenous Q12H    haloperidol lactate  2 mg Intravenous Once    Arformoterol Tartrate  15 mcg Nebulization BID    carvedilol  3.125 mg Oral BID WC    lisinopril  2.5 mg Oral Daily    insulin lispro  0-6 Units Subcutaneous TID WC    insulin lispro  0-3 Units Subcutaneous Nightly    aspirin 81 mg Oral Daily    atorvastatin  20 mg Oral Daily    cetirizine  5 mg Oral Daily    pantoprazole  20 mg Oral Daily    sertraline  50 mg Oral Daily    calcium carb-cholecalciferol  1 tablet Oral Daily       IV Infusions (if any):   DOPamine Stopped (02/15/21 0330)    dextrose 100 mL/hr at 02/15/21 1300    midazolam (VERSED) 1 mg/mL in D5W infusion 1 mg/hr (02/15/21 1100)    dexmedetomidine (PRECEDEX) IV infusion Stopped (02/14/21 2259)    norepinephrine Stopped (02/11/21 1112)    dextrose         Diagnostics:   Telemetry: Sinus rhythm with PVCs and occasional ventricular triplet    Labs:   CBC:  Recent Labs     02/14/21  0551 02/15/21  0324   WBC 10.1 12.1*   HGB 11.1* 11.6*   HCT 35.3* 36.9    203     Magnesium:  Recent Labs     02/15/21  0324 02/15/21  1136   MG 1.5* 2.2     BMP:  Recent Labs     02/14/21 0551 02/14/21  0551 02/15/21  0324 02/15/21  1136   *  --  145*  --    K 3.2*   < > 2.7* 3.1*   CALCIUM 8.0*  --  8.4*  --    CO2 23  --  25  --    BUN 38*  --  27*  --    CREATININE 1.09*  --  0.75  --    LABGLOM 49*  --  >60  --    GLUCOSE 140*  --  144*  --     < > = values in this interval not displayed. BNP:  Recent Labs     02/14/21 0551   PROBNP 110,225*     PT/INR:No results for input(s): PROTIME, INR in the last 72 hours. APTT:No results for input(s): APTT in the last 72 hours. CARDIAC ENZYMES:No results for input(s): MYOGLOBIN, CKTOTAL, CKMB, CKMBINDEX, TROPHS, TROPONINT in the last 72 hours.   FASTING LIPID PANEL:  Lab Results   Component Value Date    HDL 38 10/15/2019    TRIG 64 10/15/2019     LIVER PROFILE:  Recent Labs     02/14/21  0551 02/14/21  1600   AST 34*  --    ALT 15  --    LABALBU 2.5* 2.5*   ALKPHOS 50  --    BILITOT 0.63  --    BILIDIR 0.38*  --    IBILI 0.25  --    PROT 6.6  --    GLOB NOT REPORTED  --    ALBUMIN NOT REPORTED  --         ASSESSMENT:    · Acute systolic heart failure  · Interstitial lung disease/pulmonary fibrosis/COPD, managed by others · Severe cardiomyopathy with ejection fraction of 20% on echocardiogram 2/8/2021 of unclear etiology, history of ejection fraction 55% on echocardiogram October 2019  · Moderate tricuspid regurgitation and moderate pulmonary hypertension on echocardiogram  · COPD, managed by others  · Diabetes, managed by others  · Metabolic encephalopathy, managed by others  · Hypokalemia/hypernatremia, managed by others  · History of alcohol abuse  · Altered mental status, managed by others    PLAN:  1. Continue current cardiac medications. Potassium was replaced by others. Patient's blood pressure is improved and no longer on dopamine drip. She is unable to take her oral medications. 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 Her CHF seems to be improved some and is currently not on diuretics. Discussed with patient, RN, and Dr. Eduardo Joseph.     Jerrol Kocher, APRN - CNP

## 2021-02-15 NOTE — PROGRESS NOTES
[facility]  Progress Note    Merline Deck  MRN: [id number]  Date: 2/15/2021  LOS: 9     Reason for F/U :   Possible sepsis    Impression :   1. Acute on chronic hypoxic respiratory failure  · Currently on high flow O2 by nasal cannula  2. Interstitial lung disease/progressive pulmonary fibrosis   3. Acute exacerbation of COPD  4. Acute systolic heart failure in a patient with severe cardiomyopathy  5. Secondary pulmonary hypertension  6. Hypotensionnot requiring pressors  7. EtOH abuse  8. Altered mental status likely multifactorial but EEG with left hemispheric PLEDs pattern concern for Epileptogenic focus, herpes encephalitis, CVA  · Status post lumbar puncture with no evidence of an acute infectious process    Recommendations:   · The patient has progressive pulmonary disease which explains the hypoxia and on the chest x-ray today as well as a renal perspective the patient has an acute infectious process. · The encephalopathy/confusion is likely multifactorial.  · The CSF studies are not consistent with a primary CNS infection and I agree with discontinuing the acyclovir  · The patient is going to be transferred to [place] [address] for further evaluation by continuous EEG  · Infectious disease wise I do not see any evidence of an acute infection and we will continue to hold off antimicrobial therapy    Infection Control Recommendations:   Universal precautions    Discharge Planning:   Patient will need Midline Catheter Insertion/ PICC line Insertion: No  Patient will need: Home IV , [place],  SNF,  LTAC: Undetermined  Patient willneed outpatient wound care: No    Medical Decision making / Summary of Stay:   Merline Deck is a 77y.o.-year-old female who was initially admitted on 2/6/2021. Nayely Vergara is seen in consultation on hospital day #8 and she was admitted with generalized weakness/not feeling well, altered mentation that had been progressing over the past few weeks. Patient was having difficulty walking and has a history of EtOH abuse and was found to be hypotensive and hypoxic in the emergency department with chest x-ray showing bilateral interstitial opacities. She had improvement in the blood pressure with fluid resuscitation CT of the head did not show any acute abnormalities the patient was started on Rocephin and azithromycin. The patient has been on BiPAP on and off, continues to require high flow on and off and has hypotension. The patient remains encephalopathic and an EEG shows lateralized left hemispheric PLEDs pattern and herpes encephalitis and CVA needed to be considered though this may be an epileptogenic interictal pattern     I was asked to evaluate for shockquestionable septic. Current evaluation:2/15/2021    BP (!) 120/107   Pulse 55   Temp 96.9 °F (36.1 °C) (Temporal)   Resp 16   Ht 5' 2\" (1.575 m)   Wt 135 lb (61.2 kg)   SpO2 95%   BMI 24.69 kg/m²     Temperature Range: Temp: 96.9 °F (36.1 °C) Temp  Av °F (36.1 °C)  Min: 96.6 °F (35.9 °C)  Max: 97.2 °F (36.2 °C)  The patient is seen and evaluated at bedside she is on high flow O2 at 45 L and 35%. She is awake and not really able to follow commands for me. He is confused and her simple answers are not reliable. Review of Systems   Unable to perform ROS: Mental status change       Physical Examination :     Physical Exam  Constitutional:       Appearance: She is well-developed. She is cachectic. She is ill-appearing. HENT:      Head: Normocephalic and atraumatic. Neck:      Musculoskeletal: Normal range of motion and neck supple. Cardiovascular:      Rate and Rhythm: Regular rhythm. Heart sounds: Normal heart sounds. Pulmonary:      Breath sounds: Rhonchi present. Abdominal:      General: Bowel sounds are normal.      Palpations: Abdomen is soft. Skin:     General: Skin is warm and dry. Neurological:      Mental Status: She is alert. Laboratory data:    I have independently reviewed the followinglabs:  CBC with Differential:   Recent Labs     02/14/21  0551 02/15/21  0324   WBC 10.1 12.1*   HGB 11.1* 11.6*   HCT 35.3* 36.9    203   LYMPHOPCT 18* 18*   MONOPCT 7 4     BMP:   Recent Labs     02/14/21  0551 02/14/21  1329 02/15/21  0324   *  --  145*   K 3.2* 3.5* 2.7*   *  --  110*   CO2 23  --  25   BUN 38*  --  27*   CREATININE 1.09*  --  0.75   MG 1.8  --  1.5*     Hepatic Function Panel:   Recent Labs     02/14/21  0551 02/14/21  1600   PROT 6.6  --    LABALBU 2.5* 2.5*   BILIDIR 0.38*  --    IBILI 0.25  --    BILITOT 0.63  --    ALKPHOS 50  --    ALT 15  --    AST 34*  --          Lab Results   Component Value Date    PROCAL 0.07 02/08/2021     Lab Results   Component Value Date    CRP 46.0 08/23/2020     No results found for: SEDRATE      Lab Results   Component Value Date    DDIMER 2.58 04/11/2016     Lab Results   Component Value Date    FERRITIN 130 08/23/2020     Lab Results   Component Value Date     08/23/2020     Lab Results   Component Value Date    FIBRINOGEN 478 08/23/2020       Results in Past 30 Days  Result Component Current Result Ref Range Previous Result Ref Range   SARS-CoV-2      (2/6/2021)  Not in Time Range          (2/6/2021)       Not Detected (2/6/2021) Not Detected       Lab Results   Component Value Date    COVID19 Not Detected 02/06/2021    COVID19 Not Detected 08/23/2020    COVID19 Not Detected 08/20/2020       No results for input(s): [facility] in the last 72 hours.     Volume, CSF 2 MLS IN TUBE 4   Final 02/14/2021  4:00  [facility]   Supernatant Color, CSF COLORLESS   Final 02/14/2021  4:00 PM [facility] [address] [facility]   Appearance, CSF CLEAR   Final 02/14/2021  4:00 PM [address]   Final 02/14/2021  4:00  [facility]   WBC, CSF 0  <5 /mm3 Final 02/14/2021  4:00  [facility]   RBC, CSF 125High   0 /mm3 Final 02/14/2021  4:00  [facility]   Tube Number, CSF 4   Final 02/14/2021  4:00  [address] [facility]     Volume, CSF 8 ML   Final 02/14/2021  4:00  [address] [facility]   Supernatant Color, CSF COLORLESS   Final 02/14/2021  4:00 PM [facility] [address] [facility]   Appearance, CSF CLEAR   Final 02/14/2021  4:00 PM [address]   Final 02/14/2021  4:00  [address] [facility]   WBC, CSF 0  <5 /mm3 Final 02/14/2021  4:00  [address] [facility]   RBC, CSF 103High   0 /mm3 Final 02/14/2021  4:00  [address] [facility]   Tube Number, CSF 1   Final 02/14/2021  4:00  [facility]     Protein, CSF 25.0  15.0 - 45.0 mg/dL Final 02/14/2021  4:00  [address] [facility]           Imaging Studies:   ONE XRAY VIEW OF THE CHEST 2/15/2021 4:27 am    Impression   1. Interval progression of multifocal pneumonia within the bilateral mid and   lower lung zones. 2. Stable mild-to-moderate interstitial pulmonary edema. 3. Stable cardiomegaly. Cultures:     Culture, CSF [[id number]] Collected: 02/14/21 1600   Order Status: Completed Specimen: Spinal Fluid Updated: 02/15/21 0641    Specimen Description . SPINAL FLUID    Special Requests NOT REPORTED    Direct Exam NO NEUTROPHILS SEEN     NO BACTERIA SEEN     Gram stain made from cytocentrifuged specimen.  Organisms and cells will be concentrated.     Culture NO GROWTH 11 HOURS   VDRL CSF [[id number]] Collected: 02/14/21 1600   Order Status: Sent Specimen: CSF Updated: 02/14/21 1648   West Nile Virus, CSF [[id number]] Collected: 02/14/21 1600   Order Status: Sent Specimen: CSF Updated: 02/14/21 1648   Herpes simplex virus PCR [[id number]] Collected: 02/14/21 1600   Order Status: Sent Specimen: CSF Updated: 02/14/21 1648   Lyme Disease AB, CSF [[id number]] Collected: 02/14/21 1600   Order Status: Sent Specimen: CSF Updated: 02/14/21 1646   Gram stain CSF [[id number]] Collected: 02/14/21 1430   Order Status: Canceled Specimen: Spinal Fluid    Culture, Blood 1 [[id number]] Collected: 02/06/21 1509   Order Status: Completed Specimen: Blood Updated: 02/12/21 0022    Specimen Description . BLOOD    Special Requests L AC 7 ML    Culture NO GROWTH 6 DAYS   Culture, Blood 1 [[id number]] Collected: 02/06/21 1505   Order Status: Completed Specimen: Blood Updated: 02/12/21 0022    Specimen Description . BLOOD    Special Requests L AC 7 ML    Culture NO GROWTH 6 DAYS         Medications:      sodium chloride flush  10 mL Intravenous 2 times per day    budesonide  0.5 mg Nebulization BID    QUEtiapine  25 mg Oral BID    valproate sodium (DEPACON) IVPB  500 mg Intravenous Q12H    acyclovir  10 mg/kg Intravenous Q12H    levetiracetam  500 mg Intravenous Q12H    haloperidol lactate  2 mg Intravenous Once    Arformoterol Tartrate  15 mcg Nebulization BID    carvedilol  3.125 mg Oral BID WC    lisinopril  2.5 mg Oral Daily    insulin lispro  0-6 Units Subcutaneous TID WC    insulin lispro  0-3 Units Subcutaneous Nightly    aspirin  81 mg Oral Daily    atorvastatin  20 mg Oral Daily    cetirizine  5 mg Oral Daily    pantoprazole  20 mg Oral Daily    sertraline  50 mg Oral Daily    calcium carb-cholecalciferol  1 tablet Oral Daily           [facility]  [address]  Perfect Serve messaging  OFFICE: [phone]      Electronically signed by [facility]Bl[facility] HospitalTh [facility], [place] on 2/15/2021 at 8:22 AM  Thank you for allowing us to participate in the care of this patient. Please call with questions. This note iscreated with the assistance of a speech recognition program.  While intending to generate a document that actually reflects the content of the visit, the document can still have some errors including those of syntax andsound a like substitutions which may escape proof reading. In such instances, actual meaning can be extrapolated by contextual diversion.

## 2021-02-15 NOTE — PROGRESS NOTES
76 yo wf admitted with on February 6 confusion encephalopathy generalized weakness found to have acute on chronic respiratory failure placed on nasal BIPAP found to have CHF with concern nof community acquired pneumonia along with history of COPD  She he has been encephalopathic obtunded with minor response. Patient is daily drinker MRI of Head normal . Initial EEG with PLEDS left greater than right hemisphere bolused with IV keppra and depakote . Depakote level 57 , keppra 36 on depacon 500 mg IVPB q 12 hours , keppra 500 mg IVPB q 12 hours . CSF analysis WBC 0 ,  , total protein 25 . She was placed on IV acyclovir awaiting HSV PCR from CSF . FU EEG today with moderate diffuse slowing . Last night arrangements were made to transfer to [facility] for LTME with neurocritical care consultation at [facility] awaiting bed . She currently is on high oxygen flow having had some restlessness during he night . Significant medications depacon 500 mg IVPB q 12 hours , keppra 500 mg IVPB q 12 hours , acyclovir IVPB . Testing MRI of Head normal . Initial EEG with PLEDS left greater than right hemisphere Depakote level 57 , keppra 36 .  CSF analysis WBC 0 ,  , total protein 25. FU EEG today with moderate diffuse slowing      Past Medical History:   Diagnosis Date    [facility] failure) [facility]     [facility] obstructive [facility] disease) [facility] [address]     Diabetes mellitus (HCC)     MRSA (methicillin resistant staph aureus) culture positive 8/28/2014    sputum    Tobacco abuse 10/15/2019       Past Surgical History:   Procedure Laterality Date    APPENDECTOMY      HYSTERECTOMY      JOINT REPLACEMENT Bilateral        Family History   Problem Relation Age of Onset    Heart Disease Mother     Diabetes Father        Social History     Socioeconomic History    Marital status: Single     Spouse name: None    Number of children: None    Years of education: None    Highest education level: None   Occupational History    None   Social Needs    Financial resource strain: None    Food insecurity     Worry: None     Inability: None    Transportation needs     Medical: None     Non-medical: None   Tobacco Use    Smoking status: Current Every Day Smoker     Packs/day: 0.50     Types: Cigarettes    Smokeless tobacco: Never Used   Substance and Sexual Activity    Alcohol use:  Yes     Alcohol/week: 70.0 standard drinks     Types: 70 Cans of beer per week     Comment: 840 oz (3, 40oz beers/day)    Drug use: Yes     Types: Marijuana    Sexual activity: None   Lifestyle    Physical activity     Days per week: None     Minutes per session: None    Stress: None   Relationships    Social connections     Talks on phone: None     Gets together: None     Attends Muslim service: None     Active member of club or organization: None     Attends meetings of clubs or organizations: None     Relationship status: None    Intimate partner violence     Fear of current or ex partner: None     Emotionally abused: None     Physically abused: None     Forced sexual activity: None   Other Topics Concern    None   Social History Narrative    None       Current Facility-Administered Medications   Medication Dose Route Frequency Provider Last Rate Last Admin    DOPamine (INTROPIN) 400 mg in dextrose 5 % 250 mL infusion  2-20 mcg/kg/min Intravenous Continuous Mony Francisco MD   Stopped at 02/15/21 0330    LORazepam (ATIVAN) injection 0.5 mg  0.5 mg Intravenous Q4H PRN Mony Francisco MD   0.5 mg at 02/15/21 0311    magnesium sulfate 1000 mg in dextrose 5% 100 mL IVPB  1,000 mg Intravenous PRN Sarita Neri APRN - CNP   Stopped at 02/15/21 0949    sodium chloride flush 0.9 % injection 10 mL  10 mL Intravenous 2 times per day Barbara Leavitt MD        budesonide (PULMICORT) nebulizer suspension 500 mcg  0.5 mg Nebulization BID Mony Francisco MD   500 mcg at 02/15/21 0834    dextrose 5 % solution   Intravenous patch  1 patch Transdermal Daily PRN Naoim Shana Hajadar, DO        acetaminophen (TYLENOL) suppository 650 mg  650 mg Rectal Q6H PRN Naomi [place] Hajadar, DO        calcium carb-cholecalciferol 600-400 MG-UNIT per tab 1 tablet  1 tablet Oral Daily Naomi Shana [place], DO   Stopped at 02/09/21 1110       Allergies   Allergen Reactions    Tiotropium Anaphylaxis and Swelling    Spiriva Handihaler [Tiotropium Bromide Monohydrate] Swelling       ROS:   Constitutional                  Negative for fever and chills   HEENT                            Negative for ear discharge, ear pain, nosebleed  Eyes                                Negative for photophobia, pain and discharge  Respiratory                      Positive dyspnea   Cardiovascular                Negative for orthopnea, claudication and PND  Gastrointestinal               Negative for abdominal pain, diarrhea, blood in stool  Musculoskeletal               Negative for joint pain, negative for myalgia  Skin                                 Negative for rash or itching  Endo/heme/allergies       Negative for polydipsia, environmental allergy  Psychiatric                       Negative for suicidal ideation. Patient is not anxious    Vitals:    02/15/21 1149   BP:    Pulse:    Resp: 20   Temp:    SpO2:      Admission weight: 132 lb (59.9 kg)    Neurological Examination  Constitutional .General exam well groomed   Head/ Ears /Nose/Throat/external ear . Normal exam  Neck and thyroid . Normal size. No bruits  Cardiovascular: Auscultation of heart with regular rate and rhythm   Musculoskeletal. Muscle bulk and tone normal                                                           Muscle strength withdrawing all limbs equally                                                                                No dysmetria or dysdiadokinesis  No tremor   Normal fine motor  Orientation Stuporous . Simple verbal response .  Not oriented   Attention and concentration reduced Language process  simple verbal response  Cranial nerve 2 normal visual threat  Cranial nerve 3, 4 and 6 . Extraocular muscles are intact . Pupils are equal and reactive   Cranial nerve 5 . Intact corneal reflex. Normal facial sensation  Cranial nerve 7 normal exam   Cranial nerve 8. Grossly intact hearing   Cranial nerve 9 and 10. Symmetric palate elevation   Cranial nerve 11 , 5 out of 5 strength   Cranial Nerve 12 midline tongue . No atrophy  Sensation . Equal withdrawal al limbs   Deep Tendon Reflexes normal  Plantar response equivocal bilaterally    Assessment :    Encephalopathy . Toxic metabolic . Nonconvulsive seizures . Respiratory failure     Plan:    Await transfer to [facility] for LTME . Continue Depakote and Keppra .  Meningitis battery panel to CSF to check for HSV PCR

## 2021-02-15 NOTE — PROGRESS NOTES
Nutrition Assessment     Type and Reason for Visit: Reassess    Nutrition Recommendations/Plan:   1. NPO status. Advance diet if appropriate or consider need for NG tube feedings  2. Monitor labs and nutrition plan     Nutrition Assessment:  Patient is declining from a nutritional viewpoint by NPO status for 3 days. Patient is on Precedex for restlessness. If oral diet can not be advanced patient could benefit from NG tube feeding nutrition support. Monitor nutrition plan and labs    Malnutrition Assessment:  Malnutrition Status: Severe malnutrition    Estimated Daily Nutrient Needs:  Energy (kcal): 5080-8435 kcal based on 23-25 kcal/kg; Weight Used for Energy Requirements:  Current     Protein (g): 60-65 gm based on 1.2-1.3 gm/kg; Weight Used for Protein Requirements:  Ideal          Nutrition Related Findings: No edema. Active bowel sounds. BiPap. On Precedex for restlessness and confusion. NPO since 2/12      Current Nutrition Therapies:    Diet NPO Effective Now    Anthropometric Measures:  · Height: 5' 2\" (157.5 cm)  · Current Body Wt: 135 lb (61.2 kg)   · BMI: 24.7    Nutrition Diagnosis:   · Severe malnutrition, In context of chronic, non-illness related related to inadequate protein-energy intake as evidenced by weight loss greater than or equal to 20% in 1 year, severe loss of subcutaneous fat, severe muscle loss, intake 26-50%, intake 0-25%      Nutrition Interventions:   Food and/or Nutrient Delivery:  Continue NPO  Nutrition Education/Counseling:  Education not indicated   Coordination of Nutrition Care:  Continue to monitor while inpatient    Goals:  Initate diet or oral nutrition support       Nutrition Monitoring and Evaluation:   Food/Nutrient Intake Outcomes:  Food and Nutrient Intake, Supplement Intake  Physical Signs/Symptoms Outcomes:  Biochemical Data, Fluid Status or Edema, Skin, Weight     Discharge Planning:     Too soon to determine         Aashish Rash  MFN, RDN, LDN  Lead Clinical Dietitian  RD Office Phone [phone]

## 2021-02-15 NOTE — PROGRESS NOTES
Physician Progress Note      Payton Saucedo  CSN #:                  [id number]  :                       1945  ADMIT DATE:       2021 2:27 PM  [address] [place] DATE:  RESPONDING  PROVIDER #:        RICHARD SHAH          QUERY TEXT:    Pt admitted with Pneumonia. Pt noted to have  Sepsis  being documented by the   NEURO group   If possible, please document in the progress notes and discharge   summary if you are evaluating and /or treating any of the following: The medical record reflects the following:  Risk Factors: age 78 yo with rich medical history comes in for weakness  Clinical Indicators:  WBC --12.1, 10,4, 9.1 ,  encephalopathic unclear cause ,   shock hypovolemic inna agreed. Neuro is documenting Sepsis . Bradycardia,    Per ID-  The history is consistent with a slow/progressive clinical decline   which is not what 1 would see with sepsis and the procalcitonin levels are low   0.07, LA 2.3, 1.1   no fever noted. Treatment : Considering transfer to [email or web address]. Neuro and pulmonary consults ,  LP,    Fluid resuscitation ICU bed, high flow oxygen  Rocephin and Azithromycin, Bipap  just started on acyclovir   Now ID on board. Thank you,  Mars Gonzales, MSN, CDS  Options provided:  -- Sepsis, present on admission  -- No Sepsis,  Pneumonia   only  -- Other - I will add my own diagnosis  -- Disagree - Not applicable / Not valid  -- Disagree - Clinically unable to determine / Unknown  -- Refer to Clinical Documentation Reviewer    PROVIDER RESPONSE TEXT:    This patient has sepsis which was present on admission.     Query created by: Laisha Thornton on 2/15/2021 8:23 AM      Electronically signed by:  Ca Valentino 2/15/2021 1:39 PM

## 2021-02-16 PROBLEM — I50.23 HEART FAILURE, SYSTOLIC, WITH ACUTE DECOMPENSATION (HCC): Status: ACTIVE | Noted: 2021-02-08

## 2021-02-16 LAB
ABSOLUTE EOS #: 0.39 K/UL (ref 0–0.4)
ABSOLUTE IMMATURE GRANULOCYTE: 0 K/UL (ref 0–0.3)
ABSOLUTE LYMPH #: 2.84 K/UL (ref 1–4.8)
ABSOLUTE MONO #: 0.29 K/UL (ref 0.1–0.8)
AMMONIA: 56 UMOL/L (ref 11–51)
ANION GAP SERPL CALCULATED.3IONS-SCNC: 7 MMOL/L (ref 9–17)
BASOPHILS # BLD: 0 % (ref 0–2)
BASOPHILS ABSOLUTE: 0 K/UL (ref 0–0.2)
BUN BLDV-MCNC: 16 MG/DL (ref 8–23)
BUN/CREAT BLD: ABNORMAL (ref 9–20)
CALCIUM SERPL-MCNC: 8.2 MG/DL (ref 8.6–10.4)
CHLORIDE BLD-SCNC: 109 MMOL/L (ref 98–107)
CO2: 23 MMOL/L (ref 20–31)
CREAT SERPL-MCNC: 0.65 MG/DL (ref 0.5–0.9)
CRYPTOCOCCUS NEOFORMANS/GATTI CSF FILM ARR.: NOT DETECTED
CYTOMEGALOVIRUS (CMV) CSF FILM ARRAY: NOT DETECTED
DIFFERENTIAL TYPE: ABNORMAL
EKG ATRIAL RATE: 60 BPM
EKG P AXIS: 54 DEGREES
EKG P-R INTERVAL: 160 MS
EKG Q-T INTERVAL: 554 MS
EKG QRS DURATION: 102 MS
EKG QTC CALCULATION (BAZETT): 554 MS
EKG R AXIS: -37 DEGREES
EKG T AXIS: 149 DEGREES
EKG VENTRICULAR RATE: 60 BPM
ENTEROVIRUS CSF FILM ARRAY: NOT DETECTED
EOSINOPHILS RELATIVE PERCENT: 4 % (ref 1–4)
ESCHERICHIA COLI K1 CSF FILM ARRAY: NOT DETECTED
GFR AFRICAN AMERICAN: >60 ML/MIN
GFR NON-AFRICAN AMERICAN: >60 ML/MIN
GFR SERPL CREATININE-BSD FRML MDRD: ABNORMAL ML/MIN/{1.73_M2}
GFR SERPL CREATININE-BSD FRML MDRD: ABNORMAL ML/MIN/{1.73_M2}
GLUCOSE BLD-MCNC: 113 MG/DL (ref 65–105)
GLUCOSE BLD-MCNC: 49 MG/DL (ref 65–105)
GLUCOSE BLD-MCNC: 53 MG/DL (ref 65–105)
GLUCOSE BLD-MCNC: 61 MG/DL (ref 65–105)
GLUCOSE BLD-MCNC: 68 MG/DL (ref 65–105)
GLUCOSE BLD-MCNC: 76 MG/DL (ref 70–99)
HAEMOPHILUS INFLUENZA CSF FILM ARRAY: NOT DETECTED
HCT VFR BLD CALC: 35.8 % (ref 36.3–47.1)
HEMOGLOBIN: 11.2 G/DL (ref 11.9–15.1)
HHV-6 (HERPESVIRUS 6) CSF FILM ARRAY: NOT DETECTED
HSV-1 CSF FILM ARRAY: NOT DETECTED
HSV-2 CSF FILM ARRAY: NOT DETECTED
IMMATURE GRANULOCYTES: 0 %
LISTERIA MONOCYTOGENES CSF FILM ARRAY: NOT DETECTED
LYMPHOCYTES # BLD: 29 % (ref 24–44)
MAGNESIUM: 1.9 MG/DL (ref 1.6–2.6)
MCH RBC QN AUTO: 24 PG (ref 25.2–33.5)
MCHC RBC AUTO-ENTMCNC: 31.3 G/DL (ref 28.4–34.8)
MCV RBC AUTO: 76.7 FL (ref 82.6–102.9)
MONOCYTES # BLD: 3 % (ref 1–7)
MORPHOLOGY: ABNORMAL
NEISSERIA MENIGITIDIS CSF FILM ARRAY: NOT DETECTED
NRBC AUTOMATED: 0.2 PER 100 WBC
PARECHOVIRUS CSF FILM ARRAY: NOT DETECTED
PDW BLD-RTO: 25.9 % (ref 11.8–14.4)
PLATELET # BLD: ABNORMAL K/UL (ref 138–453)
PLATELET ESTIMATE: ABNORMAL
PLATELET, FLUORESCENCE: 185 K/UL (ref 138–453)
PLATELET, IMMATURE FRACTION: 5.1 % (ref 1.1–10.3)
PMV BLD AUTO: ABNORMAL FL (ref 8.1–13.5)
POTASSIUM SERPL-SCNC: 4.2 MMOL/L (ref 3.7–5.3)
RBC # BLD: 4.67 M/UL (ref 3.95–5.11)
RBC # BLD: ABNORMAL 10*6/UL
SEG NEUTROPHILS: 64 % (ref 36–66)
SEGMENTED NEUTROPHILS ABSOLUTE COUNT: 6.28 K/UL (ref 1.8–7.7)
SODIUM BLD-SCNC: 139 MMOL/L (ref 135–144)
SPECIMEN DESCRIPTION: NORMAL
STREPTOCOCCUS AGALACTIAE CSF FILM ARRAY: NOT DETECTED
STREPTOCOCCUS PNEUMONIAE CSF FILM ARRAY: NOT DETECTED
TROPONIN INTERP: ABNORMAL
TROPONIN T: ABNORMAL NG/ML
TROPONIN, HIGH SENSITIVITY: 67 NG/L (ref 0–14)
VARICELLA-ZOSTER CSF FILM ARRAY: NOT DETECTED
VDRL CSF SCREEN: NONREACTIVE
WBC # BLD: 9.8 K/UL (ref 3.5–11.3)
WBC # BLD: ABNORMAL 10*3/UL

## 2021-02-16 PROCEDURE — 2700000000 HC OXYGEN THERAPY PER DAY

## 2021-02-16 PROCEDURE — 6370000000 HC RX 637 (ALT 250 FOR IP): Performed by: NURSE PRACTITIONER

## 2021-02-16 PROCEDURE — 6360000002 HC RX W HCPCS: Performed by: STUDENT IN AN ORGANIZED HEALTH CARE EDUCATION/TRAINING PROGRAM

## 2021-02-16 PROCEDURE — 95714 VEEG EA 12-26 HR UNMNTR: CPT

## 2021-02-16 PROCEDURE — 85055 RETICULATED PLATELET ASSAY: CPT

## 2021-02-16 PROCEDURE — 6360000002 HC RX W HCPCS: Performed by: FAMILY MEDICINE

## 2021-02-16 PROCEDURE — 94640 AIRWAY INHALATION TREATMENT: CPT

## 2021-02-16 PROCEDURE — 85025 COMPLETE CBC W/AUTO DIFF WBC: CPT

## 2021-02-16 PROCEDURE — 99233 SBSQ HOSP IP/OBS HIGH 50: CPT | Performed by: INTERNAL MEDICINE

## 2021-02-16 PROCEDURE — 93010 ELECTROCARDIOGRAM REPORT: CPT | Performed by: INTERNAL MEDICINE

## 2021-02-16 PROCEDURE — 82140 ASSAY OF AMMONIA: CPT

## 2021-02-16 PROCEDURE — 2580000003 HC RX 258: Performed by: STUDENT IN AN ORGANIZED HEALTH CARE EDUCATION/TRAINING PROGRAM

## 2021-02-16 PROCEDURE — 2580000003 HC RX 258: Performed by: FAMILY MEDICINE

## 2021-02-16 PROCEDURE — 80048 BASIC METABOLIC PNL TOTAL CA: CPT

## 2021-02-16 PROCEDURE — 82947 ASSAY GLUCOSE BLOOD QUANT: CPT

## 2021-02-16 PROCEDURE — 6370000000 HC RX 637 (ALT 250 FOR IP): Performed by: STUDENT IN AN ORGANIZED HEALTH CARE EDUCATION/TRAINING PROGRAM

## 2021-02-16 PROCEDURE — 2060000000 HC ICU INTERMEDIATE R&B

## 2021-02-16 PROCEDURE — 83735 ASSAY OF MAGNESIUM: CPT

## 2021-02-16 PROCEDURE — 6370000000 HC RX 637 (ALT 250 FOR IP): Performed by: FAMILY MEDICINE

## 2021-02-16 PROCEDURE — 92610 EVALUATE SWALLOWING FUNCTION: CPT

## 2021-02-16 PROCEDURE — 99223 1ST HOSP IP/OBS HIGH 75: CPT | Performed by: FAMILY MEDICINE

## 2021-02-16 RX ORDER — DEXTROSE AND SODIUM CHLORIDE 5; .45 G/100ML; G/100ML
INJECTION, SOLUTION INTRAVENOUS CONTINUOUS
Status: DISCONTINUED | OUTPATIENT
Start: 2021-02-16 | End: 2021-02-25

## 2021-02-16 RX ORDER — LANOLIN ALCOHOL/MO/W.PET/CERES
100 CREAM (GRAM) TOPICAL DAILY
Status: DISCONTINUED | OUTPATIENT
Start: 2021-02-16 | End: 2021-02-27 | Stop reason: HOSPADM

## 2021-02-16 RX ORDER — THIAMINE HYDROCHLORIDE 100 MG/ML
100 INJECTION, SOLUTION INTRAMUSCULAR; INTRAVENOUS DAILY
Status: DISCONTINUED | OUTPATIENT
Start: 2021-02-16 | End: 2021-02-16

## 2021-02-16 RX ORDER — FOLIC ACID 1 MG/1
1 TABLET ORAL DAILY
Status: DISCONTINUED | OUTPATIENT
Start: 2021-02-16 | End: 2021-02-27 | Stop reason: HOSPADM

## 2021-02-16 RX ORDER — FUROSEMIDE 10 MG/ML
40 INJECTION INTRAMUSCULAR; INTRAVENOUS DAILY
Status: DISCONTINUED | OUTPATIENT
Start: 2021-02-17 | End: 2021-02-18

## 2021-02-16 RX ORDER — CLONAZEPAM 1 MG/1
1 TABLET ORAL ONCE
Status: COMPLETED | OUTPATIENT
Start: 2021-02-16 | End: 2021-02-16

## 2021-02-16 RX ADMIN — Medication 81 MG: at 10:02

## 2021-02-16 RX ADMIN — BUDESONIDE 500 MCG: 0.5 INHALANT RESPIRATORY (INHALATION) at 20:51

## 2021-02-16 RX ADMIN — LEVETIRACETAM 500 MG: 5 INJECTION INTRAVENOUS at 19:01

## 2021-02-16 RX ADMIN — ARFORMOTEROL TARTRATE 15 MCG: 15 SOLUTION RESPIRATORY (INHALATION) at 20:51

## 2021-02-16 RX ADMIN — DIVALPROEX SODIUM 500 MG: 500 TABLET, EXTENDED RELEASE ORAL at 21:49

## 2021-02-16 RX ADMIN — ATORVASTATIN CALCIUM 20 MG: 20 TABLET, FILM COATED ORAL at 10:02

## 2021-02-16 RX ADMIN — DIVALPROEX SODIUM 500 MG: 500 TABLET, EXTENDED RELEASE ORAL at 10:02

## 2021-02-16 RX ADMIN — HEPARIN SODIUM 5000 UNITS: 5000 INJECTION INTRAVENOUS; SUBCUTANEOUS at 05:34

## 2021-02-16 RX ADMIN — DEXTROSE MONOHYDRATE 12.5 G: 25 INJECTION, SOLUTION INTRAVENOUS at 18:54

## 2021-02-16 RX ADMIN — SODIUM CHLORIDE, PRESERVATIVE FREE 10 ML: 5 INJECTION INTRAVENOUS at 08:43

## 2021-02-16 RX ADMIN — CLONAZEPAM 1 MG: 1 TABLET ORAL at 13:03

## 2021-02-16 RX ADMIN — FOLIC ACID 1 MG: 1 TABLET ORAL at 13:01

## 2021-02-16 RX ADMIN — BUDESONIDE 500 MCG: 0.5 INHALANT RESPIRATORY (INHALATION) at 08:22

## 2021-02-16 RX ADMIN — Medication 1 TABLET: at 10:02

## 2021-02-16 RX ADMIN — PANTOPRAZOLE SODIUM 20 MG: 20 TABLET, DELAYED RELEASE ORAL at 10:02

## 2021-02-16 RX ADMIN — SODIUM CHLORIDE, PRESERVATIVE FREE 10 ML: 5 INJECTION INTRAVENOUS at 21:53

## 2021-02-16 RX ADMIN — ARFORMOTEROL TARTRATE 15 MCG: 15 SOLUTION RESPIRATORY (INHALATION) at 08:22

## 2021-02-16 RX ADMIN — LEVETIRACETAM 500 MG: 5 INJECTION INTRAVENOUS at 05:34

## 2021-02-16 RX ADMIN — Medication 100 MG: at 13:01

## 2021-02-16 RX ADMIN — ENOXAPARIN SODIUM 40 MG: 40 INJECTION SUBCUTANEOUS at 13:01

## 2021-02-16 RX ADMIN — POTASSIUM CHLORIDE 20 MEQ: 29.8 INJECTION, SOLUTION INTRAVENOUS at 00:52

## 2021-02-16 RX ADMIN — SERTRALINE 50 MG: 50 TABLET, FILM COATED ORAL at 10:02

## 2021-02-16 RX ADMIN — DEXTROSE MONOHYDRATE 12.5 G: 25 INJECTION, SOLUTION INTRAVENOUS at 13:34

## 2021-02-16 ASSESSMENT — PAIN SCALES - GENERAL: PAINLEVEL_OUTOF10: 0

## 2021-02-16 ASSESSMENT — PAIN - FUNCTIONAL ASSESSMENT: PAIN_FUNCTIONAL_ASSESSMENT: 0-10

## 2021-02-16 NOTE — PROGRESS NOTES
Critical Care Team - Daily Progress Note      Date and time: 2/16/2021 4:33 PM  Patient's name:  Richa Boswell  Medical Record Number: [id number]  Patient's account/billing number: [de-identified]  Patient's YOB: 1945  Age: 77 y.o. Date of Admission: 2/15/2021  6:29 PM  Length of stay during current admission: 1      Primary Care Physician: Anthony Dickson MD  ICU Attending Physician: Dr. June Breaker Status: Full Code    Reason for ICU admission: L TME      SUBJECTIVE:     OVERNIGHT EVENTS:       Afebrile, hemodynamically stable, off pressors. No acute events overnight. Was initially on Precedex but weaned off. Currently on high flow40% FiO2 at 40.     AWAKE & FOLLOWING COMMANDS:  [] No   [] Yes    CURRENT VENTILATION STATUS:     [] Ventilator  [] BIPAP  [] Nasal Cannula [] Room Air        SECRETIONS Amount:  [] Small [] Moderate  [] Large  [] None  Color:     [] White [] Colored  [] Bloody    SEDATION:  RAAS Score:  [] Propofol gtt  [] Versed gtt  [] Ativan gtt   [] No Sedation    PARALYZED:  [] No    [] Yes    DIARRHEA:                [] No                [] Yes  (C. Difficile status: [] positive                                                                                                                       [] negative                                                                                                                     [] pending)    VASOPRESSORS:  [] No    [] Yes    If yes -   [] Levophed       [] Dopamine     [] Vasopressin       [] Dobutamine  [] Phenylephrine         [] Epinephrine    CENTRAL LINES:     [] No   [] Yes   (Date of Insertion:   )           If yes -     [] Right IJ     [] Left IJ [] Right Femoral [] Left Femoral                   [] Right Subclavian [] Left Subclavian       LUCIA'S CATHETER:   [] No   [] Yes  (Date of Insertion:   )     URINE OUTPUT:            [] Good   [] Low              [] Anuric      OBJECTIVE:     VITAL SIGNS:  BP 95/76   Pulse 70   Temp 98.6 °F  insulin lispro  0-12 Units Subcutaneous TID     insulin lispro  0-6 Units Subcutaneous Nightly    pantoprazole  20 mg Oral Daily    calcium carbonate-vitamin D3  1 tablet Oral Daily    Arformoterol Tartrate  15 mcg Nebulization BID    levetiracetam  500 mg Intravenous Q12H    sodium chloride flush  10 mL Intravenous 2 times per day    divalproex  500 mg Oral BID     Continuous Infusions:   dextrose 5 % and 0.45 % NaCl      dextrose       PRN Meds:       glucose, 15 g, PRN      dextrose, 12.5 g, PRN      glucagon (rDNA), 1 mg, PRN      dextrose, 100 mL/hr, PRN      albuterol, 2.5 mg, Q6H PRN      sodium chloride flush, 10 mL, PRN      promethazine, 12.5 mg, Q6H PRN    Or      ondansetron, 4 mg, Q6H PRN      polyethylene glycol, 17 g, Daily PRN      acetaminophen, 650 mg, Q6H PRN      bisacodyl, 5 mg, Daily PRN      acetaminophen, 650 mg, Q4H PRN      potassium chloride, 20 mEq, PRN          VENT SETTINGS (Comprehensive) (if applicable):  Vent Information  Skin Assessment: Clean, dry, & intact  FiO2 : (S) 40 %  SpO2: 96 %  SpO2/FiO2 ratio: 222.22  Additional Respiratory  Assessments  Pulse: 70  Resp: 22  SpO2: 96 %  Oral Care: Mouth swabbed    ABGs:     Laboratory findings:    Complete Blood Count:   Recent Labs     02/14/21  0551 02/15/21  0324 02/16/21  0514   WBC 10.1 12.1* 9.8   HGB 11.1* 11.6* 11.2*   HCT 35.3* 36.9 35.8*    203 See Reflexed IPF Result        Last 3 Blood Glucose:   Recent Labs     02/15/21  0324 02/15/21  2222 02/16/21  0514   GLUCOSE 144* 76 76        PT/INR:    Lab Results   Component Value Date    PROTIME 16.4 02/08/2021    INR 1.3 02/08/2021     PTT:    Lab Results   Component Value Date    APTT 40.0 02/08/2021       Comprehensive Metabolic Profile:   Recent Labs     02/14/21  0551 02/14/21  0551 02/14/21  1600 02/15/21  0324 02/15/21  0324 02/15/21  1732 02/15/21  2222 02/16/21  0514   *  --   --  145*  --   --  136 139   K 3.2*   < >  --  2.7*   < > (Specify: ) [] TPN  [] PO (Diet: Diet NPO Effective Now)    HOME MEDICATIONS RECONCILED: [] No  [] Yes    INSULIN DRIP:   [] No   [] Yes    CONSULTATION NEEDED:  [] No   [] Yes    FAMILY UPDATED:    [] No   [] Yes    TRANSFER OUT OF ICU:   [] No   [] Yes    ADDITIONAL PLAN:    · Continue Coreg, Lasix, lisinopril for hypertension. · Follow-up on neuro critical care recommendations and EEG. · Diabetes mellitusinsulin sliding scale. · Depakote and Keppra for seizures. Nadia Stroud M.D.              Critical care resident,  Department of Internal Medicine/ Critical care  [facility], [facility]             2/16/2021, 4:33 PM

## 2021-02-16 NOTE — PROGRESS NOTES
Critical care team - Resident sign-out to medicine service      Date and time: 2/16/2021 2:50 PM  Patient's name:  Jared Bonilla  Medical Record Number: [id number]  Patient's account/billing number: [de-identified]  Patient's YOB: 1945  Age: 77 y.o. Date of Admission: 2/15/2021  6:29 PM  Length of stay during current admission: 1    Primary Care Physician: Tanya Livingston MD    Code Status: Full Code    Mode of physician to physician communication:        [x] Via telephone   [] In person     Date and time of sign-out: 2/16/2021 2:50 PM    Accepting Internal Medicine resident: Ms. Smitha Jeffers    Accepting Medicine team: IM Team Intermed    Accepting team's attending: Dr. Margarita Yarbrough    Patient's current ICU Bed:  122     Patient's assigned bed on floor:  430        [] Med-Surg Monitored [x] Step-down       [] Psychiatry ICU       [] Psych floor     Reason for ICU admission:     Encephalopathy/LTME    ICU course summary:     Patient was initially presented to the ER for dizziness for 2 weeks, difficulty walking, aphasia and admitted to [facility] for acute systolic heart failure. Patient was treated for CHF exacerbation, at that time neuro was also consulted for altered mentationliver concerns for seizures versus herpetic encephalitis versus stroke. Patient had abnormal EEG and was loaded with Keppra and Depakote. Patient also underwent spinal tap at [facility] and meningeal encephalitis was ruled out and acyclovir was discontinued. CHF exacerbationLasix 60 IV daily. Patient was rodriguez following specialities were consulted    · IDantibiotics discontinued.   · CardiologyCoreg dose reduced to 3.125 twice daily, lisinopril 2.5 daily, Lasix 60 mg IV daily      Procedures during patient's ICU stay:     None    Current Vitals:     BP (!) 79/57   Pulse 60   Temp 97.9 °F (36.6 °C) (Bladder)   Resp 14   Ht 5' 2\" (1.575 m)   Wt 130 lb 1.1 oz (59 kg)   SpO2 96%   BMI 23.79 kg/m²       Cultures:     Blood cultures: [] None drawn      [] Negative             []  Positive (Details:  )  Urine Culture:                   [] None drawn      [] Negative             []  Positive (Details:  )  Sputum Culture:               [] None drawn       [] Negative             []  Positive (Details:  )   Endotracheal aspirate:     [] None drawn       [] Negative             []  Positive (Details:  )       Consults:     1. Neuro critical care    Assessment:     Patient Active Problem List    Diagnosis Date Noted    Acute systolic heart failure ([facility] 02/08/2021     Priority: High    Generalized nonconvulsive seizures ([address] 02/15/2021    Abnormal EEG     Acute encephalopathy     Severe malnutrition ([address] 02/08/2021    Hypotension 02/06/2021    DEONTE (acute kidney injury) ([facility] 02/06/2021    Current every day smoker 02/06/2021    Essential hypertension 02/06/2021    Altered mental status 02/06/2021    Mild malnutrition ([facility] 08/21/2020    Elevated troponin 08/20/2020    Prolonged Q-T interval on ECG 08/20/2020    Chronic respiratory failure ([address] 08/20/2020    Pneumonia due to organism     Hypoxia     Acute on chronic diastolic heart failure ([address] 10/15/2019    Tobacco abuse 10/15/2019    Chronic diastolic heart failure ([address] 10/15/2019    Diabetes mellitus ([facility]     Type 2 diabetes mellitus without complication, without long-term current use of insulin ([facility] 08/30/2014    Hypophosphatemia 08/29/2014    COPD (chronic obstructive pulmonary disease) ([address] 08/28/2014    CHF (congestive heart failure) ([address] 08/28/2014    Cocaine abuse ([address] 08/28/2014    Acidosis, metabolic, with respiratory acidosis 08/28/2014    Acute respiratory failure with hypoxemia ([address] 08/28/2014    Polysubstance abuse ([address] 08/28/2014    Hyponatremia, Beer Potomania 08/28/2014    Normocytic anemia 85/55/2806    Neutrophilic leukocytosis 10/65/2665    Increased ammonia level 08/28/2014     Encephalopathy unclear etiology. Recommended Follow-up:     1. Follow-up on neuro critical care recommendations and LP. 2. Continue BiPAP, Seroquel 25 twice daily  3. Follow-up on ammonia levels  4. Recommend discussing goals of care with family. 5. Continue bronchodilators for COPD. Above mentioned assessment and plan was discussed by me with the admitting medicine resident. The medicine team assigned to the patient by medicine admitting resident will be following up the patient from now onwards on the floor.      Anel Villela MD  Internal Medicine Resident  [facility]  2/16/2021 2:50 PM

## 2021-02-16 NOTE — CONSULTS
Neuro Critical Care Consult Note    Reason for Consult:  Encephalopathy  Requesting Physician:  Dr. Shayna Lange  Attending Physician: Dr. Paige [facility]    History Obtained From:  Shani Diazman record    CHIEF COMPLAINT:       AMS    HISTORY OF PRESENT ILLNESS:       The patient is a 77 y.o. female with past medical history significant for HTN, DM, CHF, COPD, chronic hypoxic respiratory failure on home O2, current smoker, daily drinker, who presented to the emergency department on 2/6/2021 with 2 weeks of dizziness, four days of aphasia, recent fall while walking. Family also noted the patient was having generalized weakness, several weeks of decreased mentation. On presentation to the emergency department, patient was hypotensive, hypoxic, lactate 2.3, BUN 23, creatinine 1.57, BNP 13,000, Covid negative. Patient was found to have bilateral opacities right greater than left on chest x-ray. Patient was diagnosed with community-acquired pneumonia, acute on chronic respiratory failure on BiPAP, CHF exacerbation, pulmonary hypertension. Admitted to [facility] ICU for further assessment and management. Patient was treated with IV fluids, Rocephin, Zithromax. In the ICU, patient continued to be encephalopathic, follow-up labs for significant for troponins negative, lactate 1.8, ammonia 57, BNP increasing to 56,000, DEONTE resolved, procalcitonin 0.06, chest x-ray with interval worsening of bilateral opacities. Pulmonology, infectious disease, neurology, cardiology consulted. CT head found no acute abnormalities, repeat CT head stable. MRI brain found no acute intracranial abnormalities. EEG conducted at [facility] showed presence of moderate diffuse slowing, no lateralized or epileptiform disturbances seen. Patient was started on Keppra 500 mg twice daily and Depakote 500 mg twice daily.   LP conducted, found no WBCs, 125 RBCs, protein 25, no xanthochromia, oligoclonal banding IgG is elevated, culture and meningitis panel pending, started on IV acyclovir presumptively. Valproic acid level total 57, free 14.5, Keppra level 36. Patient was transferred to [facility] ICU for LTME. PAST MEDICAL HISTORY :       Past Medical History:        Diagnosis Date    CHF (congestive heart failure) (HCC)     COPD (chronic obstructive pulmonary disease) (HCC)     Diabetes mellitus (HCC)     MRSA (methicillin resistant staph aureus) culture positive 8/28/2014    sputum    Tobacco abuse 10/15/2019       Past Surgical History:        Procedure Laterality Date    APPENDECTOMY      HYSTERECTOMY      JOINT REPLACEMENT Bilateral        Social History:   Social History     Socioeconomic History    Marital status: Single     Spouse name: Not on file    Number of children: Not on file    Years of education: Not on file    Highest education level: Not on file   Occupational History    Not on file   Social Needs    Financial resource strain: Not on file    Food insecurity     Worry: Not on file     Inability: Not on file    Transportation needs     Medical: Not on file     Non-medical: Not on file   Tobacco Use    Smoking status: Current Every Day Smoker     Packs/day: 0.50     Types: Cigarettes    Smokeless tobacco: Never Used   Substance and Sexual Activity    Alcohol use:  Yes     Alcohol/week: 70.0 standard drinks     Types: 70 Cans of beer per week     Comment: 840 oz (3, 40oz beers/day)    Drug use: Yes     Types: Marijuana    Sexual activity: Not on file   Lifestyle    Physical activity     Days per week: Not on file     Minutes per session: Not on file    Stress: Not on file   Relationships    Social connections     Talks on phone: Not on file     Gets together: Not on file     Attends Holiness service: Not on file     Active member of club or organization: Not on file     Attends meetings of clubs or organizations: Not on file     Relationship status: Not on file    Intimate partner violence     Fear of current or ex partner: Not on file     Emotionally abused: Not on file     Physically abused: Not on file     Forced sexual activity: Not on file   Other Topics Concern    Not on file   Social History Narrative    Not on file       Family History:       Problem Relation Age of Onset    Heart Disease Mother     Diabetes Father        Allergies:  Tiotropium and Spiriva handihaler [tiotropium bromide monohydrate]    Home Medications:  Prior to Admission medications    Medication Sig Start Date End Date Taking? Authorizing Provider   budesonide-formoterol (SYMBICORT) 160-4.5 MCG/ACT AERO Inhale 2 puffs into the lungs 2 times daily    Historical Provider, MD   tiotropium (SPIRIVA RESPIMAT) 2.5 MCG/ACT AERS inhaler Inhale 2 puffs into the lungs daily    Historical Provider, MD   furosemide (LASIX) 20 MG tablet Take 20 mg by mouth daily    Historical Provider, MD   traZODone (DESYREL) 150 MG tablet Take 150 mg by mouth nightly    Historical Provider, MD   ibuprofen (ADVIL;MOTRIN) 800 MG tablet Take 800 mg by mouth 3 times daily (with meals)    Historical Provider, MD   pantoprazole (PROTONIX) 20 MG tablet Take 1 tablet by mouth daily 8/25/20   Marisol Pierson MD   albuterol (PROVENTIL) (2.5 MG/3ML) 0.083% nebulizer solution Take 3 mLs by nebulization every 6 hours as needed for Wheezing or Shortness of Breath 8/25/20   Marisol Pierson MD   tiZANidine (ZANAFLEX) 4 MG tablet Take 4 mg by mouth 2 times daily as needed    Historical Provider, MD   pregabalin (LYRICA) 100 MG capsule Take 100 mg by mouth 2 times daily.     Historical Provider, MD   Multiple Vitamins-Minerals (CERTAVITE SENIOR PO) Take 1 tablet by mouth daily    Historical Provider, MD   sertraline (ZOLOFT) 50 MG tablet Take 50 mg by mouth daily    Historical Provider, MD   loratadine (CLARITIN) 10 MG tablet Take 10 mg by mouth daily    Historical Provider, MD   atorvastatin (LIPITOR) 20 MG tablet Take 20 mg by mouth daily     Historical Provider, MD   aspirin 81 MG tablet Take 81 mg by mouth daily. Historical Provider, MD   calcium-vitamin D (OSCAL-500) 500-200 MG-UNIT per tablet Take 1 tablet by mouth daily.     Historical Provider, MD   lisinopril (PRINIVIL;ZESTRIL) 20 MG tablet Take 20 mg by mouth daily     Historical Provider, MD       Current Medications:   Current Facility-Administered Medications: aspirin EC tablet 81 mg, 81 mg, Oral, Daily  atorvastatin (LIPITOR) tablet 20 mg, 20 mg, Oral, Daily  sertraline (ZOLOFT) tablet 50 mg, 50 mg, Oral, Daily  lisinopril (PRINIVIL;ZESTRIL) tablet 2.5 mg, 2.5 mg, Oral, Daily  carvedilol (COREG) tablet 3.125 mg, 3.125 mg, Oral, BID  budesonide (PULMICORT) nebulizer suspension 500 mcg, 0.5 mg, Nebulization, BID  furosemide (LASIX) injection 60 mg, 60 mg, Intravenous, Daily  heparin (porcine) injection 5,000 Units, 5,000 Units, Subcutaneous, 3 times per day  famotidine (PEPCID) injection 20 mg, 20 mg, Intravenous, BID  insulin lispro (HUMALOG) injection vial 0-12 Units, 0-12 Units, Subcutaneous, TID WC  insulin lispro (HUMALOG) injection vial 0-6 Units, 0-6 Units, Subcutaneous, Nightly  glucose (GLUTOSE) 40 % oral gel 15 g, 15 g, Oral, PRN  dextrose 50 % IV solution, 12.5 g, Intravenous, PRN  glucagon (rDNA) injection 1 mg, 1 mg, Intramuscular, PRN  dextrose 5 % solution, 100 mL/hr, Intravenous, PRN  albuterol (PROVENTIL) nebulizer solution 2.5 mg, 2.5 mg, Nebulization, Q6H PRN  pantoprazole (PROTONIX) tablet 20 mg, 20 mg, Oral, Daily  sodium chloride flush 0.9 % injection 10 mL, 10 mL, Intravenous, PRN  promethazine (PHENERGAN) tablet 12.5 mg, 12.5 mg, Oral, Q6H PRN **OR** ondansetron (ZOFRAN) injection 4 mg, 4 mg, Intravenous, Q6H PRN  polyethylene glycol (GLYCOLAX) packet 17 g, 17 g, Oral, Daily PRN  acetaminophen (TYLENOL) suppository 650 mg, 650 mg, Rectal, Q6H PRN  calcium carbonate-vitamin D3 (CALTRATE) 600-400 MG-UNIT per tab 1 tablet, 1 tablet, Oral, Daily Arformoterol Tartrate (BROVANA) nebulizer solution 15 mcg, 15 mcg, Nebulization, BID  bisacodyl (DULCOLAX) EC tablet 5 mg, 5 mg, Oral, Daily PRN  norepinephrine (LEVOPHED) 16 mg in sodium chloride 0.9 % 250 mL infusion, 2-100 mcg/min, Intravenous, Continuous  dexmedetomidine (PRECEDEX) 400 mcg in sodium chloride 0.9 % 100 mL infusion, 0.2-1.4 mcg/kg/hr, Intravenous, Continuous  levetiracetam (KEPPRA) 500 mg/100 mL IVPB, 500 mg, Intravenous, Q12H  sodium chloride flush 0.9 % injection 10 mL, 10 mL, Intravenous, 2 times per day  acetaminophen (TYLENOL) tablet 650 mg, 650 mg, Oral, Q4H PRN  divalproex (DEPAKOTE ER) extended release tablet 500 mg, 500 mg, Oral, BID  potassium chloride 20 mEq/50 mL IVPB (Central Line), 20 mEq, Intravenous, PRN    REVIEW OF SYSTEMS:       Unable to obtain ROS secondary to condition    PHYSICAL EXAM:       BP (!) 80/57   Pulse 59   Temp 97.7 °F (36.5 °C) (Core)   Resp 19   Ht 5' 2\" (1.575 m)   Wt 131 lb 2.8 oz (59.5 kg)   SpO2 93%   BMI 23.99 kg/m²       CONSTITUTIONAL:  Patient is cachectic, awake, moaning intermittently, following commands   HEAD:  normocephalic, atraumatic    EYES:  PERRLA, EOMI.   ENT:  moist mucous membranes   NECK:  supple, symmetric, no midline tenderness to palpation    BACK:  without midline tenderness, step-offs or deformities    LUNGS:  Equal air entry bilaterally   CARDIOVASCULAR:  normal s1 / s2   ABDOMEN:  Soft, no rigidity   NEUROLOGIC:  Mental Status:  Awake, but not oriented             Cranial Nerves:    III: Pupils:  equal, round, reactive to light  III,IV,VI: Extra Ocular Movements: intact    Motor Exam:    Moving all extremities equally     SKIN:  no rash          LABS AND IMAGING:     CBC with Differential:    Lab Results   Component Value Date    WBC 9.8 02/16/2021    RBC 4.67 02/16/2021    HGB 11.2 02/16/2021    HCT 35.8 02/16/2021    PLT See Reflexed IPF Result 02/16/2021    MCV 76.7 02/16/2021    MCH 24.0 02/16/2021    MCHC 31.3 02/16/2021 RDW 25.9 02/16/2021    LYMPHOPCT PENDING 02/16/2021    MONOPCT PENDING 02/16/2021    BASOPCT PENDING 02/16/2021    MONOSABS PENDING 02/16/2021    LYMPHSABS PENDING 02/16/2021    EOSABS PENDING 02/16/2021    BASOSABS PENDING 02/16/2021    DIFFTYPE NOT REPORTED 02/16/2021     BMP:    Lab Results   Component Value Date     02/15/2021    K 3.4 02/15/2021     02/15/2021    CO2 22 02/15/2021    BUN 17 02/15/2021    LABALBU 2.5 02/14/2021    CREATININE 0.66 02/15/2021    CALCIUM 8.1 02/15/2021    GFRAA >60 02/15/2021    LABGLOM >60 02/15/2021    GLUCOSE 76 02/15/2021       Radiology Review:      Ct Head Wo Contrast    Result Date: 2/13/2021  EXAMINATION: CT OF THE HEAD WITHOUT CONTRAST  2/13/2021 1:44 pm TECHNIQUE: CT of the head was performed without the administration of intravenous contrast. Dose modulation, iterative reconstruction, and/or weight based adjustment of the mA/kV was utilized to reduce the radiation dose to as low as reasonably achievable. COMPARISON: 02/06/2021 HISTORY: ORDERING SYSTEM PROVIDED HISTORY: ams TECHNOLOGIST PROVIDED HISTORY: ams Reason for Exam: ams Acuity: Unknown Type of Exam: Unknown FINDINGS: BRAIN/VENTRICLES: Stable aneurysm clip seen in the supraclinoid region. 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 The brainstem, and cerebellum have a normal appearance for the patient's age. The falx is midline. The ventricles and peripheral sulci are mildly dilated. There is decreased attenuation in the periventricular white matter. There is no sign of a space occupying lesion, infarction, or hemorrhage. Orbits: Portion of the orbits demonstrate no acute abnormality. SINUSES: The imaged portions of the paranasal sinuses are clear. The mastoids and the middle ear chambers are clear. SOFT TISSUES/SKULL:  No acute abnormality of the visualized skull or soft tissues. Vascular calcifications are seen compatible with atherosclerotic disease.      [ Stable CT study when compared to 02/06/2021. No acute intracranial abnormalities are noted. Xr Chest Portable    Result Date: 2/15/2021  EXAMINATION: ONE XRAY VIEW OF THE CHEST 2/15/2021 5:07 pm COMPARISON: February 15, 2021 HISTORY: ORDERING SYSTEM PROVIDED HISTORY: hypoxia TECHNOLOGIST PROVIDED HISTORY: hypoxia Reason for Exam: portable upright/ hypoxia Acuity: Acute Type of Exam: Ongoing FINDINGS: Marginal inspiration is present. Diffuse interstitial and alveolar opacities are present, worse since the prior study. No pneumothorax is present. Cardiomegaly is present. Vascular markings are engorged. Osseous structures are stable. Interval worsening of the interstitial and alveolar opacities bilaterally. Differential considerations would include worsening infection or edema Cardiomegaly       Ct Chest High Resolution    Result Date: 2/9/2021  EXAMINATION: CT IMAGES OF THE CHEST WITHOUT CONTRAST, HIGH RESOLUTION 2/9/2021 TECHNIQUE: CT of the chest was performed without the administration of intravenous contrast. High resolution CT imaging was performed of the lungs. Multiplanar reformatted images are provided for review. Dose modulation, iterative reconstruction, and/or weight based adjustment of the mA/kV was utilized to reduce the radiation dose to as low as reasonably achievable. High resolution CT images were performed in the supine inspiration, supine expiration, and prone inspiration positions. COMPARISON: Chest portable February 7, 2021. CT chest without contrast August 22, 2020. HISTORY: ORDERING SYSTEM PROVIDED HISTORY: ILD TECHNOLOGIST PROVIDED HISTORY: ILD Reason for Exam: Pt is short of breath. Pt difficulty following breathing instructions for exam. Best images possible obtained due to patient condition. Acuity: Acute Type of Exam: Initial Relevant Medical/Surgical History: Hx of COPD, CHF FINDINGS: Mediastinum: The heart is moderately enlarged in size with dilatation of the right atrium seen.   No evidence of pericardial effusion is seen. Superior and middle mediastinal lymphadenopathy is identified with the largest node visualized anterior to the trachea which measures up to 16 mm in short axis diameter. Marked atherosclerotic calcification is seen involving the aortic arch and origin of the great vessels. Justine Sykes HRCT Findings/Lungs/pleura: Severe bilateral lung extensive interlobular septal thickening, extensive honeycombing, greatest within the upper lung lobes, and scattered ground-glass opacification is seen throughout the visualized lungs. Mild layering bilateral posterior pleural effusions are seen, greatest on the right measuring up to 18 mm in depth. Scattered tracheobronchial chondral calcifications are seen which, for patient's chronological age. Upper Abdomen: Visualized upper abdominal organs on chest CT examination are grossly unremarkable in appearance. Soft Tissues/Bones: Moderate scattered subcutaneous edema is identified. The bones, skeletal muscle bundles, fascial planes and subcutaneous soft tissues are otherwise unremarkable in appearance. 1. Note: Evaluation of lungs is significantly limited by patient breathing motion related artifact. 2. Severe bilateral lung extensive parenchymal changes, as discussed above, consistent with interstitial lung disease. Question overlying pulmonary interstitial edema. Findings most suggestive of usual interstitial pneumonia (UIP). Findings appear worsened in comparison with prior August 2020 CT chest examination. 3. Mild layering bilateral posterior pleural effusions, as discussed above. 4. Moderate cardiomegaly with marked dilatation of the right atrium. Recommend clinical correlation. Cardiomegaly in association with pleural effusions and likely pulmonary interstitial edema compatible with patient reported history of CHF. 5. Superior middle mediastinal lymphadenopathy, as described above.  Differential diagnostic considerations include association with infectious disease, inflammatory disease versus neoplastic process. 6. Marked atherosclerotic calcification involving the aortic arch and origin of the great vessels. 7. Moderate scattered subcutaneous edema. Mri Brain Wo Contrast    Result Date: 2/13/2021  EXAMINATION: MRI OF THE BRAIN WITHOUT CONTRAST  2/13/2021 3:37 pm TECHNIQUE: Multiplanar multisequence MRI of the brain was performed without the administration of intravenous contrast. COMPARISON: CT head 02/06/2021 HISTORY: ORDERING SYSTEM PROVIDED HISTORY: persistent encephalopathy of unclear etiology TECHNOLOGIST PROVIDED HISTORY: persistent encephalopathy of unclear etiology Reason for Exam: increased confusion Acuity: Chronic Type of Exam: Subsequent/Follow-up Additional signs and symptoms: altered mental status Relevant Medical/Surgical History: ETOH abuse FINDINGS: Motion limited evaluation. INTRACRANIAL STRUCTURES/VENTRICLES: There is no acute infarct or mass. Mild chronic white matter microvascular ischemic changes are present. There is a chronic lacunar infarct in the right frontal periventricular white matter. No mass effect or midline shift. No evidence of an acute intracranial hemorrhage. The ventricles and sulci are normal in size and configuration. The sellar/suprasellar regions appear unremarkable. The normal signal voids within the major intracranial vessels appear maintained. ORBITS: The visualized portion of the orbits demonstrate no acute abnormality. SINUSES: Partial opacification of posterior ethmoid air cells bilaterally. No mastoid effusion. BONES/SOFT TISSUES: The bone marrow signal intensity appears normal. The soft tissues demonstrate no acute abnormality. No acute intracranial abnormality within limits of motion artifact.          ASSESSMENT AND PLAN:       77 y.o. female with history of HTN, DM, CHF, COPD, chronic hypoxic respiratory failure on home O2, current smoker, daily drinker, who presented with 2 weeks of dizziness, four days of aphasia, unsteadiness on her feet, generalized weakness, several weeks of decreased mentation. Patient was admitted to [facility] ICU with community-acquired pneumonia, acute on chronic respiratory failure on BiPAP, CHF exacerbation, pulmonary hypertension. In the ICU, patient continued to be encephalopathic, CT head found no acute abnormalities, repeat CT head stable, MRI brain found no acute abnormalities, EEG showed presence of moderate diffuse slowing, no lateralized or epileptiform disturbances seen. Patient was started on Keppra 500 mg twice daily and Depakote 500 mg twice daily. LP conducted, found no WBCs, 125 RBCs, protein 25, no xanthochromia, oligoclonal banding IgG is elevated, culture and meningitis panel pending, started on IV acyclovir presumptively. Patient was transferred to [facility] ICU for LTME. Neuro critical care consulted for LTME. NEUROLOGIC:  - Imaging: CT head found no acute abnormalities   MRI brain found no acute abnormalities  - Continue Depakote 500 mg twice daily  - Continue Keppra 500 mg twice daily  - LTME for further evaluation  - Follow up LP results  - Neuro checks per protocol      DISPOSITION:  [x] To remain ICU:   [] OK for out of ICU from Neuro Critical Care standpoint    We will continue to follow along. For any changes in exam or patient status please contact Neuro Critical Care.       Aury Osborn MD  Neuro Critical Care  Pager [phone]  2/16/2021     5:31 AM

## 2021-02-16 NOTE — FLOWSHEET NOTE
Pt Arrives from [facility] via [facility]. Oriented to self only. Intermittantly follows commands. Placed in bed. Monitor applied. RT applies hi bobo O2. Pt arrives with Levophed at 12.27 mcq/min /11.5ml/hr,  precedex at 0.3 mcq/kg/min/D5W at 200ml/hr. Pt tolerated transfer well.

## 2021-02-16 NOTE — H&P
[facility]  Office: [address], DO, Wanderayo Ambriz, DO, Narenrosendo Gallagher, DO, Deshaunlawson Chna, DO, Navi Shelton MD, Toni Leblanc MD, Sofiya James MD, Sadaf Fields MD, Allison Camacho MD, Patrick Conner MD, Charito Simons MD, Hay Rojas MD, Alexandru Porter MD, Jh Conn DO, Todd Salinas MD, Juan Bolivar MD, Josh Sampson DO, Rip Fuentes MD,  Inocencio Haynes DO, Henok Tamayo MD, Diaz Saez MD, Rdudy Riley, [facility], [facility], CNP, Mara Saldivar, CNP, Rikki Garces, CNS, Cruz Ingram, CNP, Mando Lee, CNP, Debbie Sal, CNP, Romulo Guajardo, CNP, Antionette Mo, CNP, Zarina Mckeon PA-C, Giuseppe Edwards, [facility], Franca Estevez, CNP, Iftikhar Luis, CNP, Edy Jarrett, CNP, Deepa Chong, CNP, [facility], [id number] [address]      HISTORY AND PHYSICAL EXAMINATION            Date:   2/16/2021  Patient name:  Karlie Alonso  Date of admission:  2/15/2021  6:29 PM  MRN:   [id number]  Account:  [de-identified]  YOB: 1945  PCP:    Joey Orozco MD  Room:   [address] [place] [address]  Code Status:    Full Code    Chief Complaint:     Mental status changes      History Obtained From:     patient, electronic medical record    History of Present Illness: The patient is a 77 y.o. Non-/non  female who presents with mental status changes and she is admitted to the hospital for the management of acute encephalopathy and   Heart failure, systolic, with acute decompensation ([facility] [address] Patient was initially admitted to [facility] on 2/6/2021 with weakness, dizziness, speech changes, change in mental status. Patient was hypotensive, with blood pressure systolic in 82Z, hypoxic 49% on room air. Evaluation at that time showed elevated proBNP 13,553. Chest x-ray showed bilateral airspace and interstitial opacities. CT head was negative for acute intracranial abnormality. EKG showed sinus bradycardia without acute ischemic changes. Patient was admitted with diagnosis of pneumonia and acute CHF. It was also reported that patient had poor functional status for last 2 months with poor appetite, not eating and drinking well. Patient was recommended oxygen at night but was not using it. She has underlying history of type 2 diabetes mellitus, COPD. Evaluation at [facility] showed severely reduced EF 20%. Patient was treated with dopamine, diuretics. Cardiology and neurology were consulted. Patient was treated with Rocephin, azithromycin, later switched to cefepime, IV acyclovir for possible herpes encephalitis. She had abnormal EEG with diffuse slowing without any lateralized or epileptiform activity. .  Patient was treated with [facility]. CT head, MRI brain did not show any acute abnormality. Patient had spinal tap which showed 0 WBC, protein 25 and was not suggestive of meningeal encephalitis. Neurology recommended to transfer patient to [facility] for LTME monitoring and further evaluation. Patient is currently on 4 L of oxygen by nasal cannula. She was on high flow previously. She remains oriented to self only and is following only simple commands. Past Medical History:     Past Medical History:   Diagnosis Date    CHF (congestive heart failure) ([facility] [address]     COPD (chronic obstructive pulmonary disease) (HCC)     Diabetes mellitus ([facility])     MRSA (methicillin resistant staph aureus) culture positive 8/28/2014    sputum    Tobacco abuse 10/15/2019        Past Surgical History:     Past Surgical History:   Procedure Laterality Date    APPENDECTOMY      HYSTERECTOMY      JOINT REPLACEMENT Bilateral         Medications Prior to Admission:     Prior to Admission medications    Medication Sig Start Date End Date Taking?  Authorizing Provider   budesonide-formoterol (SYMBICORT) 160-4.5 MCG/ACT AERO Inhale 2 puffs into the lungs 2 times daily    Historical Provider, MD   tiotropium (SPIRIVA RESPIMAT) 2.5 MCG/ACT AERS inhaler Inhale 2 puffs into the lungs daily    Historical Provider, MD   furosemide (LASIX) 20 MG tablet Take 20 mg by mouth daily    Historical Provider, MD   traZODone (DESYREL) 150 MG tablet Take 150 mg by mouth nightly    Historical Provider, MD   ibuprofen (ADVIL;MOTRIN) 800 MG tablet Take 800 mg by mouth 3 times daily (with meals)    Historical Provider, MD   pantoprazole (PROTONIX) 20 MG tablet Take 1 tablet by mouth daily 8/25/20   Edison Gonzalez MD   albuterol (PROVENTIL) (2.5 MG/3ML) 0.083% nebulizer solution Take 3 mLs by nebulization every 6 hours as needed for Wheezing or Shortness of Breath 8/25/20   Edison Gonzalez MD   tiZANidine (ZANAFLEX) 4 MG tablet Take 4 mg by mouth 2 times daily as needed    Historical Provider, MD   pregabalin (LYRICA) 100 MG capsule Take 100 mg by mouth 2 times daily. Historical Provider, MD   Multiple Vitamins-Minerals (CERTAVITE SENIOR PO) Take 1 tablet by mouth daily    Historical Provider, MD   sertraline (ZOLOFT) 50 MG tablet Take 50 mg by mouth daily    Historical Provider, MD   loratadine (CLARITIN) 10 MG tablet Take 10 mg by mouth daily    Historical Provider, MD   atorvastatin (LIPITOR) 20 MG tablet Take 20 mg by mouth daily     Historical Provider, MD   aspirin 81 MG tablet Take 81 mg by mouth daily. Historical Provider, MD   calcium-vitamin D (OSCAL-500) 500-200 MG-UNIT per tablet Take 1 tablet by mouth daily. Historical Provider, MD   lisinopril (PRINIVIL;ZESTRIL) 20 MG tablet Take 20 mg by mouth daily     Historical Provider, MD        Allergies:     Tiotropium and Spiriva handihaler [tiotropium bromide monohydrate]    Social History:     Tobacco:    reports that she has been smoking cigarettes. She has been smoking about 0.50 packs per day. She has never used smokeless tobacco.  Alcohol:      reports current alcohol use of about 70.0 standard drinks of alcohol per week. Drug Use:  reports current drug use. Drug: Marijuana.     Family History: Family History   Problem Relation Age of Onset    Heart Disease Mother     Diabetes Father        Review of Systems:     Positive and Negative as described in HPI. Review of Systems   Unable to perform ROS: Mental status change       Physical Exam:   BP 95/76   Pulse 70   Temp 98.6 °F (37 °C) (Bladder)   Resp 22   Ht 5' 2\" (1.575 m)   Wt 130 lb 1.1 oz (59 kg)   SpO2 96%   BMI 23.79 kg/m²   Temp (24hrs), Av.6 °F (36.4 °C), Min:96 °F (35.6 °C), Max:98.6 °F (37 °C)    Recent Labs     02/15/21  1729 02/15/21  2215 21  0833 21  1331   POCGLU 87 78 68 61*       Intake/Output Summary (Last 24 hours) at 2021 1844  Last data filed at 2021 1616  Gross per 24 hour   Intake 926.79 ml   Output 2035 ml   Net -1108.21 ml     Physical Exam  Vitals signs and nursing note reviewed. Constitutional:       General: She is in acute distress. Appearance: She is underweight. She is ill-appearing. She is not diaphoretic. Interventions: Nasal cannula in place. HENT:      Head: Normocephalic and atraumatic. Nose:      Right Sinus: No maxillary sinus tenderness or frontal sinus tenderness. Left Sinus: No maxillary sinus tenderness or frontal sinus tenderness. Mouth/Throat:      Pharynx: No oropharyngeal exudate. Eyes:      General: No scleral icterus. Conjunctiva/sclera: Conjunctivae normal.      Pupils: Pupils are equal, round, and reactive to light. Neck:      Musculoskeletal: Full passive range of motion without pain and neck supple. Thyroid: No thyromegaly. Vascular: No JVD. Cardiovascular:      Rate and Rhythm: Normal rate and regular rhythm. Pulses:           Dorsalis pedis pulses are 2+ on the right side and 2+ on the left side. Heart sounds: Normal heart sounds. No murmur. Pulmonary:      Effort: Pulmonary effort is normal.      Breath sounds: Normal breath sounds. No wheezing or rales. Abdominal:      Palpations: Abdomen is soft.  There is no mass. Tenderness: There is no abdominal tenderness. Lymphadenopathy:      Head:      Right side of head: No submandibular adenopathy. Left side of head: No submandibular adenopathy. Cervical: No cervical adenopathy. Skin:     General: Skin is warm. Neurological:      Mental Status: She is lethargic. Motor: No tremor or seizure activity. Comments: Following simple commands of hand grasp . Psychiatric:         Attention and Perception: She is inattentive. Mood and Affect: Affect is flat. Behavior: Behavior is slowed. Cognition and Memory: Memory is impaired.          Investigations:      Laboratory Testing:  Recent Results (from the past 24 hour(s))   Arterial Blood Gas, POC    Collection Time: 02/15/21 10:15 PM   Result Value Ref Range    POC pH 7.427 7.350 - 7.450    POC pCO2 38.7 35.0 - 48.0 mm Hg    POC PO2 69.7 (L) 83.0 - 108.0 mm Hg    POC HCO3 25.5 21.0 - 28.0 mmol/L    TCO2 (calc), Art 27 22.0 - 29.0 mmol/L    Negative Base Excess, Art NOT REPORTED 0.0 - 2.0    Positive Base Excess, Art 1 0.0 - 3.0    POC O2 SAT 94 94.0 - 98.0 %    O2 Device/Flow/% Cannula     Booker Test NOT APPLICABLE     Sample Site Arterial Line     Mode NOT REPORTED     FIO2 NOT REPORTED     Pt Temp NOT REPORTED     POC pH Temp NOT REPORTED     POC pCO2 Temp NOT REPORTED mm Hg    POC pO2 Temp NOT REPORTED mm Hg   POCT Glucose    Collection Time: 02/15/21 10:15 PM   Result Value Ref Range    POC Glucose 78 74 - 100 mg/dL   BASIC METABOLIC PANEL    Collection Time: 02/15/21 10:22 PM   Result Value Ref Range    Glucose 76 70 - 99 mg/dL    BUN 17 8 - 23 mg/dL    CREATININE 0.66 0.50 - 0.90 mg/dL    Bun/Cre Ratio NOT REPORTED 9 - 20    Calcium 8.1 (L) 8.6 - 10.4 mg/dL    Sodium 136 135 - 144 mmol/L    Potassium 3.4 (L) 3.7 - 5.3 mmol/L    Chloride 106 98 - 107 mmol/L    CO2 22 20 - 31 mmol/L    Anion Gap 8 (L) 9 - 17 mmol/L    GFR Non-African American >60 >60 mL/min    GFR African American >60 >60 mL/min    GFR Comment          GFR Staging NOT REPORTED    Brain Natriuretic Peptide    Collection Time: 02/15/21 10:22 PM   Result Value Ref Range    Pro-BNP 56,732 (H) <300 pg/mL    BNP Interpretation Pro-BNP Reference Range:    AMMONIA    Collection Time: 02/15/21 10:22 PM   Result Value Ref Range    Ammonia 57 (H) 11 - 51 umol/L   LACTIC ACID, WHOLE BLOOD    Collection Time: 02/15/21 10:22 PM   Result Value Ref Range    Lactic Acid, Whole Blood 1.8 0.7 - 2.1 mmol/L   Troponin    Collection Time: 02/15/21 10:22 PM   Result Value Ref Range    Troponin, High Sensitivity 67 (HH) 0 - 14 ng/L    Troponin T NOT REPORTED <0.03 ng/mL    Troponin Interp NOT REPORTED    EKG 12 Lead    Collection Time: 02/15/21 11:35 PM   Result Value Ref Range    Ventricular Rate 60 BPM    Atrial Rate 60 BPM    P-R Interval 160 ms    QRS Duration 102 ms    Q-T Interval 554 ms    QTc Calculation (Bazett) 554 ms    P Axis 54 degrees    R Axis -37 degrees    T Axis 149 degrees   MAGNESIUM    Collection Time: 02/16/21 12:00 AM   Result Value Ref Range    Magnesium 1.9 1.6 - 2.6 mg/dL   Basic Metabolic Panel w/ Reflex to MG    Collection Time: 02/16/21  5:14 AM   Result Value Ref Range    Glucose 76 70 - 99 mg/dL    BUN 16 8 - 23 mg/dL    CREATININE 0.65 0.50 - 0.90 mg/dL    Bun/Cre Ratio NOT REPORTED 9 - 20    Calcium 8.2 (L) 8.6 - 10.4 mg/dL    Sodium 139 135 - 144 mmol/L    Potassium 4.2 3.7 - 5.3 mmol/L    Chloride 109 (H) 98 - 107 mmol/L    CO2 23 20 - 31 mmol/L    Anion Gap 7 (L) 9 - 17 mmol/L    GFR Non-African American >60 >60 mL/min    GFR African American >60 >60 mL/min    GFR Comment          GFR Staging NOT REPORTED    CBC auto differential    Collection Time: 02/16/21  5:14 AM   Result Value Ref Range    WBC 9.8 3.5 - 11.3 k/uL    RBC 4.67 3.95 - 5.11 m/uL    Hemoglobin 11.2 (L) 11.9 - 15.1 g/dL    Hematocrit 35.8 (L) 36.3 - 47.1 %    MCV 76.7 (L) 82.6 - 102.9 fL    MCH 24.0 (L) 25.2 - 33.5 pg    MCHC 31.3 28.4 - 34.8 g/dL    RDW 25.9 (H) 11.8 - 14.4 %    Platelets See Reflexed IPF Result 138 - 453 k/uL    MPV NOT REPORTED 8.1 - 13.5 fL    NRBC Automated 0.2 (H) 0.0 per 100 WBC    Differential Type NOT REPORTED     WBC Morphology NOT REPORTED     RBC Morphology NOT REPORTED     Platelet Estimate NOT REPORTED     Immature Granulocytes 0 0 %    Seg Neutrophils 64 36 - 66 %    Lymphocytes 29 24 - 44 %    Monocytes 3 1 - 7 %    Eosinophils % 4 1 - 4 %    Basophils 0 0 - 2 %    Absolute Immature Granulocyte 0.00 0.00 - 0.30 k/uL    Segs Absolute 6.28 1.8 - 7.7 k/uL    Absolute Lymph # 2.84 1.0 - 4.8 k/uL    Absolute Mono # 0.29 0.1 - 0.8 k/uL    Absolute Eos # 0.39 0.0 - 0.4 k/uL    Basophils Absolute 0.00 0.0 - 0.2 k/uL    Morphology ANISOCYTOSIS PRESENT     Morphology MICROCYTOSIS PRESENT     Morphology 1+ TARGET CELLS    AMMONIA    Collection Time: 02/16/21  5:14 AM   Result Value Ref Range    Ammonia 56 (H) 11 - 51 umol/L   Immature Platelet Fraction    Collection Time: 02/16/21  5:14 AM   Result Value Ref Range    Platelet, Immature Fraction 5.1 1.1 - 10.3 %    Platelet, Fluorescence 185 138 - 453 k/uL   POC Glucose Fingerstick    Collection Time: 02/16/21  8:33 AM   Result Value Ref Range    POC Glucose 68 65 - 105 mg/dL   POC Glucose Fingerstick    Collection Time: 02/16/21  1:31 PM   Result Value Ref Range    POC Glucose 61 (L) 65 - 105 mg/dL       Imaging/Diagonstics:    Xr Chest (single View Frontal)    Result Date: 2/15/2021  1. Interval progression of multifocal pneumonia within the bilateral mid and lower lung zones. 2. Stable mild-to-moderate interstitial pulmonary edema. 3. Stable cardiomegaly. Ct Head Wo Contrast    Result Date: 2/13/2021  [ Stable CT study when compared to 02/06/2021. No acute intracranial abnormalities are noted. Xr Chest Portable    Result Date: 2/15/2021  Interval worsening of the interstitial and alveolar opacities bilaterally.  Differential considerations would include worsening infection or edema Cardiomegaly     Xr Chest Portable    Result Date: 2/12/2021  Cardiomegaly and chronic pulmonary change with scattered pulmonary opacities. Xr Chest Portable    Result Date: 2/11/2021  Generalized interstitial prominence and pulmonary vascular congestion. Cardiomegaly. No definite pleural effusion or pneumothorax. Xr Chest Portable    Result Date: 2/10/2021  Increased right upper lobe opacity may represent developing pneumonia. Chronic interstitial lung changes with superimposed pulmonary edema. Ir Lumbar Puncture For Diagnosis    Result Date: 2/15/2021  Successful fluoroscopic-guided diagnostic lumbar puncture, as. Mri Brain Wo Contrast    Result Date: 2/13/2021  No acute intracranial abnormality within limits of motion artifact. Echocardiogram 2/8/2021  LVEF 20%, severe global hypokinesis. Thickened mitral valve mild to moderate MR. Moderate TR, moderate pulmonary hypertension. Important to note that EF was 55% on 10/15/2019. Lab Results   Component Value Date    LABA1C 6.2 (H) 02/07/2021     Lab Results   Component Value Date     02/07/2021         Assessment :      Primary Problem  Heart failure, systolic, with acute decompensation [facility])    Active Hospital Problems    Diagnosis Date Noted    Heart failure, systolic, with acute decompensation ([facility] [address] [I50.23] 02/08/2021     Priority: High    Acute encephalopathy [G93.40]        Plan:     Patient status Admit as inpatient in the  Progressive Unit/Step down    1. Acute systolic heart failure -diuretics, fluid restriction. Lasix 40 IV daily. Consult cardiology. May need ischemic work-up once stable. 2. Acute encephalopathy -concern for subclinical seizures -continue LTME. Meningeal encephalitis ruled out. Acyclovir discontinued. Wean off Precedex infusion as tolerated. Plan low-dose Klonopin for few days and wean slowly. Continue thiamine supplement. 3. DM2 -last of the 6.2 -Humalog as needed.   4.

## 2021-02-16 NOTE — PROGRESS NOTES
[facility] Continence Nurse  Consult Note       NAME:  Gina Valerio  MEDICAL RECORD NUMBER:  [id number]  AGE: 77 y.o. GENDER: female  : 1945  TODAY'S DATE:  2021    Subjective:     Reason for [facility] Evaluation and Assessment: \"sacral wound\" present on admission to [facility] is a 77 y.o. female referred by:   [x] Physician  [] Nursing  [] Other:     Wound Identification:  Wound Type: pressure  Contributing Factors: diabetes, chronic pressure, decreased mobility, shear force, smoking, decreased tissue oxygenation, incontinence of stool, substance abuse and altered mental status        Sacrococcygeal area with epidermal peeling and hyperpigmentation. Light pink dermal tissue exposed in areas and deep maroon dermal tissue exposed in other areas concerning for deep tissue injury evolving. Bilateral heels and elbows are intact. Xerosis bilateral feet and lower legs. No wounds. Deep tissue injuries present as purple or maroon localized areas of discolored, intact skin or blood filled blisters due to damage of the capillaries at the bone muscle interface resulting in ischemia of the soft tissue due pressure and/or shear. Evolution may include a thin blister over a dark wound bed. The wound may further evolve and become covered by thin eschar. Evolution may be rapid exposing additional layers of tissue even with optimal treatment. These areas may \"suddenly\" appear as the visible skin color change can occur days after the initial insult and reperfusion.          Objective:      BP 86/68   Pulse 59   Temp 97.9 °F (36.6 °C) (Bladder)   Resp 18   Ht 5' 2\" (1.575 m)   Wt 130 lb 1.1 oz (59 kg)   SpO2 100%   BMI 23.79 kg/m²   Rei Risk Score: Rei Scale Score: 12    LABS    CBC:   Lab Results   Component Value Date    WBC 9.8 2021    RBC 4.67 2021    HGB 11.2 2021     CMP:  Albumin:    Lab Results   Component Value Date    LABALBU 2.5 02/14/2021     PT/INR:    Lab Results   Component Value Date    PROTIME 16.4 02/08/2021    INR 1.3 02/08/2021     HgBA1c:    Lab Results   Component Value Date    LABA1C 6.2 02/07/2021     PTT: No components found for: LABPTT      Assessment:       Measurements:  Wound 02/08/21 Coccyx Medial (Active)   Wound Etiology Deep tissue/Injury 02/16/21 1005   Dressing Status New dressing applied 02/16/21 0400   Wound Cleansed Other (Comment) 02/15/21 1915   Dressing/Treatment Foam 02/16/21 1005   Dressing Change Due 02/18/21 02/16/21 0400   Wound Length (cm) 2.5 cm 02/16/21 1005   Wound Width (cm) 10 cm 02/16/21 1005   Wound Depth (cm) 0.1 cm 02/16/21 1005   Wound Surface Area (cm^2) 25 cm^2 02/16/21 1005   Change in Wound Size % (l*w) -2400 02/16/21 1005   Wound Volume (cm^3) 2.5 cm^3 02/16/21 1005   Wound Assessment Purple/maroon;Pink/red;Devitalized tissue 02/16/21 1005   Drainage Amount Small 02/16/21 1005   Drainage Description Serosanguinous 02/16/21 1005   Odor None 02/16/21 1005   Alissa-wound Assessment Hyperpigmented;Fragile 02/16/21 1005   Margins Attached edges 02/15/21 1915   Number of days: 8            Response to treatment:  Well tolerated by patient. Plan:     Plan of Care: Turn every 2 hours  Float heels off of bed with pillows under calves    Use lift sling to reposition patient to minimize potential for shear injury. Foam sacrum dressing to sacrococcygeal area. Peel back dressing, inspect skin beneath, re-secure. Change every 72 hours and prn wrinkles, soilage. Discontinue Sacral dressing if repeatedly soiled by incontinence. Routine incontinence care with incontinence barrier cloths and zinc oxide cream. Apply zinc oxide cream BID and prn incontinence. Moisture wicking under pads vs cloth backed briefs    Monitor each shift for further wound changes.        Specialty Bed Required : Yes: Isoflex gel surface in use   [] Low Air Loss   [x] Pressure Redistribution  [] Fluid Immersion  [] Bariatric  [] Total Pressure Relief  [] Other:     Discharge Plan:  TBD    Patient Teaching:    [] Indicates understanding       [] Needs reinforcement  [] Unsuccessful      [] Verbal Understanding  [] Demonstrated understanding       [x] No evidence of learning  [] Refused teaching         [] N/A       Electronically signed by Shannan Hdez RN, CWON on 2/16/2021 at 10:07 AM

## 2021-02-16 NOTE — PLAN OF CARE
Problem: Skin Integrity:  Goal: Will show no infection signs and symptoms  Description: Will show no infection signs and symptoms  Outcome: Ongoing     Problem: Falls - Risk of:  Goal: Will remain free from falls  Description: Will remain free from falls  Outcome: Ongoing     Problem: Anxiety/Stress:  Goal: Level of anxiety will decrease  Description: Level of anxiety will decrease  Outcome: Ongoing

## 2021-02-16 NOTE — H&P
Critical Care - History and Physical Examination    Patient's name:  Denise Hunter  Medical Record Number: [id number]  Patient's account/billing number: [de-identified]  Patient's YOB: 1945  Age: 77 y.o. Date of Admission: 2/15/2021  6:29 PM  Reason of ICU admission:   Date of History and Physical Examination: 2/15/2021      Primary Care Physician: Naif Aj MD  Attending Physician:    Code Status: Prior    Chief complaint: Altered mental status, shortness of breath, dizziness    Reason for ICU admission: LTM E      History Of Present Illness:   History was obtained from chart review. Denise Hunter is a 77 y.o. with PMH of CHF, COPD, diabetes, GERD presents as a transfer from [facility] d/t need for LTME. Pt admitted to their ICU for CHFe, COPDe, encephalopathy. Pt origninally presented with 2 weeks of SOB, dizziness, AMS, Falls. Found to be encephalopathic. Unknown etiology. CT head neg, neurology did EEG which showed diffuse slowing but nonepliptiform at this time, questionable for seizure, originally started on Keppra and vimpat, transitioned to keppra and depakote d/t bradycardia. Has been on precedex for agitation. Follows some commands but altered. Original concern for encephalitis, LP not convincing for infection, ID d/c Acyclovir. Kadi Wilder hypertensive, cards seen and dec coreg to 3.125mg BID and kept lisinopril 2.5mg BID. Pt treated with fluids, started on lasix for CHFe as BNP was 110,000. Apparently became hypotensive started on dopamine. Has been off of pressors since here at [facility].            Past Medical History:        Diagnosis Date    CHF (congestive heart failure) (HCC)     COPD (chronic obstructive pulmonary disease) (HCC)     Diabetes mellitus (HCC)     MRSA (methicillin resistant staph aureus) culture positive 8/28/2014    sputum    Tobacco abuse 10/15/2019       Past Surgical History:        Procedure Laterality Date    APPENDECTOMY      HYSTERECTOMY      JOINT REPLACEMENT Bilateral        Allergies: Allergies   Allergen Reactions    Tiotropium Anaphylaxis and Swelling    Spiriva Handihaler [Tiotropium Bromide Monohydrate] Swelling         Home Medications:   Prior to Admission medications    Medication Sig Start Date End Date Taking? Authorizing Provider   budesonide-formoterol (SYMBICORT) 160-4.5 MCG/ACT AERO Inhale 2 puffs into the lungs 2 times daily    Historical Provider, MD   tiotropium (SPIRIVA RESPIMAT) 2.5 MCG/ACT AERS inhaler Inhale 2 puffs into the lungs daily    Historical Provider, MD   furosemide (LASIX) 20 MG tablet Take 20 mg by mouth daily    Historical Provider, MD   traZODone (DESYREL) 150 MG tablet Take 150 mg by mouth nightly    Historical Provider, MD   ibuprofen (ADVIL;MOTRIN) 800 MG tablet Take 800 mg by mouth 3 times daily (with meals)    Historical Provider, MD   pantoprazole (PROTONIX) 20 MG tablet Take 1 tablet by mouth daily 8/25/20   Rebecca Current, MD   albuterol (PROVENTIL) (2.5 MG/3ML) 0.083% nebulizer solution Take 3 mLs by nebulization every 6 hours as needed for Wheezing or Shortness of Breath 8/25/20   Rebecca Current, MD   tiZANidine (ZANAFLEX) 4 MG tablet Take 4 mg by mouth 2 times daily as needed    Historical Provider, MD   pregabalin (LYRICA) 100 MG capsule Take 100 mg by mouth 2 times daily. Historical Provider, MD   Multiple Vitamins-Minerals (CERTAVITE SENIOR PO) Take 1 tablet by mouth daily    Historical Provider, MD   sertraline (ZOLOFT) 50 MG tablet Take 50 mg by mouth daily    Historical Provider, MD   loratadine (CLARITIN) 10 MG tablet Take 10 mg by mouth daily    Historical Provider, MD   atorvastatin (LIPITOR) 20 MG tablet Take 20 mg by mouth daily     Historical Provider, MD   aspirin 81 MG tablet Take 81 mg by mouth daily. Historical Provider, MD   calcium-vitamin D (OSCAL-500) 500-200 MG-UNIT per tablet Take 1 tablet by mouth daily.     Historical Provider, MD   lisinopril (PRINIVIL;ZESTRIL) 20 MG tablet Take 20 mg by mouth daily     Historical Provider, MD       Social History:   TOBACCO:   reports that she has been smoking cigarettes. She has been smoking about 0.50 packs per day. She has never used smokeless tobacco.  ETOH:   reports current alcohol use of about 70.0 standard drinks of alcohol per week. DRUGS:  reports current drug use. Drug: Marijuana. OCCUPATION:      Family History:       Problem Relation Age of Onset    Heart Disease Mother     Diabetes Father            REVIEW OF SYSTEMS (ROS):  Unable to obtain due to mental status      Physical Exam:    Vitals: /70   Pulse 72   Temp 97.9 °F (36.6 °C) (Axillary)   Resp 18   Ht 5' 2\" (1.575 m)   Wt 131 lb 2.8 oz (59.5 kg)   SpO2 97%   BMI 23.99 kg/m²     Body weight:   Wt Readings from Last 3 Encounters:   02/15/21 131 lb 2.8 oz (59.5 kg)   02/15/21 135 lb (61.2 kg)   08/25/20 143 lb 1.6 oz (64.9 kg)       Body Mass Index : Body mass index is 23.99 kg/m². PHYSICAL EXAMINATION :  Constitutional: alert and awake, appears confused  EENT: PERRLA, maintaining airway, mouth dry, EOMI  Neck: supple, trachea midline, no jvd, no nuchal rigidity  Respiratory: clear to auscultation, no wheezes or rales and unlabored breathing. No intercostal tenderness  Cardiovascular: regular rate and rhythm, normal S1, S2, no murmur noted and 2+ pulses throughout  Abdomen: soft, nontender, nondistended, no masses or organomegaly  Neurological: GCS 13-14, follows some commands, able to grasp my hand with correct upper extremitity, MS 5/5 in BUE, unable to follow commands with BLE, but equal plantarflexion.    Extremities:  peripheral pulses normal, no pedal edema, no clubbing or cyanosis      Laboratory findings:-    CBC:   Recent Labs     02/15/21  0324   WBC 12.1*   HGB 11.6*        BMP:    Recent Labs     02/13/21  0317 02/14/21  0551 02/14/21  0551 02/15/21  0324 02/15/21  0324 02/15/21  1732   * 150*  --  145*  --   --    K 3.5* 3.2*   < > 2.7* < > 3.5*   * 115*  --  110*  --   --    CO2 21 23  --  25  --   --    BUN 43* 38*  --  27*  --   --    CREATININE 1.15* 1.09*  --  0.75  --   --    GLUCOSE 172* 140*  --  144*  --   --     < > = values in this interval not displayed. S. Calcium:  Recent Labs     02/15/21  0324   CALCIUM 8.4*     S. Ionized Calcium:No results for input(s): IONCA in the last 72 hours. S. Magnesium:  Recent Labs     02/15/21  1136   MG 2.2     S. Phosphorus:No results for input(s): PHOS in the last 72 hours. S. Glucose:  Recent Labs     02/15/21  0922 02/15/21  1130 02/15/21  1729   POCGLU 91 151* 87     Glycosylated hemoglobin A1C: No results for input(s): LABA1C in the last 72 hours. INR: No results for input(s): INR in the last 72 hours. Hepatic functions:   Recent Labs     02/14/21  0551 02/14/21  1600   ALKPHOS 50  --    ALT 15  --    AST 34*  --    PROT 6.6  --    BILITOT 0.63  --    BILIDIR 0.38*  --    LABALBU 2.5* 2.5*     Pancreatic functions:No results for input(s): LACTA, AMYLASE in the last 72 hours. S. Lactic Acid: No results for input(s): LACTA in the last 72 hours. Cardiac enzymes:No results for input(s): CKTOTAL, CKMB, CKMBINDEX, TROPONINI in the last 72 hours. BNP:No results for input(s): BNP in the last 72 hours. Lipid profile: No results for input(s): CHOL, TRIG, HDL, LDLCALC in the last 72 hours. Invalid input(s): LDL  Blood Gases:   Lab Results   Component Value Date    [facility]  09/04/2014     DISREGARD RESULTS. PATIENT NUMBER WAS INCORRECTLY ASSIGNED. PCO2  09/04/2014     DISREGARD RESULTS. PATIENT NUMBER WAS INCORRECTLY ASSIGNED. PO2  09/04/2014     DISREGARD RESULTS. PATIENT NUMBER WAS INCORRECTLY ASSIGNED. HCO3  09/04/2014     DISREGARD RESULTS. PATIENT NUMBER WAS INCORRECTLY ASSIGNED. O2SAT  09/04/2014     DISREGARD RESULTS. PATIENT NUMBER WAS INCORRECTLY ASSIGNED.      Thyroid functions:   Lab Results   Component Value Date    TSH 0.47 10/15/2019        Urinalysis: Microbiology:  Cultures during this admission:     Blood cultures:                 [] None drawn      [x] Negative             []  Positive (Details:  )  Urine Culture:                   [] None drawn      [x] Negative             []  Positive (Details:  )  Sputum Culture:               [] None drawn       [] Negative             []  Positive (Details:  )   Endotracheal aspirate:     [] None drawn       [] Negative             []  Positive (Details:  )         -----------------------------------------------------------------  Radiological reports:    CXR:Echo Complete 2d W Doppler W Color    Result Date: 2/8/2021  [facility] Transthoracic Echocardiography Report (TTE)  Patient Name RANDALL CANTOR Date of Study             02/08/2021                  Date of      1945  Gender                    Female  Birth   Age          77 year(s)  Race                         Room Number  1021        Height:                   62 inch, 157.48 cm   Corporate ID [id number]    Weight:                   132 pounds, 59.9 kg  #   Patient Acct [de-identified]   BSA:         1.6 m^2      BMI:        24.14  #                                                              kg/m^2   MR #         [id number]     Sonographer               Sandra Tafoya   Accession #  [id number]  Interpreting Physician    Tiesha Tai   Fellow                   Referring Nurse                           Practitioner   Interpreting             Referring Physician       Laura Sullivan                                             DO  Type of Study   TTE procedure:2D Echocardiogram, M-Mode, Doppler, Color Doppler. Procedure Date Date: 02/08/2021 Start: 10:54 AM Study Location: [facility] Technical Quality: Adequate visualization Indications:Elevated troponin. History / Tech. Comments: Procedure explained to patient.  Patient Status: Inpatient Height: 62 inches Weight: 132 pounds BSA: 1.6 m^2 BMI: 24.14 kg/m^2 Rhythm: Within effusion seen. Miscellaneous Normal aortic root dimension. M-mode / 2D Measurements & Calculations:   LVIDd:4.4 cm(3.7 - 5.6 cm)       Diastolic VPHZMS:18.0 ml  NKFGI:2.7 cm(2.2 - 4.0 cm)       Systolic VYXDOC:19.1 ml  IRHV:2.7 cm(0.6 - 1.1 cm)        Aortic Root:3.3 cm(2.0 - 3.7 cm)  LVPWd:0.9 cm(0.6 - 1.1 cm)       LA Dimension: 3.4 cm(1.9 - 4.0 cm)  Fractional Shortenin.36 %    LA volume/Index: 64.5 ml /40m^2  Calculated LVEF (%): 30.4 %                                           Aortic                                           Peak Velocity: 0.75 m/s                                          Mean Velocity: 0.59 m/s                                          Peak Gradient: 2.23 mmHg                                          Mean Gradient: 2 mmHg                                           AV VTI: 9.66 cm   Tricuspid:                              Pulmonic:   Estimated RVSP: 54 mmHg  Peak TR Velocity: 3.30 m/s  Peak TR Gradient: 43.56 mmHg  Estimated RA Pressure: 10 mmHg                                          Estimated PASP: 53.56 mmHg      Ct Head Wo Contrast    Result Date: 2021  EXAMINATION: CT OF THE HEAD WITHOUT CONTRAST  2021 1:44 pm TECHNIQUE: CT of the head was performed without the administration of intravenous contrast. Dose modulation, iterative reconstruction, and/or weight based adjustment of the mA/kV was utilized to reduce the radiation dose to as low as reasonably achievable. COMPARISON: 2021 HISTORY: ORDERING SYSTEM PROVIDED HISTORY: ams TECHNOLOGIST PROVIDED HISTORY: ams Reason for Exam: ams Acuity: Unknown Type of Exam: Unknown FINDINGS: BRAIN/VENTRICLES: Stable aneurysm clip seen in the supraclinoid region. 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 The brainstem, and cerebellum have a normal appearance for the patient's age. The falx is midline. The ventricles and peripheral sulci are mildly dilated. There is decreased attenuation in the periventricular white matter.  There is no sign of a space occupying lesion, infarction, or hemorrhage. Orbits: Portion of the orbits demonstrate no acute abnormality. SINUSES: The imaged portions of the paranasal sinuses are clear. The mastoids and the middle ear chambers are clear. SOFT TISSUES/SKULL:  No acute abnormality of the visualized skull or soft tissues. Vascular calcifications are seen compatible with atherosclerotic disease. [ Stable CT study when compared to 02/06/2021. No acute intracranial abnormalities are noted. Ct Head Wo Contrast    Result Date: 2/6/2021  EXAMINATION: CT OF THE HEAD WITHOUT CONTRAST  2/6/2021 4:09 pm TECHNIQUE: CT of the head was performed without the administration of intravenous contrast. Dose modulation, iterative reconstruction, and/or weight based adjustment of the mA/kV was utilized to reduce the radiation dose to as low as reasonably achievable. COMPARISON: 04/11/2016 HISTORY: ORDERING SYSTEM PROVIDED HISTORY: Mental Status Changes TECHNOLOGIST PROVIDED HISTORY: Mental Status Changes Reason for Exam: Fatigue, slurred speech Acuity: Acute Type of Exam: Initial FINDINGS: BRAIN/VENTRICLES: Metallic artifact from aneurysm clip/coil in the supraclinoid region again demonstrated. There is no acute intracranial hemorrhage, mass effect or midline shift. No abnormal extra-axial fluid collection. Decreased attenuation near the right caudate is unchanged. Cortical atrophy and chronic white matter changes in the brain and associated ventricular enlargement are again demonstrated. ORBITS: The visualized portion of the orbits demonstrate no acute abnormality. SINUSES: The visualized paranasal sinuses and mastoid air cells demonstrate no acute abnormality. SOFT TISSUES/SKULL:  No acute abnormality of the visualized skull or soft tissues. Chronic findings in the brain without acute CT abnormality identified.      Xr Chest Portable    Result Date: 2/15/2021  EXAMINATION: ONE XRAY VIEW OF THE CHEST 2/15/2021 5:07 pm COMPARISON: February 15, 2021 HISTORY: ORDERING SYSTEM PROVIDED HISTORY: hypoxia TECHNOLOGIST PROVIDED HISTORY: hypoxia Reason for Exam: portable upright/ hypoxia Acuity: Acute Type of Exam: Ongoing FINDINGS: Marginal inspiration is present. Diffuse interstitial and alveolar opacities are present, worse since the prior study. No pneumothorax is present. Cardiomegaly is present. Vascular markings are engorged. Osseous structures are stable. Interval worsening of the interstitial and alveolar opacities bilaterally. Differential considerations would include worsening infection or edema Cardiomegaly       Ct Chest High Resolution    Result Date: 2/9/2021  EXAMINATION: CT IMAGES OF THE CHEST WITHOUT CONTRAST, HIGH RESOLUTION 2/9/2021 TECHNIQUE: CT of the chest was performed without the administration of intravenous contrast. High resolution CT imaging was performed of the lungs. Multiplanar reformatted images are provided for review. Dose modulation, iterative reconstruction, and/or weight based adjustment of the mA/kV was utilized to reduce the radiation dose to as low as reasonably achievable. High resolution CT images were performed in the supine inspiration, supine expiration, and prone inspiration positions. COMPARISON: Chest portable February 7, 2021. CT chest without contrast August 22, 2020. HISTORY: ORDERING SYSTEM PROVIDED HISTORY: ILD TECHNOLOGIST PROVIDED HISTORY: ILD Reason for Exam: Pt is short of breath. Pt difficulty following breathing instructions for exam. Best images possible obtained due to patient condition. Acuity: Acute Type of Exam: Initial Relevant Medical/Surgical History: Hx of COPD, CHF FINDINGS: Mediastinum: The heart is moderately enlarged in size with dilatation of the right atrium seen. No evidence of pericardial effusion is seen.   Superior and middle mediastinal lymphadenopathy is identified with the largest node visualized anterior to the trachea which measures up to 16 mm in short axis diameter. Marked atherosclerotic calcification is seen involving the aortic arch and origin of the great vessels. Tracy Bello HRCT Findings/Lungs/pleura: Severe bilateral lung extensive interlobular septal thickening, extensive honeycombing, greatest within the upper lung lobes, and scattered ground-glass opacification is seen throughout the visualized lungs. Mild layering bilateral posterior pleural effusions are seen, greatest on the right measuring up to 18 mm in depth. Scattered tracheobronchial chondral calcifications are seen which, for patient's chronological age. Upper Abdomen: Visualized upper abdominal organs on chest CT examination are grossly unremarkable in appearance. Soft Tissues/Bones: Moderate scattered subcutaneous edema is identified. The bones, skeletal muscle bundles, fascial planes and subcutaneous soft tissues are otherwise unremarkable in appearance. 1. Note: Evaluation of lungs is significantly limited by patient breathing motion related artifact. 2. Severe bilateral lung extensive parenchymal changes, as discussed above, consistent with interstitial lung disease. Question overlying pulmonary interstitial edema. Findings most suggestive of usual interstitial pneumonia (UIP). Findings appear worsened in comparison with prior August 2020 CT chest examination. 3. Mild layering bilateral posterior pleural effusions, as discussed above. 4. Moderate cardiomegaly with marked dilatation of the right atrium. Recommend clinical correlation. Cardiomegaly in association with pleural effusions and likely pulmonary interstitial edema compatible with patient reported history of CHF. 5. Superior middle mediastinal lymphadenopathy, as described above. Differential diagnostic considerations include association with infectious disease, inflammatory disease versus neoplastic process. 6. Marked atherosclerotic calcification involving the aortic arch and origin of the great vessels.  7. Moderate scattered subcutaneous edema. Ir Lumbar Puncture For Diagnosis    Result Date: 2/15/2021  EXAMINATION: FLUOROSCOPIC GUIDED LUMBAR PUNCTURE 2/14/2021 3:44 pm HISTORY: ORDERING SYSTEM PROVIDED HISTORY: herpes encephalitis TECHNOLOGIST PROVIDED HISTORY: herpes encephalitis FLUOROSCOPY DOSE AND TYPE OR TIME AND EXPOSURES: Fluoroscopy time-0 point 6 minutes D  mGy cm squared PROCEDURE: :  Rachel Sorto MD Informed consent was obtained after the risks and benefits of the procedure were discussed with the patient and all questions were answered fully. [place] protocol was observed and a standard timeout was performed. The patient was positioned prone and the back was prepped and draped in the normal sterile fashion. 1% lidocaine was used for local anesthesia. The subarachnoid space was accessed with a 20-gauge 3.5 \"spinal needle at the L2/L3 level. Free flow of clear CSF was noted. Approximately 8 ml of clear CSF was removed and sent for analysis. The stylet was reinserted, spinal needle was removed and brief pressure was applied at the puncture site. There were no immediate complications and the patient tolerated the procedure well. Successful fluoroscopic-guided diagnostic lumbar puncture, as. Mri Brain Wo Contrast    Result Date: 2/13/2021  EXAMINATION: MRI OF THE BRAIN WITHOUT CONTRAST  2/13/2021 3:37 pm TECHNIQUE: Multiplanar multisequence MRI of the brain was performed without the administration of intravenous contrast. COMPARISON: CT head 02/06/2021 HISTORY: ORDERING SYSTEM PROVIDED HISTORY: persistent encephalopathy of unclear etiology TECHNOLOGIST PROVIDED HISTORY: persistent encephalopathy of unclear etiology Reason for Exam: increased confusion Acuity: Chronic Type of Exam: Subsequent/Follow-up Additional signs and symptoms: altered mental status Relevant Medical/Surgical History: ETOH abuse FINDINGS: Motion limited evaluation.  INTRACRANIAL STRUCTURES/VENTRICLES: There is no acute infarct or mass. Mild chronic white matter microvascular ischemic changes are present. There is a chronic lacunar infarct in the right frontal periventricular white matter. No mass effect or midline shift. No evidence of an acute intracranial hemorrhage. The ventricles and sulci are normal in size and configuration. The sellar/suprasellar regions appear unremarkable. The normal signal voids within the major intracranial vessels appear maintained. ORBITS: The visualized portion of the orbits demonstrate no acute abnormality. SINUSES: Partial opacification of posterior ethmoid air cells bilaterally. No mastoid effusion. BONES/SOFT TISSUES: The bone marrow signal intensity appears normal. The soft tissues demonstrate no acute abnormality. No acute intracranial abnormality within limits of motion artifact.      Electrocardiogram:     Last Echocardiogram findings:          Assessment and Plan     Patient Active Problem List   Diagnosis    COPD (chronic obstructive pulmonary disease) ([address]    [facility]est[facility]    Cocaine abuse ([facility])    Acidosis, metabolic, with respiratory acidosis    Acute respiratory failure with hypoxemia ([facility])    Polysubstance abuse ([facility])    Hyponatremia, Beer [place]ia    Normocytic anemia    Neutrophilic leukocytosis    Increased ammonia level    Hypophosphatemia    Type 2 diabetes mellitus without complication, without long-term current use of insulin (HCC)    Acute on chronic diastolic heart failure (HCC)    Tobacco abuse    Diabetes mellitus (HCC)    Chronic diastolic heart failure (HCC)    Elevated troponin    Prolonged Q-T interval on ECG    Pneumonia due to organism    Hypoxia    Chronic respiratory failure (HCC)    Mild malnutrition (HCC)    Hypotension    DEONTE (acute kidney injury) ([address]    Current every day smoker    Essential hypertension    Altered mental status    Acute systolic heart failure (HCC)    Severe malnutrition ([address]    Abnormal EEG    Acute encephalopathy    Generalized nonconvulsive seizures (HCC)         Additional assessment:  ·       PLAN/MEDICAL DECISION MAKING:  Neurologic:   DX: Encephalopathy of unknown origin  DX: Questionable Seizure  DX: R/O Encephalitis  · Moving all extremities, follows some commands  · CTH neg x2, MRI brain neg 2/15  · EEG: abnormal, nonepileptiform  · precedex   · NCC recs:  · Continue Depakote 500mg BID, Keppra 500mg BID  · F/u LP results  · LTME   Cardiovascular:  DX: CHF exacerbation  DX: Hypotension  · Hemodynamically stable  · MAP goal >65  · ,225>56,732  · Trop 67, trend, no ischemia  ·  EKG: new T wave depressions in V4-V6  · 2/15 EKG: NSF w/ QTC prolongation 554, Inc RBBB, LAD  ·  ECHO: EF 87% w/ dec systolic fx  · Cards ([place]):  · Inc Home Coreg 3.125mg BID  · Home Lisinopril 2.5mg QD  ·  CXR: Worsening interstitial edema  Pulmonary:  COPD exacerbaion  · Maintain oxygen sats >92%  · Pulmonary toilet  · High flow  · Vent Information  · Skin Assessment: Clean, dry, & intact  · FiO2 : 50 %  · SpO2: 97 %  · SpO2/FiO2 ratio: 194   · AB.427/38.7/69.7/25.5  · Started on Brovana and budesonide twice daily  GI/Nutrition  DX: Questionable metabolic encephalopathy  DX: GERD  · miralax  · Ulcer Prophylaxis: H2 blockers not indicated  · Diet:No diet orders on file   · LFTs normal  · Ammonia 64,  Renal/Fluid/Electrolyte  Acute hypokalemia  DEONTE- resolved  · I/O: In: -   · Out: 300 [Urine:300]  · UOP: adequate, monitor  · Potassium 3.4, replace  · Lasix 60 mg every day  ID  Multifocal pneumonia due to unknown organism  Septic Shock  WBC:   Lab Results   Component Value Date    WBC 12.1 (H) 02/15/2021   · Hypotension resolved, off vasopressors  · Tmax: Temp (24hrs), Av.3 °F (36.3 °C), Min:96.9 °F (36.1 °C), Max:97.9 °F (36.6 °C)  · LP not convincing for encephalitis  · ID recs:  · DC acyclovir  Hematology:  Recent Labs     21  0317 21  0551 02/15/21  0324   HGB 11.8* 11.1* 11.6*   ·  stable  Endocrine:   DX: Type II DM  Medium dose sliding scale  Recent Labs     02/13/21  0317 02/14/21  0551 02/15/21  0324   GLUCOSE 172* 140* 144*   ·   PSYCH  DX: h/o Depression   - Home Zoloft  DVT Prophylaxis  · Heparin  Discharge Needs:  neuro recs, PT and OT      CODE STATUS: Prior      DISPOSITION:  [] To remain ICU:   [x] OK for out of ICU from [address], DO  PGY 3  Resident Physician Emergency Medicine  02/15/21 8:15 PM

## 2021-02-16 NOTE — PROGRESS NOTES
Speech Language Pathology  [address]   CLINICAL BEDSIDE SWALLOW EVALUATION    NAME: Timbo Joseph  : 1945  MRN: [id number]    ADMISSION DATE: 2/15/2021  ADMITTING DIAGNOSIS: has COPD (chronic obstructive pulmonary disease) ([address] CHF (congestive heart failure) ([address] Cocaine abuse ([address] Acidosis, metabolic, with respiratory acidosis; Acute respiratory failure with hypoxemia ([address] Polysubstance abuse ([address] Hyponatremia, Beer Potomania; Normocytic anemia; Neutrophilic leukocytosis; Increased ammonia level; Hypophosphatemia; Type 2 diabetes mellitus without complication, without long-term current use of insulin ([address] Acute on chronic diastolic heart failure ([address] Tobacco abuse; Diabetes mellitus ([address] Chronic diastolic heart failure ([address] Elevated troponin; Prolonged Q-T interval on ECG; Pneumonia due to organism; Hypoxia; Chronic respiratory failure ([address] Mild malnutrition ([address] Hypotension; DEONTE (acute kidney injury) ([address] Current every day smoker; Essential hypertension; Altered mental status; Acute systolic heart failure ([address] Severe malnutrition ([address] Abnormal EEG; Acute encephalopathy; and Generalized nonconvulsive seizures ([address] on their problem list.    Recent Chest Xray: (  2-15-  )    Impression   Interval worsening of the interstitial and alveolar opacities bilaterally. Differential considerations would include worsening infection or edema       Cardiomegaly             Date of Eval: 2021  Evaluating Therapist: Desirae Montenegro    Current Diet level:  Current Diet : NPO  Current Liquid Diet : NPO    Primary Complaint: Per chart, Timbo Joseph is a 77 y.o. Non-/non  female who presents with Dizziness (onset 2 weeks), Fall (difficulty walking), and Aphasia (onset 4 days ago) and is admitted to the hospital for the management of Community acquired pneumonia of right middle lobe of lung.  This is a 52-year-old -American female with past medical history of hypertension, diabetes mellitus, chronic diastolic heart failure, COPD, chronic hypoxic respiratory failure noncompliant with home oxygen, nicotine dependence/current smoker. Ms. Yadira Mcintosh was brought to the ED on 2/6/20/2021 by her family out of concern for generalized weakness, and several weeks of changes in mentation. On arrival to the ED she was afebrile, hypotensive with systolic blood pressures 13T to 80s, hypoxic 85% on room air. Lab work notable for WBC 9.5K, lactic acid 2.3, BUN/Cre 23/1.57, sodium 133, CO2 17, HST 58, proBNP 13,553, COVID-19 negative. Chest x-ray demonstrated bilateral airspace and interstitial opacities right greater than left, noncontrast CT of the head negative for any acute intracranial process, twelve-lead EKG sinus bradycardia rate of 54 without evidence of acute ischemic changes. She was fluid resuscitated and responsive with rapid improvement in her blood pressure and 2-hour lactate of 1.5. Blood cultures drawn, started on Rocephin and Zithromax and transferred to the progressive care unit for admission. On my exam, Ms. Yadira Mcintosh is very hard of hearing, however she is able to follow commands, speech is fluent and content appropriate, and is without any motor or sensory deficits. She lives alone and reports that she has been feeling poorly for the past 2 months, poor appetite, fatigue, not eating or drinking well. She has been smoking since a teenager and is down to 4 cigarettes a day, she is supposed to wear home oxygen however is not compliant, and she drinks a 16 ounce beer daily. She has family support, but to what extent is not clear. Review of systems is positive for cough, fatigue, poor appetite, at times early satiety, denies nausea, vomiting, diarrhea, chest pain.      Pain:  Pain Assessment  Pain Assessment: 0-10  Pain Level: 0    Reason for Referral  Merline Deck was referred for a bedside swallow evaluation to assess the efficiency of her swallow function, identify signs and symptoms of aspiration and make recommendations regarding safe dietary consistencies, effective compensatory strategies, and safe eating environment. Impression  Patient presents with probable safe swallow for Dysphagia Minced & Moist (Dysphagia II) diet with thin liquids & medications in puree as evidenced by no overt s/s of aspiration noted with consistencies tested. Pt with missing upper & lower teeth, exhibited +mod extended, impaired mastication of soft solid consistency. Recommend distant supervision, small sips and bites, only feed when alert and awake and upright at 90 degrees for all PO intake. Recommend close monitoring for overt/clinical s/s of aspiration and D/C PO intake and complete Modified Barium Swallow Study should they occur. Results and recommendations reported to RN. Dysphagia Diagnosis: Mild to moderate oral stage dysphagia  Dysphagia Outcome Severity Scale: Level 4: Mild moderate dysphagia- Intermittent supervision/cueing. One - two diet consistencies restricted     Treatment Plan  Requires SLP Intervention: Yes  Duration/Frequency of Treatment: 1-2x per week  D/C Recommendations: Further therapy recommended at discharge. Recommended Diet and Intervention  Diet Solids Recommendation: Dysphagia Minced and Moist (Dysphagia II)  Liquid Consistency Recommendation: Thin  Recommended Form of Meds: Meds in puree     Therapeutic Interventions: Diet tolerance monitoring;Patient/Family education    Compensatory Swallowing Strategies  Compensatory Swallowing Strategies: Eat/Feed slowly;Upright as possible for all oral intake;Small bites/sips    Treatment/Goals  Dysphagia Goals: The patient will tolerate recommended diet without observed clinical signs of aspiration; The patient/caregiver will demonstrate understanding of compensatory strategies for improved swallowing safety. General  Chart Reviewed: Yes  Behavior/Cognition: Alert; Cooperative; Requires cueing;Distractible Temperature Spikes Noted: No  Respiratory Status: O2 via nasual cannula  O2 Device: Nasal cannula  Communication Observation: Non-verbal  Follows Directions: Simple  Dentition: Some missing teeth  Patient Positioning: Upright in bed  Consistencies Administered: Dysphagia Pureed (Dysphagia I); Nectar - straw;Nectar - teaspoon; Thin - teaspoon; Thin - straw;Dysphagia Minced and Moist (Dysphagia II)    Pain Level: 0    Vision/Hearing  Vision  Vision: Within Functional Limits  Hearing  Hearing: Within functional limits    Oral Motor Deficits  Oral/Motor  Oral Motor: Within functional limits    Oral Phase Dysfunction  Oral Phase  Oral Phase: Exceptions  Oral Phase Dysfunction  Impaired Mastication: Soft Solid     Indicators of Pharyngeal Phase Dysfunction   Pharyngeal Phase  Pharyngeal Phase: WFL  Pharyngeal Phase   Pharyngeal: No overt s/s of aspiration observed with consistencies tested.     Prognosis  Prognosis  Prognosis for safe diet advancement: fair  Individuals consulted  Consulted and agree with results and recommendations: Patient;RN    Education  Patient Education: yes  Patient Education Response: Verbalizes understanding             Therapy Time  SLP Individual Minutes  Time In: 0292  Time Out: 0498  Minutes: [address], [place] [address] [facility]    2/16/2021 10:03 AM

## 2021-02-16 NOTE — PROCEDURES
[facility]        CONTINUOUS VIDEO EEG MONITORING           PATIENT: Heidi Oliveros  MRN #: [id number]  DATE: 2/16/2021 at 9:03AM to 2/19/2021 at 2:34PM  REFERRING PHYSICIAN: Jessica Delvalle MD       BRIEF HISTORY: Patient is a 77year old female who presented with dizziness for 2 weeks, and aphasia for 4 days, and decreased mentation. LTME was ordered to evaluate. AEDs:  Keppra 500mg BID; Depakote 500mg BID    EEG DESCRIPTION: 21 EEG electrodes were placed according to International 10/20 System. Single EKG electrode was also placed. Video recording was time-locked with EEG recording. BASELINE: During maximal wakefulness, the background was in delta and theta frequency of 2-6Hz, ranging between 10-50uV. There was posterior dominant rhythm at 5-6Hz. Spontaneous variability was present. Hyperventilation and photic stimulation were not performed. Single lead EKG showed regular, heart rate at 70s per minute. Day 1  recording started from 2/16/2021 at 9:03AM   AEDs:  Keppra 500mg BID; Depakote 500mg BID  Interictal: Continuous slow, generalized in delta and theta frequency with posterior dominant rhythm was 5-6Hz. No epileptiform discharges were noted. Ictal: Non    Summary: During above recoding period, there was evidence of moderate diffuse encephalopathy. No epileptiform discharges or EEG/clinical seizures were noted. Day 2  2/17/2021   AEDs:  Keppra 500mg BID; Depakote 500mg BID  Interictal: Suboptimal due to ample motion/lead/motion artifacts. Continuous slow, generalized in delta and theta frequency with posterior dominant rhythm was 5-6Hz. Ictal: Non    Summary: During above recoding period, suboptimal recording due to ample artifacts. There was evidence of mild to moderate diffuse encephalopathy.            Day 3  2/18/2021   AEDs:  Keppra 500mg BID; Depakote 500mg BID  Interictal: Continuous slow, generalized

## 2021-02-17 LAB
-: NORMAL
ABSOLUTE EOS #: 0.24 K/UL (ref 0–0.44)
ABSOLUTE IMMATURE GRANULOCYTE: 0 K/UL (ref 0–0.3)
ABSOLUTE LYMPH #: 2.13 K/UL (ref 1.1–3.7)
ABSOLUTE MONO #: 0.55 K/UL (ref 0.1–1.2)
ALLEN TEST: POSITIVE
AMORPHOUS: NORMAL
ANION GAP SERPL CALCULATED.3IONS-SCNC: 10 MMOL/L (ref 9–17)
BACTERIA: NORMAL
BASOPHILS # BLD: 0 % (ref 0–2)
BASOPHILS ABSOLUTE: 0 K/UL (ref 0–0.2)
BILIRUBIN URINE: NEGATIVE
BUN BLDV-MCNC: 12 MG/DL (ref 8–23)
BUN/CREAT BLD: ABNORMAL (ref 9–20)
CALCIUM SERPL-MCNC: 8.1 MG/DL (ref 8.6–10.4)
CASTS UA: NORMAL /LPF (ref 0–8)
CHLORIDE BLD-SCNC: 108 MMOL/L (ref 98–107)
CO2: 21 MMOL/L (ref 20–31)
COLOR: YELLOW
COMMENT UA: ABNORMAL
CREAT SERPL-MCNC: 0.65 MG/DL (ref 0.5–0.9)
CRYSTALS, UA: NORMAL /HPF
CULTURE: NORMAL
DIFFERENTIAL TYPE: ABNORMAL
DIRECT EXAM: NORMAL
EKG ATRIAL RATE: 97 BPM
EKG P AXIS: 55 DEGREES
EKG P-R INTERVAL: 166 MS
EKG Q-T INTERVAL: 436 MS
EKG QRS DURATION: 98 MS
EKG QTC CALCULATION (BAZETT): 553 MS
EKG R AXIS: -40 DEGREES
EKG T AXIS: 168 DEGREES
EKG VENTRICULAR RATE: 97 BPM
EOSINOPHILS RELATIVE PERCENT: 3 % (ref 1–4)
EPITHELIAL CELLS UA: NORMAL /HPF (ref 0–5)
FIO2: 6
FOLATE: >20 NG/ML
GFR AFRICAN AMERICAN: >60 ML/MIN
GFR NON-AFRICAN AMERICAN: >60 ML/MIN
GFR SERPL CREATININE-BSD FRML MDRD: ABNORMAL ML/MIN/{1.73_M2}
GFR SERPL CREATININE-BSD FRML MDRD: ABNORMAL ML/MIN/{1.73_M2}
GLUCOSE BLD-MCNC: 121 MG/DL (ref 65–105)
GLUCOSE BLD-MCNC: 88 MG/DL (ref 65–105)
GLUCOSE BLD-MCNC: 90 MG/DL (ref 70–99)
GLUCOSE BLD-MCNC: 91 MG/DL (ref 65–105)
GLUCOSE BLD-MCNC: 98 MG/DL (ref 65–105)
GLUCOSE BLD-MCNC: 99 MG/DL (ref 65–105)
GLUCOSE URINE: NEGATIVE
HCT VFR BLD CALC: 40.3 % (ref 36.3–47.1)
HEMOGLOBIN: 11.8 G/DL (ref 11.9–15.1)
IMMATURE GRANULOCYTES: 0 %
KETONES, URINE: NEGATIVE
LEUKOCYTE ESTERASE, URINE: NEGATIVE
LYMPHOCYTES # BLD: 27 % (ref 24–43)
Lab: NORMAL
MAGNESIUM: 1.7 MG/DL (ref 1.6–2.6)
MCH RBC QN AUTO: 24 PG (ref 25.2–33.5)
MCHC RBC AUTO-ENTMCNC: 29.3 G/DL (ref 28.4–34.8)
MCV RBC AUTO: 82.1 FL (ref 82.6–102.9)
MODE: ABNORMAL
MONOCYTES # BLD: 7 % (ref 3–12)
MORPHOLOGY: ABNORMAL
MUCUS: NORMAL
NEGATIVE BASE EXCESS, ART: ABNORMAL (ref 0–2)
NITRITE, URINE: NEGATIVE
NRBC AUTOMATED: 0 PER 100 WBC
O2 DEVICE/FLOW/%: ABNORMAL
OTHER OBSERVATIONS UA: NORMAL
PATIENT TEMP: ABNORMAL
PDW BLD-RTO: 27.4 % (ref 11.8–14.4)
PH UA: 7 (ref 5–8)
PLATELET # BLD: ABNORMAL K/UL (ref 138–453)
PLATELET ESTIMATE: ABNORMAL
PLATELET, FLUORESCENCE: 203 K/UL (ref 138–453)
PLATELET, IMMATURE FRACTION: 6.6 % (ref 1.1–10.3)
PMV BLD AUTO: ABNORMAL FL (ref 8.1–13.5)
POC HCO3: 25.4 MMOL/L (ref 21–28)
POC O2 SATURATION: 94 % (ref 94–98)
POC PCO2 TEMP: ABNORMAL MM HG
POC PCO2: 39.4 MM HG (ref 35–48)
POC PH TEMP: ABNORMAL
POC PH: 7.42 (ref 7.35–7.45)
POC PO2 TEMP: ABNORMAL MM HG
POC PO2: 67.6 MM HG (ref 83–108)
POSITIVE BASE EXCESS, ART: 1 (ref 0–3)
POTASSIUM SERPL-SCNC: 3.6 MMOL/L (ref 3.7–5.3)
PROTEIN UA: ABNORMAL
RBC # BLD: 4.91 M/UL (ref 3.95–5.11)
RBC # BLD: ABNORMAL 10*6/UL
RBC UA: NORMAL /HPF (ref 0–4)
RENAL EPITHELIAL, UA: NORMAL /HPF
SAMPLE SITE: ABNORMAL
SEG NEUTROPHILS: 63 % (ref 36–65)
SEGMENTED NEUTROPHILS ABSOLUTE COUNT: 4.98 K/UL (ref 1.5–8.1)
SODIUM BLD-SCNC: 139 MMOL/L (ref 135–144)
SPECIFIC GRAVITY UA: 1.01 (ref 1–1.03)
SPECIMEN DESCRIPTION: NORMAL
TCO2 (CALC), ART: 27 MMOL/L (ref 22–29)
THYROXINE, FREE: 1.26 NG/DL (ref 0.93–1.7)
TRICHOMONAS: NORMAL
TSH SERPL DL<=0.05 MIU/L-ACNC: 5.35 MIU/L (ref 0.3–5)
TURBIDITY: CLEAR
URINE HGB: ABNORMAL
UROBILINOGEN, URINE: NORMAL
VITAMIN B-12: 1792 PG/ML (ref 232–1245)
WBC # BLD: 7.9 K/UL (ref 3.5–11.3)
WBC # BLD: ABNORMAL 10*3/UL
WBC UA: NORMAL /HPF (ref 0–5)
YEAST: NORMAL

## 2021-02-17 PROCEDURE — 2580000003 HC RX 258: Performed by: STUDENT IN AN ORGANIZED HEALTH CARE EDUCATION/TRAINING PROGRAM

## 2021-02-17 PROCEDURE — 36415 COLL VENOUS BLD VENIPUNCTURE: CPT

## 2021-02-17 PROCEDURE — 2060000000 HC ICU INTERMEDIATE R&B

## 2021-02-17 PROCEDURE — 83735 ASSAY OF MAGNESIUM: CPT

## 2021-02-17 PROCEDURE — 6370000000 HC RX 637 (ALT 250 FOR IP): Performed by: FAMILY MEDICINE

## 2021-02-17 PROCEDURE — 94640 AIRWAY INHALATION TREATMENT: CPT

## 2021-02-17 PROCEDURE — 6360000002 HC RX W HCPCS: Performed by: STUDENT IN AN ORGANIZED HEALTH CARE EDUCATION/TRAINING PROGRAM

## 2021-02-17 PROCEDURE — 85025 COMPLETE CBC W/AUTO DIFF WBC: CPT

## 2021-02-17 PROCEDURE — 82947 ASSAY GLUCOSE BLOOD QUANT: CPT

## 2021-02-17 PROCEDURE — 2580000003 HC RX 258: Performed by: FAMILY MEDICINE

## 2021-02-17 PROCEDURE — 95720 EEG PHY/QHP EA INCR W/VEEG: CPT | Performed by: PSYCHIATRY & NEUROLOGY

## 2021-02-17 PROCEDURE — 36600 WITHDRAWAL OF ARTERIAL BLOOD: CPT

## 2021-02-17 PROCEDURE — 82607 VITAMIN B-12: CPT

## 2021-02-17 PROCEDURE — 84443 ASSAY THYROID STIM HORMONE: CPT

## 2021-02-17 PROCEDURE — 84439 ASSAY OF FREE THYROXINE: CPT

## 2021-02-17 PROCEDURE — 82803 BLOOD GASES ANY COMBINATION: CPT

## 2021-02-17 PROCEDURE — 99232 SBSQ HOSP IP/OBS MODERATE 35: CPT | Performed by: INTERNAL MEDICINE

## 2021-02-17 PROCEDURE — 6360000002 HC RX W HCPCS: Performed by: FAMILY MEDICINE

## 2021-02-17 PROCEDURE — 80048 BASIC METABOLIC PNL TOTAL CA: CPT

## 2021-02-17 PROCEDURE — 85055 RETICULATED PLATELET ASSAY: CPT

## 2021-02-17 PROCEDURE — 6370000000 HC RX 637 (ALT 250 FOR IP): Performed by: STUDENT IN AN ORGANIZED HEALTH CARE EDUCATION/TRAINING PROGRAM

## 2021-02-17 PROCEDURE — 81001 URINALYSIS AUTO W/SCOPE: CPT

## 2021-02-17 PROCEDURE — 99222 1ST HOSP IP/OBS MODERATE 55: CPT | Performed by: PSYCHIATRY & NEUROLOGY

## 2021-02-17 PROCEDURE — 2700000000 HC OXYGEN THERAPY PER DAY

## 2021-02-17 PROCEDURE — 82746 ASSAY OF FOLIC ACID SERUM: CPT

## 2021-02-17 PROCEDURE — 93010 ELECTROCARDIOGRAM REPORT: CPT | Performed by: INTERNAL MEDICINE

## 2021-02-17 PROCEDURE — 93005 ELECTROCARDIOGRAM TRACING: CPT | Performed by: STUDENT IN AN ORGANIZED HEALTH CARE EDUCATION/TRAINING PROGRAM

## 2021-02-17 PROCEDURE — 95714 VEEG EA 12-26 HR UNMNTR: CPT

## 2021-02-17 RX ORDER — DIVALPROEX SODIUM 250 MG/1
250 TABLET, EXTENDED RELEASE ORAL 2 TIMES DAILY
Status: DISCONTINUED | OUTPATIENT
Start: 2021-02-17 | End: 2021-02-18

## 2021-02-17 RX ORDER — MAGNESIUM SULFATE 1 G/100ML
2000 INJECTION INTRAVENOUS ONCE
Status: COMPLETED | OUTPATIENT
Start: 2021-02-17 | End: 2021-02-17

## 2021-02-17 RX ORDER — POTASSIUM CHLORIDE 7.45 MG/ML
40 INJECTION INTRAVENOUS ONCE
Status: COMPLETED | OUTPATIENT
Start: 2021-02-17 | End: 2021-02-17

## 2021-02-17 RX ADMIN — FOLIC ACID 1 MG: 1 TABLET ORAL at 08:49

## 2021-02-17 RX ADMIN — ARFORMOTEROL TARTRATE 15 MCG: 15 SOLUTION RESPIRATORY (INHALATION) at 20:17

## 2021-02-17 RX ADMIN — FUROSEMIDE 40 MG: 10 INJECTION, SOLUTION INTRAMUSCULAR; INTRAVENOUS at 08:48

## 2021-02-17 RX ADMIN — CARVEDILOL 3.12 MG: 3.12 TABLET, FILM COATED ORAL at 21:21

## 2021-02-17 RX ADMIN — Medication 100 MG: at 08:48

## 2021-02-17 RX ADMIN — DIVALPROEX SODIUM 250 MG: 250 TABLET, FILM COATED, EXTENDED RELEASE ORAL at 08:48

## 2021-02-17 RX ADMIN — SODIUM CHLORIDE, PRESERVATIVE FREE 10 ML: 5 INJECTION INTRAVENOUS at 08:49

## 2021-02-17 RX ADMIN — ATORVASTATIN CALCIUM 20 MG: 20 TABLET, FILM COATED ORAL at 08:48

## 2021-02-17 RX ADMIN — SODIUM CHLORIDE, PRESERVATIVE FREE 10 ML: 5 INJECTION INTRAVENOUS at 21:31

## 2021-02-17 RX ADMIN — SERTRALINE 50 MG: 50 TABLET, FILM COATED ORAL at 08:48

## 2021-02-17 RX ADMIN — BUDESONIDE 500 MCG: 0.5 INHALANT RESPIRATORY (INHALATION) at 20:17

## 2021-02-17 RX ADMIN — DIVALPROEX SODIUM 250 MG: 250 TABLET, FILM COATED, EXTENDED RELEASE ORAL at 20:42

## 2021-02-17 RX ADMIN — DEXTROSE AND SODIUM CHLORIDE: 5; 450 INJECTION, SOLUTION INTRAVENOUS at 20:41

## 2021-02-17 RX ADMIN — Medication 1 TABLET: at 08:49

## 2021-02-17 RX ADMIN — Medication 81 MG: at 08:48

## 2021-02-17 RX ADMIN — ENOXAPARIN SODIUM 40 MG: 40 INJECTION SUBCUTANEOUS at 08:48

## 2021-02-17 RX ADMIN — PANTOPRAZOLE SODIUM 20 MG: 20 TABLET, DELAYED RELEASE ORAL at 08:48

## 2021-02-17 RX ADMIN — LEVETIRACETAM 500 MG: 5 INJECTION INTRAVENOUS at 05:17

## 2021-02-17 RX ADMIN — LISINOPRIL 2.5 MG: 2.5 TABLET ORAL at 08:48

## 2021-02-17 RX ADMIN — POTASSIUM CHLORIDE 40 MEQ: 7.46 INJECTION, SOLUTION INTRAVENOUS at 05:28

## 2021-02-17 RX ADMIN — CARVEDILOL 3.12 MG: 3.12 TABLET, FILM COATED ORAL at 08:48

## 2021-02-17 RX ADMIN — MAGNESIUM SULFATE 2000 MG: 1 INJECTION INTRAVENOUS at 05:18

## 2021-02-17 NOTE — CONSULTS
Attestation signed by      Attending Physician Statement:    I have discussed the care of  Kayla Cisneros , including pertinent history and exam findings, with the Cardiology fellow/resident. I have seen and examined the patient and the key elements of all parts of the encounter have been performed by me. I agree with the assessment, plan and orders as documented by the fellow/resident, after I modified exam findings and plan of treatments, and the final version is my approved version of the assessment. Additional Comments:   CHF , HFrEF, LVEF 20% mild to moderate MR, moderate TR and pulmonary hypertension  No signs of any volume overload we will continue current medications  Patient is not aware of herself location does not complaint anything saying no to every questions, concerned patient is not a candidate for any aggressive cardiac work-up unless patient condition improved and her current mental status improved and she is aware of herself. Prolonged QTc please watch let the pharmacy knows any medication that can increase QTc, keep electrolytes within normal limits  [facility] Cardiology Cardiology    Consult / H&P               Today's Date: 2/17/2021  Patient Name: Kayla Cisneros  Date of admission: 2/15/2021  6:29 PM  Patient's age: 77 y. o., 1945  Admission Dx: Acute encephalopathy [G93.40]    Reason for Consult:  Congestive heart failure    Requesting Physician: No admitting provider for patient encounter. CHIEF COMPLAINT:  Acute encephalopathy    History Obtained From:  patient    HISTORY OF PRESENT ILLNESS:      The patient is a 77 y.o.  female who initially was admitted to [address] [place], [place] [address] on 2/6/2021 for weakness, dizziness, speech changes, and altered mental status. Pt was hypotensive, hypoxic, and proBNP was 13,533 at the time. Patient was admitted and diagnosed with acute systolic CHF.  Echo completed on 2/8/2021 showed EF 20%, severe global hypokinesis, RA is moderately dilated, mild-moderate MR, moderate TR/PHTN, RVSP is 54 mmHg. Pt is alert but only oriented to person (not place or time). Saying no to every question. Cant obtain history because of mentation. Past Medical History:   has a past medical history of CHF (congestive heart failure) ([facility] COPD (chronic obstructive pulmonary disease) ([facility] Diabetes mellitus ([facility] MRSA (methicillin resistant staph aureus) culture positive, and Tobacco abuse. Past Surgical History:   has a past surgical history that includes joint replacement (Bilateral); Hysterectomy; and Appendectomy. Home Medications:    Prior to Admission medications    Medication Sig Start Date End Date Taking? Authorizing Provider   budesonide-formoterol (SYMBICORT) 160-4.5 MCG/ACT AERO Inhale 2 puffs into the lungs 2 times daily    Historical Provider, MD   tiotropium (SPIRIVA RESPIMAT) 2.5 MCG/ACT AERS inhaler Inhale 2 puffs into the lungs daily    Historical Provider, MD   furosemide (LASIX) 20 MG tablet Take 20 mg by mouth daily    Historical Provider, MD   traZODone (DESYREL) 150 MG tablet Take 150 mg by mouth nightly    Historical Provider, MD   ibuprofen (ADVIL;MOTRIN) 800 MG tablet Take 800 mg by mouth 3 times daily (with meals)    Historical Provider, MD   pantoprazole (PROTONIX) 20 MG tablet Take 1 tablet by mouth daily 8/25/20   Marisol Pierson MD   albuterol (PROVENTIL) (2.5 MG/3ML) 0.083% nebulizer solution Take 3 mLs by nebulization every 6 hours as needed for Wheezing or Shortness of Breath 8/25/20   Marisol Pierson MD   tiZANidine (ZANAFLEX) 4 MG tablet Take 4 mg by mouth 2 times daily as needed    Historical Provider, MD   pregabalin (LYRICA) 100 MG capsule Take 100 mg by mouth 2 times daily.     Historical Provider, MD   Multiple Vitamins-Minerals (CERTAVITE SENIOR PO) Take 1 tablet by mouth daily    Historical Provider, MD   sertraline (ZOLOFT) 50 MG tablet Take 50 mg by mouth daily    Historical Provider, MD   loratadine (CLARITIN) 10 MG tablet Take 10 mg by mouth daily    Historical Provider, MD   atorvastatin (LIPITOR) 20 MG tablet Take 20 mg by mouth daily     Historical Provider, MD   aspirin 81 MG tablet Take 81 mg by mouth daily. Historical Provider, MD   calcium-vitamin D (OSCAL-500) 500-200 MG-UNIT per tablet Take 1 tablet by mouth daily.     Historical Provider, MD   lisinopril (PRINIVIL;ZESTRIL) 20 MG tablet Take 20 mg by mouth daily     Historical Provider, MD      Current Facility-Administered Medications: divalproex (DEPAKOTE ER) extended release tablet 250 mg, 250 mg, Oral, BID  furosemide (LASIX) injection 40 mg, 40 mg, Intravenous, Daily  enoxaparin (LOVENOX) injection 40 mg, 40 mg, Subcutaneous, Daily  folic acid (FOLVITE) tablet 1 mg, 1 mg, Oral, Daily  thiamine tablet 100 mg, 100 mg, Oral, Daily  dextrose 5 % and 0.45 % sodium chloride infusion, , Intravenous, Continuous  aspirin EC tablet 81 mg, 81 mg, Oral, Daily  atorvastatin (LIPITOR) tablet 20 mg, 20 mg, Oral, Daily  sertraline (ZOLOFT) tablet 50 mg, 50 mg, Oral, Daily  lisinopril (PRINIVIL;ZESTRIL) tablet 2.5 mg, 2.5 mg, Oral, Daily  carvedilol (COREG) tablet 3.125 mg, 3.125 mg, Oral, BID  budesonide (PULMICORT) nebulizer suspension 500 mcg, 0.5 mg, Nebulization, BID  insulin lispro (HUMALOG) injection vial 0-12 Units, 0-12 Units, Subcutaneous, TID WC  insulin lispro (HUMALOG) injection vial 0-6 Units, 0-6 Units, Subcutaneous, Nightly  glucose (GLUTOSE) 40 % oral gel 15 g, 15 g, Oral, PRN  dextrose 50 % IV solution, 12.5 g, Intravenous, PRN  glucagon (rDNA) injection 1 mg, 1 mg, Intramuscular, PRN  dextrose 5 % solution, 100 mL/hr, Intravenous, PRN  albuterol (PROVENTIL) nebulizer solution 2.5 mg, 2.5 mg, Nebulization, Q6H PRN  pantoprazole (PROTONIX) tablet 20 mg, 20 mg, Oral, Daily  sodium chloride flush 0.9 % injection 10 mL, 10 mL, Intravenous, PRN  promethazine (PHENERGAN) tablet 12.5 mg, 12.5 mg, Oral, Q6H PRN **OR** ondansetron (ZOFRAN) injection 4 numbness or tingling. No change in gait, balance, coordination, mood, affect, memory, mentation, behavior. · Psychiatric: No anxiety, or depression. · Endocrine: No temperature intolerance. No excessive thirst, fluid intake, or urination. No tremor. · Hematologic/Lymphatic: No abnormal bruising or bleeding, blood clots or swollen lymph nodes. · Allergic/Immunologic: No nasal congestion or hives. PHYSICAL EXAM:      /76   Pulse 98   Temp 98.1 °F (36.7 °C) (Temporal)   Resp 17   Ht 5' 2\" (1.575 m)   Wt 130 lb 1.1 oz (59 kg)   SpO2 99%   BMI 23.79 kg/m²    Constitutional and General Appearance: alert, cooperative, no distress and appears stated age  HEENT: PERRL, no cervical lymphadenopathy. No masses palpable. Normal oral mucosa  Respiratory:  · Increased work of breathing,  · Lungs clear to auscultation bilaterally  Cardiovascular:  · The apical impulse is not displaced  · Heart tones are crisp and normal. regular S1 and S2.  · Jugular venous pulsation Normal  · The carotid upstroke is normal in amplitude and contour without delay or bruit  · Peripheral pulses are symmetrical and full   Abdomen:   · No masses or tenderness  · Bowel sounds present  Extremities:  ·  No Cyanosis or Clubbing  ·  Lower extremity edema: No  ·  Skin: Warm and dry  Neurological:  · Alert and oriented. · Moves all extremities well  · No abnormalities of mood, affect, memory, mentation, or behavior are noted    DATA:    Diagnostics:    ECHO 2/8/2021: EF 20%, severe global hypokinesis, RA is moderately dilated, mild-moderate MR, moderate TR/PHTN, RVSP is 54 mmHg. EKG 2/17:  Normal sinus rhythm  Left axis deviation  T wave abnormality, consider lateral ischemia  Prolonged QT  Abnormal ECG  When compared with ECG of 15-FEB-2021 23:35,  Significant changes have occurred    CXR 2/15:  Interval worsening of the interstitial and alveolar opacities bilaterally.    Differential considerations would include worsening infection or edema       Cardiomegaly          Previous office/hospital visit:   DARIO Rosenbaum NP 2/15/2021 [facility])  Acute systolic heart failure  · Interstitial lung disease/pulmonary fibrosis/COPD, managed by others  · Severe cardiomyopathy with ejection fraction of 20% on echocardiogram 2/8/2021 of unclear etiology, history of ejection fraction 55% on echocardiogram October 2019  · Moderate tricuspid regurgitation and moderate pulmonary hypertension on echocardiogram  · COPD, managed by others  · Diabetes, managed by others  · Metabolic encephalopathy, managed by others  · Hypokalemia/hypernatremia, managed by others  · History of alcohol abuse  · Altered mental status, managed by others  Labs:     CBC:   Recent Labs     02/16/21  0514 02/17/21  0414   WBC 9.8 7.9   HGB 11.2* 11.8*   HCT 35.8* 40.3   PLT See Reflexed IPF Result See Reflexed IPF Result     BMP:   Recent Labs     02/16/21  0514 02/17/21  0414    139   K 4.2 3.6*   CO2 23 21   BUN 16 12   CREATININE 0.65 0.65   LABGLOM >60 >60   GLUCOSE 76 90     BNP: No results for input(s): BNP in the last 72 hours. PT/INR: No results for input(s): PROTIME, INR in the last 72 hours. APTT:No results for input(s): APTT in the last 72 hours. CARDIAC ENZYMES:No results for input(s): CKTOTAL, CKMB, CKMBINDEX, TROPONINI in the last 72 hours.   FASTING LIPID PANEL:  Lab Results   Component Value Date    HDL 38 10/15/2019    TRIG 64 10/15/2019     LIVER PROFILE:  Recent Labs     02/14/21  1600   LABALBU 2.5*         Patient Active Problem List   Diagnosis    COPD (ch[facility] obstructive [facility] disease) [facility]    CHF (congestive heart failure) (HCC)    Cocaine abuse ([facility]    Acidosis, metabolic, with respiratory acidosis    Acute respiratory failure with hypoxemia (HCC)    Polysubstance abuse (HCC)    Hyponatremia, Beer Potomania    Normocytic anemia    Neutrophilic leukocytosis    Increased ammonia level    Hypophosphatemia    Type 2 diabetes mellitus without complication, without long-term current use of insulin (HCC)    Acute on chronic diastolic heart failure (HCC)    Tobacco abuse    Diabetes mellitus (HCC)    Chronic diastolic heart failure (HCC)    Elevated troponin    Prolonged Q-T interval on ECG    Hypoxia    Chronic respiratory failure (HCC)    Mild malnutrition (HCC)    Hypotension    DEONTE (acute kidney injury) ([facility]    Current every day smoker    Essential hypertension    Altered mental status    Heart failure, systolic, with acute decompensation (HCC)    Severe malnutrition (HCC)    Abnormal EEG    Acute encephalopathy    Generalized nonconvulsive seizures (HCC)   IMPRESSION:    1. Acute onset of systolic congestive heart failure  2. QT prolongation and T wave abnormalities on EKG  3. Altered mental status  4. Diabetes mellitus type 2    RECOMMENDATIONS:  1. Thyroid function test  2. See if any medications cause QT prolongations  3. If mentation is reversible and improves, ischemia work-up. Discussed with patient and Nurse.     Electronically signed by Blayne Stark on 2/17/2021 at 8:52 [address] [place] [address] [facility]      [phone]

## 2021-02-17 NOTE — PLAN OF CARE
Problem: Skin Integrity:  Goal: Will show no infection signs and symptoms  Description: Will show no infection signs and symptoms  Outcome: Ongoing  Goal: Absence of new skin breakdown  Description: Absence of new skin breakdown  Outcome: Ongoing     Problem: Falls - Risk of:  Goal: Will remain free from falls  Description: Will remain free from falls  Outcome: Ongoing  Goal: Absence of physical injury  Description: Absence of physical injury  Outcome: Ongoing     Problem: Discharge Planning:  Goal: Participates in care planning  Description: Participates in care planning  Outcome: Ongoing  Goal: Discharged to appropriate level of care  Description: Discharged to appropriate level of care  Outcome: Ongoing     Problem: Airway Clearance - Ineffective:  Goal: Ability to maintain a clear airway will improve  Description: Ability to maintain a clear airway will improve  Outcome: Ongoing     Problem: Anxiety/Stress:  Goal: Level of anxiety will decrease  Description: Level of anxiety will decrease  Outcome: Ongoing     Problem: Aspiration:  Goal: Absence of aspiration  Description: Absence of aspiration  Outcome: Ongoing     Problem:  Bowel Function - Altered:  Goal: Bowel elimination is within specified parameters  Description: Bowel elimination is within specified parameters  Outcome: Ongoing     Problem: Cardiac Output - Decreased:  Goal: Hemodynamic stability will improve  Description: Hemodynamic stability will improve  Outcome: Ongoing     Problem: Fluid Volume - Imbalance:  Goal: Absence of imbalanced fluid volume signs and symptoms  Description: Absence of imbalanced fluid volume signs and symptoms  Outcome: Ongoing     Problem: Gas Exchange - Impaired:  Goal: Levels of oxygenation will improve  Description: Levels of oxygenation will improve  Outcome: Ongoing     Problem: Mental Status - Impaired:  Goal: Mental status will be restored to baseline  Description: Mental status will be restored to baseline Outcome: Ongoing     Problem: Nutrition Deficit:  Goal: Ability to achieve adequate nutritional intake will improve  Description: Ability to achieve adequate nutritional intake will improve  Outcome: Ongoing     Problem: Pain:  Goal: Pain level will decrease  Description: Pain level will decrease  Outcome: Ongoing  Goal: Recognizes and communicates pain  Description: Recognizes and communicates pain  Outcome: Ongoing  Goal: Control of acute pain  Description: Control of acute pain  Outcome: Ongoing  Goal: Control of chronic pain  Description: Control of chronic pain  Outcome: Ongoing     Problem: Serum Glucose Level - Abnormal:  Goal: Ability to maintain appropriate glucose levels will improve to within specified parameters  Description: Ability to maintain appropriate glucose levels will improve to within specified parameters  Outcome: Ongoing     Problem: Skin Integrity - Impaired:  Goal: Will show no infection signs and symptoms  Description: Will show no infection signs and symptoms  Outcome: Ongoing  Goal: Absence of new skin breakdown  Description: Absence of new skin breakdown  Outcome: Ongoing     Problem: Sleep Pattern Disturbance:  Goal: Appears well-rested  Description: Appears well-rested  Outcome: Ongoing     Problem: Tissue Perfusion, Altered:  Goal: Circulatory function within specified parameters  Description: Circulatory function within specified parameters  Outcome: Ongoing     Problem: Tissue Perfusion - Cardiopulmonary, Altered:  Goal: Absence of angina  Description: Absence of angina  Outcome: Ongoing  Goal: Hemodynamic stability will improve  Description: Hemodynamic stability will improve  Outcome: Ongoing

## 2021-02-17 NOTE — PROGRESS NOTES
[facility]  Office: [address], DO, Mini Fischer, DO, Carlos A López, DO, Yanci Jones Blood, DO, Blanca To MD, Frank Mansfield MD, Toi Carrero MD, Sridhar Lyons MD, Ozzy Jean-Baptiste MD, Reshma Anaya MD, Armand Coffey MD, Zhanna Salvador MD, Alexandru Segal MD, Lilly Holter, DO, Ferdinand Jackson MD, Malick Salas MD, Jeanne Funez DO, Luis Chamberlain MD,  Neli Carrasco DO, Yashira Troy MD, Sonido Bain MD, Gaby Tinajero, [facility], [facility] Shahram, CNP, Leticia Patton, CNP, Adela Collins, CNS, Austin Hermosillo, CNP, Casey Esposito, CNP, Anne Tovar, CNP, Roselyn Santos, CNP, Romero Mora, CNP, Azael Sorenson PA-C, Jenni Eldridge, [facility], Arteryn Unger, CNP, Allen Arambula, CNP, Antony Vela, CNP, Trina Fischer, CNP, Raoul [place]ale, [address] [place] [address]    Progress Note    2/17/2021    8:31 AM    Name:   Loring Gaucher  MRN:     [id number]     Acct:      [de-identified]   Room:   [facility] Day:  2  Admit Date:  2/15/2021  6:29 PM    PCP:   Eden Mtz MD  Code Status:  Full Code    Subjective:     C/C: Altered mental status    Interval History Status: not changed. Patient remains confused and encephalopathic. Continued seizure monitoring in progress. Denies chest pain, shortness of breath, nausea or vomiting. Brief History: This is a 43-year-old female that was admitted to [facility] on 6 February with complaint of dizziness, fall and cough and was admitted for congestive heart failure. Initially she was treated with Rocephin and Zithromax for possible underlying pneumonia. She was continued on her Keppra and Depakote for seizures and ultimately remained encephalopathic and was worked up for sepsis. Ultimately she was transferred to [facility] ICU for continued evaluation and LTME monitoring for seizure management.     Review of Systems:     Constitutional:  negative for chills, fevers, sweats Respiratory:  negative for cough, dyspnea on exertion, shortness of breath, wheezing  Cardiovascular:  negative for chest pain, chest pressure/discomfort, lower extremity edema, palpitations  Gastrointestinal:  negative for abdominal pain, constipation, diarrhea, nausea, vomiting  Neurological:  negative for dizziness, headache    Medications: Allergies: Allergies   Allergen Reactions    Tiotropium Anaphylaxis and Swelling    Spiriva Handihaler [Tiotropium Bromide Monohydrate] Swelling       Current Meds:   Scheduled Meds:    divalproex  250 mg Oral BID    furosemide  40 mg Intravenous Daily    enoxaparin  40 mg Subcutaneous Daily    folic acid  1 mg Oral Daily    thiamine  100 mg Oral Daily    aspirin  81 mg Oral Daily    atorvastatin  20 mg Oral Daily    sertraline  50 mg Oral Daily    lisinopril  2.5 mg Oral Daily    carvedilol  3.125 mg Oral BID    budesonide  0.5 mg Nebulization BID    insulin lispro  0-12 Units Subcutaneous TID WC    insulin lispro  0-6 Units Subcutaneous Nightly    pantoprazole  20 mg Oral Daily    calcium carbonate-vitamin D3  1 tablet Oral Daily    Arformoterol Tartrate  15 mcg Nebulization BID    levetiracetam  500 mg Intravenous Q12H    sodium chloride flush  10 mL Intravenous 2 times per day     Continuous Infusions:    dextrose 5 % and 0.45 % NaCl      dextrose       PRN Meds: glucose, dextrose, glucagon (rDNA), dextrose, albuterol, sodium chloride flush, promethazine **OR** ondansetron, polyethylene glycol, acetaminophen, bisacodyl, acetaminophen, potassium chloride    Data:     Past Medical History:   has a past medical history of CHF (congestive heart failure) ([facility]), COPD (chronic obstructive pulmonary disease) ([facility] [address] Diabetes mellitus ([facility] MRSA (methicillin resistant staph aureus) culture positive, and Tobacco abuse. Social History:   reports that she has been smoking cigarettes. She has been smoking about 0.50 packs per day.  She has never used smokeless tobacco. She reports current alcohol use of about 70.0 standard drinks of alcohol per week. She reports current drug use. Drug: Marijuana. Family History:   Family History   Problem Relation Age of Onset    Heart Disease Mother     Diabetes Father        Vitals:  /76   Pulse 98   Temp 98.1 °F (36.7 °C) (Temporal)   Resp 17   Ht 5' 2\" (1.575 m)   Wt 130 lb 1.1 oz (59 kg)   SpO2 99%   BMI 23.79 kg/m²   Temp (24hrs), Av °F (36.7 °C), Min:97.4 °F (36.3 °C), Max:98.6 °F (37 °C)    Recent Labs     21  1930 21  2343 21  0013 21  0353   POCGLU 113* 53* 98 88       I/O (24Hr): Intake/Output Summary (Last 24 hours) at 2021 0831  Last data filed at 2021 0641  Gross per 24 hour   Intake 1244.29 ml   Output 1355 ml   Net -110.71 ml       Labs:  Hematology:  Recent Labs     02/15/21  0324 21  0514 21  0414   WBC 12.1* 9.8 7.9   RBC 4.73 4.67 4.91   HGB 11.6* 11.2* 11.8*   HCT 36.9 35.8* 40.3   MCV 78.0* 76.7* 82.1*   MCH 24.5* 24.0* 24.0*   MCHC 31.4 31.3 29.3   RDW 26.2* 25.9* 27.4*    See Reflexed IPF Result See Reflexed IPF Result   MPV 10.0 NOT REPORTED NOT REPORTED     Chemistry:  Recent Labs     02/15/21  1136 02/15/21  1136 02/15/21  2222 21  0000 21  0521  0414   NA  --   --  136  --  139 139   K 3.1*   < > 3.4*  --  4.2 3.6*   CL  --   --  106  --  109* 108*   CO2  --   --  22  --  23 21   GLUCOSE  --   --  76  --  76 90   BUN  --   --  17  --  16 12   CREATININE  --   --  0.66  --  0.65 0.65   MG 2.2  --   --  1.9  --  1.7   ANIONGAP  --   --  8*  --  7* 10   LABGLOM  --   --  >60  --  >60 >60   GFRAA  --   --  >60  --  >60 >60   CALCIUM  --   --  8.1*  --  8.2* 8.1*   PROBNP  --   --  40,349*  --   --   --    TROPHS  --   --  79*  --   --   --    LACTACIDWB  --   --  1.8  --   --   --     < > = values in this interval not displayed.      Recent Labs     21  1600 21  1600 02/15/21  0324 02/15/21  0324 Cardiomegaly and chronic pulmonary change with scattered pulmonary opacities. Xr Chest Portable    Result Date: 2/11/2021  Generalized interstitial prominence and pulmonary vascular congestion. Cardiomegaly. No definite pleural effusion or pneumothorax. Xr Chest Portable    Result Date: 2/10/2021  Increased right upper lobe opacity may represent developing pneumonia. Chronic interstitial lung changes with superimposed pulmonary edema. Ir Lumbar Puncture For Diagnosis    Result Date: 2/15/2021  Successful fluoroscopic-guided diagnostic lumbar puncture, as. Mri Brain Wo Contrast    Result Date: 2/13/2021  No acute intracranial abnormality within limits of motion artifact. Physical Examination:        General appearance:  alert, cooperative, pleasantly confused and no distress  Mental Status:  oriented to person only  Lungs:  clear to auscultation bilaterally, normal effort  Heart:  regular rate and rhythm, no murmur  Abdomen:  soft, nontender, nondistended, normal bowel sounds, no masses, hepatomegaly, splenomegaly  Extremities:  no edema, redness, tenderness in the calves  Skin:  no gross lesions, rashes, induration    Assessment:        Hospital Problems           Last Modified POA    * (Principal) Acute encephalopathy 2/16/2021 Yes    Heart failure, systolic, with acute decompensation ([address] 2/16/2021 Yes    COPD (chronic obstructive pulmonary disease) ([address] 2/16/2021 Yes    Cocaine abuse ([address] 2/16/2021 Yes    Acute respiratory failure with hypoxemia ([address] 2/16/2021 Yes    Polysubstance abuse ([address] 2/16/2021 Yes    Diabetes mellitus ([address] 2/16/2021 Yes    Essential hypertension 2/16/2021 Yes          Plan:        1. Continue LTME monitoring  2. Seizure management per neurology  3. Monitor and control blood pressure  4. Insulin scale for glycemic control  5. Diuretics as ordered  6. Oxygen aerosols as needed  7. PT and OT  8.   for discharge 95 Margaret Guy DO 2/17/2021  8:31 AM

## 2021-02-17 NOTE — CONSULTS
[facility] Neurology   IN-PATIENT SERVICE      NEUROLOGY CONSULT  NOTE            Date:   2021  Patient name:  Zacarias Valadez  Date of admission:  2/15/2021  YOB: 1945      Chief Complaint:     Change in mental status    Reason for Consult:      AMS, concern for seizures    History of Present Illness: The patient is a 77 y.o. female with past medical history of hypertension, diabetes, CHF, COPD, chronic hypoxic respiratory failure on home oxygen, current smoker, daily drinker who had initially presented to [facility] emergency department on  with complaint of confusion, generalized weakness and was subsequently noted to have acute on chronic respiratory failure requiring BiPAP and admitted for further evaluation of CHF and pneumonia. On initial presentation to the ED at [facility] patient was hypotensive, hypoxic, lactate 2.3, BUN 23, creatinine 1.57, BNP 13,000, Covid negative. Initial ammonia level 36. Chest x-ray revealed bilateral opacities. Was initially started on Rocephin and Zithromax along with IV fluids. Patient had consultations from neurology, ID, pulmonology. With regards to neurologic work-up, patient had the followin2021: CT head without contrast -chronic findings in the brain without acute CT abnormality  2021: MRI brain without contrast -no acute intracranial abnormality within limits of motion artifact. 2021: Follow-up CT head without contrast -no acute abnormalities stable when compared to prior. 2021: Lumbar puncture by IR -fluoroscopic guided diagnostic LP with approximately 8 mL of clear CSF. WBC 0, , Ptn 25, no xanthochromia. IgG 2426  HSV PCR in process. VDRL CSF neg. West Nile CSF  in process.  Lyme Ab CSF in process  Meningitis Encephalitis Panel CSF in process  With concern of subclinical seizures patient was started on Keppra 500 mg twice daily and Depakote 500 mg twice daily  Was also placed on IV acyclovir for empiric meningitis coverage however discontinued after CSF resulted with 0 WBC  2/15/2021: EEG -presence of moderate diffuse slowing. No lateralized or epileptiform disturbance is seen. Due to patient's condition, decision was made to transfer over to the medical ICU at [facility] along with neuro critical care evaluation and starting LTME. She was monitored in the ICU and once off Precedex and off high flow oxygen patient was stable for transfer out to the floor. At time of my evaluation, patient is pleasantly confused and oriented x1 (self). Is able to follow commands. Past Medical History:     Past Medical History:   Diagnosis Date    CHF (congestive heart failure) ([facility] [address]     [facility]     Diabetes mellitus ([facility])     MRSA (methicillin resistant staph aureus) culture positive 8/28/2014    sputum    Tobacco abuse 10/15/2019        Past Surgical History:     Past Surgical History:   Procedure Laterality Date    APPENDECTOMY      HYSTERECTOMY      JOINT REPLACEMENT Bilateral         Medications Prior to Admission:     Prior to Admission medications    Medication Sig Start Date End Date Taking?  Authorizing Provider   budesonide-formoterol (SYMBICORT) 160-4.5 MCG/ACT AERO Inhale 2 puffs into the lungs 2 times daily    Historical Provider, MD   tiotropium (SPIRIVA RESPIMAT) 2.5 MCG/ACT AERS inhaler Inhale 2 puffs into the lungs daily    Historical Provider, MD   furosemide (LASIX) 20 MG tablet Take 20 mg by mouth daily    Historical Provider, MD   traZODone (DESYREL) 150 MG tablet Take 150 mg by mouth nightly    Historical Provider, MD   ibuprofen (ADVIL;MOTRIN) 800 MG tablet Take 800 mg by mouth 3 times daily (with meals)    Historical Provider, MD   pantoprazole (PROTONIX) 20 MG tablet Take 1 tablet by mouth daily 8/25/20   Hussein Casper MD   albuterol (PROVENTIL) (2.5 MG/3ML) 0.083% nebulizer solution Take 3 mLs by nebulization every 6 hours as needed for Wheezing or Shortness of Breath 20   Brown Tam MD   tiZANidine (ZANAFLEX) 4 MG tablet Take 4 mg by mouth 2 times daily as needed    Historical Provider, MD   pregabalin (LYRICA) 100 MG capsule Take 100 mg by mouth 2 times daily. Historical Provider, MD   Multiple Vitamins-Minerals (CERTAVITE SENIOR PO) Take 1 tablet by mouth daily    Historical Provider, MD   sertraline (ZOLOFT) 50 MG tablet Take 50 mg by mouth daily    Historical Provider, MD   loratadine (CLARITIN) 10 MG tablet Take 10 mg by mouth daily    Historical Provider, MD   atorvastatin (LIPITOR) 20 MG tablet Take 20 mg by mouth daily     Historical Provider, MD   aspirin 81 MG tablet Take 81 mg by mouth daily. Historical Provider, MD   calcium-vitamin D (OSCAL-500) 500-200 MG-UNIT per tablet Take 1 tablet by mouth daily. Historical Provider, MD   lisinopril (PRINIVIL;ZESTRIL) 20 MG tablet Take 20 mg by mouth daily     Historical Provider, MD        Allergies:     Tiotropium and Spiriva handihaler [tiotropium bromide monohydrate]    Social History:     Tobacco:    reports that she has been smoking cigarettes. She has been smoking about 0.50 packs per day. She has never used smokeless tobacco.  Alcohol:      reports current alcohol use of about 70.0 standard drinks of alcohol per week. Drug Use:  reports current drug use. Drug: Marijuana.     Family History:     Family History   Problem Relation Age of Onset    Heart Disease Mother     Diabetes Father        Review of Systems:     Unable to assess due to condition    Physical Exam:   /85   Pulse 91   Temp 97.8 °F (36.6 °C) (Temporal)   Resp 18   Ht 5' 2\" (1.575 m)   Wt 130 lb 1.1 oz (59 kg)   SpO2 95%   BMI 23.79 kg/m²   Temp (24hrs), Av.9 °F (36.6 °C), Min:97.4 °F (36.3 °C), Max:98.6 °F (37 °C)        General examination:      General Appearance:  cachectic, and in no acute distress  HEENT: Normocephalic, atraumatic, moist mucus membranes  Neck: supple, no carotid bruits, (-) nuchal rigidity  Lungs:  Respirations unlabored, chest wall no deformity, BS normal  Cardiovascular: normal rate, regular rhythm  Abdomen: Soft, nontender, nondistended, normal bowel sounds  Skin: No gross lesions, rashes, bruising or bleeding on exposed skin area  Extremities:  peripheral pulses palpable, no cyanosis, clubbing or edema  Psych: normal affect      Neurological examination:      Mental status   Alert and oriented x 1(self); follows commands;   speech is fluent     Cranial nerves   II - visual fields intact to confrontation; pupils reactive  III, IV, VI  EOMI  V - normal facial sensation                                                               VII - normal facial symmetry                                                             VIII - intact hearing                                                                             IX, X - symmetrical palate elevation                                               XI - symmetrical shoulder shrug                                                       XII - midline tongue without atrophy or fasciculation     Motor function  Strength:   Moves all 4 extremities antigravity      Sensory function Intact to touch, pin, vibration, proprioception throughout     Cerebellar Unable to test                       Reflex function 2/4 symmetric throughout . Downgoing plantar response bilaterally.  (-)Laurent's sign bilaterally      Gait                  Not tested           Diagnostics:      Laboratory Testing:  CBC:   Recent Labs     02/14/21  0551 02/15/21  0324 02/16/21 0514   WBC 10.1 12.1* 9.8   HGB 11.1* 11.6* 11.2*    203 See Reflexed IPF Result     BMP:    Recent Labs     02/15/21  0324 02/15/21  0324 02/15/21  1732 02/15/21  2222 02/16/21  0514   *  --   --  136 139   K 2.7*   < > 3.5* 3.4* 4.2   *  --   --  106 109*   CO2 25  --   --  22 23   BUN 27*  --   --  17 16   CREATININE 0.75  --   --  0.66 0.65   GLUCOSE 144*  --   -- thickness. Global left ventricular systolic function is severely reduced with an estimated ejection fraction of 20 % . Severe global hypokinesis. Left atrium is mildly dilated. Right atrium is moderately dilated . Right ventricular dilatation with reduced systolic function. Thickened mitral valve leaflets. Mild to moderate mitral regurgitation. Moderate tricuspid regurgitation. Moderate pulmonary hypertension. Estimated right ventricular systolic pressure is [id number]. Signature ----------------------------------------------------------------------------  Electronically signed by Sandra Tafoya(Sonographer) on 02/08/2021 12:24  PM ---------------------------------------------------------------------------- ----------------------------------------------------------------------------  Electronically signed by Denise Enriquez(Interpreting physician) on  02/08/2021 12:46 PM ---------------------------------------------------------------------------- FINDINGS Left Atrium Left atrium is mildly dilated. Left Ventricle Left ventricle is normal in size and wall thickness. Global left ventricular systolic function is severely reduced with an estimated ejection fraction of 20 % . Severe global hypokinesis. Right Atrium Right atrium is moderately dilated . Right Ventricle Right ventricular dilatation with reduced systolic function. Mitral Valve Thickened mitral valve leaflets. Mild to moderate mitral regurgitation. Aortic Valve Aortic leaflets show calcification without restriction of motion. No aortic insufficiency. Tricuspid Valve No obvious valvular abnormality. Moderate tricuspid regurgitation. Moderate pulmonary hypertension. Estimated right ventricular systolic pressure is [id number]. Pulmonic Valve Normal pulmonic valve structure. Mild pulmonic insufficiency. Pericardial Effusion No significant pericardial effusion is seen. Pleural Effusion No pleural effusion seen. Miscellaneous Normal aortic root dimension.  M-mode / 2D Measurements & Calculations:   LVIDd:4.4 cm(3.7 - 5.6 cm)       Diastolic ZBGDTC:01.7 ml  AZZRH:3.0 cm(2.2 - 4.0 cm)       Systolic NOSRFT:17.7 ml  RARF:9.3 cm(0.6 - 1.1 cm)        Aortic Root:3.3 cm(2.0 - 3.7 cm)  LVPWd:0.9 cm(0.6 - 1.1 cm)       LA Dimension: 3.4 cm(1.9 - 4.0 cm)  Fractional Shortenin.36 %    LA volume/Index: 64.5 ml /40m^2  Calculated LVEF (%): 30.4 %                                           Aortic                                           Peak Velocity: 0.75 m/s                                          Mean Velocity: 0.59 m/s                                          Peak Gradient: 2.23 mmHg                                          Mean Gradient: 2 mmHg                                           AV VTI: 9.66 cm   Tricuspid:                              Pulmonic:   Estimated RVSP: 54 mmHg  Peak TR Velocity: 3.30 m/s  Peak TR Gradient: 43.56 mmHg  Estimated RA Pressure: 10 mmHg                                          Estimated PASP: 53.56 mmHg      Xr Chest (single View Frontal)    Result Date: 2/15/2021  EXAMINATION: ONE XRAY VIEW OF THE CHEST 2/15/2021 4:27 am COMPARISON: 2021, 2021 HISTORY: ORDERING SYSTEM PROVIDED HISTORY: Pneumonia TECHNOLOGIST PROVIDED HISTORY: Pneumonia Reason for Exam: PNA f/u FINDINGS: A single frontal view of the chest was performed. There is no acute skeletal abnormality. The heart size is enlarged, though stable. The mediastinal contours are stable, with stable atherosclerotic disease and tortuosity of the intrathoracic aorta. There has been no significant change in appearance of mild-to-moderate interstitial pulmonary edema. There has been slight interval progression of multifocal airspace opacity within the bilateral mid and lower lung zones in comparison with the study of 2021, likely a combination of asymmetric edema and multifocal pneumonia. There is no evidence of a pneumothorax.      1. Interval progression of multifocal pneumonia within the bilateral mid and lower lung zones. 2. Stable mild-to-moderate interstitial pulmonary edema. 3. Stable cardiomegaly. Ct Head Wo Contrast    Result Date: 2/13/2021  EXAMINATION: CT OF THE HEAD WITHOUT CONTRAST  2/13/2021 1:44 pm TECHNIQUE: CT of the head was performed without the administration of intravenous contrast. Dose modulation, iterative reconstruction, and/or weight based adjustment of the mA/kV was utilized to reduce the radiation dose to as low as reasonably achievable. COMPARISON: 02/06/2021 HISTORY: ORDERING SYSTEM PROVIDED HISTORY: ams TECHNOLOGIST PROVIDED HISTORY: ams Reason for Exam: ams Acuity: Unknown Type of Exam: Unknown FINDINGS: BRAIN/VENTRICLES: Stable aneurysm clip seen in the supraclinoid region. 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 The brainstem, and cerebellum have a normal appearance for the patient's age. The falx is midline. The ventricles and peripheral sulci are mildly dilated. There is decreased attenuation in the periventricular white matter. There is no sign of a space occupying lesion, infarction, or hemorrhage. Orbits: Portion of the orbits demonstrate no acute abnormality. SINUSES: The imaged portions of the paranasal sinuses are clear. The mastoids and the middle ear chambers are clear. SOFT TISSUES/SKULL:  No acute abnormality of the visualized skull or soft tissues. Vascular calcifications are seen compatible with atherosclerotic disease. [ Stable CT study when compared to 02/06/2021. No acute intracranial abnormalities are noted. Ct Head Wo Contrast    Result Date: 2/6/2021  EXAMINATION: CT OF THE HEAD WITHOUT CONTRAST  2/6/2021 4:09 pm TECHNIQUE: CT of the head was performed without the administration of intravenous contrast. Dose modulation, iterative reconstruction, and/or weight based adjustment of the mA/kV was utilized to reduce the radiation dose to as low as reasonably achievable.  COMPARISON: 04/11/2016 HISTORY: ORDERING SYSTEM chronic pulmonary change. There are scattered pulmonary opacities. There is no effusion. There is no pneumothorax. The heart is enlarged. The upper abdomen is unremarkable. The extrathoracic soft tissues are unremarkable. Cardiomegaly and chronic pulmonary change with scattered pulmonary opacities. Xr Chest Portable    Result Date: 2/11/2021  EXAMINATION: ONE XRAY VIEW OF THE CHEST 2/11/2021 4:15 am COMPARISON: 02/10/2021 HISTORY: ORDERING SYSTEM PROVIDED HISTORY: infiltrate/effusion TECHNOLOGIST PROVIDED HISTORY: infiltrate/effusion Reason for Exam: infiltrate/effusion f/u FINDINGS: Cardiac silhouette is borderline enlarged. Pulmonary vascular congestion and generalized interstitial edema. No pleural effusion or pneumothorax identified. Generalized osteopenia. Osteoarthrosis of the bilateral glenohumeral joints. Generalized interstitial prominence and pulmonary vascular congestion. Cardiomegaly. No definite pleural effusion or pneumothorax. Xr Chest Portable    Result Date: 2/10/2021  EXAMINATION: ONE XRAY VIEW OF THE CHEST 2/10/2021 7:31 pm COMPARISON: February 7, 2021 HISTORY: ORDERING SYSTEM PROVIDED HISTORY: heart failure TECHNOLOGIST PROVIDED HISTORY: heart failure Reason for Exam: heart failure Acuity: Acute Type of Exam: Initial FINDINGS: Increased right upper lobe opacity. Severe cardiomegaly. Moderate interstitial opacities. Increased right upper lobe opacity may represent developing pneumonia. Chronic interstitial lung changes with superimposed pulmonary edema. Xr Chest Portable    Result Date: 2/7/2021  EXAMINATION: ONE XRAY VIEW OF THE CHEST 2/7/2021 10:39 pm COMPARISON: 02/06/2021 HISTORY: ORDERING SYSTEM PROVIDED HISTORY: hypoxia TECHNOLOGIST PROVIDED HISTORY: hypoxia Reason for Exam: hypoxia Acuity: Unknown Type of Exam: Unknown FINDINGS: The cardiomediastinal silhouette is unchanged in size.   Diffuse interstitial prominence with bilateral pulmonary opacities, increased in the interval. Suspected small left pleural effusion. No pneumothorax. No acute osseous abnormality. Increasing bilateral pulmonary opacities, concerning for worsening edema or pneumonia. Xr Chest Portable    Result Date: 2/6/2021  EXAMINATION: ONE XRAY VIEW OF THE CHEST 2/6/2021 2:57 pm COMPARISON: CT 08/22/2020, radiograph 08/21/2020 HISTORY: ORDERING SYSTEM PROVIDED HISTORY: sepsis TECHNOLOGIST PROVIDED HISTORY: sepsis Acuity: Acute Type of Exam: Unknown Initial encounter FINDINGS: Cardiomegaly scratched a stable cardiomegaly. Bilateral airspace and interstitial opacities, right slightly worse than left. No pleural effusion or pneumothorax. Osteopenia. The osseous structures otherwise stable. Bilateral airspace and interstitial opacities either reflecting pulmonary edema versus pneumonia. Ct Chest High Resolution    Result Date: 2/9/2021  EXAMINATION: CT IMAGES OF THE CHEST WITHOUT CONTRAST, HIGH RESOLUTION 2/9/2021 TECHNIQUE: CT of the chest was performed without the administration of intravenous contrast. High resolution CT imaging was performed of the lungs. Multiplanar reformatted images are provided for review. Dose modulation, iterative reconstruction, and/or weight based adjustment of the mA/kV was utilized to reduce the radiation dose to as low as reasonably achievable. High resolution CT images were performed in the supine inspiration, supine expiration, and prone inspiration positions. COMPARISON: Chest portable February 7, 2021. CT chest without contrast August 22, 2020. HISTORY: ORDERING SYSTEM PROVIDED HISTORY: ILD TECHNOLOGIST PROVIDED HISTORY: ILD Reason for Exam: Pt is short of breath. Pt difficulty following breathing instructions for exam. Best images possible obtained due to patient condition.  Acuity: Acute Type of Exam: Initial Relevant Medical/Surgical History: Hx of COPD, CHF FINDINGS: Mediastinum: The heart is moderately enlarged in size with dilatation considerations include association with infectious disease, inflammatory disease versus neoplastic process. 6. Marked atherosclerotic calcification involving the aortic arch and origin of the great vessels. 7. Moderate scattered subcutaneous edema. Ir Lumbar Puncture For Diagnosis    Result Date: 2/15/2021  EXAMINATION: FLUOROSCOPIC GUIDED LUMBAR PUNCTURE 2/14/2021 3:44 pm HISTORY: ORDERING SYSTEM PROVIDED HISTORY: herpes encephalitis TECHNOLOGIST PROVIDED HISTORY: herpes encephalitis FLUOROSCOPY DOSE AND TYPE OR TIME AND EXPOSURES: Fluoroscopy time-0 point 6 minutes D  mGy cm squared PROCEDURE: :  Kami Philip. Husam Winn MD Informed consent was obtained after the risks and benefits of the procedure were discussed with the patient and all questions were answered fully. [place] protocol was observed and a standard timeout was performed. The patient was positioned prone and the back was prepped and draped in the normal sterile fashion. 1% lidocaine was used for local anesthesia. The subarachnoid space was accessed with a 20-gauge 3.5 \"spinal needle at the L2/L3 level. Free flow of clear CSF was noted. Approximately 8 ml of clear CSF was removed and sent for analysis. The stylet was reinserted, spinal needle was removed and brief pressure was applied at the puncture site. There were no immediate complications and the patient tolerated the procedure well. Successful fluoroscopic-guided diagnostic lumbar puncture, as.      Mri Brain Wo Contrast    Result Date: 2/13/2021  EXAMINATION: MRI OF THE BRAIN WITHOUT CONTRAST  2/13/2021 3:37 pm TECHNIQUE: Multiplanar multisequence MRI of the brain was performed without the administration of intravenous contrast. COMPARISON: CT head 02/06/2021 HISTORY: ORDERING SYSTEM PROVIDED HISTORY: persistent encephalopathy of unclear etiology TECHNOLOGIST PROVIDED HISTORY: persistent encephalopathy of unclear etiology Reason for Exam: increased confusion Acuity: Chronic Type of Exam: Subsequent/Follow-up Additional signs and symptoms: altered mental status Relevant Medical/Surgical History: ETOH abuse FINDINGS: Motion limited evaluation. INTRACRANIAL STRUCTURES/VENTRICLES: There is no acute infarct or mass. Mild chronic white matter microvascular ischemic changes are present. There is a chronic lacunar infarct in the right frontal periventricular white matter. No mass effect or midline shift. No evidence of an acute intracranial hemorrhage. The ventricles and sulci are normal in size and configuration. The sellar/suprasellar regions appear unremarkable. The normal signal voids within the major intracranial vessels appear maintained. ORBITS: The visualized portion of the orbits demonstrate no acute abnormality. SINUSES: Partial opacification of posterior ethmoid air cells bilaterally. No mastoid effusion. BONES/SOFT TISSUES: The bone marrow signal intensity appears normal. The soft tissues demonstrate no acute abnormality. No acute intracranial abnormality within limits of motion artifact. LTME Started 2/16/2021 @ 9:03AM  Day 1  recording started from 2/16/2021 at 9:03AM   AEDs:  Keppra 500mg BID; Depakote 500mg BID  Interictal: Continuous slow, generalized in delta and theta frequency with posterior dominant rhythm was 5-6Hz.      Ictal:      Summary: During above recoding period, there was evidence of moderate diffuse encephalopathy. LP 2/14/2021:    WBC 0, , Ptn 25, no xanthochromia. IgG 2426  HSV PCR in process. VDRL CSF neg. West Nile CSF  in process. Lyme Ab CSF in process  Meningitis Encephalitis Panel CSF in process    Ammonia Levels:    Impression:      Toxic metabolic encephalopathy  Nonconvulsive seizures  Acute on chronic respiratory failure  Acute systolic heart failure  Daily drinker    Plan:     Treat underlying metabolic causes. Continue on Keppra 500mg BID. Decrease Depakote from 500mg BID to 250mg BID due to hyperammonemia and monitor. LTME monitoring  Follow up on pending CSF results  On Thiamine and folate supplementation  Medical management per primary service. Further recommendations may follow after discussing with attending physician. Thank you for your consult.     Electronically signed by Karishma Osei MD on 2/17/2021 at 1:13 AM      Karishma Osei MD  Neurology PGY-2  02/17/21

## 2021-02-17 NOTE — PROGRESS NOTES
[facility]  Documentation Note                Admission Dx: Acute encephalopathy [G93.40]    Past Medical History:   has a past medical history of CHF (congestive heart failure) ([facility] [address] COPD (chronic obstructive pulmonary disease) ([facility] [address] Diabetes mellitus ([facility] [address] MRSA (methicillin resistant staph aureus) culture positive, and Tobacco abuse. Previous Testing:     ECHO 2/8/2021: EF 20%, severe global hypokinesis, RA is moderately dilated, mild-moderate MR, moderate TR/PHTN, RVSP is 54 mmHg. Previous office/hospital visit:   DARIO Valdez NP 2/15/2021 [facility])  Acute systolic heart failure  · Interstitial lung disease/pulmonary fibrosis/COPD, managed by others  · Severe cardiomyopathy with ejection fraction of 20% on echocardiogram 2/8/2021 of unclear etiology, history of ejection fraction 55% on echocardiogram October 2019  · Moderate tricuspid regurgitation and moderate pulmonary hypertension on echocardiogram  · COPD, managed by others  · Diabetes, managed by others  · Metabolic encephalopathy, managed by others  · Hypokalemia/hypernatremia, managed by others  · History of alcohol abuse  · Altered mental status, managed by others     Plan   1. Continue current cardiac medications. Potassium was replaced by others. Patient's blood pressure is improved and no longer on dopamine drip. She is unable to take her oral medications. 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 Her CHF seems to be improved some and is currently not on diuretics.     Fabrice Mujica, [facility]

## 2021-02-18 LAB
ABSOLUTE EOS #: 0.29 K/UL (ref 0–0.4)
ABSOLUTE IMMATURE GRANULOCYTE: 0 K/UL (ref 0–0.3)
ABSOLUTE LYMPH #: 1.87 K/UL (ref 1–4.8)
ABSOLUTE MONO #: 0.58 K/UL (ref 0.1–0.8)
ALBUMIN SERPL-MCNC: 2.4 G/DL (ref 3.5–5.2)
ALBUMIN/GLOBULIN RATIO: 0.5 (ref 1–2.5)
ALP BLD-CCNC: 53 U/L (ref 35–104)
ALT SERPL-CCNC: 21 U/L (ref 5–33)
ANION GAP SERPL CALCULATED.3IONS-SCNC: 11 MMOL/L (ref 9–17)
AST SERPL-CCNC: 38 U/L
BASOPHILS # BLD: 0 % (ref 0–2)
BASOPHILS ABSOLUTE: 0 K/UL (ref 0–0.2)
BILIRUB SERPL-MCNC: 0.64 MG/DL (ref 0.3–1.2)
BILIRUBIN DIRECT: 0.31 MG/DL
BILIRUBIN, INDIRECT: 0.33 MG/DL (ref 0–1)
BUN BLDV-MCNC: 11 MG/DL (ref 8–23)
BUN/CREAT BLD: ABNORMAL (ref 9–20)
CALCIUM SERPL-MCNC: 8.4 MG/DL (ref 8.6–10.4)
CHLORIDE BLD-SCNC: 107 MMOL/L (ref 98–107)
CO2: 20 MMOL/L (ref 20–31)
CREAT SERPL-MCNC: 0.65 MG/DL (ref 0.5–0.9)
DIFFERENTIAL TYPE: ABNORMAL
EOSINOPHILS RELATIVE PERCENT: 4 % (ref 1–4)
GFR AFRICAN AMERICAN: >60 ML/MIN
GFR NON-AFRICAN AMERICAN: >60 ML/MIN
GFR SERPL CREATININE-BSD FRML MDRD: ABNORMAL ML/MIN/{1.73_M2}
GFR SERPL CREATININE-BSD FRML MDRD: ABNORMAL ML/MIN/{1.73_M2}
GLOBULIN: ABNORMAL G/DL (ref 1.5–3.8)
GLUCOSE BLD-MCNC: 112 MG/DL (ref 65–105)
GLUCOSE BLD-MCNC: 113 MG/DL (ref 65–105)
GLUCOSE BLD-MCNC: 117 MG/DL (ref 65–105)
GLUCOSE BLD-MCNC: 118 MG/DL (ref 65–105)
GLUCOSE BLD-MCNC: 127 MG/DL (ref 70–99)
HCT VFR BLD CALC: 35.1 % (ref 36.3–47.1)
HEMOGLOBIN: 11 G/DL (ref 11.9–15.1)
IMMATURE GRANULOCYTES: 0 %
LYMPHOCYTES # BLD: 26 % (ref 24–44)
MAGNESIUM: 2 MG/DL (ref 1.6–2.6)
MAGNESIUM: 2 MG/DL (ref 1.6–2.6)
MCH RBC QN AUTO: 24.6 PG (ref 25.2–33.5)
MCHC RBC AUTO-ENTMCNC: 31.3 G/DL (ref 28.4–34.8)
MCV RBC AUTO: 78.5 FL (ref 82.6–102.9)
MONOCYTES # BLD: 8 % (ref 1–7)
MORPHOLOGY: ABNORMAL
NRBC AUTOMATED: 0 PER 100 WBC
PDW BLD-RTO: 27.4 % (ref 11.8–14.4)
PLATELET # BLD: ABNORMAL K/UL (ref 138–453)
PLATELET ESTIMATE: ABNORMAL
PLATELET, FLUORESCENCE: 222 K/UL (ref 138–453)
PLATELET, IMMATURE FRACTION: 7.9 % (ref 1.1–10.3)
PMV BLD AUTO: ABNORMAL FL (ref 8.1–13.5)
POTASSIUM SERPL-SCNC: 4 MMOL/L (ref 3.7–5.3)
POTASSIUM SERPL-SCNC: 4.1 MMOL/L (ref 3.7–5.3)
RBC # BLD: 4.47 M/UL (ref 3.95–5.11)
RBC # BLD: ABNORMAL 10*6/UL
SEG NEUTROPHILS: 62 % (ref 36–66)
SEGMENTED NEUTROPHILS ABSOLUTE COUNT: 4.46 K/UL (ref 1.8–7.7)
SODIUM BLD-SCNC: 138 MMOL/L (ref 135–144)
TOTAL PROTEIN: 7 G/DL (ref 6.4–8.3)
WBC # BLD: 7.2 K/UL (ref 3.5–11.3)
WBC # BLD: ABNORMAL 10*3/UL

## 2021-02-18 PROCEDURE — 6370000000 HC RX 637 (ALT 250 FOR IP): Performed by: NURSE PRACTITIONER

## 2021-02-18 PROCEDURE — 97166 OT EVAL MOD COMPLEX 45 MIN: CPT

## 2021-02-18 PROCEDURE — 80076 HEPATIC FUNCTION PANEL: CPT

## 2021-02-18 PROCEDURE — 94761 N-INVAS EAR/PLS OXIMETRY MLT: CPT

## 2021-02-18 PROCEDURE — APPSS30 APP SPLIT SHARED TIME 16-30 MINUTES: Performed by: NURSE PRACTITIONER

## 2021-02-18 PROCEDURE — 99233 SBSQ HOSP IP/OBS HIGH 50: CPT | Performed by: PSYCHIATRY & NEUROLOGY

## 2021-02-18 PROCEDURE — 2580000003 HC RX 258: Performed by: FAMILY MEDICINE

## 2021-02-18 PROCEDURE — 6370000000 HC RX 637 (ALT 250 FOR IP): Performed by: FAMILY MEDICINE

## 2021-02-18 PROCEDURE — 97163 PT EVAL HIGH COMPLEX 45 MIN: CPT

## 2021-02-18 PROCEDURE — 85055 RETICULATED PLATELET ASSAY: CPT

## 2021-02-18 PROCEDURE — 2700000000 HC OXYGEN THERAPY PER DAY

## 2021-02-18 PROCEDURE — 80048 BASIC METABOLIC PNL TOTAL CA: CPT

## 2021-02-18 PROCEDURE — 82947 ASSAY GLUCOSE BLOOD QUANT: CPT

## 2021-02-18 PROCEDURE — 99232 SBSQ HOSP IP/OBS MODERATE 35: CPT | Performed by: INTERNAL MEDICINE

## 2021-02-18 PROCEDURE — 6360000002 HC RX W HCPCS: Performed by: STUDENT IN AN ORGANIZED HEALTH CARE EDUCATION/TRAINING PROGRAM

## 2021-02-18 PROCEDURE — 84132 ASSAY OF SERUM POTASSIUM: CPT

## 2021-02-18 PROCEDURE — 85025 COMPLETE CBC W/AUTO DIFF WBC: CPT

## 2021-02-18 PROCEDURE — 97530 THERAPEUTIC ACTIVITIES: CPT

## 2021-02-18 PROCEDURE — 95720 EEG PHY/QHP EA INCR W/VEEG: CPT | Performed by: PSYCHIATRY & NEUROLOGY

## 2021-02-18 PROCEDURE — 6370000000 HC RX 637 (ALT 250 FOR IP): Performed by: STUDENT IN AN ORGANIZED HEALTH CARE EDUCATION/TRAINING PROGRAM

## 2021-02-18 PROCEDURE — 94640 AIRWAY INHALATION TREATMENT: CPT

## 2021-02-18 PROCEDURE — 83735 ASSAY OF MAGNESIUM: CPT

## 2021-02-18 PROCEDURE — 97535 SELF CARE MNGMENT TRAINING: CPT

## 2021-02-18 PROCEDURE — 36415 COLL VENOUS BLD VENIPUNCTURE: CPT

## 2021-02-18 PROCEDURE — 95708 EEG WO VID EA 12-26HR UNMNTR: CPT

## 2021-02-18 PROCEDURE — 97167 OT EVAL HIGH COMPLEX 60 MIN: CPT

## 2021-02-18 PROCEDURE — 6360000002 HC RX W HCPCS: Performed by: FAMILY MEDICINE

## 2021-02-18 PROCEDURE — 2060000000 HC ICU INTERMEDIATE R&B

## 2021-02-18 RX ORDER — FUROSEMIDE 40 MG/1
40 TABLET ORAL DAILY
Status: DISCONTINUED | OUTPATIENT
Start: 2021-02-18 | End: 2021-02-27 | Stop reason: HOSPADM

## 2021-02-18 RX ORDER — LEVETIRACETAM 500 MG/1
500 TABLET ORAL 2 TIMES DAILY
Status: DISCONTINUED | OUTPATIENT
Start: 2021-02-18 | End: 2021-02-27 | Stop reason: HOSPADM

## 2021-02-18 RX ORDER — MAGNESIUM SULFATE 1 G/100ML
1000 INJECTION INTRAVENOUS PRN
Status: DISCONTINUED | OUTPATIENT
Start: 2021-02-18 | End: 2021-02-25 | Stop reason: SDUPTHER

## 2021-02-18 RX ADMIN — FUROSEMIDE 40 MG: 40 TABLET ORAL at 11:30

## 2021-02-18 RX ADMIN — LEVETIRACETAM 500 MG: 500 TABLET, FILM COATED ORAL at 15:00

## 2021-02-18 RX ADMIN — ARFORMOTEROL TARTRATE 15 MCG: 15 SOLUTION RESPIRATORY (INHALATION) at 20:32

## 2021-02-18 RX ADMIN — DIVALPROEX SODIUM 250 MG: 250 TABLET, FILM COATED, EXTENDED RELEASE ORAL at 09:30

## 2021-02-18 RX ADMIN — Medication 1 TABLET: at 09:41

## 2021-02-18 RX ADMIN — PANTOPRAZOLE SODIUM 20 MG: 20 TABLET, DELAYED RELEASE ORAL at 06:31

## 2021-02-18 RX ADMIN — LISINOPRIL 2.5 MG: 2.5 TABLET ORAL at 09:41

## 2021-02-18 RX ADMIN — FUROSEMIDE 40 MG: 10 INJECTION, SOLUTION INTRAMUSCULAR; INTRAVENOUS at 09:41

## 2021-02-18 RX ADMIN — FOLIC ACID 1 MG: 1 TABLET ORAL at 09:41

## 2021-02-18 RX ADMIN — PANTOPRAZOLE SODIUM 20 MG: 20 TABLET, DELAYED RELEASE ORAL at 09:41

## 2021-02-18 RX ADMIN — SERTRALINE 50 MG: 50 TABLET, FILM COATED ORAL at 09:41

## 2021-02-18 RX ADMIN — SODIUM CHLORIDE, PRESERVATIVE FREE 10 ML: 5 INJECTION INTRAVENOUS at 09:42

## 2021-02-18 RX ADMIN — CARVEDILOL 3.12 MG: 3.12 TABLET, FILM COATED ORAL at 22:58

## 2021-02-18 RX ADMIN — BUDESONIDE 500 MCG: 0.5 INHALANT RESPIRATORY (INHALATION) at 09:02

## 2021-02-18 RX ADMIN — ARFORMOTEROL TARTRATE 15 MCG: 15 SOLUTION RESPIRATORY (INHALATION) at 09:02

## 2021-02-18 RX ADMIN — LEVETIRACETAM 500 MG: 500 TABLET, FILM COATED ORAL at 22:58

## 2021-02-18 RX ADMIN — ENOXAPARIN SODIUM 40 MG: 40 INJECTION SUBCUTANEOUS at 09:41

## 2021-02-18 RX ADMIN — SODIUM CHLORIDE, PRESERVATIVE FREE 10 ML: 5 INJECTION INTRAVENOUS at 22:59

## 2021-02-18 RX ADMIN — ATORVASTATIN CALCIUM 20 MG: 20 TABLET, FILM COATED ORAL at 09:41

## 2021-02-18 RX ADMIN — Medication 100 MG: at 09:41

## 2021-02-18 RX ADMIN — BUDESONIDE 500 MCG: 0.5 INHALANT RESPIRATORY (INHALATION) at 20:32

## 2021-02-18 RX ADMIN — Medication 81 MG: at 09:41

## 2021-02-18 RX ADMIN — CARVEDILOL 3.12 MG: 3.12 TABLET, FILM COATED ORAL at 09:41

## 2021-02-18 NOTE — PROGRESS NOTES
[facility]  Progress Note                   Date:   2/18/2021  Patient name: Candy Paulino  Date of admission:  2/15/2021  6:29 PM  MRN:   [id number]  YOB: 1945  PCP: Loretta Goodwin MD    Reason for Admission: Acute encephalopathy [G93.40]    Subjective:       Clinical Changes /Abnormalities: Pt. Seen & examined in room after discussing with RN. Patient presently oriented only to self. Chewing on her finger - when questioned why she was doing this and if it hurt she stated \"im not doing that. \" LTME in progress. Tele reviewed - 5 beat run SVT noted otherwise no ectopy or arrhythmias noted.      Review of Systems    Medications:   Scheduled Meds:   divalproex  250 mg Oral BID    furosemide  40 mg Intravenous Daily    enoxaparin  40 mg Subcutaneous Daily    folic acid  1 mg Oral Daily    thiamine  100 mg Oral Daily    aspirin  81 mg Oral Daily    atorvastatin  20 mg Oral Daily    sertraline  50 mg Oral Daily    lisinopril  2.5 mg Oral Daily    carvedilol  3.125 mg Oral BID    budesonide  0.5 mg Nebulization BID    insulin lispro  0-12 Units Subcutaneous TID WC    insulin lispro  0-6 Units Subcutaneous Nightly    pantoprazole  20 mg Oral Daily    calcium carbonate-vitamin D3  1 tablet Oral Daily    Arformoterol Tartrate  15 mcg Nebulization BID    sodium chloride flush  10 mL Intravenous 2 times per day     Continuous Infusions:   dextrose 5 % and 0.45 % NaCl 50 mL/hr at 02/17/21 2041    dextrose       CBC:   Recent Labs     02/16/21  0514 02/17/21  0414 02/18/21  0734   WBC 9.8 7.9 7.2   HGB 11.2* 11.8* 11.0*   PLT See Reflexed IPF Result See Reflexed IPF Result See Reflexed IPF Result     BMP:    Recent Labs     02/16/21  0514 02/17/21  0414 02/18/21  0144 02/18/21  0734    139  --  138   K 4.2 3.6* 4.1 4.0   * 108*  --  107   CO2 23 21  --  20   BUN 16 12  --  11   CREATININE 0.65 0.65  --  0.65   GLUCOSE 76 90  --  127*     Hepatic:No results for input(s): AST, ALT, ALB, BILITOT, ALKPHOS in the last 72 hours. Troponin:   Recent Labs     02/15/21  2222   TROPHS 67*     BNP: No results for input(s): BNP in the last 72 hours. Lipids: No results for input(s): CHOL, HDL in the last 72 hours. Invalid input(s): LDLCALCU  INR: No results for input(s): INR in the last 72 hours. Objective:   Vitals: BP 93/73   Pulse 89   Temp 97.4 °F (36.3 °C) (Axillary)   Resp 24   Ht 5' 2\" (1.575 m)   Wt 130 lb 1.1 oz (59 kg)   SpO2 94%   BMI 23.79 kg/m²   General appearance: alert and cooperative with exam  HEENT: Head: Normocephalic, no lesions, without obvious abnormality. Neck:no JVD, trachea midline, no adenopathy  Lungs: Course rhonchi to auscultation throughout. No distress  Heart: Regular rate and rhythm, s1/s2 auscultated, no murmurs. SR  Abdomen: soft, non-tender, bowel sounds active  Extremities: no edema  Neurologic: not done    Echo 2/8/21  Summary  Left ventricle is normal in size and wall thickness. Global left ventricular systolic function is severely reduced with an  estimated ejection fraction of 20 % . Severe global hypokinesis. Left atrium is mildly dilated. Right atrium is moderately dilated . Right ventricular dilatation with reduced systolic function. Thickened mitral valve leaflets. Mild to moderate mitral regurgitation. Moderate tricuspid regurgitation. Moderate pulmonary hypertension. Estimated right ventricular systolic  pressure is [id number]. Assessment / Acute Cardiac Problems:   1. Systolic CHF, no acute exacerbation  2. Severe global hypokinesis noted on echo  3. Mod TR and mild-mod MR  4. Mod PHTN  5. QT prolongation and T wave abnormalities on EKG  6. Acute encephalopathy  7. Hx of cocaine abuse/polysubstance abuse  8.  HTN  9. DM2    Patient Active Problem List:     COPD (chronic obstructive pulmonary disease) ([address]     CHF (congestive heart failure) (HCC)     Cocaine abuse (HCC)     Acidosis, metabolic, with respiratory acidosis     Acute respiratory failure with hypoxemia (HCC)     Polysubstance abuse (HCC)     Hyponatremia, Beer Potomania     Normocytic anemia     Neutrophilic leukocytosis     Increased ammonia level     Hypophosphatemia     Type 2 diabetes mellitus without complication, without long-term current use of insulin (HCC)     Acute on chronic diastolic heart failure (HCC)     Tobacco abuse     Diabetes mellitus (HCC)     Chronic diastolic heart failure ([address]     Community acquired pneumonia of right middle lobe of lung     Elevated troponin     Prolonged Q-T interval on ECG     Hypoxia     Chronic respiratory failure (HCC)     Mild malnutrition (HCC)     Hypotension     DEONTE (acute kidney injury) ([address]     Current every day smoker     Essential hypertension     Altered mental status     Acute systolic heart failure (HCC)     Severe malnutrition (HCC)     Abnormal EEG     Acute encephalopathy     Generalized nonconvulsive seizures ([address]      Plan of Treatment:   1. Presently stable. Clinically no volume overload. QT stable. 2. Continue PO ASA, statin, BB, & ACE. No plans for aggressive/invasive CV testing at this time give neurological status and clinically stable from CV standpoint. Will switch Lasix to PO.  3. Check Mg and replace PRN per SS. Keep K > 4 and Mg > 2.  4. Please call with further questions/concerns.      Electronically signed by ABDON Balderrama CNP on 2/18/2021 at 9:32 AM  [address]  [phone]

## 2021-02-18 NOTE — CARE COORDINATION
Case Management Initial Discharge Plan  Candy Paulino,             Met with:family member granddaughter to discuss discharge plans. Information verified: address, contacts, phone number, , insurance Yes    Emergency Contact/Next of Kin name & number: Tomi Arceo [phone]    PCP: Loretta Goodwin MD  Date of last visit: unknown    Insurance Provider: De[employer]    Discharge Planning    Living Arrangements:      Support Systems:       Pt lives in apartment on the 2nd floor, has elevator. Patient able to perform ADL's:Assisted    Current Services (outpatient & in home)   DME equipment: rollator, cane, hover round scooter, shower chair, bedside commode, oxygen at night 3 LPM  DME provider: granddaughter was unsure    Receiving oral anticoagulation therapy? No    If indicated:   Physician managing anticoagulation treatment: n/a  Where does patient obtain lab work for ATC treatment? n/a      Potential Assistance Needed:       Patient agreeable to home care: Yes  Freedom of choice provided:  yes    Prior SNF/Rehab Placement and Facility:   Agreeable to SNF/Rehab: No  [place] of choice provided: yes     Evaluation: n/a    Expected Discharge date:       Patient expects to be discharged to:   Home with family  Follow Up Appointment: Best Day/ Time:      Transportation provider: Family, based on progression    Readmission Risk              Risk of Unplanned Readmission:        28             Does patient have a readmission risk score greater than 14?: Yes  If yes, follow-up appointment must be made within 7 days of discharge. Goals of Care:       Discharge Plan: Spoke with pt.'s granddaughter who states that family is agreeable for pt to return home with home care, but they do not want her in a facility. She states she currently has an aide and nurse that come, but granddaughter cannot remember which company.           Electronically signed by Raciel Leong RN on 21 at 3:55 PM EST

## 2021-02-18 NOTE — PROGRESS NOTES
Physical Therapy    Facility/Department: [address] STE[address]OWN  Initial Assessment    NAME: Richa Boswell  : 1945  MRN: [id number]    Date of Service: 2021     Lexii Schwarz is a 77 y.o. with PMH of CHF, COPD, diabetes, GERD presents as a transfer from [facility] d/t need for LTME. Pt origninally presented with 2 weeks of SOB, dizziness, AMS, Falls \"  Copied from H&P    Discharge Recommendations: Further therapy recommended at discharge. PT Equipment Recommendations  Equipment Needed: (Continue to assess pending progression of mobility. Pt currently unsafe to perform functional mobilty unassisted.)    Assessment   Body structures, Functions, Activity limitations: Decreased functional mobility ; Decreased balance;Decreased posture;Decreased ROM; Decreased strength;Decreased high-level IADLs;Decreased safe awareness;Decreased coordination;Decreased cognition;Decreased endurance  Assessment: Pt sat EOB ~5min this date demonstrating poor sitting balance and significant R side lean requiring maxA to maintain seated balance. Pt is a high fall risk d/t poor safety awareness, BLE weakness, and poor balance. Pt is currently unsafe to perform functional mobility without skilled assistance. Pt would benefit from further skilled physical therapy to address these deficits. Prognosis: Fair  Decision Making: High Complexity  PT Education: Goals; General Safety;PT Role;Functional Mobility Training;Plan of Care  Barriers to Learning: Pt's cognition  REQUIRES PT FOLLOW UP: Yes  Activity Tolerance  Activity Tolerance: Patient limited by endurance; Patient limited by fatigue;Patient limited by cognitive status       Patient Diagnosis(es): There were no encounter diagnoses.      has a past medical history of CHF (congestive heart failure) ([facility] COPD (chronic obstructive pulmonary disease) ([facility] Diabetes mellitus ([facility] MRSA (methicillin resistant staph aureus) culture positive, and Tobacco abuse.   has a past surgical history that includes joint replacement (Bilateral); Hysterectomy; and Appendectomy. Restrictions  Restrictions/Precautions  Restrictions/Precautions: Fall Risk  Required Braces or Orthoses?: No  Position Activity Restriction  Other position/activity restrictions: Up with assist  Vision/Hearing  Vision: (DEXTER d/t pt's cognition)  Hearing: (DEXTER d/t pt's cognition)     Subjective  General  Patient assessed for rehabilitation services?: Yes  Response To Previous Treatment: Not applicable  Family / Caregiver Present: No  Follows Commands: Impaired(Pt following inconsistently commands)  General Comment  Comments: PT/OT co-eval  Subjective  Subjective: RN and pt agreeable to therapy this date. Pt supine in bed upon arrival.  Pt pleasantly confused but cooperative throughout. 6L O2 NC  Pain Screening  Patient Currently in Pain: Denies  Vital Signs  Patient Currently in Pain: Denies       Orientation  Orientation  Overall Orientation Status: Impaired  Orientation Level: Oriented to time;Oriented to person;Disoriented to place; Disoriented to situation  Social/Functional History  Social/Functional History  Lives With: Alone  Type of Home: Apartment(2nd level)  Bathroom Shower/Tub: Tub/Shower unit  Home Equipment: (Pt reports use of walker at baseline, unsure if with wheels or not)  Active : No  Additional Comments: Unable to obtain accurate social/functional history d/t pt's cognition. No family present at bedside.   Cognition   Cognition  Overall Cognitive Status: Exceptions  Arousal/Alertness: Inconsistent responses to stimuli(Pt demonstrating painful responses with lack of stimuli)  Following Commands: Inconsistently follows commands  Attention Span: Difficulty attending to directions  Memory: Decreased recall of recent events;Decreased short term memory;Decreased long term memory  Safety Judgement: Decreased awareness of need for assistance;Decreased awareness of need for safety  Problem Solving: Decreased awareness of errors  Insights: Not aware of deficits  Initiation: Requires cues for all  Sequencing: Requires cues for all    Objective     Observation/Palpation  Posture: (Pt with poor balance demonstrating significant R sided lean.)    Joint Mobility  Spine: WFL  ROM RLE: appears WFL, not formally assessed d/t pt's cognition  ROM LLE: appears WFL, not formally assessed d/t pt's cognition  ROM RUE: appears WFL, not formally assessed d/t pt's cognition  ROM LUE: appears WFL, not formally assessed d/t pt's cognition  Strength RLE  Strength RLE: Exception  R Hip Flexion: 3-/5  R Knee Flexion: 3+/5  R Knee Extension: 3-/5  R Ankle Dorsiflexion: 2/5  R Ankle Plantar flexion: 3/5  Strength LLE  Strength LLE: Exception  L Hip Flexion: 3-/5  L Knee Flexion: 3/5  L Knee Extension: 3-/5  L Ankle Dorsiflexion: 3/5  L Ankle Plantar Flexion: 3/5  Strength RUE  Comment: PT/OT co-eval, see OT assessment for detail  Strength LUE  Comment: PT/OT co-eval, see OT assessment for detail  Strength Other  Other: Difficulty formally assessing strength d/t pt's difficulty following commands  Tone RLE  RLE Tone: Normotonic  Tone LLE  LLE Tone: Normotonic  Sensation  Overall Sensation Status: (DEXTER d/t pt's cognition)  Bed mobility  Supine to Sit: Maximum assistance;2 Person assistance  Sit to Supine: Maximum assistance;2 Person assistance  Scooting: Maximal assistance  Comment: Pt able to generate active muscle contraction in bilateral quads without active movement. Pt requiring max verbal cueing for progression of movement throughout mobility with poor return demo. Pt tolerated sitting EOB ~5min demonstrating significant R sided lean requiring MaxA to maintain upright posture. Pt unable to assist with seated balance using UEs on bedrails.   Transfers  Comment: Unsafe to attempt d/t pt's cognition and BLE weakness  Ambulation  Ambulation?: No(Unsafe to attempt ambulation this date d/t pt's cognition and BLE weakness)  Stairs/Curb  Stairs?: No

## 2021-02-18 NOTE — PROGRESS NOTES
NEUROLOGY INPATIENT PROGRESS NOTE    2/18/2021         Current Exam:     Chart reviewed. Discussed with RN. Patient slightly better today as compared to yesterday per RN. No seizure activity. She remains partially oriented but needs much prompting to give appropriate responses. Ammonia 56. Brief History:    Natividad Johnson is a  77 y.o. female with H/O COPD on home O2, HTN, DM, CHF, current smoker, daily drinker who was admitted as a transfer from [facility] on 2/15/2021 where she initially presented with confusion, generalized weakness and was noted to have acute on chronic respiratory failure. She had an extensive neurologic work-up including CT head, MRI brain, LP all of which came back with no acute findings. Initial EEG with PLEDs left greater than right hemisphere and follow-up EEG with moderate diffuse slowing. 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 She was started empirically on Keppra 500 mg twice daily as well as Depakote 500 mg twice daily but Depakote was later decreased to 250 mg due to an increase in her ammonia. Due to her overall condition it was decided to transfer her to the medical ICU at [address] [place] [address] along with neurology continued evaluation and starting LTM EEG. Upon transfer to [address] [place] [address] her Keppra was stopped as there have been no clinical or LTME seizures noted. No current facility-administered medications on file prior to encounter.       Current Outpatient Medications on File Prior to Encounter   Medication Sig Dispense Refill    budesonide-formoterol (SYMBICORT) 160-4.5 MCG/ACT AERO Inhale 2 puffs into the lungs 2 times daily      tiotropium (SPIRIVA RESPIMAT) 2.5 MCG/ACT AERS inhaler Inhale 2 puffs into the lungs daily      furosemide (LASIX) 20 MG tablet Take 20 mg by mouth daily      traZODone (DESYREL) 150 MG tablet Take 150 mg by mouth nightly      ibuprofen (ADVIL;MOTRIN) 800 MG tablet Take 800 mg by mouth 3 times daily (with meals)      pantoprazole (PROTONIX) 20 MG tablet Take 1 tablet by mouth daily 30 tablet 3    albuterol (PROVENTIL) (2.5 MG/3ML) 0.083% nebulizer solution Take 3 mLs by nebulization every 6 hours as needed for Wheezing or Shortness of Breath 120 each 0    tiZANidine (ZANAFLEX) 4 MG tablet Take 4 mg by mouth 2 times daily as needed      pregabalin (LYRICA) 100 MG capsule Take 100 mg by mouth 2 times daily.  Multiple Vitamins-Minerals (CERTAVITE SENIOR PO) Take 1 tablet by mouth daily      sertraline (ZOLOFT) 50 MG tablet Take 50 mg by mouth daily      loratadine (CLARITIN) 10 MG tablet Take 10 mg by mouth daily      atorvastatin (LIPITOR) 20 MG tablet Take 20 mg by mouth daily       aspirin 81 MG tablet Take 81 mg by mouth daily.  calcium-vitamin D (OSCAL-500) 500-200 MG-UNIT per tablet Take 1 tablet by mouth daily.  lisinopril (PRINIVIL;ZESTRIL) 20 MG tablet Take 20 mg by mouth daily          Allergies: Denise Hunter is allergic to tiotropium and spiriva handihaler [tiotropium bromide monohydrate]. Past Medical History:   Diagnosis Date    [facility]cong[facility]) ([facility])     [facility]) [facility]     Diabetes mellitus ([facility])     MRSA (methicillin resistant staph aureus) culture positive 8/28/2014    sputum    Tobacco abuse 10/15/2019       Past Surgical History:   Procedure Laterality Date    APPENDECTOMY      HYSTERECTOMY      JOINT REPLACEMENT Bilateral        Social History: Denise Hunter  reports that she has been smoking cigarettes. She has been smoking about 0.50 packs per day. She has never used smokeless tobacco. She reports current alcohol use of about 70.0 standard drinks of alcohol per week. She reports current drug use. Drug: Marijuana.     Family History   Problem Relation Age of Onset    Heart Disease Mother     Diabetes Father        Objective:   BP 93/73   Pulse 89   Temp 97.4 °F (36.3 °C) (Axillary)   Resp 24   Ht 5' 2\" (1.575 m)   Wt 130 lb 1.1 oz (59 kg)   SpO2 94%   BMI 23.79 kg/m²     Blood pressure range: Systolic (31TMZ), DYE:066 , Min:93 , IHH:006   ; Diastolic (38AZV), ZDY:58, Min:73, Max:96      Review of Systems:  Cannot complete due to patient condition                                            NEUROLOGIC EXAMINATION  GENERAL  Appears comfortable and in no distress   HEENT  NC/ AT   NECK  Supple   MENTAL STATUS:  Alert, oriented to person, president, year but not to situation or place, requires much prompting to answer questions, normal speech, normal language, no hallucination or delusion. Intact naming.    CRANIAL NERVES: II     -      Visual fields intact to confrontation  III,IV,VI -  EOMs full, no afferent defect, no THALIA, no ptosis  V     -     Normal facial sensation  VII    -     Normal facial symmetry  VIII   -     Intact hearing  IX,X -     Symmetrical palate  XI    -     Symmetrical shoulder shrug  XII   -     Midline tongue, no atrophy    MOTOR FUNCTION:  4/5 strength to BUE, 1/5 strength to BLE with normal bulk, normal tone and no involuntary movements, no tremor   SENSORY FUNCTION:  Normal touch, normal pin   CEREBELLAR FUNCTION:  Intact fine motor control over upper limbs   REFLEX FUNCTION:  Symmetric, 1+ to BUE, absent to BLE, no perverted reflex, no Babinski sign   STATION and GAIT  Not tested       Data:    Lab Results:   CBC:   Recent Labs     02/16/21  0514 02/17/21  0414 02/18/21  0734   WBC 9.8 7.9 7.2   HGB 11.2* 11.8* 11.0*   PLT See Reflexed IPF Result See Reflexed IPF Result See Reflexed IPF Result     BMP:    Recent Labs     02/16/21  0514 02/17/21  0414 02/18/21  0144 02/18/21  0734    139  --  138   K 4.2 3.6* 4.1 4.0   * 108*  --  107   CO2 23 21  --  20   BUN 16 12  --  11   CREATININE 0.65 0.65  --  0.65   GLUCOSE 76 90  --  127*         Lab Results   Component Value Date    CHOL 114 10/15/2019    LDLCHOLESTEROL 63 10/15/2019    HDL 38 (L) 10/15/2019    TRIG 64 10/15/2019    ALT 15 02/14/2021    AST 34 (H) 02/14/2021    TSH 5.35 (H) 02/17/2021    INR 1.3 02/08/2021    LABA1C 6.2 (H) 02/07/2021    [id number] 1792 (H) 02/17/2021           Diagnostic data reviewed:  CT HEAD (2/6/21) -  Chronic findings in the brain without acute CT abnormality identified. BRAIN MRI (2/13/21) -  No acute intracranial abnormality within limits of motion artifact. LTME (starting 2/16/21) -  Day 1: Moderate diffuse encephalopathy, no epileptiform discharges    Day 2: Suboptimal recording, mild to moderate diffuse encephalopathy    Day 3: Mild diffuse encephalopathy            Impression:  -Acute metabolic encephalopathy in the setting of heart failure, COPD, acute respiratory failure with hypoxemia, hyperammonemia     Plan:  -Patient continues on LTME  -We will discontinue Depakote and restart Keppra 500 mg twice daily  -Recheck ammonia level and LFTs tomorrow  -Continue thiamine 100 mg daily  -We will follow    Please note that this note was generated using a voice recognition dictation software. Although every effort was made to ensure the accuracy of this automated transcription, some errors in transcription may have occurred.

## 2021-02-18 NOTE — PROGRESS NOTES
[facility]  Office: [address], DO, Flower Guardado, DO, Checo Lites, DO, Leandra Becker Damion, DO, Riddhi Atkinson MD, Petty Olea MD, Lachelle Hdez MD, Julianna Aranda MD, Edith Whatley MD, James Ponce MD, Analia Peña MD, Monisha Colin MD, Alexandru Thornton MD, Kathy Shaffer DO, Maggie Brown MD, Reina Blake MD, Felix Estrada, DO, Marilu Lai MD,  Angel Freedman DO, Evelin Donovan MD, Lo Dick MD, Damaso Robertson, [facility] Richardkevin, CNP, Andre Corcoran, CNP, Zoila Fuentes, CNS, Malick Benavides, CNP, Winnie Maldonado, CNP, Ward Toledo, CNP, Gaston Rae, CNP, Yary Augustin, CNP, Olga Burton PA-C, Denisse Wilks, [facility], Doreen Shepherd, CNP, Rahul Funez, CNP, Shannan Pineda, CNP, Haley Renteria, CNP, Nataliya Plaza, [facility]    Progress Note    2/18/2021    8:41 AM    Name:   Cristina Mata  MRN:     [id number]     Acct:      [de-identified]   [facility]   [facility]   Day:  3  Admit Date:  2/15/2021  6:29 PM    PCP:   Cecilie Leyden, MD  Code Status:  Full Code    Subjective:     C/C: Altered mental status    Interval History Status: improved. Patient remains confused but seems brighter today. Denies any chest pain, shortness of breath, nausea or vomiting, fevers or chills or complaints. Brief History: This is a 81-year-old female that was admitted to [facility] on 6 February with complaint of dizziness, fall and cough and was admitted for congestive heart failure. Initially she was treated with Rocephin and Zithromax for possible underlying pneumonia. She was continued on her Keppra and Depakote for seizures and ultimately remained encephalopathic and was worked up for sepsis. Ultimately she was transferred to [facility] for continued evaluation and LTME monitoring for seizure management.     Review of Systems:     Constitutional:  negative for chills, fevers, sweats reports current alcohol use of about 70.0 standard drinks of alcohol per week. She reports current drug use. Drug: Marijuana. Family History:   Family History   Problem Relation Age of Onset    Heart Disease Mother     Diabetes Father        Vitals:  BP 93/73   Pulse 89   Temp 97.4 °F (36.3 °C) (Axillary)   Resp 18   Ht 5' 2\" (1.575 m)   Wt 130 lb 1.1 oz (59 kg)   SpO2 90%   BMI 23.79 kg/m²   Temp (24hrs), Av.8 °F (36.6 °C), Min:97.2 °F (36.2 °C), Max:98.3 °F (36.8 °C)    Recent Labs     21  1542 21  2048 21  0438 21  0645   POCGLU 121* 99 118* 117*       I/O (24Hr):   No intake or output data in the 24 hours ending 21 0841    Labs:  Hematology:  Recent Labs     21  0514 21  04121  0734   WBC 9.8 7.9 7.2   RBC 4.67 4.91 4.47   HGB 11.2* 11.8* 11.0*   HCT 35.8* 40.3 35.1*   MCV 76.7* 82.1* 78.5*   MCH 24.0* 24.0* 24.6*   MCHC 31.3 29.3 31.3   RDW 25.9* 27.4* 27.4*   PLT See Reflexed IPF Result See Reflexed IPF Result See Reflexed IPF Result   MPV NOT REPORTED NOT REPORTED NOT REPORTED     Chemistry:  Recent Labs     02/15/21  2222 21  0000 21  0514 21  0414 21  0144     --  139 139  --    K 3.4*  --  4.2 3.6* 4.1     --  109* 108*  --    CO2   --  23 21  --    GLUCOSE 76  --  76 90  --    BUN 17  --  16 12  --    CREATININE 0.66  --  0.65 0.65  --    MG  --  1.9  --  1.7 2.0   ANIONGAP 8*  --  7* 10  --    LABGLOM >60  --  >60 >60  --    GFRAA >60  --  >60 >60  --    CALCIUM 8.1*  --  8.2* 8.1*  --    PROBNP 63,321*  --   --   --   --    TROPHS 67*  --   --   --   --    LACTACIDWB 1.8  --   --   --   --      Recent Labs     02/15/21  2222 21  0514 21  0514 21  0353 21  0414 21  1132 21  1542 21  2048 21  0438 21  0645   TSH  --   --   --   --  5.35*  --   --   --   --   --    AMMONIA 57* 56*  --   --   --   --   --   --   --   --    POCGLU  --   --    < > 88  -- 91 121* 99 118* 117*    < > = values in this interval not displayed. ABG:  Lab Results   Component Value Date    POCPH 7.417 02/17/2021    [place] [address]  09/04/2014     DISREGARD RESULTS. PATIENT NUMBER WAS INCORRECTLY ASSIGNED. POCPCO2 39.4 02/17/2021    PCO2  09/04/2014     DISREGARD RESULTS. PATIENT NUMBER WAS INCORRECTLY ASSIGNED. POCPO2 67.6 02/17/2021    PO2  09/04/2014     DISREGARD RESULTS. PATIENT NUMBER WAS INCORRECTLY ASSIGNED. POCHCO3 25.4 02/17/2021    HCO3  09/04/2014     DISREGARD RESULTS. PATIENT NUMBER WAS INCORRECTLY ASSIGNED. NBEA NOT REPORTED 02/17/2021    PBEA 1 02/17/2021    FJC8ODE 27 02/17/2021    XEXL8UTP 94 02/17/2021    O2SAT  09/04/2014     DISREGARD RESULTS. PATIENT NUMBER WAS INCORRECTLY ASSIGNED. FIO2 6.0 02/17/2021     Lab Results   Component Value Date/Time    SPECIAL NOT REPORTED 02/14/2021 04:00 PM     Lab Results   Component Value Date/Time    CULTURE NO GROWTH 3 DAYS 02/14/2021 04:00 PM       Radiology:  Xr Chest (single View Frontal)    Result Date: 2/15/2021  1. Interval progression of multifocal pneumonia within the bilateral mid and lower lung zones. 2. Stable mild-to-moderate interstitial pulmonary edema. 3. Stable cardiomegaly. Ct Head Wo Contrast    Result Date: 2/13/2021  [ Stable CT study when compared to 02/06/2021. No acute intracranial abnormalities are noted. Xr Chest Portable    Result Date: 2/15/2021  Interval worsening of the interstitial and alveolar opacities bilaterally. Differential considerations would include worsening infection or edema Cardiomegaly     Xr Chest Portable    Result Date: 2/12/2021  Cardiomegaly and chronic pulmonary change with scattered pulmonary opacities. Ir Lumbar Puncture For Diagnosis    Result Date: 2/15/2021  Successful fluoroscopic-guided diagnostic lumbar puncture, as. Mri Brain Wo Contrast    Result Date: 2/13/2021  No acute intracranial abnormality within limits of motion artifact.        Physical Examination:        General appearance:  alert, cooperative and no distress  Mental Status:  oriented to person, [facility], 2021 with flat affect  Lungs:  clear to auscultation bilaterally, normal effort, diminished throughout  Heart:  regular rate and rhythm, no murmur  Abdomen:  soft, nontender, nondistended, normal bowel sounds, no masses, hepatomegaly, splenomegaly  Extremities:  no edema, redness, tenderness in the calves  Skin:  no gross lesions, rashes, induration    Assessment:        Hospital Problems           Last Modified POA    * (Principal) Acute encephalopathy 2/16/2021 Yes    Acute systolic heart failure ([address] 2/17/2021 Yes    COPD (chronic obstructive pulmonary disease) ([facility] 2/16/2021 Yes    Cocaine abuse ([facility] 2/16/2021 Yes    Acute respiratory failure with hypoxemia ([facility] 2/16/2021 Yes    Polysubstance abuse ([facility] 2/16/2021 Yes    Diabetes mellitus ([address] 2/16/2021 Yes    Essential hypertension 2/16/2021 Yes          Plan:        1. Continue seizure monitoring per neurology  2. Antiepileptics as ordered  3. Oxygen and aerosols as needed  4. Antihypertensives as ordered, titrate as needed  5. Diuretics as ordered  6. Insulin scale optimize glycemic control  7. PT and OT evaluate and treat  8.  Social service for discharge planning, will likely need skilled facility    Mikey Purvis DO  2/18/2021  8:41 AM

## 2021-02-18 NOTE — PROGRESS NOTES
Occupational Therapy   Occupational Therapy Initial Assessment  Date: 2021   Patient Name: Shani Mina  MRN: [id number]     : 1945    Date of Service: 2021    Discharge Recommendations:  Patient would benefit from continued therapy after discharge       Assessment   Performance deficits / Impairments: Decreased functional mobility ; Decreased ADL status; Decreased strength;Decreased safe awareness;Decreased cognition;Decreased balance;Decreased endurance;Decreased posture;Decreased fine motor control;Decreased coordination  Prognosis: Fair  Decision Making: High Complexity  OT Education: OT Role;Plan of Care;Precautions; ADL Adaptive Strategies;Transfer Training;Energy Conservation;Equipment;Orientation  REQUIRES OT FOLLOW UP: Yes  Activity Tolerance  Activity Tolerance: Patient limited by fatigue;Treatment limited secondary to decreased cognition  Safety Devices  Safety Devices in place: Yes  Type of devices: Nurse notified; Left in bed;Call light within reach; Bed alarm in place  Restraints  Initially in place: No           Patient Diagnosis(es): There were no encounter diagnoses. has a past medical history of CHF (congestive heart failure) ([facility] COPD (chronic obstructive pulmonary disease) ([facility] Diabetes mellitus ([facility] MRSA (methicillin resistant staph aureus) culture positive, and Tobacco abuse.   has a past surgical history that includes joint replacement (Bilateral); Hysterectomy; and Appendectomy. Restrictions  Restrictions/Precautions  Restrictions/Precautions: Fall Risk  Required Braces or Orthoses?: No  Position Activity Restriction  Other position/activity restrictions: Up with assist. LTME, telesitter, O2 via nasal canula      Subjective   General  Patient assessed for rehabilitation services?: Yes  Family / Caregiver Present: No  General Comment  Comments: RN ok'd for therapy this PM. Pt agreeable to participate in session and pleasantly confused throughout.   Patient Currently in Pain: Denies      Social/Functional History  Social/Functional History  Lives With: Alone  Type of Home: Apartment(2nd level)  Bathroom Shower/Tub: Tub/Shower unit (with shower chair)  ADL Assistance: Independent (per pt report0  Home Equipment: (Pt reports use of walker at baseline, unsure if with wheels or not)  Ambulation Assistance: Independent (with use of walker per pt report)  Transfer Assistance: Independent (per pt report)  Active : No  Additional Comments: Unable to obtain accurate social/functional history d/t pt's cognition. No family present at bedside. Objective    Hearing: Impaired (hard of hearing)  Vision: Pt with no complaints of visual deficits throughout    Sensation: Pt with no complaints of numbness/tingling throughout     Balance  Sitting Balance: Maximum assistance(seated EOB ~5 minutes with mod A to max A required throughout, R lateral lean and intermittent posterior/anterior lean throughout)   Comments: OOB activity not attempted due to pt's decreased tolerance to EOB activity    ADL  Feeding: Moderate assistance;Verbal cueing; Increased time to complete  Grooming: Increased time to complete;Verbal cueing; Moderate assistance  UE Bathing: Increased time to complete;Maximum assistance;Verbal cueing  LE Bathing: Increased time to complete;Maximum assistance;Verbal cueing  UE Dressing: Maximum assistance; Increased time to complete;Verbal cueing  LE Dressing: Maximum assistance; Increased time to complete;Verbal cueing  Toileting: Maximum assistance; Increased time to complete     Tone RUE  RUE Tone: Normotonic  Tone LUE  LUE Tone: Normotonic  Coordination  Movements Are Fluid And Coordinated: No  Coordination and Movement description: Fine motor impairments;Gross motor impairments;Decreased speed;Decreased accuracy; Right UE;Left UE        Bed mobility  Supine to Sit: Maximum assistance;2 Person assistance(HOB raised ~45*)  Sit to Supine: Maximum assistance;2 Person assistance  Scooting: Maximal assistance  Comment: Max verbal/tactile cues for initiation/sequencing/safety awareness. Increased time/effort.              Cognition  Overall Cognitive Status: Exceptions  Arousal/Alertness: Inconsistent responses to stimuli(Pt demonstrating painful responses with lack of stimuli)  Following Commands: Inconsistently follows commands  Attention Span: Difficulty attending to directions  Memory: Decreased recall of recent events;Decreased short term memory;Decreased long term memory  Safety Judgement: Decreased awareness of need for assistance;Decreased awareness of need for safety  Problem Solving: Decreased awareness of errors  Insights: Not aware of deficits  Initiation: Requires cues for all  Sequencing: Requires cues for all           Sensation  Overall Sensation Status: (DEXTER d/t pt's cognition)        LUE AROM (degrees)  LUE AROM : WFL  RUE AROM (degrees)  RUE AROM : WFL  LUE Strength  LUE Strength Comment: grossly 4-/5  RUE Strength  RUE Strength Comment: grossly 4-/5            Plan   Plan  Times per week: 3-5x/wk  Current Treatment Recommendations: Strengthening, Balance Training, Functional Mobility Training, Endurance Training, Safety Education & Training, Self-Care / ADL, Patient/Caregiver Education & Training, Equipment Evaluation, Education, & procurement, Positioning, ROM, Cognitive Reorientation    AM-PAC Score  AM-[facility] Inpatient Daily Activity Raw Score: 12 (02/18/21 1554)  AM-PAC Inpatient ADL T-Scale Score : 30.6 (02/18/21 1554)  ADL Inpatient CMS 0-100% Score: 66.57 (02/18/21 1554)  ADL Inpatient CMS G-Code Modifier : CL (02/18/21 1554)    Goals  Short term goals  Time Frame for Short term goals: Pt will, by discharge:  Short term goal 1: Perform feeding/grooming tasks with setup and AE/DME PRN  Short term goal 2: Perform UB ADL tasks with CGA and use of AE/DME PRN  Short term goal 3: Perform LB ADL tasks/toileting tasks with min A using AE/DME PRN  Short term goal 4:

## 2021-02-19 LAB
ABSOLUTE EOS #: 0.21 K/UL (ref 0–0.4)
ABSOLUTE IMMATURE GRANULOCYTE: 0 K/UL (ref 0–0.3)
ABSOLUTE LYMPH #: 1.7 K/UL (ref 1–4.8)
ABSOLUTE MONO #: 0.36 K/UL (ref 0.1–0.8)
ALLEN TEST: POSITIVE
AMMONIA: 33 UMOL/L (ref 11–51)
ANION GAP SERPL CALCULATED.3IONS-SCNC: 9 MMOL/L (ref 9–17)
BASOPHILS # BLD: 0 % (ref 0–2)
BASOPHILS ABSOLUTE: 0 K/UL (ref 0–0.2)
BUN BLDV-MCNC: 13 MG/DL (ref 8–23)
BUN/CREAT BLD: ABNORMAL (ref 9–20)
CALCIUM SERPL-MCNC: 8.7 MG/DL (ref 8.6–10.4)
CHLORIDE BLD-SCNC: 108 MMOL/L (ref 98–107)
CO2: 19 MMOL/L (ref 20–31)
CREAT SERPL-MCNC: 0.75 MG/DL (ref 0.5–0.9)
DIFFERENTIAL TYPE: ABNORMAL
EOSINOPHILS RELATIVE PERCENT: 3 % (ref 1–4)
FIO2: 6
GFR AFRICAN AMERICAN: >60 ML/MIN
GFR NON-AFRICAN AMERICAN: >60 ML/MIN
GFR SERPL CREATININE-BSD FRML MDRD: ABNORMAL ML/MIN/{1.73_M2}
GFR SERPL CREATININE-BSD FRML MDRD: ABNORMAL ML/MIN/{1.73_M2}
GLUCOSE BLD-MCNC: 106 MG/DL (ref 70–99)
GLUCOSE BLD-MCNC: 109 MG/DL (ref 65–105)
GLUCOSE BLD-MCNC: 136 MG/DL (ref 65–105)
GLUCOSE BLD-MCNC: 95 MG/DL (ref 65–105)
HCT VFR BLD CALC: 39.6 % (ref 36.3–47.1)
HEMOGLOBIN: 11.9 G/DL (ref 11.9–15.1)
IMMATURE GRANULOCYTES: 0 %
LYMPHOCYTES # BLD: 24 % (ref 24–44)
MCH RBC QN AUTO: 24.8 PG (ref 25.2–33.5)
MCHC RBC AUTO-ENTMCNC: 30.1 G/DL (ref 28.4–34.8)
MCV RBC AUTO: 82.5 FL (ref 82.6–102.9)
MODE: ABNORMAL
MONOCYTES # BLD: 5 % (ref 1–7)
MORPHOLOGY: ABNORMAL
NEGATIVE BASE EXCESS, ART: ABNORMAL (ref 0–2)
NRBC AUTOMATED: 0 PER 100 WBC
O2 DEVICE/FLOW/%: ABNORMAL
PATIENT TEMP: ABNORMAL
PDW BLD-RTO: 28 % (ref 11.8–14.4)
PLATELET # BLD: ABNORMAL K/UL (ref 138–453)
PLATELET ESTIMATE: ABNORMAL
PLATELET, FLUORESCENCE: 212 K/UL (ref 138–453)
PLATELET, IMMATURE FRACTION: 7.9 % (ref 1.1–10.3)
PMV BLD AUTO: ABNORMAL FL (ref 8.1–13.5)
POC HCO3: 24.9 MMOL/L (ref 21–28)
POC O2 SATURATION: 94 % (ref 94–98)
POC PCO2 TEMP: ABNORMAL MM HG
POC PCO2: 40.7 MM HG (ref 35–48)
POC PH TEMP: ABNORMAL
POC PH: 7.39 (ref 7.35–7.45)
POC PO2 TEMP: ABNORMAL MM HG
POC PO2: 72.7 MM HG (ref 83–108)
POSITIVE BASE EXCESS, ART: 0 (ref 0–3)
POTASSIUM SERPL-SCNC: 4.4 MMOL/L (ref 3.7–5.3)
RBC # BLD: 4.8 M/UL (ref 3.95–5.11)
RBC # BLD: ABNORMAL 10*6/UL
SAMPLE SITE: ABNORMAL
SEG NEUTROPHILS: 68 % (ref 36–66)
SEGMENTED NEUTROPHILS ABSOLUTE COUNT: 4.83 K/UL (ref 1.8–7.7)
SODIUM BLD-SCNC: 136 MMOL/L (ref 135–144)
TCO2 (CALC), ART: 26 MMOL/L (ref 22–29)
WBC # BLD: 7.1 K/UL (ref 3.5–11.3)
WBC # BLD: ABNORMAL 10*3/UL

## 2021-02-19 PROCEDURE — 95720 EEG PHY/QHP EA INCR W/VEEG: CPT | Performed by: PSYCHIATRY & NEUROLOGY

## 2021-02-19 PROCEDURE — 94640 AIRWAY INHALATION TREATMENT: CPT

## 2021-02-19 PROCEDURE — 97530 THERAPEUTIC ACTIVITIES: CPT

## 2021-02-19 PROCEDURE — 99232 SBSQ HOSP IP/OBS MODERATE 35: CPT | Performed by: INTERNAL MEDICINE

## 2021-02-19 PROCEDURE — 97110 THERAPEUTIC EXERCISES: CPT

## 2021-02-19 PROCEDURE — 97535 SELF CARE MNGMENT TRAINING: CPT

## 2021-02-19 PROCEDURE — 6360000002 HC RX W HCPCS: Performed by: STUDENT IN AN ORGANIZED HEALTH CARE EDUCATION/TRAINING PROGRAM

## 2021-02-19 PROCEDURE — 82140 ASSAY OF AMMONIA: CPT

## 2021-02-19 PROCEDURE — 6370000000 HC RX 637 (ALT 250 FOR IP): Performed by: NURSE PRACTITIONER

## 2021-02-19 PROCEDURE — APPSS30 APP SPLIT SHARED TIME 16-30 MINUTES: Performed by: NURSE PRACTITIONER

## 2021-02-19 PROCEDURE — 2060000000 HC ICU INTERMEDIATE R&B

## 2021-02-19 PROCEDURE — 82803 BLOOD GASES ANY COMBINATION: CPT

## 2021-02-19 PROCEDURE — 80048 BASIC METABOLIC PNL TOTAL CA: CPT

## 2021-02-19 PROCEDURE — 6360000002 HC RX W HCPCS: Performed by: FAMILY MEDICINE

## 2021-02-19 PROCEDURE — 85025 COMPLETE CBC W/AUTO DIFF WBC: CPT

## 2021-02-19 PROCEDURE — 95711 VEEG 2-12 HR UNMONITORED: CPT

## 2021-02-19 PROCEDURE — 82947 ASSAY GLUCOSE BLOOD QUANT: CPT

## 2021-02-19 PROCEDURE — 95718 EEG PHYS/QHP 2-12 HR W/VEEG: CPT | Performed by: PSYCHIATRY & NEUROLOGY

## 2021-02-19 PROCEDURE — 2700000000 HC OXYGEN THERAPY PER DAY

## 2021-02-19 PROCEDURE — 2580000003 HC RX 258: Performed by: FAMILY MEDICINE

## 2021-02-19 PROCEDURE — 6370000000 HC RX 637 (ALT 250 FOR IP): Performed by: STUDENT IN AN ORGANIZED HEALTH CARE EDUCATION/TRAINING PROGRAM

## 2021-02-19 PROCEDURE — 36415 COLL VENOUS BLD VENIPUNCTURE: CPT

## 2021-02-19 PROCEDURE — 99233 SBSQ HOSP IP/OBS HIGH 50: CPT | Performed by: PSYCHIATRY & NEUROLOGY

## 2021-02-19 PROCEDURE — 6370000000 HC RX 637 (ALT 250 FOR IP): Performed by: FAMILY MEDICINE

## 2021-02-19 PROCEDURE — 85055 RETICULATED PLATELET ASSAY: CPT

## 2021-02-19 PROCEDURE — 36600 WITHDRAWAL OF ARTERIAL BLOOD: CPT

## 2021-02-19 RX ADMIN — FOLIC ACID 1 MG: 1 TABLET ORAL at 09:45

## 2021-02-19 RX ADMIN — Medication 1 TABLET: at 09:45

## 2021-02-19 RX ADMIN — PANTOPRAZOLE SODIUM 20 MG: 20 TABLET, DELAYED RELEASE ORAL at 07:30

## 2021-02-19 RX ADMIN — ATORVASTATIN CALCIUM 20 MG: 20 TABLET, FILM COATED ORAL at 09:45

## 2021-02-19 RX ADMIN — ENOXAPARIN SODIUM 40 MG: 40 INJECTION SUBCUTANEOUS at 09:30

## 2021-02-19 RX ADMIN — SODIUM CHLORIDE, PRESERVATIVE FREE 10 ML: 5 INJECTION INTRAVENOUS at 21:34

## 2021-02-19 RX ADMIN — SODIUM CHLORIDE, PRESERVATIVE FREE 10 ML: 5 INJECTION INTRAVENOUS at 11:12

## 2021-02-19 RX ADMIN — LEVETIRACETAM 500 MG: 500 TABLET, FILM COATED ORAL at 09:30

## 2021-02-19 RX ADMIN — Medication 81 MG: at 09:30

## 2021-02-19 RX ADMIN — FUROSEMIDE 40 MG: 40 TABLET ORAL at 09:45

## 2021-02-19 RX ADMIN — ARFORMOTEROL TARTRATE 15 MCG: 15 SOLUTION RESPIRATORY (INHALATION) at 07:51

## 2021-02-19 RX ADMIN — LISINOPRIL 2.5 MG: 2.5 TABLET ORAL at 09:45

## 2021-02-19 RX ADMIN — LEVETIRACETAM 500 MG: 500 TABLET, FILM COATED ORAL at 21:36

## 2021-02-19 RX ADMIN — BUDESONIDE 500 MCG: 0.5 INHALANT RESPIRATORY (INHALATION) at 07:51

## 2021-02-19 RX ADMIN — SERTRALINE 50 MG: 50 TABLET, FILM COATED ORAL at 09:45

## 2021-02-19 RX ADMIN — CARVEDILOL 3.12 MG: 3.12 TABLET, FILM COATED ORAL at 09:45

## 2021-02-19 RX ADMIN — Medication 100 MG: at 09:30

## 2021-02-19 NOTE — PROGRESS NOTES
Occupational Therapy  Daily Treatment Note  Date: 2021  Patient Name: Denise Hunter  :  1945  MRN: [id number]       Further therapy recommended at discharge secondary to increased need for physical assistance for all ADL task and transfers. Subjective   General  Patient assessed for rehabilitation services?: Yes  Response to previous treatment: Patient with no complaints from previous session  Family / Caregiver Present: No  General Comment  Comments: RN okayed for therapy this PM. Pt agreeable to participate in session and pleasantly confused throughout. Vital Signs  Patient Currently in Pain: Denies   Treatment Activities:    ADL  UE Bathing: Increased time to complete;Maximum assistance;Verbal cueing(Pt engaged in bathing at EOB needing MAX A for UB. Pt unable to follow verbal instruction to engage much in task, only washing face and upper chest after consistent verbal instruction.)  UE Dressing: Maximum assistance; Increased time to complete;Verbal cueing(Pt doffed gown and donned clean gown, needing MAX A secondary to inability to follow step one directions. Pt would state \"okay\" but would not engage in task.)         Pt sat at EOB for about 8 minutes with fair- to fair sitting balance noted statically. Bed mobility  Rolling to Left: 2 Person assistance;Maximum assistance(Pt required MAX A x2 for all rolling tasks, not engaging after consistent tactile and verbal instruction.)  Rolling to Right: 2 Person assistance;Maximum assistance  Supine to Sit: Maximum assistance(to transfer to EOB and back to supine)  Sit to Supine: Maximum assistance  Scooting: Maximal assistance  Comment: Pt stated \"yes\" multiple times when asked to engage in functional tasks, but did not engage in action when consistently asked.   Transfers  Sit to stand: Maximum assistance;2 Person assistance  Stand to sit: Maximum assistance;2 Person assistance  Transfer Comments: MAX A x2 for sit to stand and stand to sit this date using FWW. Pt was able to stand for about 15 seconds for first trial and 5 seconds for second trial before asking to lay back down in bed. Assessment   Performance deficits / Impairments: Decreased functional mobility ; Decreased ADL status; Decreased strength;Decreased safe awareness;Decreased cognition;Decreased balance;Decreased endurance;Decreased posture;Decreased fine motor control;Decreased coordination  Prognosis: Fair  PT Education  PT Education: Plan of Care;PT Role;General Safety;Orientation  OT Education: OT Role;Plan of Care;Transfer Training;Energy Conservation  Patient Education: OT role, OT POC, transfer training, EC/WS. Poor return secondary to decreased cognition. Timed Code Treatment Minutes: 35 Minutes  Activity Tolerance  Activity Tolerance: Patient limited by fatigue;Treatment limited secondary to decreased cognition  Activity Tolerance: fair, improving. Safety Devices  Safety Devices in place: Yes  Type of devices: Nurse notified; Left in bed;Call light within reach; Bed alarm in place  Restraints  Initially in place: No         Plan   Plan  Times per week: 3-5x/wk  Current Treatment Recommendations: Strengthening, Balance Training, Functional Mobility Training, Endurance Training, Safety Education & Training, Self-Care / ADL, Patient/Caregiver Education & Training, Equipment Evaluation, Education, & procurement, Positioning, ROM, Cognitive Reorientation         Goals  Short term goals  Time Frame for Short term goals: Pt will, by discharge:  Short term goal 1: Perform feeding/grooming tasks with setup and AE/DME PRN  Short term goal 2: Perform UB ADL tasks with CGA and use of AE/DME PRN  Short term goal 3: Perform LB ADL tasks/toileting tasks with min A using AE/DME PRN  Short term goal 4:  Independently demo good safety awareness during engagement in ADLs and functional transfers/functional mobility  Short term goal 5: Perform functional transfers with mod A using least restrictive AD    Therapy Time   Individual Concurrent Group Co-treatment   Time In 0250         Time Out 0325         Minutes 35         Timed Code Treatment Minutes: 35 Minutes       CULLEN Vela, OTR/L

## 2021-02-19 NOTE — PROGRESS NOTES
Comprehensive Nutrition Assessment    Type and Reason for Visit:  Reassess    Nutrition Recommendations/Plan: Continue current Dysphagia Minced and Moist diet. Will continue to provide Ensure Enlive supplements and will provide Ensure Clear Liquid ONS. Encourage/monitor PO intakes as tolerated. Nutrition Assessment:  Pt not taking much of meals. RN reports helping feed pt meals but pt does not eat very much as she is so tiny. Pt ate about 25% of her lunch with assistance. RN reports when pt is offered Ensure supplements after eating she does not really want to drink the supplements. RN reports pt is taking fluids well and is drinking water and lemonade. Will provide Ensure Clear Liquid ONS. Labs/Meds reviewed. Malnutrition Assessment:  Malnutrition Status:  Severe malnutrition    Context:  Chronic Illness     Findings of the 6 clinical characteristics of malnutrition:  Energy Intake:  7 - 75% or less estimated energy requirements for 1 month or longer  Weight Loss:  (9% x 6 months per chart review)     Body Fat Loss:  7 - Severe body fat loss Orbital, Triceps   Muscle Mass Loss:  (Moderate muscle mass loss) Temples (temporalis), Clavicles (pectoralis & deltoids)  Fluid Accumulation:  No significant fluid accumulation   Strength:  Not Performed    Estimated Daily Nutrient Needs:  Energy (kcal):  25-28 kcal/kg = 3839-7933 kcals/day; Weight Used for Energy Requirements:  Admission     Protein (g):  1.2-1.5 gm/kg = 60-75 gm pro/day; Weight Used for Protein Requirements:  Ideal          Nutrition Related Findings:  Labs: reviewed. Meds: Folic Acid, Lasix, Thiamine. BM 2/18.       Wounds:  Deep Tissue Injury(to coccyx)       Current Nutrition Therapies:    DIET DYSPHAGIA MINCED AND MOIST; No Added Salt (3-4 GM)  Dietary Nutrition Supplements: Standard High Calorie Oral Supplement, Frozen Oral Supplement    Anthropometric Measures:  · Height: 5' 2\" (157.5 cm)  · Current Body Weight: 130 lb 1.1 oz

## 2021-02-19 NOTE — PROGRESS NOTES
BRONCHOSPASM/BRONCHOCONSTRICTION   [x]  IMPROVE AERATION/BREATH SOUNDS  [x]  ADMINISTER BRONCHODILATOR THERAPY AS APPROPRIATE  [x]  ASSESS BREATH SOUNDS  []  IMPLEMENT AEROSOL/MDI PROTOCOL  [x]  PATIENT EDUCATION AS NEEDED    PROVIDE ADEQUATE OXYGENATION WITH ACCEPTABLE SP02/ABG'S  [x] IDENTIFY APPROPRIATE OXYGEN THERAPY  [x] MONITOR SP02/ABG'S AS NEEDED   [x] PATIENT EDUCATION AS NEEDED

## 2021-02-19 NOTE — PROGRESS NOTES
Physical Therapy  Facility/Department: [address] STEPDOWN  Daily Treatment Note  NAME: Domonique Puri  : 1945  MRN: [id number]    Date of Service: 2021    Discharge Recommendations:    Further therapy recommended at discharge. Assessment   Body structures, Functions, Activity limitations: Decreased functional mobility ; Decreased strength;Decreased endurance;Decreased balance;Decreased cognition;Decreased safe awareness;Decreased ROM  Assessment: Pt EOB ~12min, maxA to attain EOB, modA to Candelario EOB, Pt leaning R on R elbow on raised HOB. P/AAROM. cueing throughout, fair response, poor follow throughout. Pt would benefit from continued acute PT to address deficits. Prognosis: Fair  PT Education: Plan of Care;PT Role;General Safety;Orientation  REQUIRES PT FOLLOW UP: Yes  Activity Tolerance  Activity Tolerance: Patient Tolerated treatment well;Patient limited by fatigue;Patient limited by endurance; Patient limited by cognitive status  Activity Tolerance: keeps eyes open with cueing, inconsistently follows commands/ participates. Patient Diagnosis(es): There were no encounter diagnoses. has a past medical history of CHF (congestive heart failure) ([facility] COPD (chronic obstructive pulmonary disease) ([facility] Diabetes mellitus ([facility] MRSA (methicillin resistant staph aureus) culture positive, and Tobacco abuse.   has a past surgical history that includes joint replacement (Bilateral); Hysterectomy; and Appendectomy. Restrictions  Restrictions/Precautions  Restrictions/Precautions: Fall Risk, General Precautions  Required Braces or Orthoses?: No  Position Activity Restriction  Other position/activity restrictions: Up with assist  Subjective   General  Chart Reviewed: Yes  Response To Previous Treatment: Patient with no complaints from previous session. ;Patient unable to report, no changes reported from family or staff  Family / Caregiver Present: No  Subjective  Subjective: RN and pt agreeable to PT. Pt alert in bed upon arrival.  Pain Screening  Patient Currently in Pain: Denies  Vital Signs  Patient Currently in Pain: Denies  Pre Treatment Pain Screening  Intervention List: Patient able to continue with treatment    Orientation     Cognition      Objective   Bed mobility  Supine to Sit: Maximum assistance  Sit to Supine: Maximum assistance  Comment: HOB raised to ~30 deg. max cueing, reitition, assist to initiate movement, intermitant participation. Pt often says \"yes\" but does not perform action/ activity requested. Transfers  Comment: unsafe to attempt d/t cognition and decreased response to requested activities and R lean while EOB  Ambulation  Ambulation?: No  Stairs/Curb  Stairs?: No     Balance  Posture: Poor  Sitting - Static: Fair  Sitting - Dynamic: Fair;-  Comments: leaning on R elbow on raised HOB most of session. flexed posture, improves with cueing with poor maintainance. Exercises  Comments: AAROM shld flexion 10x Bilat. AA/PROM LAQ 10x Bilat. cueing throughout, repetative. R lean throughout, some improvement with cueing and assist to reach L with LUE. Connie for R lean, pt largely resting on R elbow on pillow with raised HOB. Cues on posture to hold head up with good response and poor carry through. Strength Other  Other: minimal AROM observed, noted in BLEs, not to commands, intermittantly.        Goals  Short term goals  Time Frame for Short term goals: 14 visits  Short term goal 1: Pt to demonstrate bed mobility Connie  Short term goal 2: Pt to demonstrate functional transfers MaxA  Short term goal 3: Pt to actively participate in at least 30 minutes of physical therapy to demonstrate increased endurance  Short term goal 4: Pt to demonstrate at least fair+ static and dynamic sitting balance to decrease fall risk  Patient Goals   Patient goals : Pt did not verbalize goals    Plan    Plan  Times per week: 5-6x / week  Current Treatment Recommendations: Strengthening, Safety Education & Training, ROM, Balance Training, Endurance Training, Patient/Caregiver Education & Training, Equipment Evaluation, Education, & procurement, Functional Mobility Training, Transfer Training, Gait Training, Stair training  Safety Devices  Type of devices: Call light within reach, Bed alarm in place, All fall risk precautions in place, Nurse notified, Patient at risk for falls, Left in bed  Restraints  Initially in place: Yes(4 rails, seizure precautions)     Therapy Time   Individual Concurrent Group Co-treatment   Time In 1115         Time Out 1139         Minutes 24         Timed Code Treatment Minutes: 23 Minutes       Lexus Mejias, PT

## 2021-02-19 NOTE — CARE COORDINATION
Transitional planning.  Spoke with niece and she and pts grandson will go over SNF list and give choice tomorrow

## 2021-02-19 NOTE — PROGRESS NOTES
[facility]  Office: [address], DO, Jacques Munoz, DO, Mario Bambi, DO, Vanna Bhatia Blood, DO, Shen Doss MD, Janet Hdez MD, Rebecca Chandra MD, Shoshana Wade MD, Karen Ward MD, Kwame Wilson MD, Shirley Rodriguez MD, Luisa Lundberg MD, Alexandru Panda MD, Reanna Fuentes, DO, Forest Manzo MD, Lashell Sylvester MD, Karol Rowell DO, Anna Hernandez MD,  Fco Amos, DO, Emeka Rahman MD, Rishabh Archer MD, Sung Vinson [facility], [facility], CNP, Earlene Youngblood CNP, Stoney Gandara, CNS, Jessica Miller, CNP, Patricia Shepherd, CNP, Federica Ayala, CNP, Mike Blanco, CNP, Maryanne Granados, CNP, Cat Giles PA-C, Shelby Wood, [facility], Damion Melgar, CNP, Wendie Matt, CNP, Leann Adame, CNP, Earnest Gardner, CNP, Nicole Lucero, [address] [place] [address]    Progress Note    2/19/2021    8:48 AM    Name:   Lilain Alatorre  MRN:     [id number]     Acct:      [de-identified]   Room:   [address] [place] [address] Day:  4  Admit Date:  2/15/2021  6:29 PM    PCP:   Delilah Holt MD  Code Status:  Full Code    Subjective:     C/C: Altered mental status    Interval History Status: improved. Patient continues to be confused. Denies any chest pain, shortness of breath, fevers or chills, abdominal pain, nausea vomiting or complaints. Brief History: This is a 44-year-old female that was admitted to [facility] on 6 February with complaint of dizziness, fall and cough and was admitted for congestive heart failure. Initially she was treated with Rocephin and Zithromax for possible underlying pneumonia.  She was continued on her Keppra and Depakote for seizures and ultimately remained encephalopathic and was worked up for sepsis.  Ultimately she was transferred to [facility] ICU for continued evaluation and LTME monitoring for seizure management.     Review of Systems:     Limited to the above due to delirium    Medications: Allergies: Allergies   Allergen Reactions    Tiotropium Anaphylaxis and Swelling    Spiriva Handihaler [Tiotropium Bromide Monohydrate] Swelling       Current Meds:   Scheduled Meds:    furosemide  40 mg Oral Daily    levETIRAcetam  500 mg Oral BID    enoxaparin  40 mg Subcutaneous Daily    folic acid  1 mg Oral Daily    thiamine  100 mg Oral Daily    aspirin  81 mg Oral Daily    atorvastatin  20 mg Oral Daily    sertraline  50 mg Oral Daily    lisinopril  2.5 mg Oral Daily    carvedilol  3.125 mg Oral BID    budesonide  0.5 mg Nebulization BID    insulin lispro  0-12 Units Subcutaneous TID WC    insulin lispro  0-6 Units Subcutaneous Nightly    pantoprazole  20 mg Oral Daily    calcium carbonate-vitamin D3  1 tablet Oral Daily    Arformoterol Tartrate  15 mcg Nebulization BID    sodium chloride flush  10 mL Intravenous 2 times per day     Continuous Infusions:    dextrose 5 % and 0.45 % NaCl 50 mL/hr at 02/17/21 2041    dextrose       PRN Meds: magnesium sulfate, glucose, dextrose, glucagon (rDNA), dextrose, albuterol, sodium chloride flush, promethazine **OR** ondansetron, polyethylene glycol, acetaminophen, bisacodyl, acetaminophen, potassium chloride    Data:     Past Medical History:   has a past medical history of CHF (congestive heart failure) ([facility] Diabetes mellitus ([facility] MRSA (methicillin resistant staph aureus) culture positive, and Tobacco abuse. Social History:   reports that she has been smoking cigarettes. She has been smoking about 0.50 packs per day. She has never used smokeless tobacco. She reports current alcohol use of about 70.0 standard drinks of alcohol per week. She reports current drug use. Drug: Marijuana.      Family History:   Family History   Problem Relation Age of Onset    Heart Disease Mother     Diabetes Father        Vitals:  /87   Pulse 89   Temp 98 °F (36.7 °C) (Oral)   Resp 18   Ht 5' 2\" (1.575 m)   Wt 130 lb 1.1 oz (59 kg)   SpO2 92%   BMI 23.79 kg/m²   Temp (24hrs), Av.2 °F (36.8 °C), Min:98 °F (36.7 °C), Max:98.4 °F (36.9 °C)    Recent Labs     21  0645 21  1741 21  1937 21  0732   POCGLU 117* 112* 113* 95       I/O (24Hr):     Intake/Output Summary (Last 24 hours) at 2021 0848  Last data filed at 2021 1800  Gross per 24 hour   Intake 520 ml   Output 460 ml   Net 60 ml       Labs:  Hematology:  Recent Labs     21  0734 21  0750   WBC 7.9 7.2 7.1   RBC 4.91 4.47 4.80   HGB 11.8* 11.0* 11.9   HCT 40.3 35.1* 39.6   MCV 82.1* 78.5* 82.5*   MCH 24.0* 24.6* 24.8*   MCHC 29.3 31.3 30.1   RDW 27.4* 27.4* 28.0*   PLT See Reflexed IPF Result See Reflexed IPF Result See Reflexed IPF Result   MPV NOT REPORTED NOT REPORTED NOT REPORTED     Chemistry:  Recent Labs     21  0144 21  0734 21  0750     --  138 136   K 3.6* 4.1 4.0 4.4   *  --  107 108*   CO2 21  --  20 19*   GLUCOSE 90  --  127* 106*   BUN 12  --  11 13   CREATININE 0.65  --  0.65 0.75   MG 1.7 2.0 2.0  --    ANIONGAP 10  --  11 9   LABGLOM >60  --  >60 >60   GFRAA >60  --  >60 >60   CALCIUM 8.1*  --  8.4* 8.7     Recent Labs     21  0414 21  0414 21  2048 21  0438 21  0645 21  1425 21  1741 21  1937 21  0732 21  0750   PROT  --   --   --   --   --  7.0  --   --   --   --    LABALBU  --   --   --   --   --  2.4*  --   --   --   --    TSH 5.35*  --   --   --   --   --   --   --   --   --    AST  --   --   --   --   --  38*  --   --   --   --    ALT  --   --   --   --   --  21  --   --   --   --    ALKPHOS  --   --   --   --   --  53  --   --   --   --    BILITOT  --   --   --   --   --  0.64  --   --   --   --    BILIDIR  --   --   --   --   --  0.31*  --   --   --   --    AMMONIA  --   --   --   --   --   --   --   --   --  33   POCGLU  --    < > 99 118* 117*  --  112* 113* 95 --     < > = values in this interval not displayed. ABG:  Lab Results   Component Value Date    POCPH 7.417 02/17/2021    [facility]  09/04/2014     DISREGARD RESULTS. PATIENT NUMBER WAS INCORRECTLY ASSIGNED. POCPCO2 39.4 02/17/2021    PCO2  09/04/2014     DISREGARD RESULTS. PATIENT NUMBER WAS INCORRECTLY ASSIGNED. POCPO2 67.6 02/17/2021    PO2  09/04/2014     DISREGARD RESULTS. PATIENT NUMBER WAS INCORRECTLY ASSIGNED. POCHCO3 25.4 02/17/2021    HCO3  09/04/2014     DISREGARD RESULTS. PATIENT NUMBER WAS INCORRECTLY ASSIGNED. NBEA NOT REPORTED 02/17/2021    PBEA 1 02/17/2021    [id number] 27 02/17/2021    [id number] 94 02/17/2021    O2SAT  09/04/2014     DISREGARD RESULTS. PATIENT NUMBER WAS INCORRECTLY ASSIGNED. FIO2 6.0 02/17/2021     Lab Results   Component Value Date/Time    SPECIAL NOT REPORTED 02/14/2021 04:00 PM     Lab Results   Component Value Date/Time    CULTURE NO GROWTH 3 DAYS 02/14/2021 04:00 PM       Radiology:  Xr Chest (single View Frontal)    Result Date: 2/15/2021  1. Interval progression of multifocal pneumonia within the bilateral mid and lower lung zones. 2. Stable mild-to-moderate interstitial pulmonary edema. 3. Stable cardiomegaly. Ct Head Wo Contrast    Result Date: 2/13/2021  [ Stable CT study when compared to 02/06/2021. No acute intracranial abnormalities are noted. Xr Chest Portable    Result Date: 2/15/2021  Interval worsening of the interstitial and alveolar opacities bilaterally. Differential considerations would include worsening infection or edema Cardiomegaly     Ir Lumbar Puncture For Diagnosis    Result Date: 2/15/2021  Successful fluoroscopic-guided diagnostic lumbar puncture, as. Mri Brain Wo Contrast    Result Date: 2/13/2021  No acute intracranial abnormality within limits of motion artifact.        Physical Examination:        General appearance:  alert, cooperative and no distress  Mental Status:  oriented to person, place and time and normal affect Lungs:  clear to auscultation bilaterally, normal effort  Heart:  regular rate and rhythm, no murmur  Abdomen:  soft, nontender, nondistended, normal bowel sounds, no masses, hepatomegaly, splenomegaly  Extremities:  no edema, redness, tenderness in the calves  Skin:  no gross lesions, rashes, induration    Assessment:        Hospital Problems           Last Modified POA    * (Principal) Acute encephalopathy 2/16/2021 Yes    Acute systolic heart failure ([address] 2/17/2021 Yes    COPD (chronic obstructive pulmonary disease) ([address] 2/16/2021 Yes    Cocaine abuse ([address] 2/16/2021 Yes    Acute respiratory failure with hypoxemia ([address] 2/16/2021 Yes    Polysubstance abuse ([facility] 2/16/2021 Yes    Diabetes mellitus ([address] 2/16/2021 Yes    Essential hypertension 2/16/2021 Yes          Plan:        1. LTME monitoring per neurology  2. Oxygen aerosols as ordered  3. Antiepileptics per neurology  4. PT and OT as able  5. Continue diuresis  6. Antihypertensives as ordered  7. Social service for discharge planning, will likely need skilled facility if family will agree.     Yoli Schmitz DO  2/19/2021  8:48 AM

## 2021-02-19 NOTE — PROGRESS NOTES
NEUROLOGY INPATIENT PROGRESS NOTE    2/19/2021         Current Exam:     Chart reviewed. Discussed with RN. Ammonia improved to 33. Patient is awake and alert but does not answer orientation questions appropriately. Repeats herself multiple times during exam. RN reports she has been more alert today as compared to yesterday. No seizures on LTME and no seizure activity per RN. Brief History:    Porter Gama is a  77 y.o. female with H/O COPD on home O2, HTN, DM, CHF, current smoker, daily drinker who was admitted as a [facility] on 2/15/2021 where she initially presented with confusion, generalized weakness and was noted to have acute on chronic respiratory failure. She had an extensive neurologic work-up including CT head, MRI brain, LP all of which came back with no acute findings. Initial EEG with PLEDs left greater than right hemisphere and follow-up EEG with moderate diffuse slowing. 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 She was started empirically on Keppra 500 mg twice daily as well as Depakote 500 mg twice daily but Depakote was later decreased to 250 mg due to an increase in her ammonia. Due to her overall condition it was decided to transfer her to the medical ICU at [address] [place] [address] along with neurology continued evaluation and starting LTM EEG. Upon transfer to [address] [place] [address] her Keppra was stopped as there have been no clinical or LTME seizures noted and Depakote was continued however due to elevated ammonia Depakote was ultimately discontinued and she was placed back on Keppra. No current facility-administered medications on file prior to encounter.       Current Outpatient Medications on File Prior to Encounter   Medication Sig Dispense Refill    budesonide-formoterol (SYMBICORT) 160-4.5 MCG/ACT AERO Inhale 2 puffs into the lungs 2 times daily      tiotropium (SPIRIVA RESPIMAT) 2.5 MCG/ACT AERS inhaler Inhale 2 puffs into the lungs daily      furosemide (LASIX) 20 MG tablet Take 20 mg by mouth daily      traZODone (DESYREL) 150 MG tablet Take 150 mg by mouth nightly      ibuprofen (ADVIL;MOTRIN) 800 MG tablet Take 800 mg by mouth 3 times daily (with meals)      pantoprazole (PROTONIX) 20 MG tablet Take 1 tablet by mouth daily 30 tablet 3    albuterol (PROVENTIL) (2.5 MG/3ML) 0.083% nebulizer solution Take 3 mLs by nebulization every 6 hours as needed for Wheezing or Shortness of Breath 120 each 0    tiZANidine (ZANAFLEX) 4 MG tablet Take 4 mg by mouth 2 times daily as needed      pregabalin (LYRICA) 100 MG capsule Take 100 mg by mouth 2 times daily.  Multiple Vitamins-Minerals (CERTAVITE SENIOR PO) Take 1 tablet by mouth daily      sertraline (ZOLOFT) 50 MG tablet Take 50 mg by mouth daily      loratadine (CLARITIN) 10 MG tablet Take 10 mg by mouth daily      atorvastatin (LIPITOR) 20 MG tablet Take 20 mg by mouth daily       aspirin 81 MG tablet Take 81 mg by mouth daily.  calcium-vitamin D (OSCAL-500) 500-200 MG-UNIT per tablet Take 1 tablet by mouth daily.  lisinopril (PRINIVIL;ZESTRIL) 20 MG tablet Take 20 mg by mouth daily          Allergies: Denise Hunter is allergic to tiotropium and spiriva handihaler [tiotropium bromide monohydrate]. Past Medical History:   Diagnosis Date    [facility])     [facility] disease) [facility]     Diabetes mellitus ([facility])     MRSA (methicillin resistant staph aureus) culture positive 8/28/2014    sputum    Tobacco abuse 10/15/2019       Past Surgical History:   Procedure Laterality Date    APPENDECTOMY      HYSTERECTOMY      JOINT REPLACEMENT Bilateral        Social History: Denise Hunter  reports that she has been smoking cigarettes. She has been smoking about 0.50 packs per day. She has never used smokeless tobacco. She reports current alcohol use of about 70.0 standard drinks of alcohol per week. She reports current drug use. Drug: Marijuana.     Family History Problem Relation Age of Onset    Heart Disease Mother     Diabetes Father        Objective:   /87   Pulse 89   Temp 98 °F (36.7 °C) (Oral)   Resp 18   Ht 5' 2\" (1.575 m)   Wt 130 lb 1.1 oz (59 kg)   SpO2 92%   BMI 23.79 kg/m²     Blood pressure range: Systolic (90HYV), JHS:013 , Min:94 , BG   ; Diastolic (11FUW), GNR:21, Min:75, Max:90      Review of Systems:  Cannot complete due to patient condition                                            NEUROLOGIC EXAMINATION  GENERAL  Appears comfortable and in no distress   HEENT  NC/ AT   NECK  Supple   MENTAL STATUS:  Alert, oriented to person, president only, requires much prompting to answer questions, normal speech, normal language, no hallucination or delusion. Intact naming.  Requires multiple prompts to follow simple commands   CRANIAL NERVES: II     -      Visual fields intact to confrontation  III,IV,VI -  EOMs full, no afferent defect, no THALIA, no ptosis  V     -     Normal facial sensation  VII    -     Normal facial symmetry  VIII   -     Intact hearing  IX,X -     Symmetrical palate  XI    -     Symmetrical shoulder shrug  XII   -     Midline tongue, no atrophy    MOTOR FUNCTION:  4/5 strength to BUE, 1/5 strength to BLE with normal bulk, normal tone and no involuntary movements, no tremor   SENSORY FUNCTION:  Normal touch, normal pin   CEREBELLAR FUNCTION:  Intact fine motor control over upper limbs   REFLEX FUNCTION:  Symmetric, 1+ to BUE, absent to BLE, no perverted reflex, no Babinski sign   STATION and GAIT  Not tested       Data:    Lab Results:   CBC:   Recent Labs     214 21  0734   WBC 7.9 7.2   HGB 11.8* 11.0*   PLT See Reflexed IPF Result See Reflexed IPF Result     BMP:    Recent Labs     21  0144 21  0734     --  138   K 3.6* 4.1 4.0   *  --  107   CO2 21  --  20   BUN 12  --  11   CREATININE 0.65  --  0.65   GLUCOSE 90  --  127*         Lab Results   Component Value Date CHOL 114 10/15/2019    LDLCHOLESTEROL 63 10/15/2019    HDL 38 (L) 10/15/2019    TRIG 64 10/15/2019    ALT 21 02/18/2021    AST 38 (H) 02/18/2021    TSH 5.35 (H) 02/17/2021    INR 1.3 02/08/2021    LABA1C 6.2 (H) 02/07/2021    [id number] 1792 (H) 02/17/2021           Diagnostic data reviewed:  CT HEAD (2/6/21) -  Chronic findings in the brain without acute CT abnormality identified. BRAIN MRI (2/13/21) -  No acute intracranial abnormality within limits of motion artifact. LTME (starting 2/16/21) -  Day 1: Moderate diffuse encephalopathy, no epileptiform discharges    Day 2: Suboptimal recording, mild to moderate diffuse encephalopathy    Day 3: Mild diffuse encephalopathy    Day 4: Mild diffuse encephalopathy        Impression:  -Acute metabolic encephalopathy in the setting of heart failure, COPD, acute respiratory failure with hypoxemia, hyperammonemia     Plan:  -Will discontinue LTME, there have been no seizures  -Continue Keppra 500 mg twice daily, ammonia improved off of the Depakote  -Reviewed ABG; defer management of chronic respiratory failure to primary team  -Continue thiamine 100 mg daily  -We will follow    Please note that this note was generated using a voice recognition dictation software. Although every effort was made to ensure the accuracy of this automated transcription, some errors in transcription may have occurred.

## 2021-02-20 LAB
ABSOLUTE EOS #: 0.18 K/UL (ref 0–0.44)
ABSOLUTE IMMATURE GRANULOCYTE: 0.09 K/UL (ref 0–0.3)
ABSOLUTE LYMPH #: 2.28 K/UL (ref 1.1–3.7)
ABSOLUTE MONO #: 1 K/UL (ref 0.1–1.2)
ANION GAP SERPL CALCULATED.3IONS-SCNC: 14 MMOL/L (ref 9–17)
BASOPHILS # BLD: 0 % (ref 0–2)
BASOPHILS ABSOLUTE: 0 K/UL (ref 0–0.2)
BUN BLDV-MCNC: 17 MG/DL (ref 8–23)
BUN/CREAT BLD: ABNORMAL (ref 9–20)
CALCIUM SERPL-MCNC: 8.8 MG/DL (ref 8.6–10.4)
CHLORIDE BLD-SCNC: 109 MMOL/L (ref 98–107)
CO2: 20 MMOL/L (ref 20–31)
CREAT SERPL-MCNC: 0.86 MG/DL (ref 0.5–0.9)
DIFFERENTIAL TYPE: ABNORMAL
EOSINOPHILS RELATIVE PERCENT: 2 % (ref 1–4)
GFR AFRICAN AMERICAN: >60 ML/MIN
GFR NON-AFRICAN AMERICAN: >60 ML/MIN
GFR SERPL CREATININE-BSD FRML MDRD: ABNORMAL ML/MIN/{1.73_M2}
GFR SERPL CREATININE-BSD FRML MDRD: ABNORMAL ML/MIN/{1.73_M2}
GLUCOSE BLD-MCNC: 108 MG/DL (ref 65–105)
GLUCOSE BLD-MCNC: 109 MG/DL (ref 65–105)
GLUCOSE BLD-MCNC: 111 MG/DL (ref 70–99)
GLUCOSE BLD-MCNC: 137 MG/DL (ref 65–105)
GLUCOSE BLD-MCNC: 170 MG/DL (ref 65–105)
GLUCOSE BLD-MCNC: 88 MG/DL (ref 65–105)
HCT VFR BLD CALC: 36.7 % (ref 36.3–47.1)
HEMOGLOBIN: 11 G/DL (ref 11.9–15.1)
IMMATURE GRANULOCYTES: 1 %
LYMPHOCYTES # BLD: 25 % (ref 24–43)
MCH RBC QN AUTO: 24.3 PG (ref 25.2–33.5)
MCHC RBC AUTO-ENTMCNC: 30 G/DL (ref 28.4–34.8)
MCV RBC AUTO: 81 FL (ref 82.6–102.9)
MONOCYTES # BLD: 11 % (ref 3–12)
MORPHOLOGY: ABNORMAL
NRBC AUTOMATED: 0 PER 100 WBC
PDW BLD-RTO: 28 % (ref 11.8–14.4)
PLATELET # BLD: ABNORMAL K/UL (ref 138–453)
PLATELET ESTIMATE: ABNORMAL
PLATELET, FLUORESCENCE: 284 K/UL (ref 138–453)
PLATELET, IMMATURE FRACTION: 8.9 % (ref 1.1–10.3)
PMV BLD AUTO: ABNORMAL FL (ref 8.1–13.5)
POTASSIUM SERPL-SCNC: 4.3 MMOL/L (ref 3.7–5.3)
RBC # BLD: 4.53 M/UL (ref 3.95–5.11)
RBC # BLD: ABNORMAL 10*6/UL
SEG NEUTROPHILS: 61 % (ref 36–65)
SEGMENTED NEUTROPHILS ABSOLUTE COUNT: 5.55 K/UL (ref 1.5–8.1)
SODIUM BLD-SCNC: 143 MMOL/L (ref 135–144)
WBC # BLD: 9.1 K/UL (ref 3.5–11.3)
WBC # BLD: ABNORMAL 10*3/UL

## 2021-02-20 PROCEDURE — 6370000000 HC RX 637 (ALT 250 FOR IP): Performed by: NURSE PRACTITIONER

## 2021-02-20 PROCEDURE — 94640 AIRWAY INHALATION TREATMENT: CPT

## 2021-02-20 PROCEDURE — 85055 RETICULATED PLATELET ASSAY: CPT

## 2021-02-20 PROCEDURE — 2700000000 HC OXYGEN THERAPY PER DAY

## 2021-02-20 PROCEDURE — 6360000002 HC RX W HCPCS: Performed by: STUDENT IN AN ORGANIZED HEALTH CARE EDUCATION/TRAINING PROGRAM

## 2021-02-20 PROCEDURE — 36415 COLL VENOUS BLD VENIPUNCTURE: CPT

## 2021-02-20 PROCEDURE — 94761 N-INVAS EAR/PLS OXIMETRY MLT: CPT

## 2021-02-20 PROCEDURE — 94660 CPAP INITIATION&MGMT: CPT

## 2021-02-20 PROCEDURE — 6360000002 HC RX W HCPCS: Performed by: FAMILY MEDICINE

## 2021-02-20 PROCEDURE — 2580000003 HC RX 258: Performed by: INTERNAL MEDICINE

## 2021-02-20 PROCEDURE — 2060000000 HC ICU INTERMEDIATE R&B

## 2021-02-20 PROCEDURE — 82947 ASSAY GLUCOSE BLOOD QUANT: CPT

## 2021-02-20 PROCEDURE — 6370000000 HC RX 637 (ALT 250 FOR IP): Performed by: FAMILY MEDICINE

## 2021-02-20 PROCEDURE — 85025 COMPLETE CBC W/AUTO DIFF WBC: CPT

## 2021-02-20 PROCEDURE — 99233 SBSQ HOSP IP/OBS HIGH 50: CPT | Performed by: PSYCHIATRY & NEUROLOGY

## 2021-02-20 PROCEDURE — 6370000000 HC RX 637 (ALT 250 FOR IP): Performed by: STUDENT IN AN ORGANIZED HEALTH CARE EDUCATION/TRAINING PROGRAM

## 2021-02-20 PROCEDURE — 99232 SBSQ HOSP IP/OBS MODERATE 35: CPT | Performed by: INTERNAL MEDICINE

## 2021-02-20 PROCEDURE — 80048 BASIC METABOLIC PNL TOTAL CA: CPT

## 2021-02-20 PROCEDURE — APPSS30 APP SPLIT SHARED TIME 16-30 MINUTES: Performed by: NURSE PRACTITIONER

## 2021-02-20 PROCEDURE — 2580000003 HC RX 258: Performed by: FAMILY MEDICINE

## 2021-02-20 RX ORDER — 0.9 % SODIUM CHLORIDE 0.9 %
500 INTRAVENOUS SOLUTION INTRAVENOUS ONCE
Status: COMPLETED | OUTPATIENT
Start: 2021-02-20 | End: 2021-02-20

## 2021-02-20 RX ADMIN — ARFORMOTEROL TARTRATE 15 MCG: 15 SOLUTION RESPIRATORY (INHALATION) at 20:33

## 2021-02-20 RX ADMIN — FOLIC ACID 1 MG: 1 TABLET ORAL at 09:39

## 2021-02-20 RX ADMIN — PANTOPRAZOLE SODIUM 20 MG: 20 TABLET, DELAYED RELEASE ORAL at 06:27

## 2021-02-20 RX ADMIN — FUROSEMIDE 40 MG: 40 TABLET ORAL at 09:39

## 2021-02-20 RX ADMIN — ENOXAPARIN SODIUM 40 MG: 40 INJECTION SUBCUTANEOUS at 09:40

## 2021-02-20 RX ADMIN — LEVETIRACETAM 500 MG: 500 TABLET, FILM COATED ORAL at 09:40

## 2021-02-20 RX ADMIN — Medication 81 MG: at 09:39

## 2021-02-20 RX ADMIN — BUDESONIDE 500 MCG: 0.5 INHALANT RESPIRATORY (INHALATION) at 07:52

## 2021-02-20 RX ADMIN — ARFORMOTEROL TARTRATE 15 MCG: 15 SOLUTION RESPIRATORY (INHALATION) at 07:52

## 2021-02-20 RX ADMIN — Medication 100 MG: at 09:40

## 2021-02-20 RX ADMIN — Medication 1 TABLET: at 09:40

## 2021-02-20 RX ADMIN — SODIUM CHLORIDE, PRESERVATIVE FREE 10 ML: 5 INJECTION INTRAVENOUS at 10:01

## 2021-02-20 RX ADMIN — CARVEDILOL 3.12 MG: 3.12 TABLET, FILM COATED ORAL at 10:04

## 2021-02-20 RX ADMIN — SERTRALINE 50 MG: 50 TABLET, FILM COATED ORAL at 09:39

## 2021-02-20 RX ADMIN — INSULIN LISPRO 2 UNITS: 100 INJECTION, SOLUTION INTRAVENOUS; SUBCUTANEOUS at 12:00

## 2021-02-20 RX ADMIN — SODIUM CHLORIDE 500 ML: 0.9 INJECTION, SOLUTION INTRAVENOUS at 11:58

## 2021-02-20 RX ADMIN — LEVETIRACETAM 500 MG: 500 TABLET, FILM COATED ORAL at 21:02

## 2021-02-20 RX ADMIN — ATORVASTATIN CALCIUM 20 MG: 20 TABLET, FILM COATED ORAL at 09:40

## 2021-02-20 NOTE — PROGRESS NOTES
[facility]  Office: [address], DO, Carmine Torres, DO, Zoey Saavedra, DO, Nilson Dann Bruno, DO, Savannah Slater MD, Oddis Nissen, MD, Marisol Pierson MD, Maribel Tavera MD, Keshia Pugh MD, Conor Sheridan MD, Jarocho Bobby MD, Dain Vogel MD, Alexandru Yusuf MD, Ag Morales DO, Velma Loomis MD, Edgar Brady MD, Jessica Toure, DO, Sylvia Cain MD,  Melody Frank DO, Mercy Amaro MD, Maggie Pride MD, Eulalia Hernandez [facility], [facility], CNP, Duy Dinero, CNP, Jeffery Mckeon, CNS, Aba Sagastume, CNP, Yary Toussaint, CNP, Linnea Yepez, CNP, Lupe Liriano, [facility], Florian Virk, CNP, Nisha Bailey PA-C, Velvet Wells, [facility], Farnaz Wilkinson, CNP, Alee Villatoro, CNP, Brandie Mendez, CNP, Mariah Mcguire, CNP, Pedro Pablo Ibrhaim, [address] [place] [address]    Progress Note    2/20/2021    8:22 AM    Name:   Sharon Zuñiga  MRN:     [id number]     Acct:      [de-identified]   Room:   [address] [place] [address] Day:  5  Admit Date:  2/15/2021  6:29 PM    PCP:   Tyrell Arevalo MD  Code Status:  Full Code    Subjective:     C/C: Altered mental status    Interval History Status: improved. Patient more alert today, continue seizure monitoring discontinued. Denies any chest pain, shortness of breath, nausea or vomiting. Brief History: This is a 81-year-old female that was admitted to [facility] on 6 February with complaint of dizziness, fall and cough and was admitted for congestive heart failure. Initially she was treated with Rocephin and Zithromax for possible underlying pneumonia.  She was continued on her Keppra and Depakote for seizures and ultimately remained encephalopathic and was worked up for sepsis.  Ultimately she was transferred to [facility] for continued evaluation and LTME monitoring for seizure management.     Review of Systems:     Constitutional:  negative for chills, fevers, sweats  Respiratory: negative for cough, dyspnea on exertion, shortness of breath, wheezing  Cardiovascular:  negative for chest pain, chest pressure/discomfort, lower extremity edema, palpitations  Gastrointestinal:  negative for abdominal pain, constipation, diarrhea, nausea, vomiting  Neurological:  negative for dizziness, headache    Medications: Allergies: Allergies   Allergen Reactions    Tiotropium Anaphylaxis and Swelling    Spiriva Handihaler [Tiotropium Bromide Monohydrate] Swelling       Current Meds:   Scheduled Meds:    furosemide  40 mg Oral Daily    levETIRAcetam  500 mg Oral BID    enoxaparin  40 mg Subcutaneous Daily    folic acid  1 mg Oral Daily    thiamine  100 mg Oral Daily    aspirin  81 mg Oral Daily    atorvastatin  20 mg Oral Daily    sertraline  50 mg Oral Daily    lisinopril  2.5 mg Oral Daily    carvedilol  3.125 mg Oral BID    budesonide  0.5 mg Nebulization BID    insulin lispro  0-12 Units Subcutaneous TID WC    insulin lispro  0-6 Units Subcutaneous Nightly    pantoprazole  20 mg Oral Daily    calcium carbonate-vitamin D3  1 tablet Oral Daily    Arformoterol Tartrate  15 mcg Nebulization BID    sodium chloride flush  10 mL Intravenous 2 times per day     Continuous Infusions:    dextrose 5 % and 0.45 % NaCl 50 mL/hr at 02/17/21 2041    dextrose       PRN Meds: magnesium sulfate, glucose, dextrose, glucagon (rDNA), dextrose, albuterol, sodium chloride flush, promethazine **OR** ondansetron, polyethylene glycol, acetaminophen, bisacodyl, acetaminophen, potassium chloride    Data:     Past Medical History:   has a past medical history of CHF (congestive heart failure) ([facility] COPD (chronic obstructive pulmonary disease) ([facility] Diabetes mellitus ([facility] MRSA (methicillin resistant staph aureus) culture positive, and Tobacco abuse. Social History:   reports that she has been smoking cigarettes. She has been smoking about 0.50 packs per day.  She has never used smokeless tobacco. She reports current alcohol use of about 70.0 standard drinks of alcohol per week. She reports current drug use. Drug: Marijuana. Family History:   Family History   Problem Relation Age of Onset    Heart Disease Mother     Diabetes Father        Vitals:  BP 90/73   Pulse 96   Temp 98.8 °F (37.1 °C) (Oral)   Resp 20   Ht 5' 2\" (1.575 m)   Wt 130 lb 1.1 oz (59 kg)   SpO2 90%   BMI 23.79 kg/m²   Temp (24hrs), Av.3 °F (36.8 °C), Min:97.4 °F (36.3 °C), Max:98.8 °F (37.1 °C)    Recent Labs     21  1542 21  2130 21  0637 21  0725   POCGLU 109* 136* 109* 108*       I/O (24Hr):     Intake/Output Summary (Last 24 hours) at 2021 0822  Last data filed at 2021 0457  Gross per 24 hour   Intake 670 ml   Output 360 ml   Net 310 ml       Labs:  Hematology:  Recent Labs     21  0734 21  0750 21  0710   WBC 7.2 7.1 9.1   RBC 4.47 4.80 4.53   HGB 11.0* 11.9 11.0*   HCT 35.1* 39.6 36.7   MCV 78.5* 82.5* 81.0*   MCH 24.6* 24.8* 24.3*   MCHC 31.3 30.1 30.0   RDW 27.4* 28.0* 28.0*   PLT See Reflexed IPF Result See Reflexed IPF Result See Reflexed IPF Result   MPV NOT REPORTED NOT REPORTED NOT REPORTED     Chemistry:  Recent Labs     21  0144 21  0734 21  0750 21  0710   NA  --  138 136 143   K 4.1 4.0 4.4 4.3   CL  --  107 108* 109*   CO2  --  20 19* 20   GLUCOSE  --  127* 106* 111*   BUN  --  11 13 17   CREATININE  --  0.65 0.75 0.86   MG 2.0 2.0  --   --    ANIONGAP  --  11 9 14   LABGLOM  --  >60 >60 >60   GFRAA  --  >60 >60 >60   CALCIUM  --  8.4* 8.7 8.8     Recent Labs     21  1425 21  1425 21  1937 21  0732 21  0750 21  1542 21  2130 21  0637 21  0725   PROT 7.0  --   --   --   --   --   --   --   --    LABALBU 2.4*  --   --   --   --   --   --   --   --    AST 38*  --   --   --   --   --   --   --   --    ALT 21  --   --   --   --   --   --   --   --    ALKPHOS 53  --   --   --   --   --   -- --   --    BILITOT 0.64  --   --   --   --   --   --   --   --    BILIDIR 0.31*  --   --   --   --   --   --   --   --    AMMONIA  --   --   --   --  33  --   --   --   --    POCGLU  --    < > 113* 95  --  109* 136* 109* 108*    < > = values in this interval not displayed. ABG:  Lab Results   Component Value Date    POCPH 7.395 02/19/2021    [facility]  09/04/2014     DISREGARD RESULTS. PATIENT NUMBER WAS INCORRECTLY ASSIGNED. POCPCO2 40.7 02/19/2021    PCO2  09/04/2014     DISREGARD RESULTS. PATIENT NUMBER WAS INCORRECTLY ASSIGNED. POCPO2 72.7 02/19/2021    PO2  09/04/2014     DISREGARD RESULTS. PATIENT NUMBER WAS INCORRECTLY ASSIGNED. POCHCO3 24.9 02/19/2021    HCO3  09/04/2014     DISREGARD RESULTS. PATIENT NUMBER WAS INCORRECTLY ASSIGNED. NBEA NOT REPORTED 02/19/2021    PBEA 0 02/19/2021    [id number] 26 02/19/2021    MKIR0EVK 94 02/19/2021    O2SAT  09/04/2014     DISREGARD RESULTS. PATIENT NUMBER WAS INCORRECTLY ASSIGNED. FIO2 6.0 02/19/2021     Lab Results   Component Value Date/Time    SPECIAL NOT REPORTED 02/14/2021 04:00 PM     Lab Results   Component Value Date/Time    CULTURE NO GROWTH 3 DAYS 02/14/2021 04:00 PM       Radiology:  Xr Chest (single View Frontal)    Result Date: 2/15/2021  1. Interval progression of multifocal pneumonia within the bilateral mid and lower lung zones. 2. Stable mild-to-moderate interstitial pulmonary edema. 3. Stable cardiomegaly. Ct Head Wo Contrast    Result Date: 2/13/2021  [ Stable CT study when compared to 02/06/2021. No acute intracranial abnormalities are noted. Xr Chest Portable    Result Date: 2/15/2021  Interval worsening of the interstitial and alveolar opacities bilaterally. Differential considerations would include worsening infection or edema Cardiomegaly     Ir Lumbar Puncture For Diagnosis    Result Date: 2/15/2021  Successful fluoroscopic-guided diagnostic lumbar puncture, as.      Mri Brain Wo Contrast    Result Date: 2/13/2021  No acute intracranial abnormality within limits of motion artifact. Physical Examination:        General appearance:  alert, cooperative and no distress  Mental Status:  oriented to person, place and time and normal affect  Lungs:  clear to auscultation bilaterally, normal effort  Heart:  regular rate and rhythm, no murmur  Abdomen:  soft, nontender, nondistended, normal bowel sounds, no masses, hepatomegaly, splenomegaly  Extremities:  no edema, redness, tenderness in the calves  Skin:  no gross lesions, rashes, induration    Assessment:        Hospital Problems           Last Modified POA    * (Principal) Acute encephalopathy 2/16/2021 Yes    Acute systolic heart failure ([address] 2/17/2021 Yes    COPD (chronic obstructive pulmonary disease) ([address] 2/16/2021 Yes    Cocaine abuse ([address] 2/16/2021 Yes    Acute respiratory failure with hypoxemia ([facility] 2/16/2021 Yes    Polysubstance abuse ([facility] 2/16/2021 Yes    Diabetes mellitus ([address] 2/16/2021 Yes    Essential hypertension 2/16/2021 Yes          Plan:        1. Oxygen and BiPAP as ordered  2. Keppra per neurology  3. Aerosols as needed  4. Insulin scale for glycemic control  5. Monitor and control blood pressure  6. PT and OT  7. GI and DVT prophylaxis  8.  SNF placement, awaiting choice of facilities from family Annmarie Chew DO  2/20/2021  8:22 AM

## 2021-02-20 NOTE — PROGRESS NOTES
Neurology Nurse Practitioner Progress Note      INTERVAL HISTORY: This is a 77 y.o.  female admitted 2/15/2021 for Acute encephalopathy [G93.40]. This is a follow-up neurology progress note. The patient was examined and the chart was reviewed. Discussed with the RN. HPI: Timbo Joseph is a 77 y.o. female with H/O alcoholism, DM, COPD, CHF, chronic narcotic dependence, remote history of cocaine abuse, Hep C, who was admitted as a transfer from [facility] on 2/15/2021 for Acute encephalopathy [G93.40]. Reportedly patient was initially admitted to [facility] on 2/6/2021 with complaint of generalized weakness, not feeling well and altered mentation. Family reported that patient has had worsening mentation for the past few weeks. She was able to follow commands 2 days prior to arrival.  Patient has history of chronic alcoholism, drinking a few beers daily. At the time of arrival, patient was hypotensive and hypoxic. She was started on Rocephin and Zithromax for bilateral pneumonia. Patient required BiPAP due to COPD exacerbation and acute on chronic respiratory failure. Patient underwent CT head and MRI brain that were negative. Lumbar puncture was negative for infection; acyclovir was discontinued. She was found to have DEONTE. EEG was abnormal patient was empirically started on Keppra 500 mg twice daily and Depakote 500 mg twice daily. Patient was transferred to [facility] on 12/15/2021 for LTME. Patient was initially admitted to neuro ICU for close observation. Depakote was decreased 250 mg twice daily as ammonia level raised to 50s. Off of Precedex infusion patient was awake responding appropriately and following commands; patient was transferred to stepdown unit under medicine service with neurology consult on 2/17.       furosemide  40 mg Oral Daily    levETIRAcetam  500 mg Oral BID    enoxaparin  40 mg Subcutaneous Daily    folic acid  1 mg Oral Daily    thiamine  100 mg Oral Daily    aspirin  81 mg Oral Daily    atorvastatin  20 mg Oral Daily    sertraline  50 mg Oral Daily    lisinopril  2.5 mg Oral Daily    carvedilol  3.125 mg Oral BID    budesonide  0.5 mg Nebulization BID    insulin lispro  0-12 Units Subcutaneous TID     insulin lispro  0-6 Units Subcutaneous Nightly    pantoprazole  20 mg Oral Daily    calcium carbonate-vitamin D3  1 tablet Oral Daily    Arformoterol Tartrate  15 mcg Nebulization BID    sodium chloride flush  10 mL Intravenous 2 times per day       Past Medical History:   Diagnosis Date    [facility]est[facility]     [facility]     Diabetes mellitus ([facility])     MRSA (methicillin resistant staph aureus) culture positive 8/28/2014    sputum    Tobacco abuse 10/15/2019       Past Surgical History:   Procedure Laterality Date    APPENDECTOMY      HYSTERECTOMY      JOINT REPLACEMENT Bilateral        PHYSICAL EXAM:      Blood pressure 97/71, pulse 108, temperature 98.3 °F (36.8 °C), temperature source Temporal, resp. rate 20, height 5' 2\" (1.575 m), weight 130 lb 1.1 oz (59 kg), SpO2 90 %.       Neurological Examination:  Mental status   Patient more awake and alert today, partially oriented; delayed responses, would repeat herself; followed simple commands   Cranial nerves   II - visual fields intact to confrontation; pupils reactive  III, IV, VI  extraocular muscles intact; no THALIA; no nystagmus; no ptosis   V - normal facial sensation                                                               VII - normal facial symmetry                                                             VIII - intact hearing                                                                             IX, X - symmetrical palate elevation                                               XI - symmetrical shoulder shrug                                                       XII - midline tongue without atrophy or fasciculation   Motor function  Strength: Was able to squeeze her hands and wiggle her toes  Normal bulk and tone                  Sensory function Grossly intact     Cerebellar No visible tremors   Reflex function UEs 1/4   Ankle reflexes - absent  Down going plantar response bilaterally   Gait                  Not tested       DATA      Lab Results   Component Value Date    WBC 9.1 02/20/2021    HGB 11.0 (L) 02/20/2021    HCT 36.7 02/20/2021    PLT See Reflexed IPF Result 02/20/2021    ALT 21 02/18/2021    AST 38 (H) 02/18/2021     02/20/2021    K 4.3 02/20/2021     (H) 02/20/2021    AMMONIA 33 02/19/2021    CREATININE 0.86 02/20/2021    BUN 17 02/20/2021    CO2 20 02/20/2021    TSH 5.35 (H) 02/17/2021    INR 1.3 02/08/2021    ATCGCDII51 1792 (H) 02/17/2021    FOLATE >20.0 02/17/2021    LABA1C 6.2 (H) 02/07/2021     Lab Results   Component Value Date    CHOL 114 10/15/2019    CHOL 252 (H) 01/08/2013     Lab Results   Component Value Date    TRIG 64 10/15/2019    TRIG 59 01/08/2013     Lab Results   Component Value Date    HDL 38 (L) 10/15/2019    HDL 79 01/08/2013     Lab Results   Component Value Date    LDLCHOLESTEROL 63 10/15/2019    LDLCHOLESTEROL 161 (H) 01/08/2013     Lab Results   Component Value Date    VLDL NOT REPORTED 10/15/2019    VLDL NOT REPORTED 01/08/2013     Lab Results   Component Value Date    CHOLHDLRATIO 3.0 10/15/2019    CHOLHDLRATIO 3.2 01/08/2013     CSF STUDY:   2/14/2021 16:00   Albumin Index 42.8 (H)   Albumin,    Appearance, CSF CLEAR   IgG Index, CSF 0.51   IgG Synthesis Rate < 3.3   IgG, CSF 5.3   VDRL Negative   Protein, CSF 25.0   West Nile virus Negative   VDRL, CSF Screen Negative   Meningitis panel Negative   Cx Negative   RBC,  (H)   WBC, CSF 0     T pallidum, IgG           Negative        DIAGNOSTIC DATA:  CT HEAD (2/13/2021): No acute intracranial abnormality     MRI BRAIN (2/13/2021): No acute intracranial abnormality     ECHO (2/8/2021): EF 20%. Severe global hypokinesis. LA mildly dilated. RA moderately dilated. RV dilatation with reduced systolic function. Moderate TR & pulmonary HTN. Mild to moderate MR    EEG (2/13/2021): This is a highly abnormal EEG with periodic lateralizing epileptiform discharges emanating predominantly from the left hemisphere greater than right hemisphere. This lateralized epileptiform discharge pattern is highly epileptogenic interictal pattern    REPEAT EEG (2/14/2021): This is a abnormal EEG with periodic lateralizing epileptiform discharges emanating predominantly from the left hemisphere greater than right hemisphere and this is essentially unchanged from earlier study, not counting significant contamination of background with artifacts. These lateralized epileptiform discharges are raising concern for interictal discharges. LTME (2/16 - 2/19/2021): Showed diffuse encephalopathy, with time, the severity decreased from moderate in the beginning to mild at end of the recording. No epileptiform discharges or EEG/clinical seizures were noted. IMPRESSION: 77 y.o.  female admitted with  Toxic metabolic encephalopathy in the setting of acute respiratory failure with hypoxemia, CHF, & hyperammonemia; MRI brain - negative; CSF - negative. Patient more awake and alert today, partially oriented, would repeat herself; denied any new complaints    Intial EEG was abnormal (2/13/2021); LTME - diffuse encephalopathy    Chronic alcoholism    Comorbid conditions - DM, COPD, chronic narcotic dependence, remote history of cocaine abuse, Hep C          PLAN:  Continue Keppra 500 mg BID PO    Continue ASA 81 mg QD & Lipitor 20 mg HS    Is on thiamine & folic acid    Continue PT/OT    No further neurological testing is required. Pt needs to follow-up with neurology within 4 to 6 weeks for Keppra management    We will sign off at this time. Please, call with any questions or concerns.  Thank you    Please note that this note was generated using a voice recognition dictation software. Although every effort was made to ensure the accuracy of this automated transcription, some errors in transcription may have occurred.

## 2021-02-21 LAB
ABSOLUTE EOS #: 0.09 K/UL (ref 0–0.44)
ABSOLUTE IMMATURE GRANULOCYTE: 0.09 K/UL (ref 0–0.3)
ABSOLUTE LYMPH #: 2.44 K/UL (ref 1.1–3.7)
ABSOLUTE MONO #: 1.32 K/UL (ref 0.1–1.2)
ANION GAP SERPL CALCULATED.3IONS-SCNC: 11 MMOL/L (ref 9–17)
BASOPHILS # BLD: 1 % (ref 0–2)
BASOPHILS ABSOLUTE: 0.09 K/UL (ref 0–0.2)
BUN BLDV-MCNC: 20 MG/DL (ref 8–23)
BUN/CREAT BLD: ABNORMAL (ref 9–20)
CALCIUM SERPL-MCNC: 9 MG/DL (ref 8.6–10.4)
CHLORIDE BLD-SCNC: 110 MMOL/L (ref 98–107)
CO2: 21 MMOL/L (ref 20–31)
CREAT SERPL-MCNC: 0.89 MG/DL (ref 0.5–0.9)
DIFFERENTIAL TYPE: ABNORMAL
EOSINOPHILS RELATIVE PERCENT: 1 % (ref 1–4)
GFR AFRICAN AMERICAN: >60 ML/MIN
GFR NON-AFRICAN AMERICAN: >60 ML/MIN
GFR SERPL CREATININE-BSD FRML MDRD: ABNORMAL ML/MIN/{1.73_M2}
GFR SERPL CREATININE-BSD FRML MDRD: ABNORMAL ML/MIN/{1.73_M2}
GLUCOSE BLD-MCNC: 105 MG/DL (ref 65–105)
GLUCOSE BLD-MCNC: 124 MG/DL (ref 65–105)
GLUCOSE BLD-MCNC: 136 MG/DL (ref 65–105)
GLUCOSE BLD-MCNC: 158 MG/DL (ref 65–105)
GLUCOSE BLD-MCNC: 174 MG/DL (ref 65–105)
GLUCOSE BLD-MCNC: 99 MG/DL (ref 70–99)
HCT VFR BLD CALC: 37.5 % (ref 36.3–47.1)
HEMOGLOBIN: 11 G/DL (ref 11.9–15.1)
IMMATURE GRANULOCYTES: 1 %
LYMPHOCYTES # BLD: 26 % (ref 24–43)
MCH RBC QN AUTO: 24.8 PG (ref 25.2–33.5)
MCHC RBC AUTO-ENTMCNC: 29.3 G/DL (ref 28.4–34.8)
MCV RBC AUTO: 84.5 FL (ref 82.6–102.9)
MONOCYTES # BLD: 14 % (ref 3–12)
MORPHOLOGY: ABNORMAL
NRBC AUTOMATED: 0 PER 100 WBC
PDW BLD-RTO: 28.7 % (ref 11.8–14.4)
PLATELET # BLD: ABNORMAL K/UL (ref 138–453)
PLATELET ESTIMATE: ABNORMAL
PLATELET, FLUORESCENCE: 297 K/UL (ref 138–453)
PLATELET, IMMATURE FRACTION: 6.9 % (ref 1.1–10.3)
PMV BLD AUTO: ABNORMAL FL (ref 8.1–13.5)
POTASSIUM SERPL-SCNC: 4.2 MMOL/L (ref 3.7–5.3)
RBC # BLD: 4.44 M/UL (ref 3.95–5.11)
RBC # BLD: ABNORMAL 10*6/UL
SEG NEUTROPHILS: 57 % (ref 36–65)
SEGMENTED NEUTROPHILS ABSOLUTE COUNT: 5.37 K/UL (ref 1.5–8.1)
SODIUM BLD-SCNC: 142 MMOL/L (ref 135–144)
WBC # BLD: 9.4 K/UL (ref 3.5–11.3)
WBC # BLD: ABNORMAL 10*3/UL

## 2021-02-21 PROCEDURE — 6370000000 HC RX 637 (ALT 250 FOR IP): Performed by: NURSE PRACTITIONER

## 2021-02-21 PROCEDURE — 6370000000 HC RX 637 (ALT 250 FOR IP): Performed by: FAMILY MEDICINE

## 2021-02-21 PROCEDURE — 2580000003 HC RX 258: Performed by: STUDENT IN AN ORGANIZED HEALTH CARE EDUCATION/TRAINING PROGRAM

## 2021-02-21 PROCEDURE — 80048 BASIC METABOLIC PNL TOTAL CA: CPT

## 2021-02-21 PROCEDURE — 99232 SBSQ HOSP IP/OBS MODERATE 35: CPT | Performed by: INTERNAL MEDICINE

## 2021-02-21 PROCEDURE — 1200000000 HC SEMI PRIVATE

## 2021-02-21 PROCEDURE — 2580000003 HC RX 258: Performed by: FAMILY MEDICINE

## 2021-02-21 PROCEDURE — 6370000000 HC RX 637 (ALT 250 FOR IP): Performed by: STUDENT IN AN ORGANIZED HEALTH CARE EDUCATION/TRAINING PROGRAM

## 2021-02-21 PROCEDURE — 2700000000 HC OXYGEN THERAPY PER DAY

## 2021-02-21 PROCEDURE — 82947 ASSAY GLUCOSE BLOOD QUANT: CPT

## 2021-02-21 PROCEDURE — 85025 COMPLETE CBC W/AUTO DIFF WBC: CPT

## 2021-02-21 PROCEDURE — 6360000002 HC RX W HCPCS: Performed by: STUDENT IN AN ORGANIZED HEALTH CARE EDUCATION/TRAINING PROGRAM

## 2021-02-21 PROCEDURE — 94660 CPAP INITIATION&MGMT: CPT

## 2021-02-21 PROCEDURE — 94640 AIRWAY INHALATION TREATMENT: CPT

## 2021-02-21 PROCEDURE — 36415 COLL VENOUS BLD VENIPUNCTURE: CPT

## 2021-02-21 PROCEDURE — 6360000002 HC RX W HCPCS: Performed by: FAMILY MEDICINE

## 2021-02-21 PROCEDURE — 94761 N-INVAS EAR/PLS OXIMETRY MLT: CPT

## 2021-02-21 PROCEDURE — 85055 RETICULATED PLATELET ASSAY: CPT

## 2021-02-21 RX ADMIN — DEXTROSE AND SODIUM CHLORIDE: 5; 450 INJECTION, SOLUTION INTRAVENOUS at 19:59

## 2021-02-21 RX ADMIN — PANTOPRAZOLE SODIUM 20 MG: 20 TABLET, DELAYED RELEASE ORAL at 10:18

## 2021-02-21 RX ADMIN — SODIUM CHLORIDE, PRESERVATIVE FREE 10 ML: 5 INJECTION INTRAVENOUS at 10:21

## 2021-02-21 RX ADMIN — SERTRALINE 50 MG: 50 TABLET, FILM COATED ORAL at 10:19

## 2021-02-21 RX ADMIN — INSULIN LISPRO 2 UNITS: 100 INJECTION, SOLUTION INTRAVENOUS; SUBCUTANEOUS at 13:08

## 2021-02-21 RX ADMIN — Medication 100 MG: at 10:18

## 2021-02-21 RX ADMIN — ATORVASTATIN CALCIUM 20 MG: 20 TABLET, FILM COATED ORAL at 10:18

## 2021-02-21 RX ADMIN — ARFORMOTEROL TARTRATE 15 MCG: 15 SOLUTION RESPIRATORY (INHALATION) at 09:26

## 2021-02-21 RX ADMIN — FUROSEMIDE 40 MG: 40 TABLET ORAL at 10:19

## 2021-02-21 RX ADMIN — BUDESONIDE 500 MCG: 0.5 INHALANT RESPIRATORY (INHALATION) at 09:26

## 2021-02-21 RX ADMIN — FOLIC ACID 1 MG: 1 TABLET ORAL at 10:18

## 2021-02-21 RX ADMIN — LEVETIRACETAM 500 MG: 500 TABLET, FILM COATED ORAL at 10:18

## 2021-02-21 RX ADMIN — ENOXAPARIN SODIUM 40 MG: 40 INJECTION SUBCUTANEOUS at 10:15

## 2021-02-21 RX ADMIN — Medication 1 TABLET: at 13:10

## 2021-02-21 RX ADMIN — LEVETIRACETAM 500 MG: 500 TABLET, FILM COATED ORAL at 20:29

## 2021-02-21 RX ADMIN — Medication 81 MG: at 10:21

## 2021-02-21 NOTE — PROGRESS NOTES
[facility]  Office: [address], DO, Yani Rivera, DO, Estrella Kent, DO, Marta Reaves Blood, DO, Erlinda Leon MD, Azalea Campbell MD, Maritza Bond MD, Harlan Rod MD, Salma Villatoro MD, Jose Calle MD, Nakia France MD, Najma Quigley MD, Alexandru Cruz MD, Lori Salazar DO, Braxton Williamson MD, Bee Kendrick MD, Jh Torres DO, Magi Villalta MD,  Michele Luu DO, Junior Strong MD, Eve Hernandez MD, Ute Ortega, [facility], [facility], CNP, Marj Martinez, CNP, Terence Dixon, [facility], Benson Littlejohn, CNP, Margarita Richardson, CNP, Johnny Tenorio, CNP, Amrita Romero, CNP, Rosalva Calix, CNP, Robert Tejada PA-C, Nikhil Caicedo, [facility], Roxana Villalba, CNP, Nancy Shankar, CNP, Bonnie Morgan, CNP, Aria Mello, [facility], Ubaldo Robertson, [address] [place] [address]    Progress Note    2/21/2021    7:57 AM    Name:   Chaya Gilmore  MRN:     [id number]     Acct:      [de-identified]   Room:   [facility] Day:  6  Admit Date:  2/15/2021  6:29 PM    PCP:   Ofelia Bazan MD  Code Status:  Full Code    Subjective:     C/C: Altered mental status    Interval History Status: improved. Patient resting with BiPAP in place. Denies any chest pain, shortness of breath, nausea or vomiting. Brief History: This is a 78-year-old female that was admitted to [facility] on 6 February with complaint of dizziness, fall and cough and was admitted for congestive heart failure. Initially she was treated with Rocephin and Zithromax for possible underlying pneumonia.  She was continued on her Keppra and Depakote for seizures and ultimately remained encephalopathic and was worked up for sepsis.  Ultimately she was transferred to [facility] ICU for continued evaluation and LTME monitoring for seizure management.     Review of Systems:     Constitutional:  negative for chills, fevers, sweats  Respiratory:  negative for cough, dyspnea on exertion, shortness of breath, wheezing  Cardiovascular:  negative for chest pain, chest pressure/discomfort, lower extremity edema, palpitations  Gastrointestinal:  negative for abdominal pain, constipation, diarrhea, nausea, vomiting  Neurological:  negative for dizziness, headache    Medications: Allergies: Allergies   Allergen Reactions    Tiotropium Anaphylaxis and Swelling    Spiriva Handihaler [Tiotropium Bromide Monohydrate] Swelling       Current Meds:   Scheduled Meds:    furosemide  40 mg Oral Daily    levETIRAcetam  500 mg Oral BID    enoxaparin  40 mg Subcutaneous Daily    folic acid  1 mg Oral Daily    thiamine  100 mg Oral Daily    aspirin  81 mg Oral Daily    atorvastatin  20 mg Oral Daily    sertraline  50 mg Oral Daily    lisinopril  2.5 mg Oral Daily    carvedilol  3.125 mg Oral BID    budesonide  0.5 mg Nebulization BID    insulin lispro  0-12 Units Subcutaneous TID WC    insulin lispro  0-6 Units Subcutaneous Nightly    pantoprazole  20 mg Oral Daily    calcium carbonate-vitamin D3  1 tablet Oral Daily    Arformoterol Tartrate  15 mcg Nebulization BID    sodium chloride flush  10 mL Intravenous 2 times per day     Continuous Infusions:    dextrose 5 % and 0.45 % NaCl 50 mL/hr at 02/17/21 2041    dextrose       PRN Meds: magnesium sulfate, glucose, dextrose, glucagon (rDNA), dextrose, albuterol, sodium chloride flush, promethazine **OR** ondansetron, polyethylene glycol, acetaminophen, bisacodyl, acetaminophen, potassium chloride    Data:     Past Medical History:   has a past medical history of CHF (congestive heart failure) ([facility] [address] COPD (chronic obstructive pulmonary disease) ([facility] Diabetes mellitus ([facility] MRSA (methicillin resistant staph aureus) culture positive, and Tobacco abuse. Social History:   reports that she has been smoking cigarettes. She has been smoking about 0.50 packs per day.  She has never used smokeless tobacco. She reports current alcohol use of about 70.0 standard drinks of alcohol per week. She reports current drug use. Drug: Marijuana. Family History:   Family History   Problem Relation Age of Onset    Heart Disease Mother     Diabetes Father        Vitals:  BP 96/79   Pulse 91   Temp 96.7 °F (35.9 °C) (Temporal)   Resp 22   Ht 5' 2\" (1.575 m)   Wt 130 lb 1.1 oz (59 kg)   SpO2 95%   BMI 23.79 kg/m²   Temp (24hrs), Av °F (36.7 °C), Min:96.7 °F (35.9 °C), Max:99.9 °F (37.7 °C)    Recent Labs     21  11321  15221  0744   POCGLU 170* 137* 88 136*       I/O (24Hr):     Intake/Output Summary (Last 24 hours) at 2021 075  Last data filed at 2021 1811  Gross per 24 hour   Intake 920 ml   Output 300 ml   Net 620 ml       Labs:  Hematology:  Recent Labs     21  07521  0710   WBC 7.1 9.1   RBC 4.80 4.53   HGB 11.9 11.0*   HCT 39.6 36.7   MCV 82.5* 81.0*   MCH 24.8* 24.3*   MCHC 30.1 30.0   RDW 28.0* 28.0*   PLT See Reflexed IPF Result See Reflexed IPF Result   MPV NOT REPORTED NOT REPORTED     Chemistry:  Recent Labs     21  07521  0710    143   K 4.4 4.3   * 109*   CO2 19* 20   GLUCOSE 106* 111*   BUN 13 17   CREATININE 0.75 0.86   ANIONGAP 9 14   LABGLOM >60 >60   GFRAA >60 >60   CALCIUM 8.7 8.8     Recent Labs     21  1425 21  1425 21  0750 21  0750 21  0637 21  0725 21  11321  15221  0744   PROT 7.0  --   --   --   --   --   --   --   --   --    LABALBU 2.4*  --   --   --   --   --   --   --   --   --    AST 38*  --   --   --   --   --   --   --   --   --    ALT 21  --   --   --   --   --   --   --   --   --    ALKPHOS 53  --   --   --   --   --   --   --   --   --    BILITOT 0.64  --   --   --   --   --   --   --   --   --    BILIDIR 0.31*  --   --   --   --   --   --   --   --   --    AMMONIA  --   --  33  --   --   --   --   --   --   --    POCGLU  --    < >  --    < > 109* 108* 170* 137* 88 136*    < > = values in this interval not displayed. ABG:  Lab Results   Component Value Date    POCPH 7.395 02/19/2021    [place] [address]  09/04/2014     DISREGARD RESULTS. PATIENT NUMBER WAS INCORRECTLY ASSIGNED. POCPCO2 40.7 02/19/2021    PCO2  09/04/2014     DISREGARD RESULTS. PATIENT NUMBER WAS INCORRECTLY ASSIGNED. POCPO2 72.7 02/19/2021    PO2  09/04/2014     DISREGARD RESULTS. PATIENT NUMBER WAS INCORRECTLY ASSIGNED. POCHCO3 24.9 02/19/2021    HCO3  09/04/2014     DISREGARD RESULTS. PATIENT NUMBER WAS INCORRECTLY ASSIGNED. NBEA NOT REPORTED 02/19/2021    PBEA 0 02/19/2021    PJD1WOM 26 02/19/2021    SECF4AEV 94 02/19/2021    O2SAT  09/04/2014     DISREGARD RESULTS. PATIENT NUMBER WAS INCORRECTLY ASSIGNED. FIO2 6.0 02/19/2021     Lab Results   Component Value Date/Time    SPECIAL NOT REPORTED 02/14/2021 04:00 PM     Lab Results   Component Value Date/Time    CULTURE NO GROWTH 3 DAYS 02/14/2021 04:00 PM       Radiology:  Xr Chest (single View Frontal)    Result Date: 2/15/2021  1. Interval progression of multifocal pneumonia within the bilateral mid and lower lung zones. 2. Stable mild-to-moderate interstitial pulmonary edema. 3. Stable cardiomegaly. Xr Chest Portable    Result Date: 2/15/2021  Interval worsening of the interstitial and alveolar opacities bilaterally. Differential considerations would include worsening infection or edema Cardiomegaly     Ir Lumbar Puncture For Diagnosis    Result Date: 2/15/2021  Successful fluoroscopic-guided diagnostic lumbar puncture, as.        Physical Examination:        General appearance:  alert, cooperative and no distress  Mental Status:  oriented to person, place and time and normal affect  Lungs:  clear to auscultation bilaterally, normal effort  Heart:  regular rate and rhythm, no murmur  Abdomen:  soft, nontender, nondistended, normal bowel sounds, no masses, hepatomegaly, splenomegaly  Extremities:  no edema, redness, tenderness in the calves  Skin:  no gross lesions, rashes, induration    Assessment:        Hospital Problems           Last Modified POA    * (Principal) Acute encephalopathy 2/16/2021 Yes    Acute systolic heart failure ([facility] [address] [facility] 2/17/2021 Yes    COPD (chronic obstructive pulmonary disease) ([facility] [facility] 2/16/2021 Yes    Cocaine abuse ([facility] [address] 2/16/2021 Yes    Acute respiratory failure with hypoxemia ([facility] [address] 2/16/2021 Yes    Polysubstance abuse ([facility] [address] [facility] 2/16/2021 Yes    Diabetes mellitus ([facility] [address] 2/16/2021 Yes    Essential hypertension 2/16/2021 Yes          Plan:        1. Continue BiPAP as needed  2. PT and OT, increase activity as tolerated  3. GI and DVT prophylaxis  4. Insulin scale as ordered  5. Oxygen aerosols as needed  6. Neurology evaluation noted  7. Discharge planning to skilled facility, awaiting choice of facilities from family  6.  Transfer to [facility], DO  2/21/2021  7:57 AM

## 2021-02-22 LAB
ABSOLUTE EOS #: 0.1 K/UL (ref 0–0.44)
ABSOLUTE IMMATURE GRANULOCYTE: 0.1 K/UL (ref 0–0.3)
ABSOLUTE LYMPH #: 2.67 K/UL (ref 1.1–3.7)
ABSOLUTE MONO #: 1.39 K/UL (ref 0.1–1.2)
ANION GAP SERPL CALCULATED.3IONS-SCNC: 14 MMOL/L (ref 9–17)
BASOPHILS # BLD: 1 % (ref 0–2)
BASOPHILS ABSOLUTE: 0.1 K/UL (ref 0–0.2)
BUN BLDV-MCNC: 23 MG/DL (ref 8–23)
BUN/CREAT BLD: ABNORMAL (ref 9–20)
CALCIUM SERPL-MCNC: 9 MG/DL (ref 8.6–10.4)
CHLORIDE BLD-SCNC: 117 MMOL/L (ref 98–107)
CO2: 21 MMOL/L (ref 20–31)
CREAT SERPL-MCNC: 1.14 MG/DL (ref 0.5–0.9)
DIFFERENTIAL TYPE: ABNORMAL
EOSINOPHILS RELATIVE PERCENT: 1 % (ref 1–4)
GFR AFRICAN AMERICAN: 56 ML/MIN
GFR NON-AFRICAN AMERICAN: 46 ML/MIN
GFR SERPL CREATININE-BSD FRML MDRD: ABNORMAL ML/MIN/{1.73_M2}
GFR SERPL CREATININE-BSD FRML MDRD: ABNORMAL ML/MIN/{1.73_M2}
GLUCOSE BLD-MCNC: 129 MG/DL (ref 65–105)
GLUCOSE BLD-MCNC: 133 MG/DL (ref 65–105)
GLUCOSE BLD-MCNC: 140 MG/DL (ref 70–99)
GLUCOSE BLD-MCNC: 141 MG/DL (ref 65–105)
GLUCOSE BLD-MCNC: 200 MG/DL (ref 65–105)
HCT VFR BLD CALC: 36.5 % (ref 36.3–47.1)
HEMOGLOBIN: 10.9 G/DL (ref 11.9–15.1)
IMMATURE GRANULOCYTES: 1 %
LYMPHOCYTES # BLD: 27 % (ref 24–43)
MCH RBC QN AUTO: 24.8 PG (ref 25.2–33.5)
MCHC RBC AUTO-ENTMCNC: 29.9 G/DL (ref 28.4–34.8)
MCV RBC AUTO: 83 FL (ref 82.6–102.9)
MONOCYTES # BLD: 14 % (ref 3–12)
MORPHOLOGY: ABNORMAL
NRBC AUTOMATED: 0.2 PER 100 WBC
PDW BLD-RTO: 28.3 % (ref 11.8–14.4)
PLATELET # BLD: 342 K/UL (ref 138–453)
PLATELET ESTIMATE: ABNORMAL
PMV BLD AUTO: 11.2 FL (ref 8.1–13.5)
POTASSIUM SERPL-SCNC: 4.2 MMOL/L (ref 3.7–5.3)
RBC # BLD: 4.4 M/UL (ref 3.95–5.11)
RBC # BLD: ABNORMAL 10*6/UL
SEG NEUTROPHILS: 56 % (ref 36–65)
SEGMENTED NEUTROPHILS ABSOLUTE COUNT: 5.54 K/UL (ref 1.5–8.1)
SODIUM BLD-SCNC: 152 MMOL/L (ref 135–144)
WBC # BLD: 9.9 K/UL (ref 3.5–11.3)
WBC # BLD: ABNORMAL 10*3/UL

## 2021-02-22 PROCEDURE — 6360000002 HC RX W HCPCS: Performed by: FAMILY MEDICINE

## 2021-02-22 PROCEDURE — 6370000000 HC RX 637 (ALT 250 FOR IP): Performed by: NURSE PRACTITIONER

## 2021-02-22 PROCEDURE — 94660 CPAP INITIATION&MGMT: CPT

## 2021-02-22 PROCEDURE — 94640 AIRWAY INHALATION TREATMENT: CPT

## 2021-02-22 PROCEDURE — 6370000000 HC RX 637 (ALT 250 FOR IP): Performed by: STUDENT IN AN ORGANIZED HEALTH CARE EDUCATION/TRAINING PROGRAM

## 2021-02-22 PROCEDURE — 97535 SELF CARE MNGMENT TRAINING: CPT

## 2021-02-22 PROCEDURE — 80048 BASIC METABOLIC PNL TOTAL CA: CPT

## 2021-02-22 PROCEDURE — 94761 N-INVAS EAR/PLS OXIMETRY MLT: CPT

## 2021-02-22 PROCEDURE — 2580000003 HC RX 258: Performed by: STUDENT IN AN ORGANIZED HEALTH CARE EDUCATION/TRAINING PROGRAM

## 2021-02-22 PROCEDURE — 6360000002 HC RX W HCPCS: Performed by: STUDENT IN AN ORGANIZED HEALTH CARE EDUCATION/TRAINING PROGRAM

## 2021-02-22 PROCEDURE — 99212 OFFICE O/P EST SF 10 MIN: CPT

## 2021-02-22 PROCEDURE — 6370000000 HC RX 637 (ALT 250 FOR IP): Performed by: FAMILY MEDICINE

## 2021-02-22 PROCEDURE — 82947 ASSAY GLUCOSE BLOOD QUANT: CPT

## 2021-02-22 PROCEDURE — 99232 SBSQ HOSP IP/OBS MODERATE 35: CPT | Performed by: INTERNAL MEDICINE

## 2021-02-22 PROCEDURE — 97530 THERAPEUTIC ACTIVITIES: CPT

## 2021-02-22 PROCEDURE — 2700000000 HC OXYGEN THERAPY PER DAY

## 2021-02-22 PROCEDURE — 36415 COLL VENOUS BLD VENIPUNCTURE: CPT

## 2021-02-22 PROCEDURE — 85025 COMPLETE CBC W/AUTO DIFF WBC: CPT

## 2021-02-22 PROCEDURE — 1200000000 HC SEMI PRIVATE

## 2021-02-22 RX ORDER — FUROSEMIDE 40 MG/1
40 TABLET ORAL DAILY
Status: ON HOLD | DISCHARGE
Start: 2021-02-22 | End: 2021-03-09 | Stop reason: HOSPADM

## 2021-02-22 RX ORDER — LISINOPRIL 2.5 MG/1
2.5 TABLET ORAL DAILY
Qty: 30 TABLET | Refills: 3 | DISCHARGE
Start: 2021-02-23

## 2021-02-22 RX ORDER — BUDESONIDE 0.5 MG/2ML
0.5 INHALANT ORAL 2 TIMES DAILY
Qty: 60 AMPULE | Refills: 3 | DISCHARGE
Start: 2021-02-22

## 2021-02-22 RX ORDER — LANOLIN ALCOHOL/MO/W.PET/CERES
100 CREAM (GRAM) TOPICAL DAILY
Qty: 30 TABLET | Refills: 3 | DISCHARGE
Start: 2021-02-23

## 2021-02-22 RX ORDER — POLYETHYLENE GLYCOL 3350 17 G/17G
17 POWDER, FOR SOLUTION ORAL DAILY PRN
Qty: 527 G | Refills: 1 | DISCHARGE
Start: 2021-02-22 | End: 2021-03-24

## 2021-02-22 RX ORDER — FOLIC ACID 1 MG/1
1 TABLET ORAL DAILY
Qty: 30 TABLET | Refills: 3 | DISCHARGE
Start: 2021-02-23

## 2021-02-22 RX ORDER — ARFORMOTEROL TARTRATE 15 UG/2ML
15 SOLUTION RESPIRATORY (INHALATION) 2 TIMES DAILY
Qty: 120 ML | Refills: 3 | DISCHARGE
Start: 2021-02-22

## 2021-02-22 RX ORDER — LEVETIRACETAM 500 MG/1
500 TABLET ORAL 2 TIMES DAILY
Qty: 60 TABLET | Refills: 3 | DISCHARGE
Start: 2021-02-22

## 2021-02-22 RX ORDER — CARVEDILOL 3.12 MG/1
3.12 TABLET ORAL 2 TIMES DAILY
Qty: 60 TABLET | Refills: 3 | DISCHARGE
Start: 2021-02-22

## 2021-02-22 RX ADMIN — SERTRALINE 50 MG: 50 TABLET, FILM COATED ORAL at 08:31

## 2021-02-22 RX ADMIN — INSULIN LISPRO 4 UNITS: 100 INJECTION, SOLUTION INTRAVENOUS; SUBCUTANEOUS at 11:40

## 2021-02-22 RX ADMIN — LISINOPRIL 2.5 MG: 2.5 TABLET ORAL at 08:31

## 2021-02-22 RX ADMIN — BUDESONIDE 500 MCG: 0.5 INHALANT RESPIRATORY (INHALATION) at 21:03

## 2021-02-22 RX ADMIN — DEXTROSE AND SODIUM CHLORIDE: 5; 450 INJECTION, SOLUTION INTRAVENOUS at 14:02

## 2021-02-22 RX ADMIN — ENOXAPARIN SODIUM 40 MG: 40 INJECTION SUBCUTANEOUS at 08:31

## 2021-02-22 RX ADMIN — Medication 100 MG: at 08:31

## 2021-02-22 RX ADMIN — FUROSEMIDE 40 MG: 40 TABLET ORAL at 08:31

## 2021-02-22 RX ADMIN — LEVETIRACETAM 500 MG: 500 TABLET, FILM COATED ORAL at 20:40

## 2021-02-22 RX ADMIN — LEVETIRACETAM 500 MG: 500 TABLET, FILM COATED ORAL at 08:31

## 2021-02-22 RX ADMIN — Medication 81 MG: at 08:31

## 2021-02-22 RX ADMIN — CARVEDILOL 3.12 MG: 3.12 TABLET, FILM COATED ORAL at 08:31

## 2021-02-22 RX ADMIN — Medication 1 TABLET: at 09:19

## 2021-02-22 RX ADMIN — PANTOPRAZOLE SODIUM 20 MG: 20 TABLET, DELAYED RELEASE ORAL at 08:31

## 2021-02-22 RX ADMIN — FOLIC ACID 1 MG: 1 TABLET ORAL at 08:31

## 2021-02-22 RX ADMIN — ARFORMOTEROL TARTRATE 15 MCG: 15 SOLUTION RESPIRATORY (INHALATION) at 09:01

## 2021-02-22 RX ADMIN — BUDESONIDE 500 MCG: 0.5 INHALANT RESPIRATORY (INHALATION) at 09:02

## 2021-02-22 RX ADMIN — ATORVASTATIN CALCIUM 20 MG: 20 TABLET, FILM COATED ORAL at 08:31

## 2021-02-22 ASSESSMENT — PAIN SCALES - GENERAL
PAINLEVEL_OUTOF10: 0

## 2021-02-22 NOTE — PROGRESS NOTES
Comprehensive Nutrition Assessment    Type and Reason for Visit:  Reassess    Nutrition Recommendations/Plan: Modify ONS to Magic Cup ONS d/t need for thickened liquids. Continue to encourage PO intake as tolerated. Nutrition Assessment:  Pt ate 50-75% of breakfast this morning (did not receive ONS with meal per RN) and ate 25-50% of lunch and ONS this afternoon. Weight stable since admission. Estimated Daily Nutrient Needs:  Energy (kcal):  30 kcal/kg = 1800 kcals/day; Weight Used for Energy Requirements:  Admission     Protein (g):  1.5 gm/kg = 90 gm/day; Weight Used for Protein Requirements:  Current          Nutrition Related Findings:  Meds/labs reviewed      Wounds:  Pressure Injury, Stage III(Coccyx)       Current Nutrition Therapies:    DIET DYSPHAGIA MINCED AND MOIST; No Added Salt (3-4 GM); Moderately Thick (Honey)  Dietary Nutrition Supplements: Ensure Enlive, Ensure Clear    Anthropometric Measures:  · Height: 5' 2\" (157.5 cm)  · Current Body Weight: 130 lb 1.1 oz (59 kg)   · Admission Body Weight: 131 lb 2.8 oz (59.5 kg)    · Usual Body Weight: 143 lb 1.6 oz (64.9 kg)(8/20/20 per EHR review)     · Ideal Body Weight: 110 lbs; % Ideal Body Weight 118.2 %   · BMI: 23.8  · BMI Categories: Normal Weight (BMI 22.0 to 24.9) age over 72       Nutrition Diagnosis:   · Inadequate oral intake related to (decreased appetite, current medical condition) as evidenced by poor intake prior to admission(Variable PO intake, need for ONS)      Nutrition Interventions:   Food and/or Nutrient Delivery:  Continue Current Diet, Modify Oral Nutrition Supplement  Nutrition Education/Counseling:  No recommendation at this time   Coordination of Nutrition Care:  Continue to monitor while inpatient    Goals:  Oral intakes to meet at least 50% of estimated nutrition needs.        Nutrition Monitoring and Evaluation:   Behavioral-Environmental Outcomes:  None Identified   Food/Nutrient Intake Outcomes:  Food and Nutrient Intake, Supplement Intake  Physical Signs/Symptoms Outcomes:  Weight, Biochemical Data, Nutrition Focused Physical Findings, Skin, Chewing or Swallowing     Discharge Planning:     Too soon to determine     Electronically signed by Geovani Najera MS, RD, LD on 2/22/21 at 2:51 PM EST    Contact: [phone]

## 2021-02-22 NOTE — DISCHARGE INSTR - COC
 Hypotension I95.9    DEONTE (acute kidney injury) ([facility] [address] N17.9    Current every day smoker F17.200    Essential hypertension I10    Altered mental status X83.25    Acute systolic heart failure (HCC) I50.21    Severe malnutrition (HCC) E43    Abnormal EEG R94.01    Acute encephalopathy G93.40    Generalized nonconvulsive seizures (HCC) G40.309       Isolation/Infection:   Isolation            Contact          Patient Infection Status       Infection Onset Added Last Indicated Last Indicated By Review Planned Expiration Resolved Resolved By    MRSA  09/02/14 09/02/14 Kaylee Cherry RN        sputum    Resolved    COVID-19 Rule Out 02/06/21 02/06/21 02/06/21 COVID-19 (Ordered)   02/06/21 Rule-Out Test Resulted    COVID-19 Rule Out 08/23/20 08/23/20 08/23/20 COVID-19 (Ordered)   08/23/20 Rule-Out Test Resulted    COVID-19 Rule Out 08/20/20 08/20/20 08/20/20 COVID-19 (Ordered)   08/20/20 Rule-Out Test Resulted            Nurse Assessment:  Last Vital Signs: /74   Pulse 74   Temp 98.4 °F (36.9 °C) (Oral)   Resp 16   Ht 5' 2\" (1.575 m)   Wt 130 lb (59 kg)   SpO2 90%   BMI 23.78 kg/m²     Last documented pain score (0-10 scale): Pain Level: 0  Last Weight:   Wt Readings from Last 1 Encounters:   02/22/21 130 lb (59 kg)     Mental Status:  disoriented, oriented and alert    IV Access:- None    Nursing Mobility/ADLs:  Walking   Assisted  Transfer  Assisted  Bathing  Assisted  Dressing  Assisted  Toileting  Assisted  Feeding  Assisted  Med Admin  Assisted  Med Delivery   crushed with applesauce       Wound Care Documentation and Therapy:  Wound 02/08/21 Coccyx Medial (Active)   Wound Image   02/22/21 1020   Wound Etiology Pressure Stage  3 02/22/21 1020   Dressing Status New dressing applied 02/22/21 1100   Wound Cleansed Soap and water;Cleansed with saline 02/22/21 1020   Dressing/Treatment Foam 02/22/21 1100   Dressing Change Due 02/24/21 02/22/21 1100   Wound Length (cm) 2.8 cm 02/22/21 1020   Wound Width (cm) 3.2 cm 02/22/21 1020   Wound Depth (cm) 0.1 cm 02/16/21 1005   Wound Surface Area (cm^2) 8.96 cm^2 02/22/21 1020   Change in Wound Size % (l*w) -796 02/22/21 1020   Wound Volume (cm^3) 2.5 cm^3 02/16/21 1005   Wound Assessment Fibrin;Pink/red;Subcutaneous 02/22/21 1020   Drainage Amount Moderate 02/22/21 1020   Drainage Description Serosanguinous 02/22/21 1020   Odor None 02/22/21 1020   Alissa-wound Assessment Denuded;Fragile;Dry/flaky 02/22/21 1020   Margins Attached edges 02/21/21 0800   Number of days: 14        Elimination:  Continence:   · Bowel: No  · Bladder: No  Urinary Catheter: None   Colostomy/Ileostomy/Ileal Conduit: No       Date of Last BM: 2/26/21      Intake/Output Summary (Last 24 hours) at 2/22/2021 1410  Last data filed at 2/22/2021 1229  Gross per 24 hour   Intake 1200 ml   Output    Net 1200 ml     I/O last 3 completed shifts: In: 760 [P.O.:750; I.V.:10]  Out: -     Safety Concerns: At Risk for Falls    Impairments/Disabilities:      None    Nutrition Therapy:  Current Nutrition Therapy:   - dysphagia minced and moist, moderately thick-honey    Routes of Feeding: Oral  Liquids: Honey Thick Liquids  Daily Fluid Restriction: no  Last Modified Barium Swallow with Video (Video Swallowing Test): not done    Treatments at the Time of Hospital Discharge:   Respiratory Treatments: ***  Oxygen Therapy:  is on oxygen at 3 L/min per nasal cannula. Ventilator:   - BiPAP   IPAP: 12 cmH20, CPAP/EPAP: 6 cmH2O only when sleeping    Rehab Therapies: Physical Therapy and Occupational Therapy  Weight Bearing Status/Restrictions: No weight bearing restirctions  Other Medical Equipment (for information only, NOT a DME order):  walker  Other Treatments: Wash the coccygeal wound and surrounding skin with foam cleanser, pat dry. Apply Symvato silver gelling fiber to the wound, cover this with a hydrocolloid (brava sheet vs Exuderm) change every other day.     Patient's personal belongings (please select all that are sent with patient):None    RN SIGNATURE:  Electronically signed by Nitza Ashraf RN on 2/26/21 at 10:22 AM EST    CASE MANAGEMENT/SOCIAL WORK SECTION    Inpatient Status Date: 2/15/21    Readmission Risk Assessment Score:  Readmission Risk              Risk of Unplanned Readmission:        28           Discharging to Facility/ [address]  Details              [address] [place] [address]       Phone: [phone]            Dialysis Facility (if applicable)   · Name:  · Address:  · Dialysis Schedule:  · Phone:  · Fax:    / signature: Electronically signed by Melina Zambrano RN on 2/25/21 at 12:05 PM EST    PHYSICIAN SECTION    Prognosis: Fair    Condition at Discharge: Stable    Rehab Potential (if transferring to Rehab): Fair    Recommended Labs or Other Treatments After Discharge: PT and OT evaluate and treat. CBC, CMP in 1 week. Continue oxygen 3-4 LPM . Keep SPO2 ~ 92 %. Bipap at night with EPAP 12/ IPAP 6 - adjust as needed per Resp therapy protocol or as necessary per MD   Follow up with Cardiology as outpatient in 3-4 weeks. Physician Certification: I certify the above information and transfer of Merline Deck  is necessary for the continuing treatment of the diagnosis listed and that she requires [facility] for less 30 days.      Update Admission H&P: No change in H&P    PHYSICIAN SIGNATURE:  Electronically signed by Ryne Ramirez MD on 2/25/2021 at 9:44 AM

## 2021-02-22 NOTE — PROGRESS NOTES
Occupational Therapy  Facility/Department: [address] ORTHO/MED SURG  Daily Treatment Note  NAME: Rosmery Lynch  : 1945  MRN: [id number]    Date of Service: 2021    Discharge Recommendations:  Patient would benefit from continued therapy after discharge       Assessment   Performance deficits / Impairments: Decreased functional mobility ; Decreased ADL status; Decreased strength;Decreased safe awareness;Decreased cognition;Decreased balance;Decreased endurance;Decreased posture;Decreased fine motor control;Decreased coordination  Prognosis: Good  OT Education: OT Role;Plan of Care;Transfer Training  Patient Education: OT role, OT POC, transfer training,- Fair return  REQUIRES OT FOLLOW UP: Yes  Activity Tolerance  Activity Tolerance: Treatment limited secondary to decreased cognition;Patient limited by fatigue  Activity Tolerance: Pt required tactile/verbal cues to wake up. Pt lethargic for ADLs, Pt became more alert during transfers/sitting EOB/standing  Safety Devices  Type of devices: Left in bed;Nurse notified;Call light within reach(RN in room upon exit)         Patient Diagnosis(es): There were no encounter diagnoses. has a past medical history of CHF (congestive heart failure) ([facility] [address] COPD (chronic obstructive pulmonary disease) ([facility] [address] Diabetes mellitus ([facility] [address] MRSA (methicillin resistant staph aureus) culture positive, and Tobacco abuse.   has a past surgical history that includes joint replacement (Bilateral); Hysterectomy; and Appendectomy. Restrictions  Restrictions/Precautions  Restrictions/Precautions: Fall Risk, General Precautions  Required Braces or Orthoses?: No  Position Activity Restriction  Other position/activity restrictions:  Up with assist, 5L O2 NC  Subjective   General  Patient assessed for rehabilitation services?: Yes  Response to previous treatment: Patient with no complaints from previous session  Family / Caregiver Present: No  General Comment  Comments: RN okayed for therapy Sequencing: Requires cues for all   Plan   Plan  Times per week: 3-5x/wk  Current Treatment Recommendations: Strengthening, Balance Training, Functional Mobility Training, Endurance Training, Safety Education & Training, Self-Care / ADL, Patient/Caregiver Education & Training, Equipment Evaluation, Education, & procurement, Positioning, ROM, Cognitive Reorientation  Goals  Short term goals  Time Frame for Short term goals: Pt will, by discharge:  Short term goal 1: Perform feeding/grooming tasks with setup and AE/DME PRN  Short term goal 2: Perform UB ADL tasks with CGA and use of AE/DME PRN  Short term goal 3: Perform LB ADL tasks/toileting tasks with min A using AE/DME PRN  Short term goal 4: Independently demo good safety awareness during engagement in ADLs and functional transfers/functional mobility  Short term goal 5: Perform functional transfers with mod A using least restrictive AD       Therapy Time   Individual Concurrent Group Co-treatment   Time In 1049         Time Out 1137         Minutes 48         Timed Code Treatment Minutes: 25 Minutes.  Co-treat w/PT       Claudeen Silk, S/VIOLETTA Otero/VERA

## 2021-02-22 NOTE — PROGRESS NOTES
Physical Therapy  Facility/Department: [address] ORTHO/MED SURG  Daily Treatment Note  NAME: Belén Maxwell  : 1945  MRN: [id number]    Date of Service: 2021    Discharge Recommendations:  Patient would benefit from continued therapy after discharge    Assessment   Body structures, Functions, Activity limitations: Decreased functional mobility ; Decreased strength;Decreased endurance;Decreased balance;Decreased cognition;Decreased safe awareness;Decreased ROM  Assessment: Pt EOB ~10min, maxA to attain EOB, perfomred STS transfer with RW requiring MAX A x2, Stood  For ~2 mins. , fair response, poor follow throughout. Pt would benefit from continued acute PT to address deficits. Prognosis: Fair  PT Education: Plan of Care;PT Role;General Safety;Orientation  REQUIRES PT FOLLOW UP: Yes  Activity Tolerance  Activity Tolerance: Patient Tolerated treatment well;Patient limited by fatigue;Patient limited by endurance; Patient limited by cognitive status     Patient Diagnosis(es): There were no encounter diagnoses. has a past medical history of CHF (congestive heart failure) ([facility] [address] COPD (chronic obstructive pulmonary disease) ([facility] [address] Diabetes mellitus ([facility] [address] MRSA (methicillin resistant staph aureus) culture positive, and Tobacco abuse.   has a past surgical history that includes joint replacement (Bilateral); Hysterectomy; and Appendectomy. Restrictions  Restrictions/Precautions  Restrictions/Precautions: Fall Risk, General Precautions  Required Braces or Orthoses?: No  Position Activity Restriction  Other position/activity restrictions: Up with assist, 5L O2 NC  Subjective   General  Chart Reviewed: Yes  Additional Pertinent Hx: CHF, COPD & current smoker, DM, drinks 5-6 beers/day  Response To Previous Treatment: Patient unable to report, no changes reported from family or staff  Family / Caregiver Present: No  Subjective  Subjective: RN and pt agreeable to PT.  Pt alert in bed upon arrival., Able to responds yes/no approparitely. But pleasanlty confused, constantly repeating the number \"18\"; and was able to count to from 18-20 with cueing. Pain Screening  Patient Currently in Pain: No  Vital Signs  Patient Currently in Pain: No       Orientation  Orientation  Overall Orientation Status: Impaired  Orientation Level: Oriented to time;Oriented to person;Disoriented to place; Disoriented to situation  Cognition      Objective   Bed mobility  Supine to Sit: Maximum assistance  Sit to Supine: Maximum assistance  Scooting: Maximal assistance  Comment: Pt required Tactile and VC for hand placement and sequencing ;  Pt tolerated ~10mins at EOB ; CG-CBA for sitting balance, Pt able to sit up to once return to bed without assist .  Transfers  Sit to Stand: Maximum Assistance;2 Person Assistance  Stand to sit: Maximum Assistance;2 Person Assistance  Comment: STS for ~2mins with pt demonstrating  excessive Posterior lean initailly and was able to correct wit max tactileand verbal cueing  Ambulation  Ambulation?: No  Stairs/Curb  Stairs?: No     Balance  Posture: Poor  Sitting - Static: Fair(Flexed posture with contant cueing to correct , very little return)  Sitting - Dynamic: Fair;-  Standing - Static: Poor(Stood for ~2mins with Max A x2 for balance, Pt demo excessive posterior lean,max tactile and Vc for corection very little follow through)  Standing - Dynamic: Poor     Goals  Short term goals  Time Frame for Short term goals: 14 visits  Short term goal 1: Pt to demonstrate bed mobility Connie  Short term goal 2: Pt to demonstrate functional transfers MaxA  Short term goal 3: Pt to actively participate in at least 30 minutes of physical therapy to demonstrate increased endurance  Short term goal 4: Pt to demonstrate at least fair+ static and dynamic sitting balance to decrease fall risk  Patient Goals   Patient goals : Pt did not verbalize goals    Plan    Plan  Times per week: 5-6x / week  Current Treatment Recommendations: Strengthening, Safety Education & Training, ROM, Balance Training, Endurance Training, Patient/Caregiver Education & Training, Equipment Evaluation, Education, & procurement, Functional Mobility Training, Transfer Training, Gait Training, Stair training  Safety Devices  Type of devices: Call light within reach, Bed alarm in place, All fall risk precautions in place, Nurse notified, Patient at risk for falls, Left in bed  Restraints  Initially in place: Yes(4 rails, seizure precautions)     Therapy Time   Individual Concurrent Group Co-treatment   Time In 1114         Time Out 1137         Minutes [address] [place] [address]

## 2021-02-22 NOTE — CARE COORDINATION
Spoke with Evelina Gaytan patients grand daughter \"Daniel significant other\" , Tish Severance is power of  (grandson). Evelina Gaytan would be agreeable to a 30 day stay only, [facility] is [facility], referral sent, PT notified for therapy notes today    21 [phone] Call from [facility], they do not accept patients insurance, out of network.  Message left for Evelina Gaytan to call with additional choice    1623 Call to patients grandson Tish Severance patients POA for additional choices, message left his grandmother has been discharged and to please call with choices    1620 Patients grandson calls back with [facility] as choice, referral sent

## 2021-02-22 NOTE — PROGRESS NOTES
[facility] Continence Nurse  Consult Note       NAME:  Timbo Joseph  MEDICAL RECORD NUMBER:  [id number]  AGE: 77 y.o. GENDER: female  : 1945  TODAY'S DATE:  2021    Subjective:     Reason for WO Evaluation and Assessment: \"sacral wound\" present on admission to [facility] is a 77 y.o. female referred by:   [x]? Physician  []? Nursing  []? Other:      Wound Identification:  Wound Type: pressure  Contributing Factors: diabetes, chronic pressure, decreased mobility, shear force, smoking, decreased tissue oxygenation, incontinence of stool, substance abuse and altered mental status        Coccyx deep tissue injury evolved to full thickness wound/subcuteaneous injury Stage 3.   surrounding skin is fragile with areas of exposed dermal tissue. Areas of epidermal peeling noted as well. Incontinent of a soft, brown BM on exam.   Heels remain intact.      Objective:      /74   Pulse 77   Temp 98.4 °F (36.9 °C) (Oral)   Resp 16   Ht 5' 2\" (1.575 m)   Wt 130 lb (59 kg)   SpO2 98%   BMI 23.78 kg/m²   Rei Risk Score: Rei Scale Score: 12    LABS    CBC:   Lab Results   Component Value Date    WBC 9.9 2021    RBC 4.40 2021    HGB 10.9 2021     CMP:  Albumin:    Lab Results   Component Value Date    LABALBU 2.4 2021     PT/INR:    Lab Results   Component Value Date    PROTIME 16.4 2021    INR 1.3 2021     HgBA1c:    Lab Results   Component Value Date    LABA1C 6.2 2021     PTT: No components found for: LABPTT      Assessment:       Measurements:     21 1020   Wound 21 Coccyx Medial   Date First Assessed/Time First Assessed: 21 0900   Primary Wound Type: Pressure Injury  Location: Coccyx  Wound Location Orientation: Medial   Wound Image    Wound Etiology Pressure Stage  3   Dressing Status New dressing applied   Wound Cleansed Soap and water;Cleansed with saline   Dressing/Treatment Alginate with Ag;Hydrocolloid   Dressing Change Due 02/24/21   Wound Length (cm) 2.8 cm   Wound Width (cm) 3.2 cm   Wound Surface Area (cm^2) 8.96 cm^2   Change in Wound Size % (l*w) -796   Wound Assessment Fibrin;Pink/red;Subcutaneous   Drainage Amount Moderate   Drainage Description Serosanguinous   Odor None   Alissa-wound Assessment Denuded;Fragile;Dry/flaky               Plan of Care: Turn every 2 hours  Float heels off of bed with pillows under calves     Use lift sling to reposition patient to minimize potential for shear injury.     Wash the coccygeal wound and surrounding skin with foam cleanser, pat dry. Apply GreenNote Ag silver gelling fiber to the wound, cover this with a hydrocolloid (brava sheet vs Exuderm) change every other day.      Routine incontinence care with incontinence barrier cloths and zinc oxide cream. Apply zinc oxide cream BID and prn incontinence. Moisture wicking under pads vs cloth backed briefs     Monitor each shift for further wound changes.         Specialty Bed Required : Yes:   []? Low Air Loss   [x]? Pressure Redistribution  []? Fluid Immersion  []? Bariatric  []? Total Pressure Relief  []? Other:      Discharge Plan:  TBD     Patient Teaching:   Patient is cooperative but is counting from 17-19 repeatedly during care. She will answer yes/no to comfort questions. []? Indicates understanding                []? Needs reinforcement  []? Unsuccessful                                  []? Verbal Understanding  []? Demonstrated understanding        [x]? No evidence of learning  []?  Refused teaching                           []? N/A       Electronically signed by Tiffanie Grimaldo RN, CWON on 2/22/2021 at 12:09 PM

## 2021-02-22 NOTE — PLAN OF CARE
Problem: RESPIRATORY  Intervention: Respiratory assessment  Note: BRONCHOSPASM/BRONCHOCONSTRICTION    IMPROVE  AERATION/BREATHSOUNDS  ADMINISTER BRONCHODILATOR THERAPY AS APPROPRIATE  ASSESS BREATH SOUNDS  PATIENT EDUCATION AS NEEDED     PROVIDE ADEQUATE OXYGENATION WITH ACCEPTABLE SP02/ABG'S    [x]  IDENTIFY APPROPRIATE OXYGEN THERAPY  [x]   MONITOR SP02/ABG'S AS NEEDED   [x]   PATIENT EDUCATION AS NEEDED     NONINVASIVE VENTILATION    PROVIDE OPTIMAL VENTILATION/ACCEPTABLE SPO2   IMPLEMENT NONINVASIVE VENTILATION PROTOCOL   MAINTAIN ACCEPTABLE SPO2   ASSESS SKIN INTEGRITY/BREAKDOWN SCORE   PATIENT EDUCATION AS NEEDED   BIPAP AS NEEDED

## 2021-02-23 LAB
ABSOLUTE EOS #: 0.1 K/UL (ref 0–0.44)
ABSOLUTE IMMATURE GRANULOCYTE: 0.1 K/UL (ref 0–0.3)
ABSOLUTE LYMPH #: 2.19 K/UL (ref 1.1–3.7)
ABSOLUTE MONO #: 1.24 K/UL (ref 0.1–1.2)
ANION GAP SERPL CALCULATED.3IONS-SCNC: 11 MMOL/L (ref 9–17)
BASOPHILS # BLD: 1 % (ref 0–2)
BASOPHILS ABSOLUTE: 0.1 K/UL (ref 0–0.2)
BUN BLDV-MCNC: 18 MG/DL (ref 8–23)
BUN/CREAT BLD: ABNORMAL (ref 9–20)
CALCIUM SERPL-MCNC: 8.9 MG/DL (ref 8.6–10.4)
CHLORIDE BLD-SCNC: 108 MMOL/L (ref 98–107)
CO2: 26 MMOL/L (ref 20–31)
CREAT SERPL-MCNC: 0.81 MG/DL (ref 0.5–0.9)
DIFFERENTIAL TYPE: ABNORMAL
EOSINOPHILS RELATIVE PERCENT: 1 % (ref 1–4)
GFR AFRICAN AMERICAN: >60 ML/MIN
GFR NON-AFRICAN AMERICAN: >60 ML/MIN
GFR SERPL CREATININE-BSD FRML MDRD: ABNORMAL ML/MIN/{1.73_M2}
GFR SERPL CREATININE-BSD FRML MDRD: ABNORMAL ML/MIN/{1.73_M2}
GLUCOSE BLD-MCNC: 117 MG/DL (ref 70–99)
GLUCOSE BLD-MCNC: 120 MG/DL (ref 65–105)
GLUCOSE BLD-MCNC: 123 MG/DL (ref 65–105)
GLUCOSE BLD-MCNC: 143 MG/DL (ref 65–105)
HCT VFR BLD CALC: 36.6 % (ref 36.3–47.1)
HEMOGLOBIN: 11.3 G/DL (ref 11.9–15.1)
IMMATURE GRANULOCYTES: 1 %
LYMPHOCYTES # BLD: 23 % (ref 24–43)
MCH RBC QN AUTO: 24.8 PG (ref 25.2–33.5)
MCHC RBC AUTO-ENTMCNC: 30.9 G/DL (ref 28.4–34.8)
MCV RBC AUTO: 80.4 FL (ref 82.6–102.9)
MONOCYTES # BLD: 13 % (ref 3–12)
MORPHOLOGY: ABNORMAL
NRBC AUTOMATED: 0 PER 100 WBC
PDW BLD-RTO: 28.6 % (ref 11.8–14.4)
PLATELET # BLD: ABNORMAL K/UL (ref 138–453)
PLATELET ESTIMATE: ABNORMAL
PLATELET, FLUORESCENCE: 347 K/UL (ref 138–453)
PLATELET, IMMATURE FRACTION: 5.9 % (ref 1.1–10.3)
PMV BLD AUTO: ABNORMAL FL (ref 8.1–13.5)
POTASSIUM SERPL-SCNC: 3.9 MMOL/L (ref 3.7–5.3)
RBC # BLD: 4.55 M/UL (ref 3.95–5.11)
RBC # BLD: ABNORMAL 10*6/UL
SEG NEUTROPHILS: 61 % (ref 36–65)
SEGMENTED NEUTROPHILS ABSOLUTE COUNT: 5.77 K/UL (ref 1.5–8.1)
SODIUM BLD-SCNC: 145 MMOL/L (ref 135–144)
WBC # BLD: 9.5 K/UL (ref 3.5–11.3)
WBC # BLD: ABNORMAL 10*3/UL

## 2021-02-23 PROCEDURE — 6360000002 HC RX W HCPCS: Performed by: FAMILY MEDICINE

## 2021-02-23 PROCEDURE — 94640 AIRWAY INHALATION TREATMENT: CPT

## 2021-02-23 PROCEDURE — 2580000003 HC RX 258: Performed by: FAMILY MEDICINE

## 2021-02-23 PROCEDURE — 2580000003 HC RX 258: Performed by: STUDENT IN AN ORGANIZED HEALTH CARE EDUCATION/TRAINING PROGRAM

## 2021-02-23 PROCEDURE — 99232 SBSQ HOSP IP/OBS MODERATE 35: CPT | Performed by: FAMILY MEDICINE

## 2021-02-23 PROCEDURE — 6370000000 HC RX 637 (ALT 250 FOR IP): Performed by: NURSE PRACTITIONER

## 2021-02-23 PROCEDURE — 1200000000 HC SEMI PRIVATE

## 2021-02-23 PROCEDURE — 6370000000 HC RX 637 (ALT 250 FOR IP): Performed by: FAMILY MEDICINE

## 2021-02-23 PROCEDURE — 6370000000 HC RX 637 (ALT 250 FOR IP): Performed by: STUDENT IN AN ORGANIZED HEALTH CARE EDUCATION/TRAINING PROGRAM

## 2021-02-23 PROCEDURE — 6360000002 HC RX W HCPCS: Performed by: STUDENT IN AN ORGANIZED HEALTH CARE EDUCATION/TRAINING PROGRAM

## 2021-02-23 PROCEDURE — 97535 SELF CARE MNGMENT TRAINING: CPT

## 2021-02-23 PROCEDURE — 97110 THERAPEUTIC EXERCISES: CPT

## 2021-02-23 PROCEDURE — 82947 ASSAY GLUCOSE BLOOD QUANT: CPT

## 2021-02-23 PROCEDURE — 80048 BASIC METABOLIC PNL TOTAL CA: CPT

## 2021-02-23 PROCEDURE — 85055 RETICULATED PLATELET ASSAY: CPT

## 2021-02-23 PROCEDURE — 36415 COLL VENOUS BLD VENIPUNCTURE: CPT

## 2021-02-23 PROCEDURE — 85025 COMPLETE CBC W/AUTO DIFF WBC: CPT

## 2021-02-23 RX ADMIN — Medication 81 MG: at 08:32

## 2021-02-23 RX ADMIN — ARFORMOTEROL TARTRATE 15 MCG: 15 SOLUTION RESPIRATORY (INHALATION) at 08:30

## 2021-02-23 RX ADMIN — SODIUM CHLORIDE, PRESERVATIVE FREE 10 ML: 5 INJECTION INTRAVENOUS at 22:15

## 2021-02-23 RX ADMIN — PANTOPRAZOLE SODIUM 20 MG: 20 TABLET, DELAYED RELEASE ORAL at 08:33

## 2021-02-23 RX ADMIN — DEXTROSE AND SODIUM CHLORIDE: 5; 450 INJECTION, SOLUTION INTRAVENOUS at 10:19

## 2021-02-23 RX ADMIN — BUDESONIDE 500 MCG: 0.5 INHALANT RESPIRATORY (INHALATION) at 21:22

## 2021-02-23 RX ADMIN — ATORVASTATIN CALCIUM 20 MG: 20 TABLET, FILM COATED ORAL at 08:35

## 2021-02-23 RX ADMIN — CARVEDILOL 3.12 MG: 3.12 TABLET, FILM COATED ORAL at 22:13

## 2021-02-23 RX ADMIN — SERTRALINE 50 MG: 50 TABLET, FILM COATED ORAL at 08:33

## 2021-02-23 RX ADMIN — FOLIC ACID 1 MG: 1 TABLET ORAL at 08:34

## 2021-02-23 RX ADMIN — ENOXAPARIN SODIUM 40 MG: 40 INJECTION SUBCUTANEOUS at 10:13

## 2021-02-23 RX ADMIN — LEVETIRACETAM 500 MG: 500 TABLET, FILM COATED ORAL at 22:14

## 2021-02-23 RX ADMIN — CARVEDILOL 3.12 MG: 3.12 TABLET, FILM COATED ORAL at 08:34

## 2021-02-23 RX ADMIN — LEVETIRACETAM 500 MG: 500 TABLET, FILM COATED ORAL at 08:33

## 2021-02-23 RX ADMIN — ARFORMOTEROL TARTRATE 15 MCG: 15 SOLUTION RESPIRATORY (INHALATION) at 21:36

## 2021-02-23 RX ADMIN — Medication 100 MG: at 08:33

## 2021-02-23 RX ADMIN — Medication 1 TABLET: at 08:35

## 2021-02-23 RX ADMIN — FUROSEMIDE 40 MG: 40 TABLET ORAL at 08:33

## 2021-02-23 ASSESSMENT — PAIN SCALES - WONG BAKER: WONGBAKER_NUMERICALRESPONSE: 2

## 2021-02-23 ASSESSMENT — PAIN DESCRIPTION - FREQUENCY: FREQUENCY: CONTINUOUS

## 2021-02-23 NOTE — CARE COORDINATION
02/22/21 2336 02/23/21 0321   NIV Type   NIV Started/Stopped On On   Equipment Type v60 v60   Mode Bilevel Bilevel   Mask Type Full face mask Full face mask   Mask Size Medium Medium   Settings/Measurements   IPAP 16 cmH20 16 cmH20   CPAP/EPAP 10 cmH2O 10 cmH2O   Rate Ordered 12 12   Resp 30 18   FiO2  40 % 40 %   I Time/ I Time % 0.9 s 0.9 s   Vt Exhaled 574 mL 285 mL   Minute Volume 14.8 Liters 4.5 Liters   Mask Leak (lpm) 54 lpm 41 lpm   Comfort Level Good Good   Using Accessory Muscles No No

## 2021-02-23 NOTE — PROGRESS NOTES
BRONCHOSPASM/BRONCHOCONSTRICTION     [x]         IMPROVE AERATION/BREATH SOUNDS  [x]   ADMINISTER BRONCHODILATOR THERAPY AS APPROPRIATE  [x]   ASSESS BREATH SOUNDS  []   IMPLEMENT AEROSOL/MDI PROTOCOL  [x]   PATIENT EDUCATION AS NEEDED      [x]  NON INVASIVE VENTILATION  [x]  PROVIDE OPTIMAL VENTILATION/ACCEPTABLE SP02  []  IMPLEMENT NON INVASIVE VENTILATION PROTOCOL  [x]  ASSESSMENT SKIN INTEGRITY  [x]  PATIENT EDUCATION AS NEEDED  [x]  BIPAP AS NEEDED

## 2021-02-23 NOTE — PROGRESS NOTES
Physical Therapy  Facility/Department: [address] ORTHO/MED SURG  Daily Treatment Note  NAME: Erma Santillan  : 1945  MRN: [id number]    Date of Service: 2021    Discharge Recommendations:  Patient would benefit from continued therapy after discharge    Assessment   Body structures, Functions, Activity limitations: Decreased functional mobility ; Decreased strength;Decreased endurance;Decreased balance;Decreased cognition;Decreased safe awareness;Decreased ROM  Assessment: Pt was very lethargic t/o session limiting ability to participate. She would continue to benefit from therapy to addreaa deficits. Prognosis: Fair  PT Education: Plan of Care;PT Role;General Safety;Orientation  Activity Tolerance  Activity Tolerance: Patient Tolerated treatment well;Patient limited by fatigue;Patient limited by endurance; Patient limited by cognitive status; Other  Activity Tolerance: Lethargic . Patient Diagnosis(es): There were no encounter diagnoses. has a past medical history of CHF (congestive heart failure) ([facility] [address] COPD (chronic obstructive pulmonary disease) ([facility] [address] Diabetes mellitus ([facility] [address] MRSA (methicillin resistant staph aureus) culture positive, and Tobacco abuse.   has a past surgical history that includes joint replacement (Bilateral); Hysterectomy; and Appendectomy. Restrictions  Restrictions/Precautions  Restrictions/Precautions: Fall Risk, General Precautions  Required Braces or Orthoses?: No  Position Activity Restriction  Other position/activity restrictions: Up with assist, 5L O2 NC  Subjective   General  Chart Reviewed: Yes  Response To Previous Treatment: Patient unable to report, no changes reported from family or staff  Family / Caregiver Present: No  Subjective  Subjective: RN and pt agreeable to PT. Pt sleeping upon arrival.nursing student in room , easily aroused but remained lethargic t/o session. Able to responds yes/no approparitely.  But pleasanlty confused,  Pain Screening  Patient Currently in Pain: No  Vital Signs  Patient Currently in Pain: No       Orientation  Orientation  Overall Orientation Status: Impaired  Cognition      Objective   Bed mobility  Supine to Sit: Maximum assistance  Sit to Supine: Maximum assistance  Scooting: Maximal assistance  Comment: Pt tolerated ~2mins at EOB but couldn`t saty awakes and c/o feeling cold and tries to lay without warning. Balance  Sitting - Static: Fair  Sitting - Dynamic: Fair;-  Exercises  Comments: AAROM x10 reps to BUE and BLE , Max cueing ofr pt to stay awake .       Goals  Short term goals  Time Frame for Short term goals: 14 visits  Short term goal 1: Pt to demonstrate bed mobility Connie  Short term goal 2: Pt to demonstrate functional transfers MaxA  Short term goal 3: Pt to actively participate in at least 30 minutes of physical therapy to demonstrate increased endurance  Short term goal 4: Pt to demonstrate at least fair+ static and dynamic sitting balance to decrease fall risk  Patient Goals   Patient goals : Pt did not verbalize goals    Plan    Plan  Times per week: 5-6x / week  Current Treatment Recommendations: Strengthening, Safety Education & Training, ROM, Balance Training, Endurance Training, Patient/Caregiver Education & Training, Equipment Evaluation, Education, & procurement, Functional Mobility Training, Transfer Training, Gait Training, Stair training  Safety Devices  Type of devices: Call light within reach, Bed alarm in place, All fall risk precautions in place, Nurse notified, Patient at risk for falls, Left in bed  Restraints  Initially in place: Yes(4 rails, seizure precautions)     Therapy Time   Individual Concurrent Group Co-treatment   Time In 1008         Time Out 1026         Minutes 18         Timed Code Treatment Minutes: Askelneli 1, PTA

## 2021-02-23 NOTE — PROGRESS NOTES
Descriptors: Aching;Discomfort  Pain Frequency: Continuous  Non-Pharmaceutical Pain Intervention(s): Repositioned;Rest;Therapeutic presence  Pre Treatment Pain Screening  Comments / Details: Pt stated body was in pain. Pt winced once pain while attempting sit to stand. Vital Signs  Patient Currently in Pain: Yes   Orientation  Orientation  Overall Orientation Status: Impaired  Orientation Level: Oriented to person;Disoriented to place; Disoriented to time;Disoriented to situation  Objective    ADL  Grooming: Increased time to complete;Verbal cueing;Setup;Minimal assistance  Comment: Pt handed wash cloth and instructed to wash face, pt washed chin and stated being done. Balance  Sitting Balance: Stand by assistance(Sitting unsupported EOB/recliner.)  Standing Balance: Moderate assistance  Standing Balance  Time: ~2 min  Activity: Func mob to recliner  Functional Mobility  Functional - Mobility Device: Rolling Walker  Bed mobility  Supine to Sit: Moderate assistance  Scooting: Maximal assistance  Transfers  Sit to stand: Moderate assistance;2 Person assistance  Stand to sit: Moderate assistance  Transfer Comments: Pt attempted 3 STS max assist, pt able to lift bottom off bed unable to complete full extension. Pt able to complete full extension on 4th attempt with verbal cueing and mod x2 assist.  Cognition  Overall Cognitive Status: Exceptions  Arousal/Alertness: Delayed responses to stimuli  Following Commands: Follows one step commands with increased time; Follows one step commands with repetition; Inconsistently follows commands  Safety Judgement: Decreased awareness of need for assistance;Decreased awareness of need for safety  Problem Solving: Decreased awareness of errors;Assistance required to correct errors made;Assistance required to identify errors made  Insights: Not aware of deficits  Initiation: Requires cues for all  Sequencing: Requires cues for all   Pt supine in bed upon arrival. Pt expressed inconsistent responses, answered with \"18\" 25% of questions asked. Pt transferred to recliner and completed simple grooming, pt fatigued easily. Increased time to complete all tasks. Pt remained in chair with call light within reach and chair alarm activated. Plan   Plan  Times per week: 3-5x/wk  Current Treatment Recommendations: Strengthening, Balance Training, Functional Mobility Training, Endurance Training, Safety Education & Training, Self-Care / ADL, Patient/Caregiver Education & Training, Equipment Evaluation, Education, & procurement, Positioning, ROM, Cognitive Reorientation  Goals  Short term goals  Time Frame for Short term goals: Pt will, by discharge:  Short term goal 1: Perform feeding/grooming tasks with setup and AE/DME PRN  Short term goal 2: Perform UB ADL tasks with CGA and use of AE/DME PRN  Short term goal 3: Perform LB ADL tasks/toileting tasks with min A using AE/DME PRN  Short term goal 4:  Independently demo good safety awareness during engagement in ADLs and functional transfers/functional mobility  Short term goal 5: Perform functional transfers with mod A using least restrictive AD       Therapy Time   Individual Concurrent Group Co-treatment   Time In 1419         Time Out 1457         Minutes 38         Timed Code Treatment Minutes: [address] [facility] S/CHRIS  Claudine SHIPLEY/VERA

## 2021-02-23 NOTE — CARE COORDINATION
Transitional planning. Spoke to [facility] [address] with [facility], they received referral but can not access any information faxed H & P, face sheet, MAR, notes to [phone]   for pt's grand daughter Avelino Pappas to call CM back to inform of placement progress - Message was left on CM phone to call her back. [phone]    36 Pt's niece Curtis Phillips returned call (incorrectly recorded above as grand daughter), she is not on pt's emergency contact list and therefore CM was not able to provide information. CM asked pt who information could be provided to. Pt was unable to recall anyone other than Daniel. Pt was not able to state her own name, nor was she A & O to place or date. She stated the president as President Obama. Informed pt's niece Curtis Phillips of above information who then hung up on CM. 1357 spoke to Franc Patel, pt's grandson, he will bring in [place] papers and give to CM to make copies today sometime before 6:15pm.     1436 Spoke to [facility] with [facility], they can not take pt.     [address] of above information. 1450 search facilities that take pt's insurance and called Franc Patel back, he asked that referral be sent to [facility]- [facility] as 1st choice. He will discuss others with his mother and call CM back in 20 minutes. Referral sent to [facility].      [facility] pt's grandson, Franc Patel called, he provided additional SNF choices:   2) [facility]  3) [facility]  Referrals sent    [address] with admissions at [facility] to call CM back to confirm receipt of referral.

## 2021-02-23 NOTE — PROGRESS NOTES
[facility]  Office: [address], DO, Venessa Alert, DO, Jeison Casanova, DO, Mariangel Tineosten Blood, DO, Al Whalen MD, Sasha Duke MD, Braulio Veronica MD, Dmitry Sutton MD, Rizwan Salamanca MD, Maryann Stroud MD, Helio Hopkins MD, Alli Beal MD, Mbon Rita Santos MD, Mira Ferguson, DO, Lincoln Mac MD, Fannie Preciado MD, Theresa Arriola DO, Lady Herb MD,  Aure Santana DO, Ryder Lucas MD, Merrick Carrera MD, Brittany Truong, [facility], [address], [facility], Dara Brooks, CNP, Chana Mulligan, CNS, Karthikeyan Perdomo, CNP, Adriana Villela, CNP, Holger Kee, CNP, Kaylen Hanna, CNP, Fito Perez, CNP, Wanda Bergman PA-C, Mariana Tamayo, [facility], Luis Angel Lockhart, CNP, Shawn Monroe, CNP, Hermila Casarez, CNP, Javon Mendoza, CNP, Rosalina Salazar, [facility]      Daily Progress Note     Admit Date: 2/15/2021  Bed/Room No.  0393/3690-68  Admitting Physician : Braulio Veronica MD  Code Status :Full Code  Hospital Day:  LOS: 8 days   Chief Complaint:   Mental status changes. Principal Problem:    Acute encephalopathy  Active Problems:    Acute systolic heart failure (HCC)    COPD (chronic obstructive pulmonary disease) (HCC)    Cocaine abuse ([facility]    Acute respiratory failure with hypoxemia (HCC)    Polysubstance abuse ([facility] [address] [facility]    Diabetes mellitus ([facility] [address]    Essential hypertension  Resolved Problems:    * No resolved hospital problems. *    Subjective : Interval History/Significant events :  02/23/21    Patient is oriented to self only. She remains on supplemental oxygen currently at 5 L/min. Patient has very poor oral intake. She is on modified diet. She denies any acute pain. Vitals - Stable afebrile  Labs -hyponatremia 145, hyperchloremia 108 BUN 18, creatinine 0.8. Normal white count 9.5, hemoglobin stable 11.3. Nursing notes , Consults notes reviewed. Overnight events and updates discussed with Nursing staff .    Background History:         Heidi Oliveros is 77 y.o. female  Who was admitted to the hospital on 2/15/2021 for treatment of Acute encephalopathy. Patient was initially admitted to [facility] on 2/6/2021 with weakness, dizziness, speech changes, change in mental status. Patient was hypotensive, with blood pressure systolic in 45T, hypoxic 16% on room air. Evaluation at that time showed elevated proBNP 13,553. Chest x-ray showed bilateral airspace and interstitial opacities. CT head was negative for acute intracranial abnormality. EKG showed sinus bradycardia without acute ischemic changes. Patient was admitted with diagnosis of pneumonia and acute CHF. It was also reported that patient had poor functional status for last 2 months with poor appetite, not eating and drinking well. Patient was recommended oxygen at night but was not using it. She has underlying history of type 2 diabetes mellitus, COPD. Evaluation at [facility] showed severely reduced EF 20%. Patient was treated with dopamine, diuretics. Cardiology and neurology were consulted. Patient was treated with Rocephin, azithromycin, later switched to cefepime, IV acyclovir for possible herpes encephalitis. She had abnormal EEG with diffuse slowing without any lateralized or epileptiform activity. .  Patient was treated with Bashir Bud. CT head, MRI brain did not show any acute abnormality. Patient had spinal tap which showed 0 WBC, protein 25 and was not suggestive of meningeal encephalitis. Neurology recommended to transfer patient to [facility] for LTME monitoring and further evaluation. She was on high flow previously. Precedex infusion was stopped. Patient was transitioned to nasal cannula. EEG showed diffuse encephalopathy without any epileptiform discharges.   Antiepileptic medications per change per neurology and Depakote was stopped when patient transition to [place] [id number] continued for short-term.         PMH:  Past Medical History:   98 %      Intake / output   02/22 0701 - 02/23 0700  In: 1165 [P.O.:565; I.V.:600]  Out: -   Physical Exam:  Physical Exam  Vitals signs and nursing note reviewed. Constitutional:       General: She is not in acute distress. Appearance: She is underweight. She is ill-appearing. She is not diaphoretic. Interventions: Nasal cannula in place. HENT:      Head: Normocephalic and atraumatic. Nose:      Right Sinus: No maxillary sinus tenderness or frontal sinus tenderness. Left Sinus: No maxillary sinus tenderness or frontal sinus tenderness. Mouth/Throat:      Pharynx: No oropharyngeal exudate. Eyes:      General: No scleral icterus. Conjunctiva/sclera: Conjunctivae normal.      Pupils: Pupils are equal, round, and reactive to light. Neck:      Musculoskeletal: Full passive range of motion without pain and neck supple. Thyroid: No thyromegaly. Vascular: No JVD. Cardiovascular:      Rate and Rhythm: Normal rate and regular rhythm. Pulses:           Dorsalis pedis pulses are 2+ on the right side and 2+ on the left side. Heart sounds: Normal heart sounds. No murmur. Pulmonary:      Effort: Pulmonary effort is normal.      Breath sounds: Normal breath sounds. No wheezing or rales. Abdominal:      Palpations: Abdomen is soft. There is no mass. Tenderness: There is no abdominal tenderness. Lymphadenopathy:      Head:      Right side of head: No submandibular adenopathy. Left side of head: No submandibular adenopathy. Cervical: No cervical adenopathy. Skin:     General: Skin is warm. Neurological:      Motor: No tremor. Comments: Oriented to self   Psychiatric:         Attention and Perception: She is inattentive. Speech: Speech is delayed. Behavior: Behavior is slowed. Behavior is cooperative. Cognition and Memory: Cognition is impaired. Memory is impaired.            Laboratory findings:    Recent Labs Continue oxygen by nasal cannula. 6. H/o polysubstance use -   7. COPD with chronic respiratory failure - continue oxygen. 8. Dysphagia-dysphagia diet. Patient will be transitioned to skilled nursing facility for rehab. Awaiting pre-CERT. Continue Lasix 40 mg daily, fluid restriction, low-salt diet. Blood pressure control with Coreg 3.125 mg twice daily. Continue Zoloft 50 mg daily. Continue to monitor vitals , Intake / output ,  Cell count , HGB , Kidney function, oxygenation  as indicated . Plan and updates discussed with patient ,  answers  explained to satisfaction.    Plan discussed with Staff Jeff Cintron     (Please note that portions of this note were completed with a voice recognition program. Efforts were made to edit the dictations but occasionally words are mis-transcribed.)      Lachelle Hdez MD  2/23/2021

## 2021-02-24 ENCOUNTER — APPOINTMENT (OUTPATIENT)
Dept: GENERAL RADIOLOGY | Age: 76
DRG: 070 | End: 2021-02-24
Attending: INTERNAL MEDICINE
Payer: MEDICARE

## 2021-02-24 LAB
ABSOLUTE EOS #: 0.2 K/UL (ref 0–0.44)
ABSOLUTE IMMATURE GRANULOCYTE: 0.1 K/UL (ref 0–0.3)
ABSOLUTE LYMPH #: 2.86 K/UL (ref 1.1–3.7)
ABSOLUTE MONO #: 1.12 K/UL (ref 0.1–1.2)
ANION GAP SERPL CALCULATED.3IONS-SCNC: 9 MMOL/L (ref 9–17)
B BURGDORFERI AB,CSF: 0.18 LIV
BASOPHILS # BLD: 1 % (ref 0–2)
BASOPHILS ABSOLUTE: 0.1 K/UL (ref 0–0.2)
BUN BLDV-MCNC: 14 MG/DL (ref 8–23)
BUN/CREAT BLD: ABNORMAL (ref 9–20)
CALCIUM SERPL-MCNC: 8.3 MG/DL (ref 8.6–10.4)
CHLORIDE BLD-SCNC: 109 MMOL/L (ref 98–107)
CO2: 24 MMOL/L (ref 20–31)
CREAT SERPL-MCNC: 0.61 MG/DL (ref 0.5–0.9)
DIFFERENTIAL TYPE: ABNORMAL
EOSINOPHILS RELATIVE PERCENT: 2 % (ref 1–4)
GFR AFRICAN AMERICAN: >60 ML/MIN
GFR NON-AFRICAN AMERICAN: >60 ML/MIN
GFR SERPL CREATININE-BSD FRML MDRD: ABNORMAL ML/MIN/{1.73_M2}
GFR SERPL CREATININE-BSD FRML MDRD: ABNORMAL ML/MIN/{1.73_M2}
GLUCOSE BLD-MCNC: 110 MG/DL (ref 65–105)
GLUCOSE BLD-MCNC: 134 MG/DL (ref 65–105)
GLUCOSE BLD-MCNC: 149 MG/DL (ref 65–105)
GLUCOSE BLD-MCNC: 150 MG/DL (ref 65–105)
GLUCOSE BLD-MCNC: 80 MG/DL (ref 65–105)
GLUCOSE BLD-MCNC: 85 MG/DL (ref 65–105)
GLUCOSE BLD-MCNC: 94 MG/DL (ref 70–99)
HCT VFR BLD CALC: 36.1 % (ref 36.3–47.1)
HEMOGLOBIN: 10.7 G/DL (ref 11.9–15.1)
IMMATURE GRANULOCYTES: 1 %
LYMPHOCYTES # BLD: 28 % (ref 24–43)
MAGNESIUM: 1.5 MG/DL (ref 1.6–2.6)
MCH RBC QN AUTO: 24.4 PG (ref 25.2–33.5)
MCHC RBC AUTO-ENTMCNC: 29.6 G/DL (ref 28.4–34.8)
MCV RBC AUTO: 82.2 FL (ref 82.6–102.9)
MONOCYTES # BLD: 11 % (ref 3–12)
MORPHOLOGY: ABNORMAL
NRBC AUTOMATED: 0 PER 100 WBC
PDW BLD-RTO: 28.2 % (ref 11.8–14.4)
PLATELET # BLD: ABNORMAL K/UL (ref 138–453)
PLATELET ESTIMATE: ABNORMAL
PLATELET, FLUORESCENCE: 361 K/UL (ref 138–453)
PLATELET, IMMATURE FRACTION: 5.6 % (ref 1.1–10.3)
PMV BLD AUTO: ABNORMAL FL (ref 8.1–13.5)
POTASSIUM SERPL-SCNC: 3.4 MMOL/L (ref 3.7–5.3)
RBC # BLD: 4.39 M/UL (ref 3.95–5.11)
RBC # BLD: ABNORMAL 10*6/UL
SEG NEUTROPHILS: 57 % (ref 36–65)
SEGMENTED NEUTROPHILS ABSOLUTE COUNT: 5.82 K/UL (ref 1.5–8.1)
SODIUM BLD-SCNC: 142 MMOL/L (ref 135–144)
WBC # BLD: 10.2 K/UL (ref 3.5–11.3)
WBC # BLD: ABNORMAL 10*3/UL
WEST NILE IGG, CSF: 0.24 IV
WEST NILE IGM ANTIBODY CSF: 0.03 IV

## 2021-02-24 PROCEDURE — 94640 AIRWAY INHALATION TREATMENT: CPT

## 2021-02-24 PROCEDURE — 6360000002 HC RX W HCPCS: Performed by: STUDENT IN AN ORGANIZED HEALTH CARE EDUCATION/TRAINING PROGRAM

## 2021-02-24 PROCEDURE — 2580000003 HC RX 258: Performed by: STUDENT IN AN ORGANIZED HEALTH CARE EDUCATION/TRAINING PROGRAM

## 2021-02-24 PROCEDURE — 71045 X-RAY EXAM CHEST 1 VIEW: CPT

## 2021-02-24 PROCEDURE — 6370000000 HC RX 637 (ALT 250 FOR IP): Performed by: FAMILY MEDICINE

## 2021-02-24 PROCEDURE — 83735 ASSAY OF MAGNESIUM: CPT

## 2021-02-24 PROCEDURE — 36415 COLL VENOUS BLD VENIPUNCTURE: CPT

## 2021-02-24 PROCEDURE — 94660 CPAP INITIATION&MGMT: CPT

## 2021-02-24 PROCEDURE — 6360000002 HC RX W HCPCS: Performed by: FAMILY MEDICINE

## 2021-02-24 PROCEDURE — 6370000000 HC RX 637 (ALT 250 FOR IP): Performed by: NURSE PRACTITIONER

## 2021-02-24 PROCEDURE — 2700000000 HC OXYGEN THERAPY PER DAY

## 2021-02-24 PROCEDURE — 85025 COMPLETE CBC W/AUTO DIFF WBC: CPT

## 2021-02-24 PROCEDURE — 85055 RETICULATED PLATELET ASSAY: CPT

## 2021-02-24 PROCEDURE — 99232 SBSQ HOSP IP/OBS MODERATE 35: CPT | Performed by: FAMILY MEDICINE

## 2021-02-24 PROCEDURE — 6370000000 HC RX 637 (ALT 250 FOR IP): Performed by: STUDENT IN AN ORGANIZED HEALTH CARE EDUCATION/TRAINING PROGRAM

## 2021-02-24 PROCEDURE — 80048 BASIC METABOLIC PNL TOTAL CA: CPT

## 2021-02-24 PROCEDURE — 1200000000 HC SEMI PRIVATE

## 2021-02-24 PROCEDURE — 97535 SELF CARE MNGMENT TRAINING: CPT

## 2021-02-24 RX ADMIN — BUDESONIDE 500 MCG: 0.5 INHALANT RESPIRATORY (INHALATION) at 09:37

## 2021-02-24 RX ADMIN — ARFORMOTEROL TARTRATE 15 MCG: 15 SOLUTION RESPIRATORY (INHALATION) at 09:37

## 2021-02-24 RX ADMIN — ATORVASTATIN CALCIUM 20 MG: 20 TABLET, FILM COATED ORAL at 08:39

## 2021-02-24 RX ADMIN — LEVETIRACETAM 500 MG: 500 TABLET, FILM COATED ORAL at 20:45

## 2021-02-24 RX ADMIN — Medication 100 MG: at 08:39

## 2021-02-24 RX ADMIN — FOLIC ACID 1 MG: 1 TABLET ORAL at 08:39

## 2021-02-24 RX ADMIN — Medication 1 TABLET: at 08:39

## 2021-02-24 RX ADMIN — ARFORMOTEROL TARTRATE 15 MCG: 15 SOLUTION RESPIRATORY (INHALATION) at 20:40

## 2021-02-24 RX ADMIN — FUROSEMIDE 40 MG: 40 TABLET ORAL at 08:39

## 2021-02-24 RX ADMIN — INSULIN LISPRO 2 UNITS: 100 INJECTION, SOLUTION INTRAVENOUS; SUBCUTANEOUS at 17:28

## 2021-02-24 RX ADMIN — Medication 81 MG: at 08:39

## 2021-02-24 RX ADMIN — LEVETIRACETAM 500 MG: 500 TABLET, FILM COATED ORAL at 08:39

## 2021-02-24 RX ADMIN — PANTOPRAZOLE SODIUM 20 MG: 20 TABLET, DELAYED RELEASE ORAL at 08:39

## 2021-02-24 RX ADMIN — DEXTROSE AND SODIUM CHLORIDE: 5; 450 INJECTION, SOLUTION INTRAVENOUS at 14:03

## 2021-02-24 RX ADMIN — ENOXAPARIN SODIUM 40 MG: 40 INJECTION SUBCUTANEOUS at 08:39

## 2021-02-24 RX ADMIN — BUDESONIDE 500 MCG: 0.5 INHALANT RESPIRATORY (INHALATION) at 20:40

## 2021-02-24 RX ADMIN — SERTRALINE 50 MG: 50 TABLET, FILM COATED ORAL at 08:46

## 2021-02-24 ASSESSMENT — PAIN SCALES - GENERAL: PAINLEVEL_OUTOF10: 0

## 2021-02-24 NOTE — PROGRESS NOTES
Nursing staff . Background History:         Lynnwood Najjar is 77 y.o. female  Who was admitted to the hospital on 2/15/2021 for treatment of Acute encephalopathy. Patient was initially admitted to [facility] on 2/6/2021 with weakness, dizziness, speech changes, change in mental status. Patient was hypotensive, with blood pressure systolic in 69Y, hypoxic 39% on room air. Evaluation at that time showed elevated proBNP 13,553. Chest x-ray showed bilateral airspace and interstitial opacities. CT head was negative for acute intracranial abnormality. EKG showed sinus bradycardia without acute ischemic changes. Patient was admitted with diagnosis of pneumonia and acute CHF. It was also reported that patient had poor functional status for last 2 months with poor appetite, not eating and drinking well. Patient was recommended oxygen at night but was not using it. She has underlying history of type 2 diabetes mellitus, COPD. Evaluation at [facility] showed severely reduced EF 20%. Patient was treated with dopamine, diuretics. Cardiology and neurology were consulted. Patient was treated with Rocephin, azithromycin, later switched to cefepime, IV acyclovir for possible herpes encephalitis. She had abnormal EEG with diffuse slowing without any lateralized or epileptiform activity. .  Patient was treated with Joya Galea. CT head, MRI brain did not show any acute abnormality. Patient had spinal tap which showed 0 WBC, protein 25 and was not suggestive of meningeal encephalitis. Neurology recommended to transfer patient to [facility] for LTME monitoring and further evaluation. She was on high flow previously. Precedex infusion was stopped. Patient was transitioned to nasal cannula. EEG showed diffuse encephalopathy without any epileptiform discharges.   Antiepileptic medications per change per neurology and Depakote was stopped when patient transition to [place] [id number] continued for short-term.         PMH:  Past Medical History:   Diagnosis Date    CHF (congestive heart failure) (HCC)     COPD (chronic obstructive pulmonary disease) (HCC)     Diabetes mellitus (HCC)     MRSA (methicillin resistant staph aureus) culture positive 8/28/2014    sputum    Tobacco abuse 10/15/2019      Allergies:    Allergies   Allergen Reactions    Tiotropium Anaphylaxis and Swelling    Spiriva Handihaler [Tiotropium Bromide Monohydrate] Swelling      Medications :      furosemide, 40 mg, Oral, Daily      levETIRAcetam, 500 mg, Oral, BID      enoxaparin, 40 mg, Subcutaneous, Daily      folic acid, 1 mg, Oral, Daily      thiamine, 100 mg, Oral, Daily      aspirin, 81 mg, Oral, Daily      atorvastatin, 20 mg, Oral, Daily      sertraline, 50 mg, Oral, Daily      lisinopril, 2.5 mg, Oral, Daily      carvedilol, 3.125 mg, Oral, BID      budesonide, 0.5 mg, Nebulization, BID      insulin lispro, 0-12 Units, Subcutaneous, TID WC      insulin lispro, 0-6 Units, Subcutaneous, Nightly      pantoprazole, 20 mg, Oral, Daily      calcium carbonate-vitamin D3, 1 tablet, Oral, Daily      Arformoterol Tartrate, 15 mcg, Nebulization, BID      sodium chloride flush, 10 mL, Intravenous, 2 times per day        Review of Systems   Review of Systems   Unable to perform ROS: Mental status change     Objective :      Current Vitals : Temp: 97.7 °F (36.5 °C),  Pulse: 78, Resp: 18, BP: 88/74, SpO2: 90 %  Last 24 Hrs Vitals   Patient Vitals for the past 24 hrs:   BP Temp Temp src Pulse Resp SpO2 Weight   02/24/21 0719 88/74 97.7 °F (36.5 °C) Axillary 78 18 90 %    02/24/21 0512       133 lb (60.3 kg)   02/23/21 2213 (!) 108/94   78      02/23/21 2200 (!) 108/94 98.8 °F (37.1 °C) Oral 78 17 90 %    02/23/21 2131      91 %    02/23/21 2123     18 96 %    02/23/21 1545 127/79 98.7 °F (37.1 °C) Axillary 74 20     02/23/21 1143 119/73   68  92 %    02/23/21 1130 84/68   68    02/23/21 1125 84/64 98 °F (36.7 °C) Axillary 69 18 97 %    02/23/21 1100    71      02/23/21 0945 91/64           Intake / output   02/23 0701 - 02/24 0700  In: 10 [I.V.:10]  Out: -   Physical Exam:  Physical Exam  Vitals signs and nursing note reviewed. Constitutional:       General: She is not in acute distress. Appearance: She is underweight. She is ill-appearing. She is not diaphoretic. Interventions: Nasal cannula in place. HENT:      Head: Normocephalic and atraumatic. Nose:      Right Sinus: No maxillary sinus tenderness or frontal sinus tenderness. Left Sinus: No maxillary sinus tenderness or frontal sinus tenderness. Mouth/Throat:      Pharynx: No oropharyngeal exudate. Eyes:      General: No scleral icterus. Conjunctiva/sclera: Conjunctivae normal.      Pupils: Pupils are equal, round, and reactive to light. Neck:      Musculoskeletal: Full passive range of motion without pain and neck supple. Thyroid: No thyromegaly. Vascular: No JVD. Cardiovascular:      Rate and Rhythm: Normal rate and regular rhythm. Pulses:           Dorsalis pedis pulses are 2+ on the right side and 2+ on the left side. Heart sounds: Normal heart sounds. No murmur. Pulmonary:      Effort: Pulmonary effort is normal.      Breath sounds: Normal breath sounds. No wheezing or rales. Abdominal:      Palpations: Abdomen is soft. There is no mass. Tenderness: There is no abdominal tenderness. Lymphadenopathy:      Head:      Right side of head: No submandibular adenopathy. Left side of head: No submandibular adenopathy. Cervical: No cervical adenopathy. Skin:     General: Skin is warm. Neurological:      Motor: No tremor. Comments: Oriented to self   Psychiatric:         Attention and Perception: She is inattentive. Speech: Speech is delayed. Behavior: Behavior is slowed. Behavior is cooperative.          Cognition and Memory: Cognition is impaired. Memory is impaired. Laboratory findings:    Recent Labs     02/22/21  0431 02/23/21  0736 02/24/21  0416   WBC 9.9 9.5 10.2   HGB 10.9* 11.3* 10.7*   HCT 36.5 36.6 36.1*    See Reflexed IPF Result See Reflexed IPF Result     Recent Labs     02/22/21  0431 02/23/21  0736 02/24/21  0416   * 145* 142   K 4.2 3.9 3.4*   * 108* 109*   CO2 21 26 24   GLUCOSE 140* 117* 94   BUN 23 18 14   CREATININE 1.14* 0.81 0.61   MG  --   --  1.5*   CALCIUM 9.0 8.9 8.3*     No results for input(s): PROT, LABALBU, LABA1C, Y8CRZFK, H0YQSUZ, FT4, TSH, AST, ALT, LDH, GGT, ALKPHOS, BILITOT, BILIDIR, AMMONIA, AMYLASE, LIPASE, LACTATE, CHOL, HDL, LDLCHOLESTEROL, CHOLHDLRATIO, TRIG, VLDL, BNP, TROPONINI, CKTOTAL, CKMB, CKMBINDEX, RF, DAPHNE in the last 72 hours. Specific Gravity, UA   Date Value Ref Range Status   02/17/2021 1.012 1.005 - 1.030 Final     Protein, UA   Date Value Ref Range Status   02/17/2021 3+ (A) NEGATIVE Final     RBC, UA   Date Value Ref Range Status   02/17/2021 20 TO 50 0 - 4 /HPF Final     Comment:     Reference range defined for non-centrifuged specimen. Bacteria, UA   Date Value Ref Range Status   02/17/2021 NOT REPORTED None Final     Nitrite, Urine   Date Value Ref Range Status   02/17/2021 NEGATIVE NEGATIVE Final     WBC, UA   Date Value Ref Range Status   02/17/2021 0 TO 2 0 - 5 /HPF Final     Leukocyte Esterase, Urine   Date Value Ref Range Status   02/17/2021 NEGATIVE NEGATIVE Final       Imaging / Clinical Data :-   No results found. Clinical Course : stable  Assessment and Plan  :        1. Acute systolic heart failure -improved. Continue oral Lasix 40 mg daily, fluid restriction. Ischemic work-up once patient's mental status improves. Follow-up with cardiology. 2. Acute metabolic encephalopathy - Meningeal encephalitis ruled out. Acyclovir discontinued. Treated with Precedex infusion and weaned.  .  Plan to continue Keppra 500 mg twice daily. Depakote has been stopped. 3. DM2 -last of the 6.2 -Humalog as needed. 4. Cardiogenic shock - off norepinephrine. Low-dose Coreg as tolerated. 5. Acute on chronic hypoxic respiratory failure -was treated with high flow. Continue oxygen by nasal cannula. BiPAP at nighttime. Incentive spirometer. 6. H/o polysubstance use -   7. COPD with chronic respiratory failure - continue oxygen. 8. Dysphagia-dysphagia diet. Patient will be transitioned to skilled nursing facility for rehab. Awaiting pre-CERT. Continue Lasix 40 mg daily, fluid restriction, low-salt diet. Blood pressure control with Coreg 3.125 mg twice daily. Continue Zoloft 50 mg daily. Encourage PT OT, incentive spirometry. Continue to monitor vitals , Intake / output ,  Cell count , HGB , Kidney function, oxygenation  as indicated . Attempted to call tristan Neumann @ [phone] and no answer . Message was left to call back for availability to talk .    Plan discussed with Staff Marcia Marrufo RN     (Please note that portions of this note were completed with a voice recognition program. Efforts were made to edit the dictations but occasionally words are mis-transcribed.)      Angela López MD  2/24/2021

## 2021-02-24 NOTE — PROGRESS NOTES
Occupational Therapy  Facility/Department: [address] ORTHO/MED SURG  Daily Treatment Note  NAME: Adilson Clayton  : 1945  MRN: [id number]    Date of Service: 2021    Discharge Recommendations:  Patient would benefit from continued therapy after discharge d/t decreased cognition/ADL status/safety awareness. Assessment   Performance deficits / Impairments: Decreased functional mobility ; Decreased ADL status; Decreased strength;Decreased safe awareness;Decreased cognition;Decreased balance;Decreased endurance;Decreased posture;Decreased fine motor control;Decreased coordination  Prognosis: Good  OT Education: OT Role;Plan of Care;Transfer Training  Patient Education: Pt ed on OT role, POC, and bed mob tech- fair return  REQUIRES OT FOLLOW UP: Yes  Activity Tolerance  Activity Tolerance: Treatment limited secondary to decreased cognition;Patient Tolerated treatment well  Safety Devices  Type of devices: Left in bed;Call light within reach; Bed alarm in place         Patient Diagnosis(es): There were no encounter diagnoses. has a past medical history of CHF (congestive heart failure) ([facility] [address] COPD (chronic obstructive pulmonary disease) ([facility] [address] Diabetes mellitus ([facility] [address] MRSA (methicillin resistant staph aureus) culture positive, and Tobacco abuse.   has a past surgical history that includes joint replacement (Bilateral); Hysterectomy; and Appendectomy. Restrictions  Restrictions/Precautions  Restrictions/Precautions: Fall Risk, General Precautions  Required Braces or Orthoses?: No  Position Activity Restriction  Other position/activity restrictions: Up with assist, 4L O2 NC  Subjective   General  Patient assessed for rehabilitation services?: Yes  Response to previous treatment: Patient with no complaints from previous session  Family / Caregiver Present: No  General Comment  Comments: RN okayed for therapy this date. Pt agreeable to participate in session and pleasantly confused throughout.  Pt stated, \"18\" throughout tx; Pt responded, \"yeah\" to ~50% of questions; Pt responded to some questions w/3-5 word sentences  Vital Signs  Patient Currently in Pain: Denies   Orientation  Orientation  Overall Orientation Status: Impaired  Orientation Level: Oriented to person;Disoriented to place; Disoriented to time;Disoriented to situation  Objective    ADL  Grooming: Increased time to complete;Verbal cueing;Setup;Minimal assistance(oral care, wash face- hand over hand attempted d/t need for max v/c; and Pt inconsistently following commands; Pt resists hand-over-hand)  UE Bathing: Increased time to complete;Maximum assistance;Verbal cueing(writer washed Pt's back. Pt washed shoulder/upper chest when hand was placed on shoulder.)  UE Dressing: Maximum assistance; Increased time to complete;Verbal cueing(Pt doffed gown and donned clean gown, needing MAX A secondary to inability to follow step one directions. Pt would state \"okay\" but not follow throuh w/task.)      Pt supine in bed upon arrival; Pt transferred to EOB w/max A. Pt completed grooming/UB dressing/bathing at EOB. writer washed Pt's back. Pt washed shoulder/upper chest when hand was placed on shoulder. Pt required max A w/gown- threading arms through/line management/tying gown in back. Pt retired to supine in bed w/bed alarm activated/call light in reach/all needs met. Pt on 4L O2 NC w/0 SOB; Pt inconsistently followed commands throughout tx, max verbal and tactile cues given; Pt would respond \"yeah\" when given a one-step command but not follow through. Balance  Sitting Balance: Stand by assistance(Pt supported self w/RUE on bed, HOB elevated; ~10 mins)  Bed mobility  Supine to Sit: Maximum assistance  Sit to Supine: Moderate assistance  Scooting: Maximal assistance   Cognition  Overall Cognitive Status: Exceptions  Arousal/Alertness: Delayed responses to stimuli;Inconsistent responses to stimuli  Following Commands:  Follows one step commands with increased time;Follows one step commands with repetition; Inconsistently follows commands  Attention Span: Difficulty attending to directions  Safety Judgement: Decreased awareness of need for assistance;Decreased awareness of need for safety  Problem Solving: Decreased awareness of errors;Assistance required to correct errors made;Assistance required to identify errors made  Insights: Not aware of deficits  Initiation: Requires cues for all  Sequencing: Requires cues for all   Plan   Plan  Times per week: 3-5x/wk  Current Treatment Recommendations: Strengthening, Balance Training, Functional Mobility Training, Endurance Training, Safety Education & Training, Self-Care / ADL, Patient/Caregiver Education & Training, Equipment Evaluation, Education, & procurement, Positioning, ROM, Cognitive Reorientation  Goals  Short term goals  Time Frame for Short term goals: Pt will, by discharge:  Short term goal 1: Perform feeding/grooming tasks with setup and AE/DME PRN  Short term goal 2: Perform UB ADL tasks with CGA and use of AE/DME PRN  Short term goal 3: Perform LB ADL tasks/toileting tasks with min A using AE/DME PRN  Short term goal 4:  Independently demo good safety awareness during engagement in ADLs and functional transfers/functional mobility  Short term goal 5: Perform functional transfers with mod A using least restrictive AD       Therapy Time   Individual Concurrent Group Co-treatment   Time In 1434         Time Out 1512         Minutes 38         Timed Code Treatment Minutes: [address] [facility],[address], [address]  VIOLETTA Rubio/VERA

## 2021-02-24 NOTE — PLAN OF CARE
Problem: RESPIRATORY  Intervention: Respiratory assessment  Note:   PROVIDE ADEQUATE OXYGENATION WITH ACCEPTABLE SP02/ABG'S    [x]  IDENTIFY APPROPRIATE OXYGEN THERAPY  [x]   MONITOR SP02/ABG'S AS NEEDED   [x]   PATIENT EDUCATION AS NEEDED   BRONCHOSPASM/BRONCHOCONSTRICTION     [x]         IMPROVE AERATION/BREATH SOUNDS  [x]   ADMINISTER BRONCHODILATOR THERAPY AS APPROPRIATE  [x]   ASSESS BREATH SOUNDS  []   IMPLEMENT AEROSOL/MDI PROTOCOL  [x]   PATIENT EDUCATION AS NEEDED

## 2021-02-24 NOTE — PLAN OF CARE
[facility]  Office: [address], DO, Annalee Clayton, DO, Charly Furbish, DO, Darshan Nichols Blood, DO, Anita Dueñas MD, Jarocho Hickey MD, Segundo Ocasio MD, Jerod Simon MD, Quirino Bowling MD, Jannie Sweeney MD, Lizette Richards MD, Janet Hayden MD, Alexandru Garcia MD, Jeppie Soulier, DO, Tristen Macias MD, Dory Norman MD, Ruddy Romero, DO, Kaci Joe MD,  Chelle Giraldo, DO, Gerda Waldron MD, Sarkis Cantor MD, Madhuri Frias, [facility], [facility], CNP, Eula Powers, CNP, Bárbara Morataya, CNS, Sylvie Taylor, CNP, Carin Leung, CNP, Mann Ricci, CNP, Brittni Allen, CNP, Dave Vega, CNP, Martin Martin PA-C, Deann Galvan, [facility], Madonna Mora, CNP, Getachew Law, CNP, Beryl Castaneda, CNP, Magda Black, CNP, Elvis Severs, [address] [place] [address]    Second Visit Note  For more detailed information please refer to the progress note of the day      2/24/2021    2:07 PM    Name:   Yanira Velazquez  MRN:     [id number]     Acct:      [de-identified]   Room:   [address] [place], [place] [address] Day:  9  Admit Date:  2/15/2021  6:29 PM    PCP:   Kiersten Ortiz MD  Code Status:  Full Code      Pt vitals were reviewed   New labs were reviewed   Patient was seen    Updated plan :     1. Patient's grandson Jessica Benson returned the phone call. I spoke with Rina Arcos and updated him about patient's condition and plan for skilled nursing facility placement and outpatient follow-up that is required. Rina Arcos agreed that the patient's mental status is improving on his observation when he visited yesterday.        Segundo Ocasio MD  2/24/2021  2:07 PM

## 2021-02-24 NOTE — CARE COORDINATION
Transition planning  Spoke with Donavan Calix at [facility] she states they are unable to take patient if she needs Bipap at night on her current settings. Will clarify Bipap settings with Dr. Erik Moraes. Spoke with Kishore Oliver in admissions at [facility] re: referral, she states they did receive referral, informed her patient will need Bipap at night, she states they are reviewing patient and will let CM know if they are able to accept patient. Spoke with Kishore Oliver at [facility], she states they are able to accept patient and will start precert today.

## 2021-02-25 ENCOUNTER — TELEPHONE (OUTPATIENT)
Dept: PHARMACY | Age: 76
End: 2021-02-25

## 2021-02-25 LAB
-: NORMAL
ABSOLUTE EOS #: 0.29 K/UL (ref 0–0.4)
ABSOLUTE IMMATURE GRANULOCYTE: 0 K/UL (ref 0–0.3)
ABSOLUTE LYMPH #: 2.25 K/UL (ref 1–4.8)
ABSOLUTE MONO #: 1.18 K/UL (ref 0.1–0.8)
ANION GAP SERPL CALCULATED.3IONS-SCNC: 9 MMOL/L (ref 9–17)
BASOPHILS # BLD: 0 % (ref 0–2)
BASOPHILS ABSOLUTE: 0 K/UL (ref 0–0.2)
BUN BLDV-MCNC: 12 MG/DL (ref 8–23)
BUN/CREAT BLD: ABNORMAL (ref 9–20)
CALCIUM SERPL-MCNC: 8.2 MG/DL (ref 8.6–10.4)
CHLORIDE BLD-SCNC: 107 MMOL/L (ref 98–107)
CO2: 21 MMOL/L (ref 20–31)
CREAT SERPL-MCNC: 0.49 MG/DL (ref 0.5–0.9)
CSF ISOELECTRIC FOCUSING INTERPRETATION: NORMAL
DIFFERENTIAL TYPE: ABNORMAL
EKG ATRIAL RATE: 78 BPM
EKG P AXIS: 26 DEGREES
EKG P-R INTERVAL: 168 MS
EKG Q-T INTERVAL: 468 MS
EKG QRS DURATION: 100 MS
EKG QTC CALCULATION (BAZETT): 533 MS
EKG R AXIS: -36 DEGREES
EKG T AXIS: 156 DEGREES
EKG VENTRICULAR RATE: 78 BPM
EOSINOPHILS RELATIVE PERCENT: 3 % (ref 1–4)
GFR AFRICAN AMERICAN: >60 ML/MIN
GFR NON-AFRICAN AMERICAN: >60 ML/MIN
GFR SERPL CREATININE-BSD FRML MDRD: ABNORMAL ML/MIN/{1.73_M2}
GFR SERPL CREATININE-BSD FRML MDRD: ABNORMAL ML/MIN/{1.73_M2}
GLUCOSE BLD-MCNC: 104 MG/DL (ref 65–105)
GLUCOSE BLD-MCNC: 121 MG/DL (ref 65–105)
GLUCOSE BLD-MCNC: 122 MG/DL (ref 65–105)
GLUCOSE BLD-MCNC: 157 MG/DL (ref 65–105)
GLUCOSE BLD-MCNC: 97 MG/DL (ref 70–99)
HCT VFR BLD CALC: 38.6 % (ref 36.3–47.1)
HEMOGLOBIN: 11.5 G/DL (ref 11.9–15.1)
IMMATURE GRANULOCYTES: 0 %
LYMPHOCYTES # BLD: 23 % (ref 24–44)
MAGNESIUM: 1.4 MG/DL (ref 1.6–2.6)
MAGNESIUM: 2 MG/DL (ref 1.6–2.6)
MCH RBC QN AUTO: 24.9 PG (ref 25.2–33.5)
MCHC RBC AUTO-ENTMCNC: 29.8 G/DL (ref 28.4–34.8)
MCV RBC AUTO: 83.5 FL (ref 82.6–102.9)
MONOCYTES # BLD: 12 % (ref 1–7)
MORPHOLOGY: ABNORMAL
MORPHOLOGY: ABNORMAL
NRBC AUTOMATED: 0 PER 100 WBC
OLIGOCLONAL BANDS NUMBER: NORMAL BANDS (ref 0–1)
OLIGOCLONAL BANDS: NEGATIVE
PDW BLD-RTO: 29.2 % (ref 11.8–14.4)
PLATELET # BLD: 416 K/UL (ref 138–453)
PLATELET ESTIMATE: ABNORMAL
PMV BLD AUTO: 11 FL (ref 8.1–13.5)
POTASSIUM SERPL-SCNC: 3.5 MMOL/L (ref 3.7–5.3)
POTASSIUM SERPL-SCNC: 3.8 MMOL/L (ref 3.7–5.3)
RBC # BLD: 4.62 M/UL (ref 3.95–5.11)
RBC # BLD: ABNORMAL 10*6/UL
REASON FOR REJECTION: NORMAL
SEG NEUTROPHILS: 62 % (ref 36–66)
SEGMENTED NEUTROPHILS ABSOLUTE COUNT: 6.08 K/UL (ref 1.8–7.7)
SODIUM BLD-SCNC: 137 MMOL/L (ref 135–144)
WBC # BLD: 9.8 K/UL (ref 3.5–11.3)
WBC # BLD: ABNORMAL 10*3/UL
ZZ NTE CLEAN UP: ORDERED TEST: NORMAL
ZZ NTE WITH NAME CLEAN UP: SPECIMEN SOURCE: NORMAL

## 2021-02-25 PROCEDURE — 6370000000 HC RX 637 (ALT 250 FOR IP): Performed by: NURSE PRACTITIONER

## 2021-02-25 PROCEDURE — 93005 ELECTROCARDIOGRAM TRACING: CPT | Performed by: NURSE PRACTITIONER

## 2021-02-25 PROCEDURE — 94660 CPAP INITIATION&MGMT: CPT

## 2021-02-25 PROCEDURE — 6370000000 HC RX 637 (ALT 250 FOR IP): Performed by: FAMILY MEDICINE

## 2021-02-25 PROCEDURE — 80048 BASIC METABOLIC PNL TOTAL CA: CPT

## 2021-02-25 PROCEDURE — 2580000003 HC RX 258: Performed by: FAMILY MEDICINE

## 2021-02-25 PROCEDURE — 36415 COLL VENOUS BLD VENIPUNCTURE: CPT

## 2021-02-25 PROCEDURE — 99232 SBSQ HOSP IP/OBS MODERATE 35: CPT | Performed by: FAMILY MEDICINE

## 2021-02-25 PROCEDURE — 6360000002 HC RX W HCPCS: Performed by: STUDENT IN AN ORGANIZED HEALTH CARE EDUCATION/TRAINING PROGRAM

## 2021-02-25 PROCEDURE — 83735 ASSAY OF MAGNESIUM: CPT

## 2021-02-25 PROCEDURE — 97530 THERAPEUTIC ACTIVITIES: CPT

## 2021-02-25 PROCEDURE — 85025 COMPLETE CBC W/AUTO DIFF WBC: CPT

## 2021-02-25 PROCEDURE — 6370000000 HC RX 637 (ALT 250 FOR IP): Performed by: STUDENT IN AN ORGANIZED HEALTH CARE EDUCATION/TRAINING PROGRAM

## 2021-02-25 PROCEDURE — 1200000000 HC SEMI PRIVATE

## 2021-02-25 PROCEDURE — 84132 ASSAY OF SERUM POTASSIUM: CPT

## 2021-02-25 PROCEDURE — 94640 AIRWAY INHALATION TREATMENT: CPT

## 2021-02-25 PROCEDURE — 6360000002 HC RX W HCPCS: Performed by: FAMILY MEDICINE

## 2021-02-25 PROCEDURE — 82947 ASSAY GLUCOSE BLOOD QUANT: CPT

## 2021-02-25 PROCEDURE — 93010 ELECTROCARDIOGRAM REPORT: CPT | Performed by: INTERNAL MEDICINE

## 2021-02-25 PROCEDURE — 6360000002 HC RX W HCPCS: Performed by: NURSE PRACTITIONER

## 2021-02-25 RX ORDER — MAGNESIUM SULFATE 1 G/100ML
1000 INJECTION INTRAVENOUS PRN
Status: DISCONTINUED | OUTPATIENT
Start: 2021-02-25 | End: 2021-02-27 | Stop reason: HOSPADM

## 2021-02-25 RX ORDER — POTASSIUM CHLORIDE 7.45 MG/ML
10 INJECTION INTRAVENOUS PRN
Status: DISCONTINUED | OUTPATIENT
Start: 2021-02-25 | End: 2021-02-27 | Stop reason: HOSPADM

## 2021-02-25 RX ORDER — POTASSIUM CHLORIDE 20 MEQ/1
40 TABLET, EXTENDED RELEASE ORAL PRN
Status: DISCONTINUED | OUTPATIENT
Start: 2021-02-25 | End: 2021-02-27 | Stop reason: HOSPADM

## 2021-02-25 RX ADMIN — CARVEDILOL 3.12 MG: 3.12 TABLET, FILM COATED ORAL at 09:34

## 2021-02-25 RX ADMIN — SODIUM CHLORIDE, PRESERVATIVE FREE 10 ML: 5 INJECTION INTRAVENOUS at 20:51

## 2021-02-25 RX ADMIN — Medication 81 MG: at 09:18

## 2021-02-25 RX ADMIN — FUROSEMIDE 40 MG: 40 TABLET ORAL at 09:34

## 2021-02-25 RX ADMIN — Medication 100 MG: at 09:17

## 2021-02-25 RX ADMIN — Medication 1 TABLET: at 09:49

## 2021-02-25 RX ADMIN — PANTOPRAZOLE SODIUM 20 MG: 20 TABLET, DELAYED RELEASE ORAL at 09:17

## 2021-02-25 RX ADMIN — BUDESONIDE 500 MCG: 0.5 INHALANT RESPIRATORY (INHALATION) at 20:31

## 2021-02-25 RX ADMIN — LISINOPRIL 2.5 MG: 2.5 TABLET ORAL at 09:34

## 2021-02-25 RX ADMIN — MAGNESIUM SULFATE HEPTAHYDRATE 1000 MG: 1 INJECTION, SOLUTION INTRAVENOUS at 02:39

## 2021-02-25 RX ADMIN — POTASSIUM CHLORIDE 40 MEQ: 1500 TABLET, EXTENDED RELEASE ORAL at 03:43

## 2021-02-25 RX ADMIN — SERTRALINE 50 MG: 50 TABLET, FILM COATED ORAL at 09:17

## 2021-02-25 RX ADMIN — ARFORMOTEROL TARTRATE 15 MCG: 15 SOLUTION RESPIRATORY (INHALATION) at 20:31

## 2021-02-25 RX ADMIN — ARFORMOTEROL TARTRATE 15 MCG: 15 SOLUTION RESPIRATORY (INHALATION) at 08:15

## 2021-02-25 RX ADMIN — INSULIN LISPRO 2 UNITS: 100 INJECTION, SOLUTION INTRAVENOUS; SUBCUTANEOUS at 11:20

## 2021-02-25 RX ADMIN — ATORVASTATIN CALCIUM 20 MG: 20 TABLET, FILM COATED ORAL at 09:18

## 2021-02-25 RX ADMIN — LEVETIRACETAM 500 MG: 500 TABLET, FILM COATED ORAL at 09:17

## 2021-02-25 RX ADMIN — ENOXAPARIN SODIUM 40 MG: 40 INJECTION SUBCUTANEOUS at 09:18

## 2021-02-25 RX ADMIN — MAGNESIUM SULFATE HEPTAHYDRATE 1000 MG: 1 INJECTION, SOLUTION INTRAVENOUS at 04:23

## 2021-02-25 RX ADMIN — LEVETIRACETAM 500 MG: 500 TABLET, FILM COATED ORAL at 20:51

## 2021-02-25 RX ADMIN — FOLIC ACID 1 MG: 1 TABLET ORAL at 09:18

## 2021-02-25 ASSESSMENT — ENCOUNTER SYMPTOMS
BLOOD IN STOOL: 0
DIARRHEA: 0
ABDOMINAL PAIN: 0
WHEEZING: 0
COUGH: 0
CHEST TIGHTNESS: 0
NAUSEA: 0
RHINORRHEA: 0
VOMITING: 0
CONSTIPATION: 0
SHORTNESS OF BREATH: 1

## 2021-02-25 ASSESSMENT — PAIN SCALES - GENERAL
PAINLEVEL_OUTOF10: 0

## 2021-02-25 NOTE — PLAN OF CARE
Outcome: Ongoing     Problem: Nutrition Deficit:  Goal: Ability to achieve adequate nutritional intake will improve  Description: Ability to achieve adequate nutritional intake will improve  Outcome: Ongoing     Problem: Pain:  Goal: Pain level will decrease  Description: Pain level will decrease  Outcome: Ongoing  Goal: Recognizes and communicates pain  Description: Recognizes and communicates pain  Outcome: Ongoing  Goal: Control of acute pain  Description: Control of acute pain  Outcome: Ongoing  Goal: Control of chronic pain  Description: Control of chronic pain  Outcome: Ongoing     Problem: Serum Glucose Level - Abnormal:  Goal: Ability to maintain appropriate glucose levels will improve to within specified parameters  Description: Ability to maintain appropriate glucose levels will improve to within specified parameters  Outcome: Ongoing     Problem: Skin Integrity - Impaired:  Goal: Will show no infection signs and symptoms  Description: Will show no infection signs and symptoms  Outcome: Ongoing  Goal: Absence of new skin breakdown  Description: Absence of new skin breakdown  Outcome: Ongoing     Problem: Sleep Pattern Disturbance:  Goal: Appears well-rested  Description: Appears well-rested  Outcome: Ongoing     Problem: Tissue Perfusion, Altered:  Goal: Circulatory function within specified parameters  Description: Circulatory function within specified parameters  Outcome: Ongoing     Problem: Tissue Perfusion - Cardiopulmonary, Altered:  Goal: Absence of angina  Description: Absence of angina  Outcome: Ongoing  Goal: Hemodynamic stability will improve  Description: Hemodynamic stability will improve  Outcome: Ongoing     Problem: Pain:  Goal: Pain level will decrease  Description: Pain level will decrease  Outcome: Ongoing  Goal: Control of acute pain  Description: Control of acute pain  Outcome: Ongoing  Goal: Control of chronic pain  Description: Control of chronic pain  Outcome: Ongoing

## 2021-02-25 NOTE — PROGRESS NOTES
Pt had a 17 run of vtach. Pt denies chest pain, sob. BP is 97/74, HR back to 79. KRYSTLE Omalley notified.

## 2021-02-25 NOTE — PROGRESS NOTES
Physical Therapy  Facility/Department: [address] ORTHO/MED SURG  Daily Treatment Note  NAME: Fabian Canela  : 1945  MRN: [id number]    Date of Service: 2021    Discharge Recommendations:  Patient would benefit from continued therapy after discharge        Assessment   Body structures, Functions, Activity limitations: Decreased functional mobility ; Decreased strength;Decreased endurance;Decreased balance;Decreased cognition;Decreased safe awareness;Decreased ROM  Assessment: Pt was lethargic during Tx. Pt sat at EOB for~ 5 min with SBA. Pt amb 3 ft from bed to chair with HHA ModA x2. Pt activity limited by general weakness. She would continue to benefit from therapy to addreaa deficits. Prognosis: Fair  PT Education: Plan of Care;PT Role;General Safety;Orientation, sitting exercises  Activity Tolerance  Activity Tolerance: Patient Tolerated treatment well;Patient limited by fatigue;Patient limited by endurance; Patient limited by cognitive status; Other  Activity Tolerance: Lethargic . Patient Diagnosis(es): There were no encounter diagnoses. has a past medical history of CHF (congestive heart failure) ([facility] COPD (chronic obstructive pulmonary disease) ([facility] Diabetes mellitus ([facility] MRSA (methicillin resistant staph aureus) culture positive, and Tobacco abuse.   has a past surgical history that includes joint replacement (Bilateral); Hysterectomy; and Appendectomy. Restrictions  Restrictions/Precautions  Restrictions/Precautions: Fall Risk, General Precautions  Required Braces or Orthoses?: No  Position Activity Restriction  Other position/activity restrictions: Up with assist, 4L O2 NC  Subjective    Pt and RN agreeable to PT. Pt in bed upon arrival.  Pt denies pain currently. Orientation   WFL - Pt knew she was in [place] and in a hospital today. Pt was able to communicate in sentences today.   Objective   Bed mobility  Supine to Sit: ModA  Sit to Supine: Pt retired to chair  Scooting: Delle Lundborg Comment: Pt tolerated ~5mins at EOB SBA  Transfers  Sit to stand: Connie x2  Stand to sit: Connie  Comment: Verbal cues for hand placement during transfer with fair return. Ambulation  Ambulation? Yes  Ambulation 1  Surface: level tile  Distance 3ft  Device: HHA  Assistance:  ModA x2  Balance  Sitting - Static: Fair+  Sitting - Dynamic: Fair;  Standing - static - Fair -  Standing - dynamic - poor  Exercises  LAQ: 10 reps  Pt declined to do more exercises d/t fatigue. Goals  Short term goals  Time Frame for Short term goals: 14 visits  Short term goal 1: Pt to demonstrate bed mobility Connie  Short term goal 2: Pt to demonstrate functional transfers MaxA  Short term goal 3: Pt to actively participate in at least 30 minutes of physical therapy to demonstrate increased endurance  Short term goal 4: Pt to demonstrate at least fair+ static and dynamic sitting balance to decrease fall risk  Patient Goals   Patient goals : Pt did not verbalize goals    Plan    Plan  Times per week: 5-6x / week  Current Treatment Recommendations: Strengthening, Safety Education & Training, ROM, Balance Training, Endurance Training, Patient/Caregiver Education & Training, Equipment Evaluation, Education, & procurement, Functional Mobility Training, Transfer Training, Gait Training, Stair training  Safety Devices  Type of devices: Call light within reach, Bed alarm in place, All fall risk precautions in place, Nurse notified, Patient at risk for falls, Left in alarmed chair  Restraints  Initially in place: Yes(4 rails, seizure precautions)     Therapy Time   Individual Concurrent Group Co-treatment   Time In  10:45         Time Out  10:57         Minutes  22 Pollen Hemphill SPTA  Treatment performed by Student PTA under the supervision of co-signing PTA who agrees with all treatment and documentation.    Minesh Montoya PTA

## 2021-02-25 NOTE — PROGRESS NOTES
Background History:         Jared Bonilla is 77 y.o. female  Who was admitted to the hospital on 2/15/2021 for treatment of Acute encephalopathy. Patient was initially admitted to [facility] on 2/6/2021 with weakness, dizziness, speech changes, change in mental status. Patient was hypotensive, with blood pressure systolic in 57X, hypoxic 04% on room air. Evaluation at that time showed elevated proBNP 13,553. Chest x-ray showed bilateral airspace and interstitial opacities. CT head was negative for acute intracranial abnormality. EKG showed sinus bradycardia without acute ischemic changes. Patient was admitted with diagnosis of pneumonia and acute CHF. It was also reported that patient had poor functional status for last 2 months with poor appetite, not eating and drinking well. Patient was recommended oxygen at night but was not using it. She has underlying history of type 2 diabetes mellitus, COPD. Evaluation at [facility] showed severely reduced EF 20%. Patient was treated with dopamine, diuretics. Cardiology and neurology were consulted. Patient was treated with Rocephin, azithromycin, later switched to cefepime, IV acyclovir for possible herpes encephalitis. She had abnormal EEG with diffuse slowing without any lateralized or epileptiform activity. .  Patient was treated with [facility]. CT head, MRI brain did not show any acute abnormality. Patient had spinal tap which showed 0 WBC, protein 25 and was not suggestive of meningeal encephalitis. Neurology recommended to transfer patient to [address] [place] [address] for LTME monitoring and further evaluation. She was on high flow previously. Precedex infusion was stopped. Patient was transitioned to nasal cannula. EEG showed diffuse encephalopathy without any epileptiform discharges.   Antiepileptic medications per change per neurology and Depakote was stopped when patient transition to [place] [id number] continued for short-term.         PMH:  Past Medical History:   Diagnosis Date    CHF (congestive heart failure) (HCC)     COPD (chronic obstructive pulmonary disease) (HCC)     Diabetes mellitus ([facility])     MRSA (methicillin resistant staph aureus) culture positive 8/28/2014    sputum    Tobacco abuse 10/15/2019      Allergies: Allergies   Allergen Reactions    Tiotropium Anaphylaxis and Swelling    Spiriva Handihaler [Tiotropium Bromide Monohydrate] Swelling      Medications :      furosemide, 40 mg, Oral, Daily      levETIRAcetam, 500 mg, Oral, BID      enoxaparin, 40 mg, Subcutaneous, Daily      folic acid, 1 mg, Oral, Daily      thiamine, 100 mg, Oral, Daily      aspirin, 81 mg, Oral, Daily      atorvastatin, 20 mg, Oral, Daily      sertraline, 50 mg, Oral, Daily      lisinopril, 2.5 mg, Oral, Daily      carvedilol, 3.125 mg, Oral, BID      budesonide, 0.5 mg, Nebulization, BID      insulin lispro, 0-12 Units, Subcutaneous, TID WC      insulin lispro, 0-6 Units, Subcutaneous, Nightly      pantoprazole, 20 mg, Oral, Daily      calcium carbonate-vitamin D3, 1 tablet, Oral, Daily      Arformoterol Tartrate, 15 mcg, Nebulization, BID      sodium chloride flush, 10 mL, Intravenous, 2 times per day        Review of Systems   Review of Systems   Constitutional: Positive for fatigue. Negative for appetite change, fever and unexpected weight change. HENT: Negative for congestion, rhinorrhea and sneezing. Eyes: Negative for visual disturbance. Respiratory: Positive for shortness of breath. Negative for cough, chest tightness and wheezing. Cardiovascular: Negative for chest pain and palpitations. Gastrointestinal: Negative for abdominal pain, blood in stool, constipation, diarrhea, nausea and vomiting. Genitourinary: Negative for dysuria, enuresis, frequency and hematuria. Musculoskeletal: Negative for arthralgias and myalgias. Skin: Negative for rash.    Neurological: Negative for dizziness, weakness, light-headedness and headaches. Hematological: Does not bruise/bleed easily. Psychiatric/Behavioral: Positive for confusion. Negative for dysphoric mood and sleep disturbance. Objective :      Current Vitals : Temp: 97.6 °F (36.4 °C),  Pulse: 73, Resp: 18, BP: 98/80, SpO2: 95 %  Last 24 Hrs Vitals   Patient Vitals for the past 24 hrs:   BP Temp Temp src Pulse Resp SpO2 Weight   02/25/21 0715 98/80 97.6 °F (36.4 °C) Axillary 73 18 95 %    02/25/21 0600       136 lb 9.6 oz (62 kg)   02/25/21 0355     16     02/25/21 0106 97/74         02/25/21 0023     18     02/24/21 2101     24     02/24/21 2041      94 %    02/24/21 2040      96 %    02/24/21 2039 97/76         02/24/21 2006 92/74 98.6 °F (37 °C) Oral 88 18 94 %    02/24/21 1521 100/82 97.8 °F (36.6 °C) Axillary 79 18 100 %    02/24/21 1216 91/74 97.6 °F (36.4 °C) Axillary 86 18 96 %      Intake / output   No intake/output data recorded. Physical Exam:  Physical Exam  Vitals signs and nursing note reviewed. Constitutional:       General: She is not in acute distress. Appearance: She is underweight. She is not ill-appearing or diaphoretic. Interventions: Nasal cannula in place. HENT:      Head: Normocephalic and atraumatic. Nose:      Right Sinus: No maxillary sinus tenderness or frontal sinus tenderness. Left Sinus: No maxillary sinus tenderness or frontal sinus tenderness. Mouth/Throat:      Pharynx: No oropharyngeal exudate. Eyes:      General: No scleral icterus. Conjunctiva/sclera: Conjunctivae normal.      Pupils: Pupils are equal, round, and reactive to light. Neck:      Musculoskeletal: Full passive range of motion without pain and neck supple. Thyroid: No thyromegaly. Vascular: No JVD. Cardiovascular:      Rate and Rhythm: Normal rate and regular rhythm.       Pulses:           Dorsalis pedis pulses are 2+ on the right side and 2+ on the left side. Heart sounds: Normal heart sounds. No murmur. Pulmonary:      Effort: Pulmonary effort is normal.      Breath sounds: Normal breath sounds. No wheezing or rales. Abdominal:      Palpations: Abdomen is soft. There is no mass. Tenderness: There is no abdominal tenderness. Musculoskeletal:      Comments: Right knee surgical scar from arthroplasty   Lymphadenopathy:      Head:      Right side of head: No submandibular adenopathy. Left side of head: No submandibular adenopathy. Cervical: No cervical adenopathy. Skin:     General: Skin is warm. Neurological:      Motor: No tremor. Comments: Oriented to self, place, person, disoriented to time. Psychiatric:         Attention and Perception: She is attentive. Mood and Affect: Mood normal.         Speech: Speech is not delayed. Behavior: Behavior is not slowed. Behavior is cooperative. Cognition and Memory: Cognition is not impaired. Memory is not impaired. Laboratory findings:    Recent Labs     02/23/21  0736 02/24/21  0416 02/25/21  0154   WBC 9.5 10.2 9.8   HGB 11.3* 10.7* 11.5*   HCT 36.6 36.1* 38.6   PLT See Reflexed IPF Result See Reflexed IPF Result 416     Recent Labs     02/23/21  0736 02/24/21  0416 02/25/21  0154 02/25/21  0549   * 142 137  --    K 3.9 3.4* 3.5* 3.8   * 109* 107  --    CO2 26 24 21  --    GLUCOSE 117* 94 97  --    BUN 18 14 12  --    CREATININE 0.81 0.61 0.49*  --    MG  --  1.5* 1.4* 2.0   CALCIUM 8.9 8.3* 8.2*  --      No results for input(s): PROT, LABALBU, LABA1C, P0OWFXM, V3NGUHJ, FT4, TSH, AST, ALT, LDH, GGT, ALKPHOS, BILITOT, BILIDIR, AMMONIA, AMYLASE, LIPASE, LACTATE, CHOL, HDL, LDLCHOLESTEROL, CHOLHDLRATIO, TRIG, VLDL, BNP, TROPONINI, CKTOTAL, CKMB, CKMBINDEX, RF, DAPHNE in the last 72 hours.        Specific Gravity, UA   Date Value Ref Range Status   02/17/2021 1.012 1.005 - 1.030 Final     Protein, UA   Date Value Ref Range Status   02/17/2021 3+ (A) NEGATIVE Final     RBC, UA   Date Value Ref Range Status   02/17/2021 20 TO 50 0 - 4 /HPF Final     Comment:     Reference range defined for non-centrifuged specimen. Bacteria, UA   Date Value Ref Range Status   02/17/2021 NOT REPORTED None Final     Nitrite, Urine   Date Value Ref Range Status   02/17/2021 NEGATIVE NEGATIVE Final     WBC, UA   Date Value Ref Range Status   02/17/2021 0 TO 2 0 - 5 /HPF Final     Leukocyte Esterase, Urine   Date Value Ref Range Status   02/17/2021 NEGATIVE NEGATIVE Final       Imaging / Clinical Data :-   No results found. Clinical Course : stable  Assessment and Plan  :        1. Acute systolic heart failure -improved. Continue oral Lasix 40 mg daily, fluid restriction. Ischemic work-up once patient's mental status improves. Follow-up with cardiology. 2. Acute metabolic encephalopathy - Meningoencephalitis ruled out. Acyclovir discontinued. Treated with Precedex infusion and weaned. .  Plan to continue Keppra 500 mg twice daily. Depakote has been stopped. 3. DM2 -last of the 6.2 -Humalog as needed. 4. Cardiogenic shock - off norepinephrine. Low-dose Coreg as tolerated. 5. V. Tach - continue Coreg, notify cardiology. 6. Acute on chronic hypoxic respiratory failure -was treated with high flow. Continue oxygen by nasal cannula. BiPAP at nighttime with IPAP 12/EPAP 6 . Incentive spirometer. Continue oxygen weaned to 4 L/min. 7. H/o polysubstance use -   8. COPD with chronic respiratory failure - continue oxygen. 9. Dysphagia-dysphagia diet. Awaiting pre-CERT for transfer to skilled nursing facility. Blood pressure has been running low. We will continue Lasix 40 mg daily, low-dose lisinopril 2.5 mg daily, low-dose Coreg 3.125 mg twice daily fluid restriction, low-salt diet. Continue Zoloft 50 mg daily. Encourage PT OT, incentive spirometry.     Continue to monitor vitals , Intake / output ,  Cell count , HGB , Kidney function, oxygenation  as indicated . Discussed with patient's grandson yesterday. .   Plan discussed with Staff Zuleima Gee RN     (Please note that portions of this note were completed with a voice recognition program. Efforts were made to edit the dictations but occasionally words are mis-transcribed.)      Karthikeyan Byers MD  2/25/2021

## 2021-02-25 NOTE — PLAN OF CARE
Problem: Skin Integrity:  Goal: Will show no infection signs and symptoms  Description: Will show no infection signs and symptoms  2/25/2021 0305 by Carroll Tafoya RN  Outcome: Ongoing     Problem: Falls - Risk of:  Goal: Will remain free from falls  Description: Will remain free from falls  2/25/2021 0305 by Carroll Tafoya RN  Outcome: Ongoing     Problem: Falls - Risk of:  Goal: Absence of physical injury  Description: Absence of physical injury  2/25/2021 0305 by Carroll Tafoya RN  Outcome: Ongoing

## 2021-02-26 LAB
ABSOLUTE EOS #: 0.18 K/UL (ref 0–0.44)
ABSOLUTE IMMATURE GRANULOCYTE: 0.09 K/UL (ref 0–0.3)
ABSOLUTE LYMPH #: 2.73 K/UL (ref 1.1–3.7)
ABSOLUTE MONO #: 1.06 K/UL (ref 0.1–1.2)
ANION GAP SERPL CALCULATED.3IONS-SCNC: 8 MMOL/L (ref 9–17)
BASOPHILS # BLD: 2 % (ref 0–2)
BASOPHILS ABSOLUTE: 0.18 K/UL (ref 0–0.2)
BUN BLDV-MCNC: 9 MG/DL (ref 8–23)
BUN/CREAT BLD: ABNORMAL (ref 9–20)
CALCIUM SERPL-MCNC: 8.6 MG/DL (ref 8.6–10.4)
CHLORIDE BLD-SCNC: 107 MMOL/L (ref 98–107)
CO2: 23 MMOL/L (ref 20–31)
CREAT SERPL-MCNC: 0.47 MG/DL (ref 0.5–0.9)
DIFFERENTIAL TYPE: ABNORMAL
EOSINOPHILS RELATIVE PERCENT: 2 % (ref 1–4)
GFR AFRICAN AMERICAN: >60 ML/MIN
GFR NON-AFRICAN AMERICAN: >60 ML/MIN
GFR SERPL CREATININE-BSD FRML MDRD: ABNORMAL ML/MIN/{1.73_M2}
GFR SERPL CREATININE-BSD FRML MDRD: ABNORMAL ML/MIN/{1.73_M2}
GLUCOSE BLD-MCNC: 119 MG/DL (ref 65–105)
GLUCOSE BLD-MCNC: 85 MG/DL (ref 65–105)
GLUCOSE BLD-MCNC: 90 MG/DL (ref 70–99)
GLUCOSE BLD-MCNC: 95 MG/DL (ref 65–105)
HCT VFR BLD CALC: 37.6 % (ref 36.3–47.1)
HEMOGLOBIN: 11.1 G/DL (ref 11.9–15.1)
IMMATURE GRANULOCYTES: 1 %
LYMPHOCYTES # BLD: 31 % (ref 24–43)
MCH RBC QN AUTO: 25.1 PG (ref 25.2–33.5)
MCHC RBC AUTO-ENTMCNC: 29.5 G/DL (ref 28.4–34.8)
MCV RBC AUTO: 85.1 FL (ref 82.6–102.9)
MONOCYTES # BLD: 12 % (ref 3–12)
MORPHOLOGY: ABNORMAL
MORPHOLOGY: ABNORMAL
NRBC AUTOMATED: 0 PER 100 WBC
PDW BLD-RTO: 28.9 % (ref 11.8–14.4)
PLATELET # BLD: 368 K/UL (ref 138–453)
PLATELET ESTIMATE: ABNORMAL
PMV BLD AUTO: 10.4 FL (ref 8.1–13.5)
POTASSIUM SERPL-SCNC: 3.9 MMOL/L (ref 3.7–5.3)
RBC # BLD: 4.42 M/UL (ref 3.95–5.11)
RBC # BLD: ABNORMAL 10*6/UL
SARS-COV-2, RAPID: NOT DETECTED
SEG NEUTROPHILS: 52 % (ref 36–65)
SEGMENTED NEUTROPHILS ABSOLUTE COUNT: 4.56 K/UL (ref 1.5–8.1)
SODIUM BLD-SCNC: 138 MMOL/L (ref 135–144)
SPECIMEN DESCRIPTION: NORMAL
WBC # BLD: 8.8 K/UL (ref 3.5–11.3)
WBC # BLD: ABNORMAL 10*3/UL

## 2021-02-26 PROCEDURE — 97530 THERAPEUTIC ACTIVITIES: CPT

## 2021-02-26 PROCEDURE — 80048 BASIC METABOLIC PNL TOTAL CA: CPT

## 2021-02-26 PROCEDURE — 6360000002 HC RX W HCPCS: Performed by: FAMILY MEDICINE

## 2021-02-26 PROCEDURE — 85025 COMPLETE CBC W/AUTO DIFF WBC: CPT

## 2021-02-26 PROCEDURE — 1200000000 HC SEMI PRIVATE

## 2021-02-26 PROCEDURE — 6360000002 HC RX W HCPCS: Performed by: STUDENT IN AN ORGANIZED HEALTH CARE EDUCATION/TRAINING PROGRAM

## 2021-02-26 PROCEDURE — 82947 ASSAY GLUCOSE BLOOD QUANT: CPT

## 2021-02-26 PROCEDURE — 97535 SELF CARE MNGMENT TRAINING: CPT

## 2021-02-26 PROCEDURE — 36415 COLL VENOUS BLD VENIPUNCTURE: CPT

## 2021-02-26 PROCEDURE — 6370000000 HC RX 637 (ALT 250 FOR IP): Performed by: NURSE PRACTITIONER

## 2021-02-26 PROCEDURE — 6370000000 HC RX 637 (ALT 250 FOR IP): Performed by: FAMILY MEDICINE

## 2021-02-26 PROCEDURE — 94660 CPAP INITIATION&MGMT: CPT

## 2021-02-26 PROCEDURE — 94640 AIRWAY INHALATION TREATMENT: CPT

## 2021-02-26 PROCEDURE — 2580000003 HC RX 258: Performed by: FAMILY MEDICINE

## 2021-02-26 PROCEDURE — 6370000000 HC RX 637 (ALT 250 FOR IP): Performed by: STUDENT IN AN ORGANIZED HEALTH CARE EDUCATION/TRAINING PROGRAM

## 2021-02-26 PROCEDURE — U0002 COVID-19 LAB TEST NON-CDC: HCPCS

## 2021-02-26 RX ADMIN — FOLIC ACID 1 MG: 1 TABLET ORAL at 09:10

## 2021-02-26 RX ADMIN — LEVETIRACETAM 500 MG: 500 TABLET, FILM COATED ORAL at 21:39

## 2021-02-26 RX ADMIN — SERTRALINE 50 MG: 50 TABLET, FILM COATED ORAL at 09:09

## 2021-02-26 RX ADMIN — LEVETIRACETAM 500 MG: 500 TABLET, FILM COATED ORAL at 09:10

## 2021-02-26 RX ADMIN — PANTOPRAZOLE SODIUM 20 MG: 20 TABLET, DELAYED RELEASE ORAL at 06:52

## 2021-02-26 RX ADMIN — LISINOPRIL 2.5 MG: 2.5 TABLET ORAL at 10:24

## 2021-02-26 RX ADMIN — ARFORMOTEROL TARTRATE 15 MCG: 15 SOLUTION RESPIRATORY (INHALATION) at 20:59

## 2021-02-26 RX ADMIN — Medication 100 MG: at 09:09

## 2021-02-26 RX ADMIN — Medication 1 TABLET: at 09:56

## 2021-02-26 RX ADMIN — CARVEDILOL 3.12 MG: 3.12 TABLET, FILM COATED ORAL at 10:24

## 2021-02-26 RX ADMIN — Medication 81 MG: at 09:10

## 2021-02-26 RX ADMIN — ENOXAPARIN SODIUM 40 MG: 40 INJECTION SUBCUTANEOUS at 09:10

## 2021-02-26 RX ADMIN — FUROSEMIDE 40 MG: 40 TABLET ORAL at 09:10

## 2021-02-26 RX ADMIN — CARVEDILOL 3.12 MG: 3.12 TABLET, FILM COATED ORAL at 21:40

## 2021-02-26 RX ADMIN — SODIUM CHLORIDE, PRESERVATIVE FREE 10 ML: 5 INJECTION INTRAVENOUS at 09:18

## 2021-02-26 RX ADMIN — ATORVASTATIN CALCIUM 20 MG: 20 TABLET, FILM COATED ORAL at 09:10

## 2021-02-26 RX ADMIN — BUDESONIDE 500 MCG: 0.5 INHALANT RESPIRATORY (INHALATION) at 08:24

## 2021-02-26 ASSESSMENT — ENCOUNTER SYMPTOMS
WHEEZING: 0
RHINORRHEA: 0
CONSTIPATION: 0
VOMITING: 0
COUGH: 0
SHORTNESS OF BREATH: 0
CHEST TIGHTNESS: 0
NAUSEA: 0
ABDOMINAL PAIN: 0
DIARRHEA: 0
BLOOD IN STOOL: 0

## 2021-02-26 ASSESSMENT — PAIN SCALES - GENERAL: PAINLEVEL_OUTOF10: 0

## 2021-02-26 NOTE — PROGRESS NOTES
Comprehensive Nutrition Assessment    Type and Reason for Visit:  Reassess    Nutrition Recommendations/Plan:   -Continue dysphagia minced and moist w/ HTL and LISET diet   -Continue magic cup supplements TID   -Will continue to monitor po intake, weights and wound healing     Nutrition Assessment:  Pt reports that her appetite is \"ok\" and is variable. Pt reports consuming anywhere from 1-100% of her meals and states consuming 100% of the magic cup supplements. Pt reports UBW of 136 lbs and that her wt usually flux. Pt remains nutritionally compromised aeb severe malnutrition. Will continue to monitor po intake and wt trends. Malnutrition Assessment:  Malnutrition Status:  Severe malnutrition    Context:  Chronic Illness     Findings of the 6 clinical characteristics of malnutrition:  Energy Intake:  7 - 75% or less estimated energy requirements for 1 month or longer  Weight Loss:  (9% x 6 months per chart review)     Body Fat Loss:  7 - Severe body fat loss Orbital   Muscle Mass Loss:  1 - Mild muscle mass loss Temples (temporalis), Scapula (trapezius), Clavicles (pectoralis & deltoids)  Fluid Accumulation:  No significant fluid accumulation     Strength:  Not Performed    Estimated Daily Nutrient Needs:  Energy (kcal):  30 kcal/kg = 1800 kcals/day; Weight Used for Energy Requirements:  Admission     Protein (g):  1.5 gm/kg = 90 gm/day; Weight Used for Protein Requirements:  Current          Nutrition Related Findings:  BM 2/22; labs/meds reviewed      Wounds:  Open Wounds       Current Nutrition Therapies:    DIET DYSPHAGIA MINCED AND MOIST; No Added Salt (3-4 GM);  Moderately Thick (Honey)  Dietary Nutrition Supplements: Frozen Oral Supplement    Anthropometric Measures:  · Height: 5' 2\" (157.5 cm)  · Current Body Weight: 136 lb (61.7 kg)   · Admission Body Weight: 131 lb 2.8 oz (59.5 kg)    · Usual Body Weight: 136 lb (61.7 kg)(varies per pt)     · Ideal Body Weight: 110 lbs; % Ideal Body Weight 123.6 % · BMI: 24.9  · BMI Categories: Normal Weight (BMI 22.0 to 24.9) age over 72       Nutrition Diagnosis:   · Severe malnutrition, In context of chronic illness related to swallowing difficulty, inadequate protein-energy intake(medical condition) as evidenced by swallow study results, intake 26-50%, intake 51-75%, intake 0-25%, mild muscle loss, severe loss of subcutaneous fat, poor intake prior to admission(variable po intake, need for ONS)      Nutrition Interventions:   Food and/or Nutrient Delivery:  Continue Current Diet, Continue Oral Nutrition Supplement  Nutrition Education/Counseling:  Education not indicated   Coordination of Nutrition Care:  Continue to monitor while inpatient    Goals: Achieved   Oral intakes to meet at least 50% of estimated nutrition needs. Nutrition Monitoring and Evaluation:   Food/Nutrient Intake Outcomes:  Food and Nutrient Intake, Supplement Intake  Physical Signs/Symptoms Outcomes:  Biochemical Data, Nutrition Focused Physical Findings, Skin, Weight, GI Status, Chewing or Swallowing, Fluid Status or Edema     Discharge Planning:     Too soon to determine     Electronically signed by Alex Escobar RD, LD on 2/26/21 at 10:58 AM EST    Contact: [phone]

## 2021-02-26 NOTE — PROGRESS NOTES
[facility]  Office: [address], DO, Celine Thomas, DO, Arthur Maker, DO, Chris Woodall Blood, DO, Wendy Ladd MD, Stan Casanova MD, Suzette Lopez MD, Jerry Cadena MD, Roe Urbano MD, Ross Leal MD, Phil Diego MD, Eric Weber MD, Alexandru Collier MD, Roque Butcher DO, Juan Jose Kenney MD, Erik Lewis MD, Heydi Rhodes DO, Joaquin Bro MD,  Antonio Tirado DO, Jose Cabrera MD, Santa Bishop MD, Kari Leon CNP, [address], CNP, Samreen Acosta, CNP, Mic Cazares, CNS, Maria Ines An, CNP, Chaya Walters, CNP, Kt Jenkins, CNP, Barry Mendoza, CNP, Amanda Eldridge, CNP, Shauna Saldivar PA-C, Karolyn Gerardo, [facility], Ana Luisa Tafoya, CNP, Marleny Baptiste, CNP, Anna Martinez, CNP, Yuliya Tirado, CNP, Sedrick Devine, [facility]      Daily Progress Note     Admit Date: 2/15/2021  Bed/Room No.  0019/0418-72  Admitting Physician : Suzette Lopez MD  Code Status :Full Code  Hospital Day:  LOS: 11 days   Chief Complaint:     Mental status changes. Principal Problem:    Acute encephalopathy  Active Problems:    Heart failure, systolic, with acute decompensation (HCC)    COPD (chronic obstructive pulmonary disease) (HCC)    Cocaine abuse ([facility] [facility]    Acute respiratory failure with hypoxemia (HCC)    Polysubstance abuse ([facility] [facility]    Diabetes mellitus ([facility]    Essential hypertension  Resolved Problems:    * No resolved hospital problems. *    Subjective : Interval History/Significant events :  02/26/21    Patient's mental status continues to improve. She is oriented to self but remains disoriented to place, time. Patient appears comfortable and is watching television without any distress. She denies any breathing difficulty and remains on 3 L of oxygen by nasal cannula. Vitals - low blood pressure.  ,afebrile  Labs -White count 8.8, hypokalemia 3.9, white count 10.2, hemoglobin 10.7. Nursing notes , Consults notes reviewed.  Overnight events and updates discussed with Nursing staff . Background History:         Fabian Canela is 77 y.o. female  Who was admitted to the hospital on 2/15/2021 for treatment of Acute encephalopathy. Patient was initially admitted to [facility] on 2/6/2021 with weakness, dizziness, speech changes, change in mental status. Patient was hypotensive, with blood pressure systolic in 38Z, hypoxic 70% on room air. Evaluation at that time showed elevated proBNP 13,553. Chest x-ray showed bilateral airspace and interstitial opacities. CT head was negative for acute intracranial abnormality. EKG showed sinus bradycardia without acute ischemic changes. Patient was admitted with diagnosis of pneumonia and acute CHF. It was also reported that patient had poor functional status for last 2 months with poor appetite, not eating and drinking well. Patient was recommended oxygen at night but was not using it. She has underlying history of type 2 diabetes mellitus, COPD. Evaluation at [facility] showed severely reduced EF 20%. Patient was treated with dopamine, diuretics. Cardiology and neurology were consulted. Patient was treated with Rocephin, azithromycin, later switched to cefepime, IV acyclovir for possible herpes encephalitis. She had abnormal EEG with diffuse slowing without any lateralized or epileptiform activity. .  Patient was treated with Darlina Ted. CT head, MRI brain did not show any acute abnormality. Patient had spinal tap which showed 0 WBC, protein 25 and was not suggestive of meningeal encephalitis. Neurology recommended to transfer patient to [facility] for LTME monitoring and further evaluation. She was on high flow previously. Precedex infusion was stopped. Patient was transitioned to nasal cannula. EEG showed diffuse encephalopathy without any epileptiform discharges.   Antiepileptic medications per change per neurology and Depakote was [facility]         PMH:  Past Medical History:   Diagnosis Date    [facility]congestive [facility]) [facility]     COPD (chronic obstructive pulmonary disease) (HCC)     Diabetes mellitus ([facility]     MRSA (methicillin resistant staph aureus) culture positive 8/28/2014    sputum    Tobacco abuse 10/15/2019      Allergies: Allergies   Allergen Reactions    Tiotropium Anaphylaxis and Swelling    Spiriva Handihaler [Tiotropium Bromide Monohydrate] Swelling      Medications :      furosemide, 40 mg, Oral, Daily      levETIRAcetam, 500 mg, Oral, BID      enoxaparin, 40 mg, Subcutaneous, Daily      folic acid, 1 mg, Oral, Daily      thiamine, 100 mg, Oral, Daily      aspirin, 81 mg, Oral, Daily      atorvastatin, 20 mg, Oral, Daily      sertraline, 50 mg, Oral, Daily      lisinopril, 2.5 mg, Oral, Daily      carvedilol, 3.125 mg, Oral, BID      budesonide, 0.5 mg, Nebulization, BID      insulin lispro, 0-12 Units, Subcutaneous, TID WC      insulin lispro, 0-6 Units, Subcutaneous, Nightly      pantoprazole, 20 mg, Oral, Daily      calcium carbonate-vitamin D3, 1 tablet, Oral, Daily      Arformoterol Tartrate, 15 mcg, Nebulization, BID      sodium chloride flush, 10 mL, Intravenous, 2 times per day        Review of Systems   Review of Systems   Constitutional: Positive for fatigue. Negative for appetite change, fever and unexpected weight change. HENT: Negative for congestion, rhinorrhea and sneezing. Eyes: Negative for visual disturbance. Respiratory: Negative for cough, chest tightness, shortness of breath and wheezing. Cardiovascular: Negative for chest pain and palpitations. Gastrointestinal: Negative for abdominal pain, blood in stool, constipation, diarrhea, nausea and vomiting. Genitourinary: Negative for dysuria, enuresis, frequency and hematuria. Musculoskeletal: Negative for arthralgias and myalgias. Skin: Negative for rash. Effort: Pulmonary effort is normal.      Breath sounds: Normal breath sounds. No wheezing or rales. Abdominal:      Palpations: Abdomen is soft. There is no mass. Tenderness: There is no abdominal tenderness. Musculoskeletal:      Comments: Right knee surgical scar from arthroplasty   Lymphadenopathy:      Head:      Right side of head: No submandibular adenopathy. Left side of head: No submandibular adenopathy. Cervical: No cervical adenopathy. Skin:     General: Skin is warm. Neurological:      Motor: No tremor. Comments: Oriented to self, place, person, disoriented to time. Psychiatric:         Attention and Perception: She is attentive. Mood and Affect: Mood normal.         Speech: Speech is not delayed. Behavior: Behavior is not slowed. Behavior is cooperative. Cognition and Memory: Cognition is not impaired. Memory is not impaired. Laboratory findings:    Recent Labs     02/24/21 0416 02/25/21 0154 02/26/21  0558   WBC 10.2 9.8 8.8   HGB 10.7* 11.5* 11.1*   HCT 36.1* 38.6 37.6   PLT See Reflexed IPF Result [id number]     Recent Labs     02/24/21 0416 02/25/21 0154 02/25/21  0549 02/26/21  0558    137  --  138   K 3.4* 3.5* 3.8 3.9   * 107  --  107   CO2 24 21  --  23   GLUCOSE 94 97  --  90   BUN 14 12  --  9   CREATININE 0.61 0.49*  --  0.47*   MG 1.5* 1.4* 2.0  --    CALCIUM 8.3* 8.2*  --  8.6     No results for input(s): PROT, LABALBU, LABA1C, C3AMKRD, A4DNYUS, FT4, TSH, AST, ALT, LDH, GGT, ALKPHOS, BILITOT, BILIDIR, AMMONIA, AMYLASE, LIPASE, LACTATE, CHOL, HDL, LDLCHOLESTEROL, CHOLHDLRATIO, TRIG, VLDL, BNP, TROPONINI, CKTOTAL, CKMB, CKMBINDEX, RF, DAPHNE in the last 72 hours.        Specific Gravity, UA   Date Value Ref Range Status   02/17/2021 1.012 1.005 - 1.030 Final     Protein, UA   Date Value Ref Range Status   02/17/2021 3+ (A) NEGATIVE Final     RBC, UA   Date Value Ref Range Status   02/17/2021 20 TO 50 0 - 4 /HPF Final

## 2021-02-26 NOTE — PROGRESS NOTES
Physical Therapy  Facility/Department: [address] ORTHO/MED SURG  Daily Treatment Note  NAME: Shani Mina  : 1945  MRN: [id number]    Date of Service: 2021    Discharge Recommendations:  Patient would benefit from continued therapy after discharge        Assessment   Body structures, Functions, Activity limitations: Decreased functional mobility ; Decreased strength;Decreased endurance;Decreased balance;Decreased cognition;Decreased safe awareness;Decreased ROM  Assessment: Pt sat at EOB for~ 5 min with SBA. Pt amb 3 ft from bed to chair with RW Connie. Pt activity limited by lack willingness to participate and general weakness. She would continue to benefit from therapy to addreaa deficits. Prognosis: Fair  PT Education: Plan of Care;PT Role;General Safety;Orientation, sitting exercises  Activity Tolerance  Activity Tolerance: Patient Tolerated treatment well;Patient limited by fatigue;Patient limited by endurance; Patient limited by cognitive status; Other  Activity Tolerance: Lethargic .   Patient Diagnosis(es): There were no encounter diagnoses. has a past medical history of CHF (congestive heart failure) ([facility] COPD (chronic obstructive pulmonary disease) ([facility] [address] [facility] Diabetes mellitus ([facility] [address] MRSA (methicillin resistant staph aureus) culture positive, and Tobacco abuse.   has a past surgical history that includes joint replacement (Bilateral); Hysterectomy; and Appendectomy. Restrictions  Restrictions/Precautions  Restrictions/Precautions: Fall Risk, General Precautions  Required Braces or Orthoses?: No  Position Activity Restriction  Other position/activity restrictions: Up with assist, 4L O2 NC  Subjective    Pt and RN agreeable to PT. Pt in bed upon arrival with OT present. Pt denies pain currently. Orientation   WFL  Objective   Bed mobility  Supine to Sit: SBA  Sit to Supine: Pt retired to chair  Scooting: SBA  Comment: Extra time needed to complete BM.  Pt tolerated ~5mins at EOB SBA  Transfers Sit to stand: [address] [facility] [place]  Stand to sit: Connie  Comment: Verbal cues for hand placement during transfer with poor return. Ambulation  Ambulation? Yes  Ambulation 1  Surface: level tile  Distance 3ft bed to chair  Device: RW  Quality of gait: narrow BLANQUITA, decreased step length and hieght  Assistance:  Connie  Comment: Pt able to walk longer distances but is unwilling too. Balance  Sitting - Static: Fair+  Sitting - Dynamic: Fair;  Standing - static - Fair -  Standing - dynamic - Fair -  Exercises  Pt declined to do more exercises d/t unwillingness to participate  Goals  Short term goals  Time Frame for Short term goals: 14 visits  Short term goal 1: Pt to demonstrate bed mobility Connie  Short term goal 2: Pt to demonstrate functional transfers MaxA  Short term goal 3: Pt to actively participate in at least 30 minutes of physical therapy to demonstrate increased endurance  Short term goal 4: Pt to demonstrate at least fair+ static and dynamic sitting balance to decrease fall risk  Patient Goals   Patient goals : Pt did not verbalize goals    Plan    Plan  Times per week: 5-6x / week  Current Treatment Recommendations: Strengthening, Safety Education & Training, ROM, Balance Training, Endurance Training, Patient/Caregiver Education & Training, Equipment Evaluation, Education, & procurement, Functional Mobility Training, Transfer Training, Gait Training, Stair training  Safety Devices  Type of devices: Call light within reach, Bed alarm in place, All fall risk precautions in place, Nurse notified, Patient at risk for falls, Left in alarmed chair  Restraints  Initially in place: Yes(4 rails, seizure precautions)     Therapy Time   Individual Concurrent Group Co-treatment   Time In  9:20         Time Out  9:40         Minutes  [address] SPTA    Treatment performed by Student PTA under the supervision of co-signing PTA who agrees with all treatment and documentation.    Dia Infante PTA

## 2021-02-26 NOTE — CARE COORDINATION
Email sent to [place] to check the status of the pre certification request to [facility].

## 2021-02-26 NOTE — PROGRESS NOTES
Occupational Therapy  Facility/Department: [address] ORTHO/MED SURG  Daily Treatment Note  NAME: Belén Maxwell  : 1945  MRN: [id number]    Date of Service: 2021    Discharge Recommendations:  Patient would benefit from continued therapy after discharge       Assessment   Performance deficits / Impairments: Decreased functional mobility ; Decreased ADL status; Decreased strength;Decreased safe awareness;Decreased cognition;Decreased balance;Decreased endurance;Decreased posture;Decreased fine motor control;Decreased coordination  Assessment: Pt would benefit from continued acute care and post acute care OT services. Pt would not be safe to return home/prior living arrangements at this time d/t BLE weakness and confusion. Prognosis: Good  REQUIRES OT FOLLOW UP: Yes  Activity Tolerance  Activity Tolerance: Treatment limited secondary to decreased cognition;Patient Tolerated treatment well;Can become Agitated easily  Safety Devices  Safety Devices in place: Yes  Type of devices: Call light within reach; Chair alarm in place;Gait belt;Patient at risk for falls; Left in chair;Nurse notified         Patient Diagnosis(es): There were no encounter diagnoses. has a past medical history of CHF (congestive heart failure) ([facility] [address] COPD (chronic obstructive pulmonary disease) ([facility] [address] Diabetes mellitus ([facility] [address] MRSA (methicillin resistant staph aureus) culture positive, and Tobacco abuse.   has a past surgical history that includes joint replacement (Bilateral); Hysterectomy; and Appendectomy. Restrictions  Restrictions/Precautions  Restrictions/Precautions: General Precautions, Fall Risk, Up as Tolerated  Required Braces or Orthoses?: No  Position Activity Restriction  Other position/activity restrictions:  Up with assist, 4L O2 NC, Confused  Subjective   General  Patient assessed for rehabilitation services?: Yes  Response to previous treatment: Patient with no complaints from previous session  Family / Caregiver Present: No  General Comment  Comments: RN okayed for therapy this date. Pt agreeable to participate in session and pleasantly confused throughout. Pt stated, \"18\" throughout tx; Pt responded, \"yeah\" to ~50% of questions; Pt responded to some questions w/3-5 word sentences  Vital Signs  Patient Currently in Pain: Denies   Orientation  Orientation  Overall Orientation Status: Impaired  Orientation Level: Oriented to person;Disoriented to place; Disoriented to time;Disoriented to situation  Objective    ADL  Feeding: Minimal assistance;Setup;Verbal cueing; Increased time to complete; Thickened liquids(Min A-open containers, able to feed self)  Grooming: Minimal assistance;Setup;Verbal cueing; Increased time to complete(Min A-open containers, able to brush teeth, wash face w/multiple vc's to initiate task)  Toileting: Unable to assess([place], brief)    Additional Comments: Pt in bed upon arrival. Nursing students in room states pt was washed up this am by students. Pt did not complete grooming and was setup at tray table with multiple vc's to initiate task (see above for LOF). Pt cantankerous at times and slightly agitated with verbal encouragement to get to EOB. Pt transferred to eob SBA w/HOB elevated and use of bed rail. STS Mod assist w/RW and min for func mob to chair. Pt had no LOB, remained in recliner, BLE elevated, call light and phone in reach and chair alarm activated. RN notified.         Balance  Sitting Balance: Stand by assistance(sitting at EOB supported by BUE)  Standing Balance: Minimal assistance(w/RW)  Standing Balance  Time: Pt stood for approx 2 min for transfer from EOB to chair near bed  Comment: w/RW and No LOB  Functional Mobility  Functional - Mobility Device: Rolling Walker  Assist Level: Minimal assistance  Functional Mobility Comments: No LOB  Bed mobility  Rolling to Left: Stand by assistance;Modified independent(Using bed rail)  Supine to Sit: Modified independent;Stand by assistance(HOB elevated) Sit to Supine: Unable to assess(left up in chair)  Scooting: Stand by assistance  Comment: Max encouragement given to get OOB, pt becoming agitated at times w/encouragement  Transfers  Stand Step Transfers: Minimal assistance w/RW  Sit to stand: Moderate assistance  Stand to sit: Moderate assistance    Plan   Plan  Times per week: 3-5x/wk  Current Treatment Recommendations: Strengthening, Balance Training, Functional Mobility Training, Endurance Training, Safety Education & Training, Self-Care / ADL, Patient/Caregiver Education & Training, Equipment Evaluation, Education, & procurement, Positioning, ROM, Cognitive Reorientation    Goals  Short term goals  Time Frame for Short term goals: Pt will, by discharge:  Short term goal 1: Perform feeding/grooming tasks with setup and AE/DME PRN  Short term goal 2: Perform UB ADL tasks with CGA and use of AE/DME PRN  Short term goal 3: Perform LB ADL tasks/toileting tasks with min A using AE/DME PRN  Short term goal 4:  Independently demo good safety awareness during engagement in ADLs and functional transfers/functional mobility  Short term goal 5: Perform functional transfers with mod A using least restrictive AD     Therapy Time   Individual Concurrent Group Co-treatment   Time In  0855         Time Out  0955         Minutes  40 total tx time      20 min co-tx time           VIOLETTA DUNNE/VERA

## 2021-02-26 NOTE — CARE COORDINATION
Transition planning  Called and spoke with Nakul Kelly at [facility] regarding precert, she states they are still waiting for precert, will keep CM updated. [address] call from Nakul Kelly at [facility], she states they have precert and patient is able to come to facility today. Called and spoke with [employer] flight network, unable to set transportation up until 9:00 pm tonight, transportation set up for 10:00 am tomorrow. Called and notified Nakul Kelly of 10:00 am p/u time tray. 2-27. Report # [phone]  Fax # [phone]    Faxed H&P,AVS/TAMARA, HENS to [facility] [phone]. Notified patient's RN Analy Guzman and pt's glenn Ferraro [phone] of patient discharge tomorrow to [facility] at 10:00 am.   Received call from Nakul Kelly at [facility] she states patient needs Covid test before admitting. PS Dr. William Dunham for covid test order.

## 2021-02-27 VITALS
RESPIRATION RATE: 18 BRPM | TEMPERATURE: 97.4 F | HEIGHT: 62 IN | WEIGHT: 145 LBS | SYSTOLIC BLOOD PRESSURE: 121 MMHG | OXYGEN SATURATION: 98 % | BODY MASS INDEX: 26.68 KG/M2 | DIASTOLIC BLOOD PRESSURE: 80 MMHG | HEART RATE: 75 BPM

## 2021-02-27 LAB
ABSOLUTE EOS #: 0.26 K/UL (ref 0–0.44)
ABSOLUTE IMMATURE GRANULOCYTE: 0 K/UL (ref 0–0.3)
ABSOLUTE LYMPH #: 2.73 K/UL (ref 1.1–3.7)
ABSOLUTE MONO #: 0.88 K/UL (ref 0.1–1.2)
ANION GAP SERPL CALCULATED.3IONS-SCNC: 10 MMOL/L (ref 9–17)
BASOPHILS # BLD: 1 % (ref 0–2)
BASOPHILS ABSOLUTE: 0.09 K/UL (ref 0–0.2)
BUN BLDV-MCNC: 7 MG/DL (ref 8–23)
BUN/CREAT BLD: ABNORMAL (ref 9–20)
CALCIUM SERPL-MCNC: 8.7 MG/DL (ref 8.6–10.4)
CHLORIDE BLD-SCNC: 103 MMOL/L (ref 98–107)
CO2: 24 MMOL/L (ref 20–31)
CREAT SERPL-MCNC: 0.55 MG/DL (ref 0.5–0.9)
DIFFERENTIAL TYPE: ABNORMAL
EOSINOPHILS RELATIVE PERCENT: 3 % (ref 1–4)
GFR AFRICAN AMERICAN: >60 ML/MIN
GFR NON-AFRICAN AMERICAN: >60 ML/MIN
GFR SERPL CREATININE-BSD FRML MDRD: ABNORMAL ML/MIN/{1.73_M2}
GFR SERPL CREATININE-BSD FRML MDRD: ABNORMAL ML/MIN/{1.73_M2}
GLUCOSE BLD-MCNC: 79 MG/DL (ref 65–105)
GLUCOSE BLD-MCNC: 83 MG/DL (ref 70–99)
HCT VFR BLD CALC: 35.4 % (ref 36.3–47.1)
HEMOGLOBIN: 10.8 G/DL (ref 11.9–15.1)
IMMATURE GRANULOCYTES: 0 %
LYMPHOCYTES # BLD: 31 % (ref 24–43)
MCH RBC QN AUTO: 24.8 PG (ref 25.2–33.5)
MCHC RBC AUTO-ENTMCNC: 30.5 G/DL (ref 28.4–34.8)
MCV RBC AUTO: 81.2 FL (ref 82.6–102.9)
MONOCYTES # BLD: 10 % (ref 3–12)
MORPHOLOGY: ABNORMAL
NRBC AUTOMATED: 0 PER 100 WBC
PDW BLD-RTO: 28 % (ref 11.8–14.4)
PLATELET # BLD: 394 K/UL (ref 138–453)
PLATELET ESTIMATE: ABNORMAL
PMV BLD AUTO: 10.9 FL (ref 8.1–13.5)
POTASSIUM SERPL-SCNC: 4 MMOL/L (ref 3.7–5.3)
RBC # BLD: 4.36 M/UL (ref 3.95–5.11)
RBC # BLD: ABNORMAL 10*6/UL
SEG NEUTROPHILS: 55 % (ref 36–65)
SEGMENTED NEUTROPHILS ABSOLUTE COUNT: 4.84 K/UL (ref 1.5–8.1)
SODIUM BLD-SCNC: 137 MMOL/L (ref 135–144)
WBC # BLD: 8.8 K/UL (ref 3.5–11.3)
WBC # BLD: ABNORMAL 10*3/UL

## 2021-02-27 PROCEDURE — 6360000002 HC RX W HCPCS: Performed by: STUDENT IN AN ORGANIZED HEALTH CARE EDUCATION/TRAINING PROGRAM

## 2021-02-27 PROCEDURE — 6370000000 HC RX 637 (ALT 250 FOR IP): Performed by: FAMILY MEDICINE

## 2021-02-27 PROCEDURE — 6370000000 HC RX 637 (ALT 250 FOR IP): Performed by: NURSE PRACTITIONER

## 2021-02-27 PROCEDURE — 82947 ASSAY GLUCOSE BLOOD QUANT: CPT

## 2021-02-27 PROCEDURE — 6370000000 HC RX 637 (ALT 250 FOR IP): Performed by: STUDENT IN AN ORGANIZED HEALTH CARE EDUCATION/TRAINING PROGRAM

## 2021-02-27 PROCEDURE — 94660 CPAP INITIATION&MGMT: CPT

## 2021-02-27 PROCEDURE — 80048 BASIC METABOLIC PNL TOTAL CA: CPT

## 2021-02-27 PROCEDURE — 85025 COMPLETE CBC W/AUTO DIFF WBC: CPT

## 2021-02-27 PROCEDURE — 36415 COLL VENOUS BLD VENIPUNCTURE: CPT

## 2021-02-27 PROCEDURE — 99239 HOSP IP/OBS DSCHRG MGMT >30: CPT | Performed by: FAMILY MEDICINE

## 2021-02-27 RX ADMIN — ENOXAPARIN SODIUM 40 MG: 40 INJECTION SUBCUTANEOUS at 09:23

## 2021-02-27 RX ADMIN — CARVEDILOL 3.12 MG: 3.12 TABLET, FILM COATED ORAL at 09:23

## 2021-02-27 RX ADMIN — PANTOPRAZOLE SODIUM 20 MG: 20 TABLET, DELAYED RELEASE ORAL at 06:47

## 2021-02-27 RX ADMIN — Medication 1 TABLET: at 09:22

## 2021-02-27 RX ADMIN — Medication 100 MG: at 09:22

## 2021-02-27 RX ADMIN — FOLIC ACID 1 MG: 1 TABLET ORAL at 09:23

## 2021-02-27 RX ADMIN — LISINOPRIL 2.5 MG: 2.5 TABLET ORAL at 09:22

## 2021-02-27 RX ADMIN — LEVETIRACETAM 500 MG: 500 TABLET, FILM COATED ORAL at 09:22

## 2021-02-27 RX ADMIN — FUROSEMIDE 40 MG: 40 TABLET ORAL at 09:23

## 2021-02-27 RX ADMIN — SERTRALINE 50 MG: 50 TABLET, FILM COATED ORAL at 09:22

## 2021-02-27 RX ADMIN — ATORVASTATIN CALCIUM 20 MG: 20 TABLET, FILM COATED ORAL at 09:23

## 2021-02-27 RX ADMIN — Medication 81 MG: at 09:23

## 2021-02-27 NOTE — PLAN OF CARE
Problem: Falls - Risk of:  Goal: Will remain free from falls  Description: Will remain free from falls  Outcome: Met This Shift  Goal: Absence of physical injury  Description: Absence of physical injury  Outcome: Met This Shift     Problem: Skin Integrity:  Goal: Will show no infection signs and symptoms  Description: Will show no infection signs and symptoms  Outcome: Ongoing  Goal: Absence of new skin breakdown  Description: Absence of new skin breakdown  Outcome: Ongoing     Problem: Discharge Planning:  Goal: Participates in care planning  Description: Participates in care planning  Outcome: Ongoing  Goal: Discharged to appropriate level of care  Description: Discharged to appropriate level of care  Outcome: Ongoing     Problem: Airway Clearance - Ineffective:  Goal: Ability to maintain a clear airway will improve  Description: Ability to maintain a clear airway will improve  Outcome: Ongoing     Problem: Anxiety/Stress:  Goal: Level of anxiety will decrease  Description: Level of anxiety will decrease  Outcome: Ongoing     Problem: Aspiration:  Goal: Absence of aspiration  Description: Absence of aspiration  Outcome: Ongoing     Problem:  Bowel Function - Altered:  Goal: Bowel elimination is within specified parameters  Description: Bowel elimination is within specified parameters  Outcome: Ongoing     Problem: Cardiac Output - Decreased:  Goal: Hemodynamic stability will improve  Description: Hemodynamic stability will improve  Outcome: Ongoing     Problem: Fluid Volume - Imbalance:  Goal: Absence of imbalanced fluid volume signs and symptoms  Description: Absence of imbalanced fluid volume signs and symptoms  Outcome: Ongoing     Problem: Gas Exchange - Impaired:  Goal: Levels of oxygenation will improve  Description: Levels of oxygenation will improve  Outcome: Ongoing     Problem: Mental Status - Impaired:  Goal: Mental status will be restored to baseline  Description: Mental status will be restored to meet at least 50% of estimated nutrition needs.      Outcome: Ongoing     Problem: Pain:  Goal: Pain level will decrease  Description: Pain level will decrease  Outcome: Ongoing  Goal: Control of acute pain  Description: Control of acute pain  Outcome: Ongoing  Goal: Control of chronic pain  Description: Control of chronic pain  Outcome: Ongoing

## 2021-02-27 NOTE — DISCHARGE SUMMARY
[facility]  Office: [phone]  Clarisa Carrasco Damion DO, Anita Dueñas MD, Jarocho Hickey MD, Segundo Ocasio MD, Jerod Simon MD, Quirino Bowling MD, Jannie Sweeney MD, Amber Crockett MD, Janet Hayden MD, Alexandru Garcia MD, Jeppie Soulier DO, Tristen Macias MD, Dory Norman MD, Ruddy Romero DO, Kaci Joe MD,  Kristan Contreras MD, Sarkis Cantor MD, Madhuri Frias [facility], [facility] CNP, Eula Powers CNP, Bárbara Morataya CNS, Sylvie Taylor CNP, Carin Leung CNP, Mann Ricci CNP, Liya Abernathy CNP, Dave Vega CNP, Martin Martin PA-C, Deann Galvan CNP, Madonna Mora CNP, Getachew Law CNP, Beryl Castaneda CNP, Magda Black CNP, Elvis Severs, [address] [place] [address]      Discharge Summary     Patient ID: Yanira Velazquez  :  1945   MRN: [id number]     ACCOUNT:  [de-identified]   Patient Location : [address] [place] [address]  Patient's PCP: Kiersten Ortiz MD  Admit Date: 2/15/2021   Discharge Date: 2021     Length of Stay: 12  Code Status:  Prior  Admitting Physician: Qi Schroeder DO  Discharge Physician: Segundo Ocasio MD     Active Discharge Diagnosis :     Primary Problem  Acute encephalopathy      [facility] Problems    Diagnosis Date Noted    Heart failure, systolic, with acute decompensation ([facility] [I50.23] 2021     Priority: High    Acute encephalopathy [G93.40]     Severe malnutrition ([facility] [E43] 2021    Essential hypertension [I10] 2021    Diabetes mellitus ([facility] [E11.9]     Cocaine abuse ([facility] [F14.10] 2014    Polysubstance abuse ([facility] [F19.10] 2014    COPD (chronic obstructive pulmonary disease) ([facility] [J44.9] 2014    Acute respiratory failure with hypoxemia ([facility] [J96.01] 2014       Admission Condition:  poor     Discharged Condition: stable    Hospital Stay:     Hospital Course:  Yanira Velazquez is a 77 y.o. female who was admitted for the management of   Acute encephalopathy. Patient was initially admitted to [facility] on 2/6/2021 with weakness, dizziness, speech changes, change in mental status.  Patient was hypotensive, with blood pressure systolic in 85V, hypoxic 01% on room air.  Evaluation at that time showed elevated proBNP 13,553.  Chest x-ray showed bilateral airspace and interstitial opacities.  CT head was negative for acute intracranial abnormality.  EKG showed sinus bradycardia without acute ischemic changes.  Patient was admitted with diagnosis of pneumonia and acute CHF.  It was also reported that patient had poor functional status for last 2 months with poor appetite, not eating and drinking well.  Patient was recommended oxygen at night but was not using it.  She has underlying history of type 2 diabetes mellitus, COPD.  Evaluation at [facility] showed severely reduced EF 20%.  Patient was treated with dopamine, diuretics.  Cardiology and neurology were consulted. Akua Gutiérrez was treated with Rocephin, azithromycin, later switched to cefepime, IV acyclovir for possible herpes encephalitis.  She had abnormal EEG with diffuse slowing without any lateralized or epileptiform activity. . Akua Gutiérrez was treated with Keppra, Depacon.  CT head, MRI brain did not show any acute abnormality.  Patient had spinal tap which showed 0 WBC, protein 25 and was not suggestive of meningeal encephalitis.  Neurology recommended to transfer patient to [facility] for LTME monitoring and further evaluation.   She was on high flow previously.    Precedex infusion was stopped. Patient was transitioned to nasal cannula. EEG showed diffuse encephalopathy without any epileptiform discharges. Antiepileptic medications per change per neurology and Depakote was stopped when patient transition to [place] [id number] continued for short-term. Patient's mental status improved and was oriented to self, person at discharge.   CHF is compensated and respiratory status improved and was stable on 4 L of oxygen via nasal cannula during the day and BiPAP at night. Disposition was delayed due to arrangement for skilled nursing facility placement. Significant therapeutic interventions:   1. Acute systolic heart failure -improved. Continue oral Lasix 40 mg daily, fluid restriction.    Ischemic work-up once patient's mental status improves. Follow-up with cardiology. 2. Acute metabolic encephalopathy - Meningoencephalitis ruled out.  Acyclovir discontinued.    Treated with Precedex infusion weaned and stopped. .  Plan to continue Keppra 500 mg twice daily. Depakote has been stopped. 3. DM2 -last of the 6.2 -Humalog as needed. 4. Cardiogenic shock - off norepinephrine.  Low-dose Coreg as tolerated. 5. V. Tach - continue Coreg. 6. Acute on chronic hypoxic respiratory failure -was treated with high flow.  Continue oxygen by nasal cannula. BiPAP at nighttime with IPAP 12/EPAP 6 . Incentive spirometer. Continue oxygen weaned to 4 L/min. 7. H/o polysubstance use -   8. COPD with chronic respiratory failure - continue oxygen. 9. Dysphagia-dysphagia diet.     Significant Diagnostic Studies:   Labs / Micro:/Radiology  Recent Labs     02/27/21  0634 02/26/21  0558 02/25/21  0154   WBC 8.8 8.8 9.8   HGB 10.8* 11.1* 11.5*   HCT 35.4* 37.6 38.6   MCV 81.2* 85.1 83.5    368 416     Labs Renal Latest Ref Rng & Units 2/27/2021 2/26/2021 2/25/2021 2/25/2021 2/24/2021   BUN 8 - 23 mg/dL 7(L) 9 - 12 14   Cr 0.50 - 0.90 mg/dL 0.55 0.47(L) - 0.49(L) 0.61   K 3.7 - 5.3 mmol/L 4.0 3.9 3.8 3.5(L) 3.4(L)   Na 135 - 144 mmol/L 137 138 - 137 142     Lab Results   Component Value Date    ALT 21 02/18/2021    AST 38 (H) 02/18/2021    ALKPHOS 53 02/18/2021    BILITOT 0.64 02/18/2021     Lab Results   Component Value Date    TSH 5.35 (H) 02/17/2021     Lab Results   Component Value Date    HEPAIGM NONREACTIVE 08/29/2014    HEPBIGM NONREACTIVE 08/29/2014 HEPCAB REACTIVE (A) 08/29/2014     Lab Results   Component Value Date    COLORU YELLOW 02/17/2021    NITRU NEGATIVE 02/17/2021    GLUCOSEU NEGATIVE 02/17/2021    GLUCOSEU NEGATIVE 10/24/2011    KETUA NEGATIVE 02/17/2021    UROBILINOGEN Normal 02/17/2021    BILIRUBINUR NEGATIVE 02/17/2021    BILIRUBINUR NEGATIVE 10/24/2011     Lab Results   Component Value Date    LABA1C 6.2 (H) 02/07/2021     Lab Results   Component Value Date     02/07/2021     Lab Results   Component Value Date    INR 1.3 02/08/2021    INR 1.4 02/06/2021    PROTIME 16.4 (H) 02/08/2021    PROTIME 16.6 (H) 02/06/2021       Xr Chest Portable    Result Date: 2/24/2021  Cardiomegaly with bilateral pulmonary edema. Less pulmonary edema was noted when compared to 02/15/2021. Consultations:    Consults:     Final Specialist Recommendations/Findings:   IP CONSULT TO NEUROCRITICAL CARE  IP CONSULT TO NEUROLOGY  IP CONSULT TO CARDIOLOGY  IP CONSULT TO DIETITIAN      The patient was seen and examined on day of discharge and this discharge summary is in conjunction with any daily progress note from day of discharge. Discharge plan:     Disposition: SNF     Physician Follow Up: Follow-up with cardiology , neurology, pulmonology. Dysphagia diet. No follow-up provider specified. Requiring Further Evaluation/Follow Up POST HOSPITALIZATION/Incidental Findings: follow up with Cardiology for ischemic work up . Diet: cardiac diet    Activity: As tolerated. Instructions to Patient: medication as advised.      Discharge Medications:      Medication List      START taking these medications    Arformoterol Tartrate 15 MCG/2ML Nebu  Commonly known as: BROVANA  Take 2 mLs by nebulization 2 times daily     budesonide 0.5 MG/2ML nebulizer suspension  Commonly known as: PULMICORT  Take 2 mLs by nebulization 2 times daily     calcium carbonate-vitamin D3 600-400 MG-UNIT Tabs per tab  Commonly known as: CALTRATE  Take 1 tablet by mouth daily carvedilol 3.125 MG tablet  Commonly known as: COREG  Take 1 tablet by mouth 2 times daily     folic acid 1 MG tablet  Commonly known as: FOLVITE  Take 1 tablet by mouth daily     levETIRAcetam 500 MG tablet  Commonly known as: KEPPRA  Take 1 tablet by mouth 2 times daily     polyethylene glycol 17 g packet  Commonly known as: GLYCOLAX  Take 17 g by mouth daily as needed for Constipation     thiamine 100 MG tablet  Take 1 tablet by mouth daily        CHANGE how you take these medications    furosemide 40 MG tablet  Commonly known as: LASIX  Take 1 tablet by mouth daily  What changed:   · medication strength  · how much to take     lisinopril 2.5 MG tablet  Commonly known as: PRINIVIL;ZESTRIL  Take 1 tablet by mouth daily  What changed:   · medication strength  · how much to take        CONTINUE taking these medications    albuterol (2.5 MG/3ML) 0.083% nebulizer solution  Commonly known as: PROVENTIL  Take 3 mLs by nebulization every 6 hours as needed for Wheezing or Shortness of Breath     aspirin 81 MG tablet     atorvastatin 20 MG tablet  Commonly known as: LIPITOR     calcium-vitamin D 500-200 MG-UNIT per tablet  Commonly known as: [id number]-647     CERTAVITE SENIOR PO     loratadine 10 MG tablet  Commonly known as: CLARITIN     pantoprazole 20 MG tablet  Commonly known as: PROTONIX  Take 1 tablet by mouth daily     sertraline 50 MG tablet  Commonly known as: ZOLOFT        STOP taking these medications    ibuprofen 800 MG tablet  Commonly known as: ADVIL;MOTRIN     pregabalin 100 MG capsule  Commonly known as: LYRICA     Spiriva Respimat 2.5 MCG/ACT Aers inhaler  Generic drug: tiotropium     Symbicort 160-4.5 MCG/ACT Aero  Generic drug: budesonide-formoterol     tiZANidine 4 MG tablet  Commonly known as: ZANAFLEX     traZODone 150 MG tablet  Commonly known as: DESYREL           Where to Get Your Medications      Information about where to get these medications is not yet available    Ask your nurse or doctor about these medications  · Arformoterol Tartrate 15 MCG/2ML Nebu  · budesonide 0.5 MG/2ML nebulizer suspension  · calcium carbonate-vitamin D3 600-400 MG-UNIT Tabs per tab  · carvedilol 9.943 MG tablet  · folic acid 1 MG tablet  · furosemide 40 MG tablet  · levETIRAcetam 500 MG tablet  · lisinopril 2.5 MG tablet  · polyethylene glycol 17 g packet  · thiamine 100 MG tablet         Time Spent on discharge is  34 minutes on 2/26/21 in patient examination, evaluation, counseling as well as medication reconciliation, prescriptions for required medications, discharge plan and follow up. Patient was not seen on 2/27/21. Electronically signed by   Rebecca Chandra MD  2/27/2021        Thank you Dr. Delilah Holt MD for the opportunity to be involved in this patient's care.

## 2021-02-27 NOTE — CARE COORDINATION
Discharge [facility] Case Management Department  Written by: Mohit Lewis RN    Patient Name: Denise Hunter  Attending Provider: Taylor Pittman MD  Admit Date: 2/15/2021  6:29 PM  MRN: [id number]  Account: [de-identified]                     : 1945  Discharge Date: 2021      Disposition: SNF via ambulance/stretcher.     Mohit Lewis RN

## 2021-03-01 LAB — GLUCOSE BLD-MCNC: 70 MG/DL (ref 65–105)

## 2021-03-02 ENCOUNTER — HOSPITAL ENCOUNTER (OUTPATIENT)
Age: 76
Setting detail: SPECIMEN
Discharge: HOME | End: 2021-03-02
Payer: MEDICARE

## 2021-03-03 ENCOUNTER — APPOINTMENT (OUTPATIENT)
Dept: GENERAL RADIOLOGY | Age: 76
DRG: 189 | End: 2021-03-03
Payer: MEDICARE

## 2021-03-03 ENCOUNTER — HOSPITAL ENCOUNTER (INPATIENT)
Age: 76
LOS: 7 days | Discharge: NURSING, REHAB OR LONG-TERM CARE | DRG: 189 | End: 2021-03-10
Attending: EMERGENCY MEDICINE | Admitting: INTERNAL MEDICINE
Payer: MEDICARE

## 2021-03-03 ENCOUNTER — APPOINTMENT (OUTPATIENT)
Dept: CT IMAGING | Age: 76
DRG: 189 | End: 2021-03-03
Payer: MEDICARE

## 2021-03-03 ENCOUNTER — TELEPHONE (OUTPATIENT)
Dept: OTHER | Facility: CLINIC | Age: 76
End: 2021-03-03

## 2021-03-03 DIAGNOSIS — R41.0 DISORIENTATION: ICD-10-CM

## 2021-03-03 DIAGNOSIS — R79.89 ELEVATED LACTIC ACID LEVEL: ICD-10-CM

## 2021-03-03 DIAGNOSIS — J96.01 ACUTE HYPOXEMIC RESPIRATORY FAILURE (HCC): Primary | ICD-10-CM

## 2021-03-03 PROBLEM — R53.83 FATIGUE: Status: ACTIVE | Noted: 2021-03-03

## 2021-03-03 PROBLEM — R09.02 HYPOXIC EPISODE: Status: ACTIVE | Noted: 2021-03-03

## 2021-03-03 LAB
ABSOLUTE EOS #: 0 K/UL (ref 0–0.44)
ABSOLUTE IMMATURE GRANULOCYTE: 0 K/UL (ref 0–0.3)
ABSOLUTE LYMPH #: 2.35 K/UL (ref 1.1–3.7)
ABSOLUTE MONO #: 0.69 K/UL (ref 0.1–1.2)
ACETAMINOPHEN LEVEL: <10 UG/ML (ref 10–30)
ALBUMIN SERPL-MCNC: 3.3 G/DL (ref 3.5–5.2)
ALBUMIN/GLOBULIN RATIO: ABNORMAL (ref 1–2.5)
ALP BLD-CCNC: 95 U/L (ref 35–104)
ALT SERPL-CCNC: 29 U/L (ref 5–33)
AMMONIA: 24 UMOL/L (ref 11–51)
ANION GAP SERPL CALCULATED.3IONS-SCNC: 17 MMOL/L (ref 9–17)
AST SERPL-CCNC: 63 U/L
BASOPHILS # BLD: 1 % (ref 0–2)
BASOPHILS ABSOLUTE: 0.1 K/UL (ref 0–0.2)
BILIRUB SERPL-MCNC: 0.87 MG/DL (ref 0.3–1.2)
BILIRUBIN DIRECT: 0.48 MG/DL
BILIRUBIN, INDIRECT: 0.39 MG/DL (ref 0–1)
BNP INTERPRETATION: ABNORMAL
BUN BLDV-MCNC: 19 MG/DL (ref 8–23)
BUN/CREAT BLD: 13 (ref 9–20)
CALCIUM SERPL-MCNC: 8.9 MG/DL (ref 8.6–10.4)
CHLORIDE BLD-SCNC: 100 MMOL/L (ref 98–107)
CHP ED QC CHECK: YES
CO2: 23 MMOL/L (ref 20–31)
CREAT SERPL-MCNC: 1.43 MG/DL (ref 0.5–0.9)
DIFFERENTIAL TYPE: ABNORMAL
EOSINOPHILS RELATIVE PERCENT: 0 % (ref 1–4)
ETHANOL PERCENT: <0.01 %
ETHANOL: <10 MG/DL
FIO2: 100
GFR AFRICAN AMERICAN: 43 ML/MIN
GFR NON-AFRICAN AMERICAN: 36 ML/MIN
GFR SERPL CREATININE-BSD FRML MDRD: ABNORMAL ML/MIN/{1.73_M2}
GFR SERPL CREATININE-BSD FRML MDRD: ABNORMAL ML/MIN/{1.73_M2}
GLOBULIN: ABNORMAL G/DL (ref 1.5–3.8)
GLUCOSE BLD-MCNC: 73 MG/DL
GLUCOSE BLD-MCNC: 78 MG/DL (ref 65–105)
GLUCOSE BLD-MCNC: 98 MG/DL (ref 70–99)
HCT VFR BLD CALC: 38.1 % (ref 36.3–47.1)
HEMOGLOBIN: 12 G/DL (ref 11.9–15.1)
IMMATURE GRANULOCYTES: 0 %
LACTIC ACID: 3.7 MMOL/L (ref 0.5–2.2)
LACTIC ACID: 4 MMOL/L (ref 0.5–2.2)
LACTIC ACID: 4 MMOL/L (ref 0.5–2.2)
LYMPHOCYTES # BLD: 24 % (ref 24–43)
MAGNESIUM: 1.6 MG/DL (ref 1.6–2.6)
MCH RBC QN AUTO: 25.2 PG (ref 25.2–33.5)
MCHC RBC AUTO-ENTMCNC: 31.5 G/DL (ref 28.4–34.8)
MCV RBC AUTO: 80 FL (ref 82.6–102.9)
MONOCYTES # BLD: 7 % (ref 3–12)
MORPHOLOGY: ABNORMAL
NEGATIVE BASE EXCESS, ART: ABNORMAL (ref 0–2)
NRBC AUTOMATED: 0 PER 100 WBC
O2 DEVICE/FLOW/%: ABNORMAL
PATIENT TEMP: 37
PDW BLD-RTO: 27.9 % (ref 11.8–14.4)
PLATELET # BLD: 337 K/UL (ref 138–453)
PLATELET ESTIMATE: ABNORMAL
PMV BLD AUTO: 10.9 FL (ref 8.1–13.5)
POC HCO3: 26.4 MMOL/L (ref 22–27)
POC O2 SATURATION: 99 %
POC PCO2 TEMP: ABNORMAL MM HG
POC PCO2: 52 MM HG (ref 32–45)
POC PH TEMP: ABNORMAL
POC PH: 7.31 (ref 7.35–7.45)
POC PO2 TEMP: ABNORMAL MM HG
POC PO2: 158 MM HG (ref 75–95)
POSITIVE BASE EXCESS, ART: 0 (ref 0–2)
POTASSIUM SERPL-SCNC: 4.8 MMOL/L (ref 3.7–5.3)
PRO-BNP: ABNORMAL PG/ML
RBC # BLD: 4.76 M/UL (ref 3.95–5.11)
RBC # BLD: ABNORMAL 10*6/UL
SALICYLATE LEVEL: <1 MG/DL (ref 3–10)
SARS-COV-2, RAPID: NOT DETECTED
SEG NEUTROPHILS: 68 % (ref 36–65)
SEGMENTED NEUTROPHILS ABSOLUTE COUNT: 6.66 K/UL (ref 1.5–8.1)
SODIUM BLD-SCNC: 140 MMOL/L (ref 135–144)
SPECIMEN DESCRIPTION: NORMAL
TCO2 (CALC), ART: 28 MMOL/L (ref 23–28)
TOTAL PROTEIN: 8.4 G/DL (ref 6.4–8.3)
TOXIC TRICYCLIC SC,BLOOD: NEGATIVE
TROPONIN INTERP: ABNORMAL
TROPONIN T: ABNORMAL NG/ML
TROPONIN, HIGH SENSITIVITY: 55 NG/L (ref 0–14)
TROPONIN, HIGH SENSITIVITY: 59 NG/L (ref 0–14)
TROPONIN, HIGH SENSITIVITY: 63 NG/L (ref 0–14)
WBC # BLD: 9.8 K/UL (ref 3.5–11.3)
WBC # BLD: ABNORMAL 10*3/UL

## 2021-03-03 PROCEDURE — 83735 ASSAY OF MAGNESIUM: CPT

## 2021-03-03 PROCEDURE — 83605 ASSAY OF LACTIC ACID: CPT

## 2021-03-03 PROCEDURE — 36600 WITHDRAWAL OF ARTERIAL BLOOD: CPT

## 2021-03-03 PROCEDURE — 80143 DRUG ASSAY ACETAMINOPHEN: CPT

## 2021-03-03 PROCEDURE — 6360000002 HC RX W HCPCS: Performed by: EMERGENCY MEDICINE

## 2021-03-03 PROCEDURE — 82803 BLOOD GASES ANY COMBINATION: CPT

## 2021-03-03 PROCEDURE — 2700000000 HC OXYGEN THERAPY PER DAY

## 2021-03-03 PROCEDURE — 99285 EMERGENCY DEPT VISIT HI MDM: CPT

## 2021-03-03 PROCEDURE — 2000000000 HC ICU R&B

## 2021-03-03 PROCEDURE — 99223 1ST HOSP IP/OBS HIGH 75: CPT | Performed by: INTERNAL MEDICINE

## 2021-03-03 PROCEDURE — 2580000003 HC RX 258: Performed by: EMERGENCY MEDICINE

## 2021-03-03 PROCEDURE — 2580000003 HC RX 258: Performed by: NURSE PRACTITIONER

## 2021-03-03 PROCEDURE — 71045 X-RAY EXAM CHEST 1 VIEW: CPT

## 2021-03-03 PROCEDURE — 80179 DRUG ASSAY SALICYLATE: CPT

## 2021-03-03 PROCEDURE — 6360000002 HC RX W HCPCS: Performed by: NURSE PRACTITIONER

## 2021-03-03 PROCEDURE — 82947 ASSAY GLUCOSE BLOOD QUANT: CPT

## 2021-03-03 PROCEDURE — 80048 BASIC METABOLIC PNL TOTAL CA: CPT

## 2021-03-03 PROCEDURE — 93005 ELECTROCARDIOGRAM TRACING: CPT | Performed by: EMERGENCY MEDICINE

## 2021-03-03 PROCEDURE — 82140 ASSAY OF AMMONIA: CPT

## 2021-03-03 PROCEDURE — 85025 COMPLETE CBC W/AUTO DIFF WBC: CPT

## 2021-03-03 PROCEDURE — 70450 CT HEAD/BRAIN W/O DYE: CPT

## 2021-03-03 PROCEDURE — 80076 HEPATIC FUNCTION PANEL: CPT

## 2021-03-03 PROCEDURE — U0002 COVID-19 LAB TEST NON-CDC: HCPCS

## 2021-03-03 PROCEDURE — 80307 DRUG TEST PRSMV CHEM ANLYZR: CPT

## 2021-03-03 PROCEDURE — 94761 N-INVAS EAR/PLS OXIMETRY MLT: CPT

## 2021-03-03 PROCEDURE — 2500000003 HC RX 250 WO HCPCS: Performed by: NURSE PRACTITIONER

## 2021-03-03 PROCEDURE — 6360000002 HC RX W HCPCS: Performed by: INTERNAL MEDICINE

## 2021-03-03 PROCEDURE — 83880 ASSAY OF NATRIURETIC PEPTIDE: CPT

## 2021-03-03 PROCEDURE — 84484 ASSAY OF TROPONIN QUANT: CPT

## 2021-03-03 PROCEDURE — G0480 DRUG TEST DEF 1-7 CLASSES: HCPCS

## 2021-03-03 PROCEDURE — 87040 BLOOD CULTURE FOR BACTERIA: CPT

## 2021-03-03 RX ORDER — FUROSEMIDE 10 MG/ML
40 INJECTION INTRAMUSCULAR; INTRAVENOUS 2 TIMES DAILY
Status: DISCONTINUED | OUTPATIENT
Start: 2021-03-03 | End: 2021-03-03

## 2021-03-03 RX ORDER — FUROSEMIDE 10 MG/ML
20 INJECTION INTRAMUSCULAR; INTRAVENOUS 2 TIMES DAILY
Status: DISCONTINUED | OUTPATIENT
Start: 2021-03-03 | End: 2021-03-04

## 2021-03-03 RX ORDER — 0.9 % SODIUM CHLORIDE 0.9 %
1000 INTRAVENOUS SOLUTION INTRAVENOUS ONCE
Status: DISCONTINUED | OUTPATIENT
Start: 2021-03-03 | End: 2021-03-10 | Stop reason: HOSPADM

## 2021-03-03 RX ORDER — PROMETHAZINE HYDROCHLORIDE 12.5 MG/1
12.5 TABLET ORAL EVERY 6 HOURS PRN
Status: DISCONTINUED | OUTPATIENT
Start: 2021-03-03 | End: 2021-03-10 | Stop reason: HOSPADM

## 2021-03-03 RX ORDER — ARFORMOTEROL TARTRATE 15 UG/2ML
15 SOLUTION RESPIRATORY (INHALATION) 2 TIMES DAILY
Status: DISCONTINUED | OUTPATIENT
Start: 2021-03-03 | End: 2021-03-10 | Stop reason: HOSPADM

## 2021-03-03 RX ORDER — BUDESONIDE 0.5 MG/2ML
0.5 INHALANT ORAL 2 TIMES DAILY
Status: DISCONTINUED | OUTPATIENT
Start: 2021-03-03 | End: 2021-03-10 | Stop reason: HOSPADM

## 2021-03-03 RX ORDER — 0.9 % SODIUM CHLORIDE 0.9 %
500 INTRAVENOUS SOLUTION INTRAVENOUS ONCE
Status: COMPLETED | OUTPATIENT
Start: 2021-03-03 | End: 2021-03-03

## 2021-03-03 RX ORDER — CARVEDILOL 3.12 MG/1
3.12 TABLET ORAL 2 TIMES DAILY
Status: DISCONTINUED | OUTPATIENT
Start: 2021-03-03 | End: 2021-03-10 | Stop reason: HOSPADM

## 2021-03-03 RX ORDER — METHYLPREDNISOLONE SODIUM SUCCINATE 125 MG/2ML
60 INJECTION, POWDER, LYOPHILIZED, FOR SOLUTION INTRAMUSCULAR; INTRAVENOUS EVERY 6 HOURS
Status: DISCONTINUED | OUTPATIENT
Start: 2021-03-03 | End: 2021-03-04

## 2021-03-03 RX ORDER — ALBUTEROL SULFATE 2.5 MG/3ML
2.5 SOLUTION RESPIRATORY (INHALATION) EVERY 6 HOURS PRN
Status: DISCONTINUED | OUTPATIENT
Start: 2021-03-03 | End: 2021-03-04

## 2021-03-03 RX ORDER — POLYETHYLENE GLYCOL 3350 17 G/17G
17 POWDER, FOR SOLUTION ORAL DAILY PRN
Status: DISCONTINUED | OUTPATIENT
Start: 2021-03-03 | End: 2021-03-10 | Stop reason: HOSPADM

## 2021-03-03 RX ORDER — ASPIRIN 81 MG/1
81 TABLET ORAL DAILY
Status: DISCONTINUED | OUTPATIENT
Start: 2021-03-04 | End: 2021-03-10 | Stop reason: HOSPADM

## 2021-03-03 RX ORDER — HEPARIN SODIUM 5000 [USP'U]/ML
5000 INJECTION, SOLUTION INTRAVENOUS; SUBCUTANEOUS EVERY 8 HOURS SCHEDULED
Status: DISCONTINUED | OUTPATIENT
Start: 2021-03-03 | End: 2021-03-03 | Stop reason: SDUPTHER

## 2021-03-03 RX ORDER — LEVETIRACETAM 500 MG/1
500 TABLET ORAL 2 TIMES DAILY
Status: DISCONTINUED | OUTPATIENT
Start: 2021-03-03 | End: 2021-03-03

## 2021-03-03 RX ORDER — ALBUTEROL SULFATE 2.5 MG/3ML
2.5 SOLUTION RESPIRATORY (INHALATION)
Status: DISCONTINUED | OUTPATIENT
Start: 2021-03-03 | End: 2021-03-10 | Stop reason: HOSPADM

## 2021-03-03 RX ORDER — GAUZE BANDAGE 2" X 2"
100 BANDAGE TOPICAL DAILY
Status: DISCONTINUED | OUTPATIENT
Start: 2021-03-04 | End: 2021-03-10 | Stop reason: HOSPADM

## 2021-03-03 RX ORDER — PANTOPRAZOLE SODIUM 20 MG/1
20 TABLET, DELAYED RELEASE ORAL DAILY
Status: DISCONTINUED | OUTPATIENT
Start: 2021-03-04 | End: 2021-03-10 | Stop reason: HOSPADM

## 2021-03-03 RX ORDER — HEPARIN SODIUM 5000 [USP'U]/ML
5000 INJECTION, SOLUTION INTRAVENOUS; SUBCUTANEOUS EVERY 8 HOURS SCHEDULED
Status: DISCONTINUED | OUTPATIENT
Start: 2021-03-03 | End: 2021-03-10 | Stop reason: HOSPADM

## 2021-03-03 RX ORDER — SODIUM CHLORIDE 0.9 % (FLUSH) 0.9 %
10 SYRINGE (ML) INJECTION EVERY 12 HOURS SCHEDULED
Status: DISCONTINUED | OUTPATIENT
Start: 2021-03-03 | End: 2021-03-05 | Stop reason: SDUPTHER

## 2021-03-03 RX ORDER — METOPROLOL TARTRATE 5 MG/5ML
5 INJECTION INTRAVENOUS ONCE
Status: COMPLETED | OUTPATIENT
Start: 2021-03-03 | End: 2021-03-03

## 2021-03-03 RX ORDER — ATORVASTATIN CALCIUM 20 MG/1
20 TABLET, FILM COATED ORAL DAILY
Status: DISCONTINUED | OUTPATIENT
Start: 2021-03-04 | End: 2021-03-10 | Stop reason: HOSPADM

## 2021-03-03 RX ORDER — FOLIC ACID 1 MG/1
1 TABLET ORAL DAILY
Status: DISCONTINUED | OUTPATIENT
Start: 2021-03-04 | End: 2021-03-10 | Stop reason: HOSPADM

## 2021-03-03 RX ORDER — FUROSEMIDE 40 MG/1
40 TABLET ORAL DAILY
Status: DISCONTINUED | OUTPATIENT
Start: 2021-03-04 | End: 2021-03-04

## 2021-03-03 RX ORDER — ACETAMINOPHEN 325 MG/1
650 TABLET ORAL EVERY 6 HOURS PRN
Status: DISCONTINUED | OUTPATIENT
Start: 2021-03-03 | End: 2021-03-10 | Stop reason: HOSPADM

## 2021-03-03 RX ORDER — ONDANSETRON 2 MG/ML
4 INJECTION INTRAMUSCULAR; INTRAVENOUS EVERY 6 HOURS PRN
Status: DISCONTINUED | OUTPATIENT
Start: 2021-03-03 | End: 2021-03-10 | Stop reason: HOSPADM

## 2021-03-03 RX ORDER — SODIUM CHLORIDE 0.9 % (FLUSH) 0.9 %
10 SYRINGE (ML) INJECTION PRN
Status: DISCONTINUED | OUTPATIENT
Start: 2021-03-03 | End: 2021-03-05 | Stop reason: SDUPTHER

## 2021-03-03 RX ORDER — ACETAMINOPHEN 650 MG/1
650 SUPPOSITORY RECTAL EVERY 6 HOURS PRN
Status: DISCONTINUED | OUTPATIENT
Start: 2021-03-03 | End: 2021-03-10 | Stop reason: HOSPADM

## 2021-03-03 RX ADMIN — FUROSEMIDE 20 MG: 10 INJECTION, SOLUTION INTRAMUSCULAR; INTRAVENOUS at 19:55

## 2021-03-03 RX ADMIN — HEPARIN SODIUM 5000 UNITS: 5000 INJECTION INTRAVENOUS; SUBCUTANEOUS at 23:59

## 2021-03-03 RX ADMIN — METOPROLOL TARTRATE 5 MG: 5 INJECTION INTRAVENOUS at 23:42

## 2021-03-03 RX ADMIN — SODIUM CHLORIDE, PRESERVATIVE FREE 10 ML: 5 INJECTION INTRAVENOUS at 23:59

## 2021-03-03 RX ADMIN — CEFEPIME HYDROCHLORIDE 2000 MG: 2 INJECTION, POWDER, FOR SOLUTION INTRAVENOUS at 17:21

## 2021-03-03 RX ADMIN — METHYLPREDNISOLONE SODIUM SUCCINATE 60 MG: 125 INJECTION, POWDER, FOR SOLUTION INTRAMUSCULAR; INTRAVENOUS at 19:55

## 2021-03-03 RX ADMIN — SODIUM CHLORIDE 500 ML: 9 INJECTION, SOLUTION INTRAVENOUS at 15:20

## 2021-03-03 ASSESSMENT — ENCOUNTER SYMPTOMS
SORE THROAT: 0
COLOR CHANGE: 0
NAUSEA: 0
SHORTNESS OF BREATH: 0
EYE REDNESS: 0
DIARRHEA: 0
COUGH: 0
EYE DISCHARGE: 0
VOMITING: 0
RHINORRHEA: 0

## 2021-03-03 NOTE — ED NOTES
Pt placed on high flow NC, pt sitting forward with eyes open and able to answer staff by shaking head yes or no.       Carey Granados RN  03/03/21 5218

## 2021-03-03 NOTE — ED NOTES
Writer attempted to to straight cath pt per verbal request from Dr Mahesh Felipe unable to obtain enough urine for sample. Pt had urine in brief which was dark and foul smelling. Dr Martie Spatz notified, fluid bolus given.       Luis Tubbs RN  03/03/21 4170

## 2021-03-03 NOTE — TELEPHONE ENCOUNTER
Writer contacted ED provider Courtney Christian to inform of 30 day readmission risk. Writer's attempt to contact ED provider was unsuccessful. No Decision on disposition at this time.

## 2021-03-03 NOTE — Clinical Note
Patient Class: Inpatient [101]   REQUIRED: Diagnosis: Hypoxic episode [540095]   Estimated Length of Stay: Estimated stay of less than 2 midnights   Admitting Provider: Lashell Peterson [[id number]]   Telemetry Bed Required?: Yes

## 2021-03-03 NOTE — ED NOTES
Pt found by RN at the end of the bed with head on hands asleep without oxygen on. Pt was assisted back into bed with the help of Pct and Dr Reji Damon who is at bedside. Pt lethargic and pulse ox not reading on monitor. Pt has dusky finger nail, placed on 5 L nc, 02 sat reading 79 with highest 88%. Respiratory called.       Dwayne Acosta RN  03/03/21 2253

## 2021-03-03 NOTE — ED PROVIDER NOTES
EMERGENCY DEPARTMENT ENCOUNTER    Pt Name: Denise Hunter  MRN: [id number]  [place] 1945  Date of evaluation: 3/3/21  CHIEF COMPLAINT       Chief Complaint   Patient presents with    Fatigue     From [facility]   Is a 80-year-old female that presents with complaints of some fatigue, generalized weakness and mild altered mental status. Patient was just recently admitted to the nursing home from a visit at [place].  The patient at that time had evidence of altered mentation, she was evaluated by neurology and was felt to be discharged home safely. The nursing home states that the patient has had some decreased responsiveness, not been acting quite herself over the past 24 hours. For this reason they sent her for further evaluation. The patient denies any complaints, she denies any chest pain or shortness of breath, she has no abdominal pain nausea or vomiting. She denies any fevers or chills. REVIEW OF SYSTEMS     Review of Systems   Constitutional: Negative for chills and fever. HENT: Negative for rhinorrhea and sore throat. Eyes: Negative for discharge, redness and visual disturbance. Respiratory: Negative for cough and shortness of breath. Cardiovascular: Negative for chest pain, palpitations and leg swelling. Gastrointestinal: Negative for diarrhea, nausea and vomiting. Musculoskeletal: Negative for arthralgias, myalgias and neck pain. Skin: Negative for color change and rash. Neurological: Negative for seizures, weakness and headaches. Psychiatric/Behavioral: Negative for hallucinations, self-injury and suicidal ideas.      PASTMEDICAL HISTORY     Past Medical History:   Diagnosis Date    CHF (congestive heart failure) (HCC)     COPD (chronic obstructive pulmonary disease) (HCC)     Diabetes mellitus (HCC)     MRSA (methicillin resistant staph aureus) culture positive 8/28/2014    sputum    Tobacco abuse 10/15/2019     Past Problem List  Patient Active Problem List   Diagnosis Code    COPD (chronic obstructive pulmonary disease) ([facility] J44.9    CHF (congestive heart failure) ([facility]) I50.9    Cocaine abuse ([facility] F14.10    Acidosis, metabolic, with respiratory acidosis E87.4    Acute respiratory failure with hypoxemia ([facility]) J96.01    Polysubstance abuse ([facility] F19.10    Hyponatremia, Beer Potomania E87.1    Normocytic anemia X11.5    Neutrophilic leukocytosis P47.5    Increased ammonia level R79.89    Hypophosphatemia E83.39    Type 2 diabetes mellitus without complication, without long-term current use of insulin ([facility]) E11.9    Acute on chronic diastolic heart failure ([facility]) I50.33    Tobacco abuse Z72.0    Diabetes mellitus ([facility]) E11.9    Chronic diastolic heart failure ([facility]) I50.32    Community acquired pneumonia of right middle lobe of lung J18.9    Elevated troponin R77.8    Prolonged Q-T interval on ECG R94.31    Hypoxia R09.02    Chronic respiratory failure ([facility]) J96.10    Mild malnutrition ([facility]) E44.1    Hypotension I95.9    DEONTE (acute kidney injury) ([facility] N17.9    Current every day smoker F17.200    Essential hypertension I10    Altered mental status R41.82    Heart failure, systolic, with acute decompensation ([facility]) I50.23    Severe malnutrition ([facility]) E43    Abnormal EEG R94.01    Acute encephalopathy G93.40    Generalized nonconvulsive seizures ([facility] G40.309     SURGICAL HISTORY       Past Surgical History:   Procedure Laterality Date    APPENDECTOMY      HYSTERECTOMY      JOINT REPLACEMENT Bilateral      CURRENT MEDICATIONS       Previous Medications    ALBUTEROL (PROVENTIL) (2.5 MG/3ML) 0.083% NEBULIZER SOLUTION    Take 3 mLs by nebulization every 6 hours as needed for Wheezing or Shortness of Breath    ARFORMOTEROL TARTRATE (BROVANA) 15 MCG/2ML NEBU    Take 2 mLs by nebulization 2 times daily    ASPIRIN 81 MG TABLET    Take 81 mg by mouth daily.     ATORVASTATIN (LIPITOR) 20 MG TABLET    Take 20 mg by mouth daily     BUDESONIDE (PULMICORT) 0.5 MG/2ML NEBULIZER SUSPENSION    Take 2 mLs by nebulization 2 times daily    CALCIUM CARBONATE-VITAMIN D3 (CALTRATE) 600-400 MG-UNIT TABS PER TAB    Take 1 tablet by mouth daily    CALCIUM-VITAMIN D (OSCAL-500) 500-200 MG-UNIT PER TABLET    Take 1 tablet by mouth daily. CARVEDILOL (COREG) 3.125 MG TABLET    Take 1 tablet by mouth 2 times daily    FOLIC ACID (FOLVITE) 1 MG TABLET    Take 1 tablet by mouth daily    FUROSEMIDE (LASIX) 40 MG TABLET    Take 1 tablet by mouth daily    LEVETIRACETAM (KEPPRA) 500 MG TABLET    Take 1 tablet by mouth 2 times daily    LISINOPRIL (PRINIVIL;ZESTRIL) 2.5 MG TABLET    Take 1 tablet by mouth daily    LORATADINE (CLARITIN) 10 MG TABLET    Take 10 mg by mouth daily    MULTIPLE VITAMINS-MINERALS (CERTAVITE SENIOR PO)    Take 1 tablet by mouth daily    PANTOPRAZOLE (PROTONIX) 20 MG TABLET    Take 1 tablet by mouth daily    POLYETHYLENE GLYCOL (GLYCOLAX) 17 G PACKET    Take 17 g by mouth daily as needed for Constipation    SERTRALINE (ZOLOFT) 50 MG TABLET    Take 50 mg by mouth daily    THIAMINE 100 MG TABLET    Take 1 tablet by mouth daily     ALLERGIES     is allergic to tiotropium and spiriva handihaler [tiotropium bromide monohydrate]. FAMILY HISTORY     She indicated that her mother is . She indicated that her father is . SOCIAL HISTORY       Social History     Tobacco Use    Smoking status: Current Every Day Smoker     Packs/day: 0.50     Types: Cigarettes    Smokeless tobacco: Never Used   Substance Use Topics    Alcohol use:  Yes     Alcohol/week: 70.0 standard drinks     Types: 70 Cans of beer per week     Comment: 840 oz (3, 40oz beers/day)    Drug use: Yes     Types: Marijuana     PHYSICAL EXAM     INITIAL VITALS: BP (!) 132/102   Pulse 94   Temp 96.8 °F (36 °C) (Axillary)   Resp 14   Ht 5' 6\" (1.676 m)   Wt 145 lb (65.8 kg)   SpO2 92%   BMI 23.40 kg/m² was drawn, repeat chest x-ray shows no significant interval change. Blood gas shows good oxygenation with some mild CO2 retention, patient will be placed on high flow, I discussed the case with the hospitalist service for admission at least overnight for monitoring her respiratory status. The patient's lactic acid further climbed, suggesting dehydration and possibly infection, the patient's chest x-ray looks much more consistent with CHF. CRITICAL CARE:       Critical Care  Performed by: Cornelius Brennan MD  Authorized by: Cornelius Brennan MD     Critical care provider statement:     Critical care time (minutes):  35    Critical care time was exclusive of:  Separately billable procedures and treating other patients and teaching time    Critical care was necessary to treat or prevent imminent or life-threatening deterioration of the following conditions:  Respiratory failure    Critical care was time spent personally by me on the following activities:  Evaluation of patient's response to treatment, examination of patient, pulse oximetry, re-evaluation of patient's condition, review of old charts, ordering and review of laboratory studies and ordering and review of radiographic studies        DIAGNOSTIC RESULTS   EKG:All EKG's are interpreted by the Emergency Department Physician who either signs or Co-signs this chart in the absence of a cardiologist.  Patient's EKG shows sinus rhythm with a rate of 94, [place] QRS intervals are normal, QTC is prolonged. , the patient has left axis deviation, no ST elevations or depressions, no significant T wave changes. Nonspecific EKG. RADIOLOGY:All plain film, CT, MRI, and formal ultrasound images (except ED bedside ultrasound) are read by the radiologist, see reports below, unless otherwisenoted in MDM or here. XR CHEST PORTABLE   Final Result   Bilateral diffuse ground-glass and interstitial opacities, similar to the   radiograph obtained earlier today. Differential considerations include   pulmonary edema versus atypical or viral pneumonia. CT HEAD WO CONTRAST   Final Result   No acute intracranial abnormality on a mildly motion degraded exam.         XR CHEST PORTABLE   Final Result   Bilateral airspace disease with small effusions, most consistent with edema,   has progressed in the interval.           LABS: All lab results were reviewed by myself, and all abnormals are listed below.   Labs Reviewed   BASIC METABOLIC PANEL - Abnormal; Notable for the following components:       Result Value    CREATININE 1.43 (*)     GFR Non- 36 (*)     GFR  43 (*)     All other components within normal limits   CBC WITH AUTO DIFFERENTIAL - Abnormal; Notable for the following components:    MCV 80.0 (*)     RDW 27.9 (*)     Seg Neutrophils 68 (*)     Eosinophils % 0 (*)     All other components within normal limits   TOX SCR, BLD, ED - Abnormal; Notable for the following components:    Acetaminophen Level <65 (*)     Salicylate Lvl <1 (*)     All other components within normal limits   HEPATIC FUNCTION PANEL - Abnormal; Notable for the following components:    Albumin 3.3 (*)     AST 63 (*)     Bilirubin, Direct 0.48 (*)     Total Protein 8.4 (*)     All other components within normal limits   TROPONIN - Abnormal; Notable for the following components:    Troponin, High Sensitivity 59 (*)     All other components within normal limits   LACTIC ACID - Abnormal; Notable for the following components:    Lactic Acid 3.7 (*)     All other components within normal limits   BRAIN NATRIURETIC PEPTIDE - Abnormal; Notable for the following components:    Pro-BNP 73,215 (*)     All other components within normal limits   LACTIC ACID - Abnormal; Notable for the following components:    Lactic Acid 4.0 (*)     All other components within normal limits   TROPONIN - Abnormal; Notable for the following components:    Troponin, High Sensitivity 55 (*)     All other components within normal limits   POC PANEL (G3)-ART - Abnormal; Notable for the following components:    POC pH 7.31 (*)     POC pCO2 52 (*)     POC PO2 158 (*)     All other components within normal limits   CULTURE, BLOOD 1   CULTURE, BLOOD 1   MAGNESIUM   URINALYSIS   LACTIC ACID   TROPONIN   AMMONIA   COVID-19   POCT GLUCOSE   POC GLUCOSE FINGERSTICK       EMERGENCY DEPARTMENTCOURSE:         Vitals:    Vitals:    03/03/21 1151 03/03/21 1521   BP: (!) 117/90 (!) 132/102   Pulse: 93 94   Resp: 18 14   Temp: 96.8 °F (36 °C)    TempSrc: Axillary    SpO2: 95% 92%   Weight: 145 lb (65.8 kg)    Height: 5' 6\" (1.676 m)        The patient was given the following medications while in the emergency department:  Orders Placed This Encounter   Medications    0.9 % sodium chloride bolus    0.9 % sodium chloride bolus    cefepime (MAXIPIME) 2000 mg IVPB minibag     Order Specific Question:   Antimicrobial Indications     Answer:   Pneumonia (HAP)     CONSULTS:  IP CONSULT TO HOSPITALIST    FINAL IMPRESSION      1. Acute hypoxemic respiratory failure (HCC)    2. Disorientation    3. Elevated lactic acid level          DISPOSITION/PLAN   DISPOSITION        PATIENT REFERRED TO:  No follow-up provider specified.   DISCHARGE MEDICATIONS:  New Prescriptions    No medications on file     Keaton Richardson MD  Attending Emergency Physician                   Keaton Richardson MD  03/04/21 2651

## 2021-03-03 NOTE — ED NOTES
Patient arrived to ED from [facility] with reports of increased lethargy. Patient awake, alert, and oriented to person upon arrival. Patient denies any complaints and answer questions appropriately at this time. Denies chest pain or SOB. Patient poor historian. Dr. Mario Diaz at bedside for patient evaluation.       [place] Buenrostro RN  03/03/21 1200

## 2021-03-03 NOTE — ED NOTES
Unable to obtain IV access X 2. Patient not cooperative and moving around while actively sticking the vein. Dr. Ab Matt updated. Another provider to attempt.       Carter Sosa RN  03/03/21 7912

## 2021-03-03 NOTE — ED NOTES
Respiratory at bedside to obtain ABG, RN placed pt on non rebreather as pt found to be breathing out of mouth instead of nose, Xray of chest obtained and pt has moments where she opens her eyes and follows commands for brief moments.      Nicole Manzo RN  03/03/21 1585

## 2021-03-03 NOTE — H&P
[facility]  Office: [address], DO, Kathia Jean, DO, Catrina Negron, DO, Hero Ortega Blood, DO, Everton Vee MD, Ashley Mattson MD, Hansel Hutchinson MD, Tika Farris MD, Shu Levi MD, Radha Manuel MD, Miranda Cornell MD, Hayde Green MD, Alexandru Ordoñez MD, Hannah Valadez, DO, Lita Montoya MD, Rj Funes, DO, Patrizia Shetty MD,  Shubham Herron, DO, Nghia Junior MD, Solis Gallo MD, Sol Starr, [facility], [facility]ND[facility] Omalley, CNP, Whitney Gandara, CNP, Frank Hernandez, CNS, Ronal Freire, CNP, Eric Jose, CNP, Manuela Jenkins, CNP, Tomy Glass, CNP, Vero Tena, CNP, Forest Maynard PA-C, Sam Gupta, [facility], Roselia Cornell, CNP, Cris Castro, CNP, Kaylene Kulkarni, CNP, Odilia Parikh, CNP, Socorro Birmingham, CNP, Gracie Yanez, [facility]    HISTORY AND PHYSICAL EXAMINATION            Date:   3/3/2021  Patient name:  Nikki Mera  Date of admission:  3/3/2021 11:42 AM  MRN:   [id number]  Account:  [de-identified]  YOB: 1945  PCP:    Sonny Daily MD  Room:   [place] [address]  Code Status:    Prior    Chief Complaint:     Chief Complaint   Patient presents with    Fatigue     From [facility]       History Obtained From:     patient, electronic medical record    History of Present Illness:     Nikki Mera is a 77 y.o. Non-/non  female who presents with Fatigue (From [facility])   and is admitted to the hospital for the management of Acute hypoxemic respiratory failure ([facility] [address] Patient is a poor historian, she was admitted with fatigue and shortness of breath. In ER she was getting hypoxic and was sitting on side of bed without oxygen, she was put on nonrebreather and subsequently was placed on high flow oxygen. Patient is not able to give much information but as per nurse she was cyanosed when this happened.   Patient denies any chest pain or palpitation Her proBNP 73,215  Troponins 5955  ABG shows pH of 7.31, PCO2 52, PO2 158 on oxygen  Creatinine has increased to 1.43 which has been normal before, BUN 19  Lactic acid 3.74.0  Past Medical History:     Past Medical History:   Diagnosis Date    CHF (congestive heart failure) (HCC)     [facility]     Diabetes mellitus ([facility]     MRSA (methicillin resistant staph aureus) culture positive 8/28/2014    sputum    Tobacco abuse 10/15/2019        Past Surgical History:     Past Surgical History:   Procedure Laterality Date    APPENDECTOMY      HYSTERECTOMY      JOINT REPLACEMENT Bilateral         Medications Prior to Admission:     Prior to Admission medications    Medication Sig Start Date End Date Taking?  Authorizing Provider   levETIRAcetam (KEPPRA) 500 MG tablet Take 1 tablet by mouth 2 times daily 2/22/21   [place]ung [place] Orlop, DO   lisinopril (PRINIVIL;ZESTRIL) 2.5 MG tablet Take 1 tablet by mouth daily 2/23/21   Kyung Oak Orlop, DO   carvedilol (COREG) 3.125 MG tablet Take 1 tablet by mouth 2 times daily 2/22/21   Kyung Oak Orlop, DO   furosemide (LASIX) 40 MG tablet Take 1 tablet by mouth daily 2/22/21   Ryan Pintos, DO   folic acid (FOLVITE) 1 MG tablet Take 1 tablet by mouth daily 2/23/21   Kyung Oak Orlop, DO   polyethylene glycol (GLYCOLAX) 17 g packet Take 17 g by mouth daily as needed for Constipation 2/22/21 3/24/21  Kyung Oak Orlop, DO   calcium carbonate-vitamin D3 (CALTRATE) 600-400 MG-UNIT TABS per tab Take 1 tablet by mouth daily 2/23/21   [place] Pintos, DO   thiamine 100 MG tablet Take 1 tablet by mouth daily 2/23/21   [place] Pintos, DO   Arformoterol Tartrate (BROVANA) 15 MCG/2ML NEBU Take 2 mLs by nebulization 2 times daily 2/22/21   [facility] Orlop, DO   budesonide (PULMICORT) 0.5 MG/2ML nebulizer suspension Take 2 mLs by nebulization 2 times daily 2/22/21   Kyung Oak Orp, DO   pantoprazole (PROTONIX) 20 MG tablet Take 1 tablet by mouth daily 8/25/20   Hugo Ruiz MD   albuterol (PROVENTIL) (2.5 MG/3ML) 0.083% nebulizer solution Take 3 mLs by nebulization every 6 hours as needed for Wheezing or Shortness of Breath 8/25/20   Hugo Ruiz MD   Multiple Vitamins-Minerals (CERTAVITE SENIOR PO) Take 1 tablet by mouth daily    Historical Provider, MD   sertraline (ZOLOFT) 50 MG tablet Take 50 mg by mouth daily    Historical Provider, MD   loratadine (CLARITIN) 10 MG tablet Take 10 mg by mouth daily    Historical Provider, MD   atorvastatin (LIPITOR) 20 MG tablet Take 20 mg by mouth daily     Historical Provider, MD   aspirin 81 MG tablet Take 81 mg by mouth daily. Historical Provider, MD   calcium-vitamin D (OSCAL-500) 500-200 MG-UNIT per tablet Take 1 tablet by mouth daily. Historical Provider, MD        Allergies:     Tiotropium and Spiriva handihaler [tiotropium bromide monohydrate]    Social History:     Tobacco:    reports that she has been smoking cigarettes. She has been smoking about 0.50 packs per day. She has never used smokeless tobacco.  Alcohol:      reports current alcohol use of about 70.0 standard drinks of alcohol per week. Drug Use:  reports current drug use. Drug: Marijuana. Family History:     Family History   Problem Relation Age of Onset    Heart Disease Mother     Diabetes Father        Review of Systems:     Positive and Negative as described in HPI.     CONSTITUTIONAL:  negative for fevers, chills, sweats,+ fatigue, weight loss  HEENT:  negative for vision, hearing changes, runny nose, throat pain  RESPIRATORY: + for shortness of breath, denies cough, congestion, wheezing  CARDIOVASCULAR:  negative for chest pain, palpitations  GASTROINTESTINAL:  negative for nausea, vomiting, diarrhea, constipation, change in bowel habits, abdominal pain   GENITOURINARY:  negative for difficulty of urination, burning with urination, frequency   INTEGUMENT:  negative for rash, skin lesions, easy bruising   HEMATOLOGIC/LYMPHATIC: negative for swelling/edema   ALLERGIC/IMMUNOLOGIC:  negative for urticaria , itching  ENDOCRINE:  negative increase in drinking, increase in urination, hot or cold intolerance  MUSCULOSKELETAL:  negative joint pains, muscle aches, swelling of joints  NEUROLOGICAL:  negative for headaches, dizziness, lightheadedness, numbness, pain, tingling extremities  BEHAVIOR/PSYCH:  negative for depression, anxiety    Physical Exam:   BP (!) 144/111   Pulse 116   Temp 96.8 °F (36 °C) (Axillary)   Resp 26   Ht 5' 6\" (1.676 m)   Wt 145 lb (65.8 kg)   SpO2 (!) 88%   BMI 23.40 kg/m²   Temp (24hrs), Av.8 °F (36 °C), Min:96.8 °F (36 °C), Max:96.8 °F (36 °C)    Recent Labs     21  1640   POCGLU 78     No intake or output data in the 24 hours ending 21 1837    General Appearance: Appears fatigued, sick looking, on high flow oxygen  Mental status: oriented to person, place, and time but not able to give good history  Head:  normocephalic, atraumatic  Eye: no icterus, redness, pupils equal and reactive, extraocular eye movements intact, conjunctiva clear  Ear: normal external ear, no discharge, hearing intact  Nose:  no drainage noted  Mouth: mucous membranes slightly dry, she has multiple other  Neck: supple, no carotid bruits, thyroid not palpable  Lungs: Prolonged expiratory phase, diminished breath sounds at bases, bibasilar Rales, occasional wheezing  Cardiovascular: normal rate, regular rhythm, no murmur, gallop, rub.   Abdomen: Soft, nontender, nondistended, normal bowel sounds, no hepatomegaly or splenomegaly  Neurologic: There are no new focal motor or sensory deficits, normal muscle tone and bulk, no abnormal sensation, normal speech, cranial nerves II through XII grossly intact  Skin: No gross lesions, rashes, bruising or bleeding on exposed skin area  Extremities:  peripheral pulses palpable, minimal pedal edema, no calf pain with palpation  Psych: normal affect     Investigations:      Laboratory Testing:  Recent Results (from the past 24 hour(s))   EKG 12 Lead    Collection Time: 03/03/21 12:56 PM   Result Value Ref Range    Ventricular Rate 94 BPM    Atrial Rate 94 BPM    P-R Interval 170 ms    QRS Duration 96 ms    Q-T Interval 434 ms    QTc Calculation (Bazett) 542 ms    P Axis 35 degrees    R Axis -36 degrees    T Axis 141 degrees   Basic Metabolic Panel    Collection Time: 03/03/21  1:45 PM   Result Value Ref Range    Glucose 98 70 - 99 mg/dL    BUN 19 8 - 23 mg/dL    CREATININE 1.43 (H) 0.50 - 0.90 mg/dL    Bun/Cre Ratio 13 9 - 20    Calcium 8.9 8.6 - 10.4 mg/dL    Sodium 140 135 - 144 mmol/L    Potassium 4.8 3.7 - 5.3 mmol/L    Chloride 100 98 - 107 mmol/L    CO2 23 20 - 31 mmol/L    Anion Gap 17 9 - 17 mmol/L    GFR Non-African American 36 (L) >60 mL/min    GFR  43 (L) >60 mL/min    GFR Comment          GFR Staging NOT REPORTED    CBC Auto Differential    Collection Time: 03/03/21  1:45 PM   Result Value Ref Range    WBC 9.8 3.5 - 11.3 k/uL    RBC 4.76 3.95 - 5.11 m/uL    Hemoglobin 12.0 11.9 - 15.1 g/dL    Hematocrit 38.1 36.3 - 47.1 %    MCV 80.0 (L) 82.6 - 102.9 fL    MCH 25.2 25.2 - 33.5 pg    MCHC 31.5 28.4 - 34.8 g/dL    RDW 27.9 (H) 11.8 - 14.4 %    Platelets 875 148 - 370 k/uL    MPV 10.9 8.1 - 13.5 fL    NRBC Automated 0.0 0.0 per 100 WBC    Differential Type NOT REPORTED     WBC Morphology NOT REPORTED     RBC Morphology NOT REPORTED     Platelet Estimate NOT REPORTED     Seg Neutrophils 68 (H) 36 - 65 %    Lymphocytes 24 24 - 43 %    Monocytes 7 3 - 12 %    Eosinophils % 0 (L) 1 - 4 %    Basophils 1 0 - 2 %    Immature Granulocytes 0 0 %    Segs Absolute 6.66 1.50 - 8.10 k/uL    Absolute Lymph # 2.35 1.10 - 3.70 k/uL    Absolute Mono # 0.69 0.10 - 1.20 k/uL    Absolute Eos # 0.00 0.00 - 0.44 k/uL    Basophils Absolute 0.10 0.00 - 0.20 k/uL    Absolute Immature Granulocyte 0.00 0.00 - 0.30 k/uL    Morphology ANISOCYTOSIS PRESENT    TOX SCR, BLD, ED    Collection Time: 03/03/21  1:45 PM   Result Value Ref Range    Acetaminophen Level <10 (L) 10 - 30 ug/mL    Ethanol <10 <10 mg/dL    Ethanol percent <0.189 <8.946 %    Salicylate Lvl <1 (L) 3 - 10 mg/dL    Toxic Tricyclic Sc,Blood NEGATIVE NEGATIVE   Hepatic Function Panel    Collection Time: 03/03/21  1:45 PM   Result Value Ref Range    Albumin 3.3 (L) 3.5 - 5.2 g/dL    Alkaline Phosphatase 95 35 - 104 U/L    ALT 29 5 - 33 U/L    AST 63 (H) <32 U/L    Total Bilirubin 0.87 0.3 - 1.2 mg/dL    Bilirubin, Direct 0.48 (H) <0.31 mg/dL    Bilirubin, Indirect 0.39 0.00 - 1.00 mg/dL    Total Protein 8.4 (H) 6.4 - 8.3 g/dL    Globulin NOT REPORTED 1.5 - 3.8 g/dL    Albumin/Globulin Ratio NOT REPORTED 1.0 - 2.5   Magnesium    Collection Time: 03/03/21  1:45 PM   Result Value Ref Range    Magnesium 1.6 1.6 - 2.6 mg/dL   Troponin    Collection Time: 03/03/21  1:45 PM   Result Value Ref Range    Troponin, High Sensitivity 59 (HH) 0 - 14 ng/L    Troponin T NOT REPORTED <0.03 ng/mL    Troponin Interp NOT REPORTED    Lactic Acid    Collection Time: 03/03/21  1:45 PM   Result Value Ref Range    Lactic Acid 3.7 (H) 0.5 - 2.2 mmol/L   Brain Natriuretic Peptide    Collection Time: 03/03/21  1:45 PM   Result Value Ref Range    Pro-BNP 73,215 (H) <300 pg/mL    BNP Interpretation Pro-BNP Reference Range:    Lactic Acid    Collection Time: 03/03/21  3:45 PM   Result Value Ref Range    Lactic Acid 4.0 (H) 0.5 - 2.2 mmol/L   Troponin    Collection Time: 03/03/21  3:45 PM   Result Value Ref Range    Troponin, High Sensitivity 55 (HH) 0 - 14 ng/L    Troponin T NOT REPORTED <0.03 ng/mL    Troponin Interp NOT REPORTED    POC Glucose Fingerstick    Collection Time: 03/03/21  4:40 PM   Result Value Ref Range    POC Glucose 78 65 - 105 mg/dL   POC PANEL (G3)-ART    Collection Time: 03/03/21  4:57 PM   Result Value Ref Range    POC pH 7.31 (L) 7.35 - 7.45    POC pCO2 52 (H) 32 - 45 mm Hg    POC PO2 158 (H) 75 - 95 mm Hg    TCO2 (calc), Art 28 23 - 28 mmol/L    POC HCO3 26.4 22 - 27 mmol/L    Negative Base Excess, Art NOT REPORTED 0.0 - 2.0    Positive Base Excess, Art 0 0.0 - 2.0    POC O2 SAT 99 %    Pt Temp 37.0     O2 Device/Flow/% NOT REPORTED     FIO2 100.0     POC pH Temp NOT REPORTED     POC pCO2 Temp NOT REPORTED mm Hg    POC pO2 Temp NOT REPORTED mm Hg   POCT Glucose    Collection Time: 03/03/21  5:00 PM   Result Value Ref Range    Glucose 73 mg/dL    QC OK? yes    Ammonia    Collection Time: 03/03/21  5:00 PM   Result Value Ref Range    Ammonia 24 11 - 51 umol/L   COVID-19, Rapid    Collection Time: 03/03/21  5:07 PM    Specimen: Nasopharyngeal Swab   Result Value Ref Range    Specimen Description . NASOPHARYNGEAL SWAB     SARS-CoV-2, Rapid Not Detected Not Detected       Imaging/Diagnostics:  Ct Head Wo Contrast    Result Date: 3/3/2021  No acute intracranial abnormality on a mildly motion degraded exam.     Xr Chest Portable    Result Date: 3/3/2021  Bilateral diffuse ground-glass and interstitial opacities, similar to the radiograph obtained earlier today. Differential considerations include pulmonary edema versus atypical or viral pneumonia. Xr Chest Portable    Result Date: 3/3/2021  Bilateral airspace disease with small effusions, most consistent with edema, has progressed in the interval.     Echocardiogram 2/8/2021  Summary  Left ventricle is normal in size and wall thickness. Global left ventricular systolic function is severely reduced with an  estimated ejection fraction of 20 % . Severe global hypokinesis. Left atrium is mildly dilated. Right atrium is moderately dilated . Right ventricular dilatation with reduced systolic function. Thickened mitral valve leaflets. Mild to moderate mitral regurgitation. Moderate tricuspid regurgitation. Moderate pulmonary hypertension.  Estimated right ventricular systolic  pressure is [id number].     Assessment :      Hospital Problems           Last Modified POA    * (Principal) Acute hypoxemic respiratory failure ([facility] [address] 3/3/2021 Yes    Heart failure, systolic, with acute decompensation ([facility] [address] 3/3/2021 Yes    Hypoxic episode 3/3/2021 Yes    Fatigue 3/3/2021 Yes    COPD (chronic obstructive pulmonary disease) ([address] 3/3/2021 Yes    Cocaine abuse ([facility] [address] 3/3/2021 Yes    Type 2 diabetes mellitus without complication, without long-term current use of insulin ([facility] [address] 3/3/2021 Yes    Diabetes mellitus ([facility] [address] 3/3/2021 Yes    Pneumonia due to infectious organism 3/3/2021 Yes    Current every day smoker 3/3/2021 Yes    Essential hypertension 3/3/2021 Yes    Altered mental status 3/3/2021 Yes          Plan:     Patient status inpatient in the Medical ICU    1. Start selected home medicines  2. Start the patient on 20 mg twice daily IV Lasix and observe carefully  3. Continue IV antibiotics and start steroids  4. Consult pulmonary  5. Consult cardiology  6. DVT prophylaxis  7. Monitor labs    Consultations:   IP CONSULT TO HOSPITALIST  IP CONSULT TO PULMONOLOGY  IP CONSULT TO CARDIOLOGY     Patient is admitted as inpatient status because of co-morbidities listed above, severity of signs and symptoms as outlined, requirement for current medical therapies and most importantly because of direct risk to patient if care not provided in a hospital setting. Expected length of stay > 48 hours.     Sana Mederos MD  3/3/2021  6:37 PM    Copy sent to Dr. Anel Miles MD

## 2021-03-04 PROBLEM — E87.20 METABOLIC ACIDOSIS: Status: ACTIVE | Noted: 2021-03-04

## 2021-03-04 LAB
-: ABNORMAL
ADENOVIRUS PCR: NOT DETECTED
AMORPHOUS: ABNORMAL
ANION GAP SERPL CALCULATED.3IONS-SCNC: 22 MMOL/L (ref 9–17)
ANION GAP SERPL CALCULATED.3IONS-SCNC: 25 MMOL/L (ref 9–17)
BACTERIA: ABNORMAL
BILIRUBIN URINE: NEGATIVE
BORDETELLA PARAPERTUSSIS: NOT DETECTED
BORDETELLA PERTUSSIS PCR: NOT DETECTED
BUN BLDV-MCNC: 23 MG/DL (ref 8–23)
BUN BLDV-MCNC: 25 MG/DL (ref 8–23)
BUN/CREAT BLD: 14 (ref 9–20)
BUN/CREAT BLD: 14 (ref 9–20)
CALCIUM SERPL-MCNC: 8.4 MG/DL (ref 8.6–10.4)
CALCIUM SERPL-MCNC: 8.8 MG/DL (ref 8.6–10.4)
CASTS UA: ABNORMAL /LPF
CASTS UA: ABNORMAL /LPF
CHLAMYDIA PNEUMONIAE BY PCR: NOT DETECTED
CHLORIDE BLD-SCNC: 102 MMOL/L (ref 98–107)
CHLORIDE BLD-SCNC: 103 MMOL/L (ref 98–107)
CO2: 16 MMOL/L (ref 20–31)
CO2: 19 MMOL/L (ref 20–31)
COLOR: YELLOW
COMMENT UA: ABNORMAL
CORONAVIRUS 229E PCR: NOT DETECTED
CORONAVIRUS HKU1 PCR: NOT DETECTED
CORONAVIRUS NL63 PCR: NOT DETECTED
CORONAVIRUS OC43 PCR: NOT DETECTED
CREAT SERPL-MCNC: 1.66 MG/DL (ref 0.5–0.9)
CREAT SERPL-MCNC: 1.76 MG/DL (ref 0.5–0.9)
CRYSTALS, UA: ABNORMAL /HPF
EKG ATRIAL RATE: 94 BPM
EKG P AXIS: 35 DEGREES
EKG P-R INTERVAL: 170 MS
EKG Q-T INTERVAL: 434 MS
EKG QRS DURATION: 96 MS
EKG QTC CALCULATION (BAZETT): 542 MS
EKG R AXIS: -36 DEGREES
EKG T AXIS: 141 DEGREES
EKG VENTRICULAR RATE: 94 BPM
EPITHELIAL CELLS UA: ABNORMAL /HPF (ref 0–5)
GFR AFRICAN AMERICAN: 34 ML/MIN
GFR AFRICAN AMERICAN: 37 ML/MIN
GFR NON-AFRICAN AMERICAN: 28 ML/MIN
GFR NON-AFRICAN AMERICAN: 30 ML/MIN
GFR SERPL CREATININE-BSD FRML MDRD: ABNORMAL ML/MIN/{1.73_M2}
GLUCOSE BLD-MCNC: 107 MG/DL (ref 70–99)
GLUCOSE BLD-MCNC: 111 MG/DL (ref 65–105)
GLUCOSE BLD-MCNC: 111 MG/DL (ref 70–99)
GLUCOSE BLD-MCNC: 124 MG/DL (ref 65–105)
GLUCOSE URINE: NEGATIVE
HCT VFR BLD CALC: 34.8 % (ref 36.3–47.1)
HEMOGLOBIN: 11.2 G/DL (ref 11.9–15.1)
HUMAN METAPNEUMOVIRUS PCR: NOT DETECTED
INFLUENZA A BY PCR: NOT DETECTED
INFLUENZA A H1 (2009) PCR: NORMAL
INFLUENZA A H1 PCR: NORMAL
INFLUENZA A H3 PCR: NORMAL
INFLUENZA B BY PCR: NOT DETECTED
KETONES, URINE: NEGATIVE
LACTIC ACID, SEPSIS WHOLE BLOOD: ABNORMAL MMOL/L (ref 0.5–1.9)
LACTIC ACID, SEPSIS WHOLE BLOOD: ABNORMAL MMOL/L (ref 0.5–1.9)
LACTIC ACID, SEPSIS: 5.7 MMOL/L (ref 0.5–1.9)
LACTIC ACID, SEPSIS: 6 MMOL/L (ref 0.5–1.9)
LEUKOCYTE ESTERASE, URINE: ABNORMAL
MCH RBC QN AUTO: 25.2 PG (ref 25.2–33.5)
MCHC RBC AUTO-ENTMCNC: 32.2 G/DL (ref 28.4–34.8)
MCV RBC AUTO: 78.4 FL (ref 82.6–102.9)
MUCUS: ABNORMAL
MYCOPLASMA PNEUMONIAE PCR: NOT DETECTED
NITRITE, URINE: NEGATIVE
NRBC AUTOMATED: 0.2 PER 100 WBC
OTHER OBSERVATIONS UA: ABNORMAL
PARAINFLUENZA 1 PCR: NOT DETECTED
PARAINFLUENZA 2 PCR: NOT DETECTED
PARAINFLUENZA 3 PCR: NOT DETECTED
PARAINFLUENZA 4 PCR: NOT DETECTED
PDW BLD-RTO: 27.5 % (ref 11.8–14.4)
PH UA: 6 (ref 5–8)
PLATELET # BLD: 286 K/UL (ref 138–453)
PMV BLD AUTO: 11.6 FL (ref 8.1–13.5)
POTASSIUM SERPL-SCNC: 4.7 MMOL/L (ref 3.7–5.3)
POTASSIUM SERPL-SCNC: 5.4 MMOL/L (ref 3.7–5.3)
POTASSIUM SERPL-SCNC: 5.7 MMOL/L (ref 3.7–5.3)
PROCALCITONIN: 0.11 NG/ML
PROTEIN UA: ABNORMAL
RBC # BLD: 4.44 M/UL (ref 3.95–5.11)
RBC UA: ABNORMAL /HPF (ref 0–2)
RENAL EPITHELIAL, UA: ABNORMAL /HPF
RESP SYNCYTIAL VIRUS PCR: NOT DETECTED
RHINO/ENTEROVIRUS PCR: NOT DETECTED
SARS-COV-2, PCR: NOT DETECTED
SODIUM BLD-SCNC: 143 MMOL/L (ref 135–144)
SODIUM BLD-SCNC: 144 MMOL/L (ref 135–144)
SPECIFIC GRAVITY UA: 1.02 (ref 1–1.03)
SPECIMEN DESCRIPTION: NORMAL
TRICHOMONAS: ABNORMAL
TROPONIN INTERP: ABNORMAL
TROPONIN T: ABNORMAL NG/ML
TROPONIN, HIGH SENSITIVITY: 65 NG/L (ref 0–14)
TURBIDITY: CLEAR
URINE HGB: ABNORMAL
UROBILINOGEN, URINE: NORMAL
WBC # BLD: 9.5 K/UL (ref 3.5–11.3)
WBC UA: ABNORMAL /HPF (ref 0–5)
YEAST: ABNORMAL

## 2021-03-04 PROCEDURE — 94640 AIRWAY INHALATION TREATMENT: CPT

## 2021-03-04 PROCEDURE — 80048 BASIC METABOLIC PNL TOTAL CA: CPT

## 2021-03-04 PROCEDURE — 84132 ASSAY OF SERUM POTASSIUM: CPT

## 2021-03-04 PROCEDURE — 99232 SBSQ HOSP IP/OBS MODERATE 35: CPT | Performed by: INTERNAL MEDICINE

## 2021-03-04 PROCEDURE — 85027 COMPLETE CBC AUTOMATED: CPT

## 2021-03-04 PROCEDURE — 81001 URINALYSIS AUTO W/SCOPE: CPT

## 2021-03-04 PROCEDURE — 6360000002 HC RX W HCPCS: Performed by: NURSE PRACTITIONER

## 2021-03-04 PROCEDURE — 0202U NFCT DS 22 TRGT SARS-COV-2: CPT

## 2021-03-04 PROCEDURE — 84145 PROCALCITONIN (PCT): CPT

## 2021-03-04 PROCEDURE — 2580000003 HC RX 258: Performed by: NURSE PRACTITIONER

## 2021-03-04 PROCEDURE — 87086 URINE CULTURE/COLONY COUNT: CPT

## 2021-03-04 PROCEDURE — 2000000000 HC ICU R&B

## 2021-03-04 PROCEDURE — 2500000003 HC RX 250 WO HCPCS: Performed by: NURSE PRACTITIONER

## 2021-03-04 PROCEDURE — 83605 ASSAY OF LACTIC ACID: CPT

## 2021-03-04 PROCEDURE — 82947 ASSAY GLUCOSE BLOOD QUANT: CPT

## 2021-03-04 PROCEDURE — 94761 N-INVAS EAR/PLS OXIMETRY MLT: CPT

## 2021-03-04 PROCEDURE — 6360000002 HC RX W HCPCS: Performed by: INTERNAL MEDICINE

## 2021-03-04 PROCEDURE — 84484 ASSAY OF TROPONIN QUANT: CPT

## 2021-03-04 PROCEDURE — 6370000000 HC RX 637 (ALT 250 FOR IP): Performed by: NURSE PRACTITIONER

## 2021-03-04 PROCEDURE — 2580000003 HC RX 258: Performed by: INTERNAL MEDICINE

## 2021-03-04 PROCEDURE — 2700000000 HC OXYGEN THERAPY PER DAY

## 2021-03-04 PROCEDURE — 36415 COLL VENOUS BLD VENIPUNCTURE: CPT

## 2021-03-04 RX ORDER — METOPROLOL TARTRATE 5 MG/5ML
5 INJECTION INTRAVENOUS ONCE
Status: COMPLETED | OUTPATIENT
Start: 2021-03-04 | End: 2021-03-04

## 2021-03-04 RX ORDER — SODIUM POLYSTYRENE SULFONATE 15 G/60ML
15 SUSPENSION ORAL; RECTAL ONCE
Status: COMPLETED | OUTPATIENT
Start: 2021-03-04 | End: 2021-03-04

## 2021-03-04 RX ORDER — METHYLPREDNISOLONE SODIUM SUCCINATE 40 MG/ML
40 INJECTION, POWDER, LYOPHILIZED, FOR SOLUTION INTRAMUSCULAR; INTRAVENOUS EVERY 8 HOURS
Status: DISCONTINUED | OUTPATIENT
Start: 2021-03-04 | End: 2021-03-05

## 2021-03-04 RX ORDER — FUROSEMIDE 10 MG/ML
20 INJECTION INTRAMUSCULAR; INTRAVENOUS DAILY
Status: DISCONTINUED | OUTPATIENT
Start: 2021-03-05 | End: 2021-03-10 | Stop reason: HOSPADM

## 2021-03-04 RX ORDER — SODIUM CHLORIDE 9 MG/ML
INJECTION, SOLUTION INTRAVENOUS CONTINUOUS
Status: DISCONTINUED | OUTPATIENT
Start: 2021-03-04 | End: 2021-03-06

## 2021-03-04 RX ADMIN — CARVEDILOL 3.12 MG: 3.12 TABLET, FILM COATED ORAL at 20:26

## 2021-03-04 RX ADMIN — HEPARIN SODIUM 5000 UNITS: 5000 INJECTION INTRAVENOUS; SUBCUTANEOUS at 05:38

## 2021-03-04 RX ADMIN — CEFEPIME HYDROCHLORIDE 2000 MG: 2 INJECTION, POWDER, FOR SOLUTION INTRAVENOUS at 05:38

## 2021-03-04 RX ADMIN — ARFORMOTEROL TARTRATE 15 MCG: 15 SOLUTION RESPIRATORY (INHALATION) at 08:10

## 2021-03-04 RX ADMIN — BUDESONIDE 500 MCG: 0.5 SUSPENSION RESPIRATORY (INHALATION) at 08:10

## 2021-03-04 RX ADMIN — LEVETIRACETAM 500 MG: 100 INJECTION, SOLUTION INTRAVENOUS at 23:43

## 2021-03-04 RX ADMIN — SERTRALINE HYDROCHLORIDE 50 MG: 50 TABLET ORAL at 10:31

## 2021-03-04 RX ADMIN — SODIUM CHLORIDE, PRESERVATIVE FREE 10 ML: 5 INJECTION INTRAVENOUS at 10:31

## 2021-03-04 RX ADMIN — METOPROLOL TARTRATE 5 MG: 5 INJECTION INTRAVENOUS at 02:04

## 2021-03-04 RX ADMIN — METHYLPREDNISOLONE SODIUM SUCCINATE 60 MG: 125 INJECTION, POWDER, FOR SOLUTION INTRAMUSCULAR; INTRAVENOUS at 01:37

## 2021-03-04 RX ADMIN — SODIUM POLYSTYRENE SULFONATE 15 G: 15 SUSPENSION ORAL; RECTAL at 10:30

## 2021-03-04 RX ADMIN — SODIUM CHLORIDE: 9 INJECTION, SOLUTION INTRAVENOUS at 16:14

## 2021-03-04 RX ADMIN — LEVETIRACETAM 500 MG: 100 INJECTION, SOLUTION INTRAVENOUS at 00:00

## 2021-03-04 RX ADMIN — CARVEDILOL 3.12 MG: 3.12 TABLET, FILM COATED ORAL at 10:28

## 2021-03-04 RX ADMIN — METHYLPREDNISOLONE SODIUM SUCCINATE 60 MG: 125 INJECTION, POWDER, FOR SOLUTION INTRAMUSCULAR; INTRAVENOUS at 06:45

## 2021-03-04 RX ADMIN — FOLIC ACID 1 MG: 1 TABLET ORAL at 10:30

## 2021-03-04 RX ADMIN — FUROSEMIDE 20 MG: 10 INJECTION, SOLUTION INTRAMUSCULAR; INTRAVENOUS at 09:03

## 2021-03-04 RX ADMIN — ARFORMOTEROL TARTRATE 15 MCG: 15 SOLUTION RESPIRATORY (INHALATION) at 20:06

## 2021-03-04 RX ADMIN — ATORVASTATIN CALCIUM 20 MG: 20 TABLET, FILM COATED ORAL at 10:29

## 2021-03-04 RX ADMIN — LEVETIRACETAM 500 MG: 100 INJECTION, SOLUTION INTRAVENOUS at 11:47

## 2021-03-04 RX ADMIN — ASPIRIN 81 MG: 81 TABLET, COATED ORAL at 10:28

## 2021-03-04 RX ADMIN — SODIUM CHLORIDE, PRESERVATIVE FREE 10 ML: 5 INJECTION INTRAVENOUS at 21:00

## 2021-03-04 RX ADMIN — BUDESONIDE 500 MCG: 0.5 SUSPENSION RESPIRATORY (INHALATION) at 20:06

## 2021-03-04 RX ADMIN — METHYLPREDNISOLONE SODIUM SUCCINATE 40 MG: 40 INJECTION, POWDER, FOR SOLUTION INTRAMUSCULAR; INTRAVENOUS at 22:43

## 2021-03-04 RX ADMIN — CEFEPIME HYDROCHLORIDE 2000 MG: 2 INJECTION, POWDER, FOR SOLUTION INTRAVENOUS at 16:58

## 2021-03-04 RX ADMIN — METHYLPREDNISOLONE SODIUM SUCCINATE 40 MG: 40 INJECTION, POWDER, FOR SOLUTION INTRAMUSCULAR; INTRAVENOUS at 15:33

## 2021-03-04 RX ADMIN — HEPARIN SODIUM 5000 UNITS: 5000 INJECTION INTRAVENOUS; SUBCUTANEOUS at 21:53

## 2021-03-04 RX ADMIN — Medication 100 MG: at 10:30

## 2021-03-04 RX ADMIN — HEPARIN SODIUM 5000 UNITS: 5000 INJECTION INTRAVENOUS; SUBCUTANEOUS at 15:37

## 2021-03-04 RX ADMIN — PANTOPRAZOLE SODIUM 20 MG: 20 TABLET, DELAYED RELEASE ORAL at 10:28

## 2021-03-04 ASSESSMENT — PAIN SCALES - GENERAL: PAINLEVEL_OUTOF10: 0

## 2021-03-04 ASSESSMENT — PAIN SCALES - WONG BAKER: WONGBAKER_NUMERICALRESPONSE: 0

## 2021-03-04 NOTE — PROGRESS NOTES
[facility]  Office: [address], DO, Erik Manzano, DO, Adrienne Raza, DO, Netty Sacks Blood, DO, Margot Contreras MD, Jake Heredia MD, Ryne Ramirez MD, Radha Davila MD, Cammy Mccauley MD, Tara Agrawal MD, Sharan Felty, MD, Atiya Tadeo MD, Alexandru Rodriguez MD, Cristi Gan, DO, Preeti Green MD, Cliff Trevino, DO, Last Mcgee MD,  Samuel Baker, DO, Irineo Peter MD, Sanya Beard MD, Michael Sher, CNP, [address], [facility], Grayson Matos, CNP, Ly Cruz, CNS, Venkat Benavides, CNP, Vicente Colbert, CNP, Deondre Ruelas, CNP, Wayne Godwin, CNP, Esau Gavin, CNP, Uzair Olson PA-C, Christiano Alves, [facility], Javi Serna, CNP, Viviana Miller, CNP, Gorge Issa, CNP, Mariluz Antunez, CNP, Mary Grace Ovalle, CNP, Angelica Arroyo, [facility]    Progress Note    3/4/2021    12:28 PM    Name:   Merline Deck  MRN:     [id number]     Acct:      [de-identified]   Room:   [address] [place] [address] Day:  1  Admit Date:  3/3/2021 11:42 AM    PCP:   Shirley Milligan MD  Code Status:  Full Code    Subjective:     C/C:   Chief Complaint   Patient presents with   Collette Satchel Fatigue     From [facility]     Interval History Status:   Nurse had notified today early morning that patient is unable to follow commands and not appropriate to take medications orally. On my examination patient is more alert than before, she is answering questions more appropriately, turning side in bed on asking for examination. Subsequently nurse could give medicines with applesauce and liquids  Potassium was 5.7 in morning, Kayexalate was not given yet which was ordered early morning  Lactic acid still high, 5.7  Breathing is improved with diuretics and currently patient is being switched from high flow to nasal cannula  Creatinine has increased to 1.76 with IV diuretics  Brief History:   Merline Deck is a 77 y.o.  Non-/non  female who presents with Fatigue (From [facility]   and is admitted to the hospital for the management of Acute hypoxemic respiratory failure ([facility] Patient is a poor historian, she was admitted with fatigue and shortness of breath. In ER she was getting hypoxic and was sitting on side of bed without oxygen, she was put on nonrebreather and subsequently was placed on high flow oxygen. Patient is not able to give much information but as per nurse she was cyanosed when this happened. Patient denies any chest pain or palpitation  Her proBNP 73,215  Troponins 5955  ABG shows pH of 7.31, PCO2 52, PO2 158 on oxygen  Creatinine has increased to 1.43 which has been normal before, BUN 19  Lactic acid 3.74.0      Review of Systems:     Constitutional:  negative for chills, fevers, sweats,+ fatigue  Respiratory:  negative for cough, + shortness of breath, wheezing  Cardiovascular:  negative for chest pain, chest pressure/discomfort, lower extremity edema, palpitations  Gastrointestinal:  negative for abdominal pain, constipation, diarrhea, nausea, vomiting  Neurological:  negative for dizziness, headache    Medications: Allergies:     Allergies   Allergen Reactions    Tiotropium Anaphylaxis and Swelling    Spiriva Handihaler [Tiotropium Bromide Monohydrate] Swelling       Current Meds:   Scheduled Meds:    methylPREDNISolone  40 mg Intravenous Q8H    sodium chloride  1,000 mL Intravenous Once    cefepime  2,000 mg Intravenous Q12H    aspirin  81 mg Oral Daily    thiamine mononitrate  100 mg Oral Daily    sertraline  50 mg Oral Daily    pantoprazole  20 mg Oral Daily    [Held by provider] furosemide  40 mg Oral Daily    folic acid  1 mg Oral Daily    carvedilol  3.125 mg Oral BID    budesonide  0.5 mg Nebulization BID    atorvastatin  20 mg Oral Daily    Arformoterol Tartrate  15 mcg Nebulization BID    sodium chloride flush  10 mL Intravenous 2 times per day    heparin (porcine)  5,000 Units Subcutaneous 3 times per day    furosemide  20 mg Intravenous BID    levetiracetam  500 mg Intravenous Q12H     Continuous Infusions:   PRN Meds: polyethylene glycol, sodium chloride flush, promethazine **OR** ondansetron, acetaminophen **OR** acetaminophen, albuterol    Data:     Past Medical History:   has a past medical history of CHF (congestive heart failure) ([facility] [address] COPD (chronic obstructive pulmonary disease) ([facility] Diabetes mellitus ([facility] MRSA (methicillin resistant staph aureus) culture positive, and Tobacco abuse. Social History:   reports that she has been smoking cigarettes. She has been smoking about 0.50 packs per day. She has never used smokeless tobacco. She reports current alcohol use of about 70.0 standard drinks of alcohol per week. She reports current drug use. Drug: Marijuana. Family History:   Family History   Problem Relation Age of Onset    Heart Disease Mother     Diabetes Father        Vitals:  /77   Pulse 88   Temp 97.8 °F (36.6 °C) (Oral)   Resp 26   Ht 5' 6\" (1.676 m)   Wt 145 lb (65.8 kg)   SpO2 94%   BMI 23.40 kg/m²   Temp (24hrs), Av.9 °F (37.2 °C), Min:97.8 °F (36.6 °C), Max:101.1 °F (38.4 °C)    Recent Labs     21  1640 21  1057   POCGLU 78 111*       I/O (24Hr):     Intake/Output Summary (Last 24 hours) at 3/4/2021 1228  Last data filed at 3/4/2021 1030  Gross per 24 hour   Intake 60 ml   Output 230 ml   Net -170 ml       Labs:  Hematology:  Recent Labs     21  1345 21  0338   WBC 9.8 9.5   RBC 4.76 4.44   HGB 12.0 11.2*   HCT 38.1 34.8*   MCV 80.0* 78.4*   MCH 25.2 25.2   MCHC 31.5 32.2   RDW 27.9* 27.5*    286   MPV 10.9 11.6     Chemistry:  Recent Labs     21  1345 21  1545 21  1700 21  1855 21  0338 21  0531     --   --   --  144 143   K 4.8  --   --   --  5.4* 5.7*     --   --   --  103 102   CO2 23  --   --   --  19* 16*   GLUCOSE 98  --  73  --  107* 111*   BUN 19  -- --   --  23 25*   CREATININE 1.43*  --   --   --  1.66* 1.76*   MG 1.6  --   --   --   --   --    ANIONGAP 17  --   --   --  22* 25*   LABGLOM 36*  --   --   --  30* 28*   GFRAA 43*  --   --   --  37* 34*   CALCIUM 8.9  --   --   --  8.4* 8.8   PROBNP 73,215*  --   --   --   --   --    TROPHS 59* 55*  --  63*  --  65*     Recent Labs     03/03/21  1345 03/03/21  1640 03/03/21  1700 03/04/21  1057   PROT 8.4*  --   --   --    LABALBU 3.3*  --   --   --    AST 63*  --   --   --    ALT 29  --   --   --    ALKPHOS 95  --   --   --    BILITOT 0.87  --   --   --    BILIDIR 0.48*  --   --   --    AMMONIA  --   --  24  --    POCGLU  --  78  --  111*     ABG:  Lab Results   Component Value Date    POCPH 7.31 03/03/2021    [place] [address]  09/04/2014     DISREGARD RESULTS. PATIENT NUMBER WAS INCORRECTLY ASSIGNED. POCPCO2 52 03/03/2021    PCO2  09/04/2014     DISREGARD RESULTS. PATIENT NUMBER WAS INCORRECTLY ASSIGNED. POCPO2 158 03/03/2021    PO2  09/04/2014     DISREGARD RESULTS. PATIENT NUMBER WAS INCORRECTLY ASSIGNED. POCHCO3 26.4 03/03/2021    HCO3  09/04/2014     DISREGARD RESULTS. PATIENT NUMBER WAS INCORRECTLY ASSIGNED. NBEA NOT REPORTED 03/03/2021    PBEA 0 03/03/2021    [id number] 28 03/03/2021    [id number] 99 03/03/2021    O2SAT  09/04/2014     DISREGARD RESULTS. PATIENT NUMBER WAS INCORRECTLY ASSIGNED. FIO2 100.0 03/03/2021     Lab Results   Component Value Date/Time    SPECIAL LTAC 03/03/2021 05:19 PM     Lab Results   Component Value Date/Time    CULTURE NO GROWTH 9 HOURS 03/03/2021 05:19 PM       Radiology:  Ct Head Wo Contrast    Result Date: 3/3/2021  No acute intracranial abnormality on a mildly motion degraded exam.     Xr Chest Portable    Result Date: 3/3/2021  Bilateral diffuse ground-glass and interstitial opacities, similar to the radiograph obtained earlier today. Differential considerations include pulmonary edema versus atypical or viral pneumonia.      Xr Chest Portable    Result Date: 3/3/2021 Bilateral airspace disease with small effusions, most consistent with edema, has progressed in the interval.     Echocardiogram 2/8/2021    Summary  Left ventricle is normal in size and wall thickness. Global left ventricular systolic function is severely reduced with an  estimated ejection fraction of 20 % . Severe global hypokinesis. Left atrium is mildly dilated. Right atrium is moderately dilated . Right ventricular dilatation with reduced systolic function. Thickened mitral valve leaflets. Mild to moderate mitral regurgitation. Moderate tricuspid regurgitation. Moderate pulmonary hypertension. Estimated right ventricular systolic  pressure is 03IGPD.   Physical Examination:        General appearance: More alert than before, cooperative and mild to moderate distress  Mental Status:  oriented to person, giving very short answers  Lungs: Prolonged expiratory phase, still using accessory muscles, diminished breath sounds at bases, very few bibasilar Rales   Heart:  regular rate and rhythm, no murmur  Abdomen:  soft, nontender, nondistended, normal bowel sounds, no masses, hepatomegaly, splenomegaly  Extremities: Minimal pedal  edema, no redness, tenderness in the calves  Skin:  no gross lesions, rashes, induration    Assessment:        Hospital Problems           Last Modified POA    * (Principal) Acute hypoxemic respiratory failure ([address] 3/3/2021 Yes    Heart failure, systolic, with acute decompensation (HCC)EF 20% on echo 3/4/2021 Yes    Hypoxic episode 3/3/2021 Yes    Fatigue 3/3/2021 Yes    COPD (chronic obstructive pulmonary disease) ([address] 3/3/2021 Yes    Cocaine abuse ([address] 3/3/2021 Yes    Type 2 diabetes mellitus without complication, without long-term current use of insulin ([address] 3/3/2021 Yes    Diabetes mellitus ([address] 3/3/2021 Yes    Pneumonia due to infectious organism 3/3/2021 Yes    Current every day smoker 3/3/2021 Yes    Essential hypertension 3/3/2021 Yes    Altered mental status 3/3/2021 Yes Metabolic acidosis 9/2/8234 Yes          Plan:        1. Decrease Lasix to 20 mg IV daily  2. Continue IV Keppra  3. Reduce dose of IV steroids  4. Continue aerosol treatments and other home medicines  5. Continue cefepime for presumed pneumonia  6. Check for acute respiratory virus panel  7. DVT prophylaxis  8. Already switched to oxygen via nasal cannula  9.  Monitor labs and check potassium post Kayexalate    Caridad Kerr MD  3/4/2021  12:28 PM

## 2021-03-04 NOTE — PROGRESS NOTES
MD at bedside and states patient answered his questions and is ok to try po meds. Meds given with applesauce and liquids with a syringe. Patient cooperative but slow at following simple commands. Patient using left side more now. Moving self in bed to reposition.

## 2021-03-04 NOTE — PLAN OF CARE
Problem: Falls - Risk of:  Goal: Will remain free from falls  Description: Will remain free from falls  Outcome: Ongoing  Goal: Absence of physical injury  Description: Absence of physical injury  Outcome: Ongoing     Problem: Skin Integrity:  Goal: Will show no infection signs and symptoms  Description: Will show no infection signs and symptoms  Outcome: Ongoing  Goal: Absence of new skin breakdown  Description: Absence of new skin breakdown  Outcome: Ongoing     Problem: Falls - Risk of:  Goal: Will remain free from falls  Description: Will remain free from falls  Outcome: Ongoing  Goal: Absence of physical injury  Description: Absence of physical injury  Outcome: Ongoing     Problem: Skin Integrity:  Goal: Will show no infection signs and symptoms  Description: Will show no infection signs and symptoms  Outcome: Ongoing  Goal: Absence of new skin breakdown  Description: Absence of new skin breakdown  Outcome: Ongoing

## 2021-03-04 NOTE — PROGRESS NOTES
Covid 19 swab taken from right nare, labeled, placed in red dot bag, and handed off to second healthcare worker outside of room for transport to laboratory per hospital policy and procedure. Patient tolerated procedure fairly well.

## 2021-03-04 NOTE — PROGRESS NOTES
Patient more alert now. Speech improved but limited. Can tell writer her name. Attempted to see if she could take a sip, but unable to follow command. Gross movement of l arm now. Spontaneous. Not to command. Dr Esther Chavez paged through perfect serve.

## 2021-03-04 NOTE — PROGRESS NOTES
Patient currently alert and responsive. Still only able to follow occasional command. At 4550-7127 patient was less communicative and staring at intervals.

## 2021-03-04 NOTE — PLAN OF CARE
Nutrition Problem #1: Predicted inadequate energy intake  Intervention: Food and/or Nutrient Delivery: Continue Current Diet, Start Oral Nutrition Supplement  Nutritional Goals: PO intakes are greater than 50% at meals

## 2021-03-04 NOTE — CARE COORDINATION
Social Work-Patient was admitted from [facility]. Phone call to Vicente elizabeth. He states that the plan will be for patient to return to [place]. Phone call to [place]. They are holding her bed. Facesheet faxed.  Jarrod Lynch

## 2021-03-04 NOTE — PROGRESS NOTES
Occupational Therapy   [facility]  Occupational Therapy Not Seen Note    Patient not available for Occupational Therapy due to:    [] Testing:    [] Hemodialysis    [] Cancelled by RN:    []Refusal by Patient:    [] Surgery:     [] Intubation:     [] Pain Medication:    [] Sedation:     [] Spine Precautions :    [x] Medical Instability:    [x] Other: cx per RN      Dereje Munoz, OTR/L

## 2021-03-04 NOTE — CARE COORDINATION
Case Management Initial Discharge Plan  Heidi Oliveros,             Met with:family member grandson Leonora Alatorre to discuss discharge plans. Information verified: address, contacts, phone number, , insurance Yes    Emergency Contact/Next of Kin name & number: Lupillo   [phone]    PCP: Anel Miles MD  Date of last visit: lst week    Insurance Provider: [employer]    Discharge Planning    Living Arrangements:      Support Systems:       Home has 1 stories  elevator stairs to climb to get into front door, 0stairs to climb to reach second floor  Location of bedroom/bathroom in home main    Patient able to perform ADL's:Assisted    Current Services (outpatient & in home) Has a daily aide through [employer]  DME equipment: power chair, home O2, walker, cane, CPAP  DME provider: unknown    Receiving oral anticoagulation therapy? No    If indicated:   Physician managing anticoagulation treatment:   Where does patient obtain lab work for ATC treatment? Potential Assistance Needed:       Patient agreeable to home care: Yes  Freedom of choice provided:  yes    Prior SNF/Rehab Placement and Facility: [address] [place] [address]  Agreeable to SNF/Rehab: Yes  [place] of choice provided: yes     Evaluation: yes    Expected Discharge date:       Patient expects to be discharged to: Follow Up Appointment: Best Day/ Time:      Transportation provider: stu  Transportation arrangements needed for discharge: Yes    Readmission Risk              Risk of Unplanned Readmission:        42             Does patient have a readmission risk score greater than 14?: Yes  If yes, follow-up appointment must be made within 7 days of discharge. Goals of Care:       Discharge Plan: Dg: Hypoxia  Patient lives alone but has family support and an aide through [employer].   Has an elevator to enter 1 story apartment  Has aide services through [employer]  DME's include: walker, cane, power chair, CPAP, home O2,  Pharmacy is [facility]? Plan is for the patient to return to [facility]  Will need transportation. Continue to follow.           Electronically signed by Rosalina Elmore RN on 3/4/21 at 1:37 PM EST

## 2021-03-04 NOTE — CONSULTS
Pulmonary Medicine and [address]man MD      Patient - Denise Hunter   MRN -  [id number]   [facility] # - [de-identified]   - 1945      Date of Admission -  3/3/2021 11:42 AM  Date of evaluation -  3/4/2021  Room - [address] [place] [address]-   Baron Colbert MD Primary Care Physician - Naif Aj MD     Reason for Consult    Acute hypoxic respiratory failure    Assessment   · Acute hypoxic respiratory failure  · Bilateral pulmonary infiltrate/limb edema/pleural effusions,? Pneumonia  · Decompensated CHF/severe cardiomyopathy with EF 20% on 2021  · Acute exacerbation of COPD  · DM  · Confusion,? Metabolic encephalopathy  · Hyperkalemia/DEONTE    Recommendations   · Continue IV antibiotics, cefepime. Obtain procalcitonin level  · IV solu-medrol, decrease dose and frequency  · Albuterol and Ipratropium Q 4 hours and prn  · Brovana aerosol treatment  · Budesonide aerosol treatment  · IV diuresis  · Repeat potassium  · X-ray chest in am  · Labs: CBC and BMP in am  · BiPAP as needed  · High flow oxygen by nasal cannula, wean FiO2 as tolerated. Discussed with RT  · DVT prophylaxis with SQ heparin  · Cardiology input  · Will follow with you    HPI     Denise Hunter is 77 y.o.,  female, admitted because of acute hypoxic respiratory failure/pulmonary edema. Patient at this time is confused and not able to give any history. Mostly repeating the questions.     PMHx   Past Medical History      Diagnosis Date    CHF (congestive heart failure) (HCC)     COPD (chronic obstructive pulmonary disease) (HCC)     Diabetes mellitus (HCC)     MRSA (methicillin resistant staph aureus) culture positive 2014    sputum    Tobacco abuse 10/15/2019      Past Surgical History        Procedure Laterality Date    APPENDECTOMY      HYSTERECTOMY      JOINT REPLACEMENT Bilateral        Meds    Current Medications    sodium chloride  1,000 mL Intravenous Once    cefepime 2,000 mg Intravenous Q12H    aspirin  81 mg Oral Daily    thiamine mononitrate  100 mg Oral Daily    sertraline  50 mg Oral Daily    pantoprazole  20 mg Oral Daily    [Held by provider] furosemide  40 mg Oral Daily    folic acid  1 mg Oral Daily    carvedilol  3.125 mg Oral BID    budesonide  0.5 mg Nebulization BID    atorvastatin  20 mg Oral Daily    Arformoterol Tartrate  15 mcg Nebulization BID    sodium chloride flush  10 mL Intravenous 2 times per day    heparin (porcine)  5,000 Units Subcutaneous 3 times per day    methylPREDNISolone  60 mg Intravenous Q6H    furosemide  20 mg Intravenous BID    levetiracetam  500 mg Intravenous Q12H     polyethylene glycol, sodium chloride flush, promethazine **OR** ondansetron, acetaminophen **OR** acetaminophen, albuterol  IV Drips/Infusions    Home Medications  Medications Prior to Admission: levETIRAcetam (KEPPRA) 500 MG tablet, Take 1 tablet by mouth 2 times daily  lisinopril (PRINIVIL;ZESTRIL) 2.5 MG tablet, Take 1 tablet by mouth daily  carvedilol (COREG) 3.125 MG tablet, Take 1 tablet by mouth 2 times daily  furosemide (LASIX) 40 MG tablet, Take 1 tablet by mouth daily  folic acid (FOLVITE) 1 MG tablet, Take 1 tablet by mouth daily  polyethylene glycol (GLYCOLAX) 17 g packet, Take 17 g by mouth daily as needed for Constipation  calcium carbonate-vitamin D3 (CALTRATE) 600-400 MG-UNIT TABS per tab, Take 1 tablet by mouth daily  thiamine 100 MG tablet, Take 1 tablet by mouth daily  Arformoterol Tartrate (BROVANA) 15 MCG/2ML NEBU, Take 2 mLs by nebulization 2 times daily  budesonide (PULMICORT) 0.5 MG/2ML nebulizer suspension, Take 2 mLs by nebulization 2 times daily  pantoprazole (PROTONIX) 20 MG tablet, Take 1 tablet by mouth daily  albuterol (PROVENTIL) (2.5 MG/3ML) 0.083% nebulizer solution, Take 3 mLs by nebulization every 6 hours as needed for Wheezing or Shortness of Breath  Multiple Vitamins-Minerals (CERTAVITE SENIOR PO), Take 1 tablet by mouth daily  sertraline (ZOLOFT) 50 MG tablet, Take 50 mg by mouth daily  loratadine (CLARITIN) 10 MG tablet, Take 10 mg by mouth daily  atorvastatin (LIPITOR) 20 MG tablet, Take 20 mg by mouth daily   aspirin 81 MG tablet, Take 81 mg by mouth daily. calcium-vitamin D (OSCAL-500) 500-200 MG-UNIT per tablet, Take 1 tablet by mouth daily. Allergies    Tiotropium and Spiriva handihaler [tiotropium bromide monohydrate]  Social History     Social History     Tobacco Use    Smoking status: Current Every Day Smoker     Packs/day: 0.50     Types: Cigarettes    Smokeless tobacco: Never Used   Substance Use Topics    Alcohol use: Yes     Alcohol/week: 70.0 standard drinks     Types: 70 Cans of beer per week     Comment: 840 oz (3, 40oz beers/day)     Family History          Problem Relation Age of Onset    Heart Disease Mother     Diabetes Father      ROS - 11 systems   Unable to do detailed review of systems secondary patient's confusion. Vitals     height is 5' 6\" (1.676 m) and weight is 145 lb (65.8 kg). Her oral temperature is 97.8 °F (36.6 °C). Her blood pressure is 137/109 (abnormal) and her pulse is 85. Her respiration is 24 and oxygen saturation is 90%. Body mass index is 23.4 kg/m². I/O        Intake/Output Summary (Last 24 hours) at 3/4/2021 1121  Last data filed at 3/4/2021 1030  Gross per 24 hour   Intake 60 ml   Output 230 ml   Net -170 ml     I/O last 3 completed shifts:  In: -   Out: 230 [Urine:230]   Patient Vitals for the past 96 hrs (Last 3 readings):   Weight   03/03/21 1151 145 lb (65.8 kg)     Exam   General Appearance   Awake, not oriented, in no acute distress  HEENT - Head is normocephalic, atraumatic. Pupil reactive to light  Neck - Supple, symmetrical, trachea midline and Soft, trachea midline and straight  Lungs -bilateral crackles, moderate air exchange  Cardiovascular - Heart sounds are normal.  Regular rhythm normal rate without murmur, gallop or rub.   Abdomen - Soft, nontender, nondistended, no masses or organomegaly  Neurologic -limited secondary to confusion, CN II-XII are grossly intact. There are no focal motor or sensory deficits  Skin - No bruising or bleeding  Extremities - No cyanosis, clubbing or edema, chronic skin changes    Labs  - Old records and notes have been reviewed in [facility]   CBC     Lab Results   Component Value Date    WBC 9.5 03/04/2021    RBC 4.44 03/04/2021    HGB 11.2 03/04/2021    HCT 34.8 03/04/2021     03/04/2021    MCV 78.4 03/04/2021    MCH 25.2 03/04/2021    MCHC 32.2 03/04/2021    RDW 27.5 03/04/2021    LYMPHOPCT 24 03/03/2021    MONOPCT 7 03/03/2021    BASOPCT 1 03/03/2021    MONOSABS 0.69 03/03/2021    LYMPHSABS 2.35 03/03/2021    EOSABS 0.00 03/03/2021    BASOSABS 0.10 03/03/2021    DIFFTYPE NOT REPORTED 03/03/2021     BMP   Lab Results   Component Value Date     03/04/2021    K 5.7 03/04/2021     03/04/2021    CO2 16 03/04/2021    BUN 25 03/04/2021    CREATININE 1.76 03/04/2021    GLUCOSE 111 03/04/2021    CALCIUM 8.8 03/04/2021    MG 1.6 03/03/2021     LFTS  Lab Results   Component Value Date    ALKPHOS 95 03/03/2021    ALT 29 03/03/2021    AST 63 03/03/2021    PROT 8.4 03/03/2021    BILITOT 0.87 03/03/2021    BILIDIR 0.48 03/03/2021    IBILI 0.39 03/03/2021    LABALBU 3.3 03/03/2021     ABG   Lab Results   Component Value Date    JNS4IOD 28 03/03/2021     PTT  Lab Results   Component Value Date    APTT 40.0 (H) 02/08/2021     INR   Lab Results   Component Value Date    INR 1.3 02/08/2021    INR 1.4 02/06/2021    PROTIME 16.4 (H) 02/08/2021    PROTIME 16.6 (H) 02/06/2021       Radiology    CXR   3/3/2021    (See actual reports for details)    \"Thank you for asking us to see this patient\"    Case discussed with nurse and RT. Questions and concerns addressed.     Electronically signed by     Latrell Duke MD on 3/4/2021 at 11:21 AM  Pulmonary Critical Care and Sleep Medicine,  [facility]  Cell: [phone]  Office: [phone]

## 2021-03-04 NOTE — PROGRESS NOTES
Comprehensive Nutrition Assessment    Type and Reason for Visit:  Positive Nutrition Screen(Unplanned weight loss, decreased appetite. Dietitian consult needed: weight loss)    Nutrition Recommendations/Plan:   1. Continue Cardiac diet  2. Start Glucerna 2x/day  3. Monitor p.o intakes and labs    Nutrition Assessment:  Patient admission is related to hypoxic episode and lethargia. Patient also is not following commands. Patient p.o intakes have been decreased since admission. Current weight is estimated. Unable to assess weight loss at this time. Patient is also in [address] for rule out for COVID. Start Glucerna 2x/day. Monitor p.o intakes and labs. Malnutrition Assessment:  Malnutrition Status:  Insufficient data      Estimated Daily Nutrient Needs:  Energy (kcal):  1400 kcal based on Jolley-St. Jeor (1.2 factor); Weight Used for Energy Requirements:  Current     Protein (g):  80-86 gm based on 1.2-1.3 gm/kg; Weight Used for Protein Requirements:  Current          Nutrition Related Findings:  Trace LLE edema. Lethargic, not following commands. Poor oral intakes      Wounds:  None       Current Nutrition Therapies:    DIET CARDIAC; Dietary Nutrition Supplements: Diabetic Oral Supplement    Anthropometric Measures:  · Height: 5' 6\" (167.6 cm)  · Current Body Weight: 145 lb (65.8 kg)   · Admission Body Weight: 145 lb (65.8 kg)    · Usual Body Weight: 143 lb (64.9 kg)     · Ideal Body Weight: 130 lbs; % Ideal Body Weight 111.5 %   · BMI: 23.4   · BMI Categories: Normal Weight (BMI 18.5-24. 9)       Nutrition Diagnosis:   · Predicted inadequate energy intake related to inadequate protein-energy intake as evidenced by intake 0-25%, intake 26-50%      Nutrition Interventions:   Food and/or Nutrient Delivery:  Continue Current Diet, Start Oral Nutrition Supplement  Nutrition Education/Counseling:  Education not indicated   Coordination of Nutrition Care:  Continue to monitor while inpatient    Goals:  PO intakes are greater than 50% at meals       Nutrition Monitoring and Evaluation:   Food/Nutrient Intake Outcomes:  Food and Nutrient Intake, Supplement Intake  Physical Signs/Symptoms Outcomes:  Biochemical Data, Skin, Weight     Discharge Planning:    Continue current diet, Continue Oral Nutrition Supplement         Joseline IGNACIO, RDN, LDN  Lead Clinical Dietitian  RD Office Phone [phone]

## 2021-03-04 NOTE — ED NOTES
Writer spoke with pt's niece Zeyad Ballard who wanted an up date. She told RN that pt is usually not confused at baseline but since her last ICU stay two weeks ago she has become confused and has never come out of it. Writer let family know she will keep her updated as needed.       Brian Leblanc RN  03/03/21 1944

## 2021-03-04 NOTE — PROGRESS NOTES
Patient at times has a right gaze bilat. One episode of left field cut. STNA was at patient's left side and patient was unaware.

## 2021-03-05 ENCOUNTER — APPOINTMENT (OUTPATIENT)
Dept: ULTRASOUND IMAGING | Age: 76
DRG: 189 | End: 2021-03-05
Payer: MEDICARE

## 2021-03-05 ENCOUNTER — APPOINTMENT (OUTPATIENT)
Dept: GENERAL RADIOLOGY | Age: 76
DRG: 189 | End: 2021-03-05
Payer: MEDICARE

## 2021-03-05 PROBLEM — I27.21 PULMONARY ARTERIAL HYPERTENSION (HCC): Status: ACTIVE | Noted: 2021-03-05

## 2021-03-05 LAB
ABSOLUTE EOS #: 0 K/UL (ref 0–0.44)
ABSOLUTE IMMATURE GRANULOCYTE: 0.12 K/UL (ref 0–0.3)
ABSOLUTE LYMPH #: 1.56 K/UL (ref 1.1–3.7)
ABSOLUTE MONO #: 0.84 K/UL (ref 0.1–1.2)
ANION GAP SERPL CALCULATED.3IONS-SCNC: 17 MMOL/L (ref 9–17)
ANION GAP SERPL CALCULATED.3IONS-SCNC: 23 MMOL/L (ref 9–17)
BASOPHILS # BLD: 0 % (ref 0–2)
BASOPHILS ABSOLUTE: 0 K/UL (ref 0–0.2)
BUN BLDV-MCNC: 33 MG/DL (ref 8–23)
BUN BLDV-MCNC: 36 MG/DL (ref 8–23)
BUN/CREAT BLD: 17 (ref 9–20)
BUN/CREAT BLD: 19 (ref 9–20)
CALCIUM SERPL-MCNC: 7.3 MG/DL (ref 8.6–10.4)
CALCIUM SERPL-MCNC: 8 MG/DL (ref 8.6–10.4)
CHLORIDE BLD-SCNC: 103 MMOL/L (ref 98–107)
CHLORIDE BLD-SCNC: 106 MMOL/L (ref 98–107)
CO2: 17 MMOL/L (ref 20–31)
CO2: 21 MMOL/L (ref 20–31)
CREAT SERPL-MCNC: 1.92 MG/DL (ref 0.5–0.9)
CREAT SERPL-MCNC: 1.94 MG/DL (ref 0.5–0.9)
CREATININE URINE: 164.7 MG/DL (ref 28–217)
CULTURE: NO GROWTH
DIFFERENTIAL TYPE: ABNORMAL
EOSINOPHILS RELATIVE PERCENT: 0 % (ref 1–4)
GFR AFRICAN AMERICAN: 30 ML/MIN
GFR AFRICAN AMERICAN: 31 ML/MIN
GFR NON-AFRICAN AMERICAN: 25 ML/MIN
GFR NON-AFRICAN AMERICAN: 25 ML/MIN
GFR SERPL CREATININE-BSD FRML MDRD: ABNORMAL ML/MIN/{1.73_M2}
GLUCOSE BLD-MCNC: 134 MG/DL (ref 70–99)
GLUCOSE BLD-MCNC: 175 MG/DL (ref 70–99)
HCT VFR BLD CALC: 37.3 % (ref 36.3–47.1)
HEMOGLOBIN: 12.2 G/DL (ref 11.9–15.1)
IMMATURE GRANULOCYTES: 1 %
LACTIC ACID, SEPSIS WHOLE BLOOD: ABNORMAL MMOL/L (ref 0.5–1.9)
LACTIC ACID, SEPSIS WHOLE BLOOD: ABNORMAL MMOL/L (ref 0.5–1.9)
LACTIC ACID, SEPSIS: 3.5 MMOL/L (ref 0.5–1.9)
LACTIC ACID, SEPSIS: 4.2 MMOL/L (ref 0.5–1.9)
LYMPHOCYTES # BLD: 13 % (ref 24–43)
Lab: NORMAL
MCH RBC QN AUTO: 25.8 PG (ref 25.2–33.5)
MCHC RBC AUTO-ENTMCNC: 32.7 G/DL (ref 28.4–34.8)
MCV RBC AUTO: 79 FL (ref 82.6–102.9)
MONOCYTES # BLD: 7 % (ref 3–12)
MORPHOLOGY: ABNORMAL
NRBC AUTOMATED: 0.5 PER 100 WBC
PDW BLD-RTO: 28.2 % (ref 11.8–14.4)
PLATELET # BLD: 287 K/UL (ref 138–453)
PLATELET ESTIMATE: ABNORMAL
PMV BLD AUTO: ABNORMAL FL (ref 8.1–13.5)
POTASSIUM SERPL-SCNC: 4.2 MMOL/L (ref 3.7–5.3)
POTASSIUM SERPL-SCNC: 4.5 MMOL/L (ref 3.7–5.3)
RBC # BLD: 4.72 M/UL (ref 3.95–5.11)
RBC # BLD: ABNORMAL 10*6/UL
SEG NEUTROPHILS: 79 % (ref 36–65)
SEGMENTED NEUTROPHILS ABSOLUTE COUNT: 9.48 K/UL (ref 1.5–8.1)
SODIUM BLD-SCNC: 143 MMOL/L (ref 135–144)
SODIUM BLD-SCNC: 144 MMOL/L (ref 135–144)
SODIUM,UR: 30 MMOL/L
SPECIMEN DESCRIPTION: NORMAL
TOTAL PROTEIN, URINE: 268 MG/DL
URINE TOTAL PROTEIN CREATININE RATIO: 1.63 (ref 0–0.2)
WBC # BLD: 12 K/UL (ref 3.5–11.3)
WBC # BLD: ABNORMAL 10*3/UL

## 2021-03-05 PROCEDURE — 2700000000 HC OXYGEN THERAPY PER DAY

## 2021-03-05 PROCEDURE — 83605 ASSAY OF LACTIC ACID: CPT

## 2021-03-05 PROCEDURE — 84165 PROTEIN E-PHORESIS SERUM: CPT

## 2021-03-05 PROCEDURE — 6370000000 HC RX 637 (ALT 250 FOR IP): Performed by: NURSE PRACTITIONER

## 2021-03-05 PROCEDURE — 84300 ASSAY OF URINE SODIUM: CPT

## 2021-03-05 PROCEDURE — 84155 ASSAY OF PROTEIN SERUM: CPT

## 2021-03-05 PROCEDURE — 36415 COLL VENOUS BLD VENIPUNCTURE: CPT

## 2021-03-05 PROCEDURE — 6360000002 HC RX W HCPCS: Performed by: NURSE PRACTITIONER

## 2021-03-05 PROCEDURE — 6360000002 HC RX W HCPCS: Performed by: INTERNAL MEDICINE

## 2021-03-05 PROCEDURE — 2580000003 HC RX 258: Performed by: INTERNAL MEDICINE

## 2021-03-05 PROCEDURE — 86038 ANTINUCLEAR ANTIBODIES: CPT

## 2021-03-05 PROCEDURE — 94761 N-INVAS EAR/PLS OXIMETRY MLT: CPT

## 2021-03-05 PROCEDURE — 6370000000 HC RX 637 (ALT 250 FOR IP): Performed by: INTERNAL MEDICINE

## 2021-03-05 PROCEDURE — 80048 BASIC METABOLIC PNL TOTAL CA: CPT

## 2021-03-05 PROCEDURE — 97530 THERAPEUTIC ACTIVITIES: CPT

## 2021-03-05 PROCEDURE — 99232 SBSQ HOSP IP/OBS MODERATE 35: CPT | Performed by: INTERNAL MEDICINE

## 2021-03-05 PROCEDURE — 85025 COMPLETE CBC W/AUTO DIFF WBC: CPT

## 2021-03-05 PROCEDURE — 82570 ASSAY OF URINE CREATININE: CPT

## 2021-03-05 PROCEDURE — 2580000003 HC RX 258: Performed by: NURSE PRACTITIONER

## 2021-03-05 PROCEDURE — 84156 ASSAY OF PROTEIN URINE: CPT

## 2021-03-05 PROCEDURE — 97162 PT EVAL MOD COMPLEX 30 MIN: CPT

## 2021-03-05 PROCEDURE — 94640 AIRWAY INHALATION TREATMENT: CPT

## 2021-03-05 PROCEDURE — 2000000000 HC ICU R&B

## 2021-03-05 PROCEDURE — 76770 US EXAM ABDO BACK WALL COMP: CPT

## 2021-03-05 PROCEDURE — 86334 IMMUNOFIX E-PHORESIS SERUM: CPT

## 2021-03-05 PROCEDURE — 97167 OT EVAL HIGH COMPLEX 60 MIN: CPT

## 2021-03-05 PROCEDURE — 71045 X-RAY EXAM CHEST 1 VIEW: CPT

## 2021-03-05 PROCEDURE — 97535 SELF CARE MNGMENT TRAINING: CPT

## 2021-03-05 RX ORDER — SODIUM CHLORIDE 0.9 % (FLUSH) 0.9 %
10 SYRINGE (ML) INJECTION EVERY 12 HOURS SCHEDULED
Status: DISCONTINUED | OUTPATIENT
Start: 2021-03-05 | End: 2021-03-10 | Stop reason: HOSPADM

## 2021-03-05 RX ORDER — LANOLIN ALCOHOL/MO/W.PET/CERES
3 CREAM (GRAM) TOPICAL ONCE
Status: COMPLETED | OUTPATIENT
Start: 2021-03-06 | End: 2021-03-06

## 2021-03-05 RX ORDER — SODIUM CHLORIDE 0.9 % (FLUSH) 0.9 %
10 SYRINGE (ML) INJECTION PRN
Status: DISCONTINUED | OUTPATIENT
Start: 2021-03-05 | End: 2021-03-10 | Stop reason: HOSPADM

## 2021-03-05 RX ORDER — QUETIAPINE FUMARATE 25 MG/1
25 TABLET, FILM COATED ORAL 2 TIMES DAILY
Status: DISCONTINUED | OUTPATIENT
Start: 2021-03-05 | End: 2021-03-10 | Stop reason: HOSPADM

## 2021-03-05 RX ORDER — METHYLPREDNISOLONE SODIUM SUCCINATE 40 MG/ML
30 INJECTION, POWDER, LYOPHILIZED, FOR SOLUTION INTRAMUSCULAR; INTRAVENOUS EVERY 8 HOURS
Status: DISCONTINUED | OUTPATIENT
Start: 2021-03-05 | End: 2021-03-06

## 2021-03-05 RX ORDER — MAGNESIUM SULFATE IN WATER 40 MG/ML
2000 INJECTION, SOLUTION INTRAVENOUS ONCE
Status: COMPLETED | OUTPATIENT
Start: 2021-03-05 | End: 2021-03-05

## 2021-03-05 RX ADMIN — ARFORMOTEROL TARTRATE 15 MCG: 15 SOLUTION RESPIRATORY (INHALATION) at 07:58

## 2021-03-05 RX ADMIN — HEPARIN SODIUM 5000 UNITS: 5000 INJECTION INTRAVENOUS; SUBCUTANEOUS at 13:51

## 2021-03-05 RX ADMIN — MAGNESIUM SULFATE HEPTAHYDRATE 2000 MG: 40 INJECTION, SOLUTION INTRAVENOUS at 20:01

## 2021-03-05 RX ADMIN — Medication 100 MG: at 08:49

## 2021-03-05 RX ADMIN — ARFORMOTEROL TARTRATE 15 MCG: 15 SOLUTION RESPIRATORY (INHALATION) at 19:05

## 2021-03-05 RX ADMIN — CARVEDILOL 3.12 MG: 3.12 TABLET, FILM COATED ORAL at 21:12

## 2021-03-05 RX ADMIN — BUDESONIDE 500 MCG: 0.5 SUSPENSION RESPIRATORY (INHALATION) at 07:51

## 2021-03-05 RX ADMIN — QUETIAPINE FUMARATE 25 MG: 25 TABLET ORAL at 16:46

## 2021-03-05 RX ADMIN — METHYLPREDNISOLONE SODIUM SUCCINATE 40 MG: 40 INJECTION, POWDER, FOR SOLUTION INTRAMUSCULAR; INTRAVENOUS at 06:30

## 2021-03-05 RX ADMIN — BUDESONIDE 500 MCG: 0.5 SUSPENSION RESPIRATORY (INHALATION) at 19:05

## 2021-03-05 RX ADMIN — HEPARIN SODIUM 5000 UNITS: 5000 INJECTION INTRAVENOUS; SUBCUTANEOUS at 06:30

## 2021-03-05 RX ADMIN — ATORVASTATIN CALCIUM 20 MG: 20 TABLET, FILM COATED ORAL at 08:48

## 2021-03-05 RX ADMIN — METHYLPREDNISOLONE SODIUM SUCCINATE 30 MG: 40 INJECTION, POWDER, FOR SOLUTION INTRAMUSCULAR; INTRAVENOUS at 13:52

## 2021-03-05 RX ADMIN — ASPIRIN 81 MG: 81 TABLET, COATED ORAL at 08:49

## 2021-03-05 RX ADMIN — METHYLPREDNISOLONE SODIUM SUCCINATE 30 MG: 40 INJECTION, POWDER, FOR SOLUTION INTRAMUSCULAR; INTRAVENOUS at 22:24

## 2021-03-05 RX ADMIN — HEPARIN SODIUM 5000 UNITS: 5000 INJECTION INTRAVENOUS; SUBCUTANEOUS at 22:24

## 2021-03-05 RX ADMIN — CEFEPIME HYDROCHLORIDE 2000 MG: 2 INJECTION, POWDER, FOR SOLUTION INTRAVENOUS at 16:46

## 2021-03-05 RX ADMIN — FOLIC ACID 1 MG: 1 TABLET ORAL at 08:48

## 2021-03-05 RX ADMIN — PANTOPRAZOLE SODIUM 20 MG: 20 TABLET, DELAYED RELEASE ORAL at 08:49

## 2021-03-05 RX ADMIN — CARVEDILOL 3.12 MG: 3.12 TABLET, FILM COATED ORAL at 08:48

## 2021-03-05 RX ADMIN — SERTRALINE HYDROCHLORIDE 50 MG: 50 TABLET ORAL at 08:48

## 2021-03-05 RX ADMIN — LEVETIRACETAM 500 MG: 100 INJECTION, SOLUTION INTRAVENOUS at 11:38

## 2021-03-05 ASSESSMENT — PAIN SCALES - GENERAL: PAINLEVEL_OUTOF10: 0

## 2021-03-05 NOTE — CONSULTS
[address] [facility] [address]                [address] [place] [address]                                  CONSULTATION    PATIENT NAME: Malia RUSH                      :        1945  MED REC NO:   [id number]                             ROOM:       1101  ACCOUNT NO:   [de-identified]                           ADMIT DATE: 2021  PROVIDER:     Raudel Ramires    DATE OF CONSULTATION:  2021    CARDIOLOGY CONSULTATION    REASON FOR CONSULTATION:  Elevated troponin. CHIEF COMPLAINT:  The patient was transferred from [facility] care  facility for symptoms of fatigue. History obtained from the patient and medical record. HISTORY OF PRESENT ILLNESS:  The patient is a 60-year-old female who was  residing at [facility] in [facility]. She was transferred to  [facility] with chief complaint of fatigue. In the emergency  room, she was found to be hypoxic. Her troponin level was elevated at  59. Cardiology Service was consulted. The patient is currently in  isolation for suspected COVID-19 infection. The patient is sitting up  by her bedside and does not appear to be in any distress. I reviewed  all available chart records and discussed with nurse on duty. It  appears that the only reason for Cardiology consultation is mild  elevation in high sensitivity troponin, as mentioned above. The patient  does not appear to be in any distress at this point, and reportedly, she  denied any chest pain at rest.    PAST MEDICAL HISTORY:  1. COPD. 2.  Diabetes mellitus. 3.  Reported history of congestive heart failure. PAST SURGICAL HISTORY:  Positive for appendectomy, hysterectomy, joint  replacement. ALLERGIES:  SPIRIVA and TIOTROPIUM. SOCIAL HISTORY:  Positive for smoking half a pack of cigarettes a day. She drinks several alcoholic drinks per week. Reports use of marijuana. FAMILY HISTORY:  Reviewed and is noncontributory. REVIEW OF SYSTEMS:  Fifteen-point system review performed. CONSTITUTIONAL:  No fever, chills, rigors. HEENT:  Negative for vision change, hearing change, sore throat. RESPIRATORY:  Positive for shortness of breath. GASTROINTESTINAL:  No nausea, vomiting, diarrhea, frequency. GENITOURINARY:  No dysuria, frequency, hematuria. MUSCULOSKELETAL:  No arthralgias or myalgias. NEUROLOGIC:  No history of TIA, CVA. INTEGUMENT:  No skin rashes or easy bruising. PHYSICAL EXAMINATION:  Physical exam currently shows the patient is  comfortable at bedrest.  VITAL SIGNS:  Blood pressure of 140/90, pulse rate of 92, respiratory  rate of 25, weight is 145 pounds. SpO2 is 88%. BMI 23.40 kg/m2. GENERAL APPEARANCE:  The patient is awake and is not in any distress. She is on nasal cannula oxygen. MENTAL STATUS:  The patient is alert and oriented. HEAD:  Normocephalic, atraumatic. EYES:  Nonicteric. EARS:  No ear discharge. NOSE:  No nasal discharge. MOUTH:  Oral mucosa is moist.  NECK:  There is no jugular venous distention. Normal carotid upstrokes. No bruits. HEART:  Examination of the heart reveals S1, S2.  Regular rhythm. LUNGS:  Few expiratory wheezes are heard. ABDOMEN:  Soft. Bowel sounds are present. EXTREMITIES:  Demonstrated no edema. LABORATORY DATA:  High sensitivity troponin is 63 and 55. Potassium is  4.7. Hemoglobin of 12, hematocrit 38.1. A 12-lead EKG shows sinus  rhythm, left axis deviation, diffuse nonspecific ST-T changes, high  lateral T-wave inversions, no significant EKG changes compared to EKG of  02/17. IMPRESSION:  1. Suspected COVID-19 infection. 2.  Mildly elevated high sensitivity troponin, which is nonspecific. 3.  COPD. 4.  Diabetes mellitus. 5.  Pneumonia. PLAN:  The patient does not currently have clinical anginal symptoms.    The patient had an echocardiogram done on 02/08/2021, which reported an  ejection fraction of 20% and mild-moderate mitral and tricuspid regurgitation and moderate pulmonary hypertension. The patient  apparently has a history of cocaine abuse. In view of multiple  comorbidities and severe LV systolic dysfunction, I recommended  conservative medical therapy only at this point without _____ invasive  therapies, as cardiac catheterization, etc.  Her prognosis is very poor  due to severe LV systolic dysfunction and what appears to be [place]  Functional Heart Association class III to IV chronic systolic congestive  heart failure. Continue current antibiotic regimen for pneumonia as being rendered. We  will add carvedilol 3.125 mg b.i.d. for afterload reduction therapy and  continue as tolerated. The patient is not on ACE inhibitors or  angiotensin receptor blockers in view of renal insufficiency. Thank you for allowing us to participate in the care of the patient.         Fermin Tan    D: 03/04/2021 17:25:05       T: 03/04/2021 17:34:44     [email or web address]  Job#: [id number]     Doc#: [id number]    CC:

## 2021-03-05 NOTE — CARE COORDINATION
Social Work-If patient is dc on weekend to [place], please call [phone] or [phone] and fax Nathalia Boyer to [phone]. Notify glenn Johns.  Marquis Teresa

## 2021-03-05 NOTE — PROGRESS NOTES
Occupational Therapy   Occupational Therapy Initial Assessment  Date: 3/5/2021   Patient Name: Heidi Oliveros  MRN: [id number]     : 1945    Date of Service: 3/5/2021    Discharge Recommendations:  [address]     RN reports patient is medically stable for therapy treatment this date. Chart reviewed prior to treatment and patient is agreeable for therapy. All lines intact and patient positioned comfortably at end of treatment. All patient needs addressed prior to ending therapy session. Assessment   Performance deficits / Impairments: Decreased functional mobility ; Decreased ADL status; Decreased strength;Decreased safe awareness;Decreased cognition;Decreased balance;Decreased endurance;Decreased posture  Prognosis: Fair  Decision Making: High Complexity  Patient Education: pt unable to receive education this session d/t cognition. Attempted to reorient throughout eval, provide approp stimulation, and explanation for purpose of moving pt  REQUIRES OT FOLLOW UP: Yes  Activity Tolerance  Activity Tolerance: Treatment limited secondary to decreased cognition  Safety Devices  Safety Devices in place: Yes  Type of devices: Call light within reach;Nurse notified; Left in bed;Bed alarm in place;Gait belt;Patient at risk for falls(1:1 sitter)           Patient Diagnosis(es): The primary encounter diagnosis was Acute hypoxemic respiratory failure ([facility] Diagnoses of Disorientation and Elevated lactic acid level were also pertinent to this visit. has a past medical history of CHF (congestive heart failure) ([facility] COPD (chronic obstructive pulmonary disease) ([facility] Diabetes mellitus ([facility] MRSA (methicillin resistant staph aureus) culture positive, and Tobacco abuse.   has a past surgical history that includes joint replacement (Bilateral); Hysterectomy; and Appendectomy.            Restrictions  Restrictions/Precautions  Restrictions/Precautions: Isolation, Fall Risk, General Precautions Required Braces or Orthoses?: No  Position Activity Restriction  Other position/activity restrictions: telemetry, catheter    Subjective   General  Chart Reviewed: Yes  Patient assessed for rehabilitation services?: Yes  Family / Caregiver Present: No  Vital Signs  Pulse: 80  Resp: 21  BP: 118/81  MAP (mmHg): 91  Oxygen Therapy  SpO2: 95 %  Social/Functional History  Social/Functional History  Additional Comments: patient unable to answer any questions due to cognitive status, was admitted from SNF, from prior hospital stays patient had lived alone in an apartment with home health aide a few hours a day. Objective   Vision: (unable to fully assess, pt is unable to track/maintain eye contact)    Orientation  Overall Orientation Status: Impaired  Orientation Level: Disoriented X4(pt is very confused, has 1:1 sitter.)  Observation/Palpation  Posture: Fair  Observation: restless in bed with 1:1 sitter. Pt muttering random numbers and saying \"Lana\" over and over again  Balance  Sitting Balance: Maximum assistance(pt initially max A to achieve balance in sitting at EOB with posterior lean and R lateral lean. Pt with time able to improve to min-mod A and sit EOB x 10 min. Attempting to reorient pt when up, to engage in purposeful task, to functionally reach.)  Standing Balance: Unable to assess(comment)(not safe to trial standing this date d/t poor cognitive status and poor sitting balance)  ADL  Grooming: Dependent/Total(attempted for pt to bring washcloth to face.  Pt unable to follow through with purposeful task despite hand over hand assistance.)  UE Bathing: Dependent/Total  LE Bathing: Dependent/Total  UE Dressing: Dependent/Total  LE Dressing: Dependent/Total  Toileting: Dependent/Total  Tone RUE  RUE Tone: Normotonic  Tone LUE  LUE Tone: Normotonic     Bed mobility  Supine to Sit: 2 Person assistance;Maximum assistance  Sit to Supine: 2 Person assistance;Maximum assistance  Transfers  Sit to stand: Unable to assess  Stand to sit: Unable to assess     Cognition  Overall Cognitive Status: Exceptions  Following Commands: Does not follow commands  Attention Span: Unable to maintain attention  Insights: Not aware of deficits  Cognition Comment: patient unable to follow any commands or to initiate any task, needed physical assist with washing face, ROM  Perception  Overall Perceptual Status: WFL     Sensation  Overall Sensation Status: WFL(responds to light touch)        LUE PROM (degrees)  LUE PROM: WFL  LUE General PROM: pt unable to follow directions for any AROM.   RUE PROM (degrees)  RUE PROM: WFL  LUE Strength  LUE Strength Comment: unable to assess MMT d/t cognition                   Plan   Plan  Times per week: 4-5x/week  Current Treatment Recommendations: ROM, Strengthening, Balance Training, Endurance Training, Functional Mobility Training, Safety Education & Training, Self-Care / ADL, Positioning, Cognitive Reorientation, Cognitive/Perceptual Training, Neuromuscular Re-education, Equipment Evaluation, Education, & procurement, Patient/Caregiver Education & Training         Goals  Short term goals  Time Frame for Short term goals: by discharge, pt will  Short term goal 1: tolerate reassessment as approp for ADL transfers/mob, LB ADLs/toileting  Short term goal 2: demo min A with bed mobility with rails/controls, min cues for initiation/technique  Short term goal 3: demo min A with simple grooming and self feeding tasks following set up at approp level  Short term goal 4: demo good sitting balance x 15 min EOB for inc. participation in ADLs, prep for safe transfers  Short term goal 5: participate in AAROM ex for B UE for inc functional strength for ADLs/mob  Patient Goals   Patient goals : not stated       Therapy Time   Individual Concurrent Group Co-treatment   Time In 1113         Time Out 1126(+10 min chart review)         Minutes 13           Co-treatment with PT warranted secondary to decreased safety and

## 2021-03-05 NOTE — PROGRESS NOTES
Physical Therapy    Facility/Department: [facility]  Initial Assessment    NAME: Zacarias Valadez  : 1945  MRN: [id number]    Date of Service: 3/5/2021    Discharge Recommendations:  Subacute/Skilled Nursing Facility        Assessment   Body structures, Functions, Activity limitations: Decreased functional mobility ; Decreased cognition;Decreased strength;Decreased safe awareness;Decreased balance;Decreased coordination;Decreased endurance  Assessment: Skilled therapy needed to faciliate improving mobility, strength, balance, endurance and coordination as patient requires 2 assist with bed mobility and sitting balance. Recommend d/c to SNF for further therapy  Prognosis: Fair  Decision Making: Medium Complexity  Exam: A/AROM, endurance, balance, bed mobility, AM-PAC  Clinical Presentation: evolving  PT Education: Transfer Training;General Safety  REQUIRES PT FOLLOW UP: Yes  Activity Tolerance  Activity Tolerance: Patient limited by cognitive status       Patient Diagnosis(es): The primary encounter diagnosis was Acute hypoxemic respiratory failure ([facility] Diagnoses of Disorientation and Elevated lactic acid level were also pertinent to this visit. has a past medical history of CHF (congestive heart failure) ([facility] COPD (chronic obstructive pulmonary disease) ([facility] MRSA (methicillin resistant staph aureus) culture positive, and Tobacco abuse.   has a past surgical history that includes joint replacement (Bilateral); Hysterectomy; and Appendectomy.     Restrictions  Restrictions/Precautions  Restrictions/Precautions: Isolation, Fall Risk  Required Braces or Orthoses?: No  Position Activity Restriction  Other position/activity restrictions: telemetry, catheter  Vision/Hearing  Hearing: Within functional limits     Subjective  General  Chart Reviewed: Yes  Patient assessed for rehabilitation services?: Yes  Family / Caregiver Present: No  Follows Commands: Impaired  General Comment  Comments: RN states patient ok for therapy but is not following any commands and has 1:1 sitter  Subjective  Subjective: patient confused but did not resist therapy  Pain Screening  Patient Currently in Pain: Denies          Orientation  Orientation  Overall Orientation Status: Impaired  Orientation Level: Oriented to person  Social/Functional History  Social/Functional History  Additional Comments: patient unable to answer any questions due to cognitive status, was admitted from SNF, from prior hospital stays patient had lived alone in an apartment with home health aide a few hours a day.   Cognition   Cognition  Overall Cognitive Status: Exceptions  Following Commands: Does not follow commands  Attention Span: Unable to maintain attention  Insights: Not aware of deficits  Cognition Comment: patient unable to follow any commands or to initiate any task, needed physical assist with washing face, ROM    Objective     Observation/Palpation  Observation: restless in bed with 1:1 sitter    AROM RLE (degrees)  RLE General AROM: A/AROM WFL, patient unable to follow command to assess AROM  AROM LLE (degrees)  LLE General AROM: A/AROM WFL, unable to follow commands to assess AROM  AROM RUE (degrees)  RUE General AROM: refer to OT  AROM LUE (degrees)  LUE General AROM: refer to OT  Strength RLE  Comment: unable to assess due to not following commands  Strength LLE  Comment: unable to assess due to not following commands  Strength RUE  Comment: see OT  Strength LUE  Comment: see OT     Sensation  Overall Sensation Status: WFL(responds to light touch)  Bed mobility  Supine to Sit: 2 Person assistance;Maximum assistance  Sit to Supine: 2 Person assistance;Maximum assistance  Transfers  Comment: not appropriate for standing due to Max with bed mobility and sitting balance  Ambulation  Ambulation?: No(will assess when patient appropriate)     Balance  Posture: Fair  Sitting - Static: Poor  Sitting - Dynamic: Poor  Comments: patient sat EOB x10 minutes Mod-Max assist needed to keep patient upright, patient did not demonstrate any righting reactions        Plan   Plan  Times per week: 1-2x/day for 5-6 days/week  Current Treatment Recommendations: Strengthening, Transfer Training, Endurance Training, Balance Training, Patient/Caregiver Education & Training  Safety Devices  Type of devices: Call light within reach, Bed alarm in place, Left in bed, Sitter present      AM-PAC Score  AM-PAC Inpatient Mobility Raw Score : 6 (03/05/21 1348)  AM-PAC Inpatient T-Scale Score : 23.55 (03/05/21 1348)  Mobility Inpatient CMS 0-100% Score: 100 (03/05/21 1348)  Mobility Inpatient CMS G-Code Modifier : CN (03/05/21 1348)          Goals  Short term goals  Time Frame for Short term goals: 10 visits  Short term goal 1: Min A for bed mobility  Short term goal 2: Patient to sit EOB x20 minutes SBA while performing LE ther ex       Therapy Time   Individual Concurrent Group Co-treatment   Time In 1115         Time Out 1126(additional 10 minutes for chart review)         Minutes 11+10=21              Co-treatment with OT warranted secondary to decreased safety and independence requiring 2 skilled therapy professionals to address individual discipline's goals. PT addressing pre gait trunk strengthening, weight shifting prior to transfers, transfer training and postural control in sitting.       Dewey Wu, PT

## 2021-03-05 NOTE — PROGRESS NOTES
[facility]  Office: Doris Singletary, DO, Brandan Apt, DO, Katya Bell, DO, Arielle Nazareth Blood, DO, Wendie Richardson MD, Brittany Wilcox MD, Franco Leone MD, Dg Gutiérrez MD, Alexandru Hdez MD, Jillian Cintron MD, Torsten Grijalva MD, Marky Carter MD, Alexandru Van MD, Don Garay, DO, Paz Shultz MD, Aleah Foss, DO, Beena Pimentel MD,  Noam Wade DO, Aniket Bullock MD, Bernardino Vegas MD, Aleksey Cruz, [facility], [facility], [facility], [facility] Francis, CNP, Adis Yip, CNS, Luis Barnett, CNP, Flo Pak, CNP, Tasha Baptiste, CNP, Evette Murphy, CNP, Naz Serna, CNP, ANNEL RogerC, Laurena Habermann, [facility], Darleen Stein, CNP, Daniel Motta, CNP, Terra Culp, CNP, Sarah Small, CNP, Alaina Michelle, [facility], [facility] [place], [address]    Progress Note    3/5/2021    11:26 AM    Name:   Shani Mina  MRN:     [id number]     Acct:      [de-identified]   Room:   [address] [place] [address] Day:  2  Admit Date:  3/3/2021 11:42 AM    PCP:   Radha Dior MD  Code Status:  Full Code    Subjective:     C/C:   Chief Complaint   Patient presents with   Elfreda Livingston Fatigue     From [facility]     Interval History Status:   Patient is more alert than before, still confused which as per information is her baseline  Eating food with help of nurse at my time of examination  States her breathing is better  Her diuretics were held yesterday due to rising creatinine and low urine output and was started on gentle hydration  Creatinine is still rising, 1.92 today (baseline creatinine is normal)  Lactic acid is improving, 3.5 today  She is off high flow since yesterday, currently on 2 L oxygen via nasal cannula  Brief History:   Shani Mina is a 77 y.o.  Non-/non  female who presents with Fatigue (From [facility])   and is admitted to the hospital for the management of Acute hypoxemic respiratory failure ([facility] Patient is a poor historian, she was admitted with fatigue and shortness of breath. In ER she was getting hypoxic and was sitting on side of bed without oxygen, she was put on nonrebreather and subsequently was placed on high flow oxygen. Patient is not able to give much information but as per nurse she was cyanosed when this happened. Patient denies any chest pain or palpitation  Her proBNP 73,215  Troponins 5955  ABG shows pH of 7.31, PCO2 52, PO2 158 on oxygen  Creatinine has increased to 1.43 which has been normal before, BUN 19  Lactic acid 3.74.0    3/4/2021   Nurse had notified today early morning that patient is unable to follow commands and not appropriate to take medications orally. On my examination patient is more alert than before, she is answering questions more appropriately, turning side in bed on asking for examination. Subsequently nurse could give medicines with applesauce and liquids  Potassium was 5.7 in morning, Kayexalate was not given yet which was ordered early morning  Lactic acid still high, 5.7  Breathing is improved with diuretics and currently patient is being switched from high flow to nasal cannula  Creatinine has increased to 1.76 with IV diuretics  In ER at admission she was given significant amount of fluids possibly due to elevated lactic acid and after which she became more short of breath and had to be placed on high flow oxygen  Review of Systems:     Constitutional:  negative for chills, fevers, sweats,+ fatiguebetter than before  Respiratory:  negative for cough, + shortness of breath, wheezing  Cardiovascular:  negative for chest pain, chest pressure/discomfort, lower extremity edema, palpitations  Gastrointestinal:  negative for abdominal pain, constipation, diarrhea, nausea, vomiting  Neurological:  negative for dizziness, headache    Medications: Allergies:     Allergies   Allergen Reactions    Tiotropium Anaphylaxis and Swelling    Spiriva Handihaler [Tiotropium Bromide Monohydrate] Swelling       Current Meds:   Scheduled Meds:    sodium chloride flush  10 mL Intravenous 2 times per day    methylPREDNISolone  40 mg Intravenous Q8H    [Held by provider] furosemide  20 mg Intravenous Daily    cefepime  2,000 mg Intravenous Q24H    sodium chloride  1,000 mL Intravenous Once    aspirin  81 mg Oral Daily    thiamine mononitrate  100 mg Oral Daily    sertraline  50 mg Oral Daily    pantoprazole  20 mg Oral Daily    folic acid  1 mg Oral Daily    carvedilol  3.125 mg Oral BID    budesonide  0.5 mg Nebulization BID    atorvastatin  20 mg Oral Daily    Arformoterol Tartrate  15 mcg Nebulization BID    heparin (porcine)  5,000 Units Subcutaneous 3 times per day    levetiracetam  500 mg Intravenous Q12H     Continuous Infusions:    sodium chloride 50 mL/hr at 21 1614     PRN Meds: sodium chloride flush, polyethylene glycol, promethazine **OR** ondansetron, acetaminophen **OR** acetaminophen, albuterol    Data:     Past Medical History:   has a past medical history of CHF (congestive heart failure) ([facility] COPD (chronic obstructive pulmonary disease) ([facility] Diabetes mellitus ([facility] MRSA (methicillin resistant staph aureus) culture positive, and Tobacco abuse. Social History:   reports that she has been smoking cigarettes. She has been smoking about 0.50 packs per day. She has never used smokeless tobacco. She reports current alcohol use of about 70.0 standard drinks of alcohol per week. She reports current drug use. Drug: Marijuana.      Family History:   Family History   Problem Relation Age of Onset    Heart Disease Mother     Diabetes Father        Vitals:  BP (!) 129/91   Pulse 85   Temp 97.2 °F (36.2 °C) (Temporal)   Resp 21   Ht 5' 6\" (1.676 m)   Wt 129 lb 10.1 oz (58.8 kg)   SpO2 95%   BMI 20.92 kg/m²   Temp (24hrs), Av.6 °F (36.4 °C), Min:97.2 °F (36.2 °C), Max:98.1 °F (36.7 °C)    Recent Labs     21  1640 03/04/21  1057 03/04/21  1624   POCGLU 78 111* 124*       I/O (24Hr): Intake/Output Summary (Last 24 hours) at 3/5/2021 1126  Last data filed at 3/5/2021 0859  Gross per 24 hour   Intake 898 ml   Output 355 ml   Net 543 ml       Labs:  Hematology:  Recent Labs     03/03/21  1345 03/04/21  0338 03/05/21  0302   WBC 9.8 9.5 12.0*   RBC 4.76 4.44 4.72   HGB 12.0 11.2* 12.2   HCT 38.1 34.8* 37.3   MCV 80.0* 78.4* 79.0*   MCH 25.2 25.2 25.8   MCHC 31.5 32.2 32.7   RDW 27.9* 27.5* 28.2*    286 287   MPV 10.9 11.6 Abnormal     Chemistry:  Recent Labs     03/03/21  1345 03/03/21  1545 03/03/21  1545 03/03/21  1855 03/04/21  0338 03/04/21  0531 03/04/21  1525 03/05/21  0302     --   --   --  144 143  --  143   K 4.8  --   --   --  5.4* 5.7* 4.7 4.5     --   --   --  103 102  --  103   CO2 23  --   --   --  19* 16*  --  17*   GLUCOSE 98  --    < >  --  107* 111*  --  134*   BUN 19  --   --   --  23 25*  --  33*   CREATININE 1.43*  --   --   --  1.66* 1.76*  --  1.92*   MG 1.6  --   --   --   --   --   --   --    ANIONGAP 17  --   --   --  22* 25*  --  23*   LABGLOM 36*  --   --   --  30* 28*  --  25*   GFRAA 43*  --   --   --  37* 34*  --  31*   CALCIUM 8.9  --   --   --  8.4* 8.8  --  8.0*   PROBNP 73,215*  --   --   --   --   --   --   --    TROPHS 59* 55*  --  63*  --  65*  --   --     < > = values in this interval not displayed. Recent Labs     03/03/21  1345 03/03/21  1640 03/03/21  1700 03/04/21  1057 03/04/21  1624   PROT 8.4*  --   --   --   --    LABALBU 3.3*  --   --   --   --    AST 63*  --   --   --   --    ALT 29  --   --   --   --    ALKPHOS 95  --   --   --   --    BILITOT 0.87  --   --   --   --    BILIDIR 0.48*  --   --   --   --    AMMONIA  --   --  24  --   --    POCGLU  --  78  --  111* 124*     ABG:  Lab Results   Component Value Date    POCPH 7.31 03/03/2021    Reggie 30  09/04/2014     DISREGARD RESULTS. PATIENT NUMBER WAS INCORRECTLY ASSIGNED.     POCPCO2 52 03/03/2021    PCO2 09/04/2014     DISREGARD RESULTS. PATIENT NUMBER WAS INCORRECTLY ASSIGNED. POCPO2 158 03/03/2021    PO2  09/04/2014     DISREGARD RESULTS. PATIENT NUMBER WAS INCORRECTLY ASSIGNED. POCHCO3 26.4 03/03/2021    HCO3  09/04/2014     DISREGARD RESULTS. PATIENT NUMBER WAS INCORRECTLY ASSIGNED. NBEA NOT REPORTED 03/03/2021    PBEA 0 03/03/2021    MPE5FUX 28 03/03/2021    RTXZ1CIL 99 03/03/2021    O2SAT  09/04/2014     DISREGARD RESULTS. PATIENT NUMBER WAS INCORRECTLY ASSIGNED. FIO2 100.0 03/03/2021     Lab Results   Component Value Date/Time    SPECIAL LTAC 03/03/2021 05:19 PM     Lab Results   Component Value Date/Time    CULTURE NO GROWTH 2 DAYS 03/03/2021 05:19 PM       Radiology:  Ct Head Wo Contrast    Result Date: 3/3/2021  No acute intracranial abnormality on a mildly motion degraded exam.     Xr Chest Portable    Result Date: 3/3/2021  Bilateral diffuse ground-glass and interstitial opacities, similar to the radiograph obtained earlier today. Differential considerations include pulmonary edema versus atypical or viral pneumonia. Xr Chest Portable    Result Date: 3/3/2021  Bilateral airspace disease with small effusions, most consistent with edema, has progressed in the interval.     Echocardiogram 2/8/2021    Summary  Left ventricle is normal in size and wall thickness. Global left ventricular systolic function is severely reduced with an  estimated ejection fraction of 20 % . Severe global hypokinesis. Left atrium is mildly dilated. Right atrium is moderately dilated . Right ventricular dilatation with reduced systolic function. Thickened mitral valve leaflets. Mild to moderate mitral regurgitation. Moderate tricuspid regurgitation. Moderate pulmonary hypertension. Estimated right ventricular systolic  pressure is 25HMIL.   Physical Examination:        General appearance: More alert than before, cooperative and mild  distress  Mental Status:  oriented to person, giving very short answers Lungs: Prolonged expiratory phase, still using accessory muscles, diminished breath sounds at bases, no Rales today  Heart:  regular rate and rhythm, no murmur  Abdomen:  soft, nontender, nondistended, normal bowel sounds, no masses, hepatomegaly, splenomegaly  Extremities: Minimal pedal  edema, no redness, tenderness in the calves  Skin:  no gross lesions, rashes, induration    Assessment:        Hospital Problems           Last Modified POA    * (Principal) Acute hypoxemic respiratory failure ([address] 3/3/2021 Yes    DEONTE (acute kidney injury) ([address] 3/5/2021 Yes    Heart failure, systolic, with acute decompensation (HCC)EF 20% on echo 3/4/2021 Yes    Hypoxic episode 3/3/2021 Yes    Fatigue 3/3/2021 Yes    Pulmonary arterial hypertension (HCC)moderate 3/5/2021 Yes    COPD (chronic obstructive pulmonary disease) ([address] 3/3/2021 Yes    Cocaine abuse ([address] 3/3/2021 Yes    Type 2 diabetes mellitus without complication, without long-term current use of insulin ([address] 3/3/2021 Yes    Diabetes mellitus ([address] 3/3/2021 Yes    Pneumonia due to infectious organism 3/3/2021 Yes    Current every day smoker 3/3/2021 Yes    Essential hypertension 3/3/2021 Yes    Altered mental status 4/7/3526 Yes    Metabolic acidosis 9/6/0440 Yes          Plan:        1. Keep diuretics on hold and continue gentle hydration  2. Consult nephrology due to DEONTE  3. Continue IV Keppra  4. Reduce dose of IV steroids  5. Continue aerosol treatments and other home medicines  6. Continue cefepime for presumed pneumonia  7. DVT prophylaxis  8. Continue oxygen via nasal cannula  9.  Monitor labs    Mamadou Mtz MD  3/5/2021  11:26 AM

## 2021-03-05 NOTE — PROGRESS NOTES
Section of Cardiology  Progress Note      Date:  3/5/2021  Patient: Natividad Johnsno  Admission:  3/3/2021 11:42 AM  Admit DX: Hypoxic episode [R09.02]  Acute hypoxemic respiratory failure ([address] [J96.01]  Age:  77 y. o., 1945     LOS: 2 days     Reason for evaluation:   Acute on chronic systolic congestive heart failure  Non-sustained ventricular tachycardia    SUBJECTIVE:     The patient was seen and examined. Notes and labs reviewed. There were not complications over night. Patient's cardiac review of systems: negative. The patient is generally feeling unchanged. OBJECTIVE:      EXAM:   Vitals:    VITALS:  /89   Pulse 86   Temp 97.2 °F (36.2 °C) (Temporal)   Resp 21   Ht 5' 6\" (1.676 m)   Wt 129 lb 10.1 oz (58.8 kg)   SpO2 95%   BMI 20.92 kg/m²   24HR INTAKE/OUTPUT:      Intake/Output Summary (Last 24 hours) at 3/5/2021 1855  Last data filed at 3/5/2021 1655  Gross per 24 hour   Intake 1392 ml   Output 260 ml   Net 1132 ml       CONSTITUTIONAL:  awake, alert, cooperative, no apparent distress, and appears stated age. HEENT: Normal jugular venous pulsations, no carotid bruits. Head is atraumatic, normocephalic. Eyes and oral mucosa are normal.  LUNGS: Good respiratory effort On auscultation: clear to auscultation bilaterally  CARDIOVASCULAR:  Normal apical impulse, regular rate and rhythm, normal S1 and S2 1/6 to 2/6 systolic murmur at cardiac apex. dorsalis pedis and bilateralpresent 2+  ABDOMEN: Soft, nontender, nondistended. Bowel sounds present. No masses or tenderness. No bruit. SKIN: Warm and dry. EXTREMITIES:No lower extremity edema. Motor movement grossly intact. No cyanosis or clubbing.     Current Inpatient Medications:   sodium chloride flush  10 mL Intravenous 2 times per day    methylPREDNISolone  30 mg Intravenous Q8H    QUEtiapine  25 mg Oral BID    magnesium sulfate  2,000 mg Intravenous Once    [Held by provider] furosemide  20 mg Intravenous Daily    cefepime 2,000 mg Intravenous Q24H    sodium chloride  1,000 mL Intravenous Once    aspirin  81 mg Oral Daily    thiamine mononitrate  100 mg Oral Daily    sertraline  50 mg Oral Daily    pantoprazole  20 mg Oral Daily    folic acid  1 mg Oral Daily    carvedilol  3.125 mg Oral BID    budesonide  0.5 mg Nebulization BID    atorvastatin  20 mg Oral Daily    Arformoterol Tartrate  15 mcg Nebulization BID    heparin (porcine)  5,000 Units Subcutaneous 3 times per day    levetiracetam  500 mg Intravenous Q12H       IV Infusions (if any):   sodium chloride 50 mL/hr at 03/04/21 1614       Diagnostics:   Telemetry: Sinus rhythm    Labs:   CBC:  Recent Labs     03/04/21  0338 03/05/21  0302   WBC 9.5 12.0*   HGB 11.2* 12.2   HCT 34.8* 37.3    287     Magnesium:  Recent Labs     03/03/21  1345   MG 1.6     BMP:  Recent Labs     03/05/21  0302 03/05/21  1146    144   K 4.5 4.2   CALCIUM 8.0* 7.3*   CO2 17* 21   BUN 33* 36*   CREATININE 1.92* 1.94*   LABGLOM 25* 25*   GLUCOSE 134* 175*     BNP:  Recent Labs     03/03/21  1345   PROBNP 73,215*     PT/INR:No results for input(s): PROTIME, INR in the last 72 hours. APTT:No results for input(s): APTT in the last 72 hours. CARDIAC ENZYMES:  Recent Labs     03/03/21  1545 03/03/21  1855 03/04/21  0531   TROPHS 55* 63* 65*   TROPONINT NOT REPORTED NOT REPORTED NOT REPORTED     FASTING LIPID PANEL:  Lab Results   Component Value Date    HDL 38 10/15/2019    TRIG 64 10/15/2019     LIVER PROFILE:  Recent Labs     03/03/21  1345 03/05/21  1146   AST 63*  --    ALT 29  --    LABALBU 3.3*  --    ALKPHOS 95  --    BILITOT 0.87  --    BILIDIR 0.48*  --    IBILI 0.39  --    PROT 8.4* 7.3   GLOB NOT REPORTED  --    ALBUMIN NOT REPORTED  --         ASSESSMENT:  1. Elevated high-sensitivity troponin which is nonspecific  2. Suspected COVID-19 infection  3. Acute on chronic systolic congestive heart failure  4.  Severe LV systolic dysfunction, [place] functional Heart Association class IV chronic systolic congestive heart failure  5. Diabetes mellitus  6. Pneumonia  7. Nonsustained ventricular tachycardia  8. Pulmonary hypertension. Patient Active Problem List   Diagnosis    COPD (chronic obstructive pulmonary disease) ([facility]    CHF (congestive heart failure) (HCC)    Cocaine abuse (HCC)    Acidosis, metabolic, with respiratory acidosis    Acute respiratory failure with hypoxemia (HCC)    Polysubstance abuse (HCC)    Hyponatremia, Beer Potomania    Normocytic anemia    Neutrophilic leukocytosis    Increased ammonia level    Hypophosphatemia    Type 2 diabetes mellitus without complication, without long-term current use of insulin (HCC)    Acute on chronic diastolic heart failure (HCC)    Tobacco abuse    Diabetes mellitus (HCC)    Chronic diastolic heart failure (HCC)    Pneumonia due to infectious organism    Elevated troponin    Prolonged Q-T interval on ECG    Hypoxia    Chronic respiratory failure (HCC)    Mild malnutrition (HCC)    Hypotension    DEONTE (acute kidney injury) ([facility]    Current every day smoker    Essential hypertension    Altered mental status    Heart failure, systolic, with acute decompensation (HCC)EF 20% on echo    Severe malnutrition (HCC)    Abnormal EEG    Acute encephalopathy    Generalized nonconvulsive seizures (HCC)    Hypoxic episode    Acute hypoxemic respiratory failure (HCC)    Fatigue    Metabolic acidosis    Pulmonary arterial hypertension (HCC)moderate       PLAN:  1. Continue carvedilol for afterload reduction therapy. 2. Patient is not a candidate for cardiac invasive therapies in view of generally frail status and multiple comorbidities  3. I recommend palliative care. 4. We will give 2 g of magnesium sulfate once for nonsustained VT in the context of severe cardiomyopathy. Discussed with patient and nursing.     Tanner Diane MD

## 2021-03-05 NOTE — CONSULTS
Nephrology Consult Note    Reason for Consult: Acute elevation in creatinine  Requesting Physician: Dr. Lutricia Primrose  Chief Complaint: Erich Moon into ER due to shortness of breath  History Obtained From: Chart review   history of Present Illness: This is a 77 y.o. female who is a resident of [address] [place] [address] at [place]. She has a past medical history significant for diabetes, COPD and history of decompensated CHF. She has a compromised left ventricular ejection fraction with EF of 20% with right ventricular dilatation and reduced systolic function further complicated by tricuspid regurgitation and moderate pulmonary hypertension. On her last echo which was performed on 2/8/2021 her right ventricular systolic pressure was 54 and moderate pulmonary hypertension. Patient was sent to ER because of increasing fatigue and shortness of breath. In ER initial chest x-ray shows pulmonary congestion and she was hypoxemic. Her initial ABG her pH was 7.31 with PCO2 of 52 and PO2 of 158. I am not sure how much fluid she received in ER because of elevated lactic acid. Subsequently she was transferred to ICU. Reviewing intake and output charting she is in positive fluid balance of 370 mL. Her chest x-ray still read as CHF  Her O2 sat is 89 to 90% and she is lying flat. Reason nephrology was consulted because of elevated creatinine. Her baseline creatinine is normal.  When she was admitted to hospital her creatinine was 1.4 and most recent creatinine is 1.8 mg/dL. Patient did not receive any radiological examination with radiocontrast material.  She is not on any nephrotoxic agent. She was on a very small dose of lisinopril of 2.5 mg/dL  Patient has a Dominique catheter and there is no history suggestive of obstructive uropathy  .   Past Medical History:        Diagnosis Date    CHF (congestive heart failure) (HCC)     [facility]) [facility]     Diabetes mellitus ([facility]     MRSA (methicillin resistant staph aureus) culture positive 8/28/2014    sputum    Tobacco abuse 10/15/2019       Past Surgical History:        Procedure Laterality Date    APPENDECTOMY      HYSTERECTOMY      JOINT REPLACEMENT Bilateral        Current Medications:        sodium chloride flush 0.9 % injection 10 mL, 2 times per day      sodium chloride flush 0.9 % injection 10 mL, PRN      methylPREDNISolone sodium (SOLU-MEDROL) injection 40 mg, Q8H      [Held by provider] furosemide (LASIX) injection 20 mg, Daily      0.9 % sodium chloride infusion, Continuous      cefepime (MAXIPIME) 2000 mg IVPB minibag, Q24H      0.9 % sodium chloride bolus, Once      aspirin EC tablet 81 mg, Daily      thiamine mononitrate tablet 100 mg, Daily      sertraline (ZOLOFT) tablet 50 mg, Daily      polyethylene glycol (GLYCOLAX) packet 17 g, Daily PRN      pantoprazole (PROTONIX) tablet 20 mg, Daily      folic acid (FOLVITE) tablet 1 mg, Daily      carvedilol (COREG) tablet 3.125 mg, BID      budesonide (PULMICORT) nebulizer suspension 500 mcg, BID      atorvastatin (LIPITOR) tablet 20 mg, Daily      Arformoterol Tartrate (BROVANA) nebulizer solution 15 mcg, BID      promethazine (PHENERGAN) tablet 12.5 mg, Q6H PRN    Or      ondansetron (ZOFRAN) injection 4 mg, Q6H PRN      acetaminophen (TYLENOL) tablet 650 mg, Q6H PRN    Or      acetaminophen (TYLENOL) suppository 650 mg, Q6H PRN      albuterol (PROVENTIL) nebulizer solution 2.5 mg, Q2H PRN      heparin (porcine) injection 5,000 Units, 3 times per day      levETIRAcetam (KEPPRA) 500 mg in sodium chloride 0.9 % 100 mL IVPB, Q12H        Allergies:  Tiotropium and Spiriva handihaler [tiotropium bromide monohydrate]    Social History:   Social History     Socioeconomic History    Marital status: Single     Spouse name: Not on file    Number of children: Not on file    Years of education: Not on file    Highest education level: Not on file   Occupational History    Not on file   Social Needs    Financial resource strain: Not on file    Food insecurity     Worry: Not on file     Inability: Not on file    Transportation needs     Medical: Not on file     Non-medical: Not on file   Tobacco Use    Smoking status: Current Every Day Smoker     Packs/day: 0.50     Types: Cigarettes    Smokeless tobacco: Never Used   Substance and Sexual Activity    Alcohol use: Yes     Alcohol/week: 70.0 standard drinks     Types: 70 Cans of beer per week     Comment: 840 oz (3, 40oz beers/day)    Drug use: Yes     Types: Marijuana    Sexual activity: Not on file   Lifestyle    Physical activity     Days per week: Not on file     Minutes per session: Not on file    Stress: Not on file   Relationships    Social connections     Talks on phone: Not on file     Gets together: Not on file     Attends [employer] service: Not on file     Active member of club or organization: Not on file     Attends meetings of clubs or organizations: Not on file     Relationship status: Not on file    Intimate partner violence     Fear of current or ex partner: Not on file     Emotionally abused: Not on file     Physically abused: Not on file     Forced sexual activity: Not on file   Other Topics Concern    Not on file   Social History Narrative    Not on file       Family History:   Family History   Problem Relation Age of Onset    Heart Disease Mother     Diabetes Father        Review of Systems:    Constitutional: No fever or chills  Cardiac:  No dyspnea. Chest:  No cough  Abdomen:  Distention or vomiting. Neuro:  Moving all 4 extremities. Skin:   No rashes, no itching. :   No hematuria, no pyuria, no dysuria, no flank pain.   Extremities:  No swelling    Objective:  CURRENT TEMPERATURE:  Temp: 97.2 °F (36.2 °C)  MAXIMUM TEMPERATURE OVER 24HRS:  Temp (24hrs), Av.6 °F (36.4 °C), Min:97.2 °F (36.2 °C), Max:98.1 °F (36.7 °C)    CURRENT RESPIRATORY RATE:  Resp: 21  CURRENT PULSE:  Pulse: Acute renal failure most likely due to ATN. Patient has a significant elevation in creatinine at the time of initial presentation. Her acute renal failure is still evolving and her creatinine is now up to 1.8 mg/dL. Patient also has low urine output. There is no evidence of volume overload on physical examination. 2.  Markedly elevated BNP most likely due to poor right ventricular function and dilatation of right atrium  3. Compromised left ventricular ejection fraction of 20%  4. Pulmonary hypertension moderate in nature  5. Longstanding type 2 diabetes         Plan:  1. Will Check Renal Ultrasound to r/o element of obstruction and to assess the kidney size/echotexture. 2.  Agree to hold diuretics at this point  3. Gentle hydration  3. We will check serum protein electrophoresis and free light chain ratio  4. Urine for sodium and creatinine with protein  5. BMP in a.m.  6.  We will follow with  Thank you for the consultation. Please do not hesitate to call with questions.   This note is created with the assistance of a speech-recognition program. While intending to generate a document that actually reflects the content of the visit, no guarantees can be provided that every mistake has been identified and corrected by editing  Electronically signed by Deepthi Alvarado MD on 3/5/2021 at 10:44 AM      Electronically signed by Deepthi Alvarado MD on 3/5/2021 at 10:32 AM

## 2021-03-05 NOTE — PLAN OF CARE
Problem: Falls - Risk of:  Goal: Will remain free from falls  Description: Will remain free from falls  Outcome: Ongoing  Note: Sitter at bedside. Pt free from falls. Bed in lowest position and locked. Bed alarm on. Problem: Skin Integrity:  Goal: Will show no infection signs and symptoms  Description: Will show no infection signs and symptoms  Outcome: Ongoing  Note: Skin assessed. Pt has stage 2 pressure ulcer on coccyx. Mepilex in place.  Pt on special low airloss bed, will continue to monitor

## 2021-03-05 NOTE — PROGRESS NOTES
Pulmonary Critical Care Progress Note  Hammad Ackerman MD     Patient seen for the follow up of  Acute hypoxemic respiratory failure ([address]     Subjective:  She is sitting up in the bed, sitter at bedside. She remains confused. She does not appear to be in any distress. She has been weaned off of high flow, currently on nasal cannula. Examination:  Vitals: /85   Pulse 79   Temp 96.8 °F (36 °C) (Temporal)   Resp 23   Ht 5' 6\" (1.676 m)   Wt 129 lb 10.1 oz (58.8 kg)   SpO2 95%   BMI 20.92 kg/m²   General appearance: Awake, confused, restless   Neck: No JVD  Lungs: Moderate air exchange, occasional crackle  Heart: regular rate and rhythm, S1, S2 normal, no gallop  Abdomen: Soft, non tender, + BS  Extremities: no cyanosis or clubbing. No significant edema    LABs:  CBC:   Recent Labs     03/04/21  0338 03/05/21  0302   WBC 9.5 12.0*   HGB 11.2* 12.2   HCT 34.8* 37.3    287     BMP:   Recent Labs     03/04/21  0531 03/04/21  1525 03/05/21  0302     --  143   K 5.7* 4.7 4.5   CO2 16*  --  17*   BUN 25*  --  33*   CREATININE 1.76*  --  1.92*   LABGLOM 28*  --  25*   GLUCOSE 111*  --  134*     LIVER PROFILE:  Recent Labs     03/03/21  1345   AST 63*   ALT 29   LABALBU 3.3*      Ref. Range 3/4/2021 05:31   Procalcitonin Latest Ref Range: <0.09 ng/mL 0.11 (H)     Radiology:  3/5/2021      Impression:  · Acute hypoxic respiratory failure  · Bilateral pulmonary infiltrate/limb edema/pleural effusions,? Pneumonia  · Decompensated CHF/severe cardiomyopathy with EF 20% on 2/8/2021  · Acute exacerbation of COPD  · DM  · Confusion,?   Metabolic encephalopathy  · Hyperkalemia/DEONTE    Recommendations:  · Continue IV antibiotics, cefepime  · Decrease IV solu-medrol to 30 mg every 8 hours  · Albuterol and Ipratropium Q 4 hours and prn  · Brovana aerosol treatment  · Budesonide aerosol treatment  · Fluid management/diuresis per nephrology  · X-ray chest in am  · Labs: CBC and BMP in am  · BiPAP as needed  · Oxygen via nasal cannula, keep SPO2 90% or greater  · DVT prophylaxis with SQ heparin  · Will follow with you    Electronically signed by     Leanna Fox MD on 3/5/2021 at 1:15 PM  Pulmonary Critical Care and Sleep Medicine,  [facility]  Cell: [phone]  Office: [phone]

## 2021-03-06 ENCOUNTER — APPOINTMENT (OUTPATIENT)
Dept: GENERAL RADIOLOGY | Age: 76
DRG: 189 | End: 2021-03-06
Payer: MEDICARE

## 2021-03-06 PROBLEM — E44.0 MODERATE MALNUTRITION (HCC): Status: ACTIVE | Noted: 2021-03-06

## 2021-03-06 LAB
-: NORMAL
ANION GAP SERPL CALCULATED.3IONS-SCNC: 16 MMOL/L (ref 9–17)
ANION GAP SERPL CALCULATED.3IONS-SCNC: 17 MMOL/L (ref 9–17)
ANION GAP SERPL CALCULATED.3IONS-SCNC: 17 MMOL/L (ref 9–17)
BUN BLDV-MCNC: 38 MG/DL (ref 8–23)
BUN BLDV-MCNC: 39 MG/DL (ref 8–23)
BUN BLDV-MCNC: 39 MG/DL (ref 8–23)
BUN/CREAT BLD: 23 (ref 9–20)
BUN/CREAT BLD: 23 (ref 9–20)
BUN/CREAT BLD: 24 (ref 9–20)
CALCIUM SERPL-MCNC: 7.6 MG/DL (ref 8.6–10.4)
CALCIUM SERPL-MCNC: 7.7 MG/DL (ref 8.6–10.4)
CALCIUM SERPL-MCNC: 7.8 MG/DL (ref 8.6–10.4)
CHLORIDE BLD-SCNC: 108 MMOL/L (ref 98–107)
CHLORIDE BLD-SCNC: 110 MMOL/L (ref 98–107)
CHLORIDE BLD-SCNC: 111 MMOL/L (ref 98–107)
CO2: 20 MMOL/L (ref 20–31)
CO2: 20 MMOL/L (ref 20–31)
CO2: 21 MMOL/L (ref 20–31)
CREAT SERPL-MCNC: 1.64 MG/DL (ref 0.5–0.9)
CREAT SERPL-MCNC: 1.67 MG/DL (ref 0.5–0.9)
CREAT SERPL-MCNC: 1.68 MG/DL (ref 0.5–0.9)
GFR AFRICAN AMERICAN: 36 ML/MIN
GFR AFRICAN AMERICAN: 36 ML/MIN
GFR AFRICAN AMERICAN: 37 ML/MIN
GFR NON-AFRICAN AMERICAN: 30 ML/MIN
GFR NON-AFRICAN AMERICAN: 30 ML/MIN
GFR NON-AFRICAN AMERICAN: 31 ML/MIN
GFR SERPL CREATININE-BSD FRML MDRD: ABNORMAL ML/MIN/{1.73_M2}
GLUCOSE BLD-MCNC: 129 MG/DL (ref 70–99)
GLUCOSE BLD-MCNC: 130 MG/DL (ref 65–105)
GLUCOSE BLD-MCNC: 131 MG/DL (ref 65–105)
GLUCOSE BLD-MCNC: 147 MG/DL (ref 70–99)
GLUCOSE BLD-MCNC: 163 MG/DL (ref 70–99)
HCT VFR BLD CALC: 37.1 % (ref 36.3–47.1)
HEMOGLOBIN: 11.5 G/DL (ref 11.9–15.1)
LACTIC ACID: 3.9 MMOL/L (ref 0.5–2.2)
MAGNESIUM: 2.4 MG/DL (ref 1.6–2.6)
MCH RBC QN AUTO: 25.1 PG (ref 25.2–33.5)
MCHC RBC AUTO-ENTMCNC: 31 G/DL (ref 28.4–34.8)
MCV RBC AUTO: 80.8 FL (ref 82.6–102.9)
NRBC AUTOMATED: 0.8 PER 100 WBC
PDW BLD-RTO: 29.7 % (ref 11.8–14.4)
PLATELET # BLD: 236 K/UL (ref 138–453)
PMV BLD AUTO: ABNORMAL FL (ref 8.1–13.5)
POTASSIUM SERPL-SCNC: 3.6 MMOL/L (ref 3.7–5.3)
POTASSIUM SERPL-SCNC: 4 MMOL/L (ref 3.7–5.3)
POTASSIUM SERPL-SCNC: 4 MMOL/L (ref 3.7–5.3)
RBC # BLD: 4.59 M/UL (ref 3.95–5.11)
REASON FOR REJECTION: NORMAL
SODIUM BLD-SCNC: 144 MMOL/L (ref 135–144)
SODIUM BLD-SCNC: 147 MMOL/L (ref 135–144)
SODIUM BLD-SCNC: 149 MMOL/L (ref 135–144)
WBC # BLD: 12.8 K/UL (ref 3.5–11.3)
ZZ NTE CLEAN UP: ORDERED TEST: NORMAL
ZZ NTE WITH NAME CLEAN UP: SPECIMEN SOURCE: NORMAL

## 2021-03-06 PROCEDURE — 2580000003 HC RX 258: Performed by: NURSE PRACTITIONER

## 2021-03-06 PROCEDURE — 6370000000 HC RX 637 (ALT 250 FOR IP): Performed by: NURSE PRACTITIONER

## 2021-03-06 PROCEDURE — 80048 BASIC METABOLIC PNL TOTAL CA: CPT

## 2021-03-06 PROCEDURE — 83605 ASSAY OF LACTIC ACID: CPT

## 2021-03-06 PROCEDURE — 36415 COLL VENOUS BLD VENIPUNCTURE: CPT

## 2021-03-06 PROCEDURE — 94640 AIRWAY INHALATION TREATMENT: CPT

## 2021-03-06 PROCEDURE — 2580000003 HC RX 258: Performed by: INTERNAL MEDICINE

## 2021-03-06 PROCEDURE — 6370000000 HC RX 637 (ALT 250 FOR IP): Performed by: INTERNAL MEDICINE

## 2021-03-06 PROCEDURE — 6360000002 HC RX W HCPCS: Performed by: NURSE PRACTITIONER

## 2021-03-06 PROCEDURE — 94761 N-INVAS EAR/PLS OXIMETRY MLT: CPT

## 2021-03-06 PROCEDURE — 85027 COMPLETE CBC AUTOMATED: CPT

## 2021-03-06 PROCEDURE — 82947 ASSAY GLUCOSE BLOOD QUANT: CPT

## 2021-03-06 PROCEDURE — 83735 ASSAY OF MAGNESIUM: CPT

## 2021-03-06 PROCEDURE — 71045 X-RAY EXAM CHEST 1 VIEW: CPT

## 2021-03-06 PROCEDURE — 99232 SBSQ HOSP IP/OBS MODERATE 35: CPT | Performed by: INTERNAL MEDICINE

## 2021-03-06 PROCEDURE — 2700000000 HC OXYGEN THERAPY PER DAY

## 2021-03-06 PROCEDURE — 2060000000 HC ICU INTERMEDIATE R&B

## 2021-03-06 PROCEDURE — 6360000002 HC RX W HCPCS: Performed by: INTERNAL MEDICINE

## 2021-03-06 RX ORDER — HALOPERIDOL 5 MG/ML
2 INJECTION INTRAMUSCULAR EVERY 6 HOURS PRN
Status: DISCONTINUED | OUTPATIENT
Start: 2021-03-06 | End: 2021-03-10 | Stop reason: HOSPADM

## 2021-03-06 RX ORDER — PREDNISONE 20 MG/1
20 TABLET ORAL 2 TIMES DAILY
Status: DISCONTINUED | OUTPATIENT
Start: 2021-03-06 | End: 2021-03-09

## 2021-03-06 RX ORDER — DEXTROSE MONOHYDRATE 50 MG/ML
INJECTION, SOLUTION INTRAVENOUS CONTINUOUS
Status: DISCONTINUED | OUTPATIENT
Start: 2021-03-06 | End: 2021-03-07

## 2021-03-06 RX ADMIN — FOLIC ACID 1 MG: 1 TABLET ORAL at 08:45

## 2021-03-06 RX ADMIN — LEVETIRACETAM 500 MG: 100 INJECTION, SOLUTION INTRAVENOUS at 11:21

## 2021-03-06 RX ADMIN — BUDESONIDE 500 MCG: 0.5 SUSPENSION RESPIRATORY (INHALATION) at 20:49

## 2021-03-06 RX ADMIN — ASPIRIN 81 MG: 81 TABLET, COATED ORAL at 08:45

## 2021-03-06 RX ADMIN — METHYLPREDNISOLONE SODIUM SUCCINATE 30 MG: 40 INJECTION, POWDER, FOR SOLUTION INTRAMUSCULAR; INTRAVENOUS at 06:01

## 2021-03-06 RX ADMIN — Medication 3 MG: at 00:13

## 2021-03-06 RX ADMIN — ARFORMOTEROL TARTRATE 15 MCG: 15 SOLUTION RESPIRATORY (INHALATION) at 08:13

## 2021-03-06 RX ADMIN — DEXTROSE MONOHYDRATE: 50 INJECTION, SOLUTION INTRAVENOUS at 10:54

## 2021-03-06 RX ADMIN — ATORVASTATIN CALCIUM 20 MG: 20 TABLET, FILM COATED ORAL at 08:44

## 2021-03-06 RX ADMIN — PREDNISONE 20 MG: 20 TABLET ORAL at 22:29

## 2021-03-06 RX ADMIN — QUETIAPINE FUMARATE 25 MG: 25 TABLET ORAL at 08:45

## 2021-03-06 RX ADMIN — CARVEDILOL 3.12 MG: 3.12 TABLET, FILM COATED ORAL at 22:29

## 2021-03-06 RX ADMIN — CEFEPIME HYDROCHLORIDE 2000 MG: 2 INJECTION, POWDER, FOR SOLUTION INTRAVENOUS at 17:31

## 2021-03-06 RX ADMIN — SERTRALINE HYDROCHLORIDE 50 MG: 50 TABLET ORAL at 08:45

## 2021-03-06 RX ADMIN — LEVETIRACETAM 500 MG: 100 INJECTION, SOLUTION INTRAVENOUS at 00:13

## 2021-03-06 RX ADMIN — PANTOPRAZOLE SODIUM 20 MG: 20 TABLET, DELAYED RELEASE ORAL at 08:45

## 2021-03-06 RX ADMIN — CARVEDILOL 3.12 MG: 3.12 TABLET, FILM COATED ORAL at 08:45

## 2021-03-06 RX ADMIN — QUETIAPINE FUMARATE 25 MG: 25 TABLET ORAL at 22:29

## 2021-03-06 RX ADMIN — ARFORMOTEROL TARTRATE 15 MCG: 15 SOLUTION RESPIRATORY (INHALATION) at 20:54

## 2021-03-06 RX ADMIN — HEPARIN SODIUM 5000 UNITS: 5000 INJECTION INTRAVENOUS; SUBCUTANEOUS at 14:21

## 2021-03-06 RX ADMIN — HEPARIN SODIUM 5000 UNITS: 5000 INJECTION INTRAVENOUS; SUBCUTANEOUS at 06:01

## 2021-03-06 RX ADMIN — PREDNISONE 20 MG: 20 TABLET ORAL at 11:22

## 2021-03-06 RX ADMIN — BUDESONIDE 500 MCG: 0.5 SUSPENSION RESPIRATORY (INHALATION) at 08:14

## 2021-03-06 RX ADMIN — Medication 100 MG: at 08:45

## 2021-03-06 RX ADMIN — HEPARIN SODIUM 5000 UNITS: 5000 INJECTION INTRAVENOUS; SUBCUTANEOUS at 22:29

## 2021-03-06 NOTE — PROGRESS NOTES
[facility]  Office: [address], DO, Kalia Nurse, DO, Danielle Show, DO, Yesi Puckett Blood, DO, Heath Perdomo MD, Noel Habermann, MD, Angela López MD, Yin Ko MD, Latanya Barrios MD, Lovenia Soulier, MD, Sally Rodriguez MD, Zaira Hickman MD, Mbtr Bean MD, Keyur Collins DO, Magdalena George MD, Chani Perales DO, Jp Greenberg MD,  Suzanne Sellers DO, Nikolai Rangel MD, Donell Gay MD, Sameer Cobos, [facility], [facility] Center[facility]NDSEY Omalley, CNP, Cheryl Ferraro, CNP, Jacey Ashby, CNS, Aneudy Hopper, CNP, Marleny Barry, CNP, Jeremías Keys, CNP, Liya Abernathy, [facility], Richard Carty, CNP, ANNEL EstradaC, Do Rubin, [facility], Alexis Varma, CNP, Rosa Andersen, CNP, My Smith, CNP, Leilani Ricks, CNP, Leighann Ha, [facility], Renata Flores, [facility]    Progress Note    3/6/2021    11:33 AM    Name:   Buddy Malone  MRN:     [id number]     Acct:      [de-identified]   Room:   [address] [place] [address] Day:  3  Admit Date:  3/3/2021 11:42 AM    PCP:   Harrison James MD  Code Status:  Full Code    Subjective:     C/C:   Chief Complaint   Patient presents with   Elvia Whitney Fatigue     From [facility]     Interval History Status:   Patient is progressively more alert every day, still confused which as per information is her baseline  Eating food  at present  States her breathing is better  Her diuretics were held 3/4/2021 due to rising creatinine and low urine output and was started on gentle hydration  Creatinine has started trending down, 1.68 today (baseline creatinine is normal)  Lactic acid is 3.9 today  She is off high flow since 2 days, currently on 2 L oxygen via nasal cannula  Brief History:   Buddy Malone is a 77 y.o. Non-/non  female who presents with Fatigue (From [facility])   and is admitted to the hospital for the management of Acute hypoxemic respiratory failure ([facility]   Patient is a poor historian, she was admitted with fatigue and shortness of breath. In ER she was getting hypoxic and was sitting on side of bed without oxygen, she was put on nonrebreather and subsequently was placed on high flow oxygen. Patient is not able to give much information but as per nurse she was cyanosed when this happened. Patient denies any chest pain or palpitation  Her proBNP 73,215  Troponins 5955  ABG shows pH of 7.31, PCO2 52, PO2 158 on oxygen  Creatinine has increased to 1.43 which has been normal before, BUN 19  Lactic acid 3.74.0    3/4/2021   Nurse had notified today early morning that patient is unable to follow commands and not appropriate to take medications orally. On my examination patient is more alert than before, she is answering questions more appropriately, turning side in bed on asking for examination. Subsequently nurse could give medicines with applesauce and liquids  Potassium was 5.7 in morning, Kayexalate was not given yet which was ordered early morning  Lactic acid still high, 5.7  Breathing is improved with diuretics and currently patient is being switched from high flow to nasal cannula  Creatinine has increased to 1.76 with IV diuretics  In ER at admission she was given significant amount of fluids possibly due to elevated lactic acid and after which she became more short of breath and had to be placed on high flow oxygen  Review of Systems:     Constitutional:  negative for chills, fevers, sweats,+ fatiguebetter than before  Respiratory:  negative for cough, + shortness of breath, wheezing  Cardiovascular:  negative for chest pain, chest pressure/discomfort, lower extremity edema, palpitations  Gastrointestinal:  negative for abdominal pain, constipation, diarrhea, nausea, vomiting  Neurological:  negative for dizziness, headache    Medications: Allergies:     Allergies   Allergen Reactions    Tiotropium Anaphylaxis and Swelling    Spiriva Handihaler [Tiotropium Bromide Monohydrate] Swelling       Current Meds:   Scheduled Meds:    predniSONE  20 mg Oral BID    sodium chloride flush  10 mL Intravenous 2 times per day    QUEtiapine  25 mg Oral BID    [Held by provider] furosemide  20 mg Intravenous Daily    cefepime  2,000 mg Intravenous Q24H    sodium chloride  1,000 mL Intravenous Once    aspirin  81 mg Oral Daily    thiamine mononitrate  100 mg Oral Daily    sertraline  50 mg Oral Daily    pantoprazole  20 mg Oral Daily    folic acid  1 mg Oral Daily    carvedilol  3.125 mg Oral BID    budesonide  0.5 mg Nebulization BID    atorvastatin  20 mg Oral Daily    Arformoterol Tartrate  15 mcg Nebulization BID    heparin (porcine)  5,000 Units Subcutaneous 3 times per day    levetiracetam  500 mg Intravenous Q12H     Continuous Infusions:    dextrose 70 mL/hr at 21 1054     PRN Meds: haloperidol lactate, sodium chloride flush, polyethylene glycol, promethazine **OR** ondansetron, acetaminophen **OR** acetaminophen, albuterol    Data:     Past Medical History:   has a past medical history of CHF (congestive heart failure) ([facility] COPD (chronic obstructive pulmonary disease) ([facility] Diabetes mellitus ([facility] MRSA (methicillin resistant staph aureus) culture positive, and Tobacco abuse. Social History:   reports that she has been smoking cigarettes. She has been smoking about 0.50 packs per day. She has never used smokeless tobacco. She reports current alcohol use of about 70.0 standard drinks of alcohol per week. She reports current drug use. Drug: Marijuana.      Family History:   Family History   Problem Relation Age of Onset    Heart Disease Mother     Diabetes Father        Vitals:  BP (!) 145/93   Pulse 80   Temp 97.2 °F (36.2 °C) (Tympanic)   Resp 15   Ht 5' 6\" (1.676 m)   Wt 130 lb 15.3 oz (59.4 kg)   SpO2 96%   BMI 21.14 kg/m²   Temp (24hrs), Av.1 °F (36.2 °C), Min:96.8 °F (36 °C), Max:97.3 °F (36.3 °C)    Recent Labs     21  1057 21 1624 03/06/21  0731 03/06/21  1102   POCGLU 111* 124* 130* 131*       I/O (24Hr): Intake/Output Summary (Last 24 hours) at 3/6/2021 1133  Last data filed at 3/6/2021 0537  Gross per 24 hour   Intake 1378 ml   Output 220 ml   Net 1158 ml       Labs:  Hematology:  Recent Labs     03/04/21  0338 03/05/21  0302 03/06/21  0757   WBC 9.5 12.0* 12.8*   RBC 4.44 4.72 4.59   HGB 11.2* 12.2 11.5*   HCT 34.8* 37.3 37.1   MCV 78.4* 79.0* 80.8*   MCH 25.2 25.8 25.1*   MCHC 32.2 32.7 31.0   RDW 27.5* 28.2* 29.7*    287 236   MPV 11.6 Abnormal Abnormal     Chemistry:  Recent Labs     03/03/21  1345 03/03/21  1545 03/03/21  1545 03/03/21  1855 03/03/21  1855 03/04/21  0531 03/04/21  0531 03/05/21  0302 03/05/21  1146 03/06/21  0939     --   --   --    < > 143  --  143 144 149*   K 4.8  --   --   --    < > 5.7*   < > 4.5 4.2 4.0     --   --   --    < > 102  --  103 106 111*   CO2 23  --   --   --    < > 16*  --  17* 21 21   GLUCOSE 98  --    < >  --    < > 111*  --  134* 175* 129*   BUN 19  --   --   --    < > 25*  --  33* 36* 38*   CREATININE 1.43*  --   --   --    < > 1.76*  --  1.92* 1.94* 1.68*   MG 1.6  --   --   --   --   --   --   --   --  2.4   ANIONGAP 17  --   --   --    < > 25*  --  23* 17 17   LABGLOM 36*  --   --   --    < > 28*  --  25* 25* 30*   GFRAA 43*  --   --   --    < > 34*  --  31* 30* 36*   CALCIUM 8.9  --   --   --    < > 8.8  --  8.0* 7.3* 7.7*   PROBNP 73,215*  --   --   --   --   --   --   --   --   --    TROPHS 59* 55*  --  63*  --  65*  --   --   --   --     < > = values in this interval not displayed.      Recent Labs     03/03/21  1345 03/03/21  1640 03/03/21  1700 03/04/21  1057 03/04/21  1624 03/05/21  1146 03/06/21  0731 03/06/21  1102   PROT 8.4*  --   --   --   --  7.3  --   --    LABALBU 3.3*  --   --   --   --   --   --   --    AST 63*  --   --   --   --   --   --   --    ALT 29  --   --   --   --   --   --   --    ALKPHOS 95  --   --   --   --   --   --   --    BILITOT 0.87  --   --   --   --   --   --   --    BILIDIR 0.48*  --   --   --   --   --   --   --    AMMONIA  --   --  24  --   --   --   --   --    POCGLU  --  78  --  111* 124*  --  130* 131*     ABG:  Lab Results   Component Value Date    POCPH 7.31 03/03/2021    [facility]  09/04/2014     DISREGARD RESULTS. PATIENT NUMBER WAS INCORRECTLY ASSIGNED. POCPCO2 52 03/03/2021    PCO2  09/04/2014     DISREGARD RESULTS. PATIENT NUMBER WAS INCORRECTLY ASSIGNED. POCPO2 158 03/03/2021    PO2  09/04/2014     DISREGARD RESULTS. PATIENT NUMBER WAS INCORRECTLY ASSIGNED. POCHCO3 26.4 03/03/2021    HCO3  09/04/2014     DISREGARD RESULTS. PATIENT NUMBER WAS INCORRECTLY ASSIGNED. NBEA NOT REPORTED 03/03/2021    PBEA 0 03/03/2021    [id number] 28 03/03/2021    [id number] 99 03/03/2021    O2SAT  09/04/2014     DISREGARD RESULTS. PATIENT NUMBER WAS INCORRECTLY ASSIGNED. FIO2 100.0 03/03/2021     Lab Results   Component Value Date/Time    SPECIAL NOT REPORTED 03/04/2021 04:00 PM     Lab Results   Component Value Date/Time    CULTURE NO GROWTH 03/04/2021 04:00 PM       Radiology:  Ct Head Wo Contrast    Result Date: 3/3/2021  No acute intracranial abnormality on a mildly motion degraded exam.     Xr Chest Portable    Result Date: 3/3/2021  Bilateral diffuse ground-glass and interstitial opacities, similar to the radiograph obtained earlier today. Differential considerations include pulmonary edema versus atypical or viral pneumonia. Xr Chest Portable    Result Date: 3/3/2021  Bilateral airspace disease with small effusions, most consistent with edema, has progressed in the interval.     Echocardiogram 2/8/2021    Summary  Left ventricle is normal in size and wall thickness. Global left ventricular systolic function is severely reduced with an  estimated ejection fraction of 20 % . Severe global hypokinesis. Left atrium is mildly dilated. Right atrium is moderately dilated .   Right ventricular dilatation with reduced systolic function. Thickened mitral valve leaflets. Mild to moderate mitral regurgitation. Moderate tricuspid regurgitation. Moderate pulmonary hypertension. Estimated right ventricular systolic  pressure is [id number]. Physical Examination:        General appearance: More alert than before, cooperative and no distress  Mental Status:  oriented to person, giving very short answers as before  Lungs: Prolonged expiratory phase, still using accessory muscles, diminished breath sounds at bases, no Rales today  Heart:  regular rate and rhythm, no murmur  Abdomen:  soft, nontender, nondistended, normal bowel sounds, no masses, hepatomegaly, splenomegaly  Extremities: Minimal pedal  edema, no redness, tenderness in the calves  Skin:  no gross lesions, rashes, induration    Assessment:        Hospital Problems           Last Modified POA    * (Principal) Acute hypoxemic respiratory failure ([address] 3/3/2021 Yes    DEONTE (acute kidney injury) ([address] 3/5/2021 Yes    Heart failure, systolic, with acute decompensation (HCC)EF 20% on echo 3/4/2021 Yes    Hypoxic episode 3/3/2021 Yes    Fatigue 3/3/2021 Yes    Pulmonary arterial hypertension (HCC)moderate 3/5/2021 Yes    COPD (chronic obstructive pulmonary disease) ([address] 3/3/2021 Yes    Cocaine abuse ([address] 3/3/2021 Yes    Type 2 diabetes mellitus without complication, without long-term current use of insulin ([address] 3/3/2021 Yes    Diabetes mellitus ([address] 3/3/2021 Yes    Pneumonia due to infectious organism 3/3/2021 Yes    Current every day smoker 3/3/2021 Yes    Essential hypertension 3/3/2021 Yes    Altered mental status 4/7/1883 Yes    Metabolic acidosis 1/3/2326 Yes          Plan:        1. Keep diuretics on hold and continue gentle hydration  2.  nephrology following due to DEONTE  3. Continue IV Keppra  4. Change steroids to oral  5. Continue aerosol treatments and other home medicines  6. Continue cefepime for presumed pneumonia  7. DVT prophylaxis  8. Continue oxygen via nasal cannula  9. Monitor labs  10.  Transfer to [facility], she will go back to [facility] when more stable    Frank Chiu MD  3/6/2021  11:33 AM

## 2021-03-06 NOTE — PROGRESS NOTES
Nutrition Assessment     Type and Reason for Visit: Reassess    Nutrition Recommendations/Plan:   1. Continue Cardiac diet  2. Increase Glucerna to 3x/day  3. Monitor p.o intakes and labs     Nutrition Assessment:  Patient continue to have poor intakes and is not doing well with drinking or using a straw. RN reports she has been spooning liquids into patient's mouth. Confusion is baseline. Patient weight updated and she may have lost about 13 lbs in 7 months. Increase Glucerna to 3x/day. Monitor p.o intakes and labs. Malnutrition Assessment:  Malnutrition Status: Moderate malnutrition    Estimated Daily Nutrient Needs:  Energy (kcal): 1400 kcal based on [place]-St. Jeor (1.2 factor); Weight Used for Energy Requirements:  Current     Protein (g): 80-86 gm based on 1.2-1.3 gm/kg; Weight Used for Protein Requirements:  Current            Nutrition Related Findings: Trace LLE edema. Lethargic, not following commands. Poor oral intakes      Current Nutrition Therapies:    DIET CARDIAC;   Dietary Nutrition Supplements: Diabetic Oral Supplement    Anthropometric Measures:  · Height: 5' 6\" (167.6 cm)  · Current Body Wt: 130 lb (59 kg)   · BMI: 21    Nutrition Diagnosis:   · Predicted inadequate energy intake related to inadequate protein-energy intake as evidenced by intake 0-25%, intake 26-50%     · Moderate malnutrition related to inadequate protein-energy intake as evidence by intake 0-25%, mild loss of subcutaneous fat, mild muscle loss      Nutrition Interventions:   Food and/or Nutrient Delivery:  Continue Current Diet, Modify Oral Nutrition Supplement  Nutrition Education/Counseling:  Education initiated   Coordination of Nutrition Care:  Continue to monitor while inpatient    Goals:  PO intakes are greater than 50% at meals       Nutrition Monitoring and Evaluation:   Food/Nutrient Intake Outcomes:  Food and Nutrient Intake, Supplement Intake  Physical Signs/Symptoms Outcomes:  Biochemical Data, Skin, Weight, Fluid Status or Edema     Discharge Planning:    Continue current diet, Continue Oral Nutrition Supplement         Javy Patch  MFN, RDN, LDN  Lead Clinical Dietitian  RD Office Phone [phone]

## 2021-03-06 NOTE — PROGRESS NOTES
Section of Cardiology  Progress Note      Date:  3/6/2021  Patient: Yanira Velazquez  Admission:  3/3/2021 11:42 AM  Admit DX: Hypoxic episode [R09.02]  Acute hypoxemic respiratory failure ([address] [J96.01]  Age:  77 y. o., 1945     LOS: 3 days     Reason for evaluation:   Acute on chronic systolic congestive heart failure    Nonsustained ventricular tachycardia    SUBJECTIVE:     The patient was seen and examined. Notes and labs reviewed. There were not complications over night. Patient's cardiac review of systems: negative. The patient is generally feeling unchanged. OBJECTIVE:      EXAM:   Vitals:    VITALS:  BP (!) 145/93   Pulse 80   Temp 97.2 °F (36.2 °C) (Tympanic)   Resp 15   Ht 5' 6\" (1.676 m)   Wt 130 lb 15.3 oz (59.4 kg)   SpO2 96%   BMI 21.14 kg/m²   24HR INTAKE/OUTPUT:      Intake/Output Summary (Last 24 hours) at 3/6/2021 1122  Last data filed at 3/6/2021 0537  Gross per 24 hour   Intake 1378 ml   Output 220 ml   Net 1158 ml       CONSTITUTIONAL:  awake, alert, cooperative, no apparent distress, and appears stated age. HEENT: Normal jugular venous pulsations, no carotid bruits. Head is atraumatic, normocephalic. Eyes and oral mucosa are normal.  LUNGS: Good respiratory effort On auscultation: clear to auscultation bilaterally  CARDIOVASCULAR:  Normal apical impulse, regular rate and rhythm, normal S1 and S2,1-2/6 sm at apex. dorsalis pedis and bilateralpresent 2+  ABDOMEN: Soft, nontender, nondistended. Bowel sounds present. No masses or tenderness. No bruit. SKIN: Warm and dry. EXTREMITIES:No lower extremity edema. Motor movement grossly intact. No cyanosis or clubbing.     Current Inpatient Medications:   predniSONE  20 mg Oral BID    sodium chloride flush  10 mL Intravenous 2 times per day    QUEtiapine  25 mg Oral BID    [Held by provider] furosemide  20 mg Intravenous Daily    cefepime  2,000 mg Intravenous Q24H    sodium chloride  1,000 mL Intravenous Once    aspirin 81 mg Oral Daily    thiamine mononitrate  100 mg Oral Daily    sertraline  50 mg Oral Daily    pantoprazole  20 mg Oral Daily    folic acid  1 mg Oral Daily    carvedilol  3.125 mg Oral BID    budesonide  0.5 mg Nebulization BID    atorvastatin  20 mg Oral Daily    Arformoterol Tartrate  15 mcg Nebulization BID    heparin (porcine)  5,000 Units Subcutaneous 3 times per day    levetiracetam  500 mg Intravenous Q12H       IV Infusions (if any):   dextrose 70 mL/hr at 03/06/21 1054       Diagnostics:   Telemetry: Sinus rhythm    Labs:   CBC:  Recent Labs     03/05/21  0302 03/06/21  0757   WBC 12.0* 12.8*   HGB 12.2 11.5*   HCT 37.3 37.1    236     Magnesium:  Recent Labs     03/03/21  1345 03/06/21  0939   MG 1.6 2.4     BMP:  Recent Labs     03/05/21  1146 03/06/21  0939    149*   K 4.2 4.0   CALCIUM 7.3* 7.7*   CO2 21 21   BUN 36* 38*   CREATININE 1.94* 1.68*   LABGLOM 25* 30*   GLUCOSE 175* 129*     BNP:  Recent Labs     03/03/21  1345   PROBNP 73,215*     PT/INR:No results for input(s): PROTIME, INR in the last 72 hours. APTT:No results for input(s): APTT in the last 72 hours. CARDIAC ENZYMES:  Recent Labs     03/03/21  1545 03/03/21  1855 03/04/21  0531   TROPHS 55* 63* 65*   TROPONINT NOT REPORTED NOT REPORTED NOT REPORTED     FASTING LIPID PANEL:  Lab Results   Component Value Date    HDL 38 10/15/2019    TRIG 64 10/15/2019     LIVER PROFILE:  Recent Labs     03/03/21  1345 03/05/21  1146   AST 63*  --    ALT 29  --    LABALBU 3.3*  --    ALKPHOS 95  --    BILITOT 0.87  --    BILIDIR 0.48*  --    IBILI 0.39  --    PROT 8.4* 7.3   GLOB NOT REPORTED  --    ALBUMIN NOT REPORTED  --         ASSESSMENT:  1. Elevated high-sensitivity troponin which is nonspecific  2. Suspected COVID-19 infection  3. Acute on chronic systolic congestive heart failure  4. Severe LV systolic dysfunction, [place] functional Heart Association class IV chronic systolic congestive heart failure  5.  Diabetes mellitus  6. Pneumonia  7. Nonsustained ventricular tachycardia  8. Pulmonary hypertension.       Patient Active Problem List   Diagnosis    COPD (chronic obstructive pulmonary disease) [facility]    CHF (congestive heart failure) (HCC)    Cocaine abuse (HCC)    Acidosis, metabolic, with respiratory acidosis    Acute respiratory failure with hypoxemia (HCC)    Polysubstance abuse (HCC)    Hyponatremia, Beer Potomania    Normocytic anemia    Neutrophilic leukocytosis    Increased ammonia level    Hypophosphatemia    Type 2 diabetes mellitus without complication, without long-term current use of insulin (HCC)    Acute on chronic diastolic heart failure (HCC)    Tobacco abuse    Diabetes mellitus (HCC)    Chronic diastolic heart failure (HCC)    Pneumonia due to infectious organism    Elevated troponin    Prolonged Q-T interval on ECG    Hypoxia    Chronic respiratory failure (HCC)    Mild malnutrition (HCC)    Hypotension    DEONTE (acute kidney injury) ([facility] [address]    Current every day smoker    Essential hypertension    Altered mental status    Heart failure, systolic, with acute decompensation (HCC)EF 20% on echo    Severe malnutrition (HCC)    Abnormal EEG    Acute encephalopathy    Generalized nonconvulsive seizures (HCC)    Hypoxic episode    Acute hypoxemic respiratory failure (HCC)    Fatigue    Metabolic acidosis    Pulmonary arterial hypertension (HCC)moderate       PLAN:  1. Continue carvedilol for afterload reduction therapy and for dilated cardiomyopathy  2. Okay for discharge back to [facility]  3. I recommend palliative care in view of severe and multiple comorbidities in the context of [place] functional Heart Association class IV chronic systolic congestive heart failure. Discussed with patient and nursing.     Wisam Lopez MD

## 2021-03-06 NOTE — PLAN OF CARE
Nutrition Problem #1: Predicted inadequate energy intake  Intervention: Food and/or Nutrient Delivery: Continue Current Diet, Modify Oral Nutrition Supplement  Nutritional Goals: PO intakes are greater than 50% at meals

## 2021-03-06 NOTE — PROGRESS NOTES
Six-Minute Walk Test    Pre Test Vitals: FIO2:    SpO2:    HR:    B/P:    RR:    Pre Charline Dyspnea:    Pre Charline Fatigue:      Pt free walked for      min      FT total    Breaks  FT     FIO2:   SpO2:   HR:  FT   FIO2:    SpO2:   HR:    Reason for stopping:      Post Test Vitals: FIO2:   SpO2:    HR:    B/P:     RR:    Post Charline Dyspnea:    Post Charline Fatigue:              Tech Comments:

## 2021-03-06 NOTE — PROGRESS NOTES
Pulmonary Critical Care Progress Note  Juli Arguelles MD     Patient seen for the follow up of  Acute hypoxemic respiratory failure ([address]     Subjective:  She is resting in bed, in no distress. She remains confused, telesitter at bedside. She is cooperative with exam.  She does not have any complaints of cough or shortness of breath or chest pain. Remains on oxygen by nasal cannula. Examination:  Vitals: BP (!) 143/97   Pulse 96   Temp 97.2 °F (36.2 °C) (Tympanic)   Resp 17   Ht 5' 6\" (1.676 m)   Wt 130 lb 15.3 oz (59.4 kg)   SpO2 96%   BMI 21.14 kg/m²   General appearance: Awake, confused, restless   Neck: No JVD  Lungs: Moderate air exchange, occasional crackle  Heart: regular rate and rhythm, S1, S2 normal, no gallop  Abdomen: Soft, non tender, + BS  Extremities: no cyanosis or clubbing. No significant edema    LABs:  CBC:   Recent Labs     03/05/21  0302 03/06/21  0757   WBC 12.0* 12.8*   HGB 12.2 11.5*   HCT 37.3 37.1    236     BMP:   Recent Labs     03/05/21  0302 03/05/21  1146    144   K 4.5 4.2   CO2 17* 21   BUN 33* 36*   CREATININE 1.92* 1.94*   LABGLOM 25* 25*   GLUCOSE 134* 175*     LIVER PROFILE:  Recent Labs     03/03/21  1345   AST 63*   ALT 29   LABALBU 3.3*      Ref. Range 3/4/2021 05:31   Procalcitonin Latest Ref Range: <0.09 ng/mL 0.11 (H)     Radiology:  3/6/2021      Impression:  · Acute hypoxic respiratory failure  · Bilateral pulmonary infiltrate/limb edema/pleural effusions,? Pneumonia  · Decompensated CHF/severe cardiomyopathy with EF 20% on 2/8/2021  · Acute exacerbation of COPD  · DM  · Confusion,?   Metabolic encephalopathy  · Hyperkalemia/DEONTE    Recommendations:  · Continue IV antibiotics, cefepime  · Discontinue IV solu-medrol   · Start prednisone taper  · Albuterol and Ipratropium Q 4 hours and prn  · Brovana aerosol treatment  · Budesonide aerosol treatment  · Seroquel 25 mg twice daily  · Fluid management/diuresis per nephrology  · Labs: CBC and BMP in am · BiPAP as needed  · Oxygen via nasal cannula, keep SPO2 90% or greater  · DVT prophylaxis with SQ heparin  · Will follow with you    Electronically signed by     Juventino Oneil MD on 3/6/2021 at 11:35 AM  Pulmonary Critical Care and Sleep Medicine,  [facility]  Cell: [phone]  Office: [phone]

## 2021-03-06 NOTE — PROGRESS NOTES
Reason for Follow up: DEONTE. HISTORY:    Confused. Appears comfortable. Review Of Systems:   Unable to obtain because she is confused. Scheduled Meds:   sodium chloride flush  10 mL Intravenous 2 times per day    methylPREDNISolone  30 mg Intravenous Q8H    QUEtiapine  25 mg Oral BID    [Held by provider] furosemide  20 mg Intravenous Daily    cefepime  2,000 mg Intravenous Q24H    sodium chloride  1,000 mL Intravenous Once    aspirin  81 mg Oral Daily    thiamine mononitrate  100 mg Oral Daily    sertraline  50 mg Oral Daily    pantoprazole  20 mg Oral Daily    folic acid  1 mg Oral Daily    carvedilol  3.125 mg Oral BID    budesonide  0.5 mg Nebulization BID    atorvastatin  20 mg Oral Daily    Arformoterol Tartrate  15 mcg Nebulization BID    heparin (porcine)  5,000 Units Subcutaneous 3 times per day    levetiracetam  500 mg Intravenous Q12H   Continuous Infusions:   sodium chloride 50 mL/hr at 03/04/21 1614     PRN Meds:haloperidol lactate, sodium chloride flush, polyethylene glycol, promethazine **OR** ondansetron, acetaminophen **OR** acetaminophen, albuterol    Allergies   Allergen Reactions    Tiotropium Anaphylaxis and Swelling    Spiriva Handihaler [Tiotropium Bromide Monohydrate] Swelling       Physical Exam:  Blood pressure (!) 143/97, pulse 96, temperature 97.2 °F (36.2 °C), temperature source Tympanic, resp. rate 17, height 5' 6\" (1.676 m), weight 130 lb 15.3 oz (59.4 kg), SpO2 96 %. In: 4296 [I.V.:1228]  Out: 220   In: 1378   Out: 220 [Urine:220]    General:  Awake, not in distress. Appears to be stated age. HEENT: Atraumatic, normocephalic. Anicteric sclera. Pink and moist oral mucosa. No carotid bruit. No JVD. Chest: Bilateral air entry, clear to auscultation, no wheezing, rhonchi or rales. Cardiovascular: RRR, S1S2, no murmur, rub or gallop. No lower extremity edema. Abdomen: Soft, non tender to palpation. Active bowel sounds x 4 quadrants.   Musculoskeletal: Active ROM x 4 extremities. No cyanosis or clubbing. Integumentary: Pink, warm and dry. Free from rash or lesions. Skin turgor normal.  CNS: Face symmetrical. No tremor. Data:  CBC:   Lab Results   Component Value Date    WBC 12.8 (H) 03/06/2021    HGB 11.5 (L) 03/06/2021    HCT 37.1 03/06/2021    MCV 80.8 (L) 03/06/2021     03/06/2021     BMP:    Lab Results   Component Value Date     03/05/2021    K 4.2 03/05/2021     03/05/2021    CO2 21 03/05/2021    BUN 36 (H) 03/05/2021    CREATININE 1.94 (H) 03/05/2021    GLUCOSE 175 (H) 03/05/2021     CMP:   Lab Results   Component Value Date     03/05/2021    K 4.2 03/05/2021     03/05/2021    CO2 21 03/05/2021    BUN 36 (H) 03/05/2021    CREATININE 1.94 (H) 03/05/2021    GLUCOSE 175 (H) 03/05/2021    CALCIUM 7.3 (L) 03/05/2021    PROT 7.3 03/05/2021    LABALBU 3.3 (L) 03/03/2021    BILITOT 0.87 03/03/2021    ALKPHOS 95 03/03/2021    AST 63 (H) 03/03/2021    ALT 29 03/03/2021      Hepatic:   Lab Results   Component Value Date    AST 63 (H) 03/03/2021    ALT 29 03/03/2021    BILITOT 0.87 03/03/2021    ALKPHOS 95 03/03/2021     BNP:   Lab Results   Component Value Date    BNP 29 01/03/2014     Lipids:   Lab Results   Component Value Date    CHOL 114 10/15/2019    HDL 38 (L) 10/15/2019     INR:   Lab Results   Component Value Date    INR 1.3 02/08/2021     PTH: No results found for: PTH  Phosphorus:    Lab Results   Component Value Date    PHOS 3.1 09/07/2014     Ionized Calcium: No results found for: IONCA  Magnesium:   Lab Results   Component Value Date    MG 1.6 03/03/2021     Albumin:   Lab Results   Component Value Date    LABALBU 3.3 (L) 03/03/2021     Last 3 CK, CKMB, Troponin: @LABRCNT(CKTOTAL:3,CKMB:3,TROPONINI:3)       URINE:)No results found for: Keaton Corina    Radiology:   Reviewed. Assessment:  Acute renal failure most likely due to ATN with low urine output. There is no evidence of volume overload on physical examination. Markedly elevated BNP most likely due to poor right ventricular function and dilatation of right atrium. Compromised left ventricular ejection fraction of 20%. Pulmonary hypertension moderate in nature. Longstanding type 2 diabetes. Proteinuria. Plan:  Agree to hold diuretics at this point. Gentle hydration. Will change IVF depending on today's lab results. Will check GN panel. Checking serum protein electrophoresis and free light chain ratio. Avoid hypotension, nephrotoxic drugs, Lovenox, Fleets enema and IV contrast exposure. Follow up labs ordered for AM.     Please do not hesitate to call with questions. We will follow with you. Electronically signed by Angie Clemente MD  on 3/6/2021 at 9:24 AM  [facility].   Ph: [phone]

## 2021-03-07 LAB
ABSOLUTE EOS #: 0 K/UL (ref 0–0.4)
ABSOLUTE IMMATURE GRANULOCYTE: 0.1 K/UL (ref 0–0.3)
ABSOLUTE LYMPH #: 0.78 K/UL (ref 1–4.8)
ABSOLUTE MONO #: 0.68 K/UL (ref 0.2–0.8)
ANION GAP SERPL CALCULATED.3IONS-SCNC: 14 MMOL/L (ref 9–17)
ANION GAP SERPL CALCULATED.3IONS-SCNC: 14 MMOL/L (ref 9–17)
ANION GAP SERPL CALCULATED.3IONS-SCNC: 15 MMOL/L (ref 9–17)
ANION GAP SERPL CALCULATED.3IONS-SCNC: 17 MMOL/L (ref 9–17)
BASOPHILS # BLD: 0 %
BASOPHILS ABSOLUTE: 0 K/UL (ref 0–0.2)
BUN BLDV-MCNC: 38 MG/DL (ref 8–23)
BUN BLDV-MCNC: 40 MG/DL (ref 8–23)
BUN/CREAT BLD: 26 (ref 9–20)
BUN/CREAT BLD: 26 (ref 9–20)
BUN/CREAT BLD: 28 (ref 9–20)
BUN/CREAT BLD: 29 (ref 9–20)
CALCIUM SERPL-MCNC: 7.8 MG/DL (ref 8.6–10.4)
CALCIUM SERPL-MCNC: 7.8 MG/DL (ref 8.6–10.4)
CALCIUM SERPL-MCNC: 8 MG/DL (ref 8.6–10.4)
CALCIUM SERPL-MCNC: 8.1 MG/DL (ref 8.6–10.4)
CHLORIDE BLD-SCNC: 107 MMOL/L (ref 98–107)
CHLORIDE BLD-SCNC: 108 MMOL/L (ref 98–107)
CHLORIDE BLD-SCNC: 109 MMOL/L (ref 98–107)
CHLORIDE BLD-SCNC: 109 MMOL/L (ref 98–107)
CO2: 16 MMOL/L (ref 20–31)
CO2: 20 MMOL/L (ref 20–31)
CO2: 21 MMOL/L (ref 20–31)
CO2: 21 MMOL/L (ref 20–31)
COMPLEMENT C3: 95 MG/DL (ref 90–180)
COMPLEMENT C4: 24 MG/DL (ref 10–40)
CREAT SERPL-MCNC: 1.39 MG/DL (ref 0.5–0.9)
CREAT SERPL-MCNC: 1.42 MG/DL (ref 0.5–0.9)
CREAT SERPL-MCNC: 1.47 MG/DL (ref 0.5–0.9)
CREAT SERPL-MCNC: 1.56 MG/DL (ref 0.5–0.9)
DIFFERENTIAL TYPE: ABNORMAL
EOSINOPHILS RELATIVE PERCENT: 0 % (ref 1–4)
GFR AFRICAN AMERICAN: 39 ML/MIN
GFR AFRICAN AMERICAN: 42 ML/MIN
GFR AFRICAN AMERICAN: 44 ML/MIN
GFR AFRICAN AMERICAN: 45 ML/MIN
GFR NON-AFRICAN AMERICAN: 32 ML/MIN
GFR NON-AFRICAN AMERICAN: 35 ML/MIN
GFR NON-AFRICAN AMERICAN: 36 ML/MIN
GFR NON-AFRICAN AMERICAN: 37 ML/MIN
GFR SERPL CREATININE-BSD FRML MDRD: ABNORMAL ML/MIN/{1.73_M2}
GLUCOSE BLD-MCNC: 117 MG/DL (ref 70–99)
GLUCOSE BLD-MCNC: 126 MG/DL (ref 70–99)
GLUCOSE BLD-MCNC: 138 MG/DL (ref 70–99)
GLUCOSE BLD-MCNC: 151 MG/DL (ref 70–99)
HCT VFR BLD CALC: 38.2 % (ref 36.3–47.1)
HEMOGLOBIN: 11.6 G/DL (ref 11.9–15.1)
HEPATITIS B SURFACE ANTIGEN: NONREACTIVE
HEPATITIS C ANTIBODY: REACTIVE
HIV AG/AB: NONREACTIVE
IMMATURE GRANULOCYTES: 1 %
LYMPHOCYTES # BLD: 8 % (ref 24–44)
MAGNESIUM: 2.1 MG/DL (ref 1.6–2.6)
MCH RBC QN AUTO: 25.2 PG (ref 25.2–33.5)
MCHC RBC AUTO-ENTMCNC: 30.4 G/DL (ref 28.4–34.8)
MCV RBC AUTO: 83 FL (ref 82.6–102.9)
MONOCYTES # BLD: 7 % (ref 1–7)
MORPHOLOGY: ABNORMAL
MORPHOLOGY: ABNORMAL
NRBC AUTOMATED: 1.4 PER 100 WBC
PDW BLD-RTO: 29 % (ref 11.8–14.4)
PHOSPHORUS: 4.2 MG/DL (ref 2.6–4.5)
PLATELET # BLD: 286 K/UL (ref 138–453)
PLATELET ESTIMATE: ABNORMAL
PMV BLD AUTO: 10.9 FL (ref 8.1–13.5)
POTASSIUM SERPL-SCNC: 3.6 MMOL/L (ref 3.7–5.3)
POTASSIUM SERPL-SCNC: 3.8 MMOL/L (ref 3.7–5.3)
POTASSIUM SERPL-SCNC: 4 MMOL/L (ref 3.7–5.3)
POTASSIUM SERPL-SCNC: 4.1 MMOL/L (ref 3.7–5.3)
RBC # BLD: 4.6 M/UL (ref 3.95–5.11)
RBC # BLD: ABNORMAL 10*6/UL
SEG NEUTROPHILS: 84 % (ref 36–66)
SEGMENTED NEUTROPHILS ABSOLUTE COUNT: 8.14 K/UL (ref 1.8–7.7)
SODIUM BLD-SCNC: 141 MMOL/L (ref 135–144)
SODIUM BLD-SCNC: 142 MMOL/L (ref 135–144)
SODIUM BLD-SCNC: 144 MMOL/L (ref 135–144)
SODIUM BLD-SCNC: 144 MMOL/L (ref 135–144)
WBC # BLD: 9.7 K/UL (ref 3.5–11.3)
WBC # BLD: ABNORMAL 10*3/UL

## 2021-03-07 PROCEDURE — 94761 N-INVAS EAR/PLS OXIMETRY MLT: CPT

## 2021-03-07 PROCEDURE — 97530 THERAPEUTIC ACTIVITIES: CPT

## 2021-03-07 PROCEDURE — 87389 HIV-1 AG W/HIV-1&-2 AB AG IA: CPT

## 2021-03-07 PROCEDURE — 86038 ANTINUCLEAR ANTIBODIES: CPT

## 2021-03-07 PROCEDURE — 99233 SBSQ HOSP IP/OBS HIGH 50: CPT | Performed by: FAMILY MEDICINE

## 2021-03-07 PROCEDURE — 6360000002 HC RX W HCPCS: Performed by: NURSE PRACTITIONER

## 2021-03-07 PROCEDURE — 2700000000 HC OXYGEN THERAPY PER DAY

## 2021-03-07 PROCEDURE — 2580000003 HC RX 258: Performed by: NURSE PRACTITIONER

## 2021-03-07 PROCEDURE — 80048 BASIC METABOLIC PNL TOTAL CA: CPT

## 2021-03-07 PROCEDURE — 36415 COLL VENOUS BLD VENIPUNCTURE: CPT

## 2021-03-07 PROCEDURE — 2060000000 HC ICU INTERMEDIATE R&B

## 2021-03-07 PROCEDURE — 86803 HEPATITIS C AB TEST: CPT

## 2021-03-07 PROCEDURE — 82595 ASSAY OF CRYOGLOBULIN: CPT

## 2021-03-07 PROCEDURE — 87340 HEPATITIS B SURFACE AG IA: CPT

## 2021-03-07 PROCEDURE — 86160 COMPLEMENT ANTIGEN: CPT

## 2021-03-07 PROCEDURE — 6370000000 HC RX 637 (ALT 250 FOR IP): Performed by: NURSE PRACTITIONER

## 2021-03-07 PROCEDURE — 94640 AIRWAY INHALATION TREATMENT: CPT

## 2021-03-07 PROCEDURE — 83735 ASSAY OF MAGNESIUM: CPT

## 2021-03-07 PROCEDURE — 6370000000 HC RX 637 (ALT 250 FOR IP): Performed by: INTERNAL MEDICINE

## 2021-03-07 PROCEDURE — 86162 COMPLEMENT TOTAL (CH50): CPT

## 2021-03-07 PROCEDURE — 84100 ASSAY OF PHOSPHORUS: CPT

## 2021-03-07 PROCEDURE — 6360000002 HC RX W HCPCS: Performed by: INTERNAL MEDICINE

## 2021-03-07 PROCEDURE — 83516 IMMUNOASSAY NONANTIBODY: CPT

## 2021-03-07 PROCEDURE — 2580000003 HC RX 258: Performed by: INTERNAL MEDICINE

## 2021-03-07 PROCEDURE — 85025 COMPLETE CBC W/AUTO DIFF WBC: CPT

## 2021-03-07 RX ORDER — POTASSIUM CHLORIDE 20 MEQ/1
10 TABLET, EXTENDED RELEASE ORAL 2 TIMES DAILY WITH MEALS
Status: DISCONTINUED | OUTPATIENT
Start: 2021-03-07 | End: 2021-03-09

## 2021-03-07 RX ORDER — DEXTROSE MONOHYDRATE 50 MG/ML
INJECTION, SOLUTION INTRAVENOUS CONTINUOUS
Status: DISCONTINUED | OUTPATIENT
Start: 2021-03-08 | End: 2021-03-10 | Stop reason: HOSPADM

## 2021-03-07 RX ADMIN — LEVETIRACETAM 500 MG: 100 INJECTION, SOLUTION INTRAVENOUS at 23:25

## 2021-03-07 RX ADMIN — QUETIAPINE FUMARATE 25 MG: 25 TABLET ORAL at 20:26

## 2021-03-07 RX ADMIN — LEVETIRACETAM 500 MG: 100 INJECTION, SOLUTION INTRAVENOUS at 12:51

## 2021-03-07 RX ADMIN — BUDESONIDE 500 MCG: 0.5 SUSPENSION RESPIRATORY (INHALATION) at 08:13

## 2021-03-07 RX ADMIN — SERTRALINE HYDROCHLORIDE 50 MG: 50 TABLET ORAL at 08:08

## 2021-03-07 RX ADMIN — PANTOPRAZOLE SODIUM 20 MG: 20 TABLET, DELAYED RELEASE ORAL at 08:08

## 2021-03-07 RX ADMIN — CARVEDILOL 3.12 MG: 3.12 TABLET, FILM COATED ORAL at 08:08

## 2021-03-07 RX ADMIN — QUETIAPINE FUMARATE 25 MG: 25 TABLET ORAL at 08:08

## 2021-03-07 RX ADMIN — POTASSIUM CHLORIDE 10 MEQ: 1500 TABLET, EXTENDED RELEASE ORAL at 12:59

## 2021-03-07 RX ADMIN — ARFORMOTEROL TARTRATE 15 MCG: 15 SOLUTION RESPIRATORY (INHALATION) at 08:13

## 2021-03-07 RX ADMIN — HEPARIN SODIUM 5000 UNITS: 5000 INJECTION INTRAVENOUS; SUBCUTANEOUS at 06:19

## 2021-03-07 RX ADMIN — ARFORMOTEROL TARTRATE 15 MCG: 15 SOLUTION RESPIRATORY (INHALATION) at 20:59

## 2021-03-07 RX ADMIN — HEPARIN SODIUM 5000 UNITS: 5000 INJECTION INTRAVENOUS; SUBCUTANEOUS at 20:26

## 2021-03-07 RX ADMIN — ATORVASTATIN CALCIUM 20 MG: 20 TABLET, FILM COATED ORAL at 08:08

## 2021-03-07 RX ADMIN — CEFEPIME HYDROCHLORIDE 2000 MG: 2 INJECTION, POWDER, FOR SOLUTION INTRAVENOUS at 17:13

## 2021-03-07 RX ADMIN — Medication 100 MG: at 08:08

## 2021-03-07 RX ADMIN — LEVETIRACETAM 500 MG: 100 INJECTION, SOLUTION INTRAVENOUS at 00:58

## 2021-03-07 RX ADMIN — FOLIC ACID 1 MG: 1 TABLET ORAL at 08:08

## 2021-03-07 RX ADMIN — CARVEDILOL 3.12 MG: 3.12 TABLET, FILM COATED ORAL at 20:26

## 2021-03-07 RX ADMIN — ASPIRIN 81 MG: 81 TABLET, COATED ORAL at 08:08

## 2021-03-07 RX ADMIN — BUDESONIDE 500 MCG: 0.5 SUSPENSION RESPIRATORY (INHALATION) at 20:54

## 2021-03-07 RX ADMIN — HEPARIN SODIUM 5000 UNITS: 5000 INJECTION INTRAVENOUS; SUBCUTANEOUS at 14:27

## 2021-03-07 RX ADMIN — PREDNISONE 20 MG: 20 TABLET ORAL at 08:08

## 2021-03-07 RX ADMIN — POTASSIUM CHLORIDE 10 MEQ: 1500 TABLET, EXTENDED RELEASE ORAL at 17:13

## 2021-03-07 RX ADMIN — PREDNISONE 20 MG: 20 TABLET ORAL at 20:26

## 2021-03-07 ASSESSMENT — ENCOUNTER SYMPTOMS: SHORTNESS OF BREATH: 1

## 2021-03-07 ASSESSMENT — PAIN SCALES - WONG BAKER
WONGBAKER_NUMERICALRESPONSE: 0
WONGBAKER_NUMERICALRESPONSE: 0

## 2021-03-07 ASSESSMENT — PAIN SCALES - GENERAL
PAINLEVEL_OUTOF10: 0
PAINLEVEL_OUTOF10: 0

## 2021-03-07 NOTE — PROGRESS NOTES
Writer spoke with Dr. Val Rosado regarding patients BMP results. New orders for PO potassium and to stop fluids.

## 2021-03-07 NOTE — PROGRESS NOTES
Writer emptied out patients genao catheter and noticed urine color was noticeably more cherry colored than chris colored this AM. Patient is confused and may have pulled on genao prior to emptying. Dr. Joyce Wells notified.

## 2021-03-07 NOTE — PROGRESS NOTES
Reason for Follow up: DEONTE. HISTORY:    Confused. Appears comfortable. Review Of Systems:   Unable to obtain because she is confused. Scheduled Meds:   predniSONE  20 mg Oral BID    sodium chloride flush  10 mL Intravenous 2 times per day    QUEtiapine  25 mg Oral BID    [Held by provider] furosemide  20 mg Intravenous Daily    cefepime  2,000 mg Intravenous Q24H    sodium chloride  1,000 mL Intravenous Once    aspirin  81 mg Oral Daily    thiamine mononitrate  100 mg Oral Daily    sertraline  50 mg Oral Daily    pantoprazole  20 mg Oral Daily    folic acid  1 mg Oral Daily    carvedilol  3.125 mg Oral BID    budesonide  0.5 mg Nebulization BID    atorvastatin  20 mg Oral Daily    Arformoterol Tartrate  15 mcg Nebulization BID    heparin (porcine)  5,000 Units Subcutaneous 3 times per day    levetiracetam  500 mg Intravenous Q12H   Continuous Infusions:   dextrose 50 mL/hr at 03/06/21 2018     PRN Meds:haloperidol lactate, sodium chloride flush, polyethylene glycol, promethazine **OR** ondansetron, acetaminophen **OR** acetaminophen, albuterol    Allergies   Allergen Reactions    Tiotropium Anaphylaxis and Swelling    Spiriva Handihaler [Tiotropium Bromide Monohydrate] Swelling       Physical Exam:  Blood pressure (!) 129/93, pulse 59, temperature 97.6 °F (36.4 °C), temperature source Axillary, resp. rate 20, height 5' 6\" (1.676 m), weight 134 lb 9.6 oz (61.1 kg), SpO2 92 %. In: 6986 [P.O.:300; I.V.:851]  Out: 425   In: 1151   Out: 425 [Urine:425]    General:  Awake, not in distress. Appears to be stated age. HEENT: Atraumatic, normocephalic. Anicteric sclera. Pink and moist oral mucosa. No carotid bruit. No JVD. Chest: Bilateral air entry, clear to auscultation, no wheezing, rhonchi or rales. Cardiovascular: RRR, S1S2, no murmur, rub or gallop. No lower extremity edema. Abdomen: Soft, non tender to palpation. Active bowel sounds x 4 quadrants.   Musculoskeletal: Active ROM x 4 extremities. No cyanosis or clubbing. Integumentary: Pink, warm and dry. Free from rash or lesions. Skin turgor normal.  CNS: Face symmetrical. No tremor. Data:  CBC:   Lab Results   Component Value Date    WBC 9.7 03/07/2021    HGB 11.6 (L) 03/07/2021    HCT 38.2 03/07/2021    MCV 83.0 03/07/2021     03/07/2021     BMP:    Lab Results   Component Value Date     03/07/2021    K 4.1 03/07/2021     (H) 03/07/2021    CO2 21 03/07/2021    BUN 40 (H) 03/07/2021    CREATININE 1.56 (H) 03/07/2021    GLUCOSE 126 (H) 03/07/2021     CMP:   Lab Results   Component Value Date     03/07/2021    K 4.1 03/07/2021     (H) 03/07/2021    CO2 21 03/07/2021    BUN 40 (H) 03/07/2021    CREATININE 1.56 (H) 03/07/2021    GLUCOSE 126 (H) 03/07/2021    CALCIUM 8.0 (L) 03/07/2021    PROT 7.3 03/05/2021    LABALBU 3.3 (L) 03/03/2021    BILITOT 0.87 03/03/2021    ALKPHOS 95 03/03/2021    AST 63 (H) 03/03/2021    ALT 29 03/03/2021      Hepatic:   Lab Results   Component Value Date    AST 63 (H) 03/03/2021    ALT 29 03/03/2021    BILITOT 0.87 03/03/2021    ALKPHOS 95 03/03/2021     BNP:   Lab Results   Component Value Date    BNP 29 01/03/2014     Lipids:   Lab Results   Component Value Date    CHOL 114 10/15/2019    HDL 38 (L) 10/15/2019     INR:   Lab Results   Component Value Date    INR 1.3 02/08/2021     PTH: No results found for: PTH  Phosphorus:    Lab Results   Component Value Date    PHOS 4.2 03/07/2021     Ionized Calcium: No results found for: IONCA  Magnesium:   Lab Results   Component Value Date    MG 2.1 03/07/2021     Albumin:   Lab Results   Component Value Date    LABALBU 3.3 (L) 03/03/2021     Last 3 CK, CKMB, Troponin: @LABRCNT(CKTOTAL:3,CKMB:3,TROPONINI:3)       URINE:)No results found for: Leo Dakin    Radiology:   Reviewed. Assessment:  Acute renal failure most likely due to ATN with low urine output. Cr is improving. There is no evidence of volume overload on physical examination. Markedly elevated BNP most likely due to poor right ventricular function and dilatation of right atrium. Compromised left ventricular ejection fraction of 20%. Pulmonary hypertension moderate in nature. Hypernatremia, Na level improving. Longstanding type 2 diabetes. Proteinuria. Plan:  Agree to hold diuretics at this point. Gentle hydration with D5W to replete water deficit. Follow BMP Q6 hours. Checking GN panel, serum protein electrophoresis and free light chain ratio. Avoid hypotension, nephrotoxic drugs, Lovenox, Fleets enema and IV contrast exposure. Follow up labs ordered for AM.     Please do not hesitate to call with questions. We will follow with you. Electronically signed by Celina Prajapati MD  on 3/7/2021 at 8:04 AM  [facility].   Ph: [phone]

## 2021-03-07 NOTE — CARE COORDINATION
Social work: faxing updates to [facility]. Will need ja and Rx  At discharge. No new hens needed if pt is a return.   Terrie larsen

## 2021-03-07 NOTE — PLAN OF CARE
Problem: Falls - Risk of:  Goal: Will remain free from falls  Description: Will remain free from falls  Outcome: Ongoing  Note: Fall risk assessment completed. Patient instructed to use call light. Bed locked and in lowest position, side rails up 2/4, call light and bedside table within reach, clutter removed, and non-skid footwear on when pt out of bed. Bed alarm on. Telesitter in room. Hourly rounds will continue. Problem: Skin Integrity:  Goal: Will show no infection signs and symptoms  Description: Will show no infection signs and symptoms  Outcome: Ongoing  Note: No signs or symptoms of infection noted.

## 2021-03-07 NOTE — PROGRESS NOTES
Dominique collection chamber changed due to patient presumably having cherry colored urine. Patient's urine is yellow/chris at this time.

## 2021-03-07 NOTE — PROGRESS NOTES
Physical Therapy  Facility/Department: [facility] CARE  Daily Treatment Note  NAME: Sudeep Phelps  : 1945  MRN: [id number]    Date of Service: 3/7/2021    Discharge Recommendations:  ECF with PT     Assessment   Body structures, Functions, Activity limitations: Decreased functional mobility ; Decreased cognition;Decreased strength;Decreased safe awareness;Decreased balance;Decreased coordination;Decreased endurance  Assessment: Pt continues to progress and improve w/ her mentation. Will continue as able while hospitalized towards goals. Unsure of baseline function. PT Education: Transfer Training;General Safety;Pressure Relief; Functional Mobility Training  REQUIRES PT FOLLOW UP: Yes  Activity Tolerance  Activity Tolerance: Patient limited by cognitive status     Patient Diagnosis(es): The primary encounter diagnosis was Acute hypoxemic respiratory failure ([facility] Diagnoses of Disorientation and Elevated lactic acid level were also pertinent to this visit. has a past medical history of CHF (congestive heart failure) ([facility] COPD (chronic obstructive pulmonary disease) ([facility] Diabetes mellitus ([facility] MRSA (methicillin resistant staph aureus) culture positive, and Tobacco abuse.   has a past surgical history that includes joint replacement (Bilateral); Hysterectomy; and Appendectomy. Restrictions  Restrictions/Precautions  Restrictions/Precautions: Isolation, Fall Risk, General Precautions  Required Braces or Orthoses?: No  Position Activity Restriction  Other position/activity restrictions: telemetry, catheter  Subjective  Pt pleasant this date; continues to repeat self but able to answer questions today. Objective      Bed mobility - min assist for rolling  Lt and Rt;  Supine to Sit - mod assist   Dangled bedside SBA +1 x 10 minutes. Cueing needed at times min assist for positioning.    Ambulation  Ambulation?: No     Balance  Posture: Fair  Sitting - Static: Fair  Exercises  Comments: Pt unable to follow directions - kept treatment functional         Comment: bed mobility and repositioning; dangle bedside x 10 minutes w/ posture cueing and trunk extension focus. AM-PAC Score 8/24      Goals  Short term goals  Time Frame for Short term goals: 10 visits  Short term goal 1: Min A for bed mobility  Short term goal 2: Patient to sit EOB x20 minutes SBA while performing LE ther ex  Patient Goals   Patient goals : pt unable to verbalize goals.     Plan    Plan  Times per week: 1-2x/day for 5-6 days/week  Current Treatment Recommendations: Strengthening, Transfer Training, Endurance Training, Balance Training, Patient/Caregiver Education & Training  Safety Devices  Type of devices: Call light within reach, Bed alarm in place, Left in bed, Sitter present     Therapy Time   Individual Concurrent Group Co-treatment   Time In 0806         Time Out 0832         Minutes 26            [address], PT

## 2021-03-07 NOTE — PROGRESS NOTES
Writer spoke with Dr. Federica Bruner regarding patients BMP results, would like to be notified if sodium changes by 2.

## 2021-03-07 NOTE — DISCHARGE INSTR - COC
Continuity of Care Form    Patient Name: Jennell Paget   :  1945  MRN:  [id number]    Admit date:  3/3/2021  Discharge date:  03/10/2021    Code Status Order: Full Code   Advance Directives:   [facility] Documentation       Date/Time Healthcare Directive Type of Healthcare Directive Copy in [address] Agent's Name Healthcare Agent's Phone Number    [phone]  No, patient does not have an advance directive for healthcare treatment -- -- -- -- --            Admitting Physician:  Fani Palmer MD  PCP: Cat Zendejas MD    Discharging Nurse: [facility] Unit/Room#: 1003/1003-02  Discharging Unit Phone Number: [phone]    Emergency Contact:   Extended Emergency Contact Information  Primary Emergency Contact: Agustín Root, [address] Phone: [phone]  Mobile Phone: [phone]  Relation: Grandchild  Secondary Emergency Contact: Krueger Susan, [address] Phone: [phone]  Mobile Phone: [phone]  Relation: Niece/Nephew    Past Surgical History:  Past Surgical History:   Procedure Laterality Date    APPENDECTOMY      HYSTERECTOMY      JOINT REPLACEMENT Bilateral        Immunization History: There is no immunization history on file for this patient.     Active Problems:  Patient Active Problem List   Diagnosis Code    [facility]ch[facility] obstructive [facility] disease) [facility] J44.9    CHF (congestive heart failure) ([facility]) I50.9    Cocaine abuse ([facility] F14.10    Acidosis, metabolic, with respiratory acidosis E87.4    Acute respiratory failure with hypoxemia ([facility]) J96.01    Polysubstance abuse ([facility] F19.10    Hyponatremia, Beer Potomania E87.1    Normocytic anemia M35.0    Neutrophilic leukocytosis R29.2    Increased ammonia level R79.89    Hypophosphatemia E83.39    Type 2 diabetes mellitus without complication, without long-term current use of insulin ([facility]) E11.9    Acute on chronic diastolic heart failure ([facility]) I50.33    Tobacco abuse Z72.0    Diabetes mellitus ([facility] E11.9    Chronic diastolic heart failure (HCC) I50.32    Pneumonia due to infectious organism J18.9    Elevated troponin R77.8    Prolonged Q-T interval on ECG R94.31    Hypoxia R09.02    Chronic respiratory failure (HCC) J96.10    Mild malnutrition (HCC) E44.1    Hypotension I95.9    DEONTE (acute kidney injury) ([facility] N17.9    Current every day smoker F17.200    Essential hypertension I10    Altered mental status R41.82    Heart failure, systolic, with acute decompensation (HCC)EF 20% on echo I50.23    Severe malnutrition (HCC) E43    Abnormal EEG R94.01    Acute encephalopathy G93.40    Generalized nonconvulsive seizures (HCC) G40.309    Hypoxic episode R09.02    Acute hypoxemic respiratory failure (HCC) J96.01    Fatigue Z60.78    Metabolic acidosis L12.3    Pulmonary arterial hypertension (HCC)moderate I27.21    Moderate malnutrition (HCC) E44.0       Isolation/Infection:   Isolation            Contact          Patient Infection Status       Infection Onset Added Last Indicated Last Indicated By Review Planned Expiration Resolved Resolved By    MRSA  09/02/14 09/02/14 Cinthia Farrell RN        sputum    Resolved    COVID-19 Rule Out 03/04/21 03/04/21 03/04/21 Respiratory Panel, Molecular, with COVID-19 (Restricted: peds pts or suitable admitted adults) (Ordered)   03/04/21 Rule-Out Test Resulted    COVID-19 Rule Out 03/03/21 03/03/21 03/03/21 COVID-19, Rapid (Ordered)   03/03/21 Rule-Out Test Resulted    COVID-19 Rule Out 02/06/21 02/06/21 02/06/21 COVID-19 (Ordered)   02/06/21 Rule-Out Test Resulted    COVID-19 Rule Out 08/23/20 08/23/20 08/23/20 COVID-19 (Ordered)   08/23/20 Rule-Out Test Resulted    COVID-19 Rule Out 08/20/20 08/20/20 08/20/20 COVID-19 (Ordered)   08/20/20 Rule-Out Test Resulted            Nurse Assessment:  Last Vital Signs: BP (!) 129/93   Pulse 59   Temp 97.6 °F (36.4 °C) (Axillary)   Resp 20   Ht 5' 6\" (1.676 m)   Wt 134 3/9/21 at 12:16 PM EST

## 2021-03-07 NOTE — PROGRESS NOTES
oxygen. Patient remains afebrile. Patient is having poor oral intake. Urine output low. Vitals - Stable afebrile, low blood pressure. Labs -hypokalemia 3.6, BUN 38, creatinine 1.47. Nursing notes , Consults notes reviewed. Overnight events and updates discussed with Nursing staff . Background History:         Reymundo Field is 77 y.o. female  Who was admitted to the hospital on 3/3/2021 for treatment of Acute hypoxemic respiratory failure ([facility] [address] Patient was admitted after she was sent from [facility] with fatigue [facility] ). Patient is admitted to the hospital for management of acute on chronic respiratory failure. Patient is poor historian. She was recently admitted in the hospital and had prolonged hospital stay for encephalopathy and respiratory failure. Patient was treated for pneumonia at that time and acute CHF. She was found to have severely reduced ejection fraction of 20%. Patient also had cardiogenic shock and was treated with pressor support. She had extensive work-up for encephalopathy and was evaluated by neurology. Patient was treated for meningitis which was later ruled out with CSF analysis. Patient was also given antiepileptic medication at that time which were later discontinued after ruling out seizures. Patient was subsequently transferred to [facility] for LTME monitoring and later Depakote was discontinued and Klonopin weaned off. Patient was discharged to skilled nursing facility with instructions to use 4 to 5 L of oxygen during day and BiPAP at nighttime and continue diuretics for heart failure. She has end-stage COPD with chronic respiratory failure on  oxygen at home. On arrival patient was started on nonrebreather and subsequently placed on high flow. Work-up showed elevated proBNP 73,215, troponin 59-55, ABG on arrival showed 7.3 1/52/158/26.4  Patient was treated with IV fluids for pneumonia, sepsis.   She had worsening of breathing due to acute decompensated CHF. Patient was treated with diuretics. She developed DEONTE. Nephrology was consulted due to ATN and was given gentle hydration. Patient continued to remain confused. PMH:  Past Medical History:   Diagnosis Date    CHF (congestive heart failure) (HCC)     COPD (chronic obstructive pulmonary disease) (HCC)     Diabetes mellitus (HCC)     MRSA (methicillin resistant staph aureus) culture positive 8/28/2014    sputum    Tobacco abuse 10/15/2019      Allergies: Allergies   Allergen Reactions    Tiotropium Anaphylaxis and Swelling    Spiriva Handihaler [Tiotropium Bromide Monohydrate] Swelling      Medications :  predniSONE, 20 mg, Oral, BID  sodium chloride flush, 10 mL, Intravenous, 2 times per day  QUEtiapine, 25 mg, Oral, BID  [Held by provider] furosemide, 20 mg, Intravenous, Daily  cefepime, 2,000 mg, Intravenous, Q24H  sodium chloride, 1,000 mL, Intravenous, Once  aspirin, 81 mg, Oral, Daily  thiamine mononitrate, 100 mg, Oral, Daily  sertraline, 50 mg, Oral, Daily  pantoprazole, 20 mg, Oral, Daily  folic acid, 1 mg, Oral, Daily  carvedilol, 3.125 mg, Oral, BID  budesonide, 0.5 mg, Nebulization, BID  atorvastatin, 20 mg, Oral, Daily  Arformoterol Tartrate, 15 mcg, Nebulization, BID  heparin (porcine), 5,000 Units, Subcutaneous, 3 times per day  levetiracetam, 500 mg, Intravenous, Q12H        Review of Systems   Review of Systems   Unable to perform ROS: Mental status change   Respiratory: Positive for shortness of breath.       Objective :      Current Vitals : Temp: 98.1 °F (36.7 °C),  Pulse: 55, Resp: 20, BP: (!) 134/91, SpO2: 93 %  Last 24 Hrs Vitals   Patient Vitals for the past 24 hrs:   BP Temp Temp src Pulse Resp SpO2 Height Weight   03/07/21 0532        134 lb 9.6 oz (61.1 kg)   03/07/21 0356 (!) 134/91 98.1 °F (36.7 °C) Oral 55 20 93 %     03/07/21 0019 108/77 98.1 °F (36.7 °C) Oral 69 18 93 %     03/06/21 2049     16 95 %     03/06/21 2000 117/76 97.4 °F (36.3 °C) Axillary 66 16      03/06/21 1800 (!) 147/105   70 19      03/06/21 1700 (!) 142/115   62 14      03/06/21 1600 (!) 127/107 96.8 °F (36 °C) Temporal 63 18 91 %     03/06/21 1518       5' 6\" (1.676 m)    03/06/21 1500 (!) 120/96   68 15      03/06/21 1400 (!) 126/107   75 19      03/06/21 1300 (!) 135/96   66 17      03/06/21 1200 (!) 143/91 96.8 °F (36 °C) Temporal 70 17 100 %     03/06/21 1100 (!) 144/102   75 18      03/06/21 1000 (!) 145/93   80 15      03/06/21 0900 (!) 143/97   96 17      03/06/21 0800 (!) 150/103 97.2 °F (36.2 °C) Tympanic 73 17 96 %     03/06/21 0733        130 lb 15.3 oz (59.4 kg)     Intake / output   03/06 0701 - 03/07 0700  In: 0609 [P.O.:360; I.V.:851]  Out: 425 [Urine:425]  Physical Exam:  Physical Exam  Vitals signs and nursing note reviewed. Constitutional:       General: She is not in acute distress. Appearance: She is cachectic. She is ill-appearing. Interventions: Nasal cannula in place. HENT:      Head: Normocephalic and atraumatic. Eyes:      Conjunctiva/sclera: Conjunctivae normal.      Pupils: Pupils are equal, round, and reactive to light. Cardiovascular:      Rate and Rhythm: Normal rate and regular rhythm. Heart sounds: No murmur. Pulmonary:      Effort: Accessory muscle usage present. No respiratory distress. Breath sounds: No stridor. No decreased breath sounds, wheezing, rhonchi or rales. Abdominal:      General: Bowel sounds are normal. There is no distension. Palpations: Abdomen is soft. Abdomen is not rigid. Tenderness: There is no abdominal tenderness. There is no guarding. Musculoskeletal:         General: No tenderness. Skin:     General: Skin is warm and dry. Findings: No erythema, lesion or rash. Neurological:      Mental Status: She is lethargic and disoriented. Cranial Nerves: No cranial nerve deficit. Motor: No seizure activity. Psychiatric:         Attention and Perception: She is inattentive. Speech: Speech normal.         Behavior: Behavior is uncooperative and withdrawn. Cognition and Memory: Cognition is impaired. Laboratory findings:    Recent Labs     03/05/21  0302 03/06/21  0757 03/07/21  0347   WBC 12.0* 12.8* 9.7   HGB 12.2 11.5* 11.6*   HCT 37.3 37.1 38.2    236 286     Recent Labs     03/06/21  0939 03/06/21  1648 03/06/21  2150 03/07/21  0347   * 147* 144 144   K 4.0 3.6* 4.0 4.1   * 110* 108* 108*   CO2 21 20 20 21   GLUCOSE 129* 147* 163* 126*   BUN 38* 39* 39* 40*   CREATININE 1.68* 1.67* 1.64* 1.56*   MG 2.4  --   --  2.1   CALCIUM 7.7* 7.6* 7.8* 8.0*   PHOS  --   --   --  4.2     Recent Labs     03/05/21  1146   PROT 7.3          Specific Gravity, UA   Date Value Ref Range Status   03/04/2021 1.025 1.005 - 1.030 Final     Protein, UA   Date Value Ref Range Status   03/04/2021 2+ (A) NEGATIVE Final     RBC, UA   Date Value Ref Range Status   03/04/2021 2 TO 5 0 - 2 /HPF Final     Bacteria, UA   Date Value Ref Range Status   03/04/2021 RARE (A) None Final     Nitrite, Urine   Date Value Ref Range Status   03/04/2021 NEGATIVE NEGATIVE Final     WBC, UA   Date Value Ref Range Status   03/04/2021 5 TO 10 0 - 5 /HPF Final     Leukocyte Esterase, Urine   Date Value Ref Range Status   03/04/2021 TRACE (A) NEGATIVE Final       Imaging / Clinical Data :-   Ct Head Wo Contrast    Result Date: 3/3/2021  No acute intracranial abnormality on a mildly motion degraded exam.     Xr Chest Portable    Result Date: 3/6/2021  Stable exam since yesterday. Xr Chest Portable    Result Date: 3/5/2021  Stable moderate cardiomegaly and moderate pulmonary interstitial edema. Xr Chest Portable    Result Date: 3/3/2021  Bilateral diffuse ground-glass and interstitial opacities, similar to the radiograph obtained earlier today.   Differential considerations include pulmonary edema versus atypical or viral pneumonia. Xr Chest Portable    Result Date: 3/3/2021  Bilateral airspace disease with small effusions, most consistent with edema, has progressed in the interval.     Us Retroperitoneal Complete    Result Date: 3/5/2021  Right greater than left renal atrophy with an echogenic right kidney which can be seen in the setting of medical renal disease. Incidental note of cholelithiasis and a right pleural effusion. Clinical Course : unchanged  Assessment and Plan  :        1. Acute on chronic respiratory failure with hypoxia -continue oxygen supplement. 2. Acute on chronic systolic and diastolic CHF-EF 15% -poor candidate for ischemic work-up. Follow-up outpatient. 3. Moderate to severe pulmonary hypertension -oxygen supplement. Diuretics on hold due to DEONTE with ATN. 4. End-stage COPD -bronchodilators. 5. Type 2 diabetes mellitus  6. Essential hypertension -low blood pressure. Low-dose Coreg  7. DEONTE -due to ATN   8. Nonsustained V. Tach -continue Coreg. Maintain mag > 2.0, K > 4.0.  9. Pneumonia -IV cefepime  10. Metabolic encephalopathy secondary to pneumonia. 11. Hyperkalemia -   12. H/o cocaine abuse sober for 15 years      Continue to monitor vitals , Intake / output ,  Cell count , HGB , Kidney function, oxygenation  as indicated . No family at bed side. I called POA grand child Tammi Tolentino and updated him about patient's condition. I explained to him in detail what different CODE STATUS would mean. Johny Madison requested me to call patient's niece Benjamín Olsen former. I attempted to call Benjamín Olsen but was unable to speak to her as she did not  her phone.    Plan discussed with Staff  RN     (Please note that portions of this note were completed with a voice recognition program. Efforts were made to edit the dictations but occasionally words are mis-transcribed.)      Rebecca Chandra MD  3/7/2021

## 2021-03-08 LAB
ABSOLUTE EOS #: 0 K/UL (ref 0–0.4)
ABSOLUTE IMMATURE GRANULOCYTE: 0.1 K/UL (ref 0–0.3)
ABSOLUTE LYMPH #: 0.76 K/UL (ref 1–4.8)
ABSOLUTE MONO #: 0.48 K/UL (ref 0.2–0.8)
ALBUMIN (CALCULATED): 3.4 G/DL (ref 3.2–5.2)
ALBUMIN PERCENT: 46 % (ref 45–65)
ALPHA 1 PERCENT: 3 % (ref 3–6)
ALPHA 2 PERCENT: 12 % (ref 6–13)
ALPHA-1-GLOBULIN: 0.2 G/DL (ref 0.1–0.4)
ALPHA-2-GLOBULIN: 0.9 G/DL (ref 0.5–0.9)
ANION GAP SERPL CALCULATED.3IONS-SCNC: 11 MMOL/L (ref 9–17)
ANION GAP SERPL CALCULATED.3IONS-SCNC: 13 MMOL/L (ref 9–17)
ANION GAP SERPL CALCULATED.3IONS-SCNC: 14 MMOL/L (ref 9–17)
ANION GAP SERPL CALCULATED.3IONS-SCNC: 15 MMOL/L (ref 9–17)
ANTI-NUCLEAR ANTIBODY (ANA): NEGATIVE
ANTI-NUCLEAR ANTIBODY (ANA): NEGATIVE
BASOPHILS # BLD: 0 %
BASOPHILS ABSOLUTE: 0 K/UL (ref 0–0.2)
BETA GLOBULIN: 0.8 G/DL (ref 0.5–1.1)
BETA PERCENT: 11 % (ref 11–19)
BUN BLDV-MCNC: 38 MG/DL (ref 8–23)
BUN BLDV-MCNC: 39 MG/DL (ref 8–23)
BUN BLDV-MCNC: 40 MG/DL (ref 8–23)
BUN BLDV-MCNC: 41 MG/DL (ref 8–23)
BUN/CREAT BLD: 32 (ref 9–20)
BUN/CREAT BLD: 32 (ref 9–20)
BUN/CREAT BLD: 33 (ref 9–20)
BUN/CREAT BLD: 34 (ref 9–20)
CALCIUM SERPL-MCNC: 8 MG/DL (ref 8.6–10.4)
CALCIUM SERPL-MCNC: 8.2 MG/DL (ref 8.6–10.4)
CHLORIDE BLD-SCNC: 107 MMOL/L (ref 98–107)
CHLORIDE BLD-SCNC: 108 MMOL/L (ref 98–107)
CHLORIDE BLD-SCNC: 108 MMOL/L (ref 98–107)
CHLORIDE BLD-SCNC: 110 MMOL/L (ref 98–107)
CO2: 13 MMOL/L (ref 20–31)
CO2: 18 MMOL/L (ref 20–31)
CO2: 21 MMOL/L (ref 20–31)
CO2: 22 MMOL/L (ref 20–31)
CREAT SERPL-MCNC: 1.11 MG/DL (ref 0.5–0.9)
CREAT SERPL-MCNC: 1.2 MG/DL (ref 0.5–0.9)
CREAT SERPL-MCNC: 1.24 MG/DL (ref 0.5–0.9)
CREAT SERPL-MCNC: 1.28 MG/DL (ref 0.5–0.9)
DIFFERENTIAL TYPE: ABNORMAL
EOSINOPHILS RELATIVE PERCENT: 0 % (ref 1–4)
GAMMA GLOBULIN %: 28 % (ref 9–20)
GAMMA GLOBULIN: 2 G/DL (ref 0.5–1.5)
GFR AFRICAN AMERICAN: 49 ML/MIN
GFR AFRICAN AMERICAN: 51 ML/MIN
GFR AFRICAN AMERICAN: 53 ML/MIN
GFR AFRICAN AMERICAN: 58 ML/MIN
GFR NON-AFRICAN AMERICAN: 41 ML/MIN
GFR NON-AFRICAN AMERICAN: 42 ML/MIN
GFR NON-AFRICAN AMERICAN: 44 ML/MIN
GFR NON-AFRICAN AMERICAN: 48 ML/MIN
GFR SERPL CREATININE-BSD FRML MDRD: ABNORMAL ML/MIN/{1.73_M2}
GLUCOSE BLD-MCNC: 109 MG/DL (ref 70–99)
GLUCOSE BLD-MCNC: 112 MG/DL (ref 70–99)
GLUCOSE BLD-MCNC: 123 MG/DL (ref 70–99)
GLUCOSE BLD-MCNC: 181 MG/DL (ref 70–99)
HCT VFR BLD CALC: 33.6 % (ref 36.3–47.1)
HEMOGLOBIN: 10.5 G/DL (ref 11.9–15.1)
IMMATURE GRANULOCYTES: 1 %
LYMPHOCYTES # BLD: 8 % (ref 24–44)
MAGNESIUM: 2.1 MG/DL (ref 1.6–2.6)
MCH RBC QN AUTO: 25.6 PG (ref 25.2–33.5)
MCHC RBC AUTO-ENTMCNC: 31.3 G/DL (ref 28.4–34.8)
MCV RBC AUTO: 82 FL (ref 82.6–102.9)
MONOCYTES # BLD: 5 % (ref 1–7)
MORPHOLOGY: ABNORMAL
NRBC AUTOMATED: 0.8 PER 100 WBC
PATHOLOGIST: ABNORMAL
PATHOLOGIST: NORMAL
PDW BLD-RTO: 29.2 % (ref 11.8–14.4)
PHOSPHORUS: 3.1 MG/DL (ref 2.6–4.5)
PLATELET # BLD: 255 K/UL (ref 138–453)
PLATELET ESTIMATE: ABNORMAL
PMV BLD AUTO: 11.3 FL (ref 8.1–13.5)
POTASSIUM SERPL-SCNC: 3.9 MMOL/L (ref 3.7–5.3)
POTASSIUM SERPL-SCNC: 4 MMOL/L (ref 3.7–5.3)
POTASSIUM SERPL-SCNC: 4.1 MMOL/L (ref 3.7–5.3)
POTASSIUM SERPL-SCNC: 4.3 MMOL/L (ref 3.7–5.3)
PROTEIN ELECTROPHORESIS, SERUM: ABNORMAL
RBC # BLD: 4.1 M/UL (ref 3.95–5.11)
RBC # BLD: ABNORMAL 10*6/UL
RHEUMATOID FACTOR: 32.7 IU/ML
SEG NEUTROPHILS: 86 % (ref 36–66)
SEGMENTED NEUTROPHILS ABSOLUTE COUNT: 8.16 K/UL (ref 1.8–7.7)
SERUM IFX INTERP: NORMAL
SODIUM BLD-SCNC: 136 MMOL/L (ref 135–144)
SODIUM BLD-SCNC: 139 MMOL/L (ref 135–144)
SODIUM BLD-SCNC: 142 MMOL/L (ref 135–144)
SODIUM BLD-SCNC: 143 MMOL/L (ref 135–144)
TOTAL PROT. SUM,%: 100 % (ref 98–102)
TOTAL PROT. SUM: 7.3 G/DL (ref 6.3–8.2)
TOTAL PROTEIN: 7.3 G/DL (ref 6.4–8.3)
WBC # BLD: 9.5 K/UL (ref 3.5–11.3)
WBC # BLD: ABNORMAL 10*3/UL

## 2021-03-08 PROCEDURE — 99222 1ST HOSP IP/OBS MODERATE 55: CPT | Performed by: INTERNAL MEDICINE

## 2021-03-08 PROCEDURE — 2580000003 HC RX 258: Performed by: INTERNAL MEDICINE

## 2021-03-08 PROCEDURE — 36415 COLL VENOUS BLD VENIPUNCTURE: CPT

## 2021-03-08 PROCEDURE — 6370000000 HC RX 637 (ALT 250 FOR IP): Performed by: INTERNAL MEDICINE

## 2021-03-08 PROCEDURE — 6370000000 HC RX 637 (ALT 250 FOR IP): Performed by: NURSE PRACTITIONER

## 2021-03-08 PROCEDURE — 6360000002 HC RX W HCPCS: Performed by: NURSE PRACTITIONER

## 2021-03-08 PROCEDURE — 97530 THERAPEUTIC ACTIVITIES: CPT

## 2021-03-08 PROCEDURE — 6360000002 HC RX W HCPCS: Performed by: INTERNAL MEDICINE

## 2021-03-08 PROCEDURE — 85025 COMPLETE CBC W/AUTO DIFF WBC: CPT

## 2021-03-08 PROCEDURE — 84100 ASSAY OF PHOSPHORUS: CPT

## 2021-03-08 PROCEDURE — 94761 N-INVAS EAR/PLS OXIMETRY MLT: CPT

## 2021-03-08 PROCEDURE — 94640 AIRWAY INHALATION TREATMENT: CPT

## 2021-03-08 PROCEDURE — 2580000003 HC RX 258: Performed by: NURSE PRACTITIONER

## 2021-03-08 PROCEDURE — 97110 THERAPEUTIC EXERCISES: CPT

## 2021-03-08 PROCEDURE — 2700000000 HC OXYGEN THERAPY PER DAY

## 2021-03-08 PROCEDURE — 94660 CPAP INITIATION&MGMT: CPT

## 2021-03-08 PROCEDURE — 80048 BASIC METABOLIC PNL TOTAL CA: CPT

## 2021-03-08 PROCEDURE — 86431 RHEUMATOID FACTOR QUANT: CPT

## 2021-03-08 PROCEDURE — 83735 ASSAY OF MAGNESIUM: CPT

## 2021-03-08 PROCEDURE — 99232 SBSQ HOSP IP/OBS MODERATE 35: CPT | Performed by: FAMILY MEDICINE

## 2021-03-08 PROCEDURE — 2060000000 HC ICU INTERMEDIATE R&B

## 2021-03-08 PROCEDURE — 82595 ASSAY OF CRYOGLOBULIN: CPT

## 2021-03-08 RX ORDER — DEXTROSE AND SODIUM CHLORIDE 5; .9 G/100ML; G/100ML
INJECTION, SOLUTION INTRAVENOUS CONTINUOUS
Status: DISCONTINUED | OUTPATIENT
Start: 2021-03-08 | End: 2021-03-09

## 2021-03-08 RX ADMIN — POTASSIUM CHLORIDE 10 MEQ: 1500 TABLET, EXTENDED RELEASE ORAL at 17:13

## 2021-03-08 RX ADMIN — PANTOPRAZOLE SODIUM 20 MG: 20 TABLET, DELAYED RELEASE ORAL at 08:31

## 2021-03-08 RX ADMIN — HEPARIN SODIUM 5000 UNITS: 5000 INJECTION INTRAVENOUS; SUBCUTANEOUS at 05:26

## 2021-03-08 RX ADMIN — LEVETIRACETAM 500 MG: 100 INJECTION, SOLUTION INTRAVENOUS at 17:00

## 2021-03-08 RX ADMIN — PREDNISONE 20 MG: 20 TABLET ORAL at 08:32

## 2021-03-08 RX ADMIN — ATORVASTATIN CALCIUM 20 MG: 20 TABLET, FILM COATED ORAL at 08:32

## 2021-03-08 RX ADMIN — ARFORMOTEROL TARTRATE 15 MCG: 15 SOLUTION RESPIRATORY (INHALATION) at 19:25

## 2021-03-08 RX ADMIN — ASPIRIN 81 MG: 81 TABLET, COATED ORAL at 08:31

## 2021-03-08 RX ADMIN — PREDNISONE 20 MG: 20 TABLET ORAL at 22:12

## 2021-03-08 RX ADMIN — SERTRALINE HYDROCHLORIDE 50 MG: 50 TABLET ORAL at 08:31

## 2021-03-08 RX ADMIN — CARVEDILOL 3.12 MG: 3.12 TABLET, FILM COATED ORAL at 08:32

## 2021-03-08 RX ADMIN — ARFORMOTEROL TARTRATE 15 MCG: 15 SOLUTION RESPIRATORY (INHALATION) at 09:25

## 2021-03-08 RX ADMIN — QUETIAPINE FUMARATE 25 MG: 25 TABLET ORAL at 22:12

## 2021-03-08 RX ADMIN — BUDESONIDE 500 MCG: 0.5 SUSPENSION RESPIRATORY (INHALATION) at 19:25

## 2021-03-08 RX ADMIN — POTASSIUM CHLORIDE 10 MEQ: 1500 TABLET, EXTENDED RELEASE ORAL at 08:31

## 2021-03-08 RX ADMIN — DEXTROSE MONOHYDRATE: 50 INJECTION, SOLUTION INTRAVENOUS at 00:28

## 2021-03-08 RX ADMIN — HEPARIN SODIUM 5000 UNITS: 5000 INJECTION INTRAVENOUS; SUBCUTANEOUS at 22:13

## 2021-03-08 RX ADMIN — Medication 100 MG: at 08:38

## 2021-03-08 RX ADMIN — CEFEPIME HYDROCHLORIDE 2000 MG: 2 INJECTION, POWDER, FOR SOLUTION INTRAVENOUS at 17:13

## 2021-03-08 RX ADMIN — QUETIAPINE FUMARATE 25 MG: 25 TABLET ORAL at 08:32

## 2021-03-08 RX ADMIN — LEVETIRACETAM 500 MG: 100 INJECTION, SOLUTION INTRAVENOUS at 23:52

## 2021-03-08 RX ADMIN — BUDESONIDE 500 MCG: 0.5 SUSPENSION RESPIRATORY (INHALATION) at 09:31

## 2021-03-08 RX ADMIN — HEPARIN SODIUM 5000 UNITS: 5000 INJECTION INTRAVENOUS; SUBCUTANEOUS at 16:59

## 2021-03-08 RX ADMIN — FOLIC ACID 1 MG: 1 TABLET ORAL at 08:31

## 2021-03-08 RX ADMIN — CARVEDILOL 3.12 MG: 3.12 TABLET, FILM COATED ORAL at 22:12

## 2021-03-08 RX ADMIN — DEXTROSE AND SODIUM CHLORIDE: 5; 900 INJECTION, SOLUTION INTRAVENOUS at 11:24

## 2021-03-08 ASSESSMENT — ENCOUNTER SYMPTOMS
SHORTNESS OF BREATH: 1
WHEEZING: 1

## 2021-03-08 ASSESSMENT — PAIN SCALES - WONG BAKER
WONGBAKER_NUMERICALRESPONSE: 0

## 2021-03-08 NOTE — PROGRESS NOTES
[facility]  Office: [address], , Jesus Fischer, DO, Patrick Villatoro, DO, Lorenzo Bruno, DO, Mojgan Sahu MD, Marina Tim MD, Andree Dumont MD, Mckenzie Clinton MD, Tiffanie Hylton MD, Garfield Wilkinson MD, Sharma Babinski, MD, John Dhillon MD, Alexandru Brower MD, Jared Garvin, DO, Maricarmen Hanna MD, Andrea Rice, DO, Kasey Beatty MD,  Mike Vinson, DO, Luis E Rutledge MD, Nile Dubon MD, Josesito Prajapati, [facility], [facility], [facility], Honorio Kruger, CNP, Celia Flores, CNS, Roxana Gonzales, CNP, Erwin Pappas, CNP, Keisha Bullard, CNP, Liya Abernathy, CNP, Daniel Issa, CNP, Rishabh Stallworth PA-C, Avelina Buchanan, [facility], Joy Agosto, CNP, Saeed Whitley, CNP, Arnie Metz, CNP, Deejay Cheung, CNP, Cheli Lutz, CNP, [facility]      Daily Progress Note     Admit Date: 3/3/2021  Bed/Room No.  1003/1003-02  Admitting Physician : Andree Dumont MD  Code Status :[facility] Day:  LOS: 5 days   Chief Complaint:     Chief Complaint   Patient presents with    Fatigue     From [facility]     Principal Problem:    Acute hypoxemic respiratory failure [facility])  Active Problems:    Heart failure, systolic, with acute decompensation (HCC)EF 20% on echo    COPD (chronic obstructive pulmonary disease) (HCC)    Cocaine abuse ([address]    Type 2 diabetes mellitus without complication, without long-term current use of insulin ([address]    Diabetes mellitus ([address]    Pneumonia due to infectious organism    DEONTE (acute kidney injury) ([address]    Current every day smoker    Essential hypertension    Altered mental status    Hypoxic episode    Fatigue    Metabolic acidosis    Pulmonary arterial hypertension (HCC)moderate    Moderate malnutrition ([address]  Resolved Problems:    * No resolved hospital problems. *    Subjective : Interval History/Significant events :  03/08/21    Patient is oriented to self only.  She has poor appetite and is not eating much. Patient is on 5 LPM O2. She did not used BPAP last night , she remains afebrile. Vitals - Stable afebrile, low blood pressure. Labs - creatinine 1.24. normal white count     Nursing notes , Consults notes reviewed. Overnight events and updates discussed with Nursing staff . Background History:         Sudeep Phelps is 77 y.o. female  Who was admitted to the hospital on 3/3/2021 for treatment of Acute hypoxemic respiratory failure ([address] Patient was admitted after she was sent from [facility] with fatigue [facility] ). Patient is admitted to the hospital for management of acute on chronic respiratory failure. Patient is poor historian. She was recently admitted in the hospital and had prolonged hospital stay for encephalopathy and respiratory failure. Patient was treated for pneumonia at that time and acute CHF. She was found to have severely reduced ejection fraction of 20%. Patient also had cardiogenic shock and was treated with pressor support. She had extensive work-up for encephalopathy and was evaluated by neurology. Patient was treated for meningitis which was later ruled out with CSF analysis. Patient was also given antiepileptic medication at that time which were later discontinued after ruling out seizures. Patient was subsequently transferred to [facility] for LTME monitoring and later Depakote was discontinued and Klonopin weaned off. Patient was discharged to skilled nursing facility with instructions to use 4 to 5 L of oxygen during day and BiPAP at nighttime and continue diuretics for heart failure. She has end-stage COPD with chronic respiratory failure on  oxygen at home. On arrival patient was started on nonrebreather and subsequently placed on high flow. Work-up showed elevated proBNP 73,215, troponin 59-55, ABG on arrival showed 7.3 1/52/158/26.4  Patient was treated with IV fluids for pneumonia, sepsis.   She had worsening of breathing due to acute decompensated CHF. Patient was treated with diuretics. She developed DEONTE. Nephrology was consulted due to ATN and was given gentle hydration. Patient continued to remain confused. PMH:  Past Medical History:   Diagnosis Date    CHF (congestive heart failure) (HCC)     COPD (chronic obstructive pulmonary disease) (HCC)     Diabetes mellitus (HCC)     MRSA (methicillin resistant staph aureus) culture positive 8/28/2014    sputum    Tobacco abuse 10/15/2019      Allergies: Allergies   Allergen Reactions    Tiotropium Anaphylaxis and Swelling    Spiriva Handihaler [Tiotropium Bromide Monohydrate] Swelling      Medications :  potassium chloride, 10 mEq, Oral, BID WC  predniSONE, 20 mg, Oral, BID  sodium chloride flush, 10 mL, Intravenous, 2 times per day  QUEtiapine, 25 mg, Oral, BID  [Held by provider] furosemide, 20 mg, Intravenous, Daily  cefepime, 2,000 mg, Intravenous, Q24H  sodium chloride, 1,000 mL, Intravenous, Once  aspirin, 81 mg, Oral, Daily  thiamine mononitrate, 100 mg, Oral, Daily  sertraline, 50 mg, Oral, Daily  pantoprazole, 20 mg, Oral, Daily  folic acid, 1 mg, Oral, Daily  carvedilol, 3.125 mg, Oral, BID  budesonide, 0.5 mg, Nebulization, BID  atorvastatin, 20 mg, Oral, Daily  Arformoterol Tartrate, 15 mcg, Nebulization, BID  heparin (porcine), 5,000 Units, Subcutaneous, 3 times per day  levetiracetam, 500 mg, Intravenous, Q12H        Review of Systems   Review of Systems   Unable to perform ROS: Mental status change   Constitutional: Positive for appetite change and fatigue. Respiratory: Positive for shortness of breath and wheezing. Psychiatric/Behavioral: Positive for confusion and sleep disturbance.      Objective :      Current Vitals : Temp: 97.3 °F (36.3 °C),  Pulse: 57, Resp: 16, BP: 91/74, SpO2: 99 %  Last 24 Hrs Vitals   Patient Vitals for the past 24 hrs:   BP Temp Temp src Pulse Resp SpO2   03/08/21 0337 91/74 97.3 °F (36.3 °C) Oral 57 16    03/07/21 2356 139/88 97.3 °F (36.3 °C) Oral 63 16 99 %   03/07/21 1922 131/76 97.3 °F (36.3 °C) Oral 87 16 93 %   03/07/21 1517 108/76 97.6 °F (36.4 °C)  107 20 92 %   03/07/21 1129 117/84 97 °F (36.1 °C) Axillary 82  97 %   03/07/21 1106 115/68 97.6 °F (36.4 °C) Oral 64 20 92 %   03/07/21 0814      92 %   03/07/21 0742 (!) 129/93 97.6 °F (36.4 °C) Axillary 59 20 92 %     Intake / output   03/06 2301 - 03/07 2300  In: 540 [P.O.:540]  Out: 450 [Urine:450]  Physical Exam:  Physical Exam  Vitals signs and nursing note reviewed. Constitutional:       General: She is not in acute distress. Appearance: She is cachectic. She is ill-appearing. Interventions: Nasal cannula in place. HENT:      Head: Normocephalic and atraumatic. Eyes:      Conjunctiva/sclera: Conjunctivae normal.      Pupils: Pupils are equal, round, and reactive to light. Cardiovascular:      Rate and Rhythm: Normal rate and regular rhythm. Heart sounds: No murmur. Pulmonary:      Effort: Accessory muscle usage present. No respiratory distress. Breath sounds: No stridor. No decreased breath sounds, wheezing, rhonchi or rales. Abdominal:      General: Bowel sounds are normal. There is no distension. Palpations: Abdomen is soft. Abdomen is not rigid. Tenderness: There is no abdominal tenderness. There is no guarding. Musculoskeletal:         General: No tenderness. Skin:     General: Skin is warm and dry. Findings: No erythema, lesion or rash. Neurological:      Mental Status: She is lethargic and disoriented. Cranial Nerves: No cranial nerve deficit. Motor: No seizure activity. Psychiatric:         Attention and Perception: She is inattentive. Speech: Speech normal.         Behavior: Behavior is uncooperative and withdrawn. Cognition and Memory: Cognition is impaired.            Laboratory findings:    Recent Labs     03/06/21  0757 03/07/21  0347 03/08/21  0336   WBC 12.8* 9.7 9.5   HGB 11.5* 11.6* 10.5*   HCT 37.1 38.2 33.6*    286 255     Recent Labs     03/06/21  0939 03/06/21  0939 03/07/21  0347 03/07/21  0347 03/07/21  1555 03/07/21  2138 03/08/21  0336   *   < > 144   < > 142 144 143   K 4.0   < > 4.1   < > 3.8 4.0 4.0   *   < > 108*   < > 109* 109* 110*   CO2 21   < > 21   < > 16* 21 22   GLUCOSE 129*   < > 126*   < > 138* 117* 112*   BUN 38*   < > 40*   < > 40* 40* 41*   CREATININE 1.68*   < > 1.56*   < > 1.42* 1.39* 1.28*   MG 2.4  --  2.1  --   --   --  2.1   CALCIUM 7.7*   < > 8.0*   < > 7.8* 8.1* 8.0*   PHOS  --   --  4.2  --   --   --  3.1    < > = values in this interval not displayed. Recent Labs     03/05/21  1146   PROT 7.3          Specific Gravity, UA   Date Value Ref Range Status   03/04/2021 1.025 1.005 - 1.030 Final     Protein, UA   Date Value Ref Range Status   03/04/2021 2+ (A) NEGATIVE Final     RBC, UA   Date Value Ref Range Status   03/04/2021 2 TO 5 0 - 2 /HPF Final     Bacteria, UA   Date Value Ref Range Status   03/04/2021 RARE (A) None Final     Nitrite, Urine   Date Value Ref Range Status   03/04/2021 NEGATIVE NEGATIVE Final     WBC, UA   Date Value Ref Range Status   03/04/2021 5 TO 10 0 - 5 /HPF Final     Leukocyte Esterase, Urine   Date Value Ref Range Status   03/04/2021 TRACE (A) NEGATIVE Final       Imaging / Clinical Data :-   Ct Head Wo Contrast    Result Date: 3/3/2021  No acute intracranial abnormality on a mildly motion degraded exam.     Xr Chest Portable    Result Date: 3/6/2021  Stable exam since yesterday. Xr Chest Portable    Result Date: 3/5/2021  Stable moderate cardiomegaly and moderate pulmonary interstitial edema. Xr Chest Portable    Result Date: 3/3/2021  Bilateral diffuse ground-glass and interstitial opacities, similar to the radiograph obtained earlier today. Differential considerations include pulmonary edema versus atypical or viral pneumonia.      Xr Chest Portable    Result Date: 3/3/2021  Bilateral airspace disease

## 2021-03-08 NOTE — CONSULTS
[facility]  Initial Consult Note  Date: 3/8/2021    Hospital day :5     Impression:   1. Acute on chronic respiratory failure currently on BiPAP at 40%  2. Underlying COPD  3. Interstitial lung disease/pulmonary fibrosis with secondary pulmonary hypertension  4. Severe cardiomyopathy with bilateral pleural effusions  5. History of EtOH/cocaine abuse  6. Acute kidney injury  7. Diabetes mellitus type 2    Recommendations   · The patient recently underwent a course of antibiotics during a prolonged hospital stay  · The patient has again been on cefepime since admission [day #5] and I do not see any evidence of acute infection and therefore I will stop the cefepime  · The patient continues on gentle hydration. · The patient has chronic interstitial lung disease/pulmonary fibrosis, compromised left ventricular ejection fraction as well as moderate pulmonary hypertension and I suspect that this is contributing to the persistent respiratory issues and encephalopathy    Chief complaint/reason for consultation:   Pneumonia    History of Present Illness:   Gina Valerio is a 77y.o.-year-old female who was initially admitted on 3/3/2021. Gopal Linares has a history of diabetes mellitus type 2, COPD, EtOH and cocaine abuse recently diagnosed severe cardiomyopathy with ejection fraction of 20% and was treated for pneumonia with Rocephin and azithromycin then later switched to cefepime. The patient was recently hospitalized 2/6/21 through 2/27/2021 [facility] and [place] [address]. She was admitted with generalized weakness altered mental status and this had been progressing over weeks. She was noted to be hypotensive and hypoxic. BNP was elevated at 1353 and chest x-ray showed suggested bilateral airspace disease. The patient's hypotension improved with fluid resuscitation and was treated for CHF exacerbation with diuretics.   The patient required BiPAP on and off as well as high flow O2 by nasal cannula. 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 At [facility] she underwent a neurological work-up with continuous EEG the patient was discharged on Keppra. The patient was readmitted from the skilled nursing facility with complaints of fatigue, generalized weakness, altered mental status and sent in for further evaluation. CT of the head did not show any acute intracranial abnormality, chest x-ray showed bilateral airspace disease with small effusions most consistent with edema and it had progressed in the interval.    The patient was admitted started on diuretic therapy as well as IV antibiotic therapy and steroid therapy. The patient is being seen by the cardiology and pulmonary services. The patient was started on cefepime and was also seen by the nephrology service for acute kidney injury due to ATN and diuretic therapy was stopped. The patient remains confused and cannot really give me any history. I was asked to evaluate for questionable pneumonia. I have personally reviewed the past medical history, past surgical history, medications, social history, and family history, and I have updated the database accordingly.   Past Medical History:     Past Medical History:   Diagnosis Date    [facility]est[facility] failure) [facility]     COPD (chronic obstructive pulmonary disease) (HCC)     Diabetes mellitus ([facility])     MRSA (methicillin resistant staph aureus) culture positive 8/28/2014    sputum    Tobacco abuse 10/15/2019     Past Surgical  History:     Past Surgical History:   Procedure Laterality Date    APPENDECTOMY      HYSTERECTOMY      JOINT REPLACEMENT Bilateral      Medications:      potassium chloride  10 mEq Oral BID WC    predniSONE  20 mg Oral BID    sodium chloride flush  10 mL Intravenous 2 times per day    QUEtiapine  25 mg Oral BID    [Held by provider] furosemide  20 mg Intravenous Daily    cefepime  2,000 mg Intravenous Q24H    sodium chloride  1,000 mL Intravenous Once    aspirin  81 mg Oral Daily    thiamine mononitrate  100 mg Oral Daily    sertraline  50 mg Oral Daily    pantoprazole  20 mg Oral Daily    folic acid  1 mg Oral Daily    carvedilol  3.125 mg Oral BID    budesonide  0.5 mg Nebulization BID    atorvastatin  20 mg Oral Daily    Arformoterol Tartrate  15 mcg Nebulization BID    heparin (porcine)  5,000 Units Subcutaneous 3 times per day    levetiracetam  500 mg Intravenous Q12H     Social History:     Social History     Socioeconomic History    Marital status: Single     Spouse name: Not on file    Number of children: Not on file    Years of education: Not on file    Highest education level: Not on file   Occupational History    Not on file   Social Needs    Financial resource strain: Not on file    Food insecurity     Worry: Not on file     Inability: Not on file   [employer] needs     Medical: Not on file     Non-medical: Not on file   Tobacco Use    Smoking status: Current Every Day Smoker     Packs/day: 0.50     Types: Cigarettes    Smokeless tobacco: Never Used   Substance and Sexual Activity    Alcohol use:  Yes     Alcohol/week: 70.0 standard drinks     Types: 70 Cans of beer per week     Comment: 840 oz (3, 40oz beers/day)    Drug use: Yes     Types: Marijuana    Sexual activity: Not on file   Lifestyle    Physical activity     Days per week: Not on file     Minutes per session: Not on file    Stress: Not on file   Relationships    Social connections     Talks on phone: Not on file     Gets together: Not on file     Attends Jew service: Not on file     Active member of club or organization: Not on file     Attends meetings of clubs or organizations: Not on file     Relationship status: Not on file    Intimate partner violence     Fear of current or ex partner: Not on file     Emotionally abused: Not on file     Physically abused: Not on file     Forced sexual activity: Not on file   Other Topics Concern    Not on file   Social History Narrative    Not on file     Family History:     Family History   Problem Relation Age of Onset    Heart Disease Mother     Diabetes Father       Allergies:   Tiotropium and Spiriva handihaler [tiotropium bromide monohydrate]     Review of Systems:   Review of system is not reliablepatient is confused    Physical Examination :   BP (!) 141/90   Pulse 82   Temp 97.3 °F (36.3 °C) (Oral)   Resp 16   Ht 5' 6\" (1.676 m)   Wt 134 lb 9.6 oz (61.1 kg)   SpO2 95%   BMI 21.73 kg/m²     Temperature Range: Temp: 97.3 °F (36.3 °C) Temp  Av.4 °F (36.3 °C)  Min: 97.3 °F (36.3 °C)  Max: 97.6 °F (36.4 °C)  General Appearance: Awake, alert, and in mild respiratory distress. The patient is cachectic appearing/frail. Head: Normocephalic, without obvious abnormality, atraumatic  Eyes: Pupils equal, round, reactive, to light and accommodation; extraocular movements intact; sclera anicteric; conjunctivae pink  ENT: Oropharynx not well seen as the patient is on BiPAP  Neck: Supple, without lymphadenopathy. Pulmonary/Chest: Bilateral rales appreciated with left upper lobe crackling more than other areas. Cardiovascular: Regular rate and rhythm without murmurs, rubs, or gallops. Abdomen: soft, non-tender, non-distended, normal bowel sounds, no masses or organomegaly  Extremities: There is right upper extremity swelling at the antecubital area where she recently had an infiltrated IV but otherwise no cyanosis, clubbing, edema, or effusions. Neurologic: No gross sensory or motor deficits. Skin: Warm and dry with no rash.     Medical Decision Making:   I have independently reviewed/ordered the following labs:  CBC with Differential:   Recent Labs     21  0347 21 0336   WBC 9.7 9.5   HGB 11.6* 10.5*   HCT 38.2 33.6*    255   LYMPHOPCT 8* 8*   MONOPCT 7 5     BMP:   Recent Labs     03/07/21  0347 03/07/21  0347 03/08/21  0336 03/08/21  0953      < > 143 142   K 4.1   < > 4.0 4.1   *   < > 110* 108*   CO2 21   < > 22 21   BUN 40*   < > 41* 40*   CREATININE 1.56*   < > 1.28* 1.24*   MG 2.1  --  2.1  --     < > = values in this interval not displayed. Hepatic Function Panel:   Recent Labs     03/05/21  1146   PROT 7.3       Lab Results   Component Value Date    PROCAL 0.11 03/04/2021    PROCAL 0.06 02/15/2021    PROCAL 0.07 02/08/2021       Lab Results   Component Value Date    CRP 46.0 08/23/2020     Lab Results   Component Value Date    FERRITIN 130 08/23/2020     Lab Results   Component Value Date    FIBRINOGEN 478 08/23/2020     Lab Results   Component Value Date    DDIMER 2.58 04/11/2016     Lab Results   Component Value Date     08/23/2020       No results found for: [address]    Lab Results   Component Value Date    COVID19 Not Detected 03/04/2021    COVID19 Not Detected 03/03/2021    COVID19 Not Detected 02/26/2021    COVID19 Not Detected 02/06/2021    COVID19 Not Detected 08/23/2020     No results found for requested labs within last 30 days. Imaging Studies:   CT OF THE HEAD WITHOUT CONTRAST  3/3/2021 9:39 am FINDINGS:  No acute intracranial abnormality on a mildly motion degraded exam.       ONE XRAY VIEW OF THE CHEST 3/6/2021 5:04 am   Stable exam since yesterday. RETROPERITONEAL ULTRASOUND OF THE KIDNEYS AND URINARY BLADDER 3/5/2021   FINDINGS:   Right greater than left renal atrophy with an echogenic right kidney which can be seen in the setting of medical renal disease. Incidental note of cholelithiasis and a right pleural effusion. Cultures:     Culture, Blood 1 [[id number]] Collected: 03/03/21 2733   Order Status: Completed Specimen: Blood Updated: 03/08/21 6229    Specimen Description . BLOOD    Special Requests LTAC Culture NO GROWTH 5 DAYS   Culture, Blood 1 [[id number]] Collected: 03/03/21 1710   Order Status: Completed Specimen: Blood Updated: 03/08/21 7726    Specimen Description . BLOOD    Special Requests RFA    Culture NO GROWTH 5 DAYS   Culture, Urine [[id number]] Collected: 03/04/21 1600   Order Status: Completed Specimen: Urine, indwelling catheter Updated: 03/05/21 2135    Specimen Description . INDWELLING CATH URINE    Special Requests NOT REPORTED    Culture NO GROWTH     Respiratory Panel, Molecular, with COVID-19 (Restricted: peds pts or suitable admitted adults) [[id number]] Collected: 03/04/21 1012   Order Status: Completed Specimen: Nasopharyngeal Swab Updated: 03/04/21 1542    Specimen Description . NASOPHARYNGEAL SWAB    Adenovirus PCR Not Detected    Coronavirus 229E PCR Not Detected    Coronavirus HKU1 PCR Not Detected    Coronavirus NL63 PCR Not Detected    Coronavirus OC43 PCR Not Detected    SARS-CoV-2, PCR Not Detected    Comment: This test has been authorized by the FDA under an Emergency Use Authorization (EUA) for use   by authorized laboratories.    This test is only authorized for the duration of the time of declaration that circumstances   exist justifying the authorization of the emergency use of in vitro diagnostic tests for   detection of the SARS-CoV-2 virusand/or diagnosis of COVID-19 infection under section 564   (b)(1) of the Act,21 U.S.C.bbb-1(b)(1), unless the authorization is terminated or revoked   sooner.         Patient Fact Sheet:   Dolly         Provider Fact Sheet:   Dolly         METHODOLGY: Multiplex PCR        Human Metapneumovirus PCR Not Detected    Rhino/Enterovirus PCR Not Detected    Influenza A by PCR Not Detected    Influenza A H1 PCR NOT REPORTED    Influenza A H1 (2009) PCR NOT REPORTED    Influenza A H3 PCR NOT REPORTED    Influenza B by PCR Not Detected    Parainfluenza 1 PCR Not Detected Parainfluenza 2 PCR Not Detected    Parainfluenza 3 PCR Not Detected    Parainfluenza 4 PCR Not Detected    Resp Syncytial Virus PCR Not Detected    Bordetella Parapertussis Not Detected    B Pertussis by PCR Not Detected    Chlamydia pneumoniae By PCR Not Detected    Mycoplasma pneumo by PCR Not Detected    Comment: Performed by multiplexed nucleic acid assay. COVID-19, Rapid [[id number]] Collected: 03/03/21 1707   Order Status: Completed Specimen: Nasopharyngeal Swab Updated: 03/03/21 1808    Specimen Description . NASOPHARYNGEAL SWAB    SARS-CoV-2, Rapid Not Detected    Comment:        Rapid NAAT:  The specimen is NEGATIVE for SARS-CoV-2, the novel coronavirus associated with   COVID-19.         The ID NOW COVID-19 assay is designed to detect the virus that causes COVID-19 in patients   with signs and symptoms of infection who are suspected of COVID-19. An individual without symptoms of COVID-19 and who is not shedding SARS-CoV-2 virus would   expect to have a negative (not detected) result in this assay. Negative results should be treated as presumptive and, if inconsistent with clinical signs   and symptoms or necessary for patient management,   should be tested with an alternative molecular assay. Negative results do not preclude   SARS-CoV-2 infection and   should not be used as the sole basis for patient management decisions.         Fact sheet for Healthcare Providers: Dolly   Fact sheet for Patients: Dolly           Methodology: Isothermal Nucleic Acid Amplification               Thank you for allowing us to participate in the care of this patient. Please call with questions.     Electronically signed by Carri Zhou MD on 3/8/2021 at 11:37 AM      [facility]  [address] messaging  OFFICE: [phone]      This note is created with the assistance of a speech recognition program.  While intending to generate a document that actually reflects the content of the visit, the document can still have some errors including those of syntax and sound a like substitutions which may escape proof reading. In such instances, actual meaning can be extrapolated by contextual diversion.

## 2021-03-08 NOTE — PROGRESS NOTES
Patient arouses to voice upon entering the room. Doesn't follow commands and answers question \"8729\". Bed alarm in and tele sitter at bedside for safety.   No apparent signs of distress

## 2021-03-08 NOTE — PLAN OF CARE
Problem: Falls - Risk of:  Goal: Will remain free from falls  Description: Will remain free from falls  Outcome: Ongoing  Note: Siderails up x 2  Hourly rounding. Call light in reach. Instructed to call for assist before attempting out of bed. Remains free from falls and accidental injury at this time. Floor free from obstacles, and bed is locked and in lowest position. Adequate lighting provided. Bed alarm on. Fall sticker on wristband.  Fall Sign posted in doorway      Problem: Falls - Risk of:  Goal: Absence of physical injury  Description: Absence of physical injury  Outcome: Ongoing     Problem: Skin Integrity:  Goal: Will show no infection signs and symptoms  Description: Will show no infection signs and symptoms  Outcome: Ongoing  Note: Waffle mattress on bed and properly inflated   Q2 turn  Meiplex to coccyx       Problem: Skin Integrity:  Goal: Absence of new skin breakdown  Description: Absence of new skin breakdown  Outcome: Ongoing

## 2021-03-08 NOTE — PLAN OF CARE
Problem: Falls - Risk of:  Goal: Will remain free from falls  Description: Will remain free from falls  Outcome: Ongoing  Goal: Absence of physical injury  Description: Absence of physical injury  Outcome: Ongoing     Problem: Skin Integrity:  Goal: Will show no infection signs and symptoms  Description: Will show no infection signs and symptoms  Outcome: Ongoing  Goal: Absence of new skin breakdown  Description: Absence of new skin breakdown  Outcome: Ongoing     Problem: Nutrition  Goal: Optimal nutrition therapy  Outcome: Ongoing Ramana Matthews is a 25year old female.     Chief complaint:annual physical exam     HPI:   25year old female with PMH as listed below here for annual physical exam   Patient reports her acne is much better  Bon irth control   Sexually active with one completed.   Pertinent positives and negatives noted in the the HPI            EXAM:   /58   Pulse 79   Temp 97.7 °F (36.5 °C)   Ht 64.5\"   Wt 117 lb   SpO2 100%   BMI 19.77 kg/m²   GENERAL: well developed, well nourished,in no apparent distress  SKI :  Xanax prn   Restarting prozac        Please return to the clinic if you are having persistent or worsening symptoms   Galileo Lynn MD,   Diplomate of the American Board of Internal Medicine  Diplomate of the American Board of Obesity Medicine

## 2021-03-08 NOTE — PROGRESS NOTES
Pulmonary Critical Care Progress Note  Pedro Garcia MD     Patient seen for the follow up of  Acute hypoxemic respiratory failure ([facility] [address]     Subjective:  She is resting comfortably in bed, in no distress. She is on oxygen via nasal cannula, 4 L. She is pleasantly confused, cooperative with exam.  She denies shortness of breath, cough or chest pain. Examination:  Vitals: BP (!) 141/90   Pulse 82   Temp 97.3 °F (36.3 °C) (Oral)   Resp 16   Ht 5' 6\" (1.676 m)   Wt 134 lb 9.6 oz (61.1 kg)   SpO2 99%   BMI 21.73 kg/m²   General appearance: Awake, pleasantly confused, resting comfortably in bed  Neck: No JVD  Lungs: Moderate air exchange, no crackles or wheezing  Heart: regular rate and rhythm, S1, S2 normal, no gallop  Abdomen: Soft, non tender, + BS  Extremities: no cyanosis or clubbing. No significant edema    LABs:  CBC:   Recent Labs     03/07/21  0347 03/08/21  0336   WBC 9.7 9.5   HGB 11.6* 10.5*   HCT 38.2 33.6*    255     BMP:   Recent Labs     03/07/21  2138 03/08/21  0336    143   K 4.0 4.0   CO2 21 22   BUN 40* 41*   CREATININE 1.39* 1.28*   LABGLOM 37* 41*   GLUCOSE 117* 112*        Ref. Range 3/4/2021 05:31   Procalcitonin Latest Ref Range: <0.09 ng/mL 0.11 (H)     Radiology:  3/6/2021      Impression:  · Acute hypoxic respiratory failure  · Bilateral pulmonary infiltrate/limb edema/pleural effusions,? Pneumonia  · Decompensated CHF/severe cardiomyopathy with EF 20% on 2/8/2021  · Acute exacerbation of COPD  · DM  · Confusion,?   Metabolic encephalopathy  · Hyperkalemia/DEONTE    Recommendations:  · Continue IV antibiotics, cefepime  · Prednisone taper  · Albuterol and Ipratropium Q 4 hours and prn  · Brovana aerosol treatment  · Budesonide aerosol treatment  · Seroquel 25 mg twice daily  · Fluid management/diuresis per nephrology  · BiPAP as needed  · Oxygen via nasal cannula, keep SPO2 90% or greater  · DVT prophylaxis with SQ heparin  · PT/OT  · [facility] No objection from pulmonary standpoint.   · Family discussing CODE STATUS with primary team  · Discussed with RN    Electronically signed by     Doc Heredia MD on 3/8/2021 at 12:09 PM  Pulmonary Critical Care and Sleep Medicine,  [facility]  Cell: [phone]  Office: [phone]

## 2021-03-08 NOTE — PROGRESS NOTES
Renal Progress Note    Patient :  Talha Bishop; 77 y.o. MRN# [id number]  Location:  1003/1003-02  Attending:  Kacie Cook MD  Admit Date:  3/3/2021   Hospital Day: 5      Subjective:     Oral intake poor. Lethargic mostly needs to be fed each time. This morning has been refusing most of her food including protein shakes. D5W continues at 40 mL an hour. Urine output fair. No shortness of breath at rest.  Hemodynamically stable. No fever or chills. Some prolonged expiration noted. Creatinine down to 1.2. Hemoglobin stable. Attempting made to discuss with family about her current condition and discuss CODE STATUS. Ultrasound reviewed shows kidney size to be normal 8.5 cm with echogenic kidneys. All serologies negative except for hepatitis C. Urine sediment benign.   Outpatient Medications:     Medications Prior to Admission: levETIRAcetam (KEPPRA) 500 MG tablet, Take 1 tablet by mouth 2 times daily  lisinopril (PRINIVIL;ZESTRIL) 2.5 MG tablet, Take 1 tablet by mouth daily  carvedilol (COREG) 3.125 MG tablet, Take 1 tablet by mouth 2 times daily  furosemide (LASIX) 40 MG tablet, Take 1 tablet by mouth daily  folic acid (FOLVITE) 1 MG tablet, Take 1 tablet by mouth daily  polyethylene glycol (GLYCOLAX) 17 g packet, Take 17 g by mouth daily as needed for Constipation  calcium carbonate-vitamin D3 (CALTRATE) 600-400 MG-UNIT TABS per tab, Take 1 tablet by mouth daily  thiamine 100 MG tablet, Take 1 tablet by mouth daily  Arformoterol Tartrate (BROVANA) 15 MCG/2ML NEBU, Take 2 mLs by nebulization 2 times daily  budesonide (PULMICORT) 0.5 MG/2ML nebulizer suspension, Take 2 mLs by nebulization 2 times daily  pantoprazole (PROTONIX) 20 MG tablet, Take 1 tablet by mouth daily  albuterol (PROVENTIL) (2.5 MG/3ML) 0.083% nebulizer solution, Take 3 mLs by nebulization every 6 hours as needed for Wheezing or Shortness of Breath  Multiple Vitamins-Minerals (CERTAVITE SENIOR PO), Take 1 tablet by mouth daily  sertraline (ZOLOFT) 50 MG tablet, Take 50 mg by mouth daily  loratadine (CLARITIN) 10 MG tablet, Take 10 mg by mouth daily  atorvastatin (LIPITOR) 20 MG tablet, Take 20 mg by mouth daily   aspirin 81 MG tablet, Take 81 mg by mouth daily. calcium-vitamin D (OSCAL-500) 500-200 MG-UNIT per tablet, Take 1 tablet by mouth daily. Current Medications:     Scheduled Meds:    potassium chloride  10 mEq Oral BID WC    predniSONE  20 mg Oral BID    sodium chloride flush  10 mL Intravenous 2 times per day    QUEtiapine  25 mg Oral BID    [Held by provider] furosemide  20 mg Intravenous Daily    cefepime  2,000 mg Intravenous Q24H    sodium chloride  1,000 mL Intravenous Once    aspirin  81 mg Oral Daily    thiamine mononitrate  100 mg Oral Daily    sertraline  50 mg Oral Daily    pantoprazole  20 mg Oral Daily    folic acid  1 mg Oral Daily    carvedilol  3.125 mg Oral BID    budesonide  0.5 mg Nebulization BID    atorvastatin  20 mg Oral Daily    Arformoterol Tartrate  15 mcg Nebulization BID    heparin (porcine)  5,000 Units Subcutaneous 3 times per day    levetiracetam  500 mg Intravenous Q12H     Continuous Infusions:    dextrose 5 % and 0.9 % NaCl      dextrose 40 mL/hr at 21 0028     PRN Meds:  haloperidol lactate, sodium chloride flush, polyethylene glycol, promethazine **OR** ondansetron, acetaminophen **OR** acetaminophen, albuterol    Input/Output:       I/O last 3 completed shifts: In: 540 [P.O.:540]  Out: 600 [Urine:600].       Patient Vitals for the past 96 hrs (Last 3 readings):   Weight   21 0532 134 lb 9.6 oz (61.1 kg)   21 0733 130 lb 15.3 oz (59.4 kg)   21 0919 129 lb 10.1 oz (58.8 kg)       Vital Signs:   Temperature:  Temp: 97.3 °F (36.3 °C)  TMax:   Temp (24hrs), Av.3 °F (36.3 °C), Min:97 °F (36.1 °C), Max:97.6 °F (36.4 °C)    Respirations:  Resp: 16  Pulse:   Pulse: 82  BP:    BP: (!) 141/90  BP Range: Systolic (13GXE), WQK:447 , Min:91 , PJP:047       Diastolic (24hrs), Av, Min:74, Max:90      Physical Examination:     General:  Arousable, in no distress, speaking in full sentences, no accessory muscle use. Prolonged expiration  HEENT: Atraumatic, normocephalic, no throat congestion, moist mucosa. Eyes:   Pupils equal, round and reactive to light, EOMI. Neck:   No JVD, no thyromegaly, no lymphadenopathy. Chest:  Bilateral vesicular breath sounds, decreased air entry occasional expiratory wheezes prolonged expiration  Cardiac:  S1 S2 RR, no murmurs, gallops or rubs, JVP not raised. Abdomen: Soft, non-tender, no masses or organomegaly, BS audible. :   No suprapubic or flank tenderness. Neuro:  AAO x 3, No FND. SKIN:  No rashes, good skin turgor. Extremities:  No edema, palpable peripheral pulses, no calf tenderness. Labs:       Recent Labs     21  0336   WBC 12.8* 9.7 9.5   RBC 4.59 4.60 4.10   HGB 11.5* 11.6* 10.5*   HCT 37.1 38.2 33.6*   MCV 80.8* 83.0 82.0*   MCH 25.1* 25.2 25.6   MCHC 31.0 30.4 31.3   RDW 29.7* 29.0* 29.2*    286 255   MPV Abnormal 10.9 11.3      BMP:   Recent Labs     21  2138 21  0336 21  0953    143 142   K 4.0 4.0 4.1   * 110* 108*   CO2    BUN 40* 41* 40*   CREATININE 1.39* 1.28* 1.24*   GLUCOSE 117* 112* 123*   CALCIUM 8.1* 8.0* 8.0*      Phosphorus:     Recent Labs     21  0336   PHOS 4.2 3.1     Magnesium:    Recent Labs     21  0939 21  0336   MG 2.4 2.1 2.1     Albumin:  No results for input(s): LABALBU in the last 72 hours.   BNP:      Lab Results   Component Value Date    BNP 29 2014     DAPHNE:      Lab Results   Component Value Date    DAPHNE NEGATIVE 2020     SPEP:  Lab Results   Component Value Date    PROT 7.3 2021    ALBCAL 3.4 2021    ALBPCT 46 2021    LABALPH 0.2 2021    LABALPH 0.9 2021    A1PCT 3 2021    A2PCT 12 2021    LABBETA 0.8 03/05/2021    BETAPCT 11 03/05/2021    GAMGLOB 2.0 03/05/2021    GGPCT 28 03/05/2021    PATH ELECTRONICALLY SIGNED. Hung Foreman M.D. 03/05/2021    PATH ELECTRONICALLY SIGNED.  Hung Foreman M.D. 03/05/2021     UPEP:   No results found for: LABPE  C3:     Lab Results   Component Value Date    C3 95 03/07/2021     C4:     Lab Results   Component Value Date    C4 24 03/07/2021     MPO ANCA:     Lab Results   Component Value Date    MPO 41 08/24/2020     PR3 ANCA:     Lab Results   Component Value Date    PR3 18 08/24/2020     Anti-GBM:     Lab Results   Component Value Date    GBMABIGG 10 08/24/2020     Hep BsAg:         Lab Results   Component Value Date    HEPBSAG NONREACTIVE 03/07/2021     Hep C AB:          Lab Results   Component Value Date    HEPCAB REACTIVE 03/07/2021       Urinalysis/Chemistries:      Lab Results   Component Value Date    NITRU NEGATIVE 03/04/2021    COLORU YELLOW 03/04/2021    PHUR 6.0 03/04/2021    WBCUA 5 TO 10 03/04/2021    RBCUA 2 TO 5 03/04/2021    MUCUS NOT REPORTED 03/04/2021    TRICHOMONAS NOT REPORTED 03/04/2021    YEAST NOT REPORTED 03/04/2021    BACTERIA RARE 03/04/2021    SPECGRAV 1.025 03/04/2021    LEUKOCYTESUR TRACE 03/04/2021    UROBILINOGEN Normal 03/04/2021    BILIRUBINUR NEGATIVE 03/04/2021    BILIRUBINUR NEGATIVE 10/24/2011    GLUCOSEU NEGATIVE 03/04/2021    GLUCOSEU NEGATIVE 10/24/2011    KETUA NEGATIVE 03/04/2021    AMORPHOUS NOT REPORTED 03/04/2021     Urine Sodium:     Lab Results   Component Value Date    ULISES 30 03/05/2021     Urine Potassium:  No results found for: KUR  Urine Chloride:    Lab Results   Component Value Date    CLUR <20 02/07/2021     Urine Osmolarity:   Lab Results   Component Value Date    OSMOU 279 08/29/2014     Urine Protein:   No components found for: TOTALPROTEIN, URINE   Urine Creatinine:     Lab Results   Component Value Date    LABCREA 164.7 03/05/2021     Urine Eosinophils:  No components found for: UEOS    Radiology:     CXR: Assessment:     1. Acute Kidney Injury: Likely prerenal azotemia/ischemic ATN from depleted circulating volume, baseline 0.91.0 peaked up to 1.8 now resolving down to 1.2, Dominique catheter in place  2. Chronic kidney disease stage III baseline 0.91.0 echogenic kidneys ultrasound, poor muscle mass, proteinuria about 1.5 g  3. Severe ischemic cardiomyopathy ejection fraction 20% with compensated CHF  4. Severe COPD with recurrent respiratory failure  5. Encephalopathy   6. chronic respiratory acidosis  7. Positive hepatitis C  8. Type 2 diabetes with nephropathy    Plan:   1. Check rheumatoid factor and cryoglobulin level  2. Change fluids to D5 0.9 at 50 an hour  3. Hold diuretics for now  4. Follow renal function  5. Prognosis guarded     Nutrition   Please ensure that patient is on a renal diet/TF. Avoid nephrotoxic drugs/contrast exposure. We will continue to follow along with you.

## 2021-03-08 NOTE — PROGRESS NOTES
Physical Therapy  Facility/Department: [facility] PROGRESSIVE CARE  Daily Treatment Note  NAME: Erma Santillan  : 1945  MRN: [id number]    Date of Service: 3/8/2021    Discharge Recommendations:  ECF with PT        Assessment   Body structures, Functions, Activity limitations: Decreased functional mobility ; Decreased cognition;Decreased strength;Decreased safe awareness;Decreased balance;Decreased coordination;Decreased endurance  Assessment: Pt continues to progress and improve w/ her mentation. Will continue as able while hospitalized towards goals. Unsure of baseline function. Prognosis: Fair  REQUIRES PT FOLLOW UP: Yes  Activity Tolerance  Activity Tolerance: Patient limited by cognitive status     Patient Diagnosis(es): The primary encounter diagnosis was Acute hypoxemic respiratory failure ([facility] [address] Diagnoses of Disorientation and Elevated lactic acid level were also pertinent to this visit. has a past medical history of CHF (congestive heart failure) ([facility] [address] COPD (chronic obstructive pulmonary disease) ([facility] [address] [facility] MRSA (methicillin resistant staph aureus) culture positive, and Tobacco abuse.   has a past surgical history that includes joint replacement (Bilateral); Hysterectomy; and Appendectomy. Restrictions  Restrictions/Precautions  Restrictions/Precautions: Isolation, Fall Risk, General Precautions  Required Braces or Orthoses?: No  Position Activity Restriction  Other position/activity restrictions: telemetry, catheter  Subjective   General  Chart Reviewed: Yes  Response To Previous Treatment: Patient unable to report, no changes reported from family or staff  Family / Caregiver Present: No  Subjective  Subjective: patient confused but did not resist therapy  General Comment  Comments: Shu CAMPOS reports paitent okay for therapy but is not following commands.           Orientation  Orientation  Overall Orientation Status: Impaired  Orientation Level: Unable to assess  Cognition Objective   Bed mobility  Scooting: Dependent/Total  Comments: Assisted PCT with brief change, florencio care and bedding change. Transfers  Comment: Not appropriate for transfers this visit due to [address] [place] [address] assist level. Ambulation  Ambulation?: No  Stairs/Curb  Stairs?: No     Balance  Posture: Fair  Sitting - Static: Fair  Sitting - Dynamic: Poor  Comments: Unable to assess this visit as patient unable to follow commands or respond appropriately. Assisted PCT with brief change, florencio care, and bedding change. Exercises  Straight Leg Raise: AAROM x10  Hip Flexion: AAROM x10  Hip Abduction: AAROM x10  Ankle Pumps: AAROM x10  Upper Extremity: AAROM x 10  Comments: Patient able to perform AAROM BLE and BUE supine exercises. Patient has high difficulty with following directions. G-Code     OutComes Score       AM-PAC Score     AM-PAC Inpatient Mobility without Stair Climbing Raw Score : 6 (03/08/21 1341)  AM-PAC Inpatient without Stair Climbing T-Scale Score : 26.48 (03/08/21 1341)  Mobility Inpatient CMS 0-100% Score: 92.18 (03/08/21 1341)  Mobility Inpatient without Stair CMS G-Code Modifier : CM (03/08/21 1341)       Goals  Short term goals  Time Frame for Short term goals: 10 visits  Short term goal 1: Min A for bed mobility  Short term goal 2: Patient to sit EOB x20 minutes SBA while performing LE ther ex  Patient Goals   Patient goals : pt unable to verbalize goals.     Plan    Plan  Times per week: 1-2x/day for 5-6 days/week  Current Treatment Recommendations: Strengthening, Transfer Training, Endurance Training, Balance Training, Patient/Caregiver Education & Training  Safety Devices  Type of devices: Call light within reach, Bed alarm in place, Left in bed, Sitter present     Therapy Time   Individual Concurrent Group Co-treatment   Time In  1120         Time Out  1143         Minutes  Jordyn Guerra, Student Physical Therapist Assistant

## 2021-03-09 LAB
ABSOLUTE EOS #: 0 K/UL (ref 0–0.4)
ABSOLUTE IMMATURE GRANULOCYTE: 0 K/UL (ref 0–0.3)
ABSOLUTE LYMPH #: 1.02 K/UL (ref 1–4.8)
ABSOLUTE MONO #: 0.45 K/UL (ref 0.2–0.8)
ANCA MYELOPEROXIDASE: 65 AU/ML
ANCA PROTEINASE 3: 26 AU/ML
ANION GAP SERPL CALCULATED.3IONS-SCNC: 12 MMOL/L (ref 9–17)
BASOPHILS # BLD: 0 %
BASOPHILS ABSOLUTE: 0 K/UL (ref 0–0.2)
BUN BLDV-MCNC: 37 MG/DL (ref 8–23)
BUN/CREAT BLD: 36 (ref 9–20)
CALCIUM SERPL-MCNC: 8.4 MG/DL (ref 8.6–10.4)
CHLORIDE BLD-SCNC: 106 MMOL/L (ref 98–107)
CO2: 19 MMOL/L (ref 20–31)
CREAT SERPL-MCNC: 1.04 MG/DL (ref 0.5–0.9)
CULTURE: NORMAL
CULTURE: NORMAL
DIFFERENTIAL TYPE: ABNORMAL
EOSINOPHILS RELATIVE PERCENT: 0 % (ref 1–4)
GBM ANTIBODY IGG: 8 AU/ML
GFR AFRICAN AMERICAN: >60 ML/MIN
GFR NON-AFRICAN AMERICAN: 52 ML/MIN
GFR SERPL CREATININE-BSD FRML MDRD: ABNORMAL ML/MIN/{1.73_M2}
GFR SERPL CREATININE-BSD FRML MDRD: ABNORMAL ML/MIN/{1.73_M2}
GLUCOSE BLD-MCNC: 115 MG/DL (ref 70–99)
HCT VFR BLD CALC: 40.5 % (ref 36.3–47.1)
HEMOGLOBIN: 12.1 G/DL (ref 11.9–15.1)
IMMATURE GRANULOCYTES: 0 %
LYMPHOCYTES # BLD: 9 % (ref 24–44)
Lab: NORMAL
Lab: NORMAL
MAGNESIUM: 1.9 MG/DL (ref 1.6–2.6)
MCH RBC QN AUTO: 25.1 PG (ref 25.2–33.5)
MCHC RBC AUTO-ENTMCNC: 29.9 G/DL (ref 28.4–34.8)
MCV RBC AUTO: 84 FL (ref 82.6–102.9)
MONOCYTES # BLD: 4 % (ref 1–7)
MORPHOLOGY: ABNORMAL
MORPHOLOGY: ABNORMAL
NRBC AUTOMATED: 0.6 PER 100 WBC
PDW BLD-RTO: 29.2 % (ref 11.8–14.4)
PHOSPHORUS: 1.9 MG/DL (ref 2.6–4.5)
PLATELET # BLD: 252 K/UL (ref 138–453)
PLATELET ESTIMATE: ABNORMAL
PMV BLD AUTO: 10.9 FL (ref 8.1–13.5)
POTASSIUM SERPL-SCNC: 4.1 MMOL/L (ref 3.7–5.3)
RBC # BLD: 4.82 M/UL (ref 3.95–5.11)
RBC # BLD: ABNORMAL 10*6/UL
SEG NEUTROPHILS: 87 % (ref 36–66)
SEGMENTED NEUTROPHILS ABSOLUTE COUNT: 9.83 K/UL (ref 1.8–7.7)
SODIUM BLD-SCNC: 137 MMOL/L (ref 135–144)
SPECIMEN DESCRIPTION: NORMAL
SPECIMEN DESCRIPTION: NORMAL
WBC # BLD: 11.3 K/UL (ref 3.5–11.3)
WBC # BLD: ABNORMAL 10*3/UL

## 2021-03-09 PROCEDURE — 2060000000 HC ICU INTERMEDIATE R&B

## 2021-03-09 PROCEDURE — 6360000002 HC RX W HCPCS: Performed by: NURSE PRACTITIONER

## 2021-03-09 PROCEDURE — 2700000000 HC OXYGEN THERAPY PER DAY

## 2021-03-09 PROCEDURE — 97535 SELF CARE MNGMENT TRAINING: CPT

## 2021-03-09 PROCEDURE — 84100 ASSAY OF PHOSPHORUS: CPT

## 2021-03-09 PROCEDURE — 97530 THERAPEUTIC ACTIVITIES: CPT

## 2021-03-09 PROCEDURE — 6370000000 HC RX 637 (ALT 250 FOR IP): Performed by: NURSE PRACTITIONER

## 2021-03-09 PROCEDURE — 83735 ASSAY OF MAGNESIUM: CPT

## 2021-03-09 PROCEDURE — 99232 SBSQ HOSP IP/OBS MODERATE 35: CPT | Performed by: FAMILY MEDICINE

## 2021-03-09 PROCEDURE — 85025 COMPLETE CBC W/AUTO DIFF WBC: CPT

## 2021-03-09 PROCEDURE — 6370000000 HC RX 637 (ALT 250 FOR IP): Performed by: INTERNAL MEDICINE

## 2021-03-09 PROCEDURE — 80048 BASIC METABOLIC PNL TOTAL CA: CPT

## 2021-03-09 PROCEDURE — 94660 CPAP INITIATION&MGMT: CPT

## 2021-03-09 PROCEDURE — 94640 AIRWAY INHALATION TREATMENT: CPT

## 2021-03-09 PROCEDURE — 97168 OT RE-EVAL EST PLAN CARE: CPT

## 2021-03-09 PROCEDURE — 94761 N-INVAS EAR/PLS OXIMETRY MLT: CPT

## 2021-03-09 PROCEDURE — 36415 COLL VENOUS BLD VENIPUNCTURE: CPT

## 2021-03-09 PROCEDURE — 99232 SBSQ HOSP IP/OBS MODERATE 35: CPT | Performed by: INTERNAL MEDICINE

## 2021-03-09 PROCEDURE — 2580000003 HC RX 258: Performed by: NURSE PRACTITIONER

## 2021-03-09 PROCEDURE — 2580000003 HC RX 258: Performed by: INTERNAL MEDICINE

## 2021-03-09 RX ORDER — TORSEMIDE 10 MG/1
20 TABLET ORAL DAILY
Status: DISCONTINUED | OUTPATIENT
Start: 2021-03-09 | End: 2021-03-10 | Stop reason: HOSPADM

## 2021-03-09 RX ORDER — PREDNISONE 20 MG/1
20 TABLET ORAL 2 TIMES DAILY
Status: COMPLETED | OUTPATIENT
Start: 2021-03-09 | End: 2021-03-09

## 2021-03-09 RX ORDER — PREDNISONE 20 MG/1
20 TABLET ORAL 2 TIMES DAILY
Status: DISCONTINUED | OUTPATIENT
Start: 2021-03-09 | End: 2021-03-09

## 2021-03-09 RX ORDER — QUETIAPINE FUMARATE 25 MG/1
25 TABLET, FILM COATED ORAL 2 TIMES DAILY
Qty: 60 TABLET | Refills: 3 | Status: SHIPPED | OUTPATIENT
Start: 2021-03-09

## 2021-03-09 RX ORDER — TORSEMIDE 20 MG/1
20 TABLET ORAL DAILY
Qty: 30 TABLET | Refills: 3 | Status: SHIPPED | OUTPATIENT
Start: 2021-03-10

## 2021-03-09 RX ORDER — SPIRONOLACTONE 25 MG/1
25 TABLET ORAL DAILY
Status: DISCONTINUED | OUTPATIENT
Start: 2021-03-09 | End: 2021-03-10 | Stop reason: HOSPADM

## 2021-03-09 RX ORDER — PREDNISONE 20 MG/1
20 TABLET ORAL DAILY
Qty: 5 TABLET | Refills: 0 | Status: SHIPPED | OUTPATIENT
Start: 2021-03-09 | End: 2021-03-14

## 2021-03-09 RX ORDER — SPIRONOLACTONE 25 MG/1
25 TABLET ORAL DAILY
Qty: 30 TABLET | Refills: 3 | Status: SHIPPED | OUTPATIENT
Start: 2021-03-10

## 2021-03-09 RX ADMIN — SERTRALINE HYDROCHLORIDE 50 MG: 50 TABLET ORAL at 09:43

## 2021-03-09 RX ADMIN — ARFORMOTEROL TARTRATE 15 MCG: 15 SOLUTION RESPIRATORY (INHALATION) at 19:05

## 2021-03-09 RX ADMIN — ATORVASTATIN CALCIUM 20 MG: 20 TABLET, FILM COATED ORAL at 09:42

## 2021-03-09 RX ADMIN — PANTOPRAZOLE SODIUM 20 MG: 20 TABLET, DELAYED RELEASE ORAL at 09:43

## 2021-03-09 RX ADMIN — QUETIAPINE FUMARATE 25 MG: 25 TABLET ORAL at 21:29

## 2021-03-09 RX ADMIN — CARVEDILOL 3.12 MG: 3.12 TABLET, FILM COATED ORAL at 09:52

## 2021-03-09 RX ADMIN — ASPIRIN 81 MG: 81 TABLET, COATED ORAL at 09:42

## 2021-03-09 RX ADMIN — DIBASIC SODIUM PHOSPHATE, MONOBASIC POTASSIUM PHOSPHATE AND MONOBASIC SODIUM PHOSPHATE 2 TABLET: 852; 155; 130 TABLET ORAL at 09:58

## 2021-03-09 RX ADMIN — QUETIAPINE FUMARATE 25 MG: 25 TABLET ORAL at 09:42

## 2021-03-09 RX ADMIN — Medication 100 MG: at 09:42

## 2021-03-09 RX ADMIN — TORSEMIDE 20 MG: 10 TABLET ORAL at 09:42

## 2021-03-09 RX ADMIN — BUDESONIDE 500 MCG: 0.5 SUSPENSION RESPIRATORY (INHALATION) at 08:15

## 2021-03-09 RX ADMIN — FOLIC ACID 1 MG: 1 TABLET ORAL at 09:42

## 2021-03-09 RX ADMIN — HEPARIN SODIUM 5000 UNITS: 5000 INJECTION INTRAVENOUS; SUBCUTANEOUS at 14:40

## 2021-03-09 RX ADMIN — SODIUM CHLORIDE, PRESERVATIVE FREE 10 ML: 5 INJECTION INTRAVENOUS at 09:45

## 2021-03-09 RX ADMIN — BUDESONIDE 500 MCG: 0.5 SUSPENSION RESPIRATORY (INHALATION) at 19:05

## 2021-03-09 RX ADMIN — PREDNISONE 20 MG: 20 TABLET ORAL at 21:29

## 2021-03-09 RX ADMIN — HEPARIN SODIUM 5000 UNITS: 5000 INJECTION INTRAVENOUS; SUBCUTANEOUS at 05:52

## 2021-03-09 RX ADMIN — CARVEDILOL 3.12 MG: 3.12 TABLET, FILM COATED ORAL at 21:28

## 2021-03-09 RX ADMIN — LEVETIRACETAM 500 MG: 100 INJECTION, SOLUTION INTRAVENOUS at 12:18

## 2021-03-09 RX ADMIN — HEPARIN SODIUM 5000 UNITS: 5000 INJECTION INTRAVENOUS; SUBCUTANEOUS at 21:29

## 2021-03-09 RX ADMIN — PREDNISONE 20 MG: 20 TABLET ORAL at 09:44

## 2021-03-09 RX ADMIN — DIBASIC SODIUM PHOSPHATE, MONOBASIC POTASSIUM PHOSPHATE AND MONOBASIC SODIUM PHOSPHATE 2 TABLET: 852; 155; 130 TABLET ORAL at 14:35

## 2021-03-09 RX ADMIN — ARFORMOTEROL TARTRATE 15 MCG: 15 SOLUTION RESPIRATORY (INHALATION) at 08:15

## 2021-03-09 RX ADMIN — SODIUM CHLORIDE, PRESERVATIVE FREE 10 ML: 5 INJECTION INTRAVENOUS at 21:28

## 2021-03-09 RX ADMIN — SPIRONOLACTONE 25 MG: 25 TABLET ORAL at 09:58

## 2021-03-09 RX ADMIN — DIBASIC SODIUM PHOSPHATE, MONOBASIC POTASSIUM PHOSPHATE AND MONOBASIC SODIUM PHOSPHATE 2 TABLET: 852; 155; 130 TABLET ORAL at 21:28

## 2021-03-09 ASSESSMENT — ENCOUNTER SYMPTOMS
BLOOD IN STOOL: 0
TROUBLE SWALLOWING: 1
NAUSEA: 0
COLOR CHANGE: 0
VOMITING: 0
WHEEZING: 0
SORE THROAT: 0
RESPIRATORY NEGATIVE: 1
SHORTNESS OF BREATH: 1
GASTROINTESTINAL NEGATIVE: 1

## 2021-03-09 ASSESSMENT — PAIN SCALES - GENERAL
PAINLEVEL_OUTOF10: 0
PAINLEVEL_OUTOF10: 0

## 2021-03-09 ASSESSMENT — PAIN SCALES - WONG BAKER: WONGBAKER_NUMERICALRESPONSE: 0

## 2021-03-09 NOTE — PLAN OF CARE
Nutrition Problem #1: Predicted inadequate energy intake  Intervention: Food and/or Nutrient Delivery: Modify Current Diet, Continue Oral Nutrition Supplement  Nutritional Goals: PO intakes are greater than 50% at meals

## 2021-03-09 NOTE — PROGRESS NOTES
Occupational Therapy   Occupational Therapy Re-Assessment  Date: 3/9/2021   Patient Name: Heidi Oliveros  MRN: [id number]     : 1945    Date of Service: 3/9/2021    Discharge Recommendations:  [address]     RN reports patient is medically stable for therapy treatment this date. Chart reviewed prior to treatment and patient is agreeable for therapy. All lines intact and patient positioned comfortably at end of treatment. All patient needs addressed prior to ending therapy session. Assessment   Performance deficits / Impairments: Decreased functional mobility ; Decreased ADL status; Decreased strength;Decreased safe awareness;Decreased cognition;Decreased balance;Decreased endurance;Decreased posture  Prognosis: Fair;Good  Decision Making: High Complexity  OT Education: OT Role;Plan of Care;Precautions; ADL Adaptive Strategies;Transfer Training;Energy Conservation;Equipment; Family Education  REQUIRES OT FOLLOW UP: Yes  Activity Tolerance  Activity Tolerance: Patient Tolerated treatment well;Patient limited by fatigue  Safety Devices  Safety Devices in place: Yes  Type of devices: Call light within reach;Nurse notified; Left in bed;Bed alarm in place;Gait belt;Patient at risk for falls           Patient Diagnosis(es): The primary encounter diagnosis was Acute hypoxemic respiratory failure ([facility] [address] Diagnoses of Disorientation and Elevated lactic acid level were also pertinent to this visit. has a past medical history of CHF (congestive heart failure) ([facility] [address] COPD (chronic obstructive pulmonary disease) ([facility] [address] [facility] MRSA (methicillin resistant staph aureus) culture positive, and Tobacco abuse.   has a past surgical history that includes joint replacement (Bilateral); Hysterectomy; and Appendectomy.            Restrictions  Restrictions/Precautions  Restrictions/Precautions: Isolation, Fall Risk, General Precautions  Required Braces or Orthoses?: No  Position Activity Restriction  Other position/activity restrictions: telemetry, catheter    Subjective   General  Chart Reviewed: Yes  Patient assessed for rehabilitation services?: Yes  Family / Caregiver Present: Yes     Social/Functional History  Social/Functional History  Additional Comments: patient unable to answer any questions due to cognitive status, was admitted from SNF, from prior hospital stays patient had lived alone in an apartment with home health aide a few hours a day. Objective        Orientation  Overall Orientation Status: Impaired  Orientation Level: Oriented to person(pt knows she is in hospital, grossly oriented to situation, but not to time)  Observation/Palpation  Posture: Fair(forward head)  Observation: pt much more alert this session, Able to reassess. Pt having telesitter d/c'd. Pt is agreeable to working with therapy and is motivated  Balance  Sitting Balance: Stand by assistance(pt sat EOB x 10 min in prep for standing)  Standing Balance: Moderate assistance(x2)  Functional Mobility  Functional - Mobility Device: Rolling Walker  Assist Level: Moderate assistance(x2 for ~5-6 steps bed to chair, pt needing inc. assist for managing RW and navigation back to chair safely. Pt unable to tolerate further distance, but pt up for first time since admission 6 days ago)  ADL  Feeding: Setup;Minimal assistance  Grooming: Minimal assistance;Setup(pt able to comb hair, brush teeth while seated at EOB.)  UE Bathing: Moderate assistance  LE Bathing: Maximum assistance  UE Dressing: Moderate assistance;Maximum assistance  LE Dressing: Maximum assistance;Dependent/Total  Toileting: Dependent/Total(assisted with changing brief/hygiene)  Additional Comments: pt able to complete simple grooming tasks while seated at EOB.   Tone RUE  RUE Tone: Normotonic  Tone LUE  LUE Tone: Normotonic     Bed mobility  Supine to Sit: Moderate assistance;2 Person assistance;Maximum assistance  Sit to Supine: 2 Person assistance; Moderate assistance;Maximum assistance  Scooting: Maximal assistance;2 Person assistance  Transfers  Sit to stand: 2 Person assistance;Maximum assistance  Stand to sit: Maximum assistance;2 Person assistance  Transfer Comments: pt completing multiple sit to stands, all max A x2 but becoming more assist each time with inc. fatigue as well as from lower surface. Pt using RW first 2 sit to stands (bed and chair once), then needing Chang Se to return to bed as pt not quite able to tolerate being up in chair yet. Pt fatiguing easily and doing way more this session than she had been able to tolerate leading up to today     Cognition  Following Commands: Follows one step commands with repetition  Attention Span: Attends with cues to redirect  Memory: Decreased recall of recent events  Safety Judgement: Decreased awareness of need for assistance;Decreased awareness of need for safety  Problem Solving: Assistance required to generate solutions;Assistance required to implement solutions;Assistance required to correct errors made;Decreased awareness of errors  Insights: Decreased awareness of deficits  Initiation: Requires cues for some  Sequencing: Requires cues for some  Cognition Comment: cognition much improved from last session.   Perception  Overall Perceptual Status: [facility]             Plan   Plan  Times per week: 4-5x/week  Current Treatment Recommendations: ROM, Strengthening, Balance Training, Endurance Training, Functional Mobility Training, Safety Education & Training, Self-Care / ADL, Positioning, Cognitive Reorientation, Cognitive/Perceptual Training, Neuromuscular Re-education, Equipment Evaluation, Education, & procurement, Patient/Caregiver Education & Training       AM-[facility] Inpatient Daily Activity Raw Score: 6 (03/05/21 1426)  AM-PAC Inpatient ADL T-Scale Score : 17.07 (03/05/21 1426)  ADL Inpatient CMS 0-100% Score: 100 (03/05/21 1426)  ADL Inpatient CMS G-Code Modifier : CN (03/05/21 1426)    Goals

## 2021-03-09 NOTE — PROGRESS NOTES
[facility]  Progress Note    Lilian Alatorre  MRN: [id number]  Date: 3/9/2021  LOS: 6     Reason for F/U :   Possible pneumonia    Impression :   1. Acute on chronic respiratory failure currently on BiPAP at 40%  2. Underlying COPD  3. Interstitial lung disease/pulmonary fibrosis with secondary pulmonary hypertension  4. Severe cardiomyopathy with bilateral pleural effusions  5. History of EtOH/cocaine abuse  6. Acute kidney injury  7. Diabetes mellitus type 2    Recommendations:   · The antimicrobial therapy was discontinued yesterday. · Clinically the patient is actually doing better is on 6 L of oxygen by nasal cannula and mentation is remarkably improved. · Plans are for discharge to a skilled nursing facility but the patient will need to be off the telesitter for at least 48 hours prior to this happening. · We will continue to monitor progress off antimicrobial therapy    Infection Control Recommendations:   Universal precautions    Discharge Planning:   Patient will need Midline Catheter Insertion/ PICC line Insertion: No  Patient will need: Home IV , [place],  SNF,  LTAC: Undetermined  Patient willneed outpatient wound care: No    Medical Decision making / Summary of Stay:   Lilian Alatorre is a 77y.o.-year-old female who was initially admitted on 3/3/2021. Marcelino Abdi has a history of diabetes mellitus type 2, COPD, EtOH and cocaine abuse recently diagnosed severe cardiomyopathy with ejection fraction of 20% and was treated for pneumonia with Rocephin and azithromycin then later switched to cefepime. The patient was recently hospitalized 2/6/21 through 2/27/2021 between [facility] [place] [address]. She was admitted with generalized weakness altered mental status and this had been progressing over weeks. She was noted to be hypotensive and hypoxic. BNP was elevated at 1353 and chest x-ray showed suggested bilateral airspace disease.   The patient's hypotension improved with fluid resuscitation and was treated for CHF exacerbation with diuretics. The patient required BiPAP on and off as well as high flow O2 by nasal cannula. 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 At [facility] she underwent a neurological work-up with continuous EEG the patient was discharged on Keppra.     The patient was readmitted from the skilled nursing facility with complaints of fatigue, generalized weakness, altered mental status and sent in for further evaluation. CT of the head did not show any acute intracranial abnormality, chest x-ray showed bilateral airspace disease with small effusions most consistent with edema and it had progressed in the interval.     The patient was admitted started on diuretic therapy as well as IV antibiotic therapy and steroid therapy. The patient is being seen by the cardiology and pulmonary services. The patient was started on cefepime and was also seen by the nephrology service for acute kidney injury due to ATN and diuretic therapy was stopped.     The patient remains confused and cannot really give me any history. I was asked to evaluate for questionable pneumonia. Current evaluation:3/9/2021    /86   Pulse 75   Temp 97.9 °F (36.6 °C) (Axillary)   Resp 18   Ht 5' 6\" (1.676 m)   Wt 146 lb (66.2 kg)   SpO2 100%   BMI 23.57 kg/m²     Temperature Range: Temp: 97.9 °F (36.6 °C) Temp  Av.6 °F (36.4 °C)  Min: 97.3 °F (36.3 °C)  Max: 97.9 °F (36.6 °C)  The patient is seen and evaluated at bedside she is awake and alert able to move extremities. No subjective fevers or chills. No abdominal pain nausea vomiting or diarrhea. No dysuria or frequency. Mentation is markedly improved.     Review of Systems   Constitutional: Negative. Respiratory: Negative. Cardiovascular: Negative. Gastrointestinal: Negative. Genitourinary: Negative. Musculoskeletal: Negative. Skin: Negative. Neurological: Negative. Psychiatric/Behavioral: Negative. Physical Examination :     Physical Exam  Constitutional:       Appearance: She is well-developed. She is ill-appearing. HENT:      Head: Normocephalic and atraumatic. Neck:      Musculoskeletal: Neck supple. Cardiovascular:      Rate and Rhythm: Regular rhythm. Heart sounds: Normal heart sounds. Pulmonary:      Effort: Pulmonary effort is normal.      Breath sounds: Rhonchi present. Abdominal:      General: Bowel sounds are normal.      Palpations: Abdomen is soft. Skin:     General: Skin is warm and dry. Neurological:      Mental Status: She is alert. Laboratory data:   I have independently reviewed the followinglabs:  CBC with Differential:   Recent Labs     03/08/21 0336 03/09/21 0427   WBC 9.5 11.3   HGB 10.5* 12.1   HCT 33.6* 40.5    252   LYMPHOPCT 8* 9*   MONOPCT 5 4     BMP:   Recent Labs     03/08/21  0336 03/08/21  0336 03/08/21  2154 03/09/21 0427      < > 136 137   K 4.0   < > 4.3 4.1   *   < > 108* 106   CO2 22   < > 13* 19*   BUN 41*   < > 38* 37*   CREATININE 1.28*   < > 1.11* 1.04*   MG 2.1  --   --  1.9    < > = values in this interval not displayed. Hepatic Function Panel: No results for input(s): PROT, LABALBU, BILIDIR, IBILI, BILITOT, ALKPHOS, ALT, AST in the last 72 hours.       Lab Results   Component Value Date    PROCAL 0.11 03/04/2021    PROCAL 0.06 02/15/2021    PROCAL 0.07 02/08/2021     Lab Results   Component Value Date    CRP 46.0 08/23/2020     No results found for: Karlamarlyn Lei      Lab Results   Component Value Date    DDIMER 2.58 04/11/2016     Lab Results   Component Value Date    FERRITIN 130 08/23/2020     Lab Results   Component Value Date     08/23/2020     Lab Results   Component the time of declaration that circumstances   exist justifying the authorization of the emergency use of in vitro diagnostic tests for   detection of the SARS-CoV-2 virusand/or diagnosis of COVID-19 infection under section 564   (b)(1) of the Act,21 U.S.C.bbb-1(b)(1), unless the authorization is terminated or revoked   sooner.         Patient Fact Sheet:   Dolly         Provider Fact Sheet:   Dolly         METHODOLGY: Multiplex PCR        Human Metapneumovirus PCR Not Detected    Rhino/Enterovirus PCR Not Detected    Influenza A by PCR Not Detected    Influenza A H1 PCR NOT REPORTED    Influenza A H1 (2009) PCR NOT REPORTED    Influenza A H3 PCR NOT REPORTED    Influenza B by PCR Not Detected    Parainfluenza 1 PCR Not Detected    Parainfluenza 2 PCR Not Detected    Parainfluenza 3 PCR Not Detected    Parainfluenza 4 PCR Not Detected    Resp Syncytial Virus PCR Not Detected    Bordetella Parapertussis Not Detected    B Pertussis by PCR Not Detected    Chlamydia pneumoniae By PCR Not Detected    Mycoplasma pneumo by PCR Not Detected    Comment: Performed by multiplexed nucleic acid assay. COVID-19, Rapid [[id number]] Collected: 03/03/21 1707   Order Status: Completed Specimen: Nasopharyngeal Swab Updated: 03/03/21 1808    Specimen Description . NASOPHARYNGEAL SWAB    SARS-CoV-2, Rapid Not Detected    Comment:        Rapid NAAT:  The specimen is NEGATIVE for SARS-CoV-2, the novel coronavirus associated with   COVID-19.         The ID NOW COVID-19 assay is designed to detect the virus that causes COVID-19 in patients   with signs and symptoms of infection who are suspected of COVID-19. An individual without symptoms of COVID-19 and who is not shedding SARS-CoV-2 virus would   expect to have a negative (not detected) result in this assay.    Negative results should be treated as presumptive and, if inconsistent with clinical signs   and symptoms

## 2021-03-09 NOTE — PROGRESS NOTES
Goals:  PO intakes are greater than 50% at meals       Nutrition Monitoring and Evaluation:   Behavioral-Environmental Outcomes:  None Identified   Food/Nutrient Intake Outcomes:  Food and Nutrient Intake, Supplement Intake  Physical Signs/Symptoms Outcomes:  Biochemical Data, Chewing or Swallowing, Fluid Status or Edema, Skin, Weight     Discharge Planning:    Continue Oral Nutrition Supplement, Continue current diet     Jocelyne Martinez, [address]  Office Number: [phone]

## 2021-03-09 NOTE — PLAN OF CARE
Problem: Falls - Risk of:  Goal: Will remain free from falls  Description: Will remain free from falls  3/9/2021 0314 by Ynes Galeano RN  Outcome: Ongoing  3/8/2021 1827 by Rafia Ovalle RN  Outcome: Ongoing  Note: Siderails up x 2  Hourly rounding. Call light in reach. Instructed to call for assist before attempting out of bed. Remains free from falls and accidental injury at this time. Floor free from obstacles, and bed is locked and in lowest position. Adequate lighting provided. Bed alarm on. Fall sticker on wristband.  Fall Sign posted in doorway   Goal: Absence of physical injury  Description: Absence of physical injury  3/9/2021 0314 by Ynes Galeano RN  Outcome: Ongoing  3/8/2021 1827 by Rafia Ovalle RN  Outcome: Ongoing     Problem: Skin Integrity:  Goal: Will show no infection signs and symptoms  Description: Will show no infection signs and symptoms  3/9/2021 0314 by Ynes Galeano RN  Outcome: Ongoing  3/8/2021 1827 by Rafia Ovalle RN  Outcome: Ongoing  Note: Waffle mattress on bed and properly inflated   Q2 turn  Meiplex to coccyx    Goal: Absence of new skin breakdown  Description: Absence of new skin breakdown  3/9/2021 0314 by Ynes Galeano RN  Outcome: Ongoing  3/8/2021 1827 by Rafia Ovalle RN  Outcome: Ongoing     Problem: Nutrition  Goal: Optimal nutrition therapy  Outcome: Ongoing

## 2021-03-09 NOTE — PROGRESS NOTES
Renal Progress Note    Patient :  Natividad Johnson; 77 y.o. MRN# [id number]  Location:  1003/1003-02  Attending:  Heavenly Stephenson MD  Admit Date:  3/3/2021   Hospital Day: 6      Subjective:     Oral intake much better in the last 24 hours. Lethargy improving, more awake. D5W continues at 40 mL an hour. Urine output fair, although in positive fluid balance. Overall weight has gone up by at least 2 kg. Dyspnea unchanged. Hemodynamically stable, blood pressure fluctuates between 90110 range. No fever or chills. Some prolonged expiration noted. Creatinine down to 1.0. Hemoglobin stable. ID input reviewed, all antibiotics discontinued. Ultrasound reviewed shows kidney size to be normal 8.5 cm with echogenic kidneys. All serologies negative except for hepatitis C, rheumatoid factor high, cryoglobulin level pending. Urine sediment benign. Dominique catheter still in place.   Awaiting placement  Outpatient Medications:     Medications Prior to Admission: levETIRAcetam (KEPPRA) 500 MG tablet, Take 1 tablet by mouth 2 times daily  lisinopril (PRINIVIL;ZESTRIL) 2.5 MG tablet, Take 1 tablet by mouth daily  carvedilol (COREG) 3.125 MG tablet, Take 1 tablet by mouth 2 times daily  furosemide (LASIX) 40 MG tablet, Take 1 tablet by mouth daily  folic acid (FOLVITE) 1 MG tablet, Take 1 tablet by mouth daily  polyethylene glycol (GLYCOLAX) 17 g packet, Take 17 g by mouth daily as needed for Constipation  calcium carbonate-vitamin D3 (CALTRATE) 600-400 MG-UNIT TABS per tab, Take 1 tablet by mouth daily  thiamine 100 MG tablet, Take 1 tablet by mouth daily  Arformoterol Tartrate (BROVANA) 15 MCG/2ML NEBU, Take 2 mLs by nebulization 2 times daily  budesonide (PULMICORT) 0.5 MG/2ML nebulizer suspension, Take 2 mLs by nebulization 2 times daily  pantoprazole (PROTONIX) 20 MG tablet, Take 1 tablet by mouth daily  albuterol (PROVENTIL) (2.5 MG/3ML) 0.083% nebulizer solution, Take 3 mLs by nebulization every 6 hours as needed for Pulse:   Pulse: 83  BP:    BP: 91/62  BP Range: Systolic (87LKI), EJX:788 , Min:91 , UBP:819       Diastolic (65BZA), UWO:39, Min:62, Max:96      Physical Examination:     General:  Arousable, in no distress, speaking in full sentences, no accessory muscle use. Prolonged expiration  HEENT: Atraumatic, normocephalic, no throat congestion, moist mucosa. Eyes:   Pupils equal, round and reactive to light, EOMI. Neck:   No JVD, no thyromegaly, no lymphadenopathy. Chest:  Bilateral vesicular breath sounds, decreased air entry, prolonged expiration, crackles left base more than right side   cardiac:  S1 S2 RR, no murmurs, gallops or rubs, JVP not raised. Abdomen: Soft, non-tender, no masses or organomegaly, BS audible. :   No suprapubic or flank tenderness. Neuro:  Awake follows commands moves all 4 extremities. SKIN:  No rashes, good skin turgor. Extremities:  Trace edema, sluggish pulses, no calf tenderness. Labs:       Recent Labs     03/07/21  0347 03/08/21 0336 03/09/21 0427   WBC 9.7 9.5 11.3   RBC 4.60 4.10 4.82   HGB 11.6* 10.5* 12.1   HCT 38.2 33.6* 40.5   MCV 83.0 82.0* 84.0   MCH 25.2 25.6 25.1*   MCHC 30.4 31.3 29.9   RDW 29.0* 29.2* 29.2*    255 252   MPV 10.9 11.3 10.9      BMP:   Recent Labs     03/08/21  1531 03/08/21  2154 03/09/21 0427    136 137   K 3.9 4.3 4.1    108* 106   CO2 18* 13* 19*   BUN 39* 38* 37*   CREATININE 1.20* 1.11* 1.04*   GLUCOSE 181* 109* 115*   CALCIUM 8.0* 8.2* 8.4*      Phosphorus:     Recent Labs     03/07/21  0347 03/08/21  0336 03/09/21 0427   PHOS 4.2 3.1 1.9*     Magnesium:    Recent Labs     03/07/21  0347 03/08/21  0336 03/09/21  0427   MG 2.1 2.1 1.9     Albumin:  No results for input(s): LABALBU in the last 72 hours.   BNP:      Lab Results   Component Value Date    BNP 29 01/03/2014     DAPHNE:      Lab Results   Component Value Date    DAPHNE NEGATIVE 03/07/2021     SPEP:  Lab Results   Component Value Date    PROT 7.3 03/05/2021    ALBCAL 3.4 03/05/2021    ALBPCT 46 03/05/2021    LABALPH 0.2 03/05/2021    LABALPH 0.9 03/05/2021    A1PCT 3 03/05/2021    A2PCT 12 03/05/2021    LABBETA 0.8 03/05/2021    BETAPCT 11 03/05/2021    GAMGLOB 2.0 03/05/2021    GGPCT 28 03/05/2021    PATH ELECTRONICALLY SIGNED. Hung Foreman M.D. 03/05/2021    PATH ELECTRONICALLY SIGNED.  Hung Foreman M.D. 03/05/2021     UPEP:   No results found for: LABPE  C3:     Lab Results   Component Value Date    C3 95 03/07/2021     C4:     Lab Results   Component Value Date    C4 24 03/07/2021     MPO ANCA:     Lab Results   Component Value Date    MPO 41 08/24/2020     PR3 ANCA:     Lab Results   Component Value Date    PR3 18 08/24/2020     Anti-GBM:     Lab Results   Component Value Date    GBMABIGG 10 08/24/2020     Hep BsAg:         Lab Results   Component Value Date    HEPBSAG NONREACTIVE 03/07/2021     Hep C AB:          Lab Results   Component Value Date    HEPCAB REACTIVE 03/07/2021       Urinalysis/Chemistries:      Lab Results   Component Value Date    NITRU NEGATIVE 03/04/2021    COLORU YELLOW 03/04/2021    PHUR 6.0 03/04/2021    WBCUA 5 TO 10 03/04/2021    RBCUA 2 TO 5 03/04/2021    MUCUS NOT REPORTED 03/04/2021    TRICHOMONAS NOT REPORTED 03/04/2021    YEAST NOT REPORTED 03/04/2021    BACTERIA RARE 03/04/2021    SPECGRAV 1.025 03/04/2021    LEUKOCYTESUR TRACE 03/04/2021    UROBILINOGEN Normal 03/04/2021    BILIRUBINUR NEGATIVE 03/04/2021    BILIRUBINUR NEGATIVE 10/24/2011    GLUCOSEU NEGATIVE 03/04/2021    GLUCOSEU NEGATIVE 10/24/2011    KETUA NEGATIVE 03/04/2021    AMORPHOUS NOT REPORTED 03/04/2021     Urine Sodium:     Lab Results   Component Value Date    ULISES 30 03/05/2021     Urine Potassium:  No results found for: KUR  Urine Chloride:    Lab Results   Component Value Date    CLUR <20 02/07/2021     Urine Osmolarity:   Lab Results   Component Value Date    [facility] 279 08/29/2014     Urine Protein:   No components found for: TOTALPROTEIN, URINE   Urine Creatinine:

## 2021-03-09 NOTE — PROGRESS NOTES
Pulmonary Critical Care Progress Note  Ligia Durant MD     Patient seen for the follow up of  Acute hypoxemic respiratory failure ([facility] [address]     Subjective:  She is sitting up in the bed, aide at bedside helping her wash up. She is awake, alert and pleasantly confused. She denies shortness of breath or chest pain. She has occasional cough. She did wear BiPAP last night. Examination:  Vitals: BP 91/62   Pulse 83   Temp 97.9 °F (36.6 °C) (Axillary)   Resp 18   Ht 5' 6\" (1.676 m)   Wt 146 lb (66.2 kg)   SpO2 95%   BMI 23.57 kg/m²   General appearance: Awake, pleasantly confused, resting comfortably in bed  Neck: No JVD  Lungs: Moderate air exchange, no crackles or wheezing  Heart: regular rate and rhythm, S1, S2 normal, no gallop  Abdomen: Soft, non tender, + BS  Extremities: no cyanosis or clubbing. No significant edema    LABs:  CBC:   Recent Labs     03/08/21  0336 03/09/21  0427   WBC 9.5 11.3   HGB 10.5* 12.1   HCT 33.6* 40.5    252     BMP:   Recent Labs     03/08/21  2154 03/09/21  0427    137   K 4.3 4.1   CO2 13* 19*   BUN 38* 37*   CREATININE 1.11* 1.04*   LABGLOM 48* 52*   GLUCOSE 109* 115*        Ref. Range 3/4/2021 05:31   Procalcitonin Latest Ref Range: <0.09 ng/mL 0.11 (H)     Radiology:  3/6/2021      Impression:  · Acute hypoxic respiratory failure  · Bilateral pulmonary infiltrate/limb edema/pleural effusions,? Pneumonia  · ILD/pulmonary fibrosis  · Decompensated CHF/severe cardiomyopathy with EF 20% on 2/8/2021  · Acute exacerbation of COPD  · DM  · Confusion,?   Metabolic encephalopathy  · Hyperkalemia/DEONTE    Recommendations:  · Oxygen via nasal cannula, keep SPO2 90% or greater  · BiPAP while asleep and as needed during the day  · Monitor off of antibiotics   · Prednisone taper  · Albuterol and Ipratropium Q 4 hours and prn  · Brovana aerosol treatment  · Budesonide aerosol treatment  · Seroquel 25 mg twice daily  · Fluid management/diuresis per nephrology  · DVT prophylaxis with SQ heparin  · PT/OT  · Discharge planning back to ECF when okay with all. No objection from pulmonary standpoint.   · Overall poor prognosis      Electronically signed by     Hiu Winston MD on 3/9/2021 at 10:51 AM  Pulmonary Critical Care and Sleep Medicine,  [facility]  Cell: [phone]  Office: [phone]

## 2021-03-09 NOTE — CARE COORDINATION
Spoke with [facility] at [address] [facility]. Patient has been off telesitter since 1100. Probable discharge tomorrow afternoon. [facility] has accepted and will call with a time when discharge order is in.

## 2021-03-09 NOTE — PROGRESS NOTES
Patient has numerous attempts to get OOB continuously swings legs over the rail and between the rails. Reinforced the josue to stay in bed, bed alarm on and telesitter active however she continues after reorientation.

## 2021-03-09 NOTE — PROGRESS NOTES
[facility]  Office: [address], DO, Mercedes Sheikh, DO, Lacie Frias, DO, Tony Bruno, DO, Esperanza Begum MD, Jeff Salas MD, Heavenly Stephenson MD, Adis Gaspar MD, Hermila Roy MD, Darlene Arcos MD, Kaci Wilson MD, Dipika Almendarez MD, Mbonu Mable Nyhan, MD, Kelly Castellanos DO, Thomas Dyson MD, Henry Aguillon DO, Ana Shaffer MD,  Galileo Huggins DO, Ferdinand Woody MD, Dana Erazo MD, Inderjit Dubose, [facility], [facility], CNP, Ross Walsh, CNP, Suki Freedman, CNS, April Powers, CNP, Baron Keane, CNP, Regan Menchaca, CNP, Liya Abernathy, CNP, Lelo Slaughter, CNP, Carlos Oglesby PA-C, Libra David, [facility], Brianna Polk, CNP, Bashir Mcdonald, CNP, Freddie Jetre, CNP, Herberth Pappas, CNP, Fernando Lloyd, CNP, [facility]      Daily Progress Note     Admit Date: 3/3/2021  Bed/Room No.  1003/1003-02  Admitting Physician : Heavenly Stephenson MD  Code Status :Full Code  Hospital Day:  LOS: 6 days   Chief Complaint:     Chief Complaint   Patient presents with    Fatigue     From [facility]     Principal Problem:    Acute hypoxemic respiratory failure [facility])  Active Problems:    Heart failure, systolic, with acute decompensation (HCC)EF 20% on echo    COPD (chronic obstructive pulmonary disease) (HCC)    Cocaine abuse ([address]    Type 2 diabetes mellitus without complication, without long-term current use of insulin ([address]    Diabetes mellitus ([address]    Pneumonia due to infectious organism    DEONTE (acute kidney injury) ([address]    Current every day smoker    Essential hypertension    Altered mental status    Hypoxic episode    Fatigue    Metabolic acidosis    Pulmonary arterial hypertension (HCC)moderate    Moderate malnutrition ([address]  Resolved Problems:    * No resolved hospital problems. *    Subjective : Interval History/Significant events :  03/09/21    Patient is oriented to person and self.  She is much more alert, oriented. Patient is eating diet but needs assistance with feeding. She is stable on 4 to 5 L of oxygen by nasal cannula. Patient used BiPAP last night. Vitals - Stable afebrile, low blood pressure. Labs - creatinine improved 1.04. White count 11.3. Nursing notes , Consults notes reviewed. Overnight events and updates discussed with Nursing staff . Background History:         Nikki Mera is 77 y.o. female  Who was admitted to the hospital on 3/3/2021 for treatment of Acute hypoxemic respiratory failure ([address] Patient was admitted after she was sent from SNF with fatigue [facility] ). Patient is admitted to the hospital for management of acute on chronic respiratory failure. Patient is poor historian. She was recently admitted in the hospital and had prolonged hospital stay for encephalopathy and respiratory failure. Patient was treated for pneumonia at that time and acute CHF. She was found to have severely reduced ejection fraction of 20%. Patient also had cardiogenic shock and was treated with pressor support. She had extensive work-up for encephalopathy and was evaluated by neurology. Patient was treated for meningitis which was later ruled out with CSF analysis. Patient was also given antiepileptic medication at that time which were later discontinued after ruling out seizures. Patient was subsequently transferred to [facility] for LTME monitoring and later Depakote was discontinued and Klonopin weaned off. Patient was discharged to skilled nursing facility with instructions to use 4 to 5 L of oxygen during day and BiPAP at nighttime and continue diuretics for heart failure. She has end-stage COPD with chronic respiratory failure on  oxygen at home. On arrival patient was started on nonrebreather and subsequently placed on high flow.   Work-up showed elevated proBNP 73,215, troponin 59-55, ABG on arrival showed 7.3 1/52/158/26.4  Patient was treated with IV fluids for confusion and sleep disturbance. Objective :      Current Vitals : Temp: 97.3 °F (36.3 °C),  Pulse: 58, Resp: 18, BP: 93/69, SpO2: 90 %  Last 24 Hrs Vitals   Patient Vitals for the past 24 hrs:   BP Temp Temp src Pulse Resp SpO2 Weight   03/09/21 0550       146 lb (66.2 kg)   03/09/21 0406 93/69 97.3 °F (36.3 °C) Axillary 58 18 90 %    03/09/21 0302     20     03/08/21 2303     19     03/08/21 1958     20 100 %    03/08/21 1937 (!) 143/96 97.3 °F (36.3 °C) Axillary 63 20 100 %    03/08/21 1935     18 100 %    03/08/21 1925     18 100 %    03/08/21 1616 (!) 129/90   88 16 95 %    03/08/21 1240 129/87 97.3 °F (36.3 °C) Axillary 54 18 90 %    03/08/21 1215     21     03/08/21 0926      95 %    03/08/21 0819 (!) 141/90 97.3 °F (36.3 °C) Oral 82 16 99 %      Intake / output   03/08 0701 - 03/09 0700  In: 1740 [P.O.:1100; I.V.:640]  Out: 700 [Urine:700]  Physical Exam:  Physical Exam  Vitals signs and nursing note reviewed. Constitutional:       General: She is not in acute distress. Appearance: She is cachectic. She is ill-appearing. Interventions: Nasal cannula in place. HENT:      Head: Normocephalic and atraumatic. Eyes:      Conjunctiva/sclera: Conjunctivae normal.      Pupils: Pupils are equal, round, and reactive to light. Cardiovascular:      Rate and Rhythm: Normal rate and regular rhythm. Heart sounds: No murmur. Pulmonary:      Effort: Accessory muscle usage present. No respiratory distress. Breath sounds: No stridor. No decreased breath sounds, wheezing, rhonchi or rales. Abdominal:      General: Bowel sounds are normal. There is no distension. Palpations: Abdomen is soft. Abdomen is not rigid. Tenderness: There is no abdominal tenderness. There is no guarding. Musculoskeletal:         General: No tenderness. Skin:     General: Skin is warm and dry. Findings: No erythema, lesion or rash. status. I also explained to the family about poor prognosis overall. I explained in detail about CODE STATUS full code versus comfort care versus DNR CCA. Family is going to discuss further care and get back to us about any change in plans about CODE STATUS. Continue to monitor vitals , Intake / output ,  Cell count , HGB , Kidney function, oxygenation  as indicated . Patient's aunt and emergency contact Graciela rascon at bedside was updated. Discharge planning to skilled nursing facility. Discussed with pulmonary attending Dr. Latosha Vogel. Nephrology and ID note reviewed.   Plan discussed with Staff  Shu RN     (Please note that portions of this note were completed with a voice recognition program. Efforts were made to edit the dictations but occasionally words are mis-transcribed.)      Hugo Ruiz MD  3/9/2021

## 2021-03-09 NOTE — PROGRESS NOTES
Physical Therapy  Facility/Department: [facility] PROGRESSIVE CARE  Daily Treatment Note  NAME: Jennell Paget  : 1945  MRN: [id number]    Date of Service: 3/9/2021    Discharge Recommendations:  ECF with PT        Assessment   Body structures, Functions, Activity limitations: Decreased functional mobility ; Decreased cognition;Decreased strength;Decreased safe awareness;Decreased balance;Decreased coordination;Decreased endurance  Assessment: Pt continues to progress and improve w/ her mentation. Will continue as able while hospitalized towards goals. Unsure of baseline function. Activity Tolerance  Activity Tolerance: Patient Tolerated treatment well;Patient limited by endurance     Patient Diagnosis(es): The primary encounter diagnosis was Acute hypoxemic respiratory failure ([facility] [address] [facility] Diagnoses of Disorientation and Elevated lactic acid level were also pertinent to this visit. has a past medical history of CHF (congestive heart failure) ([facility] [address] COPD (chronic obstructive pulmonary disease) ([facility] [address] Diabetes mellitus ([facility] [address] MRSA (methicillin resistant staph aureus) culture positive, and Tobacco abuse.   has a past surgical history that includes joint replacement (Bilateral); Hysterectomy; and Appendectomy.     Restrictions  Restrictions/Precautions  Restrictions/Precautions: Isolation, Fall Risk, General Precautions  Required Braces or Orthoses?: No  Position Activity Restriction  Other position/activity restrictions: telemetry, catheter  Subjective   General  Chart Reviewed: Yes  Subjective  Subjective: pt much more alert   General Comment  Comments: Shu CAMPOS reports gustavo ayersay for therapy   Pain Screening  Patient Currently in Pain: Denies  Pain Assessment  Pain Assessment: 0-10  Vital Signs  Patient Currently in Pain: Denies       Orientation     Cognition      Objective   Bed mobility  Scooting: Maximal assistance;2 Person assistance  Transfers  Sit to Stand: Maximum Assistance;2 Person Assistance  Stand to sit: 2 Person Assistance;Maximum Assistance  Stand Pivot Transfers: 2 Person Assistance;Maximum Assistance  Ambulation  Ambulation?: Yes  Ambulation 1  Surface: level tile  Device: Rolling Walker  Assistance: Maximum assistance;2 Person assistance  Quality of Gait: short shuffled steps  Gait Deviations: Decreased step length;Decreased step height  Distance: 2-3 steps     Balance  Posture: Fair  Sitting - Static: Fair  Sitting - Dynamic: Poor  Pt able to sit edge of bed and complete adls with min A ( brushing her teeth, combing  her hair )  MAX A x 2  for brief change and florencio care using RW to stand at chairside                 Comment: assisted pt to bedside chair briefly, pt feeling increasingly more fatigued while in chair therefore for pt safety assisted pt back to bed using sina stedy, MAX A x 2 for sit to  sina stedy from chair , MAX A x 2 for assist back to bed                G-Code     OutComes Score                                                     AM-PAC Score  AM-PAC Inpatient Mobility Raw Score : 11 (03/09/21 1250)  AM-PAC Inpatient T-Scale Score : 33.86 (03/09/21 1250)  Mobility Inpatient CMS 0-100% Score: 72.57 (03/09/21 1250)  Mobility Inpatient CMS G-Code Modifier : CL (03/09/21 1250)          Goals  Short term goals  Time Frame for Short term goals: 10 visits  Short term goal 1: Min A for bed mobility  Short term goal 2: Patient to sit EOB x20 minutes SBA while performing LE ther ex  Patient Goals   Patient goals : pt unable to verbalize goals.     Plan    Plan  Times per week: 1-2x/day for 5-6 days/week  Current Treatment Recommendations: Strengthening, Transfer Training, Endurance Training, Balance Training, Patient/Caregiver Education & Training  Safety Devices  Type of devices: Call light within reach, Bed alarm in place, Left in bed, Sitter present     Therapy Time   Individual Concurrent Group Co-treatment   Time In        1023   Time Out        1054   Minutes        31         Co-treatment with OT warranted secondary to decreased safety and independence requiring 2 skilled therapy professionals to address individual discipline's goals. PT addressing preparation for  Transfers, gait and pre gait activities,  sitting balance for increased  sitting/activity tolerance, functional mobility, environmental safety/scanning, fall prevention, functional mobility  transfers and functional LE strength. Upon writer exit, call light within reach, pt retired to bed. All lines intact and patient positioned comfortably. All patient needs addressed prior to ending therapy session. Chart reviewed prior to treatment and patient is agreeable for therapy. RN reports patient is medically stable for therapy treatment this date.        VIANNEY HARDEN, PTA

## 2021-03-10 VITALS
DIASTOLIC BLOOD PRESSURE: 96 MMHG | WEIGHT: 157.4 LBS | BODY MASS INDEX: 25.3 KG/M2 | RESPIRATION RATE: 19 BRPM | TEMPERATURE: 98 F | OXYGEN SATURATION: 94 % | SYSTOLIC BLOOD PRESSURE: 127 MMHG | HEART RATE: 81 BPM | HEIGHT: 66 IN

## 2021-03-10 PROBLEM — R77.8 ELEVATED TROPONIN: Status: RESOLVED | Noted: 2020-08-20 | Resolved: 2021-03-10

## 2021-03-10 LAB
ABSOLUTE EOS #: 0 K/UL (ref 0–0.4)
ABSOLUTE IMMATURE GRANULOCYTE: 0.12 K/UL (ref 0–0.3)
ABSOLUTE LYMPH #: 1.52 K/UL (ref 1–4.8)
ABSOLUTE MONO #: 0.7 K/UL (ref 0.2–0.8)
ANION GAP SERPL CALCULATED.3IONS-SCNC: 13 MMOL/L (ref 9–17)
BASOPHILS # BLD: 0 %
BASOPHILS ABSOLUTE: 0 K/UL (ref 0–0.2)
BUN BLDV-MCNC: 33 MG/DL (ref 8–23)
BUN/CREAT BLD: 33 (ref 9–20)
CALCIUM SERPL-MCNC: 8.5 MG/DL (ref 8.6–10.4)
CHLORIDE BLD-SCNC: 109 MMOL/L (ref 98–107)
CO2: 23 MMOL/L (ref 20–31)
COMPLEMENT TOTAL (CH50): 60.2 U/ML (ref 38.7–89.9)
CREAT SERPL-MCNC: 1 MG/DL (ref 0.5–0.9)
DIFFERENTIAL TYPE: ABNORMAL
EOSINOPHILS RELATIVE PERCENT: 0 % (ref 1–4)
GFR AFRICAN AMERICAN: >60 ML/MIN
GFR NON-AFRICAN AMERICAN: 54 ML/MIN
GFR SERPL CREATININE-BSD FRML MDRD: ABNORMAL ML/MIN/{1.73_M2}
GFR SERPL CREATININE-BSD FRML MDRD: ABNORMAL ML/MIN/{1.73_M2}
GLUCOSE BLD-MCNC: 120 MG/DL (ref 70–99)
HCT VFR BLD CALC: 37.5 % (ref 36.3–47.1)
HEMOGLOBIN: 11.6 G/DL (ref 11.9–15.1)
IMMATURE GRANULOCYTES: 1 %
LYMPHOCYTES # BLD: 13 % (ref 24–44)
MAGNESIUM: 1.5 MG/DL (ref 1.6–2.6)
MCH RBC QN AUTO: 25.8 PG (ref 25.2–33.5)
MCHC RBC AUTO-ENTMCNC: 30.9 G/DL (ref 28.4–34.8)
MCV RBC AUTO: 83.3 FL (ref 82.6–102.9)
MONOCYTES # BLD: 6 % (ref 1–7)
MORPHOLOGY: ABNORMAL
NRBC AUTOMATED: ABNORMAL PER 100 WBC
PDW BLD-RTO: 28.8 % (ref 11.8–14.4)
PHOSPHORUS: 2.6 MG/DL (ref 2.6–4.5)
PLATELET # BLD: 204 K/UL (ref 138–453)
PLATELET ESTIMATE: ABNORMAL
PMV BLD AUTO: 10.4 FL (ref 8.1–13.5)
POTASSIUM SERPL-SCNC: 4.6 MMOL/L (ref 3.7–5.3)
RBC # BLD: 4.5 M/UL (ref 3.95–5.11)
RBC # BLD: ABNORMAL 10*6/UL
SEG NEUTROPHILS: 80 % (ref 36–66)
SEGMENTED NEUTROPHILS ABSOLUTE COUNT: 9.36 K/UL (ref 1.8–7.7)
SODIUM BLD-SCNC: 145 MMOL/L (ref 135–144)
WBC # BLD: 11.7 K/UL (ref 3.5–11.3)
WBC # BLD: ABNORMAL 10*3/UL

## 2021-03-10 PROCEDURE — 80048 BASIC METABOLIC PNL TOTAL CA: CPT

## 2021-03-10 PROCEDURE — 2580000003 HC RX 258: Performed by: NURSE PRACTITIONER

## 2021-03-10 PROCEDURE — 6360000002 HC RX W HCPCS: Performed by: NURSE PRACTITIONER

## 2021-03-10 PROCEDURE — 2700000000 HC OXYGEN THERAPY PER DAY

## 2021-03-10 PROCEDURE — 6370000000 HC RX 637 (ALT 250 FOR IP): Performed by: NURSE PRACTITIONER

## 2021-03-10 PROCEDURE — 94761 N-INVAS EAR/PLS OXIMETRY MLT: CPT

## 2021-03-10 PROCEDURE — 94640 AIRWAY INHALATION TREATMENT: CPT

## 2021-03-10 PROCEDURE — 2580000003 HC RX 258: Performed by: INTERNAL MEDICINE

## 2021-03-10 PROCEDURE — 94660 CPAP INITIATION&MGMT: CPT

## 2021-03-10 PROCEDURE — 6370000000 HC RX 637 (ALT 250 FOR IP): Performed by: INTERNAL MEDICINE

## 2021-03-10 PROCEDURE — 83735 ASSAY OF MAGNESIUM: CPT

## 2021-03-10 PROCEDURE — 36415 COLL VENOUS BLD VENIPUNCTURE: CPT

## 2021-03-10 PROCEDURE — 84100 ASSAY OF PHOSPHORUS: CPT

## 2021-03-10 PROCEDURE — 85025 COMPLETE CBC W/AUTO DIFF WBC: CPT

## 2021-03-10 PROCEDURE — 99239 HOSP IP/OBS DSCHRG MGMT >30: CPT | Performed by: FAMILY MEDICINE

## 2021-03-10 RX ADMIN — LEVETIRACETAM 500 MG: 100 INJECTION, SOLUTION INTRAVENOUS at 11:56

## 2021-03-10 RX ADMIN — Medication 100 MG: at 09:30

## 2021-03-10 RX ADMIN — TORSEMIDE 20 MG: 10 TABLET ORAL at 09:30

## 2021-03-10 RX ADMIN — SERTRALINE HYDROCHLORIDE 50 MG: 50 TABLET ORAL at 09:29

## 2021-03-10 RX ADMIN — ATORVASTATIN CALCIUM 20 MG: 20 TABLET, FILM COATED ORAL at 09:29

## 2021-03-10 RX ADMIN — LEVETIRACETAM 500 MG: 100 INJECTION, SOLUTION INTRAVENOUS at 00:40

## 2021-03-10 RX ADMIN — DIBASIC SODIUM PHOSPHATE, MONOBASIC POTASSIUM PHOSPHATE AND MONOBASIC SODIUM PHOSPHATE 2 TABLET: 852; 155; 130 TABLET ORAL at 09:29

## 2021-03-10 RX ADMIN — ARFORMOTEROL TARTRATE 15 MCG: 15 SOLUTION RESPIRATORY (INHALATION) at 09:54

## 2021-03-10 RX ADMIN — SODIUM CHLORIDE, PRESERVATIVE FREE 10 ML: 5 INJECTION INTRAVENOUS at 09:38

## 2021-03-10 RX ADMIN — HEPARIN SODIUM 5000 UNITS: 5000 INJECTION INTRAVENOUS; SUBCUTANEOUS at 05:57

## 2021-03-10 RX ADMIN — BUDESONIDE 500 MCG: 0.5 SUSPENSION RESPIRATORY (INHALATION) at 09:50

## 2021-03-10 RX ADMIN — PANTOPRAZOLE SODIUM 20 MG: 20 TABLET, DELAYED RELEASE ORAL at 09:30

## 2021-03-10 RX ADMIN — SPIRONOLACTONE 25 MG: 25 TABLET ORAL at 09:30

## 2021-03-10 RX ADMIN — CARVEDILOL 3.12 MG: 3.12 TABLET, FILM COATED ORAL at 09:29

## 2021-03-10 RX ADMIN — ASPIRIN 81 MG: 81 TABLET, COATED ORAL at 09:29

## 2021-03-10 RX ADMIN — PREDNISONE 30 MG: 20 TABLET ORAL at 09:29

## 2021-03-10 RX ADMIN — FOLIC ACID 1 MG: 1 TABLET ORAL at 09:30

## 2021-03-10 RX ADMIN — QUETIAPINE FUMARATE 25 MG: 25 TABLET ORAL at 09:30

## 2021-03-10 ASSESSMENT — ENCOUNTER SYMPTOMS
NAUSEA: 0
SORE THROAT: 0
SHORTNESS OF BREATH: 1
VOMITING: 0
COLOR CHANGE: 0
BLOOD IN STOOL: 0
WHEEZING: 0
TROUBLE SWALLOWING: 1

## 2021-03-10 ASSESSMENT — PAIN DESCRIPTION - LOCATION: LOCATION: GENERALIZED

## 2021-03-10 ASSESSMENT — PAIN SCALES - GENERAL: PAINLEVEL_OUTOF10: 0

## 2021-03-10 ASSESSMENT — PAIN DESCRIPTION - PAIN TYPE: TYPE: ACUTE PAIN

## 2021-03-10 ASSESSMENT — PAIN SCALES - WONG BAKER: WONGBAKER_NUMERICALRESPONSE: 0

## 2021-03-10 NOTE — PROGRESS NOTES
[facility]  Office: [address], DO, Torsten Brunson, DO, Nette Vazquez, DO, Anand Bruno, DO, Roque Bella MD, Thiago Alan MD, Hussein Casper MD, Enzo Sandar MD, Candice Wade MD, Tai Majano MD, Tresa Root MD, Peña Engle MD, Alexandru Ortez MD, Dayna Granados, DO, Ezequiel Bishop MD, Candie Boas, DO, Jad Tabor MD,  Kashif Lima DO, Deena Minor MD, Rueben Severin, MD, [facility], [facility], [facility] Omalley, CNP, Samira Chu, CNP, Maurisio Lynn, CNS, Jose Carlos Kaplan, CNP, Bhavin Ba, CNP, Karthik Waters, CNP, Liya Abernathy, CNP, Taylor Nagy, CNP, Parth Sales PA-C, Filomena Slaughter, [facility], Dixon Matute, CNP, Caridad Byrd, CNP, Fransisco Leo, CNP, Harleen Smith, CNP, Rajani Kitolamide, [facility], [facility]      Daily Progress Note     Admit Date: 3/3/2021  Bed/Room No.  1003/1003-02  Admitting Physician : Hussein Casper MD  Code Status :Full Code  Hospital Day:  LOS: 7 days   Chief Complaint:     Chief Complaint   Patient presents with    Fatigue     From [facility]     Principal Problem:    Acute hypoxemic respiratory failure [facility])  Active Problems:    Heart failure, systolic, with acute decompensation (HCC)EF 20% on echo    COPD (chronic obstructive pulmonary disease) (HCC)    Cocaine abuse ([address]    Type 2 diabetes mellitus without complication, without long-term current use of insulin ([address]    Diabetes mellitus ([address]    Pneumonia due to infectious organism    DEONTE (acute kidney injury) ([address]    Current every day smoker    Essential hypertension    Altered mental status    Hypoxic episode    Fatigue    Metabolic acidosis    Pulmonary arterial hypertension (HCC)moderate    Moderate malnutrition ([address]  Resolved Problems:    * No resolved hospital problems. *    Subjective : Interval History/Significant events :  03/10/21    Patient is oriented to person and self.  She is much more alert, oriented. Patient is eating diet but needs assistance with feeding. She is stable on 4 to 5 L of oxygen by nasal cannula. Patient used BiPAP last night. Vitals - Stable afebrile, low blood pressure. Labs - creatinine improved 1.04. White count 11.3. Nursing notes , Consults notes reviewed. Overnight events and updates discussed with Nursing staff . Background History:         Shani Mina is 77 y.o. female  Who was admitted to the hospital on 3/3/2021 for treatment of Acute hypoxemic respiratory failure ([address] [address] Patient was admitted after she was sent from SNF with fatigue [facility] ). Patient is admitted to the hospital for management of acute on chronic respiratory failure. Patient is poor historian. She was recently admitted in the hospital and had prolonged hospital stay for encephalopathy and respiratory failure. Patient was treated for pneumonia at that time and acute CHF. She was found to have severely reduced ejection fraction of 20%. Patient also had cardiogenic shock and was treated with pressor support. She had extensive work-up for encephalopathy and was evaluated by neurology. Patient was treated for meningitis which was later ruled out with CSF analysis. Patient was also given antiepileptic medication at that time which were later discontinued after ruling out seizures. Patient was subsequently transferred to [place] for LTME monitoring and later Depakote was discontinued and Klonopin weaned off. Patient was discharged to skilled nursing facility with instructions to use 4 to 5 L of oxygen during day and BiPAP at nighttime and continue diuretics for heart failure. She has end-stage COPD with chronic respiratory failure on  oxygen at home. On arrival patient was started on nonrebreather and subsequently placed on high flow.   Work-up showed elevated proBNP 73,215, troponin 59-55, ABG on arrival showed 7.3 1/52/158/26.4  Patient was treated with IV fluids for pneumonia, sepsis. She had worsening of breathing due to acute decompensated CHF. Patient was treated with diuretics. She developed DEONTE. Nephrology was consulted due to ATN and was given gentle hydration. Patient continued to remain confused. PMH:  Past Medical History:   Diagnosis Date    CHF (congestive heart failure) (HCC)     COPD (chronic obstructive pulmonary disease) (HCC)     Diabetes mellitus (HCC)     MRSA (methicillin resistant staph aureus) culture positive 8/28/2014    sputum    Tobacco abuse 10/15/2019      Allergies: Allergies   Allergen Reactions    Tiotropium Anaphylaxis and Swelling    Spiriva Handihaler [Tiotropium Bromide Monohydrate] Swelling      Medications :  phosphorus, 500 mg, Oral, TID  spironolactone, 25 mg, Oral, Daily  torsemide, 20 mg, Oral, Daily  predniSONE, 30 mg, Oral, Daily  sodium chloride flush, 10 mL, Intravenous, 2 times per day  QUEtiapine, 25 mg, Oral, BID  [Held by provider] furosemide, 20 mg, Intravenous, Daily  sodium chloride, 1,000 mL, Intravenous, Once  aspirin, 81 mg, Oral, Daily  thiamine mononitrate, 100 mg, Oral, Daily  sertraline, 50 mg, Oral, Daily  pantoprazole, 20 mg, Oral, Daily  folic acid, 1 mg, Oral, Daily  carvedilol, 3.125 mg, Oral, BID  budesonide, 0.5 mg, Nebulization, BID  atorvastatin, 20 mg, Oral, Daily  Arformoterol Tartrate, 15 mcg, Nebulization, BID  heparin (porcine), 5,000 Units, Subcutaneous, 3 times per day  levetiracetam, 500 mg, Intravenous, Q12H        Review of Systems   Review of Systems   Constitutional: Positive for appetite change and fatigue. HENT: Positive for trouble swallowing. Negative for drooling and sore throat. Eyes: Negative for visual disturbance. Respiratory: Positive for shortness of breath. Negative for wheezing. Gastrointestinal: Negative for blood in stool, nausea and vomiting. Genitourinary: Negative for decreased urine volume, difficulty urinating and hematuria.    Skin: Negative for tenderness. Skin:     General: Skin is warm and dry. Findings: No erythema, lesion or rash. Neurological:      Mental Status: She is alert. Mental status is at baseline. Cranial Nerves: No cranial nerve deficit. Motor: No seizure activity. Psychiatric:         Attention and Perception: She is attentive. Speech: Speech normal.         Behavior: Behavior is not withdrawn. Cognition and Memory: Cognition is impaired. Laboratory findings:    Recent Labs     03/08/21  0336 03/09/21  0427 03/10/21  0625   WBC 9.5 11.3 11.7*   HGB 10.5* 12.1 11.6*   HCT 33.6* 40.5 37.5    252 204     Recent Labs     03/08/21  0336 03/08/21  0336 03/08/21  2154 03/09/21  0427 03/10/21  0625      < > 136 137 145*   K 4.0   < > 4.3 4.1 4.6   *   < > 108* 106 109*   CO2 22   < > 13* 19* 23   GLUCOSE 112*   < > 109* 115* 120*   BUN 41*   < > 38* 37* 33*   CREATININE 1.28*   < > 1.11* 1.04* 1.00*   MG 2.1  --   --  1.9 1.5*   CALCIUM 8.0*   < > 8.2* 8.4* 8.5*   PHOS 3.1  --   --  1.9* 2.6    < > = values in this interval not displayed.      Recent Labs     03/08/21  1531   RF 32.7*          Specific Gravity, UA   Date Value Ref Range Status   03/04/2021 1.025 1.005 - 1.030 Final     Protein, UA   Date Value Ref Range Status   03/04/2021 2+ (A) NEGATIVE Final     RBC, UA   Date Value Ref Range Status   03/04/2021 2 TO 5 0 - 2 /HPF Final     Bacteria, UA   Date Value Ref Range Status   03/04/2021 RARE (A) None Final     Nitrite, Urine   Date Value Ref Range Status   03/04/2021 NEGATIVE NEGATIVE Final     WBC, UA   Date Value Ref Range Status   03/04/2021 5 TO 10 0 - 5 /HPF Final     Leukocyte Esterase, Urine   Date Value Ref Range Status   03/04/2021 TRACE (A) NEGATIVE Final       Imaging / Clinical Data :-   Ct Head Wo Contrast    Result Date: 3/3/2021  No acute intracranial abnormality on a mildly motion degraded exam.     Xr Chest Portable    Result Date: 3/6/2021  Stable exam since yesterday. Xr Chest Portable    Result Date: 3/5/2021  Stable moderate cardiomegaly and moderate pulmonary interstitial edema. Xr Chest Portable    Result Date: 3/3/2021  Bilateral diffuse ground-glass and interstitial opacities, similar to the radiograph obtained earlier today. Differential considerations include pulmonary edema versus atypical or viral pneumonia. Xr Chest Portable    Result Date: 3/3/2021  Bilateral airspace disease with small effusions, most consistent with edema, has progressed in the interval.     Us Retroperitoneal Complete    Result Date: 3/5/2021  Right greater than left renal atrophy with an echogenic right kidney which can be seen in the setting of medical renal disease. Incidental note of cholelithiasis and a right pleural effusion. Clinical Course : unchanged  Assessment and Plan  :        1. Acute on chronic respiratory failure with hypoxia - continue oxygen supplement. BPAP at night. 2. Acute on chronic systolic and diastolic CHF-EF 44% -poor candidate for ischemic work-up. Follow-up outpatient with cardiology. 3. Moderate to severe pulmonary hypertension -oxygen supplement. Diuretics were held due to DEONTE with ATN. Nephrology managing. Started on Demadex 20 mg daily, Aldactone 25 mg daily. 4. End-stage COPD -bronchodilators. 5. Type 2 diabetes mellitus  6. Essential hypertension - low blood pressure. Low-dose Coreg  7. DEONTE -due to ATN -improved with IV fluids. 8. Nonsustained V. Tach -continue Coreg. Maintain mag > 2.0, K > 4.0.  9. Pneumonia -ruled out. Antibiotics stopped. 10. Metabolic encephalopathy secondary to pneumonia. 11. Hyperkalemia -   12. H/o cocaine abuse sober for 15 years    Poor prognosis   BPAP at night   I called patient's grand son Leonora Alatorre [facility] ) and discussed with him about code status.  Leonora Alatorre would like to change Code status to [facility] and does not want compression, defibrillation, resuscitative medications or intubation in case of respiratory or cardiac arrest.   I called RN Kash Hickman and updated her and I was informed that patient's niece is at bed side and wants to fill the paperwork. Discharge planning to skilled nursing facility. Discussed with pulmonary attending Dr. Hoda Hernandez. Nephrology and ID note reviewed.   Plan discussed with Staff  Kash Hickman RN     (Please note that portions of this note were completed with a voice recognition program. Efforts were made to edit the dictations but occasionally words are mis-transcribed.)      Suzette Lopez MD  3/10/2021

## 2021-03-10 NOTE — CARE COORDINATION
Social work faxed ja to [facility]. Called Sharon Medina who will notify Bess Michel [phone]. Called snf. Faxed ja to admissions.  Kvng Abbasi [facility]

## 2021-03-10 NOTE — DISCHARGE SUMMARY
[facility]  Office: [phone]  Rebecca Bruno DO, Thomas Clifford MD, Isma Plata MD, Celina Devine MD, Lilly Petty MD, Nidia Wilks MD, Denisse Guillaume MD, Saray Mock MD, Wilda Gan MD, Alexandru Locke MD, Nalini Salmeron DO, Jewel Brooks MD, Brandie Kessler MD, Rylee Butler DO, Lynne George MD,  Zia Roberson DO, Charles Blount MD, Magdy Waddell MD, Goldie Holguin CNP, [facility] CNP, Kenneth Fuentes CNP, Mansi Mejia CNS, Yordan Mc CNP, Haily Castro CNP, Ajay Hendrix CNP, Liya Abernathy CNP, Yolis Olmstead CNP, Dileep Linares PA-C, Yunier Hogan CNP, Ede Quevedo CNP, Christos Shin CNP, Jesus Huff CNP, Shannan Silver CNP, Ghulam Dixonyolanda [facility]      Discharge Summary     Patient ID: Rosmery Lynch  :  1945   MRN: [id number]     ACCOUNT:  [de-identified]   Patient Location : [facility]  Patient's PCP: Godfrey Rangel MD  Admit Date: 3/3/2021   Discharge Date: 3/10/2021     Length of Stay: 7  Code Status:  Prior  Admitting Physician: Susan Toure MD  Discharge Physician: Celina Devine MD     Active Discharge Diagnosis :     Primary Problem  Acute hypoxemic respiratory failure [facility] Problems    Diagnosis Date Noted    Heart failure, systolic, with acute decompensation (HCC)EF 20% on echo [I50.23] 2021     Priority: High    Moderate malnutrition ([facility] [E44.0] 2021    Pulmonary arterial hypertension (HCC)moderate [I27.21]     Metabolic acidosis [K66.6] 2021    Hypoxic episode [R09.02] 2021    Acute hypoxemic respiratory failure ([facility] [J96.01] 2021    Fatigue [R53.83] 2021    Altered mental status [R41.82] 2021    Current every day smoker [F17.200] 2021    Essential hypertension [I10] 2021    DEONTE (acute kidney injury) ([facility] [N17.9] 2021    Pneumonia due to infectious organism [J18.9] 08/20/2020    Diabetes mellitus ([facility] [E11.9]     Type 2 diabetes mellitus without complication, without long-term current use of insulin ([facility] [E11.9] 08/30/2014    COPD (chronic obstructive pulmonary disease) ([facility] [J44.9] 08/28/2014    Cocaine abuse ([facility] [F14.10] 08/28/2014       Admission Condition:  fair     Discharged Condition: stable    Hospital Stay:     Hospital Course:  Kayla Cisneros is a 77 y.o. female who was admitted for the management of   Acute hypoxemic respiratory failure ([facility] , [facility] to ER with [facility])  Patient was admitted after she was sent from SNF with fatigue [facility] ). Patient is admitted to the hospital for management of acute on chronic respiratory failure. Patient is poor historian. She was recently admitted in the hospital and had prolonged hospital stay for encephalopathy and respiratory failure. Patient was treated for pneumonia at that time and acute CHF. She was found to have severely reduced ejection fraction of 20%. Patient also had cardiogenic shock and was treated with pressor support. She had extensive work-up for encephalopathy and was evaluated by neurology. Patient was treated for meningitis which was later ruled out with CSF analysis. Patient was also given antiepileptic medication at that time which were later discontinued after ruling out seizures. Patient was subsequently transferred to [facility] for LTME monitoring and later Depakote was discontinued and Klonopin weaned off. Patient was discharged to skilled nursing facility with instructions to use 4 to 5 L of oxygen during day and BiPAP at nighttime and continue diuretics for heart failure. She has end-stage COPD with chronic respiratory failure on  oxygen at home. On arrival patient was started on nonrebreather and subsequently placed on high flow.   Work-up showed elevated proBNP 73,215, troponin 59-55, ABG on arrival showed 7.3 1/52/158/26.4  Patient was treated with IV fluids for pneumonia, sepsis. She had worsening of breathing due to acute decompensated CHF. Patient was treated with diuretics. She developed DEONTE. Nephrology was consulted due to ATN and was given gentle hydration and kidney function improved. She was started on aldactone and demadex at discharge. Patient was stable on oxygen supplement during day and BPAP at night . Family changed code status to [facility] no intubation at discharge. Significant therapeutic interventions:   1. Acute on chronic respiratory failure with hypoxia - continue oxygen supplement during day and BPAP at night 14/8 setting. 2. Acute on chronic systolic and diastolic CHF-EF 97% -poor candidate for ischemic work-up. Follow-up outpatient with cardiology. 3. Moderate to severe pulmonary hypertension -oxygen supplement. Diuretics were held due to DEONTE with ATN. Nephrology managing. Started on Demadex 20 mg daily, Aldactone 25 mg daily. 4. End-stage COPD -bronchodilators. 5. Type 2 diabetes mellitus  6. Essential hypertension - low blood pressure. Low-dose Coreg  7. DEONTE -due to ATN -improved with IV fluids. 8. Nonsustained V. Tach -continue Coreg. Maintain mag > 2.0, K > 4.0.  9. Pneumonia -ruled out. Antibiotics stopped. 10. Metabolic encephalopathy secondary to pneumonia. 11. Hyperkalemia -   12.  H/o cocaine abuse sober for 15 years    Significant Diagnostic Studies:   Labs / Micro:/Radiology  Recent Labs     03/10/21  0625 03/09/21  0427 03/08/21  0336   WBC 11.7* 11.3 9.5   HGB 11.6* 12.1 10.5*   HCT 37.5 40.5 33.6*   MCV 83.3 84.0 82.0*    252 255     Labs Renal Latest Ref Rng & Units 3/10/2021 3/9/2021 3/8/2021 3/8/2021 3/8/2021   BUN 8 - 23 mg/dL 33(H) 37(H) 38(H) 39(H) 40(H)   Cr 0.50 - 0.90 mg/dL 1.00(H) 1.04(H) 1.11(H) 1.20(H) 1.24(H)   K 3.7 - 5.3 mmol/L 4.6 4.1 4.3 3.9 4.1   Na 135 - 144 mmol/L 145(H) 137 136 139 142     Lab Results   Component Value Date    ALT 29 03/03/2021    AST 63 (H) 03/03/2021    ALKPHOS 95 03/03/2021    BILITOT 0.87 03/03/2021     Lab Results   Component Value Date    TSH 5.35 (H) 02/17/2021     Lab Results   Component Value Date    HEPAIGM NONREACTIVE 08/29/2014    HEPBIGM NONREACTIVE 08/29/2014    HEPCAB REACTIVE (A) 03/07/2021     Lab Results   Component Value Date    COLORU YELLOW 03/04/2021    NITRU NEGATIVE 03/04/2021    GLUCOSEU NEGATIVE 03/04/2021    GLUCOSEU NEGATIVE 10/24/2011    KETUA NEGATIVE 03/04/2021    UROBILINOGEN Normal 03/04/2021    BILIRUBINUR NEGATIVE 03/04/2021    BILIRUBINUR NEGATIVE 10/24/2011     Lab Results   Component Value Date    LABA1C 6.2 (H) 02/07/2021     Lab Results   Component Value Date     02/07/2021     Lab Results   Component Value Date    INR 1.3 02/08/2021    INR 1.4 02/06/2021    PROTIME 16.4 (H) 02/08/2021    PROTIME 16.6 (H) 02/06/2021       Xr Chest Portable    Result Date: 3/6/2021  Stable exam since yesterday. Xr Chest Portable    Result Date: 3/5/2021  Stable moderate cardiomegaly and moderate pulmonary interstitial edema. Xr Chest Portable    Result Date: 3/3/2021  Bilateral diffuse ground-glass and interstitial opacities, similar to the radiograph obtained earlier today. Differential considerations include pulmonary edema versus atypical or viral pneumonia. Us Retroperitoneal Complete    Result Date: 3/5/2021  Right greater than left renal atrophy with an echogenic right kidney which can be seen in the setting of medical renal disease. Incidental note of cholelithiasis and a right pleural effusion.              Consultations:    Consults:     Final Specialist Recommendations/Findings:   IP CONSULT TO HOSPITALIST  IP CONSULT TO PULMONOLOGY  IP CONSULT TO CARDIOLOGY  IP CONSULT TO NEPHROLOGY  IP CONSULT TO INFECTIOUS DISEASES      The patient was seen and examined on day of discharge and this discharge summary is in conjunction with any daily progress note from day of discharge. Discharge plan:     Disposition: SNF      Physician Follow Up: Follow up with Cardiology at [facility]   Follow up with nephrology Dr Jesus Skelton     No follow-up provider specified. Requiring Further Evaluation/Follow Up POST HOSPITALIZATION/Incidental Findings: medication as advised. Aspiration precautions. Diet: cardiac diet    Activity: As tolerated.  PT OT     Instructions to Patient: BMP in 1 week     Discharge Medications:      Medication List      START taking these medications    predniSONE 20 MG tablet  Commonly known as: DELTASONE  Take 1 tablet by mouth daily for 5 days     QUEtiapine 25 MG tablet  Commonly known as: SEROQUEL  Take 1 tablet by mouth 2 times daily     spironolactone 25 MG tablet  Commonly known as: ALDACTONE  Take 1 tablet by mouth daily     torsemide 20 MG tablet  Commonly known as: DEMADEX  Take 1 tablet by mouth daily        CONTINUE taking these medications    albuterol (2.5 MG/3ML) 0.083% nebulizer solution  Commonly known as: PROVENTIL  Take 3 mLs by nebulization every 6 hours as needed for Wheezing or Shortness of Breath     Arformoterol Tartrate 15 MCG/2ML Nebu  Commonly known as: BROVANA  Take 2 mLs by nebulization 2 times daily     aspirin 81 MG tablet     atorvastatin 20 MG tablet  Commonly known as: LIPITOR     budesonide 0.5 MG/2ML nebulizer suspension  Commonly known as: PULMICORT  Take 2 mLs by nebulization 2 times daily     calcium carbonate-vitamin D3 600-400 MG-UNIT Tabs per tab  Commonly known as: CALTRATE  Take 1 tablet by mouth daily     calcium-vitamin D 500-200 MG-UNIT per tablet  Commonly known as: OSCAL-500     carvedilol 3.125 MG tablet  Commonly known as: COREG  Take 1 tablet by mouth 2 times daily     folic acid 1 MG tablet  Commonly known as: FOLVITE  Take 1 tablet by mouth daily     levETIRAcetam 500 MG tablet  Commonly known as: KEPPRA  Take 1 tablet by mouth 2 times daily     lisinopril 2.5 MG tablet  Commonly known as: PRINIVIL;ZESTRIL  Take 1 tablet by mouth daily     pantoprazole 20 MG tablet  Commonly known as: PROTONIX  Take 1 tablet by mouth daily     polyethylene glycol 17 g packet  Commonly known as: GLYCOLAX  Take 17 g by mouth daily as needed for Constipation     sertraline 50 MG tablet  Commonly known as: ZOLOFT     thiamine 100 MG tablet  Take 1 tablet by mouth daily        STOP taking these medications    CERTAVITE SENIOR PO     furosemide 40 MG tablet  Commonly known as: LASIX     loratadine 10 MG tablet  Commonly known as: CLARITIN           Where to Get Your Medications      These medications were sent to [address] [place] [address]  [facility] [address]    Phone: [phone]   · predniSONE 20 MG tablet  · QUEtiapine 25 MG tablet  · spironolactone 25 MG tablet  · torsemide 20 MG tablet         Time Spent on discharge is  35 mins in patient examination, evaluation, counseling as well as medication reconciliation, prescriptions for required medications, discharge plan and follow up. Electronically signed by   Hansel Hutchinson MD  3/10/2021        Thank you Dr. Sonny Daily MD for the opportunity to be involved in this patient's care.

## 2021-03-10 NOTE — PROGRESS NOTES
Tried multiple times to call report with no answer. At this time reached Eula Christian RN and gave report on patient. States facility very busy and just now able to answer.

## 2021-03-10 NOTE — PROGRESS NOTES
Patient discharged via [facility]. Attempted to call report to [facility], no answer.   TAMARA completed and signed per writer and physician for  Discharge packet given to EMS to deliver to RN receiving patient       Telemetry monitor returned

## 2021-03-10 NOTE — PROGRESS NOTES
Nephrology Progress Note      SUBJECTIVE       Pt was seen and examined. No acute issues overnite. Stable hemodynamics . Last 24 hours, urine output has been excellent at around 3.2 L. She denies any orthopnea. Does not have much in terms of lower extremity edema. No nausea, fever or chills. No chest pain. Serum creatinine stable at 1.0. OBJECTIVE      CURRENT TEMPERATURE:  Temp: 97.9 °F (36.6 °C)  MAXIMUM TEMPERATURE OVER 24HRS:  Temp (24hrs), Av.6 °F (36.4 °C), Min:97.3 °F (36.3 °C), Max:97.9 °F (36.6 °C)    CURRENT RESPIRATORY RATE:  Resp: 20  CURRENT PULSE:  Pulse: 65  CURRENT BLOOD PRESSURE:  BP: (!) 142/95  24HR BLOOD PRESSURE RANGE:  Systolic (88TGX), XXQ:863 , Min:110 , VJU:726   ; Diastolic (17LVL), IQP:42, Min:67, Max:95    24HR INTAKE/OUTPUT:      Intake/Output Summary (Last 24 hours) at 3/10/2021 1012  Last data filed at 3/10/2021 0441  Gross per 24 hour   Intake 360 ml   Output 3200 ml   Net -2840 ml     WEIGHT :  Patient Vitals for the past 96 hrs (Last 3 readings):   Weight   03/10/21 0449 157 lb 6.4 oz (71.4 kg)   21 0550 146 lb (66.2 kg)   21 0532 134 lb 9.6 oz (61.1 kg)     PHYSICAL EXAM      GENERAL APPEARANCE:Awake, alert, in no acute distress  SKIN: warm and dry, no rash or erythema  EYES: conjunctivae normal and sclera anicteric  ENT: no thrush no pharyngeal congestion   NECK:   No JVD. No carotid bruits and no carotid lymphadenopathy . PULMONARY: lungs are quite clear on  auscultation. No Wheezing, no ronchi . CADRDIOVASCULAR: S1 and S2 normal NO S3 and NO S4 . No rubs , no murmur. ABDOMEN: soft nontender, bowel sounds present, no organomegaly, no ascites.      EXTREMITIES: no cyanosis, clubbing or edema     CURRENT MEDICATIONS      phosphorus (K PHOS NEUTRAL) tablet 2 tablet, TID  spironolactone (ALDACTONE) tablet 25 mg, Daily  torsemide (DEMADEX) tablet 20 mg, Daily  predniSONE (DELTASONE) tablet 30 mg, Daily  dextrose 5 % solution, Continuous  haloperidol lactate (HALDOL) injection 2 mg, Q6H PRN  sodium chloride flush 0.9 % injection 10 mL, 2 times per day  sodium chloride flush 0.9 % injection 10 mL, PRN  QUEtiapine (SEROQUEL) tablet 25 mg, BID  [Held by provider] furosemide (LASIX) injection 20 mg, Daily  0.9 % sodium chloride bolus, Once  aspirin EC tablet 81 mg, Daily  thiamine mononitrate tablet 100 mg, Daily  sertraline (ZOLOFT) tablet 50 mg, Daily  polyethylene glycol (GLYCOLAX) packet 17 g, Daily PRN  pantoprazole (PROTONIX) tablet 20 mg, Daily  folic acid (FOLVITE) tablet 1 mg, Daily  carvedilol (COREG) tablet 3.125 mg, BID  budesonide (PULMICORT) nebulizer suspension 500 mcg, BID  atorvastatin (LIPITOR) tablet 20 mg, Daily  Arformoterol Tartrate (BROVANA) nebulizer solution 15 mcg, BID  promethazine (PHENERGAN) tablet 12.5 mg, Q6H PRN    Or  ondansetron (ZOFRAN) injection 4 mg, Q6H PRN  acetaminophen (TYLENOL) tablet 650 mg, Q6H PRN    Or  acetaminophen (TYLENOL) suppository 650 mg, Q6H PRN  albuterol (PROVENTIL) nebulizer solution 2.5 mg, Q2H PRN  heparin (porcine) injection 5,000 Units, 3 times per day  levETIRAcetam (KEPPRA) 500 mg in sodium chloride 0.9 % 100 mL IVPB, Q12H          LABS      CBC:   Recent Labs     03/08/21  0336 03/09/21  0427 03/10/21  0625   WBC 9.5 11.3 11.7*   RBC 4.10 4.82 4.50   HGB 10.5* 12.1 11.6*   HCT 33.6* 40.5 37.5   MCV 82.0* 84.0 83.3   MCH 25.6 25.1* 25.8   MCHC 31.3 29.9 30.9   RDW 29.2* 29.2* 28.8*    252 204   MPV 11.3 10.9 10.4      BMP:   Recent Labs     03/08/21  2154 03/09/21  0427 03/10/21  0625    137 145*   K 4.3 4.1 4.6   * 106 109*   CO2 13* 19* 23   BUN 38* 37* 33*   CREATININE 1.11* 1.04* 1.00*   GLUCOSE 109* 115* 120*   CALCIUM 8.2* 8.4* 8.5*      BNP:  Lab Results   Component Value Date    BNP 29 01/03/2014     PHOSPHORUS:    Recent Labs     03/08/21  0336 03/09/21  0427 03/10/21  0625   PHOS 3.1 1.9* 2.6     MAGNESIUM:   Recent Labs     03/08/21  0336 03/09/21  0427 03/10/21  0625   MG 2.1 1.9 1.5*     DAPHNE:   Lab Results   Component Value Date    DAPHNE NEGATIVE 03/07/2021       SPEP:   Lab Results   Component Value Date    PROT 7.3 03/05/2021    ALBCAL 3.4 03/05/2021    ALBPCT 46 03/05/2021    LABALPH 0.2 03/05/2021    LABALPH 0.9 03/05/2021    A1PCT 3 03/05/2021    A2PCT 12 03/05/2021    LABBETA 0.8 03/05/2021    BETAPCT 11 03/05/2021    GAMGLOB 2.0 03/05/2021    GGPCT 28 03/05/2021    PATH ELECTRONICALLY SIGNED. Destiny Martinez M.D. 03/05/2021    PATH ELECTRONICALLY SIGNED. Destiny Martinez M.D. 03/05/2021       HEPBSAG:  Lab Results   Component Value Date    HEPBSAG NONREACTIVE 03/07/2021     HEPCAB:  Lab Results   Component Value Date    HEPCAB REACTIVE 03/07/2021     C3:   Lab Results   Component Value Date    C3 95 03/07/2021     C4:   Lab Results   Component Value Date    C4 24 03/07/2021     MPO ANCA:   Lab Results   Component Value Date    MPO 65 03/07/2021    . PR3 ANCA:    Lab Results   Component Value Date    PR3 26 03/07/2021     URINE SODIUM:    Lab Results   Component Value Date    ULISES 30 03/05/2021      URINE CREATININE:    Lab Results   Component Value Date    LABCREA 164.7 03/05/2021     URINE EOSINOPHILS:   Lab Results   Component Value Date    UREO NONE SEEN 02/07/2021     URINALYSIS:  U/A:   Lab Results   Component Value Date    NITRU NEGATIVE 03/04/2021    COLORU YELLOW 03/04/2021    PHUR 6.0 03/04/2021    WBCUA 5 TO 10 03/04/2021    RBCUA 2 TO 5 03/04/2021    MUCUS NOT REPORTED 03/04/2021    TRICHOMONAS NOT REPORTED 03/04/2021    YEAST NOT REPORTED 03/04/2021    BACTERIA RARE 03/04/2021    SPECGRAV 1.025 03/04/2021    LEUKOCYTESUR TRACE 03/04/2021    UROBILINOGEN Normal 03/04/2021    BILIRUBINUR NEGATIVE 03/04/2021    BILIRUBINUR NEGATIVE 10/24/2011    GLUCOSEU NEGATIVE 03/04/2021    GLUCOSEU NEGATIVE 10/24/2011    KETUA NEGATIVE 03/04/2021    AMORPHOUS NOT REPORTED 03/04/2021       ASSESSMENT      1.  DEONTE secondary to prerenal insult from decreased circulatory volume with a creatinine that had gone up to 1.8. Her baseline creatinine is 1.0. Today's creatinine is down to baseline. 2. HTN:  3.  CKD stage III with a creatinine baseline of around 1. This is likely secondary to nephrosclerosis. 4.  History of having severe ischemic cardiomyopathy with ejection fraction of around 20%. 5.  History of diabetes type 2 with diabetic renal disease, nonnephrotic proteinuria  6. History of positive hep C    PLAN      1. From renal standpoint her creatinine is stable, she has diuresed pretty well. Complements reviewed, both normal, does not seem to have any evidence of cryoglobulinemia GN in face of hep C.   2.  Continue current diuretics  3. Renal standpoint okay for discharge anytime. 4.  We will sign off, please call back if any questions arise      Please do not hesitate to call with questions.     This note is created with the assistance of a speech-recognition program. While intending to generate a document that actually reflects the content of the visit, no guarantees can be provided that every mistake has been identified and corrected by editing    Electronically signed by Lexi Rothman MD on 3/10/2021 at 10:12 AM

## 2021-03-11 ENCOUNTER — HOSPITAL ENCOUNTER (OUTPATIENT)
Age: 76
Setting detail: SPECIMEN
Discharge: HOME | End: 2021-03-11
Payer: MEDICARE

## 2021-03-11 LAB
ANION GAP SERPL CALCULATED.3IONS-SCNC: 17 MMOL/L (ref 9–17)
BUN BLDV-MCNC: 26 MG/DL (ref 8–23)
BUN/CREAT BLD: 35 (ref 9–20)
CALCIUM SERPL-MCNC: 9.1 MG/DL (ref 8.6–10.4)
CHLORIDE BLD-SCNC: 103 MMOL/L (ref 98–107)
CO2: 20 MMOL/L (ref 20–31)
CREAT SERPL-MCNC: 0.75 MG/DL (ref 0.5–0.9)
GFR AFRICAN AMERICAN: >60 ML/MIN
GFR NON-AFRICAN AMERICAN: >60 ML/MIN
GFR SERPL CREATININE-BSD FRML MDRD: ABNORMAL ML/MIN/{1.73_M2}
GFR SERPL CREATININE-BSD FRML MDRD: ABNORMAL ML/MIN/{1.73_M2}
GLUCOSE BLD-MCNC: 103 MG/DL (ref 70–99)
HCT VFR BLD CALC: 37 % (ref 36.3–47.1)
HEMOGLOBIN: 10.9 G/DL (ref 11.9–15.1)
MCH RBC QN AUTO: 25.4 PG (ref 25.2–33.5)
MCHC RBC AUTO-ENTMCNC: 29.5 G/DL (ref 28.4–34.8)
MCV RBC AUTO: 86.2 FL (ref 82.6–102.9)
NRBC AUTOMATED: 0.3 PER 100 WBC
PDW BLD-RTO: 29.7 % (ref 11.8–14.4)
PLATELET # BLD: ABNORMAL K/UL (ref 138–453)
PLATELET, FLUORESCENCE: 197 K/UL (ref 138–453)
PLATELET, IMMATURE FRACTION: 6.1 % (ref 1.1–10.3)
PMV BLD AUTO: ABNORMAL FL (ref 8.1–13.5)
POTASSIUM SERPL-SCNC: 4.1 MMOL/L (ref 3.7–5.3)
RBC # BLD: 4.29 M/UL (ref 3.95–5.11)
SODIUM BLD-SCNC: 140 MMOL/L (ref 135–144)
WBC # BLD: 13.5 K/UL (ref 3.5–11.3)

## 2021-03-11 PROCEDURE — 80048 BASIC METABOLIC PNL TOTAL CA: CPT

## 2021-03-11 PROCEDURE — 36415 COLL VENOUS BLD VENIPUNCTURE: CPT

## 2021-03-11 PROCEDURE — 85055 RETICULATED PLATELET ASSAY: CPT

## 2021-03-11 PROCEDURE — 85027 COMPLETE CBC AUTOMATED: CPT

## 2021-03-11 PROCEDURE — P9603 ONE-WAY ALLOW PRORATED MILES: HCPCS

## 2021-03-12 LAB — CRYOGLOBULIN: 0 MG/DL (ref 0–10)

## 2021-03-14 LAB — CRYOGLOBULIN: 0 MG/DL (ref 0–10)

## 2021-03-17 ENCOUNTER — HOSPITAL ENCOUNTER (OUTPATIENT)
Age: 76
Setting detail: SPECIMEN
Discharge: HOME | End: 2021-03-17
Payer: MEDICARE

## 2021-03-17 LAB
-: ABNORMAL
AMORPHOUS: ABNORMAL
BACTERIA: ABNORMAL
BILIRUBIN URINE: NEGATIVE
CASTS UA: ABNORMAL /LPF (ref 0–2)
CASTS UA: ABNORMAL /LPF (ref 0–2)
COLOR: ABNORMAL
COMMENT UA: ABNORMAL
CRYSTALS, UA: ABNORMAL /HPF
EPITHELIAL CELLS UA: ABNORMAL /HPF (ref 0–5)
GLUCOSE URINE: NEGATIVE
KETONES, URINE: NEGATIVE
LEUKOCYTE ESTERASE, URINE: ABNORMAL
MUCUS: ABNORMAL
NITRITE, URINE: NEGATIVE
OTHER OBSERVATIONS UA: ABNORMAL
PH UA: 7.5 (ref 5–8)
PROTEIN UA: ABNORMAL
RBC UA: ABNORMAL /HPF (ref 0–2)
RENAL EPITHELIAL, UA: ABNORMAL /HPF
SPECIFIC GRAVITY UA: 1.02 (ref 1–1.03)
TRICHOMONAS: ABNORMAL
TURBIDITY: ABNORMAL
URINE HGB: ABNORMAL
UROBILINOGEN, URINE: ABNORMAL
WBC UA: ABNORMAL /HPF (ref 0–5)
YEAST: ABNORMAL

## 2021-03-17 PROCEDURE — 87086 URINE CULTURE/COLONY COUNT: CPT

## 2021-03-17 PROCEDURE — 81001 URINALYSIS AUTO W/SCOPE: CPT

## 2021-03-18 ENCOUNTER — HOSPITAL ENCOUNTER (OUTPATIENT)
Age: 76
Setting detail: SPECIMEN
Discharge: HOME | End: 2021-03-18
Payer: MEDICARE

## 2021-03-18 LAB
ANION GAP SERPL CALCULATED.3IONS-SCNC: 11 MMOL/L (ref 9–17)
BUN BLDV-MCNC: 13 MG/DL (ref 8–23)
BUN/CREAT BLD: ABNORMAL (ref 9–20)
CALCIUM SERPL-MCNC: 8.3 MG/DL (ref 8.6–10.4)
CHLORIDE BLD-SCNC: 102 MMOL/L (ref 98–107)
CO2: 33 MMOL/L (ref 20–31)
CREAT SERPL-MCNC: 0.66 MG/DL (ref 0.5–0.9)
CULTURE: NORMAL
GFR AFRICAN AMERICAN: >60 ML/MIN
GFR NON-AFRICAN AMERICAN: >60 ML/MIN
GFR SERPL CREATININE-BSD FRML MDRD: ABNORMAL ML/MIN/{1.73_M2}
GFR SERPL CREATININE-BSD FRML MDRD: ABNORMAL ML/MIN/{1.73_M2}
GLUCOSE BLD-MCNC: 97 MG/DL (ref 70–99)
HCT VFR BLD CALC: 34.2 % (ref 36.3–47.1)
HEMOGLOBIN: 10.5 G/DL (ref 11.9–15.1)
Lab: NORMAL
MCH RBC QN AUTO: 25.7 PG (ref 25.2–33.5)
MCHC RBC AUTO-ENTMCNC: 30.7 G/DL (ref 28.4–34.8)
MCV RBC AUTO: 83.8 FL (ref 82.6–102.9)
NRBC AUTOMATED: 0 PER 100 WBC
PDW BLD-RTO: 26.8 % (ref 11.8–14.4)
PLATELET # BLD: ABNORMAL K/UL (ref 138–453)
PLATELET, FLUORESCENCE: 116 K/UL (ref 138–453)
PLATELET, IMMATURE FRACTION: 5 % (ref 1.1–10.3)
PMV BLD AUTO: ABNORMAL FL (ref 8.1–13.5)
POTASSIUM SERPL-SCNC: 3.9 MMOL/L (ref 3.7–5.3)
RBC # BLD: 4.08 M/UL (ref 3.95–5.11)
SODIUM BLD-SCNC: 146 MMOL/L (ref 135–144)
SPECIMEN DESCRIPTION: NORMAL
WBC # BLD: 7.5 K/UL (ref 3.5–11.3)

## 2021-03-18 PROCEDURE — 85055 RETICULATED PLATELET ASSAY: CPT

## 2021-03-18 PROCEDURE — 85027 COMPLETE CBC AUTOMATED: CPT

## 2021-03-18 PROCEDURE — P9603 ONE-WAY ALLOW PRORATED MILES: HCPCS

## 2021-03-18 PROCEDURE — 36415 COLL VENOUS BLD VENIPUNCTURE: CPT

## 2021-03-18 PROCEDURE — 80048 BASIC METABOLIC PNL TOTAL CA: CPT

## 2021-03-26 ENCOUNTER — HOSPITAL ENCOUNTER (OUTPATIENT)
Age: 76
Setting detail: SPECIMEN
Discharge: HOME | End: 2021-03-26
Payer: MEDICARE

## 2021-03-26 LAB
ANION GAP SERPL CALCULATED.3IONS-SCNC: 15 MMOL/L (ref 9–17)
BUN BLDV-MCNC: 13 MG/DL (ref 8–23)
BUN/CREAT BLD: NORMAL (ref 9–20)
CALCIUM SERPL-MCNC: 9.6 MG/DL (ref 8.6–10.4)
CHLORIDE BLD-SCNC: 98 MMOL/L (ref 98–107)
CO2: 25 MMOL/L (ref 20–31)
CREAT SERPL-MCNC: 0.8 MG/DL (ref 0.5–0.9)
GFR AFRICAN AMERICAN: >60 ML/MIN
GFR NON-AFRICAN AMERICAN: >60 ML/MIN
GFR SERPL CREATININE-BSD FRML MDRD: NORMAL ML/MIN/{1.73_M2}
GFR SERPL CREATININE-BSD FRML MDRD: NORMAL ML/MIN/{1.73_M2}
GLUCOSE BLD-MCNC: 81 MG/DL (ref 70–99)
HCT VFR BLD CALC: 41.3 % (ref 36.3–47.1)
HEMOGLOBIN: 12.6 G/DL (ref 11.9–15.1)
MCH RBC QN AUTO: 25.6 PG (ref 25.2–33.5)
MCHC RBC AUTO-ENTMCNC: 30.5 G/DL (ref 28.4–34.8)
MCV RBC AUTO: 83.9 FL (ref 82.6–102.9)
NRBC AUTOMATED: 0 PER 100 WBC
PDW BLD-RTO: 26.4 % (ref 11.8–14.4)
PLATELET # BLD: 291 K/UL (ref 138–453)
PMV BLD AUTO: 11.1 FL (ref 8.1–13.5)
POTASSIUM SERPL-SCNC: 4.4 MMOL/L (ref 3.7–5.3)
RBC # BLD: 4.92 M/UL (ref 3.95–5.11)
SODIUM BLD-SCNC: 138 MMOL/L (ref 135–144)
WBC # BLD: 6.9 K/UL (ref 3.5–11.3)

## 2021-03-26 PROCEDURE — 80048 BASIC METABOLIC PNL TOTAL CA: CPT

## 2021-03-26 PROCEDURE — 36415 COLL VENOUS BLD VENIPUNCTURE: CPT

## 2021-03-26 PROCEDURE — 85027 COMPLETE CBC AUTOMATED: CPT

## 2021-03-26 PROCEDURE — P9603 ONE-WAY ALLOW PRORATED MILES: HCPCS

## 2021-04-20 NOTE — TELEPHONE ENCOUNTER
Patient never called back and we have not received signed smoking cessation referral from PCP. Will close encounter at this time.